# Patient Record
Sex: MALE | Race: WHITE | NOT HISPANIC OR LATINO | Employment: FULL TIME | URBAN - METROPOLITAN AREA
[De-identification: names, ages, dates, MRNs, and addresses within clinical notes are randomized per-mention and may not be internally consistent; named-entity substitution may affect disease eponyms.]

---

## 2020-02-25 ENCOUNTER — HOSPITAL ENCOUNTER (OUTPATIENT)
Dept: RADIOLOGY | Facility: HOSPITAL | Age: 50
Discharge: HOME | End: 2020-02-25
Payer: COMMERCIAL

## 2020-02-25 ENCOUNTER — TRANSCRIBE ORDERS (OUTPATIENT)
Dept: ADMINISTRATIVE | Facility: HOSPITAL | Age: 50
End: 2020-02-25

## 2020-02-25 DIAGNOSIS — M54.2 CERVICALGIA: Primary | ICD-10-CM

## 2020-02-25 DIAGNOSIS — M54.2 CERVICALGIA: ICD-10-CM

## 2020-02-25 PROCEDURE — 72040 X-RAY EXAM NECK SPINE 2-3 VW: CPT

## 2021-01-06 ENCOUNTER — TRANSCRIBE ORDERS (OUTPATIENT)
Dept: ADMINISTRATIVE | Facility: HOSPITAL | Age: 51
End: 2021-01-06

## 2021-01-06 ENCOUNTER — HOSPITAL ENCOUNTER (OUTPATIENT)
Dept: RADIOLOGY | Facility: HOSPITAL | Age: 51
Discharge: HOME | End: 2021-01-06
Payer: COMMERCIAL

## 2021-01-06 DIAGNOSIS — M79.644 PAIN OF RIGHT THUMB: ICD-10-CM

## 2021-01-06 DIAGNOSIS — M79.644 PAIN OF RIGHT THUMB: Primary | ICD-10-CM

## 2021-01-06 PROCEDURE — 73140 X-RAY EXAM OF FINGER(S): CPT

## 2022-01-26 ENCOUNTER — HOSPITAL ENCOUNTER (OUTPATIENT)
Dept: CT IMAGING | Facility: HOSPITAL | Age: 52
Discharge: HOME | End: 2022-01-26
Payer: COMMERCIAL

## 2022-01-26 DIAGNOSIS — R31.9 HEMATURIA: ICD-10-CM

## 2022-01-26 PROCEDURE — 74176 CT ABD & PELVIS W/O CONTRAST: CPT

## 2022-02-10 ENCOUNTER — TELEPHONE (OUTPATIENT)
Dept: UROLOGY | Facility: AMBULATORY SURGERY CENTER | Age: 52
End: 2022-02-10

## 2022-02-10 NOTE — TELEPHONE ENCOUNTER
Please Triage  New Patient- Oj Elias   What is the reason for the patients appointment? Pt calling after being referred from his pcp - he did take medication from this previous visit with pcp  He has now had a ct and pcp said he needs to be seen by urology  Pt describes does have lower flank pain  Can urinate and no fever  No blood currently in urine  Imaging/Lab Results:  Do we accept the patient's insurance or is the patient Self-Pay? Provider & Plan: [facility]   Member ID#:    Has the patient had any previous Urologist(s)?no  Have patient records been requested?no  Has the patient had any outside testing done?no - did have ct with pcp   Does the patient have a personal history of cancer?no  Patient can be reached at : [phone]      PT is scheduled for 3/31 - if this is appropriate no action needed

## 2022-02-11 NOTE — TELEPHONE ENCOUNTER
CT scan abdomen pelvis 1/26/22: IMPRESSION:     1   Bilateral nonobstructive nephrolithiasis  Note is made of mild asymmetric fullness of the left renal pelvis without maile hydronephrosis      2  Borderline mild circumferential mural thickening of the urinary bladder of uncertain clinical significance and possibly related to underdistention      3   5 7 mm nodule involving the lingula  While I favor this finding to be related to nodular atelectasis, small developing lung nodule cannot be entirely excluded  Based on current Fleischner Society 2017 Guidelines on incidental pulmonary nodule, no   routine follow-up is needed if the patient is considered low risk for lung cancer  If the patient is considered high risk for lung cancer, 12 month follow-up non-contrast chest CT is recommended      Please see above for additional incidental findings  Please advise if appointment is appropriate 3/31/22

## 2022-03-31 ENCOUNTER — OFFICE VISIT (OUTPATIENT)
Dept: UROLOGY | Facility: CLINIC | Age: 52
End: 2022-03-31
Payer: COMMERCIAL

## 2022-03-31 VITALS
SYSTOLIC BLOOD PRESSURE: 132 MMHG | HEART RATE: 97 BPM | DIASTOLIC BLOOD PRESSURE: 78 MMHG | WEIGHT: 218.2 LBS | HEIGHT: 67 IN | BODY MASS INDEX: 34.25 KG/M2

## 2022-03-31 DIAGNOSIS — N40.0 BENIGN PROSTATIC HYPERPLASIA WITHOUT LOWER URINARY TRACT SYMPTOMS: ICD-10-CM

## 2022-03-31 DIAGNOSIS — R31.0 GROSS HEMATURIA: Primary | ICD-10-CM

## 2022-03-31 PROCEDURE — 99203 OFFICE O/P NEW LOW 30 MIN: CPT | Performed by: PHYSICIAN ASSISTANT

## 2022-03-31 RX ORDER — AMITRIPTYLINE HYDROCHLORIDE 100 MG/1
200 TABLET, FILM COATED ORAL
COMMUNITY
Start: 2022-03-09

## 2022-03-31 RX ORDER — ATORVASTATIN CALCIUM 10 MG/1
10 TABLET, FILM COATED ORAL DAILY
COMMUNITY
Start: 2022-03-09

## 2022-03-31 RX ORDER — LOSARTAN POTASSIUM 25 MG/1
25 TABLET ORAL DAILY
COMMUNITY
Start: 2022-03-09

## 2022-03-31 NOTE — PROGRESS NOTES
UROLOGY CONSULTATION NOTE     Patient Identifiers: Mary Kay Calvo (MRN: [id number])  Service Requesting Consultation:Andrzej Fenton DO   Service Providing Consultation:  Urology, Emil Laurent PA-C  Consults  Date of Service: 3/31/2022    Reason for Consultation:  Micro hematuria  History of Present Illness:     Mary Kay Calvo is a 45 y o  Male with no prior urologic history is referred from primary care for evaluation of gross  Hematuria  He had one recent episode of gross hematuria  But it sounds like he experienced this before  No lower tract symptoms  Denies any family history of prostate bladder or kidney disease  CT shows small stones in both kidneys  Current  cigarette smoking   Past Medical, Past Surgical History:   History reviewed  No pertinent past medical history :    Past Surgical History:   Procedure Laterality Date    KNEE SURGERY      MANDIBLE FRACTURE SURGERY  1985    PATELLA FRACTURE SURGERY      RECTAL SURGERY     :    Medications, Allergies:     Current Outpatient Medications:     amitriptyline (ELAVIL) 100 mg tablet, 200 mg daily at bedtime, Disp: , Rfl:     atorvastatin (LIPITOR) 10 mg tablet, Take 10 mg by mouth daily, Disp: , Rfl:     losartan (COZAAR) 25 mg tablet, Take 25 mg by mouth daily, Disp: , Rfl:     metFORMIN (GLUCOPHAGE) 500 mg tablet, TAKE 1 TABLET EVERY EVENING MEAL, Disp: , Rfl:     Allergies:  No Known Allergies:    Social and Family History:   Social History:   Social History     Tobacco Use    Smoking status: Current Every Day Smoker     Packs/day: 1 50    Smokeless tobacco: Never Used   Vaping Use    Vaping Use: Never used   Substance Use Topics    Alcohol use: Not Currently    Drug use: Never         Social History     Tobacco Use   Smoking Status Current Every Day Smoker    Packs/day: 1 50   Smokeless Tobacco Never Used       Family History:  Family History   Problem Relation Age of Onset    No Known Problems Father     No Known Problems Mother    :     Review of Systems:     General: Fever, chills, or night sweats: negative  Cardiac: Negative for chest pain  Pulmonary: Negative for shortness of breath  Gastrointestinal: Abdominal pain negative  Nausea, vomiting, or diarrhea negative,  Genitourinary: See HPI above  Patient did  have gross hematuria  All other systems queried were negative  Physical Exam:   General: Patient is pleasant and in NAD  Awake and alert  /78 (BP Location: Left arm, Patient Position: Sitting, Cuff Size: Adult)   Pulse 97   Ht 5' 7" (1 702 m)   Wt 99 kg (218 lb 3 2 oz)   BMI 34 17 kg/m²   HEENT:conjunctiva clear  Constitutional:  pleasant and cooperative     no apparent distress  Cardiac: Peripheral edema: negative  Pulmonary: Non-labored breathing  Abdomen: Soft, non-tender, non-distended  No surgical scars  No masses, tenderness, hernias noted  Genitourinary: Negative CVA tenderness, negative suprapubic tenderness  Extremities:moves all extremities  Neurological:CNII-XII intact  No numbness or tingling  Essentially non focal neurologic exam  Psychiatric:mood affect and behavior normal    (Male): Penis circumcised, phallus normal, meatus patent  Testicles descended into scrotum bilaterally without masses nor tenderness  No inguinal hernias bilaterally  WES: Prostate is not enlarged at 28 grams  The prostate is not boggy  The prostate is not tender  No nodules noted  Labs:   No results found for: HGB, HCT, WBC, PLT]    No results found for: NA, K, CL, CO2, BUN, CREATININE, CALCIUM, GLUCOSE]    Imaging:   I personally reviewed the images and report of the following studies, and reviewed them with the patient:  CT ABDOMEN AND PELVIS WITHOUT IV CONTRAST   IMPRESSION:     1  Bilateral nonobstructive nephrolithiasis  Note is made of mild asymmetric fullness of the left renal pelvis without maile hydronephrosis       2   Borderline mild circumferential mural thickening of the urinary bladder of uncertain clinical significance and possibly related to underdistention  3   5 7 mm nodule involving the lingula  While I favor this finding to be related to nodular atelectasis, small developing lung nodule cannot be entirely excluded  Based on current Fleischner Society 2017 Guidelines on incidental pulmonary nodule, no   routine follow-up is needed if the patient is considered low risk for lung cancer  If the patient is considered high risk for lung cancer, 12 month follow-up non-contrast chest CT is recommended  ASSESSMENT:     1  Microhematuria      PLAN:   -urine for cytology  -office follow up for cystoscopy      Thank you for allowing me to participate in this patients care  Please do not hesitate to call with any additional questions    Ryan Boston PA-C

## 2022-07-03 PROBLEM — R31.0 GROSS HEMATURIA: Status: ACTIVE | Noted: 2022-07-03

## 2022-07-03 PROBLEM — Z71.2 ENCOUNTER TO DISCUSS TEST RESULTS: Status: ACTIVE | Noted: 2022-07-03

## 2022-07-04 ENCOUNTER — TELEPHONE (OUTPATIENT)
Dept: OTHER | Facility: OTHER | Age: 52
End: 2022-07-04

## 2022-07-04 NOTE — TELEPHONE ENCOUNTER
Pt called, requesting a call back from office at best convenience, to reschedule cancelled appointment

## 2024-04-19 ENCOUNTER — APPOINTMENT (EMERGENCY)
Dept: RADIOLOGY | Facility: HOSPITAL | Age: 54
DRG: 987 | End: 2024-04-19
Payer: COMMERCIAL

## 2024-04-19 ENCOUNTER — APPOINTMENT (EMERGENCY)
Dept: CT IMAGING | Facility: HOSPITAL | Age: 54
DRG: 987 | End: 2024-04-19
Payer: COMMERCIAL

## 2024-04-19 ENCOUNTER — HOSPITAL ENCOUNTER (INPATIENT)
Facility: HOSPITAL | Age: 54
LOS: 5 days | Discharge: HOME | DRG: 987 | End: 2024-04-24
Attending: EMERGENCY MEDICINE | Admitting: INTERNAL MEDICINE
Payer: COMMERCIAL

## 2024-04-19 DIAGNOSIS — I10 PRIMARY HYPERTENSION: ICD-10-CM

## 2024-04-19 DIAGNOSIS — F17.200 TOBACCO DEPENDENCE: ICD-10-CM

## 2024-04-19 DIAGNOSIS — I26.09 ACUTE PULMONARY EMBOLISM WITH ACUTE COR PULMONALE, UNSPECIFIED PULMONARY EMBOLISM TYPE (HCC): ICD-10-CM

## 2024-04-19 DIAGNOSIS — R09.02 HYPOXIA: ICD-10-CM

## 2024-04-19 DIAGNOSIS — R93.89 ABNORMAL CT OF THE CHEST: ICD-10-CM

## 2024-04-19 DIAGNOSIS — E11.9 TYPE 2 DIABETES MELLITUS (HCC): ICD-10-CM

## 2024-04-19 DIAGNOSIS — I26.99 ACUTE PULMONARY EMBOLISM (HCC): ICD-10-CM

## 2024-04-19 DIAGNOSIS — J18.9 MULTIFOCAL PNEUMONIA: ICD-10-CM

## 2024-04-19 DIAGNOSIS — R07.82 INTERCOSTAL PAIN: ICD-10-CM

## 2024-04-19 DIAGNOSIS — I26.99 PULMONARY EMBOLI (HCC): Primary | ICD-10-CM

## 2024-04-19 LAB
2HR DELTA HS TROPONIN: 2 NG/L
ALBUMIN SERPL BCP-MCNC: 4.1 G/DL (ref 3.5–5)
ALP SERPL-CCNC: 71 U/L (ref 34–104)
ALT SERPL W P-5'-P-CCNC: 20 U/L (ref 7–52)
ANION GAP SERPL CALCULATED.3IONS-SCNC: 8 MMOL/L (ref 4–13)
APTT PPP: 40 SECONDS (ref 23–37)
AST SERPL W P-5'-P-CCNC: 13 U/L (ref 13–39)
BASOPHILS # BLD AUTO: 0.06 THOUSANDS/ÂΜL (ref 0–0.1)
BASOPHILS NFR BLD AUTO: 1 % (ref 0–1)
BILIRUB SERPL-MCNC: 0.33 MG/DL (ref 0.2–1)
BNP SERPL-MCNC: 11 PG/ML (ref 0–100)
BUN SERPL-MCNC: 21 MG/DL (ref 5–25)
CALCIUM SERPL-MCNC: 9.7 MG/DL (ref 8.4–10.2)
CARDIAC TROPONIN I PNL SERPL HS: 24 NG/L
CARDIAC TROPONIN I PNL SERPL HS: 26 NG/L
CHLORIDE SERPL-SCNC: 104 MMOL/L (ref 96–108)
CO2 SERPL-SCNC: 25 MMOL/L (ref 21–32)
CREAT SERPL-MCNC: 0.74 MG/DL (ref 0.6–1.3)
D DIMER PPP FEU-MCNC: 13.7 UG/ML FEU
EOSINOPHIL # BLD AUTO: 0.17 THOUSAND/ÂΜL (ref 0–0.61)
EOSINOPHIL NFR BLD AUTO: 2 % (ref 0–6)
ERYTHROCYTE [DISTWIDTH] IN BLOOD BY AUTOMATED COUNT: 15.6 % (ref 11.6–15.1)
GFR SERPL CREATININE-BSD FRML MDRD: 103 ML/MIN/1.73SQ M
GLUCOSE SERPL-MCNC: 100 MG/DL (ref 65–140)
HCT VFR BLD AUTO: 38.7 % (ref 36.5–49.3)
HGB BLD-MCNC: 12.1 G/DL (ref 12–17)
IMM GRANULOCYTES # BLD AUTO: 0.06 THOUSAND/UL (ref 0–0.2)
IMM GRANULOCYTES NFR BLD AUTO: 1 % (ref 0–2)
INR PPP: 1.13 (ref 0.84–1.19)
LYMPHOCYTES # BLD AUTO: 3.09 THOUSANDS/ÂΜL (ref 0.6–4.47)
LYMPHOCYTES NFR BLD AUTO: 27 % (ref 14–44)
MCH RBC QN AUTO: 24.7 PG (ref 26.8–34.3)
MCHC RBC AUTO-ENTMCNC: 31.3 G/DL (ref 31.4–37.4)
MCV RBC AUTO: 79 FL (ref 82–98)
MONOCYTES # BLD AUTO: 0.96 THOUSAND/ÂΜL (ref 0.17–1.22)
MONOCYTES NFR BLD AUTO: 8 % (ref 4–12)
NEUTROPHILS # BLD AUTO: 7.11 THOUSANDS/ÂΜL (ref 1.85–7.62)
NEUTS SEG NFR BLD AUTO: 61 % (ref 43–75)
NRBC BLD AUTO-RTO: 0 /100 WBCS
PLATELET # BLD AUTO: 277 THOUSANDS/UL (ref 149–390)
PMV BLD AUTO: 8.8 FL (ref 8.9–12.7)
POTASSIUM SERPL-SCNC: 4.1 MMOL/L (ref 3.5–5.3)
PROT SERPL-MCNC: 7.5 G/DL (ref 6.4–8.4)
PROTHROMBIN TIME: 15.2 SECONDS (ref 11.6–14.5)
RBC # BLD AUTO: 4.89 MILLION/UL (ref 3.88–5.62)
SODIUM SERPL-SCNC: 137 MMOL/L (ref 135–147)
WBC # BLD AUTO: 11.45 THOUSAND/UL (ref 4.31–10.16)

## 2024-04-19 PROCEDURE — 96365 THER/PROPH/DIAG IV INF INIT: CPT

## 2024-04-19 PROCEDURE — 82728 ASSAY OF FERRITIN: CPT

## 2024-04-19 PROCEDURE — 85730 THROMBOPLASTIN TIME PARTIAL: CPT

## 2024-04-19 PROCEDURE — 71275 CT ANGIOGRAPHY CHEST: CPT

## 2024-04-19 PROCEDURE — 85379 FIBRIN DEGRADATION QUANT: CPT

## 2024-04-19 PROCEDURE — 36415 COLL VENOUS BLD VENIPUNCTURE: CPT | Performed by: EMERGENCY MEDICINE

## 2024-04-19 PROCEDURE — 99285 EMERGENCY DEPT VISIT HI MDM: CPT

## 2024-04-19 PROCEDURE — 83550 IRON BINDING TEST: CPT

## 2024-04-19 PROCEDURE — 85025 COMPLETE CBC W/AUTO DIFF WBC: CPT | Performed by: EMERGENCY MEDICINE

## 2024-04-19 PROCEDURE — 83540 ASSAY OF IRON: CPT

## 2024-04-19 PROCEDURE — 83880 ASSAY OF NATRIURETIC PEPTIDE: CPT

## 2024-04-19 PROCEDURE — 87040 BLOOD CULTURE FOR BACTERIA: CPT

## 2024-04-19 PROCEDURE — 96375 TX/PRO/DX INJ NEW DRUG ADDON: CPT

## 2024-04-19 PROCEDURE — 71045 X-RAY EXAM CHEST 1 VIEW: CPT

## 2024-04-19 PROCEDURE — 84145 PROCALCITONIN (PCT): CPT

## 2024-04-19 PROCEDURE — 80053 COMPREHEN METABOLIC PANEL: CPT | Performed by: EMERGENCY MEDICINE

## 2024-04-19 PROCEDURE — 84484 ASSAY OF TROPONIN QUANT: CPT | Performed by: EMERGENCY MEDICINE

## 2024-04-19 PROCEDURE — 93005 ELECTROCARDIOGRAM TRACING: CPT

## 2024-04-19 PROCEDURE — 85610 PROTHROMBIN TIME: CPT

## 2024-04-19 RX ORDER — HEPARIN SODIUM 1000 [USP'U]/ML
3600 INJECTION, SOLUTION INTRAVENOUS; SUBCUTANEOUS EVERY 6 HOURS PRN
Status: DISCONTINUED | OUTPATIENT
Start: 2024-04-19 | End: 2024-04-23

## 2024-04-19 RX ORDER — HEPARIN SODIUM 10000 [USP'U]/100ML
3-30 INJECTION, SOLUTION INTRAVENOUS
Status: DISCONTINUED | OUTPATIENT
Start: 2024-04-19 | End: 2024-04-23

## 2024-04-19 RX ORDER — HEPARIN SODIUM 1000 [USP'U]/ML
7200 INJECTION, SOLUTION INTRAVENOUS; SUBCUTANEOUS ONCE
Status: COMPLETED | OUTPATIENT
Start: 2024-04-19 | End: 2024-04-19

## 2024-04-19 RX ORDER — LIDOCAINE 50 MG/G
1 PATCH TOPICAL ONCE
Status: COMPLETED | OUTPATIENT
Start: 2024-04-19 | End: 2024-04-20

## 2024-04-19 RX ORDER — METHOCARBAMOL 500 MG/1
500 TABLET, FILM COATED ORAL 4 TIMES DAILY PRN
COMMUNITY
Start: 2024-02-27 | End: 2024-05-01

## 2024-04-19 RX ORDER — FAMOTIDINE 40 MG/1
40 TABLET, FILM COATED ORAL DAILY PRN
COMMUNITY
Start: 2024-04-02

## 2024-04-19 RX ORDER — HEPARIN SODIUM 1000 [USP'U]/ML
7200 INJECTION, SOLUTION INTRAVENOUS; SUBCUTANEOUS EVERY 6 HOURS PRN
Status: DISCONTINUED | OUTPATIENT
Start: 2024-04-19 | End: 2024-04-23

## 2024-04-19 RX ORDER — INSULIN LISPRO 100 [IU]/ML
1-6 INJECTION, SOLUTION INTRAVENOUS; SUBCUTANEOUS
Status: DISCONTINUED | OUTPATIENT
Start: 2024-04-20 | End: 2024-04-24 | Stop reason: HOSPADM

## 2024-04-19 RX ORDER — GLIMEPIRIDE 1 MG/1
1 TABLET ORAL 2 TIMES DAILY WITH MEALS
COMMUNITY
Start: 2024-03-19 | End: 2024-04-30 | Stop reason: SDUPTHER

## 2024-04-19 RX ORDER — CEPHALEXIN 500 MG/1
500 CAPSULE ORAL EVERY 8 HOURS SCHEDULED
Status: ON HOLD | COMMUNITY
Start: 2024-04-03 | End: 2024-04-20 | Stop reason: ALTCHOICE

## 2024-04-19 RX ADMIN — HEPARIN SODIUM 7200 UNITS: 1000 INJECTION INTRAVENOUS; SUBCUTANEOUS at 23:34

## 2024-04-19 RX ADMIN — HEPARIN SODIUM 18 UNITS/KG/HR: 10000 INJECTION, SOLUTION INTRAVENOUS at 23:34

## 2024-04-19 RX ADMIN — IOHEXOL 85 ML: 350 INJECTION, SOLUTION INTRAVENOUS at 21:27

## 2024-04-19 RX ADMIN — LIDOCAINE 1 PATCH: 50 PATCH CUTANEOUS at 23:44

## 2024-04-19 RX ADMIN — AZITHROMYCIN MONOHYDRATE 500 MG: 500 INJECTION, POWDER, LYOPHILIZED, FOR SOLUTION INTRAVENOUS at 23:37

## 2024-04-19 RX ADMIN — CEFTRIAXONE SODIUM 1000 MG: 10 INJECTION, POWDER, FOR SOLUTION INTRAVENOUS at 22:59

## 2024-04-20 ENCOUNTER — APPOINTMENT (INPATIENT)
Dept: NON INVASIVE DIAGNOSTICS | Facility: HOSPITAL | Age: 54
DRG: 987 | End: 2024-04-20
Payer: COMMERCIAL

## 2024-04-20 ENCOUNTER — APPOINTMENT (INPATIENT)
Dept: VASCULAR ULTRASOUND | Facility: HOSPITAL | Age: 54
DRG: 987 | End: 2024-04-20
Payer: COMMERCIAL

## 2024-04-20 ENCOUNTER — APPOINTMENT (INPATIENT)
Dept: CT IMAGING | Facility: HOSPITAL | Age: 54
DRG: 987 | End: 2024-04-20
Payer: COMMERCIAL

## 2024-04-20 PROBLEM — R07.9 CHEST PAIN: Status: ACTIVE | Noted: 2024-04-20

## 2024-04-20 PROBLEM — D72.829 LEUCOCYTOSIS: Status: ACTIVE | Noted: 2024-04-20

## 2024-04-20 PROBLEM — R06.02 SOB (SHORTNESS OF BREATH): Status: ACTIVE | Noted: 2024-04-20

## 2024-04-20 PROBLEM — F17.200 TOBACCO DEPENDENCE: Status: ACTIVE | Noted: 2024-04-20

## 2024-04-20 LAB
4HR DELTA HS TROPONIN: -7 NG/L
AORTIC ROOT: 4 CM
AORTIC VALVE MEAN VELOCITY: 13.8 M/S
APICAL FOUR CHAMBER EJECTION FRACTION: 56 %
APTT PPP: 57 SECONDS (ref 23–37)
APTT PPP: 64 SECONDS (ref 23–37)
APTT PPP: 64 SECONDS (ref 23–37)
ASCENDING AORTA: 3.5 CM
AV AREA BY CONTINUOUS VTI: 2.1 CM2
AV AREA PEAK VELOCITY: 1.8 CM2
AV LVOT MEAN GRADIENT: 2 MMHG
AV LVOT PEAK GRADIENT: 4 MMHG
AV MEAN GRADIENT: 9 MMHG
AV PEAK GRADIENT: 15 MMHG
AV VALVE AREA: 2.06 CM2
AV VELOCITY RATIO: 0.48
BSA FOR ECHO PROCEDURE: 2.06 M2
CARDIAC TROPONIN I PNL SERPL HS: 17 NG/L
DOP CALC AO PEAK VEL: 1.95 M/S
DOP CALC AO VTI: 34.22 CM
DOP CALC LVOT AREA: 3.8 CM2
DOP CALC LVOT CARDIAC INDEX: 3.09 L/MIN/M2
DOP CALC LVOT CARDIAC OUTPUT: 6.37 L/MIN
DOP CALC LVOT DIAMETER: 2.2 CM
DOP CALC LVOT PEAK VEL VTI: 18.52 CM
DOP CALC LVOT PEAK VEL: 0.94 M/S
DOP CALC LVOT STROKE INDEX: 33 ML/M2
DOP CALC LVOT STROKE VOLUME: 70.36
E WAVE DECELERATION TIME: 168 MS
E/A RATIO: 0.83
ERYTHROCYTE [DISTWIDTH] IN BLOOD BY AUTOMATED COUNT: 15.8 % (ref 11.6–15.1)
EST. AVERAGE GLUCOSE BLD GHB EST-MCNC: 157 MG/DL
FERRITIN SERPL-MCNC: 63 NG/ML (ref 24–336)
FLUAV RNA RESP QL NAA+PROBE: NEGATIVE
FLUBV RNA RESP QL NAA+PROBE: NEGATIVE
FRACTIONAL SHORTENING: 30 (ref 28–44)
GLUCOSE SERPL-MCNC: 118 MG/DL (ref 65–140)
GLUCOSE SERPL-MCNC: 133 MG/DL (ref 65–140)
GLUCOSE SERPL-MCNC: 135 MG/DL (ref 65–140)
GLUCOSE SERPL-MCNC: 168 MG/DL (ref 65–140)
GLUCOSE SERPL-MCNC: 191 MG/DL (ref 65–140)
HBA1C MFR BLD: 7.1 %
HCT VFR BLD AUTO: 37.4 % (ref 36.5–49.3)
HGB BLD-MCNC: 11.7 G/DL (ref 12–17)
INTERVENTRICULAR SEPTUM IN DIASTOLE (PARASTERNAL SHORT AXIS VIEW): 1.3 CM
INTERVENTRICULAR SEPTUM: 1.3 CM (ref 0.6–1.1)
IRON SATN MFR SERPL: 13 % (ref 15–50)
IRON SERPL-MCNC: 28 UG/DL (ref 50–212)
LAAS-AP2: 21.6 CM2
LAAS-AP4: 20.7 CM2
LEFT ATRIUM SIZE: 3.9 CM
LEFT ATRIUM VOLUME (MOD BIPLANE): 68 ML
LEFT ATRIUM VOLUME INDEX (MOD BIPLANE): 33 ML/M2
LEFT INTERNAL DIMENSION IN SYSTOLE: 3.3 CM (ref 2.1–4)
LEFT VENTRICULAR INTERNAL DIMENSION IN DIASTOLE: 4.7 CM (ref 3.5–6)
LEFT VENTRICULAR POSTERIOR WALL IN END DIASTOLE: 1.3 CM
LEFT VENTRICULAR STROKE VOLUME: 58 ML
LVSV (TEICH): 58 ML
MCH RBC QN AUTO: 24.8 PG (ref 26.8–34.3)
MCHC RBC AUTO-ENTMCNC: 31.3 G/DL (ref 31.4–37.4)
MCV RBC AUTO: 79 FL (ref 82–98)
MV E'TISSUE VEL-SEP: 10 CM/S
MV PEAK A VEL: 1.06 M/S
MV PEAK E VEL: 88 CM/S
MV STENOSIS PRESSURE HALF TIME: 49 MS
MV VALVE AREA P 1/2 METHOD: 4.49
PLATELET # BLD AUTO: 262 THOUSANDS/UL (ref 149–390)
PMV BLD AUTO: 9 FL (ref 8.9–12.7)
PROCALCITONIN SERPL-MCNC: 0.11 NG/ML
PROCALCITONIN SERPL-MCNC: 0.13 NG/ML
RBC # BLD AUTO: 4.72 MILLION/UL (ref 3.88–5.62)
RIGHT ATRIAL 2D VOLUME: 42 ML
RIGHT ATRIUM AREA SYSTOLE A4C: 15.1 CM2
RIGHT VENTRICLE ID DIMENSION: 4.2 CM
RSV RNA RESP QL NAA+PROBE: NEGATIVE
SARS-COV-2 RNA RESP QL NAA+PROBE: NEGATIVE
SL CV LEFT ATRIUM LENGTH A2C: 5.1 CM
SL CV LV EF: 60
SL CV PED ECHO LEFT VENTRICLE DIASTOLIC VOLUME (MOD BIPLANE) 2D: 104 ML
SL CV PED ECHO LEFT VENTRICLE SYSTOLIC VOLUME (MOD BIPLANE) 2D: 45 ML
TIBC SERPL-MCNC: 212 UG/DL (ref 250–450)
TRICUSPID ANNULAR PLANE SYSTOLIC EXCURSION: 2.3 CM
UIBC SERPL-MCNC: 184 UG/DL (ref 155–355)
WBC # BLD AUTO: 10.33 THOUSAND/UL (ref 4.31–10.16)

## 2024-04-20 PROCEDURE — 82948 REAGENT STRIP/BLOOD GLUCOSE: CPT

## 2024-04-20 PROCEDURE — 85730 THROMBOPLASTIN TIME PARTIAL: CPT

## 2024-04-20 PROCEDURE — 93970 EXTREMITY STUDY: CPT | Performed by: SURGERY

## 2024-04-20 PROCEDURE — 74178 CT ABD&PLV WO CNTR FLWD CNTR: CPT

## 2024-04-20 PROCEDURE — 83036 HEMOGLOBIN GLYCOSYLATED A1C: CPT | Performed by: INTERNAL MEDICINE

## 2024-04-20 PROCEDURE — 99223 1ST HOSP IP/OBS HIGH 75: CPT | Performed by: HOSPITALIST

## 2024-04-20 PROCEDURE — 3051F HG A1C>EQUAL 7.0%<8.0%: CPT | Performed by: FAMILY MEDICINE

## 2024-04-20 PROCEDURE — 93970 EXTREMITY STUDY: CPT

## 2024-04-20 PROCEDURE — 93306 TTE W/DOPPLER COMPLETE: CPT | Performed by: INTERNAL MEDICINE

## 2024-04-20 PROCEDURE — 85730 THROMBOPLASTIN TIME PARTIAL: CPT | Performed by: HOSPITALIST

## 2024-04-20 PROCEDURE — 84145 PROCALCITONIN (PCT): CPT

## 2024-04-20 PROCEDURE — 93306 TTE W/DOPPLER COMPLETE: CPT

## 2024-04-20 PROCEDURE — 84484 ASSAY OF TROPONIN QUANT: CPT | Performed by: INTERNAL MEDICINE

## 2024-04-20 PROCEDURE — 85027 COMPLETE CBC AUTOMATED: CPT

## 2024-04-20 PROCEDURE — 0241U HB NFCT DS VIR RESP RNA 4 TRGT: CPT

## 2024-04-20 RX ORDER — FAMOTIDINE 20 MG/1
40 TABLET, FILM COATED ORAL DAILY PRN
Status: DISCONTINUED | OUTPATIENT
Start: 2024-04-20 | End: 2024-04-24 | Stop reason: HOSPADM

## 2024-04-20 RX ORDER — OXYCODONE HYDROCHLORIDE 5 MG/1
5 TABLET ORAL EVERY 4 HOURS PRN
Status: DISCONTINUED | OUTPATIENT
Start: 2024-04-20 | End: 2024-04-24 | Stop reason: HOSPADM

## 2024-04-20 RX ORDER — ACETAMINOPHEN 325 MG/1
650 TABLET ORAL EVERY 6 HOURS PRN
Status: DISCONTINUED | OUTPATIENT
Start: 2024-04-20 | End: 2024-04-20

## 2024-04-20 RX ORDER — ACETAMINOPHEN 325 MG/1
975 TABLET ORAL EVERY 8 HOURS SCHEDULED
Status: DISCONTINUED | OUTPATIENT
Start: 2024-04-20 | End: 2024-04-24 | Stop reason: HOSPADM

## 2024-04-20 RX ORDER — FUROSEMIDE 10 MG/ML
20 INJECTION INTRAMUSCULAR; INTRAVENOUS ONCE
Status: COMPLETED | OUTPATIENT
Start: 2024-04-20 | End: 2024-04-20

## 2024-04-20 RX ORDER — BENZONATATE 100 MG/1
200 CAPSULE ORAL 3 TIMES DAILY
Status: DISCONTINUED | OUTPATIENT
Start: 2024-04-20 | End: 2024-04-24 | Stop reason: HOSPADM

## 2024-04-20 RX ORDER — LOSARTAN POTASSIUM 25 MG/1
25 TABLET ORAL DAILY
Status: DISCONTINUED | OUTPATIENT
Start: 2024-04-20 | End: 2024-04-24 | Stop reason: HOSPADM

## 2024-04-20 RX ORDER — METHOCARBAMOL 500 MG/1
500 TABLET, FILM COATED ORAL 4 TIMES DAILY PRN
Status: DISCONTINUED | OUTPATIENT
Start: 2024-04-20 | End: 2024-04-24 | Stop reason: HOSPADM

## 2024-04-20 RX ORDER — AMITRIPTYLINE HYDROCHLORIDE 25 MG/1
200 TABLET, FILM COATED ORAL
Status: DISCONTINUED | OUTPATIENT
Start: 2024-04-20 | End: 2024-04-24 | Stop reason: HOSPADM

## 2024-04-20 RX ORDER — IBUPROFEN 400 MG/1
400 TABLET ORAL ONCE
Status: COMPLETED | OUTPATIENT
Start: 2024-04-20 | End: 2024-04-20

## 2024-04-20 RX ORDER — LANOLIN ALCOHOL/MO/W.PET/CERES
3 CREAM (GRAM) TOPICAL
Status: DISCONTINUED | OUTPATIENT
Start: 2024-04-20 | End: 2024-04-24 | Stop reason: HOSPADM

## 2024-04-20 RX ADMIN — ACETAMINOPHEN 975 MG: 325 TABLET, FILM COATED ORAL at 21:24

## 2024-04-20 RX ADMIN — BENZONATATE 200 MG: 100 CAPSULE ORAL at 08:14

## 2024-04-20 RX ADMIN — ACETAMINOPHEN 975 MG: 325 TABLET, FILM COATED ORAL at 14:37

## 2024-04-20 RX ADMIN — IBUPROFEN 400 MG: 400 TABLET, FILM COATED ORAL at 01:53

## 2024-04-20 RX ADMIN — AMITRIPTYLINE HYDROCHLORIDE 200 MG: 25 TABLET, FILM COATED ORAL at 21:24

## 2024-04-20 RX ADMIN — HEPARIN SODIUM 20 UNITS/KG/HR: 10000 INJECTION, SOLUTION INTRAVENOUS at 21:25

## 2024-04-20 RX ADMIN — IOHEXOL 90 ML: 350 INJECTION, SOLUTION INTRAVENOUS at 16:53

## 2024-04-20 RX ADMIN — METHOCARBAMOL 500 MG: 500 TABLET ORAL at 21:24

## 2024-04-20 RX ADMIN — BENZONATATE 200 MG: 100 CAPSULE ORAL at 21:24

## 2024-04-20 RX ADMIN — INSULIN LISPRO 1 UNITS: 100 INJECTION, SOLUTION INTRAVENOUS; SUBCUTANEOUS at 08:15

## 2024-04-20 RX ADMIN — BENZONATATE 200 MG: 100 CAPSULE ORAL at 17:23

## 2024-04-20 RX ADMIN — Medication 3 MG: at 21:24

## 2024-04-20 RX ADMIN — FUROSEMIDE 20 MG: 10 INJECTION, SOLUTION INTRAMUSCULAR; INTRAVENOUS at 01:54

## 2024-04-20 RX ADMIN — OXYCODONE HYDROCHLORIDE 5 MG: 5 TABLET ORAL at 21:24

## 2024-04-20 RX ADMIN — AMITRIPTYLINE HYDROCHLORIDE 200 MG: 25 TABLET, FILM COATED ORAL at 02:35

## 2024-04-20 RX ADMIN — HEPARIN SODIUM 3600 UNITS: 1000 INJECTION INTRAVENOUS; SUBCUTANEOUS at 06:03

## 2024-04-20 RX ADMIN — LOSARTAN POTASSIUM 25 MG: 25 TABLET, FILM COATED ORAL at 08:14

## 2024-04-20 RX ADMIN — ACETAMINOPHEN 975 MG: 325 TABLET, FILM COATED ORAL at 05:14

## 2024-04-20 RX ADMIN — HEPARIN SODIUM 20 UNITS/KG/HR: 10000 INJECTION, SOLUTION INTRAVENOUS at 07:37

## 2024-04-20 NOTE — QUICK NOTE
This morning patient notes his chest pain is a bit better. He is not on oxygen at home. He understands we are looking for potential underlying malignancy.    Currently satting 94% on 4L oxygen.    Follow up vas duplex and echo reads  If no RV strain on echo can dc tele, only sinus tach 100-110 recorded  If patient spikes a fever, will add CTX to treat PNA, but imaging findings may just be inflammatory  IR consulted for potential biopsy  CT abdomen pelvis with contrast ordered for further evaluation of enlarged lymph nodes and to see if there is a potential neoplastic source

## 2024-04-20 NOTE — ASSESSMENT & PLAN NOTE
35-year history of smoking history, smoker, half pack per day 53pack.year  Stopped smoking on Monday  Tobacco cessation counseling provided  Patient refused nicotine patches at this time if needed will request back

## 2024-04-20 NOTE — ASSESSMENT & PLAN NOTE
"Assessment:  Recent URI/COVID infection January 2024, since then has had worsening cough and shortness of breath.  Afebrile, no recent sick contacts.  Current smoker. 53 pack/year  CT imaging with   Multiple findings including \"multifocal pneumonia in the right lung worse in the right mid lobe  Pathologically enlarged mediastinal, hilar, periaortic, retrocrural and upper abdominal lymph nodes concerning for diffuse inflammatory/infection or neoplastic process including lymphoma.   Trace bilateral pleural effusions with bibasilar atelectasis. Interstitial pulmonary edema bilaterally.   Nonspecific 1.9 cm left adrenal nodule as described above. Recommend adrenal washout CT in 12 months per current guidelines.  In the ED given findings of possible multifocal pneumonia patient was given ceftriaxone and azithromycin  Afebrile, WBC likely reactive trending down back to normal.  Procalcitonin negative x 2  Iron panel showed signs of inflammatory anemia.  Likely malignancy given lymphadenopathy.    Plan  Pending Blood cx   IR consult appreciated for lymph node biopsy.  Pending CT abdomen/pelvis   "

## 2024-04-20 NOTE — ASSESSMENT & PLAN NOTE
Assessment:   Presented shortness of breath over the past 1 to 2 months worsening for the past 1 week prior to admission.  No lower extremity edema.   CT scan showed bilateral small pleural effusions, atelectasis as well as acute PE  Occupational history.   Likely in the setting of acute PE    Plan  Continue IV heparin as above.   Incentive spirometry

## 2024-04-20 NOTE — ED PROVIDER NOTES
History  Chief Complaint   Patient presents with    Shortness of Breath     Patient reporting SOB for past few weeks. States breathing getting progressively worse. Also reporting L sided chest / rib pain. Rates pain 8/10. Describes as pulling. States worse pain w/ coughing. Reports cough is dry.      Papo Gibbs is a 54 y.o. male with a PMH of diabetes, hypertension presenting to the ED on 2024 with shortness of breath. Patient endorses that for the last month or 2 he has had increasing shortness of breath. Patient states that prior to the onset of these shortness of breath he had tested positive for COVID. All other COVID symptoms resolved however he is still endorsing this cough and shortness of breath.  Patient notes that he out of breath just from putting his shoes on or walking short distances around the house which is not typical for him.  Patient is also complaining of right-sided chest and rib pain he rates this pain an 8 out of 10 and states that it is worse when he is coughing or breathing deeply. Patient is coming in today because his wife is very concerned over his increased work of breathing.  He states that he has been smoking 1 pack of cigarettes a day ever since he was a teenager but has never required oxygen at home and does not have a diagnosis of COPD or asthma.  Patient denies congestion, fevers, chills, abdominal pain, n/v, diarrhea, dysuria, urgency, frequency or any other complaints at this time.         Shortness of Breath  Associated symptoms: chest pain and cough    Associated symptoms: no abdominal pain, no fever, no headaches, no neck pain, no rash, no sore throat, no vomiting and no wheezing        Prior to Admission Medications   Prescriptions Last Dose Informant Patient Reported? Taking?   amitriptyline (ELAVIL) 100 mg tablet 2024 Self Yes Yes   Si mg daily at bedtime   atorvastatin (LIPITOR) 10 mg tablet More than a month Self Yes No   Sig: Take 10 mg by mouth  daily   famotidine (PEPCID) 40 MG tablet More than a month  Yes No   Sig: Take 40 mg by mouth daily as needed for heartburn or indigestion   glimepiride (AMARYL) 1 mg tablet 4/19/2024  Yes Yes   Sig: Take 1 mg by mouth 2 (two) times a day with meals   losartan (COZAAR) 25 mg tablet 4/19/2024 Self Yes Yes   Sig: Take 25 mg by mouth daily   metFORMIN (GLUCOPHAGE) 500 mg tablet  Self Yes No   Sig: Take 1,000 mg by mouth 2 (two) times a day with meals   methocarbamol (ROBAXIN) 500 mg tablet Not Taking  Yes No   Sig: Take 500 mg by mouth 4 (four) times a day as needed for muscle spasms   Patient not taking: Reported on 4/20/2024      Facility-Administered Medications: None       History reviewed. No pertinent past medical history.    Past Surgical History:   Procedure Laterality Date    KNEE SURGERY      MANDIBLE FRACTURE SURGERY  1985    PATELLA FRACTURE SURGERY      RECTAL SURGERY         Family History   Problem Relation Age of Onset    No Known Problems Father     No Known Problems Mother      I have reviewed and agree with the history as documented.    E-Cigarette/Vaping    E-Cigarette Use Never User      E-Cigarette/Vaping Substances    Nicotine No     THC No     CBD No     Flavoring No     Other No     Unknown No      Social History     Tobacco Use    Smoking status: Every Day     Current packs/day: 0.50     Types: Cigarettes    Smokeless tobacco: Never   Vaping Use    Vaping status: Never Used   Substance Use Topics    Alcohol use: Not Currently    Drug use: Never       Review of Systems   Constitutional:  Negative for chills and fever.   HENT:  Negative for congestion and sore throat.    Eyes:  Negative for discharge and redness.   Respiratory:  Positive for cough and shortness of breath. Negative for wheezing.    Cardiovascular:  Positive for chest pain. Negative for palpitations.   Gastrointestinal:  Negative for abdominal distention, abdominal pain, nausea and vomiting.   Genitourinary:  Negative for  difficulty urinating, dysuria, frequency, hematuria and urgency.   Musculoskeletal:  Negative for neck pain and neck stiffness.   Skin:  Negative for color change and rash.   Neurological:  Negative for dizziness, seizures, syncope, light-headedness and headaches.   All other systems reviewed and are negative.      Physical Exam  Physical Exam  Vitals and nursing note reviewed.   Constitutional:       General: He is not in acute distress.     Appearance: Normal appearance. He is well-developed and normal weight. He is not ill-appearing, toxic-appearing or diaphoretic.   HENT:      Head: Normocephalic and atraumatic.      Right Ear: External ear normal.      Left Ear: External ear normal.      Nose: Nose normal. No congestion or rhinorrhea.      Mouth/Throat:      Mouth: Mucous membranes are moist.   Eyes:      General: No scleral icterus.        Right eye: No discharge.         Left eye: No discharge.      Extraocular Movements: Extraocular movements intact.      Conjunctiva/sclera: Conjunctivae normal.   Cardiovascular:      Rate and Rhythm: Normal rate and regular rhythm.      Heart sounds: Normal heart sounds. No murmur heard.     No friction rub. No gallop.   Pulmonary:      Effort: Pulmonary effort is normal. No respiratory distress.      Breath sounds: Normal breath sounds. No wheezing, rhonchi or rales.   Abdominal:      General: Abdomen is flat. Bowel sounds are normal. There is no distension.      Palpations: Abdomen is soft.      Tenderness: There is no abdominal tenderness. There is no guarding or rebound.   Musculoskeletal:         General: No signs of injury. Normal range of motion.      Cervical back: Normal range of motion and neck supple. No rigidity.   Skin:     General: Skin is warm.      Capillary Refill: Capillary refill takes less than 2 seconds.      Coloration: Skin is not pale.      Findings: No erythema.   Neurological:      General: No focal deficit present.      Mental Status: He is alert  and oriented to person, place, and time. Mental status is at baseline.      Motor: No weakness.      Gait: Gait normal.   Psychiatric:         Mood and Affect: Mood normal.         Behavior: Behavior normal.         Vital Signs  ED Triage Vitals   Temperature Pulse Respirations Blood Pressure SpO2   04/19/24 2029 04/19/24 2029 04/19/24 2029 04/19/24 2029 04/19/24 2029   98.7 °F (37.1 °C) (!) 111 20 168/84 92 %      Temp src Heart Rate Source Patient Position - Orthostatic VS BP Location FiO2 (%)   -- -- 04/19/24 2029 04/19/24 2029 --     Sitting Right arm       Pain Score       04/20/24 0131       No Pain           Vitals:    04/20/24 0710 04/20/24 0801 04/20/24 1433 04/20/24 1434   BP: 151/91 135/72 133/81 133/81   Pulse: 101 103 (!) 114 (!) 113   Patient Position - Orthostatic VS:             Visual Acuity      ED Medications  Medications   heparin (porcine) 25,000 units in 0.45% NaCl 250 mL infusion (premix) (20 Units/kg/hr × 90 kg (Order-Specific) Intravenous New Bag 4/20/24 2125)   heparin (porcine) injection 7,200 Units (has no administration in time range)   heparin (porcine) injection 3,600 Units (3,600 Units Intravenous Given 4/20/24 0603)   insulin lispro (HumALOG/ADMELOG) 100 units/mL subcutaneous injection 1-6 Units ( Subcutaneous Not Given 4/20/24 1722)   melatonin tablet 3 mg (3 mg Oral Given 4/20/24 2124)   acetaminophen (TYLENOL) tablet 975 mg ( Oral Canceled Entry 4/21/24 0600)   amitriptyline (ELAVIL) tablet 200 mg (200 mg Oral Given 4/20/24 2124)   losartan (COZAAR) tablet 25 mg (25 mg Oral Given 4/20/24 0814)   famotidine (PEPCID) tablet 40 mg (has no administration in time range)   methocarbamol (ROBAXIN) tablet 500 mg (500 mg Oral Given 4/20/24 2124)   benzonatate (TESSALON PERLES) capsule 200 mg (200 mg Oral Given 4/20/24 2124)   oxyCODONE (ROXICODONE) split tablet 2.5 mg ( Oral See Alternative 4/20/24 2124)     Or   oxyCODONE (ROXICODONE) IR tablet 5 mg (5 mg Oral Given 4/20/24 2124)    ceftriaxone (ROCEPHIN) 1 g/50 mL in dextrose IVPB (0 mg Intravenous Stopped 4/19/24 2320)   azithromycin (ZITHROMAX) 500 mg in sodium chloride 0.9% 250mL IVPB 500 mg (500 mg Intravenous New Bag 4/19/24 2337)   iohexol (OMNIPAQUE) 350 MG/ML injection (MULTI-DOSE) 100 mL (85 mL Intravenous Given 4/19/24 2127)   heparin (porcine) injection 7,200 Units (7,200 Units Intravenous Given 4/19/24 2334)   lidocaine (LIDODERM) 5 % patch 1 patch (1 patch Topical Patch Removed 4/20/24 1309)   furosemide (LASIX) injection 20 mg (20 mg Intravenous Given 4/20/24 0154)   ibuprofen (MOTRIN) tablet 400 mg (400 mg Oral Given 4/20/24 0153)   iohexol (OMNIPAQUE) 350 MG/ML injection (MULTI-DOSE) 90 mL (90 mL Intravenous Given 4/20/24 1653)       Diagnostic Studies  Results Reviewed       Procedure Component Value Units Date/Time    Blood culture #1 [[id number]] Collected: 04/19/24 2345    Lab Status: Preliminary result Specimen: Blood from Hand, Right Updated: 04/20/24 0901     Blood Culture Received in Microbiology Lab. Culture in Progress.    Blood culture #2 [[id number]] Collected: 04/19/24 2345    Lab Status: Preliminary result Specimen: Blood from Arm, Left Updated: 04/20/24 0901     Blood Culture Received in Microbiology Lab. Culture in Progress.    Procalcitonin [[id number]]  (Normal) Collected: 04/20/24 0505    Lab Status: Final result Specimen: Blood from Arm, Right Updated: 04/20/24 0550     Procalcitonin 0.13 ng/ml     APTT [[id number]]  (Abnormal) Collected: 04/20/24 0505    Lab Status: Final result Specimen: Blood from Arm, Right Updated: 04/20/24 0547     PTT 57 seconds     CBC [[id number]]  (Abnormal) Collected: 04/20/24 0505    Lab Status: Final result Specimen: Blood from Arm, Right Updated: 04/20/24 0520     WBC 10.33 Thousand/uL      RBC 4.72 Million/uL      Hemoglobin 11.7 g/dL      Hematocrit 37.4 %      MCV 79 fL      MCH 24.8 pg      MCHC 31.3 g/dL      RDW 15.8 %      Platelets 262 Thousands/uL      MPV 9.0 fL      Ferritin [[id number]]  (Normal) Collected: 04/19/24 2039    Lab Status: Final result Specimen: Blood from Arm, Left Updated: 04/20/24 0504     Ferritin 63 ng/mL     TIBC Panel (incl. Iron, TIBC, % Iron Saturation) [[id number]]  (Abnormal) Collected: 04/19/24 2039    Lab Status: Final result Specimen: Blood from Arm, Left Updated: 04/20/24 0500     Iron Saturation 13 %      TIBC 212 ug/dL      Iron 28 ug/dL      UIBC 184 ug/dL     COVID/FLU/RSV [[id number]]  (Normal) Collected: 04/20/24 0008    Lab Status: Final result Specimen: Nares from Nose Updated: 04/20/24 0113     SARS-CoV-2 Negative     INFLUENZA A PCR Negative     INFLUENZA B PCR Negative     RSV PCR Negative    Narrative:      FOR PEDIATRIC PATIENTS - copy/paste COVID Guidelines URL to browser: [email or web address]    SARS-CoV-2 assay is a Nucleic Acid Amplification assay intended for the  qualitative detection of nucleic acid from SARS-CoV-2 in nasopharyngeal  swabs. Results are for the presumptive identification of SARS-CoV-2 RNA.    Positive results are indicative of infection with SARS-CoV-2, the virus  causing COVID-19, but do not rule out bacterial infection or co-infection  with other viruses. Laboratories within the [place] and its  territories are required to report all positive results to the appropriate  public health authorities. Negative results do not preclude SARS-CoV-2  infection and should not be used as the sole basis for treatment or other  patient management decisions. Negative results must be combined with  clinical observations, patient history, and epidemiological information.  This test has not been FDA cleared or approved.    This test has been authorized by FDA under an Emergency Use Authorization  (EUA). This test is only authorized for the duration of time the  declaration that circumstances exist justifying the authorization of the  emergency use of an in vitro diagnostic  tests for detection of SARS-CoV-2  virus and/or diagnosis of COVID-19 infection under section 564(b)(1) of  the Act, 21 U.S.C. 360bbb-3(b)(1), unless the authorization is terminated  or revoked sooner. The test has been validated but independent review by FDA  and CLIA is pending.    Test performed using [employer] GeneXpert: This RT-PCR assay targets N2,  a region unique to SARS-CoV-2. A conserved region in the E-gene was chosen  for pan-Sarbecovirus detection which includes SARS-CoV-2.    According to CMS-2020-01-R, this platform meets the definition of high-throughput technology.    Procalcitonin [[id number]]  (Normal) Collected: 04/19/24 2039    Lab Status: Final result Specimen: Blood from Arm, Left Updated: 04/20/24 0018     Procalcitonin 0.11 ng/ml     HS Troponin I 2hr [[id number]]  (Normal) Collected: 04/19/24 2259    Lab Status: Final result Specimen: Blood from Arm, Right Updated: 04/19/24 2334     hs TnI 2hr 26 ng/L      Delta 2hr hsTnI 2 ng/L     B-Type Natriuretic Peptide(BNP) [[id number]]  (Normal) Collected: 04/19/24 2039    Lab Status: Final result Specimen: Blood from Arm, Left Updated: 04/19/24 2310     BNP 11 pg/mL     APTT [[id number]]  (Abnormal) Collected: 04/19/24 2039    Lab Status: Final result Specimen: Blood from Arm, Left Updated: 04/19/24 2309     PTT 40 seconds     Protime-INR [[id number]]  (Abnormal) Collected: 04/19/24 2039    Lab Status: Final result Specimen: Blood from Arm, Left Updated: 04/19/24 2309     Protime 15.2 seconds      INR 1.13    D-dimer, quantitative [[id number]]  (Abnormal) Collected: 04/19/24 2039    Lab Status: Final result Specimen: Blood from Arm, Left Updated: 04/19/24 2112     D-Dimer, Quant 13.70 ug/ml FEU     Narrative:      In the evaluation for possible pulmonary embolism, in the appropriate (Well's Score of 4 or less) patient, the age adjusted d-dimer cutoff for this patient can be calculated as:    Age x 0.01 (in ug/mL) for Age-adjusted D-dimer exclusion  threshold for a patient over 50 years.    HS Troponin 0hr (reflex protocol) [[id number]]  (Normal) Collected: 04/19/24 2039    Lab Status: Final result Specimen: Blood from Arm, Left Updated: 04/19/24 2110     hs TnI 0hr 24 ng/L     Comprehensive metabolic panel [[id number]] Collected: 04/19/24 2039    Lab Status: Final result Specimen: Blood from Arm, Left Updated: 04/19/24 2104     Sodium 137 mmol/L      Potassium 4.1 mmol/L      Chloride 104 mmol/L      CO2 25 mmol/L      ANION GAP 8 mmol/L      BUN 21 mg/dL      Creatinine 0.74 mg/dL      Glucose 100 mg/dL      Calcium 9.7 mg/dL      AST 13 U/L      ALT 20 U/L      Alkaline Phosphatase 71 U/L      Total Protein 7.5 g/dL      Albumin 4.1 g/dL      Total Bilirubin 0.33 mg/dL      eGFR 103 ml/min/1.73sq m     Narrative:      National Kidney Disease Foundation guidelines for Chronic Kidney Disease (CKD):     Stage 1 with normal or high GFR (GFR > 90 mL/min/1.73 square meters)    Stage 2 Mild CKD (GFR = 60-89 mL/min/1.73 square meters)    Stage 3A Moderate CKD (GFR = 45-59 mL/min/1.73 square meters)    Stage 3B Moderate CKD (GFR = 30-44 mL/min/1.73 square meters)    Stage 4 Severe CKD (GFR = 15-29 mL/min/1.73 square meters)    Stage 5 End Stage CKD (GFR <15 mL/min/1.73 square meters)  Note: GFR calculation is accurate only with a steady state creatinine    CBC and differential [[id number]]  (Abnormal) Collected: 04/19/24 2039    Lab Status: Final result Specimen: Blood from Arm, Left Updated: 04/19/24 2046     WBC 11.45 Thousand/uL      RBC 4.89 Million/uL      Hemoglobin 12.1 g/dL      Hematocrit 38.7 %      MCV 79 fL      MCH 24.7 pg      MCHC 31.3 g/dL      RDW 15.6 %      MPV 8.8 fL      Platelets 277 Thousands/uL      nRBC 0 /100 WBCs      Segmented % 61 %      Immature Grans % 1 %      Lymphocytes % 27 %      Monocytes % 8 %      Eosinophils Relative 2 %      Basophils Relative 1 %      Absolute Neutrophils 7.11 Thousands/µL      Absolute Immature Grans 0.06  Thousand/uL      Absolute Lymphocytes 3.09 Thousands/µL      Absolute Monocytes 0.96 Thousand/µL      Eosinophils Absolute 0.17 Thousand/µL      Basophils Absolute 0.06 Thousands/µL                     VAS VENOUS DUPLEX - LOWER LIMB BILATERAL   Final Result by Hubert Bedolla MD (04/20 1912)      CTA ED chest PE study   Final Result by Everardo Mata MD (04/19 2234)      1.  Acute moderate clot burden multifocal bilateral peripheral pulmonary embolism. RV:LV ratio is elevated at 1.1 suggestive of mild right heart strain.   2.  No thoracic aortic aneurysm or dissection. Mild calcific atherosclerosis of the aortic arch and coronary arteries noted.   3.  Trace bilateral pleural effusions with bibasilar atelectasis. Interstitial pulmonary edema bilaterally.   4.  Findings suggestive of multifocal pneumonia in the right lung worse in the right middle lobe. Recommend follow-up imaging after adequate therapy to ensure complete resolution and exclude possibility of underlying neoplasm.   5.  Pathologically enlarged mediastinal, hilar, para-aortic, retrocrural, and upper abdominal lymph nodes are seen which may be secondary to a diffuse infectious/inflammatory or neoplastic process including lymphoma. Recommend clinical correlation.   6.  Nonspecific 1.9 cm left adrenal nodule as described above. Recommend adrenal washout CT in 12 months per current guidelines.         Findings discussed with RAFFI Anguiano at 10:33 p.m.      Workstation performed: [id number]         XR chest 1 view portable   Final Result by Jeffrey Connelly MD (04/20 0729)      Right middle lobe pneumonia.            Workstation performed: [id number]         CT abdomen pelvis w wo contrast    (Results Pending)              Procedures  ECG 12 Lead Documentation Only    Date/Time: 4/19/2024 8:33 PM    Performed by: Alta Anguiano PA-C  Authorized by: Alta Anguiano PA-C    Indications / Diagnosis:  Sob + cp  ECG reviewed by me, the ED Provider: yes     Patient location:  ED  Previous ECG:     Previous ECG:  Unavailable    Comparison to cardiac monitor: Yes    Interpretation:     Interpretation: normal    Rate:     ECG rate:  110    ECG rate assessment: tachycardic    Rhythm:     Rhythm: sinus tachycardia    Ectopy:     Ectopy: none    QRS:     QRS axis:  Normal    QRS intervals:  Normal  Conduction:     Conduction: normal    ST segments:     ST segments:  Normal  T waves:     T waves: normal             ED Course  ED Course as of 04/21/24 0639   Fri Apr 19, 2024   2235 Called radiology, has PEs w right heart strain in addition to pneumonia             HEART Risk Score      Flowsheet Row Most Recent Value   Heart Score Risk Calculator    History 1 Filed at: 04/19/2024 2341   ECG 0 Filed at: 04/19/2024 2341   Age 1 Filed at: 04/19/2024 2341   Risk Factors 1 Filed at: 04/19/2024 2341   Troponin 1 Filed at: 04/19/2024 2341   HEART Score 4 Filed at: 04/19/2024 2341                          SBIRT 22yo+      Flowsheet Row Most Recent Value   Initial Alcohol Screen: US AUDIT-C     1. How often do you have a drink containing alcohol? 0 Filed at: 04/19/2024 2028   2. How many drinks containing alcohol do you have on a typical day you are drinking?  0 Filed at: 04/19/2024 2028   3a. Male UNDER 65: How often do you have five or more drinks on one occasion? 0 Filed at: 04/19/2024 2028   3b. FEMALE Any Age, or MALE 65+: How often do you have 4 or more drinks on one occassion? 0 Filed at: 04/19/2024 2028   Audit-C Score 0 Filed at: 04/19/2024 2028   CALE: How many times in the past year have you...    Used an illegal drug or used a prescription medication for non-medical reasons? Never Filed at: 04/19/2024 2028            Wells' Criteria for PE      Flowsheet Row Most Recent Value   Wells' Criteria for PE    Clinical signs and symptoms of DVT 0 Filed at: 04/19/2024 2340   PE is primary diagnosis or equally likely 3 Filed at: 04/19/2024 2340   HR >100 1.5 Filed at: 04/19/2024  "2340   Immobilization at least 3 days or Surgery in the previous 4 weeks 0 Filed at: 04/19/2024 2340   Previous, objectively diagnosed PE or DVT 0 Filed at: 04/19/2024 2340   Hemoptysis 0 Filed at: 04/19/2024 2340   Malignancy with treatment within 6 months or palliative 0 Filed at: 04/19/2024 2340   Wells' Criteria Total 4.5 Filed at: 04/19/2024 2340                  Medical Decision Making  Patient seen and examined noted to have pulmonary emboli, multifocal pneumonia, hypoxia.  Patient coming in with 1 to 2 months of symptoms.  Due to to patient's new oxygen requirement and tachycardia as well as complaints of shortness of breath labs and imaging were ordered.  Flu/COVID/RSV swab was negative, 0-hour troponin of 24, 2-hour troponin of 26, BNP of 11, PT of 15.2, INR 1.13, PTT of 40, D-dimer of 13.7, CMP unrevealing, CBC shows a white blood cell count of 11.45.  Chest x-ray showed right middle lobe pneumonia, CTA CT PE chest study showed acute moderate clot burden of multifocal bilateral peripheral pulmonary emboli and RV to LV ratio was elevated at 1.1 suggestive of right heart strain.  Heparin and antibiotics were initiated for this patient and he was admitted to [facility] for further management.    Differential diagnosis includes but is not limited to pneumonia, pleural effusion, CHF, SCAPE, PE, PTX, ACS, MI, asthma exacerbation, COPD exacerbation, COVID 19, EVALI, anemia, metabolic abnormality, renal failure.     Patient's labs notable for: mentioned above    Imaging revealed:   Mentioned above    EKG: was interpreted by myself as above.    Discussed patient's case with [facility] resident team regarding admission who accepted the patient for further evaluation and management.    All labs reviewed and utilized in the medical decision making process (if labs were ordered). Portions of the record may have been created with voice recognition software.  Occasional wrong word or \"sound a like\" substitutions may have occurred " due to the inherent limitations of voice recognition software.  Read the chart carefully and recognize, using context, where substitutions have occurred.      Amount and/or Complexity of Data Reviewed  Labs: ordered.  Radiology: ordered.    Risk  Prescription drug management.  Decision regarding hospitalization.                 Disposition  Final diagnoses:   Pulmonary emboli (HCC)   Multifocal pneumonia   Hypoxia     Time reflects when diagnosis was documented in both MDM as applicable and the Disposition within this note       Time User Action Codes Description Comment    4/19/2024 11:47 PM Alta Anguiano Add [I26.99] Pulmonary emboli (HCC)     4/19/2024 11:47 PM Silvio, Alta Add [J18.9] Multifocal pneumonia     4/19/2024 11:47 PM SilvioYanelis cohnissa Remove [J18.9] Multifocal pneumonia     4/19/2024 11:47 PM Glen DanielYanelis cohnissa Add [J18.9] Multifocal pneumonia     4/19/2024 11:48 PM Alta Anguiano Add [R09.02] Hypoxia     4/20/2024  1:15 AM Jihan Roche Add [R93.89] Abnormal CT of the chest     4/20/2024  2:11 AM Jihan Roche Add [I26.99] Acute pulmonary embolism (HCC)           ED Disposition       ED Disposition   Admit    Condition   Stable    Date/Time   Fri Apr 19, 2024 [id number]    Comment   Case was discussed with [facility] resident Jihan and the patient's admission status was agreed to be Admission Status: inpatient status to the service of Dr. Martinez .               Follow-up Information    None         Current Discharge Medication List        START taking these medications    Details   apixaban (ELIQUIS) 5 mg Take 2 tablets (10 mg total) by mouth 2 (two) times a day for 7 days, THEN 1 tablet (5 mg total) 2 (two) times a day for 23 days.  Qty: 74 tablet, Refills: 0    Comments: Eliquis Starter Pack  Associated Diagnoses: Pulmonary emboli (HCC); Acute pulmonary embolism (HCC)           CONTINUE these medications which have NOT CHANGED    Details   amitriptyline (ELAVIL) 100 mg tablet  200 mg daily at bedtime      glimepiride (AMARYL) 1 mg tablet Take 1 mg by mouth 2 (two) times a day with meals      losartan (COZAAR) 25 mg tablet Take 25 mg by mouth daily      atorvastatin (LIPITOR) 10 mg tablet Take 10 mg by mouth daily      famotidine (PEPCID) 40 MG tablet Take 40 mg by mouth daily as needed for heartburn or indigestion      metFORMIN (GLUCOPHAGE) 500 mg tablet Take 1,000 mg by mouth 2 (two) times a day with meals      methocarbamol (ROBAXIN) 500 mg tablet Take 500 mg by mouth 4 (four) times a day as needed for muscle spasms             No discharge procedures on file.    PDMP Review       None            ED Provider  Electronically Signed by             Alta Anguiano PA-C  04/21/24 4979

## 2024-04-20 NOTE — ASSESSMENT & PLAN NOTE
History of HTN  Blood Pressure: 139/79  Above goal <130/80  Continue home losartan 25 mg daily.  If patient above goal can increase losartan to 50 mg daily.

## 2024-04-20 NOTE — ASSESSMENT & PLAN NOTE
35-year history of smoking history, smoker, half pack per day 53 pack.year  Stopped smoking on 4/15/2024  Tobacco cessation counseling provided.   Patient refused nicotine patches at this time if needed will order.

## 2024-04-20 NOTE — UTILIZATION REVIEW
Initial Clinical Review    Admission: Date/Time/Statement:   Admission Orders (From admission, onward)       Ordered        04/19/24 2348  INPATIENT ADMISSION  Once                          Orders Placed This Encounter   Procedures    INPATIENT ADMISSION     Standing Status:   Standing     Number of Occurrences:   1     Order Specific Question:   Level of Care     Answer:   Med Surg [16]     Order Specific Question:   Estimated length of stay     Answer:   More than 2 Midnights     Order Specific Question:   Certification     Answer:   I certify that inpatient services are medically necessary for this patient for a duration of greater than two midnights. See H&P and MD Progress Notes for additional information about the patient's course of treatment.     ED Arrival Information       Expected   -    Arrival   4/19/2024 20:17    Acuity   Urgent              Means of arrival   Walk-In    Escorted by   Spouse    Service   Hospitalist    Admission type   Emergency              Arrival complaint   SOB             Chief Complaint   Patient presents with    Shortness of Breath     Patient reporting SOB for past few weeks. States breathing getting progressively worse. Also reporting L sided chest / rib pain. Rates pain 8/10. Describes as pulling. States worse pain w/ coughing. Reports cough is dry.        Initial Presentation: 54 y.o. male to ED w Spouse presents with worsening shortness of breath with cough. Symptoms present for approximately 1 month worsening over the last week. Shortness of breath mostly with exertion. PMH  active Smoker, type 2 diabetes, hypertension, tobacco use, recent COVID infection 1/2024 Denied fevers or chills.   EXAM  Tachycardia, CT c/a imaging acute PE with mild right heart strain w  lymphadenopathy concerning for possible neoplasm. CXR reveals interstitial pneumonia bilateral  Labs w leukocytosis 11.45, D Dimer elevation, MCV of 79 , PESI score II   Inpatient admission due to acute PE, SOB,  ABN CT chest, CP. PLAN Tele, IV Heparin drip next 24-48 HR, echo, follow resp status & bio markers, VAS LE,   Trial IV Lasix, incentive spirometry; follow BCX, consult IR for biopsy & consider CT a/p for staging; iron panel. Motrin 400mg for CP, Tylenol scheduled, Lido cream PRn, Oxycodone 2.5 mg PRN, hold off Opioid escalation & further NSAIDs given IV Heparin.  AM CBC, pro chhaya SSI, PTA losartan   Date: 4/20   Day 2:   Lungs clear w unlabored respiration, tachycardia;  POX 94% on NC 4 L, follow up VAS US   Hold PO anticoagulation until plan from IR is finalized. IR consulted for consideration of lymph node biopsy given patient's significant smoking history. CT abdomen pelvis ordered to assess for alternate site of biopsy or evidence of malignancy. Question of this being related to possible infection     ED Triage Vitals   Temperature Pulse Respirations Blood Pressure SpO2   04/19/24 2029 04/19/24 2029 04/19/24 2029 04/19/24 2029 04/19/24 2029   98.7 °F (37.1 °C) (!) 111 20 168/84 92 %      Temp src Heart Rate Source Patient Position - Orthostatic VS BP Location FiO2 (%)   -- -- 04/19/24 2029 04/19/24 2029 --     Sitting Right arm       Pain Score       04/20/24 0131       No Pain          Wt Readings from Last 1 Encounters:   04/20/24 95.2 kg (209 lb 14.1 oz)     Additional Vital Signs:   Date/Time Temp Pulse Resp BP MAP (mmHg) SpO2 Calculated FIO2 (%) - Nasal Cannula Nasal Cannula O2 Flow Rate (L/min) O2 Device Patient Position - Orthostatic VS   04/20/24 1548 -- -- -- -- -- 95 % 28 2 L/min Nasal cannula --   04/20/24 1434 98.5 °F (36.9 °C) 113 Abnormal  -- 133/81 98 93 % -- -- -- --   04/20/24 14:33:45 98.5 °F (36.9 °C) 114 Abnormal  -- 133/81 98 93 % -- -- -- --   04/20/24 08:01:51 98.6 °F (37 °C) 103 -- 135/72 93 92 % -- -- -- --   04/20/24 0710 -- 101 -- 151/91 -- 95 % -- -- -- --   04/20/24 00:28:28 98.7 °F (37.1 °C) 119 Abnormal  -- 151/91 111 91 % -- -- -- --   04/19/24 2300 -- 104 -- 145/74 103 93 % -- --  "-- --   04/19/24 2100 -- -- -- -- -- -- -- -- None (Room air) --   04/19/24 2029 98.7 °F (37.1 °C) 111 Abnormal  20 168/84 -- 92 % -- -- None (Room air) Sitting     Weights (last 14 days)    Date/Time Weight Weight Method Height   04/20/24 0710 95.2 kg (209 lb 14.1 oz) -- 5' 7\" (1.702 m)   04/19/24 2029 95.2 kg (209 lb 14.1 oz) Stretcher scale      Pertinent Labs/Diagnostic Test Results:   4/20 ECHO =   Left Ventricle: Left ventricular cavity size is normal. Wall thickness is mildly increased. There is mild concentric hypertrophy. The left ventricular ejection fraction is 60%. Systolic function is normal. Wall motion is normal. Diastolic function is normal.    Right Ventricle: Right ventricular cavity size is borderline dilated. Systolic function is normal.    Aortic Valve: There is mild regurgitation. There is aortic valve sclerosis.The aortic valve peak velocity is 1.95 m/s. The aortic valve area is 2.06 cm2.  No echocardiographic evidence of RV dysfunction / strain.  No indirect evidence of pulmonary hypertension.  4/19 EKG=  Component  Ref Range & Units 4/19/24 2033   Ventricular Rate     Atrial Rate     LA Interval  ms 164   QRSD Interval  ms 88   QT Interval  ms 326   QTC Interval  ms 441   P [place]  degrees 57   QRS Axis  degrees 65   T Wave Axis  degrees 45       CTA ED chest PE study   Final Result by Everardo Mata MD (04/19 2234)      1.  Acute moderate clot burden multifocal bilateral peripheral pulmonary embolism. RV:LV ratio is elevated at 1.1 suggestive of mild right heart strain.   2.  No thoracic aortic aneurysm or dissection. Mild calcific atherosclerosis of the aortic arch and coronary arteries noted.   3.  Trace bilateral pleural effusions with bibasilar atelectasis. Interstitial pulmonary edema bilaterally.   4.  Findings suggestive of multifocal pneumonia in the right lung worse in the right middle lobe. Recommend follow-up imaging after adequate therapy to ensure complete " "resolution and exclude possibility of underlying neoplasm.   5.  Pathologically enlarged mediastinal, hilar, para-aortic, retrocrural, and upper abdominal lymph nodes are seen which may be secondary to a diffuse infectious/inflammatory or neoplastic process including lymphoma. Recommend clinical correlation.   6.  Nonspecific 1.9 cm left adrenal nodule as described above. Recommend adrenal washout CT in 12 months per current guidelines.         Findings discussed with RAFFI Anguiano at 10:33 p.m.      Workstation performed: [id number]         XR chest 1 view portable   Final Result by Jeffrey Connelly MD (04/20 0729)      Right middle lobe pneumonia.            Workstation performed: [id number]          VAS VENOUS DUPLEX - LOWER LIMB BILATERAL    (Results Pending)   CT abdomen pelvis w wo contrast    (Results Pending)     Results from last 7 days   Lab Units 04/20/24  0008   SARS-COV-2  Negative     Results from last 7 days   Lab Units 04/20/24  0505 04/19/24 2039   WBC Thousand/uL 10.33* 11.45*   HEMOGLOBIN g/dL 11.7* 12.1   HEMATOCRIT % 37.4 38.7   PLATELETS Thousands/uL 262 277   TOTAL NEUT ABS Thousands/µL  --  7.11         Results from last 7 days   Lab Units 04/19/24  2039   SODIUM mmol/L 137   POTASSIUM mmol/L 4.1   CHLORIDE mmol/L 104   CO2 mmol/L 25   ANION GAP mmol/L 8   BUN mg/dL 21   CREATININE mg/dL 0.74   EGFR ml/min/1.73sq m 103   CALCIUM mg/dL 9.7     Results from last 7 days   Lab Units 04/19/24  2039   AST U/L 13   ALT U/L 20   ALK PHOS U/L 71   TOTAL PROTEIN g/dL 7.5   ALBUMIN g/dL 4.1   TOTAL BILIRUBIN mg/dL 0.33     Results from last 7 days   Lab Units 04/20/24  1623 04/20/24  1145 04/20/24  0801 04/20/24  0348   POC GLUCOSE mg/dl 133 135 168* 191*     Results from last 7 days   Lab Units 04/19/24  2039   GLUCOSE RANDOM mg/dL 100         Results from last 7 days   Lab Units 04/20/24  0505   HEMOGLOBIN A1C % 7.1*   EAG mg/dl 157     No results found for: \"BETA-HYDROXYBUTYRATE\"                 "   Results from last 7 days   Lab Units 04/20/24  0505 04/19/24  2259 04/19/24 2039   HS TNI 0HR ng/L  --   --  24   HS TNI 2HR ng/L  --  26  --    HSTNI D2 ng/L  --  2  --    HS TNI 4HR ng/L 17  --   --    HSTNI D4 ng/L -7  --   --      Results from last 7 days   Lab Units 04/19/24 2039   D-DIMER QUANTITATIVE ug/ml FEU 13.70*     Results from last 7 days   Lab Units 04/20/24  1230 04/20/24  0505 04/19/24 2039   PROTIME seconds  --   --  15.2*   INR   --   --  1.13   PTT seconds 64* 57* 40*         Results from last 7 days   Lab Units 04/20/24  0505 04/19/24 2039   PROCALCITONIN ng/ml 0.13 0.11                 Results from last 7 days   Lab Units 04/19/24 2039   BNP pg/mL 11     Results from last 7 days   Lab Units 04/19/24 2039   FERRITIN ng/mL 63   IRON SATURATION % 13*   IRON ug/dL 28*   TIBC ug/dL 212*                                     Results from last 7 days   Lab Units 04/20/24  0008   INFLUENZA A PCR  Negative   INFLUENZA B PCR  Negative   RSV PCR  Negative                             Results from last 7 days   Lab Units 04/19/24  2345   BLOOD CULTURE  Received in Microbiology Lab. Culture in Progress.  Received in Microbiology Lab. Culture in Progress.                   ED Treatment:   Medication Administration from 04/19/2024 2017 to 04/20/2024 0017         Date/Time Order Dose Route Action Action by Comments     04/19/2024 2320 EDT ceftriaxone (ROCEPHIN) 1 g/50 mL in dextrose IVPB 0 mg Intravenous Stopped Stephanie Ch RN --     04/19/2024 2259 EDT ceftriaxone (ROCEPHIN) 1 g/50 mL in dextrose IVPB 1,000 mg Intravenous New Lucas Curtis RN --     04/19/2024 2337 EDT azithromycin (ZITHROMAX) 500 mg in sodium chloride 0.9% 250mL IVPB 500 mg 500 mg Intravenous New Lucas Ch RN --     04/19/2024 2127 EDT iohexol (OMNIPAQUE) 350 MG/ML injection (MULTI-DOSE) 100 mL 85 mL Intravenous Given Lorraine Patrice --     04/19/2024 2304 EDT heparin (VTE/PE) high 0 mL Intravenous Hold Alayna Curtis,  RN --     04/19/2024 2315 EDT heparin (VTE/PE) high 0 mL Intravenous Hold Stephanie Ch RN --     04/19/2024 2334 EDT heparin (porcine) injection 7,200 Units 7,200 Units Intravenous Given Stephanie Ch RN --     04/19/2024 2334 EDT heparin (porcine) 25,000 units in 0.45% NaCl 250 mL infusion (premix) 18 Units/kg/hr Intravenous New Bag Stephanie Ch RN --     04/19/2024 2344 EDT lidocaine (LIDODERM) 5 % patch 1 patch 1 patch Topical Medication Applied Stephanie Ch RN --          History reviewed. No pertinent past medical history.  Present on Admission:  **None**      Admitting Diagnosis: SOB (shortness of breath) [R06.02]  Hypoxia [R09.02]  Pulmonary emboli (HCC) [I26.99]  Multifocal pneumonia [J18.9]  Age/Sex: 54 y.o. male  Admission Orders:  Tele  Echo  VAS quintin LE  CT a/p  oob      Scheduled Medications:  acetaminophen, 975 mg, Oral, Q8H LATONIA  amitriptyline, 200 mg, Oral, HS  benzonatate, 200 mg, Oral, TID  insulin lispro, 1-6 Units, Subcutaneous, TID AC  losartan, 25 mg, Oral, Daily    Continuous IV Infusions:  heparin (porcine), 3-30 Units/kg/hr (Order-Specific), Intravenous, Titrated      PRN Meds:  famotidine, 40 mg, Oral, Daily PRN  heparin (porcine), 3,600 Units, Intravenous, Q6H PRN  heparin (porcine), 7,200 Units, Intravenous, Q6H PRN  melatonin, 3 mg, Oral, HS PRN  methocarbamol, 500 mg, Oral, 4x Daily PRN  oxyCODONE, 2.5 mg, Oral, Q4H PRN   Or  oxyCODONE, 5 mg, Oral, Q4H PRN        INPATIENT CONSULT TO     Network Utilization Review Department  ATTENTION: Please call with any questions or concerns to [phone] and carefully listen to the prompts so that you are directed to the right person. All voicemails are confidential.   For Discharge needs, contact Care Management DC Support Team at [phone] opt. 2  Send all requests for admission clinical reviews, approved or denied determinations and any other requests to dedicated fax number below belonging to the campus where the patient is  receiving treatment. List of dedicated fax numbers for the Facilities:  FACILITY NAME UR FAX NUMBER   ADMISSION DENIALS (Administrative/Medical Necessity) [phone]   DISCHARGE SUPPORT TEAM (NETWORK) [phone]   PARENT CHILD HEALTH (Maternity/NICU/Pediatrics) [phone]   [facility] [phone]   [facility] [phone]   [facility] [phone]   [facility] [phone]   [facility] [phone]   [facility] [phone]   [facility] [phone]   [facility] [phone]   [facility] [phone]   [facility] [phone]   [facility] [phone]   [facility] [phone]

## 2024-04-20 NOTE — ASSESSMENT & PLAN NOTE
"No results found for: \"HGBA1C\"    No results for input(s): \"POCGLU\" in the last 72 hours.    Blood Sugar Average: Last 72 hrs:  Hba1c per patient and fiance at bedside 7.8 not on care everhwere  PTA on metformin 1000 mg twice a day, recently started on Amaryl 1 mg twice daily    Plan  Start carbohydrate controlled diet level 2  Start sliding scale, glucose check 4 times daily goal 140-1 80 while inpatient  Hold off PTA p.o. meds    "

## 2024-04-20 NOTE — INCIDENTAL FINDINGS
The following findings require follow up:  Radiographic finding   Finding: Nonspecific 1.9 cm left adrenal nodule as described above measuring 52 Hounsfield units by density measurement.    Follow up required: Adrenal washout CT    Follow up should be done within 12 month(s)    Please notify the following clinician to assist with the follow up:   Dr. Jacob

## 2024-04-20 NOTE — ASSESSMENT & PLAN NOTE
Assessment:  Likely MSK in the setting of recurrent cough. TTP left sided.   Imagine negative for Fx  Trialed of Lidoderm patch unresponsive    Plan  Received one dose of Motrin 400mg  Continue multimodal pain regimen  Tylenol 975 mg TID  Robaxin 500 mg 4 times daily as needed.  Oxycodone 2.5 mg every 4 hours as needed for moderate pain  Oxycodone 5 mg every 4 hours as needed for severe pain.

## 2024-04-20 NOTE — ASSESSMENT & PLAN NOTE
Lab Results   Component Value Date    HGBA1C 7.1 (H) 04/20/2024       Recent Labs     04/20/24  1145 04/20/24  1623 04/20/24  2107 04/21/24  0737   POCGLU 135 133 118 140       Blood Sugar Average: Last 72 hrs:  (P) 147.5  Home medication: metformin 1000 mg twice a day, recently started on Amaryl 1 mg twice daily    Plan  Diabetic diet level 2  Insulin sliding scale.  Glucose goal 140-180

## 2024-04-20 NOTE — ASSESSMENT & PLAN NOTE
POA WBC 11.45 however afebrile, was noted tachycardia however this is in the setting of pulmonary embolism  At the ED was given ceftriaxone and azithromycin given imaging findings concerning for possible multifocal pneumonia however patient does have other findings including lymphadenopathy which may be inflammatory response  Leukocytosis likely in the setting of acute phase reactant inflammatory response with negative Procalcitonin on POA    Plan  Check CBC a.m.  Monitor off antibiotics  Recheck procalcitonin in a.m.

## 2024-04-20 NOTE — ASSESSMENT & PLAN NOTE
Hx HTN  Blood Pressure: 145/74  Above goal <130/80  PTA on losartan 25 mg daily on the afternoon   Adequate: hears normal conversation without difficulty

## 2024-04-20 NOTE — ED NOTES
Transported patient to [place] [address], receiving PCA was at bedside and receiving RN (Mikayla) was notified of arrival face to face.     Lewis Livingston  04/20/24 0022

## 2024-04-20 NOTE — ASSESSMENT & PLAN NOTE
Assessment:   Presented with 1 to 2 months history of shortness of breath with associated left sided chest pain worsening for 1 week prior to admission.  In the ED, D-dimer elevated 13.70  CT chest PE study positive for multifocal bilateral peripheral pulmonary embolism. RV:LV ratio is elevated at 1.1 suggestive of mild right heart strain.   PESI score II  Acute pulmonary embolism likely provoked concerning underlying undiagnosed malignancy given pathologic lymphadenopathy, less likely unprovoked given no recent history of traveling, occupational no recent surgeries although family history unclear on father side.  Echocardiogram showed no RV dysfunction/strain.  Doppler US:   Right acute DVT in the peroneal & soleal veins at theproximal to mid calf.  Left acute DVT in the proximal femoral vein and (1) posterior tibial, and peroneal veins at the proximal to mid calf.    Plan:  Continue IV heparin PE protocol, will continue for the next 24 to 48 hours, will hold off transition to DOAC at this time given abnormal CT scan consider considerations for IR biopsy  Discontinue Telemetry given echocardiogram findings.  Continue monitor respiratory status and biomarkers  Eliquis sent for price check upon discharge

## 2024-04-20 NOTE — ASSESSMENT & PLAN NOTE
"Recent URI/COVID infection January 2024, since then has had worsening cough and shortness of breath.  Afebrile, no recent sick contacts.  Current smoker.  53 pack/year  04/19 CT imaging with multiple findings including \"multifocal pneumonia in the right lung worse in the right mid lobe, pathologically enlarged mediastinal, hilar, periaortic, retrocrural and upper abdominal lymph nodes concerning for diffuse inflammatory/infection or neoplastic process including lymphoma. Trace bilateral pleural effusions with bibasilar atelectasis. Interstitial pulmonary edema bilaterally. Nonspecific 1.9 cm left adrenal nodule as described above. Recommend adrenal washout CT in 12 months per current guidelines.  At the ED given findings of possible multifocal pneumonia patient was started on ceftriaxone and azithromycin  Afebrile, WBC mildly elevated likely reactive  Procalcitonin negative 1x  Abnormal CT scan of the chest with multiple findings concerning multifocal pneumonia however most importantly concerning for malignancy given lymphadenopathy    Plan  Follow-up blood cultures however low suspicious of infection at this time  Patient does have mild leukocytosis however likely reactive, afebrile, procalcitonin negative for which at this point we will hold off further antibiotics  Will consult IR for biopsy while inpatient, consider CT abdominal pelvis for further staging  Iron panel added given patient does have low normal hemoglobin for a 54-year-old male, with MCV of 79   Has had colonoscopy 2009 however this was unremarkable encourage patient to repeat colonoscopy    "

## 2024-04-20 NOTE — ASSESSMENT & PLAN NOTE
POA shortness of breath over the past 1 to 2 months worsening the past 1 to 2 weeks mostly upon exertion  No lower extremity edema  Chest x-ray pending final read however interstitial pneumonia bilateral noted.  CAT scan did showed bilateral small pleural effusions, atelectasis as well as acute PE  Occupational history  Likely in the setting of acute PE, doubt CHF at this time given unremarkable BNP versus inflammatory/malignancy related    Plan  Continue IV heparin due to the above  Consider trial of IV Lasix once  Incentive spirometry

## 2024-04-20 NOTE — H&P
[facility]  H&P  Name: Papo Gibbs 54 y.o. male I MRN: [id number]  Unit/Bed#: S -[facility] I Date of Admission: 4/19/2024   Date of Service: 4/20/2024 I Hospital Day: 1      Assessment/Plan   * Acute pulmonary embolism (HCC)  Assessment & Plan  POA 1 to 2 months history of shortness of breath with associated chest pain mostly on the left side worsening over the past week.  At the ED D-dimer elevated 13.70  CT chest PE study positive for multifocal bilateral peripheral pulmonary embolism. RV:LV ratio is elevated at 1.1 suggestive of mild right heart strain.   PESI score II  Acute pulmonary embolism likely provoked concerning underlying undiagnosed malignancy given pathologic lymphadenopathy, less likely unprovoked given no recent history of traveling, occupational no recent surgeries although family history unclear on father side..    Plan  Continue IV heparin PE protocol, will continue for the next 24 to 48 hours, will hold off transition to DOAC at this time given abnormal CT scan consider considerations for IR biopsy  Follow-up echocardiogram   Continue telemetry  Continue monitor respiratory status and biomarkers  Eliquis sent for price check upon discharge  VAS Duplex US ordered for completion.     SOB (shortness of breath)  Assessment & Plan  POA shortness of breath over the past 1 to 2 months worsening the past 1 to 2 weeks mostly upon exertion  No lower extremity edema  Chest x-ray pending final read however interstitial pneumonia bilateral noted.  CAT scan did showed bilateral small pleural effusions, atelectasis as well as acute PE  Occupational history  Likely in the setting of acute PE, doubt CHF at this time given unremarkable BNP versus inflammatory/malignancy related    Plan  Continue IV heparin due to the above  Trial of IV Lasix once  Incentive spirometry    Abnormal CT of the chest  Assessment & Plan  Recent URI/COVID infection January 2024, since then has had worsening  "cough and shortness of breath.  Afebrile, no recent sick contacts.  Current smoker.  53 pack/year  04/19 CT imaging with multiple findings including \"multifocal pneumonia in the right lung worse in the right mid lobe, pathologically enlarged mediastinal, hilar, periaortic, retrocrural and upper abdominal lymph nodes concerning for diffuse inflammatory/infection or neoplastic process including lymphoma. Trace bilateral pleural effusions with bibasilar atelectasis. Interstitial pulmonary edema bilaterally. Nonspecific 1.9 cm left adrenal nodule as described above. Recommend adrenal washout CT in 12 months per current guidelines.  At the ED given findings of possible multifocal pneumonia patient was started on ceftriaxone and azithromycin  Afebrile, WBC mildly elevated likely reactive  Procalcitonin negative 1x  Abnormal CT scan of the chest with multiple findings concerning multifocal pneumonia however most importantly concerning for malignancy given lymphadenopathy    Plan  Follow-up blood cultures however low suspicious of infection at this time  Patient does have mild leukocytosis however likely reactive, afebrile, procalcitonin negative for which at this point we will hold off further antibiotics  Will consult IR for biopsy while inpatient, consider CT abdominal pelvis for further staging  Iron panel added given patient does have low normal hemoglobin for a 54-year-old male, with MCV of 79   Per patient has had colonoscopy 2009 however this was unremarkable. Unable to find report encourage patient to repeat colonoscopy once discharge      Chest pain  Assessment & Plan  Likely in the setting of recurrent cough. TTP left sided.   Imagine negative for Fx  Trialed of Lidoderm patch unresponsive  Plan  One dose of Motrin 400mg  Start multimodal pain regimen  Tylenol 975 mg every 8 hours scheduled  Lidocaine cream as needed 4 times a day  Robaxin 500 mg 3 times daily as needed  Oxycodone 2.5 mg every 4 hours as needed " "for severe pain  Will hold off o further escalation of opioids  Will hold off further NSAIDs at this time given IV heparin    Tobacco dependence  Assessment & Plan  35-year history of smoking history, smoker, half pack per day 53pack.year  Stopped smoking on Monday  Tobacco cessation counseling provided  Patient refused nicotine patches at this time if needed will request back    Leucocytosis  Assessment & Plan  POA WBC 11.45 however afebrile, was noted tachycardia however this is in the setting of pulmonary embolism  At the ED was given ceftriaxone and azithromycin given imaging findings concerning for possible multifocal pneumonia however patient does have other findings including lymphadenopathy which may be inflammatory response  Leukocytosis likely in the setting of acute phase reactant inflammatory response with negative Procalcitonin on POA    Plan  Check CBC a.m.  Monitor off antibiotics  Recheck procalcitonin in a.m.    Type 2 diabetes mellitus (HCC)  Assessment & Plan  No results found for: \"HGBA1C\"    No results for input(s): \"POCGLU\" in the last 72 hours.    Blood Sugar Average: Last 72 hrs:  Hba1c per patient and fiance at bedside 7.8 not on care everhwere  PTA on metformin 1000 mg twice a day, recently started on Amaryl 1 mg twice daily    Plan  Start carbohydrate controlled diet level 2  Start sliding scale, glucose check 4 times daily goal 140-1 80 while inpatient  Hold off PTA p.o. meds      HTN (hypertension)  Assessment & Plan  Hx HTN  Blood Pressure: 151/91  Above goal <130/80  PTA on losartan 25 mg daily on the afternoon         Nutrition Assessment and Intervention:     Reviewed food recall journal      Emotional and Mental Well-being, Sleep, Connectedness Assessment and Intervention:    Sleep/stress assessment performed      Tobacco and Toxic Substance Assessment and Intervention:     Tobacco use screening performed    Tobacco cessation counseling provided    Alcohol cessation counseling " provided       VTE Pharmacologic Prophylaxis: VTE Score: 2  IV heparin  Code Status: Level 1 - Full Code patient and fiancé  Discussion with family: Updated  (fiancé) at bedside.    Anticipated Length of Stay: Patient will be admitted on an inpatient basis with an anticipated length of stay of greater than 2 midnights secondary to acute pulmonary embolism, shortness of breath, abnormal CT chest.    Chief Complaint: Shortness of breath    History of Present Illness:  Papo Gibbs is a 54 y.o. male with a PMH of type 2 diabetes not on insulin, hypertension, tobacco dependence, remote history of gross hematuria due to renal calculus who presents with 1 to 2-month history of cough, shortness of breath for which has been worsening over the past week.  Patient endorsed that had COVID infection back in January 2024 since then started with a cough which has been worsening since then.  His shortness of breath is mostly upon exertion, now with associated chest pain mostly on the left side due to recurrent cough that has been intractable since COVID infection at worst rated 8/10 relieved by NSAIDs at home.    Denies any orthopnea, paroxysmal dyspnea.  Denies any lower extremity edema, redness or pain since  episode back in February 2024 of bilateral cramps on his for which had a duplex arterial ultrasound for which was negative.  Denies any fevers, night sweats, chills, weight loss ,recent sick contacts, abdominal pain or urinary symptoms.    Denies any family history of DVT/PE although is not familiar with father side of family history.  Patient also denies any family history of lung cancer, pancreatic, colon cancer.    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  Denies any recent traveling. No pets, no exposure to to birds, coal, silica, construction material. Did had colonoscopy approximately in 2009 for which was negative however no other prevented health  maintenance since then regards to this as of low-dose CT chest.  Denied changes in bowel habits as well has not changes in stool color.Patient did had imaging back in 2022 for which was evaluated for gross hematuria and was found with a renal calculi since then no other episodes.  Denied any recent changes in weight, fatigue or appetite changes. Current smoker with 53 Pack/year, stopped since Monday 04/15 due to worsening symptoms.    Recently on 04/03 was evaluated in urgent care for acute episode of cellulitis in the right forearm for which completed 7 days course of Keflex.    Review of Systems:  Review of Systems   Constitutional:  Negative for chills and fever.   HENT:  Negative for ear pain and sore throat.    Eyes:  Negative for pain and visual disturbance.   Respiratory:  Positive for cough, chest tightness and shortness of breath.    Cardiovascular:  Positive for chest pain. Negative for palpitations and leg swelling.   Gastrointestinal:  Negative for abdominal distention, abdominal pain and vomiting.   Genitourinary:  Negative for dysuria and hematuria.   Musculoskeletal:  Negative for arthralgias and back pain.   Skin:  Negative for color change and rash.   Neurological:  Negative for seizures and syncope.   All other systems reviewed and are negative.      Past Medical and Surgical History:   History reviewed. No pertinent past medical history.    Past Surgical History:   Procedure Laterality Date    KNEE SURGERY      MANDIBLE FRACTURE SURGERY  1985    PATELLA FRACTURE SURGERY      RECTAL SURGERY         Meds/Allergies:  Prior to Admission medications    Medication Sig Start Date End Date Taking? Authorizing Provider   amitriptyline (ELAVIL) 100 mg tablet 200 mg daily at bedtime 3/9/22  Yes Historical Provider, MD   glimepiride (AMARYL) 1 mg tablet Take 1 mg by mouth 2 (two) times a day with meals 3/19/24  Yes Historical Provider, MD   losartan (COZAAR) 25 mg tablet Take 25 mg by mouth daily 3/9/22  Yes  Historical Provider, MD   atorvastatin (LIPITOR) 10 mg tablet Take 10 mg by mouth daily 3/9/22   Historical Provider, MD   famotidine (PEPCID) 40 MG tablet Take 40 mg by mouth daily as needed for heartburn or indigestion 4/2/24   Historical Provider, MD   metFORMIN (GLUCOPHAGE) 500 mg tablet Take 1,000 mg by mouth 2 (two) times a day with meals 3/10/22   Historical Provider, MD   methocarbamol (ROBAXIN) 500 mg tablet Take 500 mg by mouth 4 (four) times a day as needed for muscle spasms  Patient not taking: Reported on 4/20/2024 2/27/24   Historical Provider, MD   cephalexin (KEFLEX) 500 mg capsule Take 500 mg by mouth every 8 (eight) hours  Patient not taking: Reported on 4/20/2024 4/3/24 4/20/24  Historical Provider, MD     I have reviewed home medications with patient personally.    Allergies: No Known Allergies    Social History:  Marital Status: Single   Occupation: /dispatch  Patient Pre-hospital Living Situation: Home, With spouse  Patient Pre-hospital Level of Mobility: walks  Patient Pre-hospital Diet Restrictions: None  Substance Use History:   Social History     Substance and Sexual Activity   Alcohol Use Not Currently     Social History     Tobacco Use   Smoking Status Every Day    Current packs/day: 0.50    Types: Cigarettes   Smokeless Tobacco Never     Social History     Substance and Sexual Activity   Drug Use Never       Family History:  Family History   Problem Relation Age of Onset    No Known Problems Father     No Known Problems Mother        Physical Exam:     Vitals:   Blood Pressure: 151/91 (04/20/24 0028)  Pulse: (!) 119 (04/20/24 0028)  Temperature: 98.7 °F (37.1 °C) (04/20/24 0028)  Respirations: 20 (04/19/24 2029)  Weight - Scale: 95.2 kg (209 lb 14.1 oz) (04/19/24 2029)  SpO2: 91 % (04/20/24 0028)    Physical Exam  Vitals and nursing note reviewed.   Constitutional:       General: He is not in acute distress.     Appearance: He is well-developed. He is not ill-appearing.    HENT:      Head: Normocephalic and atraumatic.      Right Ear: External ear normal.      Left Ear: External ear normal.      Nose: Nose normal.      Mouth/Throat:      Mouth: Mucous membranes are moist.   Eyes:      General: No scleral icterus.     Extraocular Movements: Extraocular movements intact.      Conjunctiva/sclera: Conjunctivae normal.      Pupils: Pupils are equal, round, and reactive to light.   Cardiovascular:      Rate and Rhythm: Regular rhythm. Tachycardia present.      Pulses: Normal pulses.      Heart sounds: Normal heart sounds. No murmur heard.  Pulmonary:      Effort: Pulmonary effort is normal. No respiratory distress.      Breath sounds: No wheezing.      Comments: Bilateral decreased breath sounds mostly at the bases with somewhat poor airway entry right more than left.  Right lung mild rhonchi.  No wheezing.  Abdominal:      General: Bowel sounds are normal. There is no distension.      Palpations: Abdomen is soft.      Tenderness: There is no abdominal tenderness.      Comments: Obese abdomen however soft nontender to palpation   Musculoskeletal:         General: No swelling.      Cervical back: Normal range of motion and neck supple.      Right lower leg: No edema.      Left lower leg: No edema.   Skin:     General: Skin is warm and dry.      Capillary Refill: Capillary refill takes less than 2 seconds.      Findings: No rash.   Neurological:      Mental Status: He is alert.   Psychiatric:         Mood and Affect: Mood normal.          Additional Data:     Lab Results:  Results from last 7 days   Lab Units 04/20/24  0505 04/19/24 2039   WBC Thousand/uL 10.33* 11.45*   HEMOGLOBIN g/dL 11.7* 12.1   HEMATOCRIT % 37.4 38.7   PLATELETS Thousands/uL 262 277   SEGS PCT %  --  61   LYMPHO PCT %  --  27   MONO PCT %  --  8   EOS PCT %  --  2     Results from last 7 days   Lab Units 04/19/24 2039   SODIUM mmol/L 137   POTASSIUM mmol/L 4.1   CHLORIDE mmol/L 104   CO2 mmol/L 25   BUN mg/dL 21    CREATININE mg/dL 0.74   ANION GAP mmol/L 8   CALCIUM mg/dL 9.7   ALBUMIN g/dL 4.1   TOTAL BILIRUBIN mg/dL 0.33   ALK PHOS U/L 71   ALT U/L 20   AST U/L 13   GLUCOSE RANDOM mg/dL 100     Results from last 7 days   Lab Units 04/19/24 2039   INR  1.13     Results from last 7 days   Lab Units 04/20/24  0348   POC GLUCOSE mg/dl 191*         Results from last 7 days   Lab Units 04/20/24  0505 04/19/24 2039   PROCALCITONIN ng/ml 0.13 0.11       Lines/Drains:  Invasive Devices       Peripheral Intravenous Line  Duration             Peripheral IV 04/19/24 Dorsal (posterior);Right Wrist <1 day    Peripheral IV 04/19/24 Left Forearm <1 day                        Imaging: Reviewed radiology reports from this admission including: chest xray and chest CT scan  CTA ED chest PE study   Final Result by Everardo Mata MD (04/19 2234)      1.  Acute moderate clot burden multifocal bilateral peripheral pulmonary embolism. RV:LV ratio is elevated at 1.1 suggestive of mild right heart strain.   2.  No thoracic aortic aneurysm or dissection. Mild calcific atherosclerosis of the aortic arch and coronary arteries noted.   3.  Trace bilateral pleural effusions with bibasilar atelectasis. Interstitial pulmonary edema bilaterally.   4.  Findings suggestive of multifocal pneumonia in the right lung worse in the right middle lobe. Recommend follow-up imaging after adequate therapy to ensure complete resolution and exclude possibility of underlying neoplasm.   5.  Pathologically enlarged mediastinal, hilar, para-aortic, retrocrural, and upper abdominal lymph nodes are seen which may be secondary to a diffuse infectious/inflammatory or neoplastic process including lymphoma. Recommend clinical correlation.   6.  Nonspecific 1.9 cm left adrenal nodule as described above. Recommend adrenal washout CT in 12 months per current guidelines.         Findings discussed with RAFFI Anguiano at 10:33 p.m.      Workstation performed: [id number]         XR chest  1 view portable    (Results Pending)    VAS VENOUS DUPLEX - LOWER LIMB BILATERAL    (Results Pending)       EKG and Other Studies Reviewed on Admission:   EKG: Sinus Tachycardia. .    ** Please Note: This note has been constructed using a voice recognition system. **

## 2024-04-20 NOTE — ASSESSMENT & PLAN NOTE
POA 1 to 2 months history of shortness of breath with associated chest pain mostly on the left side worsening over the past week.  At the ED D-dimer elevated 13.70  CT chest PE study positive for multifocal bilateral peripheral pulmonary embolism. RV:LV ratio is elevated at 1.1 suggestive of mild right heart strain.   PESI score II  Acute pulmonary embolism likely unprovoked concerning underlying undiagnosed malignancy given pathologic lymphadenopathy, less likely provoked given no recent history of traveling, occupational no recent surgeries although family history unclear on father side..    Plan  Continue IV heparin PE protocol, will hold off transition to DOAC at this time given abnormal CT scan consider considerations for IR biopsy  Follow-up echocardiogram   Continue telemetry  Continue monitor respiratory status and biomarkers

## 2024-04-21 PROBLEM — D72.829 LEUCOCYTOSIS: Status: RESOLVED | Noted: 2024-04-20 | Resolved: 2024-04-21

## 2024-04-21 LAB
ANION GAP SERPL CALCULATED.3IONS-SCNC: 10 MMOL/L (ref 4–13)
APTT PPP: 66 SECONDS (ref 23–37)
ATRIAL RATE: 110 BPM
BUN SERPL-MCNC: 15 MG/DL (ref 5–25)
CALCIUM SERPL-MCNC: 9.3 MG/DL (ref 8.4–10.2)
CHLORIDE SERPL-SCNC: 104 MMOL/L (ref 96–108)
CO2 SERPL-SCNC: 23 MMOL/L (ref 21–32)
CREAT SERPL-MCNC: 0.61 MG/DL (ref 0.6–1.3)
ERYTHROCYTE [DISTWIDTH] IN BLOOD BY AUTOMATED COUNT: 15.6 % (ref 11.6–15.1)
GFR SERPL CREATININE-BSD FRML MDRD: 112 ML/MIN/1.73SQ M
GLUCOSE SERPL-MCNC: 113 MG/DL (ref 65–140)
GLUCOSE SERPL-MCNC: 139 MG/DL (ref 65–140)
GLUCOSE SERPL-MCNC: 140 MG/DL (ref 65–140)
GLUCOSE SERPL-MCNC: 147 MG/DL (ref 65–140)
GLUCOSE SERPL-MCNC: 183 MG/DL (ref 65–140)
HCT VFR BLD AUTO: 38.4 % (ref 36.5–49.3)
HGB BLD-MCNC: 11.6 G/DL (ref 12–17)
MCH RBC QN AUTO: 24.5 PG (ref 26.8–34.3)
MCHC RBC AUTO-ENTMCNC: 30.2 G/DL (ref 31.4–37.4)
MCV RBC AUTO: 81 FL (ref 82–98)
P AXIS: 57 DEGREES
PLATELET # BLD AUTO: 285 THOUSANDS/UL (ref 149–390)
PMV BLD AUTO: 9.1 FL (ref 8.9–12.7)
POTASSIUM SERPL-SCNC: 4 MMOL/L (ref 3.5–5.3)
PR INTERVAL: 164 MS
QRS AXIS: 65 DEGREES
QRSD INTERVAL: 88 MS
QT INTERVAL: 326 MS
QTC INTERVAL: 441 MS
RBC # BLD AUTO: 4.74 MILLION/UL (ref 3.88–5.62)
SODIUM SERPL-SCNC: 137 MMOL/L (ref 135–147)
T WAVE AXIS: 45 DEGREES
VENTRICULAR RATE: 110 BPM
WBC # BLD AUTO: 8.49 THOUSAND/UL (ref 4.31–10.16)

## 2024-04-21 PROCEDURE — 85730 THROMBOPLASTIN TIME PARTIAL: CPT | Performed by: HOSPITALIST

## 2024-04-21 PROCEDURE — 85027 COMPLETE CBC AUTOMATED: CPT | Performed by: HOSPITALIST

## 2024-04-21 PROCEDURE — 82948 REAGENT STRIP/BLOOD GLUCOSE: CPT

## 2024-04-21 PROCEDURE — 80048 BASIC METABOLIC PNL TOTAL CA: CPT | Performed by: HOSPITALIST

## 2024-04-21 PROCEDURE — 99232 SBSQ HOSP IP/OBS MODERATE 35: CPT | Performed by: HOSPITALIST

## 2024-04-21 PROCEDURE — NC001 PR NO CHARGE: Performed by: RADIOLOGY

## 2024-04-21 PROCEDURE — 93010 ELECTROCARDIOGRAM REPORT: CPT | Performed by: INTERNAL MEDICINE

## 2024-04-21 RX ADMIN — LOSARTAN POTASSIUM 25 MG: 25 TABLET, FILM COATED ORAL at 08:21

## 2024-04-21 RX ADMIN — ACETAMINOPHEN 975 MG: 325 TABLET, FILM COATED ORAL at 04:34

## 2024-04-21 RX ADMIN — INSULIN LISPRO 1 UNITS: 100 INJECTION, SOLUTION INTRAVENOUS; SUBCUTANEOUS at 17:11

## 2024-04-21 RX ADMIN — BENZONATATE 200 MG: 100 CAPSULE ORAL at 17:11

## 2024-04-21 RX ADMIN — HEPARIN SODIUM 20 UNITS/KG/HR: 10000 INJECTION, SOLUTION INTRAVENOUS at 21:38

## 2024-04-21 RX ADMIN — ACETAMINOPHEN 975 MG: 325 TABLET, FILM COATED ORAL at 14:09

## 2024-04-21 RX ADMIN — ACETAMINOPHEN 975 MG: 325 TABLET, FILM COATED ORAL at 21:37

## 2024-04-21 RX ADMIN — OXYCODONE HYDROCHLORIDE 5 MG: 5 TABLET ORAL at 21:37

## 2024-04-21 RX ADMIN — Medication 3 MG: at 21:37

## 2024-04-21 RX ADMIN — BENZONATATE 200 MG: 100 CAPSULE ORAL at 21:38

## 2024-04-21 RX ADMIN — METHOCARBAMOL 500 MG: 500 TABLET ORAL at 21:38

## 2024-04-21 RX ADMIN — BENZONATATE 200 MG: 100 CAPSULE ORAL at 08:21

## 2024-04-21 RX ADMIN — HEPARIN SODIUM 20 UNITS/KG/HR: 10000 INJECTION, SOLUTION INTRAVENOUS at 10:06

## 2024-04-21 NOTE — PROGRESS NOTES
[facility]  Progress Note  Name: Papo Gibbs I  MRN: [id number]  Unit/Bed#: S -01 I Date of Admission: 4/19/2024   Date of Service: 4/21/2024 I Hospital Day: 2    Assessment/Plan   * Acute pulmonary embolism (HCC)  Assessment & Plan  Assessment:   Presented with 1 to 2 months history of shortness of breath with associated left sided chest pain worsening for 1 week prior to admission.  In the ED, D-dimer elevated 13.70  CT chest PE study positive for multifocal bilateral peripheral pulmonary embolism. RV:LV ratio is elevated at 1.1 suggestive of mild right heart strain.   PESI score II  Acute pulmonary embolism likely provoked concerning underlying undiagnosed malignancy given pathologic lymphadenopathy, less likely unprovoked given no recent history of traveling, occupational no recent surgeries although family history unclear on father side.  Echocardiogram showed no RV dysfunction/strain.  Doppler US:   Right acute DVT in the peroneal & soleal veins at theproximal to mid calf.  Left acute DVT in the proximal femoral vein and (1) posterior tibial, and peroneal veins at the proximal to mid calf.    Plan:  Continue IV heparin PE protocol, will continue for the next 24 to 48 hours, will hold off transition to DOAC at this time given abnormal CT scan consider considerations for IR biopsy  Discontinue Telemetry given echocardiogram findings.  Continue monitor respiratory status and biomarkers  Eliquis sent for price check upon discharge    Chest pain  Assessment & Plan  Assessment:  Likely MSK in the setting of recurrent cough. TTP left sided.   Imagine negative for Fx  Trialed of Lidoderm patch unresponsive    Plan  Received one dose of Motrin 400mg  Continue multimodal pain regimen  Tylenol 975 mg TID  Robaxin 500 mg 4 times daily as needed.  Oxycodone 2.5 mg every 4 hours as needed for moderate pain  Oxycodone 5 mg every 4 hours as needed for severe pain.    SOB (shortness of  "breath)  Assessment & Plan  Assessment:   Presented shortness of breath over the past 1 to 2 months worsening for the past 1 week prior to admission.  No lower extremity edema.   CT scan showed bilateral small pleural effusions, atelectasis as well as acute PE  Occupational history.   Likely in the setting of acute PE    Plan  Continue IV heparin as above.   Incentive spirometry    Tobacco dependence  Assessment & Plan  35-year history of smoking history, smoker, half pack per day 53 pack.year  Stopped smoking on 4/15/2024  Tobacco cessation counseling provided.   Patient refused nicotine patches at this time if needed will order.     Type 2 diabetes mellitus (HCC)  Assessment & Plan  Lab Results   Component Value Date    HGBA1C 7.1 (H) 04/20/2024       Recent Labs     04/20/24  1145 04/20/24  1623 04/20/24  2107 04/21/24  0737   POCGLU 135 133 118 140         Blood Sugar Average: Last 72 hrs:  (P) 147.5  Home medication: metformin 1000 mg twice a day, recently started on Amaryl 1 mg twice daily    Plan  Diabetic diet level 2  Insulin sliding scale.  Glucose goal 140-180    HTN (hypertension)  Assessment & Plan  History of HTN  Blood Pressure: 139/79  Above goal <130/80  Continue home losartan 25 mg daily.  If patient above goal can increase losartan to 50 mg daily.    Abnormal CT of the chest  Assessment & Plan  Assessment:  Recent URI/COVID infection January 2024, since then has had worsening cough and shortness of breath.  Afebrile, no recent sick contacts.  Current smoker. 53 pack/year  CT imaging with   Multiple findings including \"multifocal pneumonia in the right lung worse in the right mid lobe  Pathologically enlarged mediastinal, hilar, periaortic, retrocrural and upper abdominal lymph nodes concerning for diffuse inflammatory/infection or neoplastic process including lymphoma.   Trace bilateral pleural effusions with bibasilar atelectasis. Interstitial pulmonary edema bilaterally.   Nonspecific 1.9 cm " left adrenal nodule as described above. Recommend adrenal washout CT in 12 months per current guidelines.  In the ED given findings of possible multifocal pneumonia patient was given ceftriaxone and azithromycin  Afebrile, WBC likely reactive trending down back to normal.  Procalcitonin negative x 2  Iron panel showed signs of inflammatory anemia.  Likely malignancy given lymphadenopathy.    Plan  Pending Blood cx   IR consult appreciated for lymph node biopsy.  Pending CT abdomen/pelvis          VTE Pharmacologic Prophylaxis: VTE Score: 2 Moderate Risk (Score 3-4) - Pharmacological DVT Prophylaxis Ordered: heparin drip.    Mobility:   Basic Mobility Inpatient Raw Score: 22  JH-HLM Goal: 7: Walk 25 feet or more  JH-HLM Achieved: 7: Walk 25 feet or more  JH-HLM Goal NOT achieved. Continue with multidisciplinary rounding and encourage appropriate mobility to improve upon JH-HLM goals.    Patient Centered Rounds: I performed bedside rounds with nursing staff today.  Discussions with Specialists or Other Care Team Provider: Nursing    Education and Discussions with Family / Patient: Patient declined call to .     Current Length of Stay: 2 day(s)  Current Patient Status: Inpatient   Discharge Plan: Anticipate discharge in 48-72 hrs to home.    Code Status: Level 1 - Full Code    Subjective:   Patient reported that his SOB has improved, however he is not sure if he would feels short of breath if he walks around. Chest pain is improving rated as 4/10 this morning. No legs pain or swelling. No palpitations.     Objective:   Patient was seen and examined this morning. He was sitting in bed not in pain or discomfort. AAO x 3. Breathing with 1 L LFNC satting 92-93%.     Vitals:   Temp (24hrs), Av.4 °F (36.9 °C), Min:98.3 °F (36.8 °C), Max:98.5 °F (36.9 °C)    Temp:  [98.3 °F (36.8 °C)-98.5 °F (36.9 °C)] 98.3 °F (36.8 °C)  HR:  [] 96  BP: (133-139)/(79-81) 139/79  SpO2:  [93 %-95 %] 93 %  Body mass index  is 31.23 kg/m².     Input and Output Summary (last 24 hours):     Intake/Output Summary (Last 24 hours) at 4/21/2024 0951  Last data filed at 4/21/2024 0441  Gross per 24 hour   Intake --   Output 2225 ml   Net -2225 ml       Physical Exam:   Physical Exam  Vitals and nursing note reviewed.   Constitutional:       General: He is not in acute distress.     Appearance: He is well-developed.   HENT:      Head: Normocephalic and atraumatic.   Eyes:      Conjunctiva/sclera: Conjunctivae normal.   Cardiovascular:      Rate and Rhythm: Normal rate and regular rhythm.      Heart sounds: No murmur heard.  Pulmonary:      Effort: Pulmonary effort is normal. No respiratory distress.      Breath sounds: Decreased breath sounds present.   Abdominal:      Palpations: Abdomen is soft.      Tenderness: There is no abdominal tenderness.   Musculoskeletal:         General: No swelling.      Cervical back: Neck supple.      Comments: Trace left leg edema.   No edema in right leg.    Skin:     General: Skin is warm and dry.      Capillary Refill: Capillary refill takes less than 2 seconds.   Neurological:      Mental Status: He is alert and oriented to person, place, and time.   Psychiatric:         Mood and Affect: Mood normal.          Additional Data:     Labs:  Results from last 7 days   Lab Units 04/21/24  0435 04/20/24  0505 04/19/24 2039   WBC Thousand/uL 8.49   < > 11.45*   HEMOGLOBIN g/dL 11.6*   < > 12.1   HEMATOCRIT % 38.4   < > 38.7   PLATELETS Thousands/uL 285   < > 277   SEGS PCT %  --   --  61   LYMPHO PCT %  --   --  27   MONO PCT %  --   --  8   EOS PCT %  --   --  2    < > = values in this interval not displayed.     Results from last 7 days   Lab Units 04/21/24  0435 04/19/24 2039   SODIUM mmol/L 137 137   POTASSIUM mmol/L 4.0 4.1   CHLORIDE mmol/L 104 104   CO2 mmol/L 23 25   BUN mg/dL 15 21   CREATININE mg/dL 0.61 0.74   ANION GAP mmol/L 10 8   CALCIUM mg/dL 9.3 9.7   ALBUMIN g/dL  --  4.1   TOTAL BILIRUBIN mg/dL   --  0.33   ALK PHOS U/L  --  71   ALT U/L  --  20   AST U/L  --  13   GLUCOSE RANDOM mg/dL 147* 100     Results from last 7 days   Lab Units 04/19/24 2039   INR  1.13     Results from last 7 days   Lab Units 04/21/24  0737 04/20/24  2107 04/20/24  1623 04/20/24  1145 04/20/24  0801 04/20/24  0348   POC GLUCOSE mg/dl 140 118 133 135 168* 191*     Results from last 7 days   Lab Units 04/20/24  0505   HEMOGLOBIN A1C % 7.1*     Results from last 7 days   Lab Units 04/20/24  0505 04/19/24 2039   PROCALCITONIN ng/ml 0.13 0.11       Lines/Drains:  Invasive Devices       Peripheral Intravenous Line  Duration             Peripheral IV 04/19/24 Dorsal (posterior);Right Wrist 1 day    Peripheral IV 04/19/24 Left Forearm 1 day                          Imaging: Reviewed radiology reports from this admission including: Doppler US and Echocardiogram    Recent Cultures (last 7 days):   Results from last 7 days   Lab Units 04/19/24  2345   BLOOD CULTURE  No Growth at 24 hrs.  No Growth at 24 hrs.       Last 24 Hours Medication List:   Current Facility-Administered Medications   Medication Dose Route Frequency Provider Last Rate    acetaminophen  975 mg Oral Q8H LATONIA Jihan Perez MD      amitriptyline  200 mg Oral HS Jihan Perez MD      benzonatate  200 mg Oral TID Jihan Perez MD      famotidine  40 mg Oral Daily PRN Jihan Perez MD      heparin (porcine)  3-30 Units/kg/hr (Order-Specific) Intravenous Titrated Alta Anguiano PA-C 20 Units/kg/hr (04/20/24 2125)    heparin (porcine)  3,600 Units Intravenous Q6H PRN Alta Anguiano PA-C      heparin (porcine)  7,200 Units Intravenous Q6H PRN Alta Anguiano PA-C      insulin lispro  1-6 Units Subcutaneous TID AC Jihan Perez MD      losartan  25 mg Oral Daily Jihan Perez MD      melatonin  3 mg Oral HS PRN Jihan Perez MD      methocarbamol  500 mg Oral 4x Daily PRN Jihan  Nikko Perez MD      oxyCODONE  2.5 mg Oral Q4H PRN Jihan Perez MD      Or    oxyCODONE  5 mg Oral Q4H PRN Jihan Perez MD       Nutrition Assessment and Intervention:     Online resources such as NutritionFacts.[email or web address], ForksOverKnives.com, plantstrong.com, pcrm.[email or web address], or similar provided to patient      Physical Activity Assessment and Intervention:    Activity journal reviewed      Emotional and Mental Well-being, Sleep, Connectedness Assessment and Intervention:    Sleep/stress assessment performed      Tobacco and Toxic Substance Assessment and Intervention:     Tobacco cessation counseling provided      Therapeutic Lifestyle Change Visit:     One-on-one comprehensive counseling, coaching, and health behavior change visit completed          Today, Patient Was Seen By: Geraldine Anne MD    **Please Note: This note may have been constructed using a voice recognition system.**

## 2024-04-21 NOTE — ASSESSMENT & PLAN NOTE
Assessment:   Presented with 1 to 2 months history of shortness of breath with associated left sided chest pain worsening for 1 week prior to admission.  In the ED, D-dimer elevated 13.70  CT chest PE study positive for multifocal bilateral peripheral pulmonary embolism. RV:LV ratio is elevated at 1.1 suggestive of mild right heart strain.   PESI score II  Acute pulmonary embolism likely provoked concerning underlying undiagnosed malignancy given pathologic lymphadenopathy, less likely unprovoked given no recent history of traveling, occupational no recent surgeries although family history unclear on father side.  Echocardiogram showed no RV dysfunction/strain.  Doppler US:   Right acute DVT in the peroneal & soleal veins at theproximal to mid calf.  Left acute DVT in the proximal femoral vein and (1) posterior tibial, and peroneal veins at the proximal to mid calf.  Right sided chest pain overnight, EKG and troponin unremarkable, patient was placed back on telemetry. Appears muscular in nature    Plan:  Continue IV heparin PE protocol, will continue for the next 24 to 48 hours, will hold off transition to DOAC until after biopsy  Discontinue Telemetry   Lidocaine patch for right chest  Eliquis sent for price check upon discharge

## 2024-04-21 NOTE — ASSESSMENT & PLAN NOTE
"Assessment:  Recent URI/COVID infection January 2024, since then has had worsening cough and shortness of breath.  Afebrile, no recent sick contacts.  Current smoker. 53 pack/year  CT imaging with   Multiple findings including \"multifocal pneumonia in the right lung worse in the right mid lobe  Pathologically enlarged mediastinal, hilar, periaortic, retrocrural and upper abdominal lymph nodes concerning for diffuse inflammatory/infection or neoplastic process including lymphoma.   Trace bilateral pleural effusions with bibasilar atelectasis. Interstitial pulmonary edema bilaterally.   Nonspecific 1.9 cm left adrenal nodule as described above. Recommend adrenal washout CT in 12 months per current guidelines.  In the ED given findings of possible multifocal pneumonia patient was given ceftriaxone and azithromycin  Afebrile, WBC likely reactive trending down back to normal.  Procalcitonin negative x 2  Iron panel showed signs of inflammatory anemia.  Likely malignancy given lymphadenopathy.    Plan  IR lymph node biopsy tomorrow, npo from midnight, hold heparin drip few hours before  "

## 2024-04-21 NOTE — ASSESSMENT & PLAN NOTE
35-year history of smoking history, smoker, half pack per day 53 pack.year  Stopped smoking on 4/15/2024  Tobacco cessation counseling provided.   Patient refused nicotine patches at this time if needed will order.   Incentive spirometry

## 2024-04-21 NOTE — TELEMEDICINE
e-Consult ([facility])  - Interventional Radiology  Papo Gibbs 54 y.o. male MRN: [id number]  Unit/Bed#: S -01 Encounter: [id number]          Interventional Radiology has been consulted to evaluate Papo Gibbs    We were consulted by Dr. Perez concerning this patient with lymph nodes.    Inpatient Consult to IR  Consult performed by: Glen Robledo DO  Consult ordered by: Jihan Perez MD        04/21/24    Assessment/Recommendation:   54 y M w/ DM2, HTN, tobacco use w/ worsening SOB w/ CT findings of PE and lymphadenopathy c/f inflammatory / infectious process vs neoplasm. Per chart review, no fever, chills or infectious symptoms prior to admission. Venous duplex w/ DVT. WBC wnl, afebrile, VSS. On heparin gtt.     CT PE - mild R heart strain, PE, findings suggestive of multifocal PNA and enlarged mediastinal, hilar, para-aortic, retrocrural and upper abdominal lymph nodes.     CT a/p pending.     Request for biopsy.     Await final read on CT a/p prior to deciding for biopsy. May want to consider formal heme/onc consult and/or short term follow up imaging once clinically improved prior to holding of heparin gtt to determine stability, worsening, resolution of lymph nodes. The para-aortic lymph nodes are amenable to CT biopsy.       11-20 minutes, >50% of the total time devoted to medical consultative verbal/EMR discussion between providers. Written report will be generated in the EMR.     Thank you for allowing Interventional Radiology to participate in the care of Papo Gibbs. Please don't hesitate to call or TigerText us with any questions.     Glen Robledo DO

## 2024-04-22 ENCOUNTER — APPOINTMENT (INPATIENT)
Dept: RADIOLOGY | Facility: HOSPITAL | Age: 54
DRG: 987 | End: 2024-04-22
Payer: COMMERCIAL

## 2024-04-22 LAB
2HR DELTA HS TROPONIN: -1 NG/L
ANION GAP SERPL CALCULATED.3IONS-SCNC: 9 MMOL/L (ref 4–13)
APTT PPP: 50 SECONDS (ref 23–37)
APTT PPP: 56 SECONDS (ref 23–37)
APTT PPP: 79 SECONDS (ref 23–37)
ATRIAL RATE: 97 BPM
BUN SERPL-MCNC: 15 MG/DL (ref 5–25)
CALCIUM SERPL-MCNC: 9.9 MG/DL (ref 8.4–10.2)
CARDIAC TROPONIN I PNL SERPL HS: 4 NG/L
CARDIAC TROPONIN I PNL SERPL HS: 5 NG/L
CHLORIDE SERPL-SCNC: 102 MMOL/L (ref 96–108)
CO2 SERPL-SCNC: 25 MMOL/L (ref 21–32)
CREAT SERPL-MCNC: 0.68 MG/DL (ref 0.6–1.3)
ERYTHROCYTE [DISTWIDTH] IN BLOOD BY AUTOMATED COUNT: 15.4 % (ref 11.6–15.1)
GFR SERPL CREATININE-BSD FRML MDRD: 107 ML/MIN/1.73SQ M
GLUCOSE SERPL-MCNC: 168 MG/DL (ref 65–140)
GLUCOSE SERPL-MCNC: 169 MG/DL (ref 65–140)
GLUCOSE SERPL-MCNC: 181 MG/DL (ref 65–140)
GLUCOSE SERPL-MCNC: 193 MG/DL (ref 65–140)
GLUCOSE SERPL-MCNC: 200 MG/DL (ref 65–140)
HCT VFR BLD AUTO: 41.5 % (ref 36.5–49.3)
HGB BLD-MCNC: 12.7 G/DL (ref 12–17)
MAGNESIUM SERPL-MCNC: 1.9 MG/DL (ref 1.9–2.7)
MCH RBC QN AUTO: 24.4 PG (ref 26.8–34.3)
MCHC RBC AUTO-ENTMCNC: 30.6 G/DL (ref 31.4–37.4)
MCV RBC AUTO: 80 FL (ref 82–98)
P AXIS: 60 DEGREES
PLATELET # BLD AUTO: 321 THOUSANDS/UL (ref 149–390)
PMV BLD AUTO: 9.1 FL (ref 8.9–12.7)
POTASSIUM SERPL-SCNC: 4.1 MMOL/L (ref 3.5–5.3)
PR INTERVAL: 162 MS
QRS AXIS: 61 DEGREES
QRSD INTERVAL: 84 MS
QT INTERVAL: 338 MS
QTC INTERVAL: 429 MS
RBC # BLD AUTO: 5.2 MILLION/UL (ref 3.88–5.62)
SODIUM SERPL-SCNC: 136 MMOL/L (ref 135–147)
T WAVE AXIS: 29 DEGREES
VENTRICULAR RATE: 97 BPM
WBC # BLD AUTO: 10.53 THOUSAND/UL (ref 4.31–10.16)

## 2024-04-22 PROCEDURE — 93005 ELECTROCARDIOGRAM TRACING: CPT

## 2024-04-22 PROCEDURE — 80048 BASIC METABOLIC PNL TOTAL CA: CPT

## 2024-04-22 PROCEDURE — 82948 REAGENT STRIP/BLOOD GLUCOSE: CPT

## 2024-04-22 PROCEDURE — 85027 COMPLETE CBC AUTOMATED: CPT

## 2024-04-22 PROCEDURE — 71045 X-RAY EXAM CHEST 1 VIEW: CPT

## 2024-04-22 PROCEDURE — 99232 SBSQ HOSP IP/OBS MODERATE 35: CPT | Performed by: HOSPITALIST

## 2024-04-22 PROCEDURE — 93010 ELECTROCARDIOGRAM REPORT: CPT | Performed by: INTERNAL MEDICINE

## 2024-04-22 PROCEDURE — 83735 ASSAY OF MAGNESIUM: CPT

## 2024-04-22 PROCEDURE — 84484 ASSAY OF TROPONIN QUANT: CPT

## 2024-04-22 PROCEDURE — 85730 THROMBOPLASTIN TIME PARTIAL: CPT | Performed by: HOSPITALIST

## 2024-04-22 RX ORDER — FUROSEMIDE 10 MG/ML
20 INJECTION INTRAMUSCULAR; INTRAVENOUS ONCE
Status: COMPLETED | OUTPATIENT
Start: 2024-04-22 | End: 2024-04-22

## 2024-04-22 RX ORDER — HYDROMORPHONE HCL/PF 1 MG/ML
0.5 SYRINGE (ML) INJECTION EVERY 4 HOURS PRN
Status: DISCONTINUED | OUTPATIENT
Start: 2024-04-22 | End: 2024-04-24 | Stop reason: HOSPADM

## 2024-04-22 RX ORDER — LIDOCAINE 50 MG/G
1 PATCH TOPICAL DAILY
Status: DISCONTINUED | OUTPATIENT
Start: 2024-04-22 | End: 2024-04-24 | Stop reason: HOSPADM

## 2024-04-22 RX ADMIN — ACETAMINOPHEN 975 MG: 325 TABLET, FILM COATED ORAL at 04:45

## 2024-04-22 RX ADMIN — BENZONATATE 200 MG: 100 CAPSULE ORAL at 08:45

## 2024-04-22 RX ADMIN — BENZONATATE 200 MG: 100 CAPSULE ORAL at 15:46

## 2024-04-22 RX ADMIN — ACETAMINOPHEN 975 MG: 325 TABLET, FILM COATED ORAL at 13:10

## 2024-04-22 RX ADMIN — HEPARIN SODIUM 3600 UNITS: 1000 INJECTION INTRAVENOUS; SUBCUTANEOUS at 07:30

## 2024-04-22 RX ADMIN — HYDROMORPHONE HYDROCHLORIDE 0.5 MG: 1 INJECTION, SOLUTION INTRAMUSCULAR; INTRAVENOUS; SUBCUTANEOUS at 17:08

## 2024-04-22 RX ADMIN — INSULIN LISPRO 1 UNITS: 100 INJECTION, SOLUTION INTRAVENOUS; SUBCUTANEOUS at 13:12

## 2024-04-22 RX ADMIN — OXYCODONE HYDROCHLORIDE 5 MG: 5 TABLET ORAL at 10:21

## 2024-04-22 RX ADMIN — INSULIN LISPRO 1 UNITS: 100 INJECTION, SOLUTION INTRAVENOUS; SUBCUTANEOUS at 17:06

## 2024-04-22 RX ADMIN — OXYCODONE HYDROCHLORIDE 5 MG: 5 TABLET ORAL at 21:27

## 2024-04-22 RX ADMIN — OXYCODONE HYDROCHLORIDE 5 MG: 5 TABLET ORAL at 15:46

## 2024-04-22 RX ADMIN — BENZONATATE 200 MG: 100 CAPSULE ORAL at 21:27

## 2024-04-22 RX ADMIN — FUROSEMIDE 20 MG: 10 INJECTION, SOLUTION INTRAMUSCULAR; INTRAVENOUS at 04:45

## 2024-04-22 RX ADMIN — LOSARTAN POTASSIUM 25 MG: 25 TABLET, FILM COATED ORAL at 08:45

## 2024-04-22 RX ADMIN — HEPARIN SODIUM 22 UNITS/KG/HR: 10000 INJECTION, SOLUTION INTRAVENOUS at 11:38

## 2024-04-22 RX ADMIN — AMITRIPTYLINE HYDROCHLORIDE 200 MG: 25 TABLET, FILM COATED ORAL at 21:27

## 2024-04-22 RX ADMIN — LIDOCAINE 5% 1 PATCH: 700 PATCH TOPICAL at 08:45

## 2024-04-22 RX ADMIN — Medication 3 MG: at 21:26

## 2024-04-22 RX ADMIN — OXYCODONE HYDROCHLORIDE 5 MG: 5 TABLET ORAL at 03:15

## 2024-04-22 RX ADMIN — HEPARIN SODIUM 3600 UNITS: 1000 INJECTION INTRAVENOUS; SUBCUTANEOUS at 14:55

## 2024-04-22 RX ADMIN — ACETAMINOPHEN 975 MG: 325 TABLET, FILM COATED ORAL at 21:26

## 2024-04-22 RX ADMIN — METHOCARBAMOL 500 MG: 500 TABLET ORAL at 11:53

## 2024-04-22 RX ADMIN — INSULIN LISPRO 2 UNITS: 100 INJECTION, SOLUTION INTRAVENOUS; SUBCUTANEOUS at 08:46

## 2024-04-22 RX ADMIN — METHOCARBAMOL 500 MG: 500 TABLET ORAL at 21:27

## 2024-04-22 NOTE — PLAN OF CARE

## 2024-04-22 NOTE — QUICK NOTE
"Notified by nursing that patient was experiencing localized, R-sided chest pain. Patient expressed that the pain feels like a \"stretching\" and is worse with inspiration. No associated nausea, diaphoresis, or palpitations. He was noted to have SpO2 desaturation to the 80s. Upon evaluation his SpO2 was 95% on 3.5 L NC. Vitals were otherwise stable. Breath sounds were decreased at lung bases on auscultation, but patient had poor inspiratory effort 2/2 pain. Heart RRR. CXR and EKG ordered.   "

## 2024-04-22 NOTE — PROGRESS NOTES
04/22/24 1700   Clinical Encounter Type   Visited With Patient and family together   Routine Visit Introduction   Continue Visiting No   Patient Spiritual Encounters   Spiritual Assessment 4   Suffering Severity 3   Fear Level 5   Feelings of Loneliness none   Feelings of Hopelessness none   Social Interaction 75% of the time   Family Spiritual Encounters   Family Participation in Care 5     Pt male 54 y.o.,  in routine visitation. At that time Pt. Was lying on his bed, with computer on his hand. 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 Part of his family members was present in the encounter. Pt lucid very aware, coherent, able to engage in conversation.  notice strong family support. None [employer] belonging, but has some spiritual believes.

## 2024-04-22 NOTE — PROGRESS NOTES
[facility]  Progress Note  Name: Papo Gibbs I  MRN: [id number]  Unit/Bed#: S -01 I Date of Admission: 4/19/2024   Date of Service: 4/22/2024 I Hospital Day: 3    Assessment/Plan   * Acute pulmonary embolism (HCC)  Assessment & Plan  Assessment:   Presented with 1 to 2 months history of shortness of breath with associated left sided chest pain worsening for 1 week prior to admission.  In the ED, D-dimer elevated 13.70  CT chest PE study positive for multifocal bilateral peripheral pulmonary embolism. RV:LV ratio is elevated at 1.1 suggestive of mild right heart strain.   PESI score II  Acute pulmonary embolism likely provoked concerning underlying undiagnosed malignancy given pathologic lymphadenopathy, less likely unprovoked given no recent history of traveling, occupational no recent surgeries although family history unclear on father side.  Echocardiogram showed no RV dysfunction/strain.  Doppler US:   Right acute DVT in the peroneal & soleal veins at theproximal to mid calf.  Left acute DVT in the proximal femoral vein and (1) posterior tibial, and peroneal veins at the proximal to mid calf.  Right sided chest pain overnight, EKG and troponin unremarkable, patient was placed back on telemetry. Appears muscular in nature    Plan:  Continue IV heparin PE protocol, will continue for the next 24 to 48 hours, will hold off transition to DOAC until after biopsy  Discontinue Telemetry   Lidocaine patch for right chest  Eliquis sent for price check upon discharge    Chest pain  Assessment & Plan  Assessment:  Likely MSK in the setting of recurrent cough. TTP left sided.   Imagine negative for Fx  Trialed of Lidoderm patch unresponsive    Plan  Received one dose of Motrin 400mg  Continue multimodal pain regimen  Tylenol 975 mg TID  Robaxin 500 mg 4 times daily as needed.  Oxycodone 2.5 mg every 4 hours as needed for moderate pain  Oxycodone 5 mg every 4 hours as needed for severe  "pain.    SOB (shortness of breath)  Assessment & Plan  Assessment:   Presented shortness of breath over the past 1 to 2 months worsening for the past 1 week prior to admission.  No lower extremity edema.   CT scan showed bilateral small pleural effusions, atelectasis as well as acute PE  Occupational history.   Likely in the setting of acute PE    Plan  Continue IV heparin as above.   Incentive spirometry    Tobacco dependence  Assessment & Plan  35-year history of smoking history, smoker, half pack per day 53 pack.year  Stopped smoking on 4/15/2024  Tobacco cessation counseling provided.   Patient refused nicotine patches at this time if needed will order.   Incentive spirometry    Type 2 diabetes mellitus (HCC)  Assessment & Plan  Lab Results   Component Value Date    HGBA1C 7.1 (H) 04/20/2024       Recent Labs     04/20/24  1145 04/20/24  1623 04/20/24  2107 04/21/24  0737   POCGLU 135 133 118 140         Blood Sugar Average: Last 72 hrs:  (P) 147.5  Home medication: metformin 1000 mg twice a day, recently started on Amaryl 1 mg twice daily    Plan  Diabetic diet level 2  Insulin sliding scale.  Glucose goal 140-180    HTN (hypertension)  Assessment & Plan  History of HTN  Blood Pressure: 139/79  Above goal <130/80  Continue home losartan 25 mg daily.  If patient above goal can increase losartan to 50 mg daily.    Abnormal CT of the chest  Assessment & Plan  Assessment:  Recent URI/COVID infection January 2024, since then has had worsening cough and shortness of breath.  Afebrile, no recent sick contacts.  Current smoker. 53 pack/year  CT imaging with   Multiple findings including \"multifocal pneumonia in the right lung worse in the right mid lobe  Pathologically enlarged mediastinal, hilar, periaortic, retrocrural and upper abdominal lymph nodes concerning for diffuse inflammatory/infection or neoplastic process including lymphoma.   Trace bilateral pleural effusions with bibasilar atelectasis. Interstitial " "pulmonary edema bilaterally.   Nonspecific 1.9 cm left adrenal nodule as described above. Recommend adrenal washout CT in 12 months per current guidelines.  In the ED given findings of possible multifocal pneumonia patient was given ceftriaxone and azithromycin  Afebrile, WBC likely reactive trending down back to normal.  Procalcitonin negative x 2  Iron panel showed signs of inflammatory anemia.  Likely malignancy given lymphadenopathy.    Plan  IR lymph node biopsy tomorrow, npo from midnight, hold heparin drip few hours before               VTE Pharmacologic Prophylaxis: VTE Score: 2  On heparin drip for acute dvt and pe    Mobility:   Basic Mobility Inpatient Raw Score: 22  JH-HLM Goal: 7: Walk 25 feet or more  JH-HLM Achieved: 7: Walk 25 feet or more  JH-HLM Goal achieved. Continue to encourage appropriate mobility.    Patient Centered Rounds: I performed bedside rounds with nursing staff today.  Discussions with Specialists or Other Care Team Provider: IR    Education and Discussions with Family / Patient: Updated  (significant other) via phone.    Current Length of Stay: 3 day(s)  Current Patient Status: Inpatient   Discharge Plan: Anticipate discharge in 24-48 hrs to home.    Code Status: Level 1 - Full Code    Subjective:   Overnight patient had some right sided chest pain. He said it was different from his left sided pain and feels muscular like something is \"pulling.\" He is on board with trying lidocaine patch. He knows he will have a biopsy tomorrow.     Objective:     Vitals:   Temp (24hrs), Av.8 °F (37.1 °C), Min:98.7 °F (37.1 °C), Max:98.9 °F (37.2 °C)    Temp:  [98.7 °F (37.1 °C)-98.9 °F (37.2 °C)] 98.7 °F (37.1 °C)  HR:  [104-108] 107  Resp:  [18] 18  BP: (125-141)/(70-84) 141/84  SpO2:  [88 %-95 %] 95 %  Body mass index is 31.23 kg/m².     Input and Output Summary (last 24 hours):     Intake/Output Summary (Last 24 hours) at 2024 1426  Last data filed at 2024 1314  Gross " per 24 hour   Intake 560 ml   Output 2050 ml   Net -1490 ml       Physical Exam:   Physical Exam  Vitals reviewed.   Constitutional:       General: He is not in acute distress.     Appearance: He is well-developed.   HENT:      Head: Normocephalic and atraumatic.   Eyes:      Extraocular Movements: Extraocular movements intact.      Pupils: Pupils are equal, round, and reactive to light.   Cardiovascular:      Rate and Rhythm: Normal rate and regular rhythm.      Heart sounds: Normal heart sounds. No murmur heard.  Pulmonary:      Effort: Pulmonary effort is normal.      Breath sounds: Normal breath sounds.      Comments: Satting well on 3.5 L  1250 mL on IS  Abdominal:      General: There is no distension.      Palpations: Abdomen is soft.      Tenderness: There is no abdominal tenderness.   Musculoskeletal:         General: No tenderness.      Right lower leg: No edema.      Left lower leg: No edema.   Skin:     Coloration: Skin is not jaundiced or pale.   Neurological:      Mental Status: He is alert and oriented to person, place, and time.   Psychiatric:         Mood and Affect: Mood normal.          Additional Data:     Labs:  Results from last 7 days   Lab Units 04/22/24  0455 04/20/24  0505 04/19/24 2039   WBC Thousand/uL 10.53*   < > 11.45*   HEMOGLOBIN g/dL 12.7   < > 12.1   HEMATOCRIT % 41.5   < > 38.7   PLATELETS Thousands/uL 321   < > 277   SEGS PCT %  --   --  61   LYMPHO PCT %  --   --  27   MONO PCT %  --   --  8   EOS PCT %  --   --  2    < > = values in this interval not displayed.     Results from last 7 days   Lab Units 04/22/24  0455 04/21/24  0435 04/19/24 2039   SODIUM mmol/L 136   < > 137   POTASSIUM mmol/L 4.1   < > 4.1   CHLORIDE mmol/L 102   < > 104   CO2 mmol/L 25   < > 25   BUN mg/dL 15   < > 21   CREATININE mg/dL 0.68   < > 0.74   ANION GAP mmol/L 9   < > 8   CALCIUM mg/dL 9.9   < > 9.7   ALBUMIN g/dL  --   --  4.1   TOTAL BILIRUBIN mg/dL  --   --  0.33   ALK PHOS U/L  --   --  71    ALT U/L  --   --  20   AST U/L  --   --  13   GLUCOSE RANDOM mg/dL 168*   < > 100    < > = values in this interval not displayed.     Results from last 7 days   Lab Units 04/19/24  2039   INR  1.13     Results from last 7 days   Lab Units 04/22/24  1142 04/22/24  0812 04/21/24  2058 04/21/24  1509 04/21/24  1118 04/21/24  0737 04/20/24  2107 04/20/24  1623 04/20/24  1145 04/20/24  0801 04/20/24  0348   POC GLUCOSE mg/dl 181* 200* 113 183* 139 140 118 133 135 168* 191*     Results from last 7 days   Lab Units 04/20/24  0505   HEMOGLOBIN A1C % 7.1*     Results from last 7 days   Lab Units 04/20/24  0505 04/19/24 2039   PROCALCITONIN ng/ml 0.13 0.11       Lines/Drains:  Invasive Devices       Peripheral Intravenous Line  Duration             Peripheral IV 04/19/24 Dorsal (posterior);Right Wrist 2 days    Peripheral IV 04/19/24 Left Forearm 2 days                          Imaging: Reviewed radiology reports from this admission including: chest xray and abdominal/pelvic CT    Recent Cultures (last 7 days):   Results from last 7 days   Lab Units 04/19/24  2345   BLOOD CULTURE  No Growth at 48 hrs.  No Growth at 48 hrs.       Last 24 Hours Medication List:   Current Facility-Administered Medications   Medication Dose Route Frequency Provider Last Rate    acetaminophen  975 mg Oral Q8H LATONIA Jihan Perez MD      amitriptyline  200 mg Oral HS Jihan Perez MD      benzonatate  200 mg Oral TID Jihan Perez MD      famotidine  40 mg Oral Daily PRN Jihan Perez MD      heparin (porcine)  3-30 Units/kg/hr (Order-Specific) Intravenous Titrated Alta Anguiano PA-C 22 Units/kg/hr (04/22/24 1138)    heparin (porcine)  3,600 Units Intravenous Q6H PRN Alta Anguiano PA-C      heparin (porcine)  7,200 Units Intravenous Q6H PRN Alta Anguiano PA-C      insulin lispro  1-6 Units Subcutaneous TID AC Jihan Perez MD      lidocaine  1 patch Topical Daily Bhavna  MD Kelsey      losartan  25 mg Oral Daily Jihan Perez MD      melatonin  3 mg Oral HS PRN Jihan Perez MD      methocarbamol  500 mg Oral 4x Daily PRN Jihan Perez MD      oxyCODONE  2.5 mg Oral Q4H PRN Jihan Perez MD      Or    oxyCODONE  5 mg Oral Q4H PRN Jihan Perez MD          Today, Patient Was Seen By: Bhavna Cole MD    **Please Note: This note may have been constructed using a voice recognition system.**

## 2024-04-23 ENCOUNTER — APPOINTMENT (INPATIENT)
Dept: CT IMAGING | Facility: HOSPITAL | Age: 54
DRG: 987 | End: 2024-04-23
Payer: COMMERCIAL

## 2024-04-23 PROBLEM — J96.01 ACUTE RESPIRATORY FAILURE WITH HYPOXIA (HCC): Status: ACTIVE | Noted: 2024-04-23

## 2024-04-23 LAB
ANION GAP SERPL CALCULATED.3IONS-SCNC: 8 MMOL/L (ref 4–13)
APTT PPP: 46 SECONDS (ref 23–37)
BUN SERPL-MCNC: 13 MG/DL (ref 5–25)
CALCIUM SERPL-MCNC: 9.7 MG/DL (ref 8.4–10.2)
CHLORIDE SERPL-SCNC: 102 MMOL/L (ref 96–108)
CO2 SERPL-SCNC: 26 MMOL/L (ref 21–32)
CREAT SERPL-MCNC: 0.63 MG/DL (ref 0.6–1.3)
ERYTHROCYTE [DISTWIDTH] IN BLOOD BY AUTOMATED COUNT: 15.3 % (ref 11.6–15.1)
GFR SERPL CREATININE-BSD FRML MDRD: 110 ML/MIN/1.73SQ M
GLUCOSE SERPL-MCNC: 156 MG/DL (ref 65–140)
GLUCOSE SERPL-MCNC: 165 MG/DL (ref 65–140)
GLUCOSE SERPL-MCNC: 167 MG/DL (ref 65–140)
GLUCOSE SERPL-MCNC: 167 MG/DL (ref 65–140)
GLUCOSE SERPL-MCNC: 193 MG/DL (ref 65–140)
HCT VFR BLD AUTO: 39.2 % (ref 36.5–49.3)
HGB BLD-MCNC: 12.1 G/DL (ref 12–17)
MCH RBC QN AUTO: 24.4 PG (ref 26.8–34.3)
MCHC RBC AUTO-ENTMCNC: 30.9 G/DL (ref 31.4–37.4)
MCV RBC AUTO: 79 FL (ref 82–98)
PLATELET # BLD AUTO: 296 THOUSANDS/UL (ref 149–390)
PMV BLD AUTO: 8.8 FL (ref 8.9–12.7)
POTASSIUM SERPL-SCNC: 4.1 MMOL/L (ref 3.5–5.3)
RBC # BLD AUTO: 4.95 MILLION/UL (ref 3.88–5.62)
SODIUM SERPL-SCNC: 136 MMOL/L (ref 135–147)
WBC # BLD AUTO: 9.44 THOUSAND/UL (ref 4.31–10.16)

## 2024-04-23 PROCEDURE — 82948 REAGENT STRIP/BLOOD GLUCOSE: CPT

## 2024-04-23 PROCEDURE — 80048 BASIC METABOLIC PNL TOTAL CA: CPT | Performed by: INTERNAL MEDICINE

## 2024-04-23 PROCEDURE — 07BD3ZX EXCISION OF AORTIC LYMPHATIC, PERCUTANEOUS APPROACH, DIAGNOSTIC: ICD-10-PCS | Performed by: RADIOLOGY

## 2024-04-23 PROCEDURE — 88342 IMHCHEM/IMCYTCHM 1ST ANTB: CPT | Performed by: PATHOLOGY

## 2024-04-23 PROCEDURE — NC001 PR NO CHARGE: Performed by: RADIOLOGY

## 2024-04-23 PROCEDURE — 88341 IMHCHEM/IMCYTCHM EA ADD ANTB: CPT | Performed by: PATHOLOGY

## 2024-04-23 PROCEDURE — 88184 FLOWCYTOMETRY/ TC 1 MARKER: CPT

## 2024-04-23 PROCEDURE — 49180 BIOPSY ABDOMINAL MASS: CPT | Performed by: RADIOLOGY

## 2024-04-23 PROCEDURE — 88185 FLOWCYTOMETRY/TC ADD-ON: CPT

## 2024-04-23 PROCEDURE — 99152 MOD SED SAME PHYS/QHP 5/>YRS: CPT | Performed by: RADIOLOGY

## 2024-04-23 PROCEDURE — 88307 TISSUE EXAM BY PATHOLOGIST: CPT | Performed by: PATHOLOGY

## 2024-04-23 PROCEDURE — 85027 COMPLETE CBC AUTOMATED: CPT | Performed by: INTERNAL MEDICINE

## 2024-04-23 PROCEDURE — 99232 SBSQ HOSP IP/OBS MODERATE 35: CPT | Performed by: INTERNAL MEDICINE

## 2024-04-23 PROCEDURE — 85730 THROMBOPLASTIN TIME PARTIAL: CPT | Performed by: INTERNAL MEDICINE

## 2024-04-23 PROCEDURE — 77012 CT SCAN FOR NEEDLE BIOPSY: CPT | Performed by: RADIOLOGY

## 2024-04-23 RX ORDER — MIDAZOLAM HYDROCHLORIDE 2 MG/2ML
INJECTION, SOLUTION INTRAMUSCULAR; INTRAVENOUS AS NEEDED
Status: COMPLETED | OUTPATIENT
Start: 2024-04-23 | End: 2024-04-23

## 2024-04-23 RX ORDER — FENTANYL CITRATE 50 UG/ML
INJECTION, SOLUTION INTRAMUSCULAR; INTRAVENOUS AS NEEDED
Status: COMPLETED | OUTPATIENT
Start: 2024-04-23 | End: 2024-04-23

## 2024-04-23 RX ORDER — LIDOCAINE WITH 8.4% SOD BICARB 0.9%(10ML)
SYRINGE (ML) INJECTION AS NEEDED
Status: COMPLETED | OUTPATIENT
Start: 2024-04-23 | End: 2024-04-23

## 2024-04-23 RX ADMIN — APIXABAN 10 MG: 5 TABLET, FILM COATED ORAL at 17:13

## 2024-04-23 RX ADMIN — MIDAZOLAM 0.5 MG: 1 INJECTION INTRAMUSCULAR; INTRAVENOUS at 15:28

## 2024-04-23 RX ADMIN — BENZONATATE 200 MG: 100 CAPSULE ORAL at 16:28

## 2024-04-23 RX ADMIN — ACETAMINOPHEN 975 MG: 325 TABLET, FILM COATED ORAL at 14:00

## 2024-04-23 RX ADMIN — HEPARIN SODIUM 3600 UNITS: 1000 INJECTION INTRAVENOUS; SUBCUTANEOUS at 05:14

## 2024-04-23 RX ADMIN — FENTANYL CITRATE 25 MCG: 50 INJECTION INTRAMUSCULAR; INTRAVENOUS at 15:36

## 2024-04-23 RX ADMIN — BENZONATATE 200 MG: 100 CAPSULE ORAL at 09:21

## 2024-04-23 RX ADMIN — METHOCARBAMOL 500 MG: 500 TABLET ORAL at 21:59

## 2024-04-23 RX ADMIN — MIDAZOLAM 1 MG: 1 INJECTION INTRAMUSCULAR; INTRAVENOUS at 15:23

## 2024-04-23 RX ADMIN — Medication 5 ML: at 15:29

## 2024-04-23 RX ADMIN — MIDAZOLAM 0.5 MG: 1 INJECTION INTRAMUSCULAR; INTRAVENOUS at 15:36

## 2024-04-23 RX ADMIN — ACETAMINOPHEN 975 MG: 325 TABLET, FILM COATED ORAL at 21:59

## 2024-04-23 RX ADMIN — OXYCODONE HYDROCHLORIDE 5 MG: 5 TABLET ORAL at 04:03

## 2024-04-23 RX ADMIN — LOSARTAN POTASSIUM 25 MG: 25 TABLET, FILM COATED ORAL at 09:21

## 2024-04-23 RX ADMIN — FENTANYL CITRATE 25 MCG: 50 INJECTION INTRAMUSCULAR; INTRAVENOUS at 15:28

## 2024-04-23 RX ADMIN — INSULIN LISPRO 1 UNITS: 100 INJECTION, SOLUTION INTRAVENOUS; SUBCUTANEOUS at 16:31

## 2024-04-23 RX ADMIN — BENZONATATE 200 MG: 100 CAPSULE ORAL at 22:00

## 2024-04-23 RX ADMIN — FENTANYL CITRATE 25 MCG: 50 INJECTION INTRAMUSCULAR; INTRAVENOUS at 15:23

## 2024-04-23 RX ADMIN — AMITRIPTYLINE HYDROCHLORIDE 200 MG: 25 TABLET, FILM COATED ORAL at 22:02

## 2024-04-23 RX ADMIN — HEPARIN SODIUM 24 UNITS/KG/HR: 10000 INJECTION, SOLUTION INTRAVENOUS at 03:23

## 2024-04-23 NOTE — ASSESSMENT & PLAN NOTE
Lab Results   Component Value Date    HGBA1C 7.1 (H) 04/20/2024       Recent Labs     04/22/24  1142 04/22/24  1605 04/22/24  2108 04/23/24  0704   POCGLU 181* 169* 193* 167*         Blood Sugar Average: Last 72 hrs:  (P) 159.7497761540674713  Home medication: metformin 1000 mg twice a day, recently started on Amaryl 1 mg twice daily    Plan  Diabetic diet level 2  Insulin sliding scale.  Glucose goal 140-180

## 2024-04-23 NOTE — DISCHARGE INSTRUCTIONS
POST BIOPSY    Care after your procedure:    1. Limit your activities for 24 hours after your biopsy.    2. No driving day of biopsy.    3. Return to your normal diet.Small sips of flat soda will help with mild nausea.    4. Remove band-aid or dressing 24 hours after procedure.     Contact Interventional Radiology at [phone] ([facility] PATIENTS: Contact Interventional Radiology at [phone]) ([facility] PATIENTS: Contact Interventional Radiology at [phone]) if:    1. Difficulty breathing, nausea or vomiting.    2. Chills or fever above 101 degrees F.     3. Pain at biopsy site not relieved by medication.     4. Develop any redness, swelling, heat, unusual drainage, heavy bruising or bleeding from biopsy site.     Procedural Sedation   WHAT YOU NEED TO KNOW:   Procedural sedation is medicine used during procedures to help you feel relaxed and calm. You will remember little to none of the procedure. After sedation you may feel tired, weak, or unsteady on your feet. You may also have trouble concentrating or short-term memory loss. These symptoms should go away in 24 hours or less.   DISCHARGE INSTRUCTIONS:     Call 911 or have someone else call for any of the following:   You have sudden trouble breathing.     You cannot be woken.      Contact Interventional Radiology at [phone]   ([facility] PATIENTS: Contact Interventional Radiology at [phone]) ([facility] PATIENTS: Contact Interventional Radiology at [phone]) if any of the following occur:     You have a severe headache or dizziness.     Your heart is beating faster than usual.    You have a fever or chills.     Your skin is itchy, swollen, or you have a rash.     You have nausea or are vomiting for more than 8 hours after the procedure.      You have questions or concerns about your condition or care.  Self-care:   Have someone stay with you for 24 hours. This person can drive you to errands and help you do things around the house. This person  can also watch for problems.      Rest and do quiet activities for 24 hours. Do not exercise, ride a bike, or play sports. Stand up slowly to prevent dizziness and falls. Take short walks around the house with another person. Slowly return to your usual activities the next day.      Do not drive or use dangerous machines or tools for 24 hours. You may injure yourself or others. Examples include a lawnmower, saw, or drill. Do not return to work for 24 hours if you use dangerous machines or tools for work.      Do not make important decisions for 24 hours. For example, do not sign important papers or invest money.      Drink liquids as directed. Liquids help flush the sedation medicine out of your body. Ask how much liquid to drink each day and which liquids are best for you.      Eat small, frequent meals to prevent nausea and vomiting. Start with clear liquids such as juice or broth. If you do not vomit after clear liquids, you can eat your usual foods.      Do not drink alcohol or take medicines that make you drowsy. This includes medicines that help you sleep and anxiety medicines. Ask your healthcare provider if it is safe for you to take pain medicine.  Follow up with your healthcare provider as directed: Write down your questions so you remember to ask them during your visits.

## 2024-04-23 NOTE — ASSESSMENT & PLAN NOTE
"Recent COVID infection January 2024, since then has had worsening cough and shortness of breath.  Afebrile, no recent sick contacts. 53 pack year hx, stopped smoking 1 week ago.   CT imaging with   Multiple findings including \"multifocal pneumonia in the right lung worse in the right mid lobe  Pathologically enlarged mediastinal, hilar, periaortic, retrocrural and upper abdominal lymph nodes concerning for diffuse inflammatory/infection or neoplastic process including lymphoma.   Trace bilateral pleural effusions with bibasilar atelectasis. Interstitial pulmonary edema bilaterally.   Nonspecific 1.9 cm left adrenal nodule as described above. Recommend adrenal washout CT in 12 months per current guidelines.  Suspect malignancy, infectious etiology less likely    Plan  IR lymph node biopsy today, npo, hold heparin drip few hours before  "

## 2024-04-23 NOTE — SEDATION DOCUMENTATION
Left retroperitoneal lymph node biopsy completed by Dr. Carter. D-stat and band-aid to site. Patient tolerated well.

## 2024-04-23 NOTE — PLAN OF CARE

## 2024-04-23 NOTE — PROGRESS NOTES
"[facility]  Progress Note  Name: Papo Gibbs I  MRN: [id number]  Unit/Bed#: S -Beckie I Date of Admission: 4/19/2024   Date of Service: 4/23/2024 I Hospital Day: 4    Assessment/Plan   * Acute pulmonary embolism (HCC)  Assessment & Plan  Multifocal bilateral peripheral pulmonary embolism. Bilateral acute DVTs L>R. No RV strain on echo. Concern for underlying malignancy suggestive of provoked. Pleuritic chest pain responded well to dilaudid  Price check confirmed patient can afford eliquis. Suspect 3 months on DOAC will suffice    Plan:  Continue IV heparin PE protocol, after lymph node biopsy today transition to eliquis  Dilaudid 0.5 for breakthrough    Abnormal CT of the chest  Assessment & Plan  Recent COVID infection January 2024, since then has had worsening cough and shortness of breath.  Afebrile, no recent sick contacts. 53 pack year hx, stopped smoking 1 week ago.   CT imaging with   Multiple findings including \"multifocal pneumonia in the right lung worse in the right mid lobe  Pathologically enlarged mediastinal, hilar, periaortic, retrocrural and upper abdominal lymph nodes concerning for diffuse inflammatory/infection or neoplastic process including lymphoma.   Trace bilateral pleural effusions with bibasilar atelectasis. Interstitial pulmonary edema bilaterally.   Nonspecific 1.9 cm left adrenal nodule as described above. Recommend adrenal washout CT in 12 months per current guidelines.  Suspect malignancy, infectious etiology less likely    Plan  IR lymph node biopsy today, npo, hold heparin drip few hours before    Chest pain  Assessment & Plan  Continue multimodal pain regimen  Tylenol 975 mg TID  Robaxin 500 mg 4 times daily as needed.  Oxycodone 2.5 mg every 4 hours as needed for moderate pain  Oxycodone 5 mg every 4 hours as needed for severe pain.  Dilaudid added for breakthrough    SOB (shortness of breath)  Assessment & Plan  2/2 to PE. Not on O2 at home. 5L this " morning    Plan  PE plan above  Incentive spirometry    Tobacco dependence  Assessment & Plan  35-year history of smoking history, smoker, half pack per day 53 pack.year  Stopped smoking on 4/15/2024  Tobacco cessation counseling provided.   Patient refused nicotine patches at this time if needed will order.   Incentive spirometry    Type 2 diabetes mellitus (HCC)  Assessment & Plan  Lab Results   Component Value Date    HGBA1C 7.1 (H) 04/20/2024       Recent Labs     04/22/24  1142 04/22/24  1605 04/22/24  2108 04/23/24  0704   POCGLU 181* 169* 193* 167*         Blood Sugar Average: Last 72 hrs:  (P) [id number]  Home medication: metformin 1000 mg twice a day, recently started on Amaryl 1 mg twice daily    Plan  Diabetic diet level 2  Insulin sliding scale.  Glucose goal 140-180    HTN (hypertension)  Assessment & Plan  History of HTN  Blood Pressure: 138/89  Above goal <130/80  Continue home losartan 25 mg daily.  If patient above goal can increase losartan to 50 mg daily.  Currently suspect above goal 2/2 to pleuritic chest pain and anxiety                VTE Pharmacologic Prophylaxis: VTE Score: 2  heparin drip currently held for IR procedure will transition to Eliquis after    Mobility:   Basic Mobility Inpatient Raw Score: 23  JH-HLM Goal: 7: Walk 25 feet or more  JH-HLM Achieved: 3: Sit at edge of bed  JH-HLM Goal NOT achieved. Continue with multidisciplinary rounding and encourage appropriate mobility to improve upon JH-HLM goals.    Patient Centered Rounds: I performed bedside rounds with nursing staff today.  Discussions with Specialists or Other Care Team Provider: IR    Education and Discussions with Family / Patient: Updated  (mother) at bedside.    Current Length of Stay: 4 day(s)  Current Patient Status: Inpatient   Discharge Plan: Anticipate discharge tomorrow to home.    Code Status: Level 1 - Full Code    Subjective:   This morning patient feels well and notes that the sharp  pain is gone.  He understands that he has procedure today and will likely get started on anticoagulation after.  Patient confirmed he is okay with the cost of Eliquis.    Objective:     Vitals:   Temp (24hrs), Av.2 °F (37.3 °C), Min:98.7 °F (37.1 °C), Max:99.9 °F (37.7 °C)    Temp:  [98.7 °F (37.1 °C)-99.9 °F (37.7 °C)] 98.7 °F (37.1 °C)  HR:  [111-114] 111  Resp:  [22] 22  BP: (135-141)/(82-89) 138/89  SpO2:  [88 %-91 %] 88 %  Body mass index is 30.9 kg/m².     Input and Output Summary (last 24 hours):     Intake/Output Summary (Last 24 hours) at 2024 0850  Last data filed at 2024 0401  Gross per 24 hour   Intake 2371.17 ml   Output 1975 ml   Net 396.17 ml       Physical Exam:   Physical Exam  Vitals reviewed.   Constitutional:       General: He is not in acute distress.     Appearance: He is well-developed.   HENT:      Head: Normocephalic and atraumatic.   Cardiovascular:      Rate and Rhythm: Regular rhythm. Tachycardia present.      Heart sounds: Normal heart sounds. No murmur heard.  Pulmonary:      Effort: Pulmonary effort is normal.      Breath sounds: Normal breath sounds.      Comments: Requiring 4 L of oxygen  Abdominal:      General: There is no distension.      Palpations: Abdomen is soft.      Tenderness: There is no abdominal tenderness.   Musculoskeletal:      Right lower leg: No edema.      Left lower leg: No edema.   Skin:     Coloration: Skin is not jaundiced or pale.   Neurological:      Mental Status: He is alert and oriented to person, place, and time.   Psychiatric:         Mood and Affect: Mood normal.          Additional Data:     Labs:  Results from last 7 days   Lab Units 24  0356 24  0505 24  2039   WBC Thousand/uL 9.44   < > 11.45*   HEMOGLOBIN g/dL 12.1   < > 12.1   HEMATOCRIT % 39.2   < > 38.7   PLATELETS Thousands/uL 296   < > 277   SEGS PCT %  --   --  61   LYMPHO PCT %  --   --  27   MONO PCT %  --   --  8   EOS PCT %  --   --  2    < > = values in  this interval not displayed.     Results from last 7 days   Lab Units 04/23/24  0356 04/21/24  0435 04/19/24 2039   SODIUM mmol/L 136   < > 137   POTASSIUM mmol/L 4.1   < > 4.1   CHLORIDE mmol/L 102   < > 104   CO2 mmol/L 26   < > 25   BUN mg/dL 13   < > 21   CREATININE mg/dL 0.63   < > 0.74   ANION GAP mmol/L 8   < > 8   CALCIUM mg/dL 9.7   < > 9.7   ALBUMIN g/dL  --   --  4.1   TOTAL BILIRUBIN mg/dL  --   --  0.33   ALK PHOS U/L  --   --  71   ALT U/L  --   --  20   AST U/L  --   --  13   GLUCOSE RANDOM mg/dL 165*   < > 100    < > = values in this interval not displayed.     Results from last 7 days   Lab Units 04/19/24 2039   INR  1.13     Results from last 7 days   Lab Units 04/23/24  0704 04/22/24  2108 04/22/24  1605 04/22/24  1142 04/22/24  0812 04/21/24  2058 04/21/24  1509 04/21/24  1118 04/21/24  0737 04/20/24  2107 04/20/24  1623 04/20/24  1145   POC GLUCOSE mg/dl 167* 193* 169* 181* 200* 113 183* 139 140 118 133 135     Results from last 7 days   Lab Units 04/20/24  0505   HEMOGLOBIN A1C % 7.1*     Results from last 7 days   Lab Units 04/20/24  0505 04/19/24 2039   PROCALCITONIN ng/ml 0.13 0.11       Lines/Drains:  Invasive Devices       Peripheral Intravenous Line  Duration             Peripheral IV 04/23/24 Dorsal (posterior);Left Hand <1 day                          Imaging: Reviewed radiology reports from this admission including: abdominal/pelvic CT    Recent Cultures (last 7 days):   Results from last 7 days   Lab Units 04/19/24  2345   BLOOD CULTURE  No Growth at 72 hrs.  No Growth at 72 hrs.       Last 24 Hours Medication List:   Current Facility-Administered Medications   Medication Dose Route Frequency Provider Last Rate    acetaminophen  975 mg Oral Q8H LATONIA Jihan Perez MD      amitriptyline  200 mg Oral HS Jihan Perez MD      benzonatate  200 mg Oral TID Jihan Perez MD      famotidine  40 mg Oral Daily PRN Jihan Perez MD       heparin (porcine)  3-30 Units/kg/hr (Order-Specific) Intravenous Titrated Alta Magdalena, PA-C 26 Units/kg/hr (04/23/24 0510)    heparin (porcine)  3,600 Units Intravenous Q6H PRN Altaalbert Anguiano, PA-C      heparin (porcine)  7,200 Units Intravenous Q6H PRN Altaalbert Anguiano, PA-C      HYDROmorphone  0.5 mg Intravenous Q4H PRN Bhavna Cole MD      insulin lispro  1-6 Units Subcutaneous TID AC Jihan Perez MD      lidocaine  1 patch Topical Daily Bhavna Cole MD      losartan  25 mg Oral Daily Jihan Perez MD      melatonin  3 mg Oral HS PRN Jihan Perez MD      methocarbamol  500 mg Oral 4x Daily PRN Jihan Perez MD      oxyCODONE  2.5 mg Oral Q4H PRN Jihan Perez MD      Or    oxyCODONE  5 mg Oral Q4H PRN Jihan Perez MD          Today, Patient Was Seen By: Bhavna Cole MD    **Please Note: This note may have been constructed using a voice recognition system.**

## 2024-04-23 NOTE — ASSESSMENT & PLAN NOTE
Continue multimodal pain regimen  Tylenol 975 mg TID  Robaxin 500 mg 4 times daily as needed.  Oxycodone 2.5 mg every 4 hours as needed for moderate pain  Oxycodone 5 mg every 4 hours as needed for severe pain.  Dilaudid added for breakthrough

## 2024-04-23 NOTE — ASSESSMENT & PLAN NOTE
Multifocal bilateral peripheral pulmonary embolism. Bilateral acute DVTs L>R. No RV strain on echo. Concern for underlying malignancy suggestive of provoked. Pleuritic chest pain responded well to dilaudid  Price check confirmed patient can afford eliquis. Suspect 3 months on DOAC will suffice    Plan:  Continue IV heparin PE protocol, after lymph node biopsy today transition to eliquis  Dilaudid 0.5 for breakthrough

## 2024-04-23 NOTE — ASSESSMENT & PLAN NOTE
History of HTN  Blood Pressure: 138/89  Above goal <130/80  Continue home losartan 25 mg daily.  If patient above goal can increase losartan to 50 mg daily.  Currently suspect above goal 2/2 to pleuritic chest pain and anxiety

## 2024-04-23 NOTE — PLAN OF CARE
Problem: Potential for Falls  Goal: Patient will remain free of falls  Description: INTERVENTIONS:  - Educate patient/family on patient safety including physical limitations  - Instruct patient to call for assistance with activity   - Consult OT/PT to assist with strengthening/mobility   - Keep Call bell within reach  - Keep bed low and locked with side rails adjusted as appropriate  - Keep care items and personal belongings within reach  - Initiate and maintain comfort rounds  - Make Fall Risk Sign visible to staff  - Offer Toileting every 2 Hours, in advance of need  - Initiate/Maintain alarm  - Obtain necessary fall risk management equipment:   - Apply yellow socks and bracelet for high fall risk patients  - Consider moving patient to room near nurses station  4/23/2024 1153 by Ana Swanson RN  Outcome: Progressing  4/23/2024 1152 by Ana Swanson RN  Outcome: Progressing     Problem: PAIN - ADULT  Goal: Verbalizes/displays adequate comfort level or baseline comfort level  Description: Interventions:  - Encourage patient to monitor pain and request assistance  - Assess pain using appropriate pain scale  - Administer analgesics based on type and severity of pain and evaluate response  - Implement non-pharmacological measures as appropriate and evaluate response  - Consider cultural and social influences on pain and pain management  - Notify physician/advanced practitioner if interventions unsuccessful or patient reports new pain  4/23/2024 1153 by Ana Swanson RN  Outcome: Progressing  4/23/2024 1152 by Ana Swanson RN  Outcome: Progressing     Problem: INFECTION - ADULT  Goal: Absence or prevention of progression during hospitalization  Description: INTERVENTIONS:  - Assess and monitor for signs and symptoms of infection  - Monitor lab/diagnostic results  - Monitor all insertion sites, i.e. indwelling lines, tubes, and drains  - Monitor endotracheal if appropriate and nasal secretions for  changes in amount and color  - [place] appropriate cooling/warming therapies per order  - Administer medications as ordered  - Instruct and encourage patient and family to use good hand hygiene technique  - Identify and instruct in appropriate isolation precautions for identified infection/condition  4/23/2024 1153 by Ana Swanson RN  Outcome: Progressing  4/23/2024 1152 by Ana Swanson RN  Outcome: Progressing  Goal: Absence of fever/infection during neutropenic period  Description: INTERVENTIONS:  - Monitor WBC    4/23/2024 1153 by Ana Swanson RN  Outcome: Progressing  4/23/2024 1152 by Ana Swanson RN  Outcome: Progressing     Problem: SAFETY ADULT  Goal: Patient will remain free of falls  Description: INTERVENTIONS:  - Educate patient/family on patient safety including physical limitations  - Instruct patient to call for assistance with activity   - Consult OT/PT to assist with strengthening/mobility   - Keep Call bell within reach  - Keep bed low and locked with side rails adjusted as appropriate  - Keep care items and personal belongings within reach  - Initiate and maintain comfort rounds  - Make Fall Risk Sign visible to staff  - Offer Toileting every 2 Hours, in advance of need  - Initiate/Maintain alarm  - Obtain necessary fall risk management equipment:   - Apply yellow socks and bracelet for high fall risk patients  - Consider moving patient to room near nurses station  4/23/2024 1153 by Ana Swanson RN  Outcome: Progressing  4/23/2024 1152 by Ana Swanson RN  Outcome: Progressing  Goal: Maintain or return to baseline ADL function  Description: INTERVENTIONS:  -  Assess patient's ability to carry out ADLs; assess patient's baseline for ADL function and identify physical deficits which impact ability to perform ADLs (bathing, care of mouth/teeth, toileting, grooming, dressing, etc.)  - Assess/evaluate cause of self-care deficits   - Assess range of motion  - Assess  patient's mobility; develop plan if impaired  - Assess patient's need for assistive devices and provide as appropriate  - Encourage maximum independence but intervene and supervise when necessary  - Involve family in performance of ADLs  - Assess for home care needs following discharge   - Consider OT consult to assist with ADL evaluation and planning for discharge  - Provide patient education as appropriate  4/23/2024 1153 by Ana Swanson RN  Outcome: Progressing  4/23/2024 1152 by Ana Swanson RN  Outcome: Progressing  Goal: Maintains/Returns to pre admission functional level  Description: INTERVENTIONS:  - Perform AM-PAC 6 Click Basic Mobility/ Daily Activity assessment daily.  - Set and communicate daily mobility goal to care team and patient/family/caregiver.   - Collaborate with rehabilitation services on mobility goals if consulted  - Perform Range of Motion 2 times a day.  - Reposition patient every 2 hours.  - Dangle patient 2 times a day  - Stand patient 2 times a day  - Ambulate patient 2 times a day  - Out of bed to chair 2 times a day   - Out of bed for meals 2 times a day  - Out of bed for toileting  - Record patient progress and toleration of activity level   4/23/2024 1153 by Ana Swanson RN  Outcome: Progressing  4/23/2024 1152 by Ana Swanson RN  Outcome: Progressing     Problem: DISCHARGE PLANNING  Goal: Discharge to home or other facility with appropriate resources  Description: INTERVENTIONS:  - Identify barriers to discharge w/patient and caregiver  - Arrange for needed discharge resources and transportation as appropriate  - Identify discharge learning needs (meds, wound care, etc.)  - Arrange for interpretive services to assist at discharge as needed  - Refer to Case Management Department for coordinating discharge planning if the patient needs post-hospital services based on physician/advanced practitioner order or complex needs related to functional status, cognitive  ability, or social support system  4/23/2024 1153 by Ana Swanson RN  Outcome: Progressing  4/23/2024 1152 by Ana Swanson RN  Outcome: Progressing     Problem: Knowledge Deficit  Goal: Patient/family/caregiver demonstrates understanding of disease process, treatment plan, medications, and discharge instructions  Description: Complete learning assessment and assess knowledge base.  Interventions:  - Provide teaching at level of understanding  - Provide teaching via preferred learning methods  4/23/2024 1153 by Ana Swanson RN  Outcome: Progressing  4/23/2024 1152 by Ana Swanson RN  Outcome: Progressing

## 2024-04-24 VITALS
DIASTOLIC BLOOD PRESSURE: 72 MMHG | TEMPERATURE: 97.9 F | RESPIRATION RATE: 16 BRPM | HEART RATE: 104 BPM | HEIGHT: 67 IN | OXYGEN SATURATION: 91 % | BODY MASS INDEX: 30.67 KG/M2 | WEIGHT: 195.4 LBS | SYSTOLIC BLOOD PRESSURE: 137 MMHG

## 2024-04-24 LAB
ANION GAP SERPL CALCULATED.3IONS-SCNC: 9 MMOL/L (ref 4–13)
BUN SERPL-MCNC: 18 MG/DL (ref 5–25)
CALCIUM SERPL-MCNC: 9.8 MG/DL (ref 8.4–10.2)
CHLORIDE SERPL-SCNC: 102 MMOL/L (ref 96–108)
CO2 SERPL-SCNC: 25 MMOL/L (ref 21–32)
CREAT SERPL-MCNC: 0.64 MG/DL (ref 0.6–1.3)
ERYTHROCYTE [DISTWIDTH] IN BLOOD BY AUTOMATED COUNT: 15.5 % (ref 11.6–15.1)
GFR SERPL CREATININE-BSD FRML MDRD: 110 ML/MIN/1.73SQ M
GLUCOSE SERPL-MCNC: 154 MG/DL (ref 65–140)
GLUCOSE SERPL-MCNC: 164 MG/DL (ref 65–140)
GLUCOSE SERPL-MCNC: 168 MG/DL (ref 65–140)
HCT VFR BLD AUTO: 39.4 % (ref 36.5–49.3)
HGB BLD-MCNC: 12.1 G/DL (ref 12–17)
MCH RBC QN AUTO: 24.5 PG (ref 26.8–34.3)
MCHC RBC AUTO-ENTMCNC: 30.7 G/DL (ref 31.4–37.4)
MCV RBC AUTO: 80 FL (ref 82–98)
PLATELET # BLD AUTO: 312 THOUSANDS/UL (ref 149–390)
PMV BLD AUTO: 9 FL (ref 8.9–12.7)
POTASSIUM SERPL-SCNC: 4.1 MMOL/L (ref 3.5–5.3)
RBC # BLD AUTO: 4.94 MILLION/UL (ref 3.88–5.62)
SODIUM SERPL-SCNC: 136 MMOL/L (ref 135–147)
WBC # BLD AUTO: 8.71 THOUSAND/UL (ref 4.31–10.16)

## 2024-04-24 PROCEDURE — 80048 BASIC METABOLIC PNL TOTAL CA: CPT

## 2024-04-24 PROCEDURE — 85027 COMPLETE CBC AUTOMATED: CPT

## 2024-04-24 PROCEDURE — 94761 N-INVAS EAR/PLS OXIMETRY MLT: CPT

## 2024-04-24 PROCEDURE — 82948 REAGENT STRIP/BLOOD GLUCOSE: CPT

## 2024-04-24 PROCEDURE — 99239 HOSP IP/OBS DSCHRG MGMT >30: CPT | Performed by: INTERNAL MEDICINE

## 2024-04-24 RX ORDER — ACETAMINOPHEN 325 MG/1
975 TABLET ORAL EVERY 8 HOURS SCHEDULED
Qty: 30 TABLET | Refills: 0 | Status: SHIPPED | OUTPATIENT
Start: 2024-04-24

## 2024-04-24 RX ADMIN — INSULIN LISPRO 1 UNITS: 100 INJECTION, SOLUTION INTRAVENOUS; SUBCUTANEOUS at 08:19

## 2024-04-24 RX ADMIN — LOSARTAN POTASSIUM 25 MG: 25 TABLET, FILM COATED ORAL at 08:17

## 2024-04-24 RX ADMIN — BENZONATATE 200 MG: 100 CAPSULE ORAL at 08:17

## 2024-04-24 RX ADMIN — INSULIN LISPRO 1 UNITS: 100 INJECTION, SOLUTION INTRAVENOUS; SUBCUTANEOUS at 12:23

## 2024-04-24 RX ADMIN — ACETAMINOPHEN 975 MG: 325 TABLET, FILM COATED ORAL at 05:19

## 2024-04-24 RX ADMIN — APIXABAN 10 MG: 5 TABLET, FILM COATED ORAL at 08:17

## 2024-04-24 NOTE — ASSESSMENT & PLAN NOTE
Lab Results   Component Value Date    HGBA1C 7.1 (H) 04/20/2024       Recent Labs     04/23/24  1630 04/23/24  2105 04/24/24  0713 04/24/24  1113   POCGLU 156* 193* 164* 168*         Blood Sugar Average: Last 72 hrs:  (P) 166.6522382252916735  Home medication: metformin 1000 mg twice a day, recently started on Amaryl 1 mg twice daily

## 2024-04-24 NOTE — ASSESSMENT & PLAN NOTE
2/2 to PE. Not on O2 at home. 3L this morning  Home O2 eval completed patient going home on 2 L    Plan  PE plan above  Incentive spirometry

## 2024-04-24 NOTE — DISCHARGE SUMMARY
"[facility]  Discharge- Paop Gibbs 1970, 54 y.o. male MRN: [id number]  Unit/Bed#: S -Beckie Encounter: [id number]  Primary Care Provider: Andrzej Olivares DO   Date and time admitted to hospital: 4/19/2024  8:25 PM    * Acute pulmonary embolism (HCC)  Assessment & Plan  Multifocal bilateral peripheral pulmonary embolism. Bilateral acute DVTs L>R. No RV strain on echo. Concern for underlying malignancy suggestive of provoked. Pleuritic chest pain responded well to dilaudid  Price check confirmed patient can afford eliquis.   While suspect that this was a provoked DVT due to underlying malignancy, lymph node biopsy results are pending, will recommend 6 months for now, hematology can recommend a different course of time based on results    Plan:  Continue Eliquis 10 mg twice daily to complete a total of 14 doses and then 5 mg twice daily going forward, recommending 6 months for now  Home O2 eval completed, patient going home with 2 L of oxygen, PCP can determine when and if patient should come off oxygen  Medically cleared for discharge    Abnormal CT of the chest  Assessment & Plan  Recent COVID infection January 2024, since then has had worsening cough and shortness of breath.  Afebrile, no recent sick contacts. 53 pack year hx, stopped smoking 1 week ago.   CT imaging with   Multiple findings including \"multifocal pneumonia in the right lung worse in the right mid lobe  Pathologically enlarged mediastinal, hilar, periaortic, retrocrural and upper abdominal lymph nodes concerning for diffuse inflammatory/infection or neoplastic process including lymphoma.   Trace bilateral pleural effusions with bibasilar atelectasis. Interstitial pulmonary edema bilaterally.   Nonspecific 1.9 cm left adrenal nodule as described above. Recommend adrenal washout CT in 12 months per current guidelines.  Suspect malignancy, infectious etiology less likely    Plan  IR lymph node biopsy completed " tissue and cytology results pending, hematology referral placed they will follow-up on results    Chest pain  Assessment & Plan  Resolving, continue scheduled Tylenol to help with pleuritic chest pain    SOB (shortness of breath)  Assessment & Plan  2/2 to PE. Not on O2 at home. 3L this morning  Home O2 eval completed patient going home on 2 L    Plan  PE plan above  Incentive spirometry    Tobacco dependence  Assessment & Plan  35-year history of smoking history, smoker, half pack per day 53 pack.year  Stopped smoking on 4/15/2024  Tobacco cessation counseling provided.   Patient refused nicotine patches at this time if needed will order.   Incentive spirometry  Patient educated that it would be dangerous if he starts smoking again while he is on oxygen, he expresses understanding and confirms he does not intend to start smoking again    Type 2 diabetes mellitus (HCC)  Assessment & Plan  Lab Results   Component Value Date    HGBA1C 7.1 (H) 04/20/2024       Recent Labs     04/23/24  1630 04/23/24  2105 04/24/24  0713 04/24/24  1113   POCGLU 156* 193* 164* 168*         Blood Sugar Average: Last 72 hrs:  (P) 166.7801016050065425  Home medication: metformin 1000 mg twice a day, recently started on Amaryl 1 mg twice daily    HTN (hypertension)  Assessment & Plan  History of HTN  Blood Pressure: 137/72  Above goal <130/80  Continue home losartan 25 mg daily.        Medical Problems       Resolved Problems  Date Reviewed: 4/24/2024            Resolved    Leucocytosis 4/21/2024     Resolved by  Geraldine Anne MD        Discharging Resident: Bhavna Cole MD  Discharging Attending: Nyla Romero*  PCP: Andrzej Olivares DO  Admission Date:   Admission Orders (From admission, onward)       Ordered        04/19/24 7748  INPATIENT ADMISSION  Once                          Discharge Date: 04/24/24    Consultations During Hospital Stay:  Interventional radiology    Procedures Performed:   Lymph node  biopsy    Significant Findings / Test Results:   D-dimer 13.7  BNP 11  Troponin 24, 26, 17  Procalcitonin 0.13  Ferritin 63  Iron saturation 13%, TIBC 212, iron 28, UIBC 184  CTA PE with acute moderate clot burden multifocal bilateral peripheral PE suggestive mild right heart strain, trace bilateral pleural effusions with bibasilar atelectasis, interstitial pulmonary edema bilaterally, multifocal pneumonia type findings in the right middle lobe exclude possibility of underlying neoplasm, pathologically enlarged mediastinal hilar para-aortic retrocrural upper abdominal lymph nodes may be secondary to diffuse infectious inflammatory or neoplastic process including lymphoma  Bilateral venous duplex lower extremity: Acute DVT identified bilaterally left greater than right clot burden  CT abdomen and pelvis abdominal and thoracic lymphadenopathy with representative lymph nodes  Lymph node biopsy percutaneous core biopsy of left para-aortic lymph node  Home O2 qualifying test qualifies for 2 L on exertion with humidification and portable concentrator    Incidental Findings:   Patient informed of adrenal nodule requiring adrenal washout CT in 12 months    Test Results Pending at Discharge (will require follow up):  Lymph node tissue and cytology results     Outpatient Tests Requested:  Colonoscopy  Adrenal washout CT in 12 months    Complications: None    Reason for Admission: Acute pulmonary embolism    Hospital Course:   Papo Gibbs is a 54 y.o. male patient who originally presented to the hospital on 4/19/2024 due to worsening shortness of breath with chest pain and recurrent cough.  Found to have PE with bilateral DVTs and was started on heparin drip.  Based on lymphadenopathy concerning for underlying malignancy, patient remained on drip until imaging was evaluated and IR decided to do lymph node biopsy.  Patient remained stable and pleuritic pain was better controlled so after biopsy transitioned to Eliquis which  "was price checked.  Home O2 eval indicated need for 2 L at home.  Patient is medically cleared for discharge with home oxygen and outpatient follow-up with hematology to follow-up on length of time for anticoagulation as well as biopsy results.    Please see above list of diagnoses and related plan for additional information.     Condition at Discharge: stable    Discharge Day Visit / Exam:   Subjective:    This morning patient feels well and feels ready to go home.  He affirms that he cannot afford the Eliquis.  He denies further chest pain.  Suppresses interest in obtaining a new PCP and a referral was placed to complete this request.  He understands that he needs anticoagulation for likely 6 months at least and acknowledges understanding of increased bleeding risk.  He confirmed he does not have any intention of starting to smoke again but has been educated on the significant risk that smoking with oxygen on would pose for him.  Vitals: Blood Pressure: 137/72 (04/24/24 0712)  Pulse: 104 (04/24/24 0712)  Temperature: 97.9 °F (36.6 °C) (04/24/24 0712)  Temp Source: Oral (04/24/24 0712)  Respirations: 16 (04/24/24 0712)  Height: 5' 7\" (170.2 cm) (04/20/24 0710)  Weight - Scale: 88.6 kg (195 lb 6.4 oz) (04/24/24 0600)  SpO2: 91 % (04/24/24 0712)  Exam:   Physical Exam  Vitals reviewed.   Constitutional:       General: He is not in acute distress.     Appearance: He is well-developed.   HENT:      Head: Normocephalic and atraumatic.   Eyes:      Extraocular Movements: Extraocular movements intact.      Pupils: Pupils are equal, round, and reactive to light.   Cardiovascular:      Rate and Rhythm: Normal rate and regular rhythm.      Heart sounds: Normal heart sounds. No murmur heard.  Pulmonary:      Effort: Pulmonary effort is normal. No respiratory distress.      Breath sounds: Normal breath sounds. No wheezing or rhonchi.   Chest:      Chest wall: No tenderness.   Abdominal:      General: There is no distension.    "   Palpations: Abdomen is soft.      Tenderness: There is no abdominal tenderness.   Musculoskeletal:      Right lower leg: No edema.      Left lower leg: No edema.   Skin:     Capillary Refill: Capillary refill takes less than 2 seconds.      Coloration: Skin is not jaundiced or pale.   Neurological:      General: No focal deficit present.      Mental Status: He is alert and oriented to person, place, and time.   Psychiatric:         Mood and Affect: Mood normal.          Discussion with Family: Updated  (mother) at bedside.    Discharge instructions/Information to patient and family:   See after visit summary for information provided to patient and family.      Provisions for Follow-Up Care:  See after visit summary for information related to follow-up care and any pertinent home health orders.      Mobility at time of Discharge:   Basic Mobility Inpatient Raw Score: 23  JH-HLM Goal: 7: Walk 25 feet or more  JH-HLM Achieved: 7: Walk 25 feet or more  HLM Goal achieved. Continue to encourage appropriate mobility.     Disposition:   Home    Planned Readmission: No    Discharge Medications:  See after visit summary for reconciled discharge medications provided to patient and/or family.      **Please Note: This note may have been constructed using a voice recognition system**

## 2024-04-24 NOTE — ASSESSMENT & PLAN NOTE
Multifocal bilateral peripheral pulmonary embolism. Bilateral acute DVTs L>R. No RV strain on echo. Concern for underlying malignancy suggestive of provoked. Pleuritic chest pain responded well to dilaudid  Price check confirmed patient can afford eliquis.   While suspect that this was a provoked DVT due to underlying malignancy, lymph node biopsy results are pending, will recommend 6 months for now, hematology can recommend a different course of time based on results    Plan:  Continue Eliquis 10 mg twice daily to complete a total of 14 doses and then 5 mg twice daily going forward, recommending 6 months for now  Home O2 eval completed, patient going home with 2 L of oxygen, PCP can determine when and if patient should come off oxygen  Medically cleared for discharge

## 2024-04-24 NOTE — PLAN OF CARE
Problem: Potential for Falls  Goal: Patient will remain free of falls  Description: INTERVENTIONS:  - Educate patient/family on patient safety including physical limitations  - Instruct patient to call for assistance with activity   - Consult OT/PT to assist with strengthening/mobility   - Keep Call bell within reach  - Keep bed low and locked with side rails adjusted as appropriate  - Keep care items and personal belongings within reach  - Initiate and maintain comfort rounds  - Make Fall Risk Sign visible to staff  - Offer Toileting every  Hours, in advance of need  - Initiate/Maintain alarm  - Obtain necessary fall risk management equipment:   - Apply yellow socks and bracelet for high fall risk patients  - Consider moving patient to room near nurses station  Outcome: Completed     Problem: PAIN - ADULT  Goal: Verbalizes/displays adequate comfort level or baseline comfort level  Description: Interventions:  - Encourage patient to monitor pain and request assistance  - Assess pain using appropriate pain scale  - Administer analgesics based on type and severity of pain and evaluate response  - Implement non-pharmacological measures as appropriate and evaluate response  - Consider cultural and social influences on pain and pain management  - Notify physician/advanced practitioner if interventions unsuccessful or patient reports new pain  Outcome: Completed     Problem: INFECTION - ADULT  Goal: Absence or prevention of progression during hospitalization  Description: INTERVENTIONS:  - Assess and monitor for signs and symptoms of infection  - Monitor lab/diagnostic results  - Monitor all insertion sites, i.e. indwelling lines, tubes, and drains  - Monitor endotracheal if appropriate and nasal secretions for changes in amount and color  - [place] appropriate cooling/warming therapies per order  - Administer medications as ordered  - Instruct and encourage patient and family to use good hand hygiene technique  -  Identify and instruct in appropriate isolation precautions for identified infection/condition  Outcome: Completed  Goal: Absence of fever/infection during neutropenic period  Description: INTERVENTIONS:  - Monitor WBC    Outcome: Completed     Problem: SAFETY ADULT  Goal: Patient will remain free of falls  Description: INTERVENTIONS:  - Educate patient/family on patient safety including physical limitations  - Instruct patient to call for assistance with activity   - Consult OT/PT to assist with strengthening/mobility   - Keep Call bell within reach  - Keep bed low and locked with side rails adjusted as appropriate  - Keep care items and personal belongings within reach  - Initiate and maintain comfort rounds  - Make Fall Risk Sign visible to staff  - Offer Toileting every  Hours, in advance of need  - Initiate/Maintain alarm  - Obtain necessary fall risk management equipment:   - Apply yellow socks and bracelet for high fall risk patients  - Consider moving patient to room near nurses station  Outcome: Completed     Problem: SAFETY ADULT  Goal: Maintain or return to baseline ADL function  Description: INTERVENTIONS:  -  Assess patient's ability to carry out ADLs; assess patient's baseline for ADL function and identify physical deficits which impact ability to perform ADLs (bathing, care of mouth/teeth, toileting, grooming, dressing, etc.)  - Assess/evaluate cause of self-care deficits   - Assess range of motion  - Assess patient's mobility; develop plan if impaired  - Assess patient's need for assistive devices and provide as appropriate  - Encourage maximum independence but intervene and supervise when necessary  - Involve family in performance of ADLs  - Assess for home care needs following discharge   - Consider OT consult to assist with ADL evaluation and planning for discharge  - Provide patient education as appropriate  Outcome: Completed     Problem: SAFETY ADULT  Goal: Maintains/Returns to pre admission  functional level  Description: INTERVENTIONS:  - Perform AM-PAC 6 Click Basic Mobility/ Daily Activity assessment daily.  - Set and communicate daily mobility goal to care team and patient/family/caregiver.   - Collaborate with rehabilitation services on mobility goals if consulted  - Perform Range of Motion  times a day.  - Reposition patient every  hours.  - Dangle patient  times a day  - Stand patient  times a day  - Ambulate patient  times a day  - Out of bed to chair  times a day   - Out of bed for meals  times a day  - Out of bed for toileting  - Record patient progress and toleration of activity level   Outcome: Completed     Problem: DISCHARGE PLANNING  Goal: Discharge to home or other facility with appropriate resources  Description: INTERVENTIONS:  - Identify barriers to discharge w/patient and caregiver  - Arrange for needed discharge resources and transportation as appropriate  - Identify discharge learning needs (meds, wound care, etc.)  - Arrange for interpretive services to assist at discharge as needed  - Refer to Case Management Department for coordinating discharge planning if the patient needs post-hospital services based on physician/advanced practitioner order or complex needs related to functional status, cognitive ability, or social support system  Outcome: Completed     Problem: Knowledge Deficit  Goal: Patient/family/caregiver demonstrates understanding of disease process, treatment plan, medications, and discharge instructions  Description: Complete learning assessment and assess knowledge base.  Interventions:  - Provide teaching at level of understanding  - Provide teaching via preferred learning methods  Outcome: Completed

## 2024-04-24 NOTE — CASE MANAGEMENT
Case Management Progress Note    Patient name Papo Gibbs  Location S /S -01 MRN [id number]  : 1970 Date 2024       LOS (days): 5  Geometric Mean LOS (GMLOS) (days):   Days to GMLOS:        OBJECTIVE:        Current admission status: Inpatient  Preferred Pharmacy:   [facility]/pharmacy #[id number] - [place] - [address]  [address] [facility] [facility] [place] [address]  Phone: [phone] Fax: [phone]    Primary Care Provider: Andrzej Olivares DO    Primary Insurance: [employer]  Secondary Insurance:     PROGRESS NOTE:    Jazmine confirmed home oxygen order for delivery. [facility] delivered Saul POC to pt bedside from consignment closet. [facility] to reach out directly to pt for scheduling of delivery of home concentrator.

## 2024-04-24 NOTE — RESPIRATORY THERAPY NOTE
Home Oxygen Qualifying Test     Patient name: Papo Gibbs        : 1970   Date of Test:  2024  Diagnosis:    Home Oxygen Test:    **Medicare Guidelines require item(s) 1-5 on all ambulatory patients or 1 and 2 on non-ambulatory patients.    1. Baseline SPO2 on Room Air at rest 90 %   If <= 88% on Room Air add O2 via NC to obtain SpO2 >=88%. If LPM needed, document LPM 2 needed to reach =>88%    SPO2 during exertion on Room Air 86  %  During exertion monitor SPO2. If SPO2 increases >=89%, do not add supplemental oxygen    SPO2 on Oxygen at Rest 94 % at 2 LPM    SPO2 during exertion on Oxygen 91 % at 2 LPM    Test performed during exertion activity.      [x]  Supplemental Home Oxygen is indicated.    []  Client does not qualify for home oxygen.    Respiratory Additional Notes- pt qualifies for home 02 with exertion via nasal  Cannula. Liter flow of  2  lpm. Add humidification. Pt would benefit from Portable   Concentrator since he works and does exert himself    Geremias Wood

## 2024-04-24 NOTE — CASE MANAGEMENT
Case Management Discharge Planning Note    Patient name Papo Gibbs  Location S /S -01 MRN [id number]  : 1970 Date 2024       Current Admission Date: 2024  Current Admission Diagnosis:Acute pulmonary embolism (HCC)   Patient Active Problem List    Diagnosis Date Noted    Acute respiratory failure with hypoxia (HCC) 2024    Tobacco dependence 2024    SOB (shortness of breath) 2024    Chest pain 2024    Acute pulmonary embolism (HCC) 2024    Abnormal CT of the chest 2024    HTN (hypertension) 2024    Type 2 diabetes mellitus (HCC) 2024    Encounter to discuss test results 2022    Gross hematuria 2022      LOS (days): 5  Geometric Mean LOS (GMLOS) (days):   Days to GMLOS:     OBJECTIVE:  Risk of Unplanned Readmission Score: 9.26         Current admission status: Inpatient   Preferred Pharmacy:   [facility]/pharmacy #[id number]  [place] - [address]  [facility] [facility] [place] [address]  Phone: [phone] Fax: [phone]    Primary Care Provider: Andrzej Olivares DO    Primary Insurance: [employer]  Secondary Insurance:     DISCHARGE DETAILS:    Discharge planning discussed with:: patient, pt's mother and pt's friend at bedside  Freedom of Choice: Yes  Comments - Freedom of Choice: CM met w/ pt re: Home O2 needed on exertion per respiratory. Patient relayed choice for [facility] - home O2 order placed via parachute, currently in processing.                          DME Referral Provided  Referral made for DME?: Yes  DME referral completed for the following items:: Home Oxygen concentrator  DME Supplier Name:: [employer]

## 2024-04-24 NOTE — ASSESSMENT & PLAN NOTE
"Recent COVID infection January 2024, since then has had worsening cough and shortness of breath.  Afebrile, no recent sick contacts. 53 pack year hx, stopped smoking 1 week ago.   CT imaging with   Multiple findings including \"multifocal pneumonia in the right lung worse in the right mid lobe  Pathologically enlarged mediastinal, hilar, periaortic, retrocrural and upper abdominal lymph nodes concerning for diffuse inflammatory/infection or neoplastic process including lymphoma.   Trace bilateral pleural effusions with bibasilar atelectasis. Interstitial pulmonary edema bilaterally.   Nonspecific 1.9 cm left adrenal nodule as described above. Recommend adrenal washout CT in 12 months per current guidelines.  Suspect malignancy, infectious etiology less likely    Plan  IR lymph node biopsy completed tissue and cytology results pending, hematology referral placed they will follow-up on results  "

## 2024-04-24 NOTE — ASSESSMENT & PLAN NOTE
35-year history of smoking history, smoker, half pack per day 53 pack.year  Stopped smoking on 4/15/2024  Tobacco cessation counseling provided.   Patient refused nicotine patches at this time if needed will order.   Incentive spirometry  Patient educated that it would be dangerous if he starts smoking again while he is on oxygen, he expresses understanding and confirms he does not intend to start smoking again

## 2024-04-24 NOTE — DISCHARGE INSTR - AVS FIRST PAGE
Dear Papo Gibbs,     It was our pleasure to care for you here at [facility].  It is our hope that we were always able to exceed the expected standards for your care during your stay.  You were hospitalized due to acute pulmonary embolism.  You were cared for on the S 3 medsurg floor by Bhavna Cole MD under the service of Nyla Romero* with the [facility] who covers for your primary care physician (PCP), Andrzej Olivares DO, while you were hospitalized.  If you have any questions or concerns related to this hospitalization, you may contact us at .  For follow up as well as any medication refills, we recommend that you follow up with your primary care physician.  A registered nurse will reach out to you by phone within a few days after your discharge to answer any additional questions that you may have after going home.  However, at this time we provide for you here, the most important instructions / recommendations at discharge:     Notable Medication Adjustments -   Please start taking Eliquis twice daily 10 mg through 4/30 then 5 mg twice daily going forward for 6 months unless instructed otherwise by hematology  Continue rest of home medications as prescribed  Testing Required after Discharge -   Colonoscopy  ** Please contact your PCP to request testing orders for any of the testing recommended here **  Important follow up information -   Please follow up with your primary care within 1 week, a referral has been placed for you to establish with a new physician  Please follow up with hematology within 2 weeks, a referral has been placed, they will assist you with Eliquis duration and go over lymph node biopsy results  Other Instructions -   You are on a blood thinner now, you will need to be cautious and mindful of bleeding as it will take longer to stop.   Please review this entire after visit summary as additional  general instructions including medication list, appointments, activity, diet, any pertinent wound care, and other additional recommendations from your care team that may be provided for you.      Sincerely,     Bhavna Cole MD

## 2024-04-25 ENCOUNTER — TELEPHONE (OUTPATIENT)
Dept: OTHER | Facility: HOSPITAL | Age: 54
End: 2024-04-25

## 2024-04-25 LAB
BACTERIA BLD CULT: NORMAL
BACTERIA BLD CULT: NORMAL
DME PARACHUTE DELIVERY DATE ACTUAL: NORMAL
DME PARACHUTE DELIVERY DATE EXPECTED: NORMAL
DME PARACHUTE DELIVERY DATE REQUESTED: NORMAL
DME PARACHUTE ITEM DESCRIPTION: NORMAL
DME PARACHUTE ORDER STATUS: NORMAL
DME PARACHUTE SUPPLIER NAME: NORMAL
DME PARACHUTE SUPPLIER PHONE: NORMAL
SCAN RESULT: NORMAL

## 2024-04-25 PROCEDURE — 88341 IMHCHEM/IMCYTCHM EA ADD ANTB: CPT | Performed by: PATHOLOGY

## 2024-04-25 PROCEDURE — 88342 IMHCHEM/IMCYTCHM 1ST ANTB: CPT | Performed by: PATHOLOGY

## 2024-04-25 PROCEDURE — 88307 TISSUE EXAM BY PATHOLOGIST: CPT | Performed by: PATHOLOGY

## 2024-04-25 NOTE — UTILIZATION REVIEW
NOTIFICATION OF ADMISSION DISCHARGE   This is a Notification of Discharge from [facility]. Please be advised that this patient has been discharge from our facility. Below you will find the admission and discharge date and time including the patient’s disposition.   UTILIZATION REVIEW CONTACT:  Chiquis Mead  Utilization   Network Utilization Review Department  Phone: [phone] carefully listen to the prompts. All voicemails are confidential.  Email: [email or web address]     ADMISSION INFORMATION  PRESENTATION DATE: 4/19/2024  8:25 PM  OBERVATION ADMISSION DATE:   INPATIENT ADMISSION DATE: 4/19/24 11:48 PM   DISCHARGE DATE: 4/24/2024  2:09 PM   DISPOSITION:Home/Self Care    Network Utilization Review Department  ATTENTION: Please call with any questions or concerns to [phone] and carefully listen to the prompts so that you are directed to the right person. All voicemails are confidential.   For Discharge needs, contact Care Management DC Support Team at [phone] opt. 2  Send all requests for admission clinical reviews, approved or denied determinations and any other requests to dedicated fax number below belonging to the campus where the patient is receiving treatment. List of dedicated fax numbers for the Facilities:  FACILITY NAME UR FAX NUMBER   ADMISSION DENIALS (Administrative/Medical Necessity) [phone]   DISCHARGE SUPPORT TEAM ([facility]) [phone]   PARENT CHILD HEALTH (Maternity/NICU/Pediatrics) [phone]   [facility] [phone]   [facility] [phone]   [facility] [phone]   [facility] [phone]   [facility] [phone]   [facility] [phone]   [facility] [phone]   [facility] [phone]    [facility] [phone]   [facility] [phone]   [facility] [phone]   [facility] [phone]

## 2024-04-25 NOTE — TELEPHONE ENCOUNTER
Called patient today at 4:20 PM and notified him of lymph node biopsy results with metastatic adenocarcinoma of lung primary.  Emphasized importance of seeing hematology and establishing with new primary care in the next week.  He will call hematology as he has not heard from them yet.  No questions at this time, but patient endorses understanding that he has cancer.

## 2024-04-26 DIAGNOSIS — R59.1 LYMPHADENOPATHY: Primary | ICD-10-CM

## 2024-04-29 ENCOUNTER — TELEPHONE (OUTPATIENT)
Dept: HEMATOLOGY ONCOLOGY | Facility: CLINIC | Age: 54
End: 2024-04-29

## 2024-04-29 NOTE — TELEPHONE ENCOUNTER
I called Papo in response to a referral that was received for patient to establish care with Hematology.     Outreach was made to schedule a consultation.    I left a voicemail explaining the reason for my call and advised patient to call [facility] at [phone].  Another attempt will be made to contact patient.

## 2024-04-30 ENCOUNTER — OFFICE VISIT (OUTPATIENT)
Dept: FAMILY MEDICINE CLINIC | Facility: CLINIC | Age: 54
End: 2024-04-30
Payer: COMMERCIAL

## 2024-04-30 VITALS
HEIGHT: 67 IN | DIASTOLIC BLOOD PRESSURE: 52 MMHG | WEIGHT: 206 LBS | BODY MASS INDEX: 32.33 KG/M2 | TEMPERATURE: 98.3 F | HEART RATE: 120 BPM | OXYGEN SATURATION: 93 % | SYSTOLIC BLOOD PRESSURE: 140 MMHG

## 2024-04-30 DIAGNOSIS — E11.00 TYPE 2 DIABETES MELLITUS WITH HYPEROSMOLARITY WITHOUT COMA, WITHOUT LONG-TERM CURRENT USE OF INSULIN (HCC): ICD-10-CM

## 2024-04-30 DIAGNOSIS — Z12.11 COLON CANCER SCREENING: ICD-10-CM

## 2024-04-30 DIAGNOSIS — C34.90 ADENOCARCINOMA OF LUNG, UNSPECIFIED LATERALITY (HCC): Primary | ICD-10-CM

## 2024-04-30 DIAGNOSIS — E78.2 MIXED HYPERLIPIDEMIA: ICD-10-CM

## 2024-04-30 DIAGNOSIS — I26.99 PULMONARY EMBOLI (HCC): ICD-10-CM

## 2024-04-30 PROCEDURE — 99205 OFFICE O/P NEW HI 60 MIN: CPT | Performed by: FAMILY MEDICINE

## 2024-04-30 PROCEDURE — 3725F SCREEN DEPRESSION PERFORMED: CPT | Performed by: FAMILY MEDICINE

## 2024-04-30 PROCEDURE — 1111F DSCHRG MED/CURRENT MED MERGE: CPT | Performed by: FAMILY MEDICINE

## 2024-04-30 RX ORDER — GLIMEPIRIDE 4 MG/1
4 TABLET ORAL
Qty: 90 TABLET | Refills: 1 | Status: SHIPPED | OUTPATIENT
Start: 2024-04-30

## 2024-04-30 NOTE — PROGRESS NOTES
Subjective:      Patient ID: Papo Gibbs is a 54 y.o. male.    Here to establish care,   Was diagnosed with lung cancer recently  Got an appointment with hemato-oncology for 6/3/2024  Wife present  Type 2 diabetes-on metformin and glimepiride  Hypertension-on losartan  Hyperlipidemia- on Atorvastatin  Pulmonary embolism in setting of lung cancer- on eliquis        Past Medical History:   Diagnosis Date    Cancer (HCC) 04,25,2024    Diabetes mellitus (HCC)     High cholesterol     History of blood clots     Hypertension     Leucocytosis 04/20/2024       Family History   Problem Relation Age of Onset    No Known Problems Father     No Known Problems Mother        Past Surgical History:   Procedure Laterality Date    IR BIOPSY LYMPH NODE  4/23/2024    KNEE SURGERY      MANDIBLE FRACTURE SURGERY  1985    PATELLA FRACTURE SURGERY      RECTAL SURGERY          reports that he quit smoking about 2 weeks ago. His smoking use included cigarettes. He has a 52.5 pack-year smoking history. He has never used smokeless tobacco. He reports that he does not currently use alcohol. He reports that he does not use drugs.      Current Outpatient Medications:     acetaminophen (TYLENOL) 325 mg tablet, Take 3 tablets (975 mg total) by mouth every 8 (eight) hours, Disp: 30 tablet, Rfl: 0    amitriptyline (ELAVIL) 100 mg tablet, 200 mg daily at bedtime, Disp: , Rfl:     apixaban (ELIQUIS) 5 mg, Take 2 tablets (10 mg total) by mouth 2 (two) times a day for 12 doses, Disp: 24 tablet, Rfl: 0    apixaban (Eliquis) 5 mg, Take 1 tablet (5 mg total) by mouth 2 (two) times a day Do not start before May 1, 2024., Disp: 60 tablet, Rfl: 5    atorvastatin (LIPITOR) 10 mg tablet, Take 1 tablet (10 mg total) by mouth daily, Disp: 90 tablet, Rfl: 3    famotidine (PEPCID) 40 MG tablet, Take 40 mg by mouth daily as needed for heartburn or indigestion, Disp: , Rfl:     glimepiride (AMARYL) 4 mg tablet, Take 1 tablet (4 mg total) by mouth daily with  One week for stitch removal. "breakfast, Disp: 90 tablet, Rfl: 1    losartan (COZAAR) 25 mg tablet, Take 25 mg by mouth daily, Disp: , Rfl:     metFORMIN (GLUCOPHAGE) 500 mg tablet, Take 1,000 mg by mouth 2 (two) times a day with meals, Disp: , Rfl:     The following portions of the patient's history were reviewed and updated as appropriate: allergies, current medications, past family history, past medical history, past social history, past surgical history and problem list.    Review of Systems   Constitutional:  Negative for chills and fever.   HENT:  Negative for congestion, rhinorrhea and sore throat.    Eyes:  Negative for discharge, redness and itching.   Respiratory:  Negative for chest tightness, shortness of breath and wheezing.    Cardiovascular:  Negative for chest pain and palpitations.   Gastrointestinal:  Negative for abdominal pain, constipation and diarrhea.   Genitourinary:  Negative for dysuria.   Skin:  Negative for pallor and rash.   Neurological:  Negative for dizziness, weakness, numbness and headaches.         PHQ-2/9 Depression Screening    Little interest or pleasure in doing things: 0 - not at all  Feeling down, depressed, or hopeless: 0 - not at all  PHQ-2 Score: 0  PHQ-2 Interpretation: Negative depression screen             Objective:    /52 (BP Location: Left arm, Patient Position: Sitting, Cuff Size: Standard)   Pulse (!) 120   Temp 98.3 °F (36.8 °C) (Tympanic)   Ht 5' 7\" (1.702 m)   Wt 93.4 kg (206 lb)   SpO2 93%   BMI 32.26 kg/m²      Physical Exam  Vitals and nursing note reviewed.   Constitutional:       Appearance: Normal appearance.   HENT:      Right Ear: External ear normal.      Left Ear: External ear normal.   Eyes:      General:         Right eye: No discharge.         Left eye: No discharge.      Conjunctiva/sclera: Conjunctivae normal.   Cardiovascular:      Rate and Rhythm: Normal rate and regular rhythm.      Pulses: no weak pulses.           Dorsalis pedis pulses are 2+ on the right side " and 2+ on the left side.      Heart sounds: No murmur heard.  Pulmonary:      Effort: Pulmonary effort is normal.      Breath sounds: Rales (right middle lobe) present. No wheezing.   Abdominal:      General: There is no distension.      Palpations: Abdomen is soft.      Tenderness: There is no abdominal tenderness.   Musculoskeletal:      Right lower leg: No edema.      Left lower leg: No edema.   Feet:      Right foot:      Skin integrity: No ulcer, skin breakdown, erythema, warmth, callus or dry skin.      Left foot:      Skin integrity: No ulcer, skin breakdown, erythema, warmth, callus or dry skin.   Skin:     Findings: No lesion or rash.   Neurological:      Mental Status: He is alert. Mental status is at baseline.   Psychiatric:         Mood and Affect: Mood normal.         Thought Content: Thought content normal.         Patient's shoes and socks removed.    Right Foot/Ankle   Right Foot Inspection  Skin Exam: skin normal. Skin not intact, no dry skin, no warmth, no callus, no erythema, no maceration, no abnormal color, no pre-ulcer, no ulcer and no callus.     Toe Exam: ROM and strength within normal limits.     Sensory   Monofilament testing: intact    Vascular  Capillary refills: < 3 seconds  The right DP pulse is 2+.     Right Toe  - Comprehensive Exam  Ecchymosis: none  Swelling: none   Tenderness: none       Left Foot/Ankle  Left Foot Inspection  Skin Exam: skin normal. Skin not intact, no dry skin, no warmth, no erythema, no maceration, normal color, no pre-ulcer, no ulcer and no callus.     Toe Exam: ROM and strength within normal limits.     Sensory   Monofilament testing: intact    Vascular  Capillary refills: < 3 seconds  The left DP pulse is 2+.     Left Toe  - Comprehensive Exam  Ecchymosis: none  Swelling: none   Tenderness: none       Assign Risk Category  No deformity present  No loss of protective sensation  No weak pulses  Risk: 0      Recent Results (from the past 8736 hour(s))   ECG 12  "lead    Collection Time: 04/19/24  8:33 PM   Result Value Ref Range    Ventricular Rate 110 BPM    Atrial Rate 110 BPM    MT Interval 164 ms    QRSD Interval 88 ms    QT Interval 326 ms    QTC Interval 441 ms    P [place] 57 degrees    QRS Axis 65 degrees    T Wave Axis 45 degrees   CBC and differential    Collection Time: 04/19/24  8:39 PM   Result Value Ref Range    WBC 11.45 (H) 4.31 - 10.16 Thousand/uL    RBC 4.89 3.88 - 5.62 Million/uL    Hemoglobin 12.1 12.0 - 17.0 g/dL    Hematocrit 38.7 36.5 - 49.3 %    MCV 79 (L) 82 - 98 fL    MCH 24.7 (L) 26.8 - 34.3 pg    MCHC 31.3 (L) 31.4 - 37.4 g/dL    RDW 15.6 (H) 11.6 - 15.1 %    MPV 8.8 (L) 8.9 - 12.7 fL    Platelets 277 149 - 390 Thousands/uL    nRBC 0 /100 WBCs    Segmented % 61 43 - 75 %    Immature Grans % 1 0 - 2 %    Lymphocytes % 27 14 - 44 %    Monocytes % 8 4 - 12 %    Eosinophils Relative 2 0 - 6 %    Basophils Relative 1 0 - 1 %    Absolute Neutrophils 7.11 1.85 - 7.62 Thousands/µL    Absolute Immature Grans 0.06 0.00 - 0.20 Thousand/uL    Absolute Lymphocytes 3.09 0.60 - 4.47 Thousands/µL    Absolute Monocytes 0.96 0.17 - 1.22 Thousand/µL    Eosinophils Absolute 0.17 0.00 - 0.61 Thousand/µL    Basophils Absolute 0.06 0.00 - 0.10 Thousands/µL   Comprehensive metabolic panel    Collection Time: 04/19/24  8:39 PM   Result Value Ref Range    Sodium 137 135 - 147 mmol/L    Potassium 4.1 3.5 - 5.3 mmol/L    Chloride 104 96 - 108 mmol/L    CO2 25 21 - 32 mmol/L    ANION GAP 8 4 - 13 mmol/L    BUN 21 5 - 25 mg/dL    Creatinine 0.74 0.60 - 1.30 mg/dL    Glucose 100 65 - 140 mg/dL    Calcium 9.7 8.4 - 10.2 mg/dL    AST 13 13 - 39 U/L    ALT 20 7 - 52 U/L    Alkaline Phosphatase 71 34 - 104 U/L    Total Protein 7.5 6.4 - 8.4 g/dL    Albumin 4.1 3.5 - 5.0 g/dL    Total Bilirubin 0.33 0.20 - 1.00 mg/dL    eGFR 103 ml/min/1.73sq m   HS Troponin 0hr (reflex protocol)    Collection Time: 04/19/24  8:39 PM   Result Value Ref Range    hs TnI 0hr 24 \"Refer to ACS Flowchart\"- " "see link ng/L   D-dimer, quantitative    Collection Time: 04/19/24  8:39 PM   Result Value Ref Range    D-Dimer, Quant 13.70 (H) <0.50 ug/ml FEU   APTT    Collection Time: 04/19/24  8:39 PM   Result Value Ref Range    PTT 40 (H) 23 - 37 seconds   Protime-INR    Collection Time: 04/19/24  8:39 PM   Result Value Ref Range    Protime 15.2 (H) 11.6 - 14.5 seconds    INR 1.13 0.84 - 1.19   B-Type Natriuretic Peptide(BNP)    Collection Time: 04/19/24  8:39 PM   Result Value Ref Range    BNP 11 0 - 100 pg/mL   Procalcitonin    Collection Time: 04/19/24  8:39 PM   Result Value Ref Range    Procalcitonin 0.11 <=0.25 ng/ml   TIBC Panel (incl. Iron, TIBC, % Iron Saturation)    Collection Time: 04/19/24  8:39 PM   Result Value Ref Range    Iron Saturation 13 (L) 15 - 50 %    TIBC 212 (L) 250 - 450 ug/dL    Iron 28 (L) 50 - 212 ug/dL    UIBC 184 155 - 355 ug/dL   Ferritin    Collection Time: 04/19/24  8:39 PM   Result Value Ref Range    Ferritin 63 24 - 336 ng/mL   HS Troponin I 2hr    Collection Time: 04/19/24 10:59 PM   Result Value Ref Range    hs TnI 2hr 26 \"Refer to ACS Flowchart\"- see link ng/L    Delta 2hr hsTnI 2 <20 ng/L   Blood culture #1    Collection Time: 04/19/24 11:45 PM    Specimen: Hand, Right; Blood   Result Value Ref Range    Blood Culture No Growth After 5 Days.    Blood culture #2    Collection Time: 04/19/24 11:45 PM    Specimen: Arm, Left; Blood   Result Value Ref Range    Blood Culture No Growth After 5 Days.    COVID/FLU/RSV    Collection Time: 04/20/24 12:08 AM    Specimen: Nose; Nares   Result Value Ref Range    SARS-CoV-2 Negative Negative    INFLUENZA A PCR Negative Negative    INFLUENZA B PCR Negative Negative    RSV PCR Negative Negative   Fingerstick Glucose (POCT)    Collection Time: 04/20/24  3:48 AM   Result Value Ref Range    POC Glucose 191 (H) 65 - 140 mg/dl   APTT    Collection Time: 04/20/24  5:05 AM   Result Value Ref Range    PTT 57 (H) 23 - 37 seconds   CBC    Collection Time: 04/20/24  " "5:05 AM   Result Value Ref Range    WBC 10.33 (H) 4.31 - 10.16 Thousand/uL    RBC 4.72 3.88 - 5.62 Million/uL    Hemoglobin 11.7 (L) 12.0 - 17.0 g/dL    Hematocrit 37.4 36.5 - 49.3 %    MCV 79 (L) 82 - 98 fL    MCH 24.8 (L) 26.8 - 34.3 pg    MCHC 31.3 (L) 31.4 - 37.4 g/dL    RDW 15.8 (H) 11.6 - 15.1 %    Platelets 262 149 - 390 Thousands/uL    MPV 9.0 8.9 - 12.7 fL   Procalcitonin    Collection Time: 04/20/24  5:05 AM   Result Value Ref Range    Procalcitonin 0.13 <=0.25 ng/ml   HS Troponin I 4hr    Collection Time: 04/20/24  5:05 AM   Result Value Ref Range    hs TnI 4hr 17 \"Refer to ACS Flowchart\"- see link ng/L    Delta 4hr hsTnI -7 <20 ng/L   Hemoglobin A1C    Collection Time: 04/20/24  5:05 AM   Result Value Ref Range    Hemoglobin A1C 7.1 (H) Normal 4.0-5.6%; PreDiabetic 5.7-6.4%; Diabetic >=6.5%; Glycemic control for adults with diabetes <7.0% %     mg/dl   Echo complete w/ contrast if indicated    Collection Time: 04/20/24  7:52 AM   Result Value Ref Range    BSA 2.06 m2    A4C EF 56 %    LVOT stroke volume 70.36     LVOT stroke volume index 33.00 ml/m2    LVOT Cardiac Output 6.37 l/min    LVOT Cardiac Index 3.09 l/min/m2    LVIDd 4.70 cm    LVIDS 3.30 cm    IVSd 1.30 cm    LVPWd 1.30 cm    LVOT diameter 2.2 cm    LVOT peak VTI 18.52 cm    FS 30 28 - 44    MV E' Tissue Velocity Septal 10 cm/s    LA Volume Index (BP) 33.0 mL/m2    E/A ratio 0.83     E wave deceleration time 168 ms    MV Peak E Aldo 88 cm/s    MV Peak A Aldo 1.06 m/s    AV LVOT peak gradient 4 mmHg    LVOT peak aldo 0.94 m/s    RVID d 4.2 cm    Tricuspid annular plane systolic excursion 2.30 cm    LA size 3.9 cm    LA length (A2C) 5.10 cm    LA volume (BP) 68 mL    RA 2D Volume 42.0 mL    RAA A4C 15.1 cm2    Aortic valve peak velocity 1.95 m/s    Ao VTI 34.22 cm    AV mean gradient 9 mmHg    LVOT mn grad 2.0 mmHg    AV peak gradient 15 mmHg    AV area by cont VTI 2.1 cm2    AV area peak aldo 1.8 cm2    MV stenosis pressure 1/2 time 49 ms    " MV valve area p 1/2 method 4.49     Ao root 4.00 cm    Asc Ao 3.5 cm    Aortic valve mean velocity 13.80 m/s    Left ventricular stroke volume (2D) 58.00 mL    IVS 1.3 cm    LEFT VENTRICLE SYSTOLIC VOLUME (MOD BIPLANE) 2D 45 mL    LV DIASTOLIC VOLUME (MOD BIPLANE) 2D 104 mL    Left [facility] Area-systolic Four Chamber 20.7 cm2    Left [facility] Area-systolic Apical Two Chamber 21.6 cm2    LVSV, 2D 58 mL    LVOT area 3.80 cm2    DVI 0.48     AV valve area 2.06 cm2    LV EF 60    Fingerstick Glucose (POCT)    Collection Time: 04/20/24  8:01 AM   Result Value Ref Range    POC Glucose 168 (H) 65 - 140 mg/dl   Fingerstick Glucose (POCT)    Collection Time: 04/20/24 11:45 AM   Result Value Ref Range    POC Glucose 135 65 - 140 mg/dl   APTT    Collection Time: 04/20/24 12:30 PM   Result Value Ref Range    PTT 64 (H) 23 - 37 seconds   Fingerstick Glucose (POCT)    Collection Time: 04/20/24  4:23 PM   Result Value Ref Range    POC Glucose 133 65 - 140 mg/dl   APTT    Collection Time: 04/20/24  7:04 PM   Result Value Ref Range    PTT 64 (H) 23 - 37 seconds   Fingerstick Glucose (POCT)    Collection Time: 04/20/24  9:07 PM   Result Value Ref Range    POC Glucose 118 65 - 140 mg/dl   APTT    Collection Time: 04/21/24  4:35 AM   Result Value Ref Range    PTT 66 (H) 23 - 37 seconds   Basic metabolic panel    Collection Time: 04/21/24  4:35 AM   Result Value Ref Range    Sodium 137 135 - 147 mmol/L    Potassium 4.0 3.5 - 5.3 mmol/L    Chloride 104 96 - 108 mmol/L    CO2 23 21 - 32 mmol/L    ANION GAP 10 4 - 13 mmol/L    BUN 15 5 - 25 mg/dL    Creatinine 0.61 0.60 - 1.30 mg/dL    Glucose 147 (H) 65 - 140 mg/dL    Calcium 9.3 8.4 - 10.2 mg/dL    eGFR 112 ml/min/1.73sq m   CBC    Collection Time: 04/21/24  4:35 AM   Result Value Ref Range    WBC 8.49 4.31 - 10.16 Thousand/uL    RBC 4.74 3.88 - 5.62 Million/uL    Hemoglobin 11.6 (L) 12.0 - 17.0 g/dL    Hematocrit 38.4 36.5 - 49.3 %    MCV 81 (L) 82 - 98 fL    MCH 24.5 (L) 26.8 - 34.3 pg     "MCHC 30.2 (L) 31.4 - 37.4 g/dL    RDW 15.6 (H) 11.6 - 15.1 %    Platelets 285 149 - 390 Thousands/uL    MPV 9.1 8.9 - 12.7 fL   Fingerstick Glucose (POCT)    Collection Time: 04/21/24  7:37 AM   Result Value Ref Range    POC Glucose 140 65 - 140 mg/dl   Fingerstick Glucose (POCT)    Collection Time: 04/21/24 11:18 AM   Result Value Ref Range    POC Glucose 139 65 - 140 mg/dl   Fingerstick Glucose (POCT)    Collection Time: 04/21/24  3:09 PM   Result Value Ref Range    POC Glucose 183 (H) 65 - 140 mg/dl   Fingerstick Glucose (POCT)    Collection Time: 04/21/24  8:58 PM   Result Value Ref Range    POC Glucose 113 65 - 140 mg/dl   ECG 12 lead    Collection Time: 04/22/24  3:55 AM   Result Value Ref Range    Ventricular Rate 97 BPM    Atrial Rate 97 BPM    OH Interval 162 ms    QRSD Interval 84 ms    QT Interval 338 ms    QTC Interval 429 ms    P Axis 60 degrees    QRS Axis 61 degrees    T Wave Axis 29 degrees   APTT    Collection Time: 04/22/24  4:55 AM   Result Value Ref Range    PTT 56 (H) 23 - 37 seconds   CBC and Platelet    Collection Time: 04/22/24  4:55 AM   Result Value Ref Range    WBC 10.53 (H) 4.31 - 10.16 Thousand/uL    RBC 5.20 3.88 - 5.62 Million/uL    Hemoglobin 12.7 12.0 - 17.0 g/dL    Hematocrit 41.5 36.5 - 49.3 %    MCV 80 (L) 82 - 98 fL    MCH 24.4 (L) 26.8 - 34.3 pg    MCHC 30.6 (L) 31.4 - 37.4 g/dL    RDW 15.4 (H) 11.6 - 15.1 %    Platelets 321 149 - 390 Thousands/uL    MPV 9.1 8.9 - 12.7 fL   HS Troponin 0hr (reflex protocol)    Collection Time: 04/22/24  4:55 AM   Result Value Ref Range    hs TnI 0hr 5 \"Refer to ACS Flowchart\"- see link ng/L   Magnesium    Collection Time: 04/22/24  4:55 AM   Result Value Ref Range    Magnesium 1.9 1.9 - 2.7 mg/dL   Basic metabolic panel    Collection Time: 04/22/24  4:55 AM   Result Value Ref Range    Sodium 136 135 - 147 mmol/L    Potassium 4.1 3.5 - 5.3 mmol/L    Chloride 102 96 - 108 mmol/L    CO2 25 21 - 32 mmol/L    ANION GAP 9 4 - 13 mmol/L    BUN 15 5 - " "25 mg/dL    Creatinine 0.68 0.60 - 1.30 mg/dL    Glucose 168 (H) 65 - 140 mg/dL    Calcium 9.9 8.4 - 10.2 mg/dL    eGFR 107 ml/min/1.73sq m   Fingerstick Glucose (POCT)    Collection Time: 04/22/24  8:12 AM   Result Value Ref Range    POC Glucose 200 (H) 65 - 140 mg/dl   HS Troponin I 2hr    Collection Time: 04/22/24  8:16 AM   Result Value Ref Range    hs TnI 2hr 4 \"Refer to ACS Flowchart\"- see link ng/L    Delta 2hr hsTnI -1 <20 ng/L   Fingerstick Glucose (POCT)    Collection Time: 04/22/24 11:42 AM   Result Value Ref Range    POC Glucose 181 (H) 65 - 140 mg/dl   APTT    Collection Time: 04/22/24  1:48 PM   Result Value Ref Range    PTT 50 (H) 23 - 37 seconds   Fingerstick Glucose (POCT)    Collection Time: 04/22/24  4:05 PM   Result Value Ref Range    POC Glucose 169 (H) 65 - 140 mg/dl   Fingerstick Glucose (POCT)    Collection Time: 04/22/24  9:08 PM   Result Value Ref Range    POC Glucose 193 (H) 65 - 140 mg/dl   APTT    Collection Time: 04/22/24  9:19 PM   Result Value Ref Range    PTT 79 (H) 23 - 37 seconds   APTT    Collection Time: 04/23/24  3:56 AM   Result Value Ref Range    PTT 46 (H) 23 - 37 seconds   Basic metabolic panel    Collection Time: 04/23/24  3:56 AM   Result Value Ref Range    Sodium 136 135 - 147 mmol/L    Potassium 4.1 3.5 - 5.3 mmol/L    Chloride 102 96 - 108 mmol/L    CO2 26 21 - 32 mmol/L    ANION GAP 8 4 - 13 mmol/L    BUN 13 5 - 25 mg/dL    Creatinine 0.63 0.60 - 1.30 mg/dL    Glucose 165 (H) 65 - 140 mg/dL    Calcium 9.7 8.4 - 10.2 mg/dL    eGFR 110 ml/min/1.73sq m   CBC    Collection Time: 04/23/24  3:56 AM   Result Value Ref Range    WBC 9.44 4.31 - 10.16 Thousand/uL    RBC 4.95 3.88 - 5.62 Million/uL    Hemoglobin 12.1 12.0 - 17.0 g/dL    Hematocrit 39.2 36.5 - 49.3 %    MCV 79 (L) 82 - 98 fL    MCH 24.4 (L) 26.8 - 34.3 pg    MCHC 30.9 (L) 31.4 - 37.4 g/dL    RDW 15.3 (H) 11.6 - 15.1 %    Platelets 296 149 - 390 Thousands/uL    MPV 8.8 (L) 8.9 - 12.7 fL   Fingerstick Glucose (POCT) "    Collection Time: 04/23/24  7:04 AM   Result Value Ref Range    POC Glucose 167 (H) 65 - 140 mg/dl   Fingerstick Glucose (POCT)    Collection Time: 04/23/24 11:40 AM   Result Value Ref Range    POC Glucose 167 (H) 65 - 140 mg/dl   Tissue Exam    Collection Time: 04/23/24  3:39 PM   Result Value Ref Range    Case Report       Surgical Pathology Report                         Case: [id number]                                  Authorizing Provider:  Geraldine Anne MD             Collected:           04/23/2024 1539              Ordering Location:     [facility]        Received:            04/23/2024 [address] [place] [address] 3rd  Floor Med                                                                             Surg Unit                                                                    Pathologist:           Rufina Lopez MD                                                                  Specimen:    Lymph Node, left, retroperitoneal                                                          Final Diagnosis       A. Lymph Node, Left retroperitoneal, Biopsy:  - Metastatic adenocarcinoma of lung primary.     Comment: Specimen (block A2) is being sent to [facility] for MI Profile Testing. Upon completion of testing, "[employer] report will be sent directly to the requesting provider, as well as posted in the Media Tab of EPIC for this patient by the Pathology Department.      Microscopic Description       Immunochemical stains performed on block A1 with appropriate controls show the tumor cells are positive for CK7, TTF-1, napsin A and SATB2 (focal, weak), negative for CK20, CDX-2, GATA3, NKX3.1 and [place]-8.    Best representative tumor block: A1-3.      Note       Dr. Houston notified electronically (Inst[employer]GIS[employer]) by Dr. Lopze on 4/25/2024 at 10:27.    Intradepartmental consultation in agreement (CV).    Flow cytometry ([facility]#[id number] / [id number], evaluated by Nathan Piedra  "[email or web address].):    No flow immunophenotypic evidence of a lymphoproliferative disorder.      Additional Information       All reported additional testing was performed with appropriately reactive controls.  These tests were developed and their performance characteristics determined by [facility] or appropriate performing facility, though some tests may be performed on tissues which have not been validated for performance characteristics (such as staining performed on alcohol exposed cell blocks and decalcified tissues).  Results should be interpreted with caution and in the context of the patients’ clinical condition. These tests may not be cleared or approved by the U.S. Food and Drug Administration, though the FDA has determined that such clearance or approval is not necessary. These tests are used for clinical purposes and they should not be regarded as investigational or for research. This laboratory has been approved by CLIA 88, designated as a high-complexity laboratory and is qualified to perform these tests.    Interpretation performed at [facility], [address] [place] [address]      Gross Description       A. The specimen is received in formalin, labeled with the patient's name and hospital number, and is designated \"Lymph node, left retroperitoneal\".  The specimen consists of 3 tan-brown cores of filiform and friable soft tissue measuring less than 0.1 cm-0.1 cm in diameter and ranging from 0.5-1.1 cm in length.  Entirely submitted. Three cassettes.  Between sponges.    Note: The estimated total formalin fixation time based upon information provided by the submitting clinician and the standard processing schedule is under 72 hours. -[facility]      Clinical Information       CT (4/20/2024)  1. There is a 2.1 x 1.8 x 2.6 cm left adrenal nodule. This is new compared to 2022. This nodule remains indeterminate and does not have enhancement features to suggest an adenoma. Suggest continued " surveillance given above adenopathy.     Adrenal recommendation based on institutional consensus and Journal of American College of Radiology 2017;14:6115-0520.     2. Abdominal and thoracic lymphadenopathy with representative lymph nodes as described above.     3. Pulmonary emboli remain visualized.     4. Right middle lobe consolidation, suspicious for pneumonia, grossly similar in appearance when compared to prior study from 4/19/2024   Leukemia/Lymphoma flow cytometry    Collection Time: 04/23/24  3:39 PM   Result Value Ref Range    Scan Result SEE WRITTEN REPORT    Fingerstick Glucose (POCT)    Collection Time: 04/23/24  4:30 PM   Result Value Ref Range    POC Glucose 156 (H) 65 - 140 mg/dl   Fingerstick Glucose (POCT)    Collection Time: 04/23/24  9:05 PM   Result Value Ref Range    POC Glucose 193 (H) 65 - 140 mg/dl   CBC    Collection Time: 04/24/24  4:52 AM   Result Value Ref Range    WBC 8.71 4.31 - 10.16 Thousand/uL    RBC 4.94 3.88 - 5.62 Million/uL    Hemoglobin 12.1 12.0 - 17.0 g/dL    Hematocrit 39.4 36.5 - 49.3 %    MCV 80 (L) 82 - 98 fL    MCH 24.5 (L) 26.8 - 34.3 pg    MCHC 30.7 (L) 31.4 - 37.4 g/dL    RDW 15.5 (H) 11.6 - 15.1 %    Platelets 312 149 - 390 Thousands/uL    MPV 9.0 8.9 - 12.7 fL   Basic metabolic panel    Collection Time: 04/24/24  4:52 AM   Result Value Ref Range    Sodium 136 135 - 147 mmol/L    Potassium 4.1 3.5 - 5.3 mmol/L    Chloride 102 96 - 108 mmol/L    CO2 25 21 - 32 mmol/L    ANION GAP 9 4 - 13 mmol/L    BUN 18 5 - 25 mg/dL    Creatinine 0.64 0.60 - 1.30 mg/dL    Glucose 154 (H) 65 - 140 mg/dL    Calcium 9.8 8.4 - 10.2 mg/dL    eGFR 110 ml/min/1.73sq m   Fingerstick Glucose (POCT)    Collection Time: 04/24/24  7:13 AM   Result Value Ref Range    POC Glucose 164 (H) 65 - 140 mg/dl   Home O2 Setup    Collection Time: 04/24/24  9:51 AM   Result Value Ref Range    Supplier Name [employer]     Supplier Phone Number [phone]     Order Status Delivery  Successful     Delivery Note      Delivery Request Date 04/24/2024     Date Delivered  04/25/2024     Supplier Name 04/25/2024     Item Description       Home Oxygen Concentrator with Portability, Adult, Standard Liter Flow    Item Description Portable Gaseous Oxygen System     Item Description Portable O2 Contents, Gas     Item Description No Conserving Device     Item Description O2 Humidifier Bottle, Standard Liter Flow    Fingerstick Glucose (POCT)    Collection Time: 04/24/24 11:13 AM   Result Value Ref Range    POC Glucose 168 (H) 65 - 140 mg/dl       Laboratory Results: I have personally reviewed the pertinent laboratory results/reports     Radiology/Other Diagnostic Testing Results: I have personally reviewed pertinent reports.      CT abdomen pelvis w wo contrast    Result Date: 4/22/2024  CT ABDOMEN AND PELVIS WITH AND WITHOUT IV CONTRAST INDICATION: Abnormal CT chest concerning for lymphadenopathy. R/o malignancy/staging. COMPARISON: CTA chest 4/19/2024; CT abdomen pelvis without contrast 1/26/2022 TECHNIQUE: CT examination of the abdomen and pelvis was performed. Multiplanar 2D reformatted images were created from the source data. 15-minute delayed images of the adrenal glands were obtained to assess adrenal nodule. This examination, like all CT scans performed in the [facility], was performed utilizing techniques to minimize radiation dose exposure, including the use of iterative reconstruction and automated exposure control. Radiation dose length product (DLP) for this visit: 1681 mGy-cm IV Contrast: 90 mL of iohexol (OMNIPAQUE) Enteric Contrast: Not administered. FINDINGS: ABDOMEN LOWER CHEST: Right middle lobe consolidation, grossly similar in appearance when compared to prior study from 4/19/2024. Trace left pleural effusion. Coronary artery calcifications. Pulmonary emboli remain visible, allowing for differences in technique and contrast timing. LIVER/BILIARY TREE: Unremarkable.  GALLBLADDER: No calcified gallstones. No pericholecystic inflammatory change. SPLEEN: Unremarkable. PANCREAS: Unremarkable. ADRENAL GLANDS: Unremarkable. ADRENAL GLANDS: Adrenal nodule #1 - Location: Left adrenal gland. - Size: 2.1 x 1.8 x 2.6 cm. Previously measured similarly on study dated 4/19/2024, and new since 1/26/2022. - Attenuation: Homogenous. - Macroscopic fat: No. - Precontrast HU: 28 - Parenchymal phase HU: 51 - Delayed 15 min HU: 50 - Relative washout: 4% - Absolute washout: 2% CONCLUSION: Indeterminate. KIDNEYS/URETERS: Oval hypodensity in the upper lateral cortex, probably representing small cyst. No suspicious interval change. Left intrarenal calculus measuring 0.8 x 0.7 cm. No hydronephrosis. STOMACH AND BOWEL: Colonic diverticulosis without findings of acute diverticulitis. APPENDIX: Normal. ABDOMINOPELVIC CAVITY: Abdominal lymphadenopathy, representative lymph nodes as described: - Para-aortic/right retrocrural lymph node measuring 2.2 x 1.8 cm (series 2 image 34). - Left para-aortic lymph node measuring 1.5 cm in short axis (series 2 image 36). - Right para-aortic lymph node measuring 1.6 cm in short axis (series 2 image 43). - Aortocaval lymph node at the level of the renal arteries measuring 1.2 cm in short axis (series 2 image 64). No ascites. No pneumoperitoneum. VESSELS: Atherosclerosis without abdominal aortic aneurysm. PELVIS REPRODUCTIVE ORGANS: Mildly enlarged prostate. URINARY BLADDER: Mildly distended urinary bladder. ABDOMINAL WALL/INGUINAL REGIONS: Unremarkable. BONES: No acute fracture or suspicious osseous lesion. Spinal degenerative changes.     1. There is a 2.1 x 1.8 x 2.6 cm left adrenal nodule. This is new compared to 2022. This nodule remains indeterminate and does not have enhancement features to suggest an adenoma. Suggest continued surveillance given above adenopathy. Adrenal recommendation based on institutional consensus and Journal of American College of Radiology  2017;14:9919-2995. 2. Abdominal and thoracic lymphadenopathy with representative lymph nodes as described above. 3. Pulmonary emboli remain visualized. 4. Right middle lobe consolidation, suspicious for pneumonia, grossly similar in appearance when compared to prior study from 4/19/2024 Resident: CASSANDRA Hall I, the attending radiologist, have reviewed the images and agree with the final report above. Workstation performed: [id number]      VAS VENOUS DUPLEX - LOWER LIMB BILATERAL    Result Date: 4/20/2024   [facility] REPORT CLINICAL: Indications: Patient presents with recent discovery of pulmonary embolism and physician wants to determine potential source.  Patient reports worsening shortness of breath this past week upon arrival to ED . Operative History: Patient denies any cardiovascular surgery Risk Factors The patient has history of HTN, Diabetes (NIDDM (oral meds)) and smoking (current) 0.5 ppd.  FINDINGS:  Right       Impression              Peroneal    Occlusive Subsegmental  Calf Veins  Thrombosed (acute)       Left        Impression              FV Prox     Non Occlusive Thrombus  PostTibial  Occlusive Subsegmental  Peroneal    Occlusive Subsegmental     CONCLUSION: Impression: RIGHT LOWER LIMB: Acute deep vein thrombosis identified in the peroneal & soleal veins at the proximal to mid calf. No evidence of superficial thrombophlebitis noted. Doppler evaluation shows a normal response to augmentation maneuvers.. Popliteal, posterior tibial and anterior tibial arterial Doppler waveform's are triphasic.  LEFT LOWER LIMB: Acute deep vein thrombosis identified in the proximal femoral vein (extending into the confluence of the common femoral vein), and (1) posterior tibial, and peroneal veins at the proximal to mid calf. No evidence of superficial thrombophlebitis noted. Doppler evaluation shows a normal response to augmentation maneuvers. Popliteal, posterior tibial and anterior tibial arterial Doppler  waveform's are triphasic.  Technical findings were given to Jason Turcios MD via [employer] following exam.  SIGNATURE: Electronically Signed by: ALMA LEBLANC MD, RPVI on 2024-04-20 07:12:03 PM    Echo complete w/ contrast if indicated    Result Date: 4/20/2024    Left Ventricle: Left ventricular cavity size is normal. Wall thickness is mildly increased. There is mild concentric hypertrophy. The left ventricular ejection fraction is 60%. Systolic function is normal. Wall motion is normal. Diastolic function is normal.   Right Ventricle: Right ventricular cavity size is borderline dilated. Systolic function is normal.   Aortic Valve: There is mild regurgitation. There is aortic valve sclerosis.The aortic valve peak velocity is 1.95 m/s. The aortic valve area is 2.06 cm2. No echocardiographic evidence of RV dysfunction / strain.  No indirect evidence of pulmonary hypertension.     XR chest 1 view portable    Result Date: 4/20/2024  XR CHEST PORTABLE INDICATION: SOB. COMPARISON: None FINDINGS: Right middle lobe pneumonia. Chronic changes. Normal cardiomediastinal silhouette. Bones are unremarkable for age. Normal upper abdomen.     Right middle lobe pneumonia. Workstation performed: [id number]     CTA ED chest PE study    Result Date: 4/19/2024  CTA - CHEST WITH IV CONTRAST - PULMONARY ANGIOGRAM INDICATION: sob. COMPARISON: None. TECHNIQUE: CTA examination of the chest was performed using angiographic technique according to a protocol specifically tailored to evaluate for pulmonary embolism. Multiplanar 2D reformatted images were created from the source data. In addition, coronal  3D MIP postprocessing was performed on the acquisition scanner. Radiation dose length product (DLP) for this visit: 358 mGy-cm . This examination, like all CT scans performed in the [facility], was performed utilizing techniques to minimize radiation dose exposure, including the use of iterative reconstruction and automated  exposure control. IV Contrast: 85 mL of iohexol (OMNIPAQUE) FINDINGS: PULMONARY ARTERIAL TREE: Intraluminal filling defects in multiple lobar, segmental, and subsegmental branches in the right lower lobe, left upper lobe, lingula, and left lower lobe noted with overall moderate clot burden. No central/saddle pulmonary embolism. RV:LV ratio is elevated at 1.1 suggestive of mild right heart strain. LUNGS: Central airways are patent. There is no tracheal or endobronchial lesion. Interlobular septal thickening noted bilaterally suggestive of interstitial pulmonary edema. Extensive right upper lobe and right middle lobe perihilar airspace opacities are seen. Additional subtle perihilar opacities in the right lower lobe also noted. Overall findings suggest multifocal infection of the right lung. PLEURA: No pneumothorax. Trace bilateral pleural effusions with basal atelectasis. HEART/GREAT VESSELS: Heart is unremarkable for patient's age. No pericardial effusion. No thoracic aortic aneurysm or dissection. Mild calcific atherosclerosis of the aortic arch and coronary arteries noted. The visualized proximal thoracic great vessels  are patent with normal course and caliber.. MEDIASTINUM AND JEREMIAH: Multiple mediastinal and hilar pathologically enlarged lymph nodes are seen measuring up to 2.3 x 1.5 cm. The visualized thyroid glands are unremarkable. Esophagus is normal in course and caliber. No significant prevertebral soft tissue swelling. Multiple prominent para-aortic, retrocrural and upper abdominal lymph nodes are seen. CHEST WALL AND LOWER NECK: Unremarkable. VISUALIZED STRUCTURES IN THE UPPER ABDOMEN: No free air or free fluid in the imaged upper abdomen. Mild diffuse hepatic steatosis. 1.9 cm nonspecific left adrenal nodule noted measuring 52 Hounsfield units by density measurement. Recommend 1 year follow-up adrenal washout CT per current guidelines. Multiple nonspecific pathologically enlarged para-aortic,  retrocrural, and upper abdominal lymph nodes are seen measuring up to 2.2 x 1.7 cm. OSSEOUS STRUCTURES: No acute fracture or destructive osseous lesion. Mild multilevel degenerative changes of the thoracic spine.     1.  Acute moderate clot burden multifocal bilateral peripheral pulmonary embolism. RV:LV ratio is elevated at 1.1 suggestive of mild right heart strain. 2.  No thoracic aortic aneurysm or dissection. Mild calcific atherosclerosis of the aortic arch and coronary arteries noted. 3.  Trace bilateral pleural effusions with bibasilar atelectasis. Interstitial pulmonary edema bilaterally. 4.  Findings suggestive of multifocal pneumonia in the right lung worse in the right middle lobe. Recommend follow-up imaging after adequate therapy to ensure complete resolution and exclude possibility of underlying neoplasm. 5.  Pathologically enlarged mediastinal, hilar, para-aortic, retrocrural, and upper abdominal lymph nodes are seen which may be secondary to a diffuse infectious/inflammatory or neoplastic process including lymphoma. Recommend clinical correlation. 6.  Nonspecific 1.9 cm left adrenal nodule as described above. Recommend adrenal washout CT in 12 months per current guidelines. Findings discussed with RAFFI Anguiano at 10:33 p.m. Workstation performed: [id number]        Assessment/Plan:  Problem List Items Addressed This Visit          Endocrine    Type 2 diabetes mellitus (HCC)    Relevant Medications    glimepiride (AMARYL) 4 mg tablet    atorvastatin (LIPITOR) 10 mg tablet    Other Relevant Orders    Albumin / creatinine urine ratio     Other Visit Diagnoses       Adenocarcinoma of lung, unspecified laterality (HCC)    -  Primary    Relevant Orders    Ambulatory Referral to Pulmonology    Ambulatory Referral to Hematology / Oncology    Pulmonary emboli (HCC)        Colon cancer screening        Relevant Orders    Cologuard    Mixed hyperlipidemia        Relevant Medications    atorvastatin (LIPITOR) 10 mg  "tablet          Reviewed hospital records, imaging, biopsy and pathology results,labs, previous notes.  Refer to pulmonology and hemato-oncology. Recommend sooner appointments.  I have reached out to pulmonary office and oncology office to speed up the process to get his care for lung cancer started.  Continue metformin, glimepiride increased to 4 mg with breakfast.  Continue atorvastatin 10 mg daily.  Discussed risks and benefits of cologuard and colonoscopy.Given he is on eliquis, will do cologuard for now.  He will need zostavax , pcv-20 which we will discuss at next appointment as he is overwhelmed at present.                   Read package inserts for all medications before starting a new medications, call me if you have any questions.    Patient was given opportunity to ask questions and all questions were answered.    Disclaimer: Portions of the record may have been created with voice recognition software. Occasional wrong word or \"sound a like\" substitutions may have occurred due to the inherent limitations of voice recognition software. Read the chart carefully and recognize, using context, where substitutions have occurred. I have used the Epic copy/forward function to compose this note. I have reviewed my current note to ensure it reflects the current patient status, exam, assessment and plan.    "

## 2024-05-01 ENCOUNTER — DOCUMENTATION (OUTPATIENT)
Dept: HEMATOLOGY ONCOLOGY | Facility: CLINIC | Age: 54
End: 2024-05-01

## 2024-05-01 ENCOUNTER — TELEPHONE (OUTPATIENT)
Age: 54
End: 2024-05-01

## 2024-05-01 ENCOUNTER — PATIENT OUTREACH (OUTPATIENT)
Dept: HEMATOLOGY ONCOLOGY | Facility: CLINIC | Age: 54
End: 2024-05-01

## 2024-05-01 RX ORDER — ATORVASTATIN CALCIUM 10 MG/1
10 TABLET, FILM COATED ORAL DAILY
Qty: 90 TABLET | Refills: 3 | Status: SHIPPED | OUTPATIENT
Start: 2024-05-01

## 2024-05-01 NOTE — TELEPHONE ENCOUNTER
I placed a stat referral for Dr Henry or one of the physicians. I did copy my  on this and we will try to get him in ASAP.

## 2024-05-01 NOTE — PROGRESS NOTES
Intake received/ Chart reviewed for work up completed     Imaging completed: 04/20/24 CT abdomen pelvis w wo contrast  04/19/24 CTA ed chest pe study     Pathology completed:  04/23/24 at [facility]    All records needed are in patients chart. No records retrieval needed at this time.    Routing to care coordinator for scheduling of:  STAT Hem/Onc appointment - patient is willing to go to any campus but he lives in [place].     OCC to notify Nurse Navigator once scheduling complete. Nurse Navigator will provide appointment info upon outreach.

## 2024-05-01 NOTE — TELEPHONE ENCOUNTER
Spoke with patient's fiance and some time in the later afternoon is better for an appointment I'm going to try to call to get him in sooner.

## 2024-05-01 NOTE — TELEPHONE ENCOUNTER
Yair called and stated patient received a referral to hematology from the hospital upon discharge. Yair stated that the patient has a appt in June with a pa.The hemo office stated that the patient needs a new referral with the diagnosis listed on it and if patient needs a sooner appt it has to state stat.Yair also wanted to know does the provider want the patient to she a doctor instead of a pa.Please advise.

## 2024-05-01 NOTE — PROGRESS NOTES
Outreach to patient advised I received his referral for hem/onc. I am working on getting him an appointment. I will reach out tomorrow with more information. Verbalized understanding. Expressed appreciation of phone call.

## 2024-05-02 ENCOUNTER — DOCUMENTATION (OUTPATIENT)
Dept: HEMATOLOGY ONCOLOGY | Facility: CLINIC | Age: 54
End: 2024-05-02

## 2024-05-02 ENCOUNTER — PATIENT OUTREACH (OUTPATIENT)
Dept: HEMATOLOGY ONCOLOGY | Facility: CLINIC | Age: 54
End: 2024-05-02

## 2024-05-02 ENCOUNTER — PATIENT MESSAGE (OUTPATIENT)
Dept: FAMILY MEDICINE CLINIC | Facility: CLINIC | Age: 54
End: 2024-05-02

## 2024-05-02 DIAGNOSIS — C34.90 ADENOCARCINOMA OF LUNG, UNSPECIFIED LATERALITY (HCC): Primary | ICD-10-CM

## 2024-05-02 DIAGNOSIS — C34.90 ADENOCARCINOMA, LUNG, UNSPECIFIED LATERALITY (HCC): Primary | ICD-10-CM

## 2024-05-02 PROBLEM — C34.81: Status: ACTIVE | Noted: 2024-05-02

## 2024-05-02 NOTE — PROGRESS NOTES
Contacted patient as directed by  JENNIFER Simental to schedule medical oncology consult  .      Introduced myself and my role.      Type of appointment-Medical Oncology Consult   Provider-Dr. Sandoval   Qenh-2-6-2024  Time-10:00am  Location-[place]    Patient had no other questions or concerns at this time. I provided my contact information in case any should arise.

## 2024-05-02 NOTE — PROGRESS NOTES
MRI brain ordered per Dr. Sandoval - to be scheduled after his appointment on Monday 5/6/24 at 10 am.

## 2024-05-02 NOTE — PATIENT COMMUNICATION
[facility] requesting to order MRI brain for staging, he has an appointment with oncology on 5/7/2024.  Advised that this would be for oncology to order the appropriate testing to stage and is out of my scope of practise.

## 2024-05-02 NOTE — PROGRESS NOTES
Hematology/Oncology Outpatient Office Note    Date of Service: 2024    [facility]ATOLOG[facility] [address]  [place] [address]  [phone]    Reason for Consultation:   Chief Complaint   Patient presents with    Consult       Cancer Stage at diagnosis: IV    Referral Physician: Amelie Bacon MD    Primary Care Physician:  Amelie Bacon MD     Nickname: Jessica    Spouse: Mabel  (RN at [employer] in Cardiology)    Original ECO    Today's ECO    Goals and Barriers:  Current Goal: Minimize effects of disease burden, extend life.   Barriers to accomplishing this: SOB    Patient's Capacity to Self Care:  Patient is able to self care    ASSESSMENT & PLAN      Diagnosis ICD-10-CM Associated Orders   1. Adenocarcinoma of overlapping sites of right lung (HCC)  C34.81       2. Chemotherapy induced nausea and vomiting  R11.2     T45.1X5A       3. Adenocarcinoma of lung, unspecified laterality (HCC)  C34.90 Ambulatory Referral to Hematology / Oncology            This is a 54 y.o. c PMHx notable for DM, HLP, blood clots, HTN, remote nicotine abuse, being seen in consultation for metastatic lung adeno       Discussion of decision making  Oncology history updated, accordingly, during this visit  Goals of care/patient communication  I discussed with the patient the clinical course leading up to their cancer diagnosis. I reviewed relevant office notes, imaging reports and pathology result as well.  I told the patient that this is a case of incurable disease and what this means. We discussed that the goal of anti-cancer therapy is to provide best quality of life, extend overall survival, and progression free survival as shown in clinical trials. We also discussed that there might be a point when the cancer will no longer respond to this anti-neoplastic therapy. As a result, we also discussed the role of the palliative care team being introduced early in the treatment  course. We will be making this referral  I explained the risks/benefits of the proposed cancer therapy: Carbo-Alimta-Keytruda and after discussion including understanding risks of possible life-threatening complications and therapy-related malignancy development, informed consent for blood products and treatment has been signed and obtained.  TNM/Staging At Diagnosis  Cancer Staging   Adenocarcinoma of overlapping sites of right lung (HCC)  Staging form: Lung, AJCC 8th Edition  - Clinical: Stage IV (cN3, cM1) - Signed by Maryana Sandoval MD on 5/2/2024  Disease Features/Tumor Markers/Genetics  Tumor Marker: n/a  Notable Path Features: 4/23/2024 Lymph Node, Left retroperitoneal, Biopsy: Metastatic adenocarcinoma of lung primary  Treatment: Carbo-Alimta-Keytruda  Other Supportive care: Zofran EMLA  Treatment Team Members  Surgeon  Rad Onc  Palliative: Dr. Estrella  Labs  Diagnostics  4/19/2024 CT Chest PE: Acute moderate clot burden multifocal bilateral peripheral pulmonary embolism, mild R heart strain. Findings suggestive of multifocal pneumonia. Pathologically enlarged mediastinal, hilar, para-aortic, retrocrural, and upper abdominal lymph nodes. Nonspecific 1.9 cm left adrenal nodule  4/20/2024 CT Abd/pelv w/wo c: There is a 2.1 x 1.8 x 2.6 cm left adrenal nodule. Abdominal and thoracic lymphadenopathy  5/4/2024 MRI Brain w/wo c: 4 mm metastasis in the posterior right frontal lobe without significant edema. No other definite enhancing lesions but evaluation is limited due to motion artifact.  Tiny focus of restricted diffusion in the left frontal lobe without definite enhancement could represent acute/early subacute infarct      Discussion of decision making    I personally reviewed the following lab results, the image studies, pathology, other specialty/physicians consult notes and recommendations, and outside medical records. I had a lengthy discussion with the patient and shared the work-up findings. We  discussed the diagnosis and management plan as below. I spent 62 minutes reviewing the records (labs, clinician notes, outside records, medical history, ordering medicine/tests/procedures, monitoring of anti-neoplastic toxicities, interpreting the imaging/labs previously done) and coordination of care as well as direct time with the patient today, of which greater than 50% of the time was spent in counseling and coordination of care with the patient/family.      Plan/Labs  Zofran 8mg q8 prn nausea/vomiting to be sent home  CARIS NGS being sent off the 4/23 biopsy  Guardant NGS being sent off  Restaging CT CAP w/c ordered in 3 months  MRI Brain restaging as per Rad Onc  Rad Onc referral for determination of SRS vs NSY, tumor board scheduled Wednesday  F/u palliative care   Infusion chairs ordered for Carbo-Alimta-Keytruda q3 weeks while on preceding labs, C1 coming up         Follow Up: q3 weeks scheduled thru     All questions were answered to the patient's satisfaction during this encounter. The patient knows the contact information for our office and knows to reach out for any relevant concerns related to this encounter. They are to call for any temperature 100.4 or higher, new symptoms including but not restricted to shaking chills, decreased appetite, nausea, vomiting, diarrhea, increased fatigue, shortness of breath or chest pain, confusion, and not feeling the strength to come to the clinic. For all other listed problems and medical diagnosis in their chart - they are managed by PCP and/or other specialists, which the patient acknowledges. Thank you very much for your consultation and making us a part of this patient's care. We are continuing to follow closely with you. Please do not hesitate to reach out to me with any additional questions or concerns.    Maryana Sandoval MD  Hematology & Medical Oncology Staff Physician             Disclaimer: This document was prepared using [employer] Direct  technology. If a word or phrase is confusing, or does not make sense, this is likely due to recognition error which was not discovered during this clinician's review. If you believe an error has occurred, please contact me through [facility] service line for [email or web address]?cation.      ONCOLOGY HISTORY OF PRESENT ILLNESS        Oncology History   Adenocarcinoma of overlapping sites of right lung (HCC)   5/2/2024 Initial Diagnosis    Adenocarcinoma of overlapping sites of right lung (HCC)     5/2/2024 -  Cancer Staged    Staging form: Lung, AJCC 8th Edition  - Clinical: Stage IV (cN3, cM1) - Signed by Maryana Sandoval MD on 5/2/2024             SUBJECTIVE  (INTERVAL HISTORY)      Clotting History None   Bleeding History None   Cancer History Lung Adeno   Family Cancer History None   H/O Blood/Plt Transfusion None   Tobacco/etoh/drug abuse 1.5 PPD x 40 years, quit 4/2024, no etoh abuse or rec drug use       Cancer Screening history C-scope 2014, due for cologuard (ordered)   Occupation  in the past, now      Pain: mid chest (deep) since early May 2024, related to coughing which is acute    Dyspnea with exertion over the past 4 weeks since 4/2024. Has chronic kaleidoscope ocular sensation over the past few years.    I have reviewed the relevant past medical, surgical, social and family history. I have also reviewed allergies and medications for this patient.    Review of Systems  Baseline weight: 200-205 lbs    Denies F/C, N/V, CP, LH, HA, rash, itching, gen weakness, unilateral weakness, diplopia, melena, hematuria, hematochezia, falls, diarrhea, or constipation       A 10-point review of system was performed, pertinent positive and negative were detailed as above. Otherwise, the 10-point review of system was negative.      Past Medical History:   Diagnosis Date    Cancer (HCC) 04,25,2024    Diabetes mellitus (HCC)     High cholesterol     History of blood clots     Hypertension     Leucocytosis  2024       Past Surgical History:   Procedure Laterality Date    IR BIOPSY LYMPH NODE  2024    KNEE SURGERY      MANDIBLE FRACTURE SURGERY  1985    PATELLA FRACTURE SURGERY      RECTAL SURGERY         Family History   Problem Relation Age of Onset    No Known Problems Father     No Known Problems Mother        Social History     Socioeconomic History    Marital status: Single     Spouse name: Not on file    Number of children: Not on file    Years of education: Not on file    Highest education level: Not on file   Occupational History    Not on file   Tobacco Use    Smoking status: Former     Current packs/day: 0.00     Average packs/day: 1.5 packs/day for 35.0 years (52.5 ttl pk-yrs)     Types: Cigarettes     Quit date: 4/15/2024     Years since quittin.0     Passive exposure: Past    Smokeless tobacco: Never   Vaping Use    Vaping status: Never Used   Substance and Sexual Activity    Alcohol use: Not Currently    Drug use: Never    Sexual activity: Yes     Partners: Female     Birth control/protection: None   Other Topics Concern    Not on file   Social History Narrative    Not on file     Social Determinants of Health     Financial Resource Strain: Not on file   Food Insecurity: Not on file   Transportation Needs: Not on file   Physical Activity: Not on file   Stress: Not on file   Social Connections: Not on file   Intimate Partner Violence: Not on file   Housing Stability: Not on file       No Known Allergies    Current Outpatient Medications   Medication Sig Dispense Refill    acetaminophen (TYLENOL) 325 mg tablet Take 3 tablets (975 mg total) by mouth every 8 (eight) hours 30 tablet 0    amitriptyline (ELAVIL) 100 mg tablet 200 mg daily at bedtime      apixaban (Eliquis) 5 mg Take 1 tablet (5 mg total) by mouth 2 (two) times a day Do not start before May 1, 2024. 60 tablet 5    atorvastatin (LIPITOR) 10 mg tablet Take 1 tablet (10 mg total) by mouth daily 90 tablet 3    famotidine (PEPCID) 40 MG  tablet Take 40 mg by mouth daily as needed for heartburn or indigestion      glimepiride (AMARYL) 4 mg tablet Take 1 tablet (4 mg total) by mouth daily with breakfast 90 tablet 1    Lancets (OneTouch Delica Plus [id number]) MISC TEST ONE TIME DAILY      losartan (COZAAR) 25 mg tablet Take 25 mg by mouth daily      metFORMIN (GLUCOPHAGE) 500 mg tablet Take 1,000 mg by mouth 2 (two) times a day with meals      OneTouch Verio test strip in the morning       No current facility-administered medications for this visit.       (Not in a hospital admission)      Objective:     24 Hour Vitals Assessment:     Vitals:    05/06/24 0935   BP: 124/60   Pulse: (!) 107   Resp: 16   Temp: 98.2 °F (36.8 °C)   SpO2: 95%       PHYSICIAN EXAM:    General: Appearance: alert, cooperative, no distress.  HEENT: Normocephalic, atraumatic. No scleral icterus. conjunctivae clear. EOMI.  Chest: No tenderness to palpation. No open wound noted.  Lungs: Clear to auscultation bilaterally, Respirations unlabored.  Cardiac: Tachycardia, +S1and S2  Abdomen: Soft, non-tender, non-distended. Bowel sounds are normal.  Extremities:  No edema, cyanosis, clubbing.  Skin: Skin color, turgor are normal. No rashes.  Lymphatics: no palpable supra-cervical, axillary, or inguinal adenopathy  Neurologic: Awake, Alert, and oriented, no gross focal deficits noted b/l.       DATA REVIEW:    Pathology Result:    Final Diagnosis   Date Value Ref Range Status   04/23/2024   Final    A. Lymph Node, Left retroperitoneal, Biopsy:  - Metastatic adenocarcinoma of lung primary.     Comment: Specimen (block A2) is being sent to [facility] for MI Profile Testing. Upon completion of testing, [employer] report will be sent directly to the requesting provider, as well as posted in the Media Tab of EPIC for this patient by the Pathology Department.          Image Results:   Image result are reviewed and documented in Hematology/Oncology history    MRI brain w wo  contrast  Narrative: MRI BRAIN WITH AND WITHOUT CONTRAST    INDICATION: C34.90: Malignant neoplasm of unspecified part of unspecified bronchus or lung.    COMPARISON:  None.    TECHNIQUE:  Multiplanar, multisequence imaging of the brain was performed before and after gadolinium administration.    IV Contrast:  9.2 mL of Gadobutrol injection (SINGLE-DOSE)    IMAGE QUALITY:   Diagnostic.    FINDINGS:    BRAIN PARENCHYMA: There is the 4 mm enhancing lesion in the posterior right frontal lobe on image 23 series 10 suspicious for metastasis. No significant edema. No other definite enhancing lesions but evaluation is limited due to motion artifact on   postcontrast images. There is tiny focus of restricted diffusion in the left frontal lobe that could represent recent infarct but additional metastasis is not excluded and close interval follow-up is recommended. Tiny linear focus of mild DWI signal in   the right caudate head adjacent to chronic cystic lacunar infarct. No acute hemorrhage, mass effect, shift or herniation. Small scattered foci of T2/FLAIR hyperintensity in the periventricular and subcortical matter due to mild chronic microangiopathy.    VENTRICLES:  Normal for the patient's age.    SELLA AND PITUITARY GLAND:  Normal.    ORBITS:  Normal.    PARANASAL SINUSES:  Normal.    VASCULATURE:  Evaluation of the major intracranial vasculature demonstrates appropriate flow voids.    CALVARIUM AND SKULL BASE:  Normal.    EXTRACRANIAL SOFT TISSUES:  Normal.  Impression: 4 mm metastasis in the posterior right frontal lobe without significant edema. No other definite enhancing lesions but evaluation is limited due to motion artifact.  Tiny focus of restricted diffusion in the left frontal lobe without definite enhancement could represent acute/early subacute infarct but recommend 2-month follow-up as additional metastasis is not excluded.    The study was marked in EPIC for immediate notification.    Workstation  "performed: [id number]      LABS:  Lab data are reviewed and documented in [facility] history.       Lab Results   Component Value Date    HGB 12.1 04/24/2024    HCT 39.4 04/24/2024    MCV 80 (L) 04/24/2024     04/24/2024    WBC 8.71 04/24/2024    NRBC 0 04/19/2024     Lab Results   Component Value Date    K 4.1 04/24/2024     04/24/2024    CO2 25 04/24/2024    BUN 18 04/24/2024    CREATININE 0.64 04/24/2024    CALCIUM 9.8 04/24/2024    AST 13 04/19/2024    ALT 20 04/19/2024    ALKPHOS 71 04/19/2024    EGFR 110 04/24/2024       Lab Results   Component Value Date    IRON 28 (L) 04/19/2024    TIBC 212 (L) 04/19/2024    FERRITIN 63 04/19/2024       No results found for: \"[id number]\"    No results for input(s): \"WBC\", \"CREAT\", \"PLT\" in the last 72 hours.    By:  Maryana Sandoval MD, 5/6/2024, 10:01 AM                                  "

## 2024-05-02 NOTE — PROGRESS NOTES
Oncology Nurse Navigator:    Called patient for initial outreach from nurse navigator. Introduced myself and my role. Reviewed referral to medical oncologist.  Assessed barriers of care.     Patient lives spouse . Denies any transportation issues at this time.  OSW referral placed.     RN Initial Assessment     What is your understanding of your diagnosis? Lung cancer. Reviewed role of medical oncologist. Advised patient he would need MRI of brain to complete staging. We will try to get order and have it schedule prior to appointment with medical oncologist. Verbalized understanding.     Do yo have transportation for initial appts? Yes     Will someone be coming with you? Spouse    Review of Communication consent and update as needed.     How do you prefer to receive information? Verbal both written    Family history of cancer? No   Interested in speaking with oncology Genetics?    Systems Assessment     History of smoking at least 1.5 ppd for 35 years quit 3 weeks ago.     Patient states he had some leg cramps the end of April ended up in the hospital with blood clot and pneumonia. He had scans done and biopsy which he was told yesterday he had lung cancer.     He reports he works full time plus.  He has an occasional dry cough and SOB on exertion. He uses oxygen as needed.  He denies any phlegm, hemoptysis, chronic infections, wheezing or pain. He does report fatigue or weakness on exertion.   Patient denies any weight loss except during hospitalization.     Can you get around independently? Yes    Assistance with ADLs?  No    How much time during the day do you spend sitting/lying down? Not much    Do you have any vision changes/headaches/dizziness/numbness/altered mental status/increase in forgetfulness? No    Any precipitating factors?     Any swelling new or worsening?  No    Do you have a PCP that you are established with? Yes - Dr. Bacon    Information provided on Cancer Support Community along with my  contact information and OCC contact information via [employer] message.  Patient  is aware he can reach out with any questions.  General assessment complete. Patient expresses appreciation of phone call.       Patient Height: 5 7  Weight: 206  Pacemaker:Defibrillator: No  Surgical metal in body? Yes  (Shunts, filters, coils, clips, etc?)  Wire in right side of jaw, right knee fx patella  Injury to eyes involving metal?  no  Struck by bullets or shrapnel? no  Previous study/MRI? No  (This body part) Date :   Diabetes: yes - Glimepiride   Kidney or liver disease?  no  Kidney or liver transplant?  no  On Dialysis? No  Recent BUN/Creatinine? yes 04/24/24  (Within 12 weeks)  High blood pressure? yes - on medication    Claustrophobia? No  Shunt? No  Port? No  Is patient able to stand without assistance? Yes    History of cancer diagnosis and treatment? No  Did patient receive previous radiation therapy? No  Did patient receive previous chemotherapy? No  Did patient complete prescribed treatment? No  Has patient previously undergone genetic or genomic testing? No    Patient scheduled with Dr. Sandoval Monday 5/6/24  10:00 and MRI brain on Monday 05/06/24 at 2:15 [facility].

## 2024-05-03 ENCOUNTER — PATIENT OUTREACH (OUTPATIENT)
Dept: HEMATOLOGY ONCOLOGY | Facility: CLINIC | Age: 54
End: 2024-05-03

## 2024-05-03 RX ORDER — LANCETS 33 GAUGE
EACH MISCELLANEOUS
COMMUNITY
Start: 2024-03-18

## 2024-05-03 RX ORDER — BLOOD SUGAR DIAGNOSTIC
STRIP MISCELLANEOUS DAILY
COMMUNITY
Start: 2024-03-15

## 2024-05-03 NOTE — PROGRESS NOTES
Appointment Change  Cancel, Reschedule, Change to Virtual      Who are you speaking with? Patient   If it is not the patient, is the caller listed on the communication consent form? N/A   Old appointment details May 6, 2024  Brain MRI  Vlad   What is the reason for the appointment change? Sooner appointment    New appointment details May 4, 2024  Brain MRI   Vlad

## 2024-05-04 ENCOUNTER — HOSPITAL ENCOUNTER (OUTPATIENT)
Dept: MRI IMAGING | Facility: HOSPITAL | Age: 54
Discharge: HOME | End: 2024-05-04
Attending: INTERNAL MEDICINE
Payer: COMMERCIAL

## 2024-05-04 DIAGNOSIS — C34.90 ADENOCARCINOMA, LUNG, UNSPECIFIED LATERALITY (HCC): ICD-10-CM

## 2024-05-04 PROCEDURE — A9585 GADOBUTROL INJECTION: HCPCS | Performed by: INTERNAL MEDICINE

## 2024-05-04 PROCEDURE — 70553 MRI BRAIN STEM W/O & W/DYE: CPT

## 2024-05-04 RX ORDER — GADOBUTROL 604.72 MG/ML
9.2 INJECTION INTRAVENOUS
Status: COMPLETED | OUTPATIENT
Start: 2024-05-04 | End: 2024-05-04

## 2024-05-04 RX ADMIN — GADOBUTROL 9.2 ML: 604.72 INJECTION INTRAVENOUS at 07:48

## 2024-05-06 ENCOUNTER — OFFICE VISIT (OUTPATIENT)
Dept: HEMATOLOGY ONCOLOGY | Facility: CLINIC | Age: 54
End: 2024-05-06
Payer: COMMERCIAL

## 2024-05-06 ENCOUNTER — PATIENT OUTREACH (OUTPATIENT)
Dept: CASE MANAGEMENT | Facility: HOSPITAL | Age: 54
End: 2024-05-06

## 2024-05-06 ENCOUNTER — DOCUMENTATION (OUTPATIENT)
Dept: HEMATOLOGY ONCOLOGY | Facility: CLINIC | Age: 54
End: 2024-05-06

## 2024-05-06 ENCOUNTER — TELEPHONE (OUTPATIENT)
Dept: HEMATOLOGY ONCOLOGY | Facility: CLINIC | Age: 54
End: 2024-05-06

## 2024-05-06 ENCOUNTER — PATIENT OUTREACH (OUTPATIENT)
Dept: HEMATOLOGY ONCOLOGY | Facility: CLINIC | Age: 54
End: 2024-05-06

## 2024-05-06 VITALS
DIASTOLIC BLOOD PRESSURE: 60 MMHG | HEIGHT: 67 IN | HEART RATE: 107 BPM | RESPIRATION RATE: 16 BRPM | SYSTOLIC BLOOD PRESSURE: 124 MMHG | BODY MASS INDEX: 32.18 KG/M2 | OXYGEN SATURATION: 95 % | TEMPERATURE: 98.2 F | WEIGHT: 205 LBS

## 2024-05-06 DIAGNOSIS — C34.90 ADENOCARCINOMA OF LUNG, UNSPECIFIED LATERALITY (HCC): ICD-10-CM

## 2024-05-06 DIAGNOSIS — C34.90 ADENOCARCINOMA, LUNG, UNSPECIFIED LATERALITY (HCC): Primary | ICD-10-CM

## 2024-05-06 DIAGNOSIS — C34.81 ADENOCARCINOMA OF OVERLAPPING SITES OF RIGHT LUNG (HCC): ICD-10-CM

## 2024-05-06 DIAGNOSIS — T45.1X5A CHEMOTHERAPY INDUCED NAUSEA AND VOMITING: ICD-10-CM

## 2024-05-06 DIAGNOSIS — R11.2 CHEMOTHERAPY INDUCED NAUSEA AND VOMITING: ICD-10-CM

## 2024-05-06 DIAGNOSIS — C34.81 ADENOCARCINOMA OF OVERLAPPING SITES OF RIGHT LUNG (HCC): Primary | ICD-10-CM

## 2024-05-06 PROCEDURE — 99205 OFFICE O/P NEW HI 60 MIN: CPT | Performed by: INTERNAL MEDICINE

## 2024-05-06 RX ORDER — ONDANSETRON 8 MG/1
8 TABLET, ORALLY DISINTEGRATING ORAL EVERY 8 HOURS PRN
Qty: 20 TABLET | Refills: 1 | Status: SHIPPED | OUTPATIENT
Start: 2024-05-06

## 2024-05-06 RX ORDER — FOLIC ACID 1 MG/1
1 TABLET ORAL DAILY
Qty: 90 TABLET | Refills: 2 | Status: SHIPPED | OUTPATIENT
Start: 2024-05-06

## 2024-05-06 RX ORDER — SODIUM CHLORIDE 9 MG/ML
20 INJECTION, SOLUTION INTRAVENOUS ONCE
OUTPATIENT
Start: 2024-07-02

## 2024-05-06 RX ORDER — CYANOCOBALAMIN 1000 UG/ML
1000 INJECTION, SOLUTION INTRAMUSCULAR; SUBCUTANEOUS ONCE
OUTPATIENT
Start: 2024-07-02 | End: 2024-06-25

## 2024-05-06 RX ORDER — LIDOCAINE AND PRILOCAINE 25; 25 MG/G; MG/G
CREAM TOPICAL AS NEEDED
Qty: 50 G | Refills: 1 | Status: SHIPPED | OUTPATIENT
Start: 2024-05-06

## 2024-05-06 RX ORDER — CYANOCOBALAMIN 1000 UG/ML
1000 INJECTION, SOLUTION INTRAMUSCULAR; SUBCUTANEOUS ONCE
Status: CANCELLED | OUTPATIENT
Start: 2024-05-14 | End: 2024-05-07

## 2024-05-06 NOTE — PROGRESS NOTES
OSW received referral for patient. Patient had consult today, 5/6/24 with Dr. Sandoval. Patient will be presented to tumor board. OSW will follow.

## 2024-05-06 NOTE — PROGRESS NOTES
Reviewed [employer]colink printout for Carboplatin, Alimta, and keytruda. Reviewed vitamin b12 injection and lab recommendations. Reviewed port information as well.    Reviewed office handout with HOPELINE number and Chemotherapy and you booklet.    Consent obtained, copy given to patient and uploaded into patient chart for further review.

## 2024-05-06 NOTE — PROGRESS NOTES
In-basket message received from Dr. Sandoval to add patient to the neuro [facility]CC on 5/8/2024. Chart reviewed and prep completed.

## 2024-05-06 NOTE — PROGRESS NOTES
Call to patient, introduced self and my direct contact number provided. Spoke with patient regarding scheduling radiation oncology consult, referral placed by Dr Sandoval today. Patient reports no prior history of radiation therapy. Radiation consult scheduled for 5/8/24 at [facility]. Office location, address and phone number provided. Information regarding consult visit reviewed with patient. No transportation needs at this time. Patient states that he will probably be going to appointment by himself. Patient also scheduled to see Dr Estrella, palliative care 5/8/24. All questions answered to patient's satisfaction at this time, encouraged him to call if he has any questions/needs.     Documentation noted from today that patient to be presented at 5/8/24 neuro [facility].

## 2024-05-06 NOTE — Clinical Note
Not sure who's covering Nuzhat (Harleen?), but need JOSE ENRIQUE SANTACRUZ off of the April lymph node please

## 2024-05-07 ENCOUNTER — PATIENT OUTREACH (OUTPATIENT)
Dept: HEMATOLOGY ONCOLOGY | Facility: CLINIC | Age: 54
End: 2024-05-07

## 2024-05-07 ENCOUNTER — CONSULT (OUTPATIENT)
Dept: PULMONOLOGY | Facility: CLINIC | Age: 54
End: 2024-05-07
Payer: COMMERCIAL

## 2024-05-07 VITALS
HEART RATE: 121 BPM | TEMPERATURE: 98 F | WEIGHT: 205 LBS | HEIGHT: 67 IN | OXYGEN SATURATION: 92 % | SYSTOLIC BLOOD PRESSURE: 116 MMHG | BODY MASS INDEX: 32.18 KG/M2 | DIASTOLIC BLOOD PRESSURE: 66 MMHG

## 2024-05-07 DIAGNOSIS — C34.90 ADENOCARCINOMA OF LUNG, UNSPECIFIED LATERALITY (HCC): ICD-10-CM

## 2024-05-07 DIAGNOSIS — C34.81 ADENOCARCINOMA OF OVERLAPPING SITES OF RIGHT LUNG (HCC): ICD-10-CM

## 2024-05-07 DIAGNOSIS — J96.11 CHRONIC HYPOXEMIC RESPIRATORY FAILURE (HCC): Primary | ICD-10-CM

## 2024-05-07 DIAGNOSIS — I26.99 ACUTE PULMONARY EMBOLISM, UNSPECIFIED PULMONARY EMBOLISM TYPE, UNSPECIFIED WHETHER ACUTE COR PULMONALE PRESENT (HCC): ICD-10-CM

## 2024-05-07 DIAGNOSIS — J98.4 RESTRICTIVE LUNG DISEASE: ICD-10-CM

## 2024-05-07 PROCEDURE — 94618 PULMONARY STRESS TESTING: CPT | Performed by: INTERNAL MEDICINE

## 2024-05-07 PROCEDURE — 99205 OFFICE O/P NEW HI 60 MIN: CPT | Performed by: INTERNAL MEDICINE

## 2024-05-07 PROCEDURE — 94010 BREATHING CAPACITY TEST: CPT | Performed by: INTERNAL MEDICINE

## 2024-05-07 NOTE — PROGRESS NOTES
I reached out and spoke with Papo now that consults have been completed with the oncology teams to review for any barriers to care and offer supportive services as needed. I reviewed and updated the members assigned to the care team in [facility].   He knows the members of the care team as well as how and when to contact them with any needs.   He verbalizes managing the schedules well.   They are currently driving themselves and deny any transportation needs.    He is already established with Palliative Care.  Scheduled on 5/8/24  Patients states that they are eating and drinking as per usual with no unintentional weight loss.     Patient does not smoke.   Patient states he is well supported by family and friends.  Community support groups discussed including the Cancer Support Community of the [facility]. Patient declined information at this time.   Patient feels they have adequate insurance coverage and deny any financial concerns at this time.     Discussed the plan for follow-up with the patient. Based on their individual needs I will follow up with them in about 4-6 weeks.   I have provided my direct contact information to the patient and welcome them to contact me if their needs as discussed above change. They were appreciative for the call.

## 2024-05-07 NOTE — ASSESSMENT & PLAN NOTE
Patient maintaining on Eliquis.  I reviewed his CTA as well as his 2D echocardiogram which showed no evidence of right heart strain.  We talked about PE and how his cancer put him at risk he will maintain on Eliquis hereafter.

## 2024-05-07 NOTE — ASSESSMENT & PLAN NOTE
6-minute walk reveals persistent oxygen desaturations on room air down to 88% needs 2 L of supplemental oxygen to keep pulse ox greater than 90%.

## 2024-05-08 ENCOUNTER — DOCUMENTATION (OUTPATIENT)
Dept: HEMATOLOGY ONCOLOGY | Facility: CLINIC | Age: 54
End: 2024-05-08

## 2024-05-08 ENCOUNTER — RADIATION ONCOLOGY CONSULT (OUTPATIENT)
Dept: RADIATION ONCOLOGY | Facility: HOSPITAL | Age: 54
End: 2024-05-08
Attending: STUDENT IN AN ORGANIZED HEALTH CARE EDUCATION/TRAINING PROGRAM
Payer: COMMERCIAL

## 2024-05-08 ENCOUNTER — CONSULT (OUTPATIENT)
Dept: PALLIATIVE MEDICINE | Facility: CLINIC | Age: 54
End: 2024-05-08
Payer: COMMERCIAL

## 2024-05-08 ENCOUNTER — CLINICAL SUPPORT (OUTPATIENT)
Dept: PALLIATIVE MEDICINE | Facility: CLINIC | Age: 54
End: 2024-05-08
Payer: COMMERCIAL

## 2024-05-08 VITALS
HEART RATE: 95 BPM | OXYGEN SATURATION: 92 % | SYSTOLIC BLOOD PRESSURE: 116 MMHG | DIASTOLIC BLOOD PRESSURE: 68 MMHG | RESPIRATION RATE: 18 BRPM | TEMPERATURE: 97.7 F

## 2024-05-08 VITALS
HEART RATE: 101 BPM | WEIGHT: 206.5 LBS | OXYGEN SATURATION: 90 % | TEMPERATURE: 97 F | BODY MASS INDEX: 32.41 KG/M2 | DIASTOLIC BLOOD PRESSURE: 60 MMHG | SYSTOLIC BLOOD PRESSURE: 102 MMHG | HEIGHT: 67 IN

## 2024-05-08 DIAGNOSIS — Z51.5 PALLIATIVE CARE ENCOUNTER: ICD-10-CM

## 2024-05-08 DIAGNOSIS — C34.81 ADENOCARCINOMA OF OVERLAPPING SITES OF RIGHT LUNG (HCC): ICD-10-CM

## 2024-05-08 DIAGNOSIS — E11.9 TYPE 2 DIABETES MELLITUS (HCC): ICD-10-CM

## 2024-05-08 DIAGNOSIS — C34.81 ADENOCARCINOMA OF OVERLAPPING SITES OF RIGHT LUNG (HCC): Primary | ICD-10-CM

## 2024-05-08 DIAGNOSIS — J98.4 RESTRICTIVE LUNG DISEASE: ICD-10-CM

## 2024-05-08 DIAGNOSIS — J96.11 CHRONIC HYPOXEMIC RESPIRATORY FAILURE (HCC): ICD-10-CM

## 2024-05-08 DIAGNOSIS — Z86.711 HISTORY OF PULMONARY EMBOLISM: ICD-10-CM

## 2024-05-08 DIAGNOSIS — Z71.89 COUNSELING AND COORDINATION OF CARE: Primary | ICD-10-CM

## 2024-05-08 DIAGNOSIS — C79.31 METASTASIS TO BRAIN (HCC): Primary | ICD-10-CM

## 2024-05-08 PROCEDURE — 99211 OFF/OP EST MAY X REQ PHY/QHP: CPT | Performed by: STUDENT IN AN ORGANIZED HEALTH CARE EDUCATION/TRAINING PROGRAM

## 2024-05-08 PROCEDURE — G0463 HOSPITAL OUTPT CLINIC VISIT: HCPCS | Performed by: STUDENT IN AN ORGANIZED HEALTH CARE EDUCATION/TRAINING PROGRAM

## 2024-05-08 PROCEDURE — 99204 OFFICE O/P NEW MOD 45 MIN: CPT | Performed by: STUDENT IN AN ORGANIZED HEALTH CARE EDUCATION/TRAINING PROGRAM

## 2024-05-08 PROCEDURE — NC001 PR NO CHARGE

## 2024-05-08 NOTE — ASSESSMENT & PLAN NOTE
Goals of Care / Advanced Care Planning  -Advanced Directive Counseling Given   -Patient declined ACP directive   -Health Care Proxy/Agent/ Power of  :       Psychosocial   Supportive listening provided  Normalized experience of patient/family  Provided anxiety containment     Referrals Placed / Medical Equipment Ordered  -    Follow-Up Recommendations  -Follow-up with PCP and current medical specialists  -Follow-up with palliative care:     Medication safety issues - no driving under the influence of narcotics (including opioids), watch for adverse effects including AMS or respiratory depression (slowed breathing), keep medications stored in a safe/locked environment, do not use alcohol while opioids or other narcotics are in one's system.

## 2024-05-08 NOTE — PATIENT INSTRUCTIONS
It was a pleasure speaking with you today.    As discussed:  -Continue your medications as prescribed.  -Keep your appointments with your PCP and specialists.    Follow-up in: 4 weeks or sooner as needed    Please do not hesitate to call me sooner should you have any questions/concerns or needs.

## 2024-05-08 NOTE — ASSESSMENT & PLAN NOTE
Currently on Eliquis 5mg BID for management.  Following Pulmonology and Oncology.  Patient has oxygen tank available. States he has needed anywhere from 2-3 L at times especially after periods of physical exertion.

## 2024-05-08 NOTE — ASSESSMENT & PLAN NOTE
Following Pulmonology with Dr. Little.  Patient previously a tobacco smoker.  Smoked for 35-40 years at 1.5 ppd. He has quit since April 2024.    Does not use alcohol products.

## 2024-05-08 NOTE — PROGRESS NOTES
Outpatient Consultation - Palliative and Supportive Care   Papo Gibbs 54 y.o. male [id number]    Assessment & Plan  Problem List Items Addressed This Visit       Type 2 diabetes mellitus (HCC)       Lab Results   Component Value Date    HGBA1C 7.1 (H) 04/20/2024     Managed by his PCP and currently on Metformin and Glimepiride.  Glucerna protein shakes provided in office today.  Counseling on weight maintenance provided today and importance of maintaining adequate PO intake.         Adenocarcinoma of overlapping sites of right lung (HCC) - Primary     Medical team:  PCP: Dr Bacon  Oncology: Dr Sandoval  Radiation oncology  Pulmonology: Dr Little    Cancer treatment planned: Pembrolizumab, pemetrexed, carboplatin q. 21 days    Symptom management:  Patient has had occasional discomfort in his mid-chest that has been ongoing since his hospitalization for PE. He was started on Eliquis for management. He does have dyspnea with physical exertion but does use his portable oxygen that helps. He takes Tylenol only as needed and does not feel he requires escalation of medications as he does not feel the discomfort is of much concern at the moment.    ER precautions provided for red flags symptoms not limited to fevers, chills, acute chest pain, shortness of breath, intractable pain, acute swelling/pain/warmth of extremities (for counseling regarding DVTs).    Nutrition:  Patient open to establish with Oncology Nutrition for recommendations and guidance as he prepares for systemic cancer therapy and potentially radiation treatments.  -Glucerna protein shakes provided today as patient is diabetic.         Relevant Orders    Ambulatory Referral to Nutrition Services for Oncology    Chronic hypoxemic respiratory failure (HCC)     Following Pulmonology with Dr. Little.  Patient previously a tobacco smoker.  Smoked for 35-40 years at 1.5 ppd. He has quit since April 2024.    Does not use alcohol products.         Restrictive  lung disease     Following Pulmonology with Dr. Little.         Palliative care encounter     Goals of Care / Advanced Care Planning  -Advanced Directive Counseling Given   -Patient declined ACP directive   -Health Care Proxy/Agent/ Power of  :       Psychosocial   Supportive listening provided  Normalized experience of patient/family  Provided anxiety containment     Referrals Placed / Medical Equipment Ordered  -    Follow-Up Recommendations  -Follow-up with PCP and current medical specialists  -Follow-up with palliative care:     Medication safety issues - no driving under the influence of narcotics (including opioids), watch for adverse effects including AMS or respiratory depression (slowed breathing), keep medications stored in a safe/locked environment, do not use alcohol while opioids or other narcotics are in one's system.          Relevant Orders    Ambulatory Referral to Nutrition Services for Oncology    History of pulmonary embolism     Currently on Eliquis 5mg BID for management.  Following Pulmonology and Oncology.  Patient has oxygen tank available. States he has needed anywhere from 2-3 L at times especially after periods of physical exertion.                 Medications adjusted this encounter:  Requested Prescriptions      No prescriptions requested or ordered in this encounter     Orders Placed This Encounter   Procedures    Ambulatory Referral to Nutrition Services for Oncology     There are no discontinued medications.      Papo Gibbs was seen today for symptoms and planning cares related to above illnesses.  Above orders were sent electronically, or provided in hardcopy in clinic.  I have reviewed the patient's controlled substance dispensing history in the Prescription Drug Monitoring Program in compliance with the [employer] regulations before prescribing any controlled substances.   We appreciate the referral, and wish for him to continue to follow with us.  If there are questions or  concerns, please contact us through our clinic/answering service 24 hours a day, seven days a week.      Visit Information    Accompanied By: No one    Source of History: Self    History Limitations: None    Contacts:Caregiver Information: Name: S/O Tamera Randolph    Chief Complaint  Chief Complaint   Patient presents with    Consult       History of Present Illness    Papo Gibbs is a 54 y.o. male who presents as a referral from Dr. Sandoval of Medical Oncology for primary palliative diagnosis of adenocarcinoma of right lung.  Consultation is requested for: symptom management, goal of care assessment and decisional support, advance care planning.    Past medical history of hypertension, type 2 diabetes, restrictive lung disease, chronic respiratory failure, tobacco dependence, recent history of PE on anticoagulation with Eliquis 5 mg twice daily.     Patient's current diagnosis of adenocarcinoma of the right lung initially diagnosed on 5/2/2024.  Stage IV (cN3, cM1). Current cancer treatment planned: Pembrolizumab, pemetrexed, carboplatin q. 21 days.     Patient is here alone for his appointment today. He is alert, oriented, and pleasantly conversant. We discussed Palliative Care services in terms of symptom management and goals of care/ACP determination. Patient does not have any concerning symptoms at this time including no pain, nausea, vomiting, or other issues requiring attention today. He does have dyspnea with exertion but this has since been due to his PE but is now on Eliquis. He does use supplemental oxygen when he needs it, but did not have it on while he was in the office. He is following with Oncology and Pulmonology with plans to establish with Radiation Oncology for potential radiation to his brain.  Patient is tolerating his normal level of PO intake without concerns but is open to establishing with Oncology Nutrition in anticipation of his systemic cancer therapy and being a diabetic.   We reviewed ACP  options and patient would like to re-visit this at another time. He has his significant other (not legally ) Tomasa who is his main source of support. He does not have children. He has his parents along with his siblings, however, he has not told them yet of his diagnosis. He plans to speak with them soon regarding his medical diagnosis. We did review PA  and we will revisit HCA designation as he is not ready yet for this. He does not have a living will or POA paperwork.    I will plan to follow-up with him in the coming weeks after he starts systemic cancer treatment to follow-up for any symptoms he may be experiencing.     LN Biopsy 4/23/2024  A. Lymph Node, Left retroperitoneal, Biopsy:  - Metastatic adenocarcinoma of lung primary.     MRI Brain 5/4/2024  4 mm metastasis in the posterior right frontal lobe without significant edema. No other definite enhancing lesions but evaluation is limited due to motion artifact.  Tiny focus of restricted diffusion in the left frontal lobe without definite enhancement could represent acute/early subacute infarct but recommend 2-month follow-up as additional metastasis is not excluded.    CT AP 4/20/2024  1. There is a 2.1 x 1.8 x 2.6 cm left adrenal nodule. This is new compared to 2022. This nodule remains indeterminate and does not have enhancement features to suggest an adenoma. Suggest continued surveillance given above adenopathy.  Adrenal recommendation based on institutional consensus and Journal of American College of Radiology 2017;14:6741-9637.  2. Abdominal and thoracic lymphadenopathy with representative lymph nodes as described above.  3. Pulmonary emboli remain visualized.  4. Right middle lobe consolidation, suspicious for pneumonia, grossly similar in appearance when compared to prior study from 4/19/2024      Pertinent Palliative Care Domains    Physical Symptoms:ambulates    Psychological Symptoms:good insight, coping well    Social Aspects:  support system includes significant other (Tomasa) and parents/siblings (who are not aware of diagnosis yet but patient will plan on discussing with them)    Spiritual Aspects: to re-visit    Advance Directive and Living Will:    No  Power of :  No  POLST:  No    Historical Data  Past Medical History:   Diagnosis Date    Cancer (HCC) ,    Diabetes mellitus (HCC)     High cholesterol     History of blood clots     Hypertension     Leucocytosis 2024    Lung cancer (HCC)     Pneumonia     Pulmonary embolism (HCC)      Past Surgical History:   Procedure Laterality Date    IR BIOPSY LYMPH NODE  2024    KNEE SURGERY      MANDIBLE FRACTURE SURGERY  1985    PATELLA FRACTURE SURGERY      RECTAL SURGERY       Social History     Socioeconomic History    Marital status: Single     Spouse name: Not on file    Number of children: Not on file    Years of education: Not on file    Highest education level: Not on file   Occupational History    Not on file   Tobacco Use    Smoking status: Former     Current packs/day: 0.00     Average packs/day: 1.5 packs/day for 35.0 years (52.5 ttl pk-yrs)     Types: Cigarettes     Quit date: 4/15/2024     Years since quittin.0     Passive exposure: Past    Smokeless tobacco: Never   Vaping Use    Vaping status: Never Used   Substance and Sexual Activity    Alcohol use: Not Currently    Drug use: Never    Sexual activity: Yes     Partners: Female     Birth control/protection: Post-menopausal, None   Other Topics Concern    Not on file   Social History Narrative    Not on file     Social Determinants of Health     Financial Resource Strain: Not on file   Food Insecurity: Not on file   Transportation Needs: Not on file   Physical Activity: Not on file   Stress: Not on file   Social Connections: Not on file   Intimate Partner Violence: Not on file   Housing Stability: Not on file     Family History   Problem Relation Age of Onset    No Known Problems Father     No Known  Problems Mother      No Known Allergies  Current Outpatient Medications   Medication Sig Dispense Refill    acetaminophen (TYLENOL) 325 mg tablet Take 3 tablets (975 mg total) by mouth every 8 (eight) hours 30 tablet 0    amitriptyline (ELAVIL) 100 mg tablet 200 mg daily at bedtime      apixaban (Eliquis) 5 mg Take 1 tablet (5 mg total) by mouth 2 (two) times a day Do not start before May 1, 2024. 60 tablet 5    atorvastatin (LIPITOR) 10 mg tablet Take 1 tablet (10 mg total) by mouth daily 90 tablet 3    famotidine (PEPCID) 40 MG tablet Take 40 mg by mouth daily as needed for heartburn or indigestion      folic acid ( Folic Acid) 1 mg tablet Take 1 tablet (1 mg total) by mouth daily 90 tablet 2    glimepiride (AMARYL) 4 mg tablet Take 1 tablet (4 mg total) by mouth daily with breakfast 90 tablet 1    Lancets (OneTouch Delica Plus [id number]) MISC TEST ONE TIME DAILY      lidocaine-prilocaine (EMLA) cream Apply topically as needed for mild pain 50 g 1    losartan (COZAAR) 25 mg tablet Take 25 mg by mouth daily      metFORMIN (GLUCOPHAGE) 500 mg tablet Take 1,000 mg by mouth 2 (two) times a day with meals      ondansetron (ZOFRAN-ODT) 8 mg disintegrating tablet Take 1 tablet (8 mg total) by mouth every 8 (eight) hours as needed for vomiting or nausea 20 tablet 1    OneTouch Verio test strip in the morning       No current facility-administered medications for this visit.       Review of Systems   Constitutional: Negative for chills, decreased appetite, fever and malaise/fatigue.   Cardiovascular:  Negative for chest pain and leg swelling.   Respiratory:  Negative for cough and shortness of breath (no dyspnea at time of visit, but does endorse dyspnea with physical exertion. Does use portable oxygen when he needs it which helps.).    Musculoskeletal:  Negative for myalgias.   Gastrointestinal:  Negative for abdominal pain, constipation, diarrhea, dysphagia, nausea and vomiting.   Genitourinary:  Negative for dysuria.  "  Neurological:  Negative for headaches, light-headedness and numbness.   Psychiatric/Behavioral:  The patient does not have insomnia.    All other systems reviewed and are negative.        Vital Signs    /60 (BP Location: Left arm, Patient Position: Sitting, Cuff Size: Standard)   Pulse 101   Temp (!) 97 °F (36.1 °C) (Temporal)   Ht 5' 7\" (1.702 m)   Wt 93.7 kg (206 lb 8 oz)   SpO2 90%   BMI 32.34 kg/m²     Physical Exam and Objective Data  Physical Exam  Vitals reviewed.   Constitutional:       General: He is not in acute distress.     Appearance: He is well-developed. He is not ill-appearing, toxic-appearing or diaphoretic.   HENT:      Head: Normocephalic and atraumatic.      Nose: Nose normal.      Mouth/Throat:      Mouth: Mucous membranes are moist.   Eyes:      General:         Right eye: No discharge.         Left eye: No discharge.   Cardiovascular:      Rate and Rhythm: Normal rate.   Pulmonary:      Effort: Pulmonary effort is normal. No respiratory distress.   Abdominal:      General: Abdomen is flat. There is no distension.   Musculoskeletal:         General: No swelling, tenderness or signs of injury.      Right lower leg: No edema.      Left lower leg: No edema.   Skin:     General: Skin is warm and dry.      Capillary Refill: Capillary refill takes less than 2 seconds.      Coloration: Skin is not jaundiced or pale.   Neurological:      General: No focal deficit present.      Mental Status: He is alert and oriented to person, place, and time.      Gait: Gait normal.   Psychiatric:         Mood and Affect: Mood normal.         Behavior: Behavior normal.         Thought Content: Thought content normal.         Judgment: Judgment normal.           Radiology and Laboratory:  I personally reviewed and interpreted the following results: labs (4/24/2024 - Cr 0.64, ), images, specialist notes (Dr. Sandoval 5/6/2024 and Dr. Little 5/7/2024)    45 minutes were spent in this ambulatory visit " "with greater than 50% of the time spent face to face with patient in counseling or coordination of care. A description of the counseling / coordination of care: symptom assessment and management, medication review, psychosocial support, chart review, imaging review, lab review, advanced directives, supportive listening, and anticipatory guidance.. All of the patient's questions were answered during this discussion.    Note Shared.    Rikki Estrella,   Palliative & Supportive Care  Clinic/Answering Service: [phone]  You can find me on [email or web address].    Portions of this document may have been created using dictation software and as such some \"sound alike\" terms may have been generated by the system. Do not hesitate to contact me with any questions or clarifications.    "

## 2024-05-08 NOTE — ASSESSMENT & PLAN NOTE
Medical team:  PCP: Dr Bacon  Oncology: Dr Sandoval  Radiation oncology  Pulmonology: Dr Little    Cancer treatment planned: Pembrolizumab, pemetrexed, carboplatin q. 21 days    Symptom management:  Patient has had occasional discomfort in his mid-chest that has been ongoing since his hospitalization for PE. He was started on Eliquis for management. He does have dyspnea with physical exertion but does use his portable oxygen that helps. He takes Tylenol only as needed and does not feel he requires escalation of medications as he does not feel the discomfort is of much concern at the moment.    ER precautions provided for red flags symptoms not limited to fevers, chills, acute chest pain, shortness of breath, intractable pain, acute swelling/pain/warmth of extremities (for counseling regarding DVTs).    Nutrition:  Patient open to establish with Oncology Nutrition for recommendations and guidance as he prepares for systemic cancer therapy and potentially radiation treatments.  -Glucerna protein shakes provided today as patient is diabetic.

## 2024-05-08 NOTE — PROGRESS NOTES
NEURO ONCOLOGY MULTIDISCIPLINARY CASE CONFERENCE    DATE: 5/8/2024  PRESENTING DOCTOR: Dr Maryana Sandoval    DIAGNOSIS: Stage IV adenocarcinoma of overlapping sites of right lung        Papo Gibbs is a 54 y.o. male who was presented at Neuro Oncology Case Review today. Newly diagnosed metastatic adenocarcinoma of the lung. He presented to ED 4/19/24 with progressive SOB, dry cough and left sided chest/rib pain. Workup revealed multifocal bilateral peripheral pulmonary embolism, abdominal and thoracic lymphadenopathy, and incidental finding of nonspecific 2.6 cm left adrenal nodule. Biopsy 4/23/24 left retroperitoneal lymph node revealed metastatic adenocarcinoma of lung primary. Patient to initiate systemic therapy with Carbo-Alimta-Keytruda. Patient was presented today to review recent MRI brain findings.     PHYSICIAN RECOMMENDED PLAN:   - Short interval MRI brain follow up in 6-8 weeks    Upcoming Oncology Appointments:   5/8/2024 Palliative care consult, Dr Estrella  5/8/2024 Radiation oncology consult, Dr Paulo Alcala  6/3/2024 Hematology oncology, Dr Sandoval      Imaging Reviewed:   5/7/2024 MRI brain      Team agreed to plan.    NCCN guidelines were readily available for review at this discussion.    The final treatment plan will be left at the discretion of the patient and the treating physician.     DISCLAIMERS:  TO THE TREATING PHYSICIAN:  This conference is a meeting of clinicians from various specialty areas who evaluate and discuss patients for whom a multidisciplinary treatment approach is being considered. Please note that the above opinion was a consensus of the conference attendees and is intended only to assist in quality care of the discussed patient.  The responsibility for follow up on the input given during the conference, along with any final decisions regarding plan of care, is that of the patient and the patient's provider. Accordingly, appointments  have only been recommended based on this information; and have NOT been scheduled unless otherwise noted.      TO THE PATIENT:  This summary is a brief record of major aspects of your cancer treatment. You may choose to can share a your copy with any of your doctors or nurses. However, this is not a detailed or comprehensive record of your care.

## 2024-05-08 NOTE — PROGRESS NOTES
Consultation - Radiation Oncology      MRN:[id number] : 1970  Encounter: [id number]  Patient Information: Papo Gibbs      CHIEF COMPLAINT  Chief Complaint   Patient presents with    Consult     Radiation Oncology     Cancer Staging   Adenocarcinoma of overlapping sites of right lung (HCC)  Staging form: Lung, AJCC 8th Edition  - Clinical: Stage IV (cN3, cM1) - Signed by Maryana Sandoval MD on 2024    Assessment and Plan:   Mr. Papo Gibbs is a 54 year old man with newly diagnosed Stage IV adenocarcinoma of the lung pending initiation of systemic therapy has been found to have a 4 mm right frontal brain metastasis on screening MRI brain.  He is seen today for consideration of RT options.    We reviewed the patient's recent clinical history, imaging studies, and pathology report in detail.  These were additionally reviewed at neuro-oncology tumor board with neurosurgery, medical oncology, and neuroradiology present.  On my review, he has a small right frontal enhancing lesion consistent with metastasis.  There is also an area of diffusion signal in the left frontal lobe without corresponding T1 hyperenhancement.  Options reviewed include upfront SRS versus initiation of systemic therapy with short interval follow-up thereafter.  As the patient will be receiving systemic therapy with good intracranial penetration (pembro) and his metastasis is quite small and asymptomatic, I would favor upfront observation with short interval repeat MRI brain.  We reviewed the advantages and disadvantages of this approach as compared to upfront stereotactic treatment.  The patient was in agreement with this plan.    I will plan for repeat MRI brain in approximately 6 weeks with return to clinic thereafter.  I remain available in the interim should any questions or concerns arise.    Summary:   - Plan for repeat MRI Brain in 6 weeks with RTC thereafter.        History of Present Illness   Mr. Papo Gibbs is a 54  year old man with newly diagnosed Stage IV adenocarcinoma of the right lung (TxN3M1) found after CTA Chest (4/19/2024) performed in the context of shortness of breath demonstrate pathologically enlarged mediastinal, hilar, para-aortic, retrocrural, and upper abdominal lymph nodes.  CT-guided biopsy of a left para-aortic lymph node (4/23/2024) demonstrated metastatic adenocarcinoma of lung primary.  MRI brain (5/4/2024) performed for lung cancer staging demonstrated a 4 mm enhancing lesion in the posterior right frontal lobe suspicious for metastasis as well as a tiny focus of restricted diffusion in the left frontal lobe (recent infarct versus additional metastasis).  He was referred to our office for consideration of treatment options.    Currently the patient is doing well overall.  He has no headaches, nausea, weakness, numbness, or other neurologic symptoms.  He does have some cough and shortness of breath with exertion as well as moderate fatigue.  He is pending start of the chemoimmunotherapy with plan for carboplatin/pemetrexed/pembrolizumab under the care of Dr. Sandoval.    Historical Information   Oncology History   Adenocarcinoma of overlapping sites of right lung (HCC)   4/23/2024 Biopsy    Final Diagnosis   A. Lymph Node, Left retroperitoneal, Biopsy:  - Metastatic adenocarcinoma of lung primary.         5/2/2024 Initial Diagnosis    Adenocarcinoma of overlapping sites of right lung (HCC)     5/2/2024 -  Cancer Staged    Staging form: Lung, AJCC 8th Edition  - Clinical: Stage IV (cN3, cM1) - Signed by Maryana Sandoval MD on 5/2/2024 5/14/2024 -  Chemotherapy    cyanocobalamin, 1,000 mcg, Intramuscular, Once, 0 of 3 cycles  alteplase (CATHFLO), 2 mg, Intracatheter, Every 1 Minute as needed, 0 of 6 cycles  fosaprepitant (EMEND) IVPB, 150 mg, Intravenous, Once, 0 of 6 cycles  CARBOplatin (PARAPLATIN) IVPB (GOG AUC DOSING), , Intravenous, Once, 0 of 6 cycles  pemetrexed (ALIMTA) chemo infusion, 500  mg/m2, Intravenous, Once, 0 of 6 cycles  pembrolizumab (KEYTRUDA) IVPB, 200 mg, Intravenous, Once, 0 of 6 cycles           Past Medical History:   Diagnosis Date    Cancer (HCC)     Diabetes mellitus (HCC)     High cholesterol     History of blood clots     Hypertension     Leucocytosis 2024    Lung cancer (HCC)     Pneumonia     Pulmonary embolism (HCC)      Past Surgical History:   Procedure Laterality Date    IR BIOPSY LYMPH NODE  2024    KNEE SURGERY      MANDIBLE FRACTURE SURGERY  1985    PATELLA FRACTURE SURGERY      RECTAL SURGERY         Family History   Problem Relation Age of Onset    No Known Problems Father     No Known Problems Mother        Social History   Social History     Substance and Sexual Activity   Alcohol Use Not Currently     Social History     Substance and Sexual Activity   Drug Use Never     Social History     Tobacco Use   Smoking Status Former    Current packs/day: 0.00    Average packs/day: 1.5 packs/day for 35.0 years (52.5 ttl pk-yrs)    Types: Cigarettes    Quit date: 4/15/2024    Years since quittin.0    Passive exposure: Past   Smokeless Tobacco Never         Meds/Allergies     Current Outpatient Medications:     acetaminophen (TYLENOL) 325 mg tablet, Take 3 tablets (975 mg total) by mouth every 8 (eight) hours, Disp: 30 tablet, Rfl: 0    amitriptyline (ELAVIL) 100 mg tablet, 200 mg daily at bedtime, Disp: , Rfl:     apixaban (Eliquis) 5 mg, Take 1 tablet (5 mg total) by mouth 2 (two) times a day Do not start before May 1, 2024., Disp: 60 tablet, Rfl: 5    atorvastatin (LIPITOR) 10 mg tablet, Take 1 tablet (10 mg total) by mouth daily, Disp: 90 tablet, Rfl: 3    famotidine (PEPCID) 40 MG tablet, Take 40 mg by mouth daily as needed for heartburn or indigestion, Disp: , Rfl:     folic acid (KP Folic Acid) 1 mg tablet, Take 1 tablet (1 mg total) by mouth daily, Disp: 90 tablet, Rfl: 2    glimepiride (AMARYL) 4 mg tablet, Take 1 tablet (4 mg total) by mouth  daily with breakfast, Disp: 90 tablet, Rfl: 1    Lancets (OneTouch Delica Plus Ooglyj79M) MISC, TEST ONE TIME DAILY, Disp: , Rfl:     lidocaine-prilocaine (EMLA) cream, Apply topically as needed for mild pain, Disp: 50 g, Rfl: 1    losartan (COZAAR) 25 mg tablet, Take 25 mg by mouth daily, Disp: , Rfl:     metFORMIN (GLUCOPHAGE) 500 mg tablet, Take 1,000 mg by mouth 2 (two) times a day with meals, Disp: , Rfl:     ondansetron (ZOFRAN-ODT) 8 mg disintegrating tablet, Take 1 tablet (8 mg total) by mouth every 8 (eight) hours as needed for vomiting or nausea, Disp: 20 tablet, Rfl: 1    OneTouch Verio test strip, in the morning, Disp: , Rfl:   No Known Allergies    Review of Systems   Constitutional:  Positive for fatigue (Moderate).   HENT: Negative.     Eyes: Negative.    Respiratory:  Positive for cough (Daily, dry) and shortness of breath (with exertion).    Cardiovascular: Negative.    Gastrointestinal: Negative.    Endocrine: Negative.    Genitourinary: Negative.    Musculoskeletal: Negative.    Skin: Negative.    Allergic/Immunologic: Negative.    Neurological: Negative.    Hematological: Negative.    Psychiatric/Behavioral: Negative.          OBJECTIVE:   /68 (BP Location: Left arm, Patient Position: Sitting, Cuff Size: Standard)   Pulse 95   Temp 97.7 °F (36.5 °C) (Temporal)   Resp 18   SpO2 92%   Pain Assessment:  0  Performance Status: ECOG/Zubrod/WHO: 1 - Symptomatic but completely ambulatory    Physical Exam   Well-appearing.  NAD.  No increased work of breathing.  Extremities warm well-perfused.  No overt neurologic deficits noted.  Alert and well-oriented.  Normal ambulation.      RESULTS  Lab Results    Chemistry        Component Value Date/Time    K 4.1 04/24/2024 0452     04/24/2024 0452    CO2 25 04/24/2024 0452    BUN 18 04/24/2024 0452    CREATININE 0.64 04/24/2024 0452        Component Value Date/Time    CALCIUM 9.8 04/24/2024 0452    ALKPHOS 71 04/19/2024 2039    AST 13 04/19/2024  2039    ALT 20 04/19/2024 2039            Lab Results   Component Value Date    WBC 8.71 04/24/2024    HGB 12.1 04/24/2024    HCT 39.4 04/24/2024    MCV 80 (L) 04/24/2024     04/24/2024         Imaging Studies  POCT spirometry    Result Date: 5/7/2024  Impression: Restrictive lung disease with an FEV1 of 41% predicted    MRI brain w wo contrast    Result Date: 5/6/2024  Narrative: MRI BRAIN WITH AND WITHOUT CONTRAST INDICATION: C34.90: Malignant neoplasm of unspecified part of unspecified bronchus or lung. COMPARISON:  None. TECHNIQUE: Multiplanar, multisequence imaging of the brain was performed before and after gadolinium administration. IV Contrast:  9.2 mL of Gadobutrol injection (SINGLE-DOSE) IMAGE QUALITY:   Diagnostic. FINDINGS: BRAIN PARENCHYMA: There is the 4 mm enhancing lesion in the posterior right frontal lobe on image 23 series 10 suspicious for metastasis. No significant edema. No other definite enhancing lesions but evaluation is limited due to motion artifact on postcontrast images. There is tiny focus of restricted diffusion in the left frontal lobe that could represent recent infarct but additional metastasis is not excluded and close interval follow-up is recommended. Tiny linear focus of mild DWI signal in the right caudate head adjacent to chronic cystic lacunar infarct. No acute hemorrhage, mass effect, shift or herniation. Small scattered foci of T2/FLAIR hyperintensity in the periventricular and subcortical matter due to mild chronic microangiopathy. VENTRICLES:  Normal for the patient's age. SELLA AND PITUITARY GLAND:  Normal. ORBITS:  Normal. PARANASAL SINUSES:  Normal. VASCULATURE:  Evaluation of the major intracranial vasculature demonstrates appropriate flow voids. CALVARIUM AND SKULL BASE:  Normal. EXTRACRANIAL SOFT TISSUES:  Normal.     Impression: 4 mm metastasis in the posterior right frontal lobe without significant edema. No other definite enhancing lesions but evaluation  is limited due to motion artifact. Tiny focus of restricted diffusion in the left frontal lobe without definite enhancement could represent acute/early subacute infarct but recommend 2-month follow-up as additional metastasis is not excluded. The study was marked in EPIC for immediate notification. Workstation performed: [id number]     IR biopsy lymph node    Result Date: 4/24/2024  Narrative: CT-scan guided needle biopsy of left para-aortic lymph node PROCEDURAL PERSONNEL: Attending physician(s): Vamsi Carter MD Resident physician(s): None Advanced practice provider(s): None INDICATION:  lymphadenopathy. COMPLICATIONS: No immediate complications. ESTIMATED BLOOD LOSS (mL): Less than 10 CONTRAST: Contrast agent: None Contrast volume (mL): 0 RADIATION DOSE: Reference air kerma: 968.16 mGy-cm ANESTHESIA/SEDATION: Level of anesthesia/sedation: Moderate sedation (conscious sedation) Anesthesia/sedation administered by: Independent trained observer under attending supervision with continuous monitoring of the patient's level of consciousness and physiologic status Total intra-service sedation time (minutes): 30 DESCRIPTION OF PROCEDURE: Informed consent for the procedure including risks, benefits and alternatives was obtained and time-out was performed prior to the procedure. The patient was brought to the CT scanner and placed prone on the table. After axial images were obtained through the region of interest, the site was marked, prepared and draped using all elements of maximal sterile barrier technique including sterile gloves, sterile gown, cap, mask, large sterile sheet, sterile ultrasound probe cover, hand hygiene and cutaneous antisepsis with 2% chlorhexidine. This examination, like all CT scans performed in the [facility], was performed utilizing techniques to minimize radiation dose exposure, including the use of iterative reconstruction and automated exposure control. Lidocaine was  administered to the skin and a small skin incision was made. A 17-gauge cannula was advanced up to the lesion, and through this, an 18-gauge core biopsy specimen was obtained. At the end of the procedure, D-Stat was used for hemostasis through the cannula as it was removed. The patient tolerated the procedure well and suffered no complication. FINDINGS: 1.  Planning CT abdomen redemonstrated confluence of multiple left-sided para-aortic lymph nodes. 2.  Interval CT demonstrated needle tip at the lymph nodes. 3.  Completion CT showed no immediate postprocedure hematoma in this region.     Impression: Successful percutaneous core biopsy of left para-aortic lymph node. Workstation performed: [id number]     CT abdomen pelvis w wo contrast    Result Date: 4/22/2024  Narrative: CT ABDOMEN AND PELVIS WITH AND WITHOUT IV CONTRAST INDICATION: Abnormal CT chest concerning for lymphadenopathy. R/o malignancy/staging. COMPARISON: CTA chest 4/19/2024; CT abdomen pelvis without contrast 1/26/2022 TECHNIQUE: CT examination of the abdomen and pelvis was performed. Multiplanar 2D reformatted images were created from the source data. 15-minute delayed images of the adrenal glands were obtained to assess adrenal nodule. This examination, like all CT scans performed in the [facility], was performed utilizing techniques to minimize radiation dose exposure, including the use of iterative reconstruction and automated exposure control. Radiation dose length product (DLP) for this visit: 1681 mGy-cm IV Contrast: 90 mL of iohexol (OMNIPAQUE) Enteric Contrast: Not administered. FINDINGS: ABDOMEN LOWER CHEST: Right middle lobe consolidation, grossly similar in appearance when compared to prior study from 4/19/2024. Trace left pleural effusion. Coronary artery calcifications. Pulmonary emboli remain visible, allowing for differences in technique and contrast timing. LIVER/BILIARY TREE: Unremarkable. GALLBLADDER: No calcified  gallstones. No pericholecystic inflammatory change. SPLEEN: Unremarkable. PANCREAS: Unremarkable. ADRENAL GLANDS: Unremarkable. ADRENAL GLANDS: Adrenal nodule #1 - Location: Left adrenal gland. - Size: 2.1 x 1.8 x 2.6 cm. Previously measured similarly on study dated 4/19/2024, and new since 1/26/2022. - Attenuation: Homogenous. - Macroscopic fat: No. - Precontrast HU: 28 - Parenchymal phase HU: 51 - Delayed 15 min HU: 50 - Relative washout: 4% - Absolute washout: 2% CONCLUSION: Indeterminate. KIDNEYS/URETERS: Oval hypodensity in the upper lateral cortex, probably representing small cyst. No suspicious interval change. Left intrarenal calculus measuring 0.8 x 0.7 cm. No hydronephrosis. STOMACH AND BOWEL: Colonic diverticulosis without findings of acute diverticulitis. APPENDIX: Normal. ABDOMINOPELVIC CAVITY: Abdominal lymphadenopathy, representative lymph nodes as described: - Para-aortic/right retrocrural lymph node measuring 2.2 x 1.8 cm (series 2 image 34). - Left para-aortic lymph node measuring 1.5 cm in short axis (series 2 image 36). - Right para-aortic lymph node measuring 1.6 cm in short axis (series 2 image 43). - Aortocaval lymph node at the level of the renal arteries measuring 1.2 cm in short axis (series 2 image 64). No ascites. No pneumoperitoneum. VESSELS: Atherosclerosis without abdominal aortic aneurysm. PELVIS REPRODUCTIVE ORGANS: Mildly enlarged prostate. URINARY BLADDER: Mildly distended urinary bladder. ABDOMINAL WALL/INGUINAL REGIONS: Unremarkable. BONES: No acute fracture or suspicious osseous lesion. Spinal degenerative changes.     Impression: 1. There is a 2.1 x 1.8 x 2.6 cm left adrenal nodule. This is new compared to 2022. This nodule remains indeterminate and does not have enhancement features to suggest an adenoma. Suggest continued surveillance given above adenopathy. Adrenal recommendation based on institutional consensus and Journal of American College of Radiology 2017;14:3750-1607.  2. Abdominal and thoracic lymphadenopathy with representative lymph nodes as described above. 3. Pulmonary emboli remain visualized. 4. Right middle lobe consolidation, suspicious for pneumonia, grossly similar in appearance when compared to prior study from 4/19/2024 Resident: CASSANDRA Hall I, the attending radiologist, have reviewed the images and agree with the final report above. Workstation performed: [id number]     XR chest portable    Result Date: 4/22/2024  Narrative: XR CHEST PORTABLE INDICATION: chest pain. COMPARISON: 4/19/2024 FINDINGS: Persistent fairly dense right basal infiltrate persistent vascular congestion No pneumothorax or pleural effusion. Normal cardiomediastinal silhouette. Bones are unremarkable for age. Normal upper abdomen.     Impression: Persistent right basal infiltrate and mild vascular congestion Workstation performed: [id number]      VAS VENOUS DUPLEX - LOWER LIMB BILATERAL    Result Date: 4/20/2024  Narrative:  [facility] REPORT CLINICAL: Indications: Patient presents with recent discovery of pulmonary embolism and physician wants to determine potential source.  Patient reports worsening shortness of breath this past week upon arrival to ED . Operative History: Patient denies any cardiovascular surgery Risk Factors The patient has history of HTN, Diabetes (NIDDM (oral meds)) and smoking (current) 0.5 ppd.  FINDINGS:  Right       Impression              Peroneal    Occlusive Subsegmental  Calf Veins  Thrombosed (acute)       Left        Impression              FV Prox     Non Occlusive Thrombus  PostTibial  Occlusive Subsegmental  Peroneal    Occlusive Subsegmental     CONCLUSION: Impression: RIGHT LOWER LIMB: Acute deep vein thrombosis identified in the peroneal & soleal veins at the proximal to mid calf. No evidence of superficial thrombophlebitis noted. Doppler evaluation shows a normal response to augmentation maneuvers.. Popliteal, posterior tibial and anterior tibial  arterial Doppler waveform's are triphasic.  LEFT LOWER LIMB: Acute deep vein thrombosis identified in the proximal femoral vein (extending into the confluence of the common femoral vein), and (1) posterior tibial, and peroneal veins at the proximal to mid calf. No evidence of superficial thrombophlebitis noted. Doppler evaluation shows a normal response to augmentation maneuvers. Popliteal, posterior tibial and anterior tibial arterial Doppler waveform's are triphasic.  Technical findings were given to Jason Turcios MD via [employer] following exam.  SIGNATURE: Electronically Signed by: ALMA LEBLANC MD, RPVI on 2024-04-20 07:12:03 PM    Echo complete w/ contrast if indicated    Result Date: 4/20/2024  Narrative:   Left Ventricle: Left ventricular cavity size is normal. Wall thickness is mildly increased. There is mild concentric hypertrophy. The left ventricular ejection fraction is 60%. Systolic function is normal. Wall motion is normal. Diastolic function is normal.   Right Ventricle: Right ventricular cavity size is borderline dilated. Systolic function is normal.   Aortic Valve: There is mild regurgitation. There is aortic valve sclerosis.The aortic valve peak velocity is 1.95 m/s. The aortic valve area is 2.06 cm2. No echocardiographic evidence of RV dysfunction / strain.  No indirect evidence of pulmonary hypertension.     XR chest 1 view portable    Result Date: 4/20/2024  Narrative: XR CHEST PORTABLE INDICATION: SOB. COMPARISON: None FINDINGS: Right middle lobe pneumonia. Chronic changes. Normal cardiomediastinal silhouette. Bones are unremarkable for age. Normal upper abdomen.     Impression: Right middle lobe pneumonia. Workstation performed: [id number]     CTA ED chest PE study    Result Date: 4/19/2024  Narrative: CTA - CHEST WITH IV CONTRAST - PULMONARY ANGIOGRAM INDICATION: sob. COMPARISON: None. TECHNIQUE: CTA examination of the chest was performed using angiographic technique according to a protocol  specifically tailored to evaluate for pulmonary embolism. Multiplanar 2D reformatted images were created from the source data. In addition, coronal  3D MIP postprocessing was performed on the acquisition scanner. Radiation dose length product (DLP) for this visit: 358 mGy-cm . This examination, like all CT scans performed in the [facility], was performed utilizing techniques to minimize radiation dose exposure, including the use of iterative reconstruction and automated exposure control. IV Contrast: 85 mL of iohexol (OMNIPAQUE) FINDINGS: PULMONARY ARTERIAL TREE: Intraluminal filling defects in multiple lobar, segmental, and subsegmental branches in the right lower lobe, left upper lobe, lingula, and left lower lobe noted with overall moderate clot burden. No central/saddle pulmonary embolism. RV:LV ratio is elevated at 1.1 suggestive of mild right heart strain. LUNGS: Central airways are patent. There is no tracheal or endobronchial lesion. Interlobular septal thickening noted bilaterally suggestive of interstitial pulmonary edema. Extensive right upper lobe and right middle lobe perihilar airspace opacities are seen. Additional subtle perihilar opacities in the right lower lobe also noted. Overall findings suggest multifocal infection of the right lung. PLEURA: No pneumothorax. Trace bilateral pleural effusions with basal atelectasis. HEART/GREAT VESSELS: Heart is unremarkable for patient's age. No pericardial effusion. No thoracic aortic aneurysm or dissection. Mild calcific atherosclerosis of the aortic arch and coronary arteries noted. The visualized proximal thoracic great vessels  are patent with normal course and caliber.. MEDIASTINUM AND JEREMIAH: Multiple mediastinal and hilar pathologically enlarged lymph nodes are seen measuring up to 2.3 x 1.5 cm. The visualized thyroid glands are unremarkable. Esophagus is normal in course and caliber. No significant prevertebral soft tissue swelling.  Multiple prominent para-aortic, retrocrural and upper abdominal lymph nodes are seen. CHEST WALL AND LOWER NECK: Unremarkable. VISUALIZED STRUCTURES IN THE UPPER ABDOMEN: No free air or free fluid in the imaged upper abdomen. Mild diffuse hepatic steatosis. 1.9 cm nonspecific left adrenal nodule noted measuring 52 Hounsfield units by density measurement. Recommend 1 year follow-up adrenal washout CT per current guidelines. Multiple nonspecific pathologically enlarged para-aortic, retrocrural, and upper abdominal lymph nodes are seen measuring up to 2.2 x 1.7 cm. OSSEOUS STRUCTURES: No acute fracture or destructive osseous lesion. Mild multilevel degenerative changes of the thoracic spine.     Impression: 1.  Acute moderate clot burden multifocal bilateral peripheral pulmonary embolism. RV:LV ratio is elevated at 1.1 suggestive of mild right heart strain. 2.  No thoracic aortic aneurysm or dissection. Mild calcific atherosclerosis of the aortic arch and coronary arteries noted. 3.  Trace bilateral pleural effusions with bibasilar atelectasis. Interstitial pulmonary edema bilaterally. 4.  Findings suggestive of multifocal pneumonia in the right lung worse in the right middle lobe. Recommend follow-up imaging after adequate therapy to ensure complete resolution and exclude possibility of underlying neoplasm. 5.  Pathologically enlarged mediastinal, hilar, para-aortic, retrocrural, and upper abdominal lymph nodes are seen which may be secondary to a diffuse infectious/inflammatory or neoplastic process including lymphoma. Recommend clinical correlation. 6.  Nonspecific 1.9 cm left adrenal nodule as described above. Recommend adrenal washout CT in 12 months per current guidelines. Findings discussed with RAFFI Anguiano at 10:33 p.m. Workstation performed: [id number]     Pathology: As above.     ASSESSMENT  1. Metastasis to brain (HCC)  MRI Brain BT w wo Contrast      2. Adenocarcinoma of overlapping sites of right lung (HCC)   "Ambulatory referral to Radiation Oncology        Cancer Staging   Adenocarcinoma of overlapping sites of right lung (HCC)  Staging form: Lung, AJCC 8th Edition  - Clinical: Stage IV (cN3, cM1) - Signed by Maryana Sandoval MD on 5/2/2024        PLAN/DISCUSSION  Orders Placed This Encounter   Procedures    MRI Brain BT w wo Contrast          Sergey Alcala MD  5/8/2024,11:05 AM    Total time spent reviewing EMR, seeing patient in clinic visit, documenting visit, placing treatment orders, and communicating with other medical providers on date of visit: 41 minutes.     Portions of the record may have been created with voice recognition software.  Occasional wrong word or \"sound a like\" substitutions may have occurred due to the inherent limitations of voice recognition software.  Read the chart carefully and recognize, using context, where substitutions have occurred.          "

## 2024-05-08 NOTE — H&P (VIEW-ONLY)
Consultation - Radiation Oncology      MRN:[id number] : 1970  Encounter: [id number]  Patient Information: Papo Gibbs      CHIEF COMPLAINT  Chief Complaint   Patient presents with    Consult     Radiation Oncology     Cancer Staging   Adenocarcinoma of overlapping sites of right lung (HCC)  Staging form: Lung, AJCC 8th Edition  - Clinical: Stage IV (cN3, cM1) - Signed by Maryana Sandoval MD on 2024    Assessment and Plan:   Mr. Papo Gibbs is a 54 year old man with newly diagnosed Stage IV adenocarcinoma of the lung pending initiation of systemic therapy has been found to have a 4 mm right frontal brain metastasis on screening MRI brain.  He is seen today for consideration of RT options.    We reviewed the patient's recent clinical history, imaging studies, and pathology report in detail.  These were additionally reviewed at neuro-oncology tumor board with neurosurgery, medical oncology, and neuroradiology present.  On my review, he has a small right frontal enhancing lesion consistent with metastasis.  There is also an area of diffusion signal in the left frontal lobe without corresponding T1 hyperenhancement.  Options reviewed include upfront SRS versus initiation of systemic therapy with short interval follow-up thereafter.  As the patient will be receiving systemic therapy with good intracranial penetration (pembro) and his metastasis is quite small and asymptomatic, I would favor upfront observation with short interval repeat MRI brain.  We reviewed the advantages and disadvantages of this approach as compared to upfront stereotactic treatment.  The patient was in agreement with this plan.    I will plan for repeat MRI brain in approximately 6 weeks with return to clinic thereafter.  I remain available in the interim should any questions or concerns arise.    Summary:   - Plan for repeat MRI Brain in 6 weeks with RTC thereafter.        History of Present Illness   Mr. Papo Gibbs is a 54  year old man with newly diagnosed Stage IV adenocarcinoma of the right lung (TxN3M1) found after CTA Chest (4/19/2024) performed in the context of shortness of breath demonstrate pathologically enlarged mediastinal, hilar, para-aortic, retrocrural, and upper abdominal lymph nodes.  CT-guided biopsy of a left para-aortic lymph node (4/23/2024) demonstrated metastatic adenocarcinoma of lung primary.  MRI brain (5/4/2024) performed for lung cancer staging demonstrated a 4 mm enhancing lesion in the posterior right frontal lobe suspicious for metastasis as well as a tiny focus of restricted diffusion in the left frontal lobe (recent infarct versus additional metastasis).  He was referred to our office for consideration of treatment options.    Currently the patient is doing well overall.  He has no headaches, nausea, weakness, numbness, or other neurologic symptoms.  He does have some cough and shortness of breath with exertion as well as moderate fatigue.  He is pending start of the chemoimmunotherapy with plan for carboplatin/pemetrexed/pembrolizumab under the care of Dr. Sandoval.    Historical Information   Oncology History   Adenocarcinoma of overlapping sites of right lung (HCC)   4/23/2024 Biopsy    Final Diagnosis   A. Lymph Node, Left retroperitoneal, Biopsy:  - Metastatic adenocarcinoma of lung primary.         5/2/2024 Initial Diagnosis    Adenocarcinoma of overlapping sites of right lung (HCC)     5/2/2024 -  Cancer Staged    Staging form: Lung, AJCC 8th Edition  - Clinical: Stage IV (cN3, cM1) - Signed by Maryana Sandoval MD on 5/2/2024 5/14/2024 -  Chemotherapy    cyanocobalamin, 1,000 mcg, Intramuscular, Once, 0 of 3 cycles  alteplase (CATHFLO), 2 mg, Intracatheter, Every 1 Minute as needed, 0 of 6 cycles  fosaprepitant (EMEND) IVPB, 150 mg, Intravenous, Once, 0 of 6 cycles  CARBOplatin (PARAPLATIN) IVPB (GOG AUC DOSING), , Intravenous, Once, 0 of 6 cycles  pemetrexed (ALIMTA) chemo infusion, 500  mg/m2, Intravenous, Once, 0 of 6 cycles  pembrolizumab (KEYTRUDA) IVPB, 200 mg, Intravenous, Once, 0 of 6 cycles           Past Medical History:   Diagnosis Date    Cancer (HCC)     Diabetes mellitus (HCC)     High cholesterol     History of blood clots     Hypertension     Leucocytosis 2024    Lung cancer (HCC)     Pneumonia     Pulmonary embolism (HCC)      Past Surgical History:   Procedure Laterality Date    IR BIOPSY LYMPH NODE  2024    KNEE SURGERY      MANDIBLE FRACTURE SURGERY  1985    PATELLA FRACTURE SURGERY      RECTAL SURGERY         Family History   Problem Relation Age of Onset    No Known Problems Father     No Known Problems Mother        Social History   Social History     Substance and Sexual Activity   Alcohol Use Not Currently     Social History     Substance and Sexual Activity   Drug Use Never     Social History     Tobacco Use   Smoking Status Former    Current packs/day: 0.00    Average packs/day: 1.5 packs/day for 35.0 years (52.5 ttl pk-yrs)    Types: Cigarettes    Quit date: 4/15/2024    Years since quittin.0    Passive exposure: Past   Smokeless Tobacco Never         Meds/Allergies     Current Outpatient Medications:     acetaminophen (TYLENOL) 325 mg tablet, Take 3 tablets (975 mg total) by mouth every 8 (eight) hours, Disp: 30 tablet, Rfl: 0    amitriptyline (ELAVIL) 100 mg tablet, 200 mg daily at bedtime, Disp: , Rfl:     apixaban (Eliquis) 5 mg, Take 1 tablet (5 mg total) by mouth 2 (two) times a day Do not start before May 1, 2024., Disp: 60 tablet, Rfl: 5    atorvastatin (LIPITOR) 10 mg tablet, Take 1 tablet (10 mg total) by mouth daily, Disp: 90 tablet, Rfl: 3    famotidine (PEPCID) 40 MG tablet, Take 40 mg by mouth daily as needed for heartburn or indigestion, Disp: , Rfl:     folic acid (KP Folic Acid) 1 mg tablet, Take 1 tablet (1 mg total) by mouth daily, Disp: 90 tablet, Rfl: 2    glimepiride (AMARYL) 4 mg tablet, Take 1 tablet (4 mg total) by mouth  daily with breakfast, Disp: 90 tablet, Rfl: 1    Lancets (OneTouch Delica Plus Ipizsi18O) MISC, TEST ONE TIME DAILY, Disp: , Rfl:     lidocaine-prilocaine (EMLA) cream, Apply topically as needed for mild pain, Disp: 50 g, Rfl: 1    losartan (COZAAR) 25 mg tablet, Take 25 mg by mouth daily, Disp: , Rfl:     metFORMIN (GLUCOPHAGE) 500 mg tablet, Take 1,000 mg by mouth 2 (two) times a day with meals, Disp: , Rfl:     ondansetron (ZOFRAN-ODT) 8 mg disintegrating tablet, Take 1 tablet (8 mg total) by mouth every 8 (eight) hours as needed for vomiting or nausea, Disp: 20 tablet, Rfl: 1    OneTouch Verio test strip, in the morning, Disp: , Rfl:   No Known Allergies    Review of Systems   Constitutional:  Positive for fatigue (Moderate).   HENT: Negative.     Eyes: Negative.    Respiratory:  Positive for cough (Daily, dry) and shortness of breath (with exertion).    Cardiovascular: Negative.    Gastrointestinal: Negative.    Endocrine: Negative.    Genitourinary: Negative.    Musculoskeletal: Negative.    Skin: Negative.    Allergic/Immunologic: Negative.    Neurological: Negative.    Hematological: Negative.    Psychiatric/Behavioral: Negative.          OBJECTIVE:   /68 (BP Location: Left arm, Patient Position: Sitting, Cuff Size: Standard)   Pulse 95   Temp 97.7 °F (36.5 °C) (Temporal)   Resp 18   SpO2 92%   Pain Assessment:  0  Performance Status: ECOG/Zubrod/WHO: 1 - Symptomatic but completely ambulatory    Physical Exam   Well-appearing.  NAD.  No increased work of breathing.  Extremities warm well-perfused.  No overt neurologic deficits noted.  Alert and well-oriented.  Normal ambulation.      RESULTS  Lab Results    Chemistry        Component Value Date/Time    K 4.1 04/24/2024 0452     04/24/2024 0452    CO2 25 04/24/2024 0452    BUN 18 04/24/2024 0452    CREATININE 0.64 04/24/2024 0452        Component Value Date/Time    CALCIUM 9.8 04/24/2024 0452    ALKPHOS 71 04/19/2024 2039    AST 13 04/19/2024  2039    ALT 20 04/19/2024 2039            Lab Results   Component Value Date    WBC 8.71 04/24/2024    HGB 12.1 04/24/2024    HCT 39.4 04/24/2024    MCV 80 (L) 04/24/2024     04/24/2024         Imaging Studies  POCT spirometry    Result Date: 5/7/2024  Impression: Restrictive lung disease with an FEV1 of 41% predicted    MRI brain w wo contrast    Result Date: 5/6/2024  Narrative: MRI BRAIN WITH AND WITHOUT CONTRAST INDICATION: C34.90: Malignant neoplasm of unspecified part of unspecified bronchus or lung. COMPARISON:  None. TECHNIQUE: Multiplanar, multisequence imaging of the brain was performed before and after gadolinium administration. IV Contrast:  9.2 mL of Gadobutrol injection (SINGLE-DOSE) IMAGE QUALITY:   Diagnostic. FINDINGS: BRAIN PARENCHYMA: There is the 4 mm enhancing lesion in the posterior right frontal lobe on image 23 series 10 suspicious for metastasis. No significant edema. No other definite enhancing lesions but evaluation is limited due to motion artifact on postcontrast images. There is tiny focus of restricted diffusion in the left frontal lobe that could represent recent infarct but additional metastasis is not excluded and close interval follow-up is recommended. Tiny linear focus of mild DWI signal in the right caudate head adjacent to chronic cystic lacunar infarct. No acute hemorrhage, mass effect, shift or herniation. Small scattered foci of T2/FLAIR hyperintensity in the periventricular and subcortical matter due to mild chronic microangiopathy. VENTRICLES:  Normal for the patient's age. SELLA AND PITUITARY GLAND:  Normal. ORBITS:  Normal. PARANASAL SINUSES:  Normal. VASCULATURE:  Evaluation of the major intracranial vasculature demonstrates appropriate flow voids. CALVARIUM AND SKULL BASE:  Normal. EXTRACRANIAL SOFT TISSUES:  Normal.     Impression: 4 mm metastasis in the posterior right frontal lobe without significant edema. No other definite enhancing lesions but evaluation  is limited due to motion artifact. Tiny focus of restricted diffusion in the left frontal lobe without definite enhancement could represent acute/early subacute infarct but recommend 2-month follow-up as additional metastasis is not excluded. The study was marked in EPIC for immediate notification. Workstation performed: [id number]     IR biopsy lymph node    Result Date: 4/24/2024  Narrative: CT-scan guided needle biopsy of left para-aortic lymph node PROCEDURAL PERSONNEL: Attending physician(s): Vamsi Carter MD Resident physician(s): None Advanced practice provider(s): None INDICATION:  lymphadenopathy. COMPLICATIONS: No immediate complications. ESTIMATED BLOOD LOSS (mL): Less than 10 CONTRAST: Contrast agent: None Contrast volume (mL): 0 RADIATION DOSE: Reference air kerma: 968.16 mGy-cm ANESTHESIA/SEDATION: Level of anesthesia/sedation: Moderate sedation (conscious sedation) Anesthesia/sedation administered by: Independent trained observer under attending supervision with continuous monitoring of the patient's level of consciousness and physiologic status Total intra-service sedation time (minutes): 30 DESCRIPTION OF PROCEDURE: Informed consent for the procedure including risks, benefits and alternatives was obtained and time-out was performed prior to the procedure. The patient was brought to the CT scanner and placed prone on the table. After axial images were obtained through the region of interest, the site was marked, prepared and draped using all elements of maximal sterile barrier technique including sterile gloves, sterile gown, cap, mask, large sterile sheet, sterile ultrasound probe cover, hand hygiene and cutaneous antisepsis with 2% chlorhexidine. This examination, like all CT scans performed in the [facility], was performed utilizing techniques to minimize radiation dose exposure, including the use of iterative reconstruction and automated exposure control. Lidocaine was  administered to the skin and a small skin incision was made. A 17-gauge cannula was advanced up to the lesion, and through this, an 18-gauge core biopsy specimen was obtained. At the end of the procedure, D-Stat was used for hemostasis through the cannula as it was removed. The patient tolerated the procedure well and suffered no complication. FINDINGS: 1.  Planning CT abdomen redemonstrated confluence of multiple left-sided para-aortic lymph nodes. 2.  Interval CT demonstrated needle tip at the lymph nodes. 3.  Completion CT showed no immediate postprocedure hematoma in this region.     Impression: Successful percutaneous core biopsy of left para-aortic lymph node. Workstation performed: [id number]     CT abdomen pelvis w wo contrast    Result Date: 4/22/2024  Narrative: CT ABDOMEN AND PELVIS WITH AND WITHOUT IV CONTRAST INDICATION: Abnormal CT chest concerning for lymphadenopathy. R/o malignancy/staging. COMPARISON: CTA chest 4/19/2024; CT abdomen pelvis without contrast 1/26/2022 TECHNIQUE: CT examination of the abdomen and pelvis was performed. Multiplanar 2D reformatted images were created from the source data. 15-minute delayed images of the adrenal glands were obtained to assess adrenal nodule. This examination, like all CT scans performed in the Counts include 234 beds at the [facility], was performed utilizing techniques to minimize radiation dose exposure, including the use of iterative reconstruction and automated exposure control. Radiation dose length product (DLP) for this visit: 1681 mGy-cm IV Contrast: 90 mL of iohexol (OMNIPAQUE) Enteric Contrast: Not administered. FINDINGS: ABDOMEN LOWER CHEST: Right middle lobe consolidation, grossly similar in appearance when compared to prior study from 4/19/2024. Trace left pleural effusion. Coronary artery calcifications. Pulmonary emboli remain visible, allowing for differences in technique and contrast timing. LIVER/BILIARY TREE: Unremarkable. GALLBLADDER: No calcified  gallstones. No pericholecystic inflammatory change. SPLEEN: Unremarkable. PANCREAS: Unremarkable. ADRENAL GLANDS: Unremarkable. ADRENAL GLANDS: Adrenal nodule #1 - Location: Left adrenal gland. - Size: 2.1 x 1.8 x 2.6 cm. Previously measured similarly on study dated 4/19/2024, and new since 1/26/2022. - Attenuation: Homogenous. - Macroscopic fat: No. - Precontrast HU: 28 - Parenchymal phase HU: 51 - Delayed 15 min HU: 50 - Relative washout: 4% - Absolute washout: 2% CONCLUSION: Indeterminate. KIDNEYS/URETERS: Oval hypodensity in the upper lateral cortex, probably representing small cyst. No suspicious interval change. Left intrarenal calculus measuring 0.8 x 0.7 cm. No hydronephrosis. STOMACH AND BOWEL: Colonic diverticulosis without findings of acute diverticulitis. APPENDIX: Normal. ABDOMINOPELVIC CAVITY: Abdominal lymphadenopathy, representative lymph nodes as described: - Para-aortic/right retrocrural lymph node measuring 2.2 x 1.8 cm (series 2 image 34). - Left para-aortic lymph node measuring 1.5 cm in short axis (series 2 image 36). - Right para-aortic lymph node measuring 1.6 cm in short axis (series 2 image 43). - Aortocaval lymph node at the level of the renal arteries measuring 1.2 cm in short axis (series 2 image 64). No ascites. No pneumoperitoneum. VESSELS: Atherosclerosis without abdominal aortic aneurysm. PELVIS REPRODUCTIVE ORGANS: Mildly enlarged prostate. URINARY BLADDER: Mildly distended urinary bladder. ABDOMINAL WALL/INGUINAL REGIONS: Unremarkable. BONES: No acute fracture or suspicious osseous lesion. Spinal degenerative changes.     Impression: 1. There is a 2.1 x 1.8 x 2.6 cm left adrenal nodule. This is new compared to 2022. This nodule remains indeterminate and does not have enhancement features to suggest an adenoma. Suggest continued surveillance given above adenopathy. Adrenal recommendation based on institutional consensus and Journal of American College of Radiology 2017;14:1848-4217.  2. Abdominal and thoracic lymphadenopathy with representative lymph nodes as described above. 3. Pulmonary emboli remain visualized. 4. Right middle lobe consolidation, suspicious for pneumonia, grossly similar in appearance when compared to prior study from 4/19/2024 Resident: CASSANDRA Hall I, the attending radiologist, have reviewed the images and agree with the final report above. Workstation performed: [id number]     XR chest portable    Result Date: 4/22/2024  Narrative: XR CHEST PORTABLE INDICATION: chest pain. COMPARISON: 4/19/2024 FINDINGS: Persistent fairly dense right basal infiltrate persistent vascular congestion No pneumothorax or pleural effusion. Normal cardiomediastinal silhouette. Bones are unremarkable for age. Normal upper abdomen.     Impression: Persistent right basal infiltrate and mild vascular congestion Workstation performed: [id number]      VAS VENOUS DUPLEX - LOWER LIMB BILATERAL    Result Date: 4/20/2024  Narrative:  [facility] REPORT CLINICAL: Indications: Patient presents with recent discovery of pulmonary embolism and physician wants to determine potential source.  Patient reports worsening shortness of breath this past week upon arrival to ED . Operative History: Patient denies any cardiovascular surgery Risk Factors The patient has history of HTN, Diabetes (NIDDM (oral meds)) and smoking (current) 0.5 ppd.  FINDINGS:  Right       Impression              Peroneal    Occlusive Subsegmental  Calf Veins  Thrombosed (acute)       Left        Impression              FV Prox     Non Occlusive Thrombus  PostTibial  Occlusive Subsegmental  Peroneal    Occlusive Subsegmental     CONCLUSION: Impression: RIGHT LOWER LIMB: Acute deep vein thrombosis identified in the peroneal & soleal veins at the proximal to mid calf. No evidence of superficial thrombophlebitis noted. Doppler evaluation shows a normal response to augmentation maneuvers.. Popliteal, posterior tibial and anterior tibial  arterial Doppler waveform's are triphasic.  LEFT LOWER LIMB: Acute deep vein thrombosis identified in the proximal femoral vein (extending into the confluence of the common femoral vein), and (1) posterior tibial, and peroneal veins at the proximal to mid calf. No evidence of superficial thrombophlebitis noted. Doppler evaluation shows a normal response to augmentation maneuvers. Popliteal, posterior tibial and anterior tibial arterial Doppler waveform's are triphasic.  Technical findings were given to Jason Turcios MD via [employer] following exam.  SIGNATURE: Electronically Signed by: ALMA LEBLANC MD, RPVI on 2024-04-20 07:12:03 PM    Echo complete w/ contrast if indicated    Result Date: 4/20/2024  Narrative:   Left Ventricle: Left ventricular cavity size is normal. Wall thickness is mildly increased. There is mild concentric hypertrophy. The left ventricular ejection fraction is 60%. Systolic function is normal. Wall motion is normal. Diastolic function is normal.   Right Ventricle: Right ventricular cavity size is borderline dilated. Systolic function is normal.   Aortic Valve: There is mild regurgitation. There is aortic valve sclerosis.The aortic valve peak velocity is 1.95 m/s. The aortic valve area is 2.06 cm2. No echocardiographic evidence of RV dysfunction / strain.  No indirect evidence of pulmonary hypertension.     XR chest 1 view portable    Result Date: 4/20/2024  Narrative: XR CHEST PORTABLE INDICATION: SOB. COMPARISON: None FINDINGS: Right middle lobe pneumonia. Chronic changes. Normal cardiomediastinal silhouette. Bones are unremarkable for age. Normal upper abdomen.     Impression: Right middle lobe pneumonia. Workstation performed: [id number]     CTA ED chest PE study    Result Date: 4/19/2024  Narrative: CTA - CHEST WITH IV CONTRAST - PULMONARY ANGIOGRAM INDICATION: sob. COMPARISON: None. TECHNIQUE: CTA examination of the chest was performed using angiographic technique according to a protocol  specifically tailored to evaluate for pulmonary embolism. Multiplanar 2D reformatted images were created from the source data. In addition, coronal  3D MIP postprocessing was performed on the acquisition scanner. Radiation dose length product (DLP) for this visit: 358 mGy-cm . This examination, like all CT scans performed in the [facility], was performed utilizing techniques to minimize radiation dose exposure, including the use of iterative reconstruction and automated exposure control. IV Contrast: 85 mL of iohexol (OMNIPAQUE) FINDINGS: PULMONARY ARTERIAL TREE: Intraluminal filling defects in multiple lobar, segmental, and subsegmental branches in the right lower lobe, left upper lobe, lingula, and left lower lobe noted with overall moderate clot burden. No central/saddle pulmonary embolism. RV:LV ratio is elevated at 1.1 suggestive of mild right heart strain. LUNGS: Central airways are patent. There is no tracheal or endobronchial lesion. Interlobular septal thickening noted bilaterally suggestive of interstitial pulmonary edema. Extensive right upper lobe and right middle lobe perihilar airspace opacities are seen. Additional subtle perihilar opacities in the right lower lobe also noted. Overall findings suggest multifocal infection of the right lung. PLEURA: No pneumothorax. Trace bilateral pleural effusions with basal atelectasis. HEART/GREAT VESSELS: Heart is unremarkable for patient's age. No pericardial effusion. No thoracic aortic aneurysm or dissection. Mild calcific atherosclerosis of the aortic arch and coronary arteries noted. The visualized proximal thoracic great vessels  are patent with normal course and caliber.. MEDIASTINUM AND JEREMIAH: Multiple mediastinal and hilar pathologically enlarged lymph nodes are seen measuring up to 2.3 x 1.5 cm. The visualized thyroid glands are unremarkable. Esophagus is normal in course and caliber. No significant prevertebral soft tissue swelling.  Multiple prominent para-aortic, retrocrural and upper abdominal lymph nodes are seen. CHEST WALL AND LOWER NECK: Unremarkable. VISUALIZED STRUCTURES IN THE UPPER ABDOMEN: No free air or free fluid in the imaged upper abdomen. Mild diffuse hepatic steatosis. 1.9 cm nonspecific left adrenal nodule noted measuring 52 Hounsfield units by density measurement. Recommend 1 year follow-up adrenal washout CT per current guidelines. Multiple nonspecific pathologically enlarged para-aortic, retrocrural, and upper abdominal lymph nodes are seen measuring up to 2.2 x 1.7 cm. OSSEOUS STRUCTURES: No acute fracture or destructive osseous lesion. Mild multilevel degenerative changes of the thoracic spine.     Impression: 1.  Acute moderate clot burden multifocal bilateral peripheral pulmonary embolism. RV:LV ratio is elevated at 1.1 suggestive of mild right heart strain. 2.  No thoracic aortic aneurysm or dissection. Mild calcific atherosclerosis of the aortic arch and coronary arteries noted. 3.  Trace bilateral pleural effusions with bibasilar atelectasis. Interstitial pulmonary edema bilaterally. 4.  Findings suggestive of multifocal pneumonia in the right lung worse in the right middle lobe. Recommend follow-up imaging after adequate therapy to ensure complete resolution and exclude possibility of underlying neoplasm. 5.  Pathologically enlarged mediastinal, hilar, para-aortic, retrocrural, and upper abdominal lymph nodes are seen which may be secondary to a diffuse infectious/inflammatory or neoplastic process including lymphoma. Recommend clinical correlation. 6.  Nonspecific 1.9 cm left adrenal nodule as described above. Recommend adrenal washout CT in 12 months per current guidelines. Findings discussed with RAFFI Anguiano at 10:33 p.m. Workstation performed: [id number]     Pathology: As above.     ASSESSMENT  1. Metastasis to brain (HCC)  MRI Brain BT w wo Contrast      2. Adenocarcinoma of overlapping sites of right lung (HCC)   "Ambulatory referral to Radiation Oncology        Cancer Staging   Adenocarcinoma of overlapping sites of right lung (HCC)  Staging form: Lung, AJCC 8th Edition  - Clinical: Stage IV (cN3, cM1) - Signed by Maryana Sandoval MD on 5/2/2024        PLAN/DISCUSSION  Orders Placed This Encounter   Procedures    MRI Brain BT w wo Contrast          Sergey Alcala MD  5/8/2024,11:05 AM    Total time spent reviewing EMR, seeing patient in clinic visit, documenting visit, placing treatment orders, and communicating with other medical providers on date of visit: 41 minutes.     Portions of the record may have been created with voice recognition software.  Occasional wrong word or \"sound a like\" substitutions may have occurred due to the inherent limitations of voice recognition software.  Read the chart carefully and recognize, using context, where substitutions have occurred.          "

## 2024-05-08 NOTE — PROGRESS NOTES
Papo Gibbs 1970 is a 54 y.o. male presented to ED 4/19/24 with progressive SOB, dry cough and left sided chest/rib pain. CT chest PE study positive for multifocal bilateral peripheral pulmonary embolism. Incidental finding of nonspecific 1.9 cm left adrenal nodule. Biopsy 4/23/24 left retroperitoneal lymph node revealed metastatic adenocarcinoma of lung primary. MRI brain 5/4/24 revealed 4 mm metastasis in the posterior right frontal lobe. He was seen by Dr. Sandoval and will be initiated on carbo/alimta/keytruda every 3 weeks. He is referred by Dr. Sandoval and presents today for initial consult.     4/19/24 CTA ED chest PE study  IMPRESSION:  1.  Acute moderate clot burden multifocal bilateral peripheral pulmonary embolism. RV:LV ratio is elevated at 1.1 suggestive of mild right heart strain.  2.  No thoracic aortic aneurysm or dissection. Mild calcific atherosclerosis of the aortic arch and coronary arteries noted.  3.  Trace bilateral pleural effusions with bibasilar atelectasis. Interstitial pulmonary edema bilaterally.  4.  Findings suggestive of multifocal pneumonia in the right lung worse in the right middle lobe. Recommend follow-up imaging after adequate therapy to ensure complete resolution and exclude possibility of underlying neoplasm.  5.  Pathologically enlarged mediastinal, hilar, para-aortic, retrocrural, and upper abdominal lymph nodes are seen which may be secondary to a diffuse infectious/inflammatory or neoplastic process including lymphoma. Recommend clinical correlation.  6.  Nonspecific 1.9 cm left adrenal nodule as described above. Recommend adrenal washout CT in 12 months per current guidelines.     4/20/24 CT abdomen pelvis w wo contrast  IMPRESSION:  1. There is a 2.1 x 1.8 x 2.6 cm left adrenal nodule. This is new compared to 2022. This nodule remains indeterminate and does not have enhancement features to suggest an adenoma. Suggest continued surveillance given above  adenopathy.  Adrenal recommendation based on institutional consensus and Journal of American College of Radiology 2017;14:6110-3555.  2. Abdominal and thoracic lymphadenopathy with representative lymph nodes as described above.  3. Pulmonary emboli remain visualized.  4. Right middle lobe consolidation, suspicious for pneumonia, grossly similar in appearance when compared to prior study from 24 Biopsy  (Results below)    24 MRI brain w wo contrast  IMPRESSION:  4 mm metastasis in the posterior right frontal lobe without significant edema. No other definite enhancing lesions but evaluation is limited due to motion artifact.  Tiny focus of restricted diffusion in the left frontal lobe without definite enhancement could represent acute/early subacute infarct but recommend 2-month follow-up as additional metastasis is not excluded.    24 Hematology Oncology - Dr. Sandoval  Infusion chairs ordered for Carbo-Alimta-Keytruda q3 weeks while on preceding labs, C1 coming up   CARIS NGS being sent off the  biopsy  Guardant NGS being sent off  Restaging CT CAP w/c ordered in 3 months  MRI Brain restaging as per Rad Onc  Rad Onc referral for determination of SRS vs NSY, tumor board scheduled 24 Palliative Care    Upcomin24 IR - port placement 24 Infusion c1  6/3/24 Dr. Sandoval    Oncology History   Adenocarcinoma of overlapping sites of right lung (HCC)   2024 Biopsy    Final Diagnosis   A. Lymph Node, Left retroperitoneal, Biopsy:  - Metastatic adenocarcinoma of lung primary.         2024 Initial Diagnosis    Adenocarcinoma of overlapping sites of right lung (HCC)     2024 -  Cancer Staged    Staging form: Lung, AJCC 8th Edition  - Clinical: Stage IV (cN3, cM1) - Signed by Maryana Sandoval MD on 2024 -  Chemotherapy    cyanocobalamin, 1,000 mcg, Intramuscular, Once, 0 of 3 cycles  alteplase (CATHFLO), 2 mg, Intracatheter, Every 1  Minute as needed, 0 of 6 cycles  fosaprepitant (EMEND) IVPB, 150 mg, Intravenous, Once, 0 of 6 cycles  CARBOplatin (PARAPLATIN) IVPB (GOG AUC DOSING), , Intravenous, Once, 0 of 6 cycles  pemetrexed (ALIMTA) chemo infusion, 500 mg/m2, Intravenous, Once, 0 of 6 cycles  pembrolizumab (KEYTRUDA) IVPB, 200 mg, Intravenous, Once, 0 of 6 cycles         Review of Systems:  Review of Systems   Constitutional:  Positive for fatigue (Moderate).   HENT: Negative.     Eyes: Negative.    Respiratory:  Positive for cough (Daily, dry) and shortness of breath (with exertion).    Cardiovascular: Negative.    Gastrointestinal: Negative.    Endocrine: Negative.    Genitourinary: Negative.    Musculoskeletal: Negative.    Skin: Negative.    Allergic/Immunologic: Negative.    Neurological: Negative.    Hematological: Negative.    Psychiatric/Behavioral: Negative.         Clinical Trial: no    Pain assessment: 0    Prior Radiation: No    Teaching: nci radiation packet    MST: completed     Implantable Devices (Port, pacemaker, pain stimulator): No    Hip Replacement: No    Health Maintenance   Topic Date Due    Hepatitis C Screening  Never done    Pneumococcal Vaccine: Pediatrics (0 to 5 Years) and At-Risk Patients (6 to 64 Years) (1 of 2 - PCV) Never done    DM Eye Exam  Never done    HIV Screening  Never done    BMI: Followup Plan  Never done    Annual Physical  Never done    Zoster Vaccine (1 of 2) Never done    Hepatitis A Vaccine (1 of 2 - Risk 2-dose series) Never done    Colorectal Cancer Screening  Never done    COVID-19 Vaccine (3 - Pfizer risk series) 05/20/2021    Influenza Vaccine (Season Ended) 09/01/2024    HEMOGLOBIN A1C  10/20/2024    Diabetic Foot Exam  04/30/2025    BMI: Adult  05/07/2025    Depression Screening  05/08/2025    DTaP,Tdap,and Td Vaccines (2 - Td or Tdap) 04/03/2034    HIB Vaccine  Aged Out    IPV Vaccine  Aged Out    Meningococcal ACWY Vaccine  Aged Out    HPV Vaccine  Aged Out       Past Medical History:    Diagnosis Date    Cancer (HCC)     Diabetes mellitus (HCC)     High cholesterol     History of blood clots     Hypertension     Leucocytosis 2024    Lung cancer (HCC)     Pneumonia     Pulmonary embolism (HCC)        Past Surgical History:   Procedure Laterality Date    IR BIOPSY LYMPH NODE  2024    KNEE SURGERY      MANDIBLE FRACTURE SURGERY  1985    PATELLA FRACTURE SURGERY      RECTAL SURGERY         Family History   Problem Relation Age of Onset    No Known Problems Father     No Known Problems Mother        Social History     Tobacco Use    Smoking status: Former     Current packs/day: 0.00     Average packs/day: 1.5 packs/day for 35.0 years (52.5 ttl pk-yrs)     Types: Cigarettes     Quit date: 4/15/2024     Years since quittin.0     Passive exposure: Past    Smokeless tobacco: Never   Vaping Use    Vaping status: Never Used   Substance Use Topics    Alcohol use: Not Currently    Drug use: Never          Current Outpatient Medications:     acetaminophen (TYLENOL) 325 mg tablet, Take 3 tablets (975 mg total) by mouth every 8 (eight) hours, Disp: 30 tablet, Rfl: 0    amitriptyline (ELAVIL) 100 mg tablet, 200 mg daily at bedtime, Disp: , Rfl:     apixaban (Eliquis) 5 mg, Take 1 tablet (5 mg total) by mouth 2 (two) times a day Do not start before May 1, 2024., Disp: 60 tablet, Rfl: 5    atorvastatin (LIPITOR) 10 mg tablet, Take 1 tablet (10 mg total) by mouth daily, Disp: 90 tablet, Rfl: 3    famotidine (PEPCID) 40 MG tablet, Take 40 mg by mouth daily as needed for heartburn or indigestion, Disp: , Rfl:     folic acid (KP Folic Acid) 1 mg tablet, Take 1 tablet (1 mg total) by mouth daily, Disp: 90 tablet, Rfl: 2    glimepiride (AMARYL) 4 mg tablet, Take 1 tablet (4 mg total) by mouth daily with breakfast, Disp: 90 tablet, Rfl: 1    Lancets (OneTouch Delica Plus [id number]) MISC, TEST ONE TIME DAILY, Disp: , Rfl:     lidocaine-prilocaine (EMLA) cream, Apply topically as needed for mild  pain, Disp: 50 g, Rfl: 1    losartan (COZAAR) 25 mg tablet, Take 25 mg by mouth daily, Disp: , Rfl:     metFORMIN (GLUCOPHAGE) 500 mg tablet, Take 1,000 mg by mouth 2 (two) times a day with meals, Disp: , Rfl:     ondansetron (ZOFRAN-ODT) 8 mg disintegrating tablet, Take 1 tablet (8 mg total) by mouth every 8 (eight) hours as needed for vomiting or nausea, Disp: 20 tablet, Rfl: 1    OneTouch Verio test strip, in the morning, Disp: , Rfl:     No Known Allergies     Vitals:    05/08/24 1016   BP: 116/68   BP Location: Left arm   Patient Position: Sitting   Cuff Size: Standard   Pulse: 95   Resp: 18   Temp: 97.7 °F (36.5 °C)   TempSrc: Temporal   SpO2: 92%       Pain Score: 0-No pain

## 2024-05-08 NOTE — PROGRESS NOTES
Palliative Outpatient Assessment of Need    LSW completed an assessment of need which was completed with (patient, family, or both) in the office or via phone/video conference    Relationship status: Engaged   Duration of relationship: 17 years   Name of significant other: Tomasa  Children and Ages: None  Pets: 1 dog  Other important family information: Parents and 2 siblings are local. Pt's SO is an RN  Living situation (where and whom): Resides with SO    Patient's primary caregiver: Self    Any limitations of caregiver:  Environmental concerns or barriers:    history: Served in the [employer]  Employment history/source of income: Currently working full-time as an  for a [employer] company  Disability: None    Concerns regarding literacy: None  Spirituality/ Jehovah's witness: None   Patient's strengths, social supports, and resources: Supportive SO  Cultural information:   Mental Health current or previous: None  Substance use or history: Pt reports he quit smoking in April   Sleep: Pt reports sleep being a long-standing issue for him  Exercise: Pt reports some fatigue related to his breathing  Diet/nutrition: No concerns  Durable Medical Equipment needs: O2 (as needed)  Transportation: Drives Self  Financial concerns: None  Advanced Directive: None. [facility] HCR/AD document provided at visit today  Other medical or social work providers involved: Oncology; Pulmonology  Patient/caregiver current level of coping: Pt appears to be coping well emotionally at this time   Understanding: Pt appears understanding of current medical status  Patient/family concerns and areas of need: Fatigue related to his breathing; Pt's goal is to continue to be able to work full-time  Patient's interests: Motorcycles     I have spent 25 minutes with Patient  today in which greater than 50% of this time was spent in counseling/coordination of care.    *All questions may not be answered due to constraints.  Follow-up discussions  may need to occur

## 2024-05-08 NOTE — ASSESSMENT & PLAN NOTE
Lab Results   Component Value Date    HGBA1C 7.1 (H) 04/20/2024     Managed by his PCP and currently on Metformin and Glimepiride.  Glucerna protein shakes provided in office today.  Counseling on weight maintenance provided today and importance of maintaining adequate PO intake.

## 2024-05-09 ENCOUNTER — TELEPHONE (OUTPATIENT)
Dept: NUTRITION | Facility: CLINIC | Age: 54
End: 2024-05-09

## 2024-05-09 ENCOUNTER — PATIENT OUTREACH (OUTPATIENT)
Dept: CASE MANAGEMENT | Facility: HOSPITAL | Age: 54
End: 2024-05-09

## 2024-05-09 NOTE — PROGRESS NOTES
OSW completed chart review. Patient had consults completed. OSW will outreach for assessment and dt.

## 2024-05-09 NOTE — TELEPHONE ENCOUNTER
"Received notification by Dr. Estrella on 5/8/24 that pt has triggered for oncology nutrition care (reason for referral: Ambulatory referral for nutrition services for oncology: \"Planned for systemic therapy along with potential radiation. Patient is diabetic. Recommendations/guidance with Nutrition in anticipation of planned therapies. Thank you.\" ).    Contacted Papo and introduced self and explained the reason for today's call.      Discussed oncology nutrition services available (options for in-person and phone consultation) and the benefits of meeting for a consultation. He preferred to wait until he starts tx and will reach out to RD at that time, when he is ready to schedule an appointment.     Provided this RD’s contact information asking that Papo reach out prn.  All questions/concerns addressed at this time.  "

## 2024-05-10 DIAGNOSIS — C34.81 ADENOCARCINOMA OF OVERLAPPING SITES OF RIGHT LUNG (HCC): Primary | ICD-10-CM

## 2024-05-12 LAB
CARIS ALK: NORMAL
CARIS GENOMIC LOH - EXOME: NORMAL
CARIS MSI - EXOME: NORMAL
CARIS PD-L1 (22C3): NEGATIVE
CARIS PD-L1 (SP263): NEGATIVE
CARIS PD-L1 FDA (28-8): NEGATIVE
CARIS PD-L1 FDA(SP142): NEGATIVE
CARIS PTEN: POSITIVE
CARIS TMB - EXOME: NORMAL

## 2024-05-14 ENCOUNTER — HOSPITAL ENCOUNTER (OUTPATIENT)
Dept: INFUSION CENTER | Facility: CLINIC | Age: 54
Discharge: HOME | End: 2024-05-14
Payer: COMMERCIAL

## 2024-05-14 ENCOUNTER — TELEPHONE (OUTPATIENT)
Dept: RADIOLOGY | Facility: HOSPITAL | Age: 54
End: 2024-05-14

## 2024-05-14 DIAGNOSIS — C34.81 ADENOCARCINOMA OF OVERLAPPING SITES OF RIGHT LUNG (HCC): Primary | ICD-10-CM

## 2024-05-14 RX ORDER — CEFAZOLIN SODIUM 2 G/50ML
2000 SOLUTION INTRAVENOUS ONCE
Status: CANCELLED | OUTPATIENT
Start: 2024-05-14 | End: 2024-05-14

## 2024-05-14 RX ORDER — CYANOCOBALAMIN 1000 UG/ML
1000 INJECTION, SOLUTION INTRAMUSCULAR; SUBCUTANEOUS ONCE
Status: COMPLETED | OUTPATIENT
Start: 2024-05-14 | End: 2024-05-14

## 2024-05-14 RX ORDER — SODIUM CHLORIDE 9 MG/ML
75 INJECTION, SOLUTION INTRAVENOUS CONTINUOUS
Status: CANCELLED | OUTPATIENT
Start: 2024-05-14

## 2024-05-14 RX ADMIN — CYANOCOBALAMIN 1000 MCG: 1000 INJECTION INTRAMUSCULAR; SUBCUTANEOUS at 08:18

## 2024-05-14 NOTE — PROGRESS NOTES
Pt tolerated B12 injection prior to start of Alimta next week. Pt aware of next appt 5/21 1000 and aware of need to have labs drawn 2-3 days prior.

## 2024-05-14 NOTE — PRE-PROCEDURE INSTRUCTIONS
Pre-procedure Instructions for Interventional Radiology  [place] [address] [place] [address]   [id number]  INTERVENTIONAL RADIOLOGY [phone]    You are scheduled for a/an Port Placement.    On Monday 5/20/24.    Your arrival time is 1245.  Our Interventional Radiology nurse will notify you a few days before your procedure with the exact arrival time and location to arrive.    To prepare for your procedure:  Please arrange for someone to drive you home after the procedure and stay with you until the next morning if you are instructed to do so.  This is typically for patients receiving some type of sedative or anesthetic for the procedure.  DO NOT EAT OR DRINK ANYTHING after midnight on the evening before your procedure including candy & gum.  ONLY SIPS OF WATER with your medications are allowed on the morning of your procedure.  TAKE ALL OF YOUR REGULAR MEDICATIONS THE MORNING OF YOUR PROCEDURE with sips of water!  We may call you to stop some of your blood sugar, blood pressure and blood thinning medications depending on the procedure.  Please take all of these medications unless we instruct you to stop them.  If you have an allergy to x-ray dye, please contact Interventional Radiology for an x-ray dye preparation which usually consists of an oral steroid and Benadryl.    The day of your procedure:  Bring a list of the medications you take at home.  Bring medications you take for breathing problems (such as inhalers), medications for chest pain, or both.  Bring your insurance card and a form of photo ID.  Bring a case for your glasses or contacts.  Please leave all valuables such as credit cards and jewelry at home.  Report to the registration desk in the main lobby at the [facility].  Ask to be directed to the After Procedure Unit on the 2nd floor.  While your procedure is being performed your family may wait in the Waiting Room on the 2nd floor.  Be prepared to stay overnight just in case.  Sometimes procedures will indicate the need for further observation or treatment.   If you are scheduled for a follow-up visit with the Interventional Radiologist after your procedure, you will be called with a date and time.    Special Instructions (Medications to alter or stop taking before your procedure etc.):  Glimepiride  and Metformin hold AM 5/20/24.

## 2024-05-15 ENCOUNTER — PATIENT OUTREACH (OUTPATIENT)
Dept: CASE MANAGEMENT | Facility: HOSPITAL | Age: 54
End: 2024-05-15

## 2024-05-15 NOTE — PROGRESS NOTES
Biopsychosocial and Barriers Assessment    Cancer Diagnosis: lung cancer  Home/Cell Phone: [phone]  Emergency Contact: Tomasa- spouse  Marital Status:   Interpretation concerns, speaks another language, preferred language: English  Cultural concerns: none  Ability to read or write: yes    Caregiver/Support: family   Children: grown children   Child/Elder care: no    Housing: house  Home Setup: more than 1 level  Lives With: Tomasa  Daily Living Activities:independent  Durable Medical Equipment: no  Ambulation: independent     Preferred Pharmacy: [facility]  High co-pays with insurance: no  High co-pays with medication coverage: no  No medication coverage: has coverage    Primary Care Provider: Dr. Bacon  Hx of Home Health Care: none  Hx of Short term rehab: none  Mental Health Hx: no  Substance Abuse Hx: no   Employment: full time, employer supportive  [place] Status/Location: not linked to va but is a    Ability to pay bills: yes  POA/LW/AD:will send info  Transportation Plan/Concerns: no concerns      What do you know about your Cancer Diagnosis    What has your doctor told you about your cancer diagnosis: Lung Cancer    What has your doctor told you about your cancer treatment: chemo    What specific concerns do you have about your diagnosis and treatment:  None at this time  Have you been made aware of any hair loss associated with treatment:did not discuss    Additional Comments:  OSW outreached to complete assessment and dt. Patient self rated DT as 0/10. Patient denies social, emotional, practical, spiritual concerns. Patient does report that there are some changes to physical abilities.     Patient denies current needs from OSW but was encouraged to reach out should a need arise.

## 2024-05-17 ENCOUNTER — APPOINTMENT (OUTPATIENT)
Dept: LAB | Facility: CLINIC | Age: 54
End: 2024-05-17
Payer: COMMERCIAL

## 2024-05-17 DIAGNOSIS — C34.81 ADENOCARCINOMA OF OVERLAPPING SITES OF RIGHT LUNG (HCC): Primary | ICD-10-CM

## 2024-05-17 DIAGNOSIS — E03.9 HYPOTHYROIDISM, UNSPECIFIED TYPE: Primary | ICD-10-CM

## 2024-05-17 LAB
ALBUMIN SERPL BCP-MCNC: 3.8 G/DL (ref 3.5–5)
ALP SERPL-CCNC: 70 U/L (ref 34–104)
ALT SERPL W P-5'-P-CCNC: 18 U/L (ref 7–52)
ANION GAP SERPL CALCULATED.3IONS-SCNC: 9 MMOL/L (ref 4–13)
AST SERPL W P-5'-P-CCNC: 17 U/L (ref 13–39)
BASOPHILS # BLD AUTO: 0.07 THOUSANDS/ÂΜL (ref 0–0.1)
BASOPHILS NFR BLD AUTO: 1 % (ref 0–1)
BILIRUB SERPL-MCNC: 0.34 MG/DL (ref 0.2–1)
BUN SERPL-MCNC: 13 MG/DL (ref 5–25)
CALCIUM SERPL-MCNC: 9.2 MG/DL (ref 8.4–10.2)
CHLORIDE SERPL-SCNC: 101 MMOL/L (ref 96–108)
CO2 SERPL-SCNC: 27 MMOL/L (ref 21–32)
CREAT SERPL-MCNC: 0.83 MG/DL (ref 0.6–1.3)
EOSINOPHIL # BLD AUTO: 0.14 THOUSAND/ÂΜL (ref 0–0.61)
EOSINOPHIL NFR BLD AUTO: 1 % (ref 0–6)
ERYTHROCYTE [DISTWIDTH] IN BLOOD BY AUTOMATED COUNT: 16.1 % (ref 11.6–15.1)
GFR SERPL CREATININE-BSD FRML MDRD: 99 ML/MIN/1.73SQ M
GLUCOSE P FAST SERPL-MCNC: 133 MG/DL (ref 65–99)
HCT VFR BLD AUTO: 36.6 % (ref 36.5–49.3)
HGB BLD-MCNC: 11.1 G/DL (ref 12–17)
IMM GRANULOCYTES # BLD AUTO: 0.03 THOUSAND/UL (ref 0–0.2)
IMM GRANULOCYTES NFR BLD AUTO: 0 % (ref 0–2)
LYMPHOCYTES # BLD AUTO: 1.78 THOUSANDS/ÂΜL (ref 0.6–4.47)
LYMPHOCYTES NFR BLD AUTO: 18 % (ref 14–44)
MCH RBC QN AUTO: 24 PG (ref 26.8–34.3)
MCHC RBC AUTO-ENTMCNC: 30.3 G/DL (ref 31.4–37.4)
MCV RBC AUTO: 79 FL (ref 82–98)
MONOCYTES # BLD AUTO: 0.63 THOUSAND/ÂΜL (ref 0.17–1.22)
MONOCYTES NFR BLD AUTO: 7 % (ref 4–12)
NEUTROPHILS # BLD AUTO: 7.06 THOUSANDS/ÂΜL (ref 1.85–7.62)
NEUTS SEG NFR BLD AUTO: 73 % (ref 43–75)
NRBC BLD AUTO-RTO: 0 /100 WBCS
PLATELET # BLD AUTO: 267 THOUSANDS/UL (ref 149–390)
PMV BLD AUTO: 8.9 FL (ref 8.9–12.7)
POTASSIUM SERPL-SCNC: 4.4 MMOL/L (ref 3.5–5.3)
PROT SERPL-MCNC: 7.1 G/DL (ref 6.4–8.4)
RBC # BLD AUTO: 4.63 MILLION/UL (ref 3.88–5.62)
SODIUM SERPL-SCNC: 137 MMOL/L (ref 135–147)
T4 FREE SERPL-MCNC: 0.51 NG/DL (ref 0.61–1.12)
TSH SERPL DL<=0.05 MIU/L-ACNC: 0.23 UIU/ML (ref 0.45–4.5)
WBC # BLD AUTO: 9.71 THOUSAND/UL (ref 4.31–10.16)

## 2024-05-17 PROCEDURE — 85025 COMPLETE CBC W/AUTO DIFF WBC: CPT

## 2024-05-17 PROCEDURE — 84443 ASSAY THYROID STIM HORMONE: CPT

## 2024-05-17 PROCEDURE — 36415 COLL VENOUS BLD VENIPUNCTURE: CPT

## 2024-05-17 PROCEDURE — 80053 COMPREHEN METABOLIC PANEL: CPT

## 2024-05-17 PROCEDURE — 84439 ASSAY OF FREE THYROXINE: CPT

## 2024-05-17 RX ORDER — SODIUM CHLORIDE 9 MG/ML
20 INJECTION, SOLUTION INTRAVENOUS ONCE
Status: CANCELLED | OUTPATIENT
Start: 2024-05-21

## 2024-05-17 RX ORDER — SODIUM CHLORIDE 9 MG/ML
20 INJECTION, SOLUTION INTRAVENOUS ONCE
OUTPATIENT
Start: 2024-06-11

## 2024-05-18 RX ORDER — LEVOTHYROXINE SODIUM 0.03 MG/1
25 TABLET ORAL DAILY
Qty: 90 TABLET | Refills: 2 | Status: SHIPPED | OUTPATIENT
Start: 2024-05-18 | End: 2025-02-12

## 2024-05-20 ENCOUNTER — HOSPITAL ENCOUNTER (OUTPATIENT)
Dept: INTERVENTIONAL RADIOLOGY/VASCULAR | Facility: HOSPITAL | Age: 54
Discharge: HOME | End: 2024-05-20
Attending: INTERNAL MEDICINE | Admitting: RADIOLOGY
Payer: COMMERCIAL

## 2024-05-20 VITALS
HEART RATE: 97 BPM | TEMPERATURE: 97.6 F | DIASTOLIC BLOOD PRESSURE: 65 MMHG | OXYGEN SATURATION: 93 % | HEIGHT: 67 IN | RESPIRATION RATE: 20 BRPM | WEIGHT: 203 LBS | BODY MASS INDEX: 31.86 KG/M2 | SYSTOLIC BLOOD PRESSURE: 130 MMHG

## 2024-05-20 DIAGNOSIS — C34.81 ADENOCARCINOMA OF OVERLAPPING SITES OF RIGHT LUNG (HCC): ICD-10-CM

## 2024-05-20 LAB — GLUCOSE SERPL-MCNC: 122 MG/DL (ref 65–140)

## 2024-05-20 PROCEDURE — C1894 INTRO/SHEATH, NON-LASER: HCPCS

## 2024-05-20 PROCEDURE — 77001 FLUOROGUIDE FOR VEIN DEVICE: CPT

## 2024-05-20 PROCEDURE — 36561 INSERT TUNNELED CV CATH: CPT | Performed by: RADIOLOGY

## 2024-05-20 PROCEDURE — C1788 PORT, INDWELLING, IMP: HCPCS

## 2024-05-20 PROCEDURE — 36561 INSERT TUNNELED CV CATH: CPT

## 2024-05-20 PROCEDURE — 76937 US GUIDE VASCULAR ACCESS: CPT

## 2024-05-20 PROCEDURE — 77001 FLUOROGUIDE FOR VEIN DEVICE: CPT | Performed by: RADIOLOGY

## 2024-05-20 PROCEDURE — 82948 REAGENT STRIP/BLOOD GLUCOSE: CPT

## 2024-05-20 PROCEDURE — 99152 MOD SED SAME PHYS/QHP 5/>YRS: CPT | Performed by: RADIOLOGY

## 2024-05-20 PROCEDURE — 76937 US GUIDE VASCULAR ACCESS: CPT | Performed by: RADIOLOGY

## 2024-05-20 PROCEDURE — C1769 GUIDE WIRE: HCPCS

## 2024-05-20 RX ORDER — MIDAZOLAM HYDROCHLORIDE 2 MG/2ML
INJECTION, SOLUTION INTRAMUSCULAR; INTRAVENOUS AS NEEDED
Status: COMPLETED | OUTPATIENT
Start: 2024-05-20 | End: 2024-05-20

## 2024-05-20 RX ORDER — SODIUM CHLORIDE 9 MG/ML
75 INJECTION, SOLUTION INTRAVENOUS CONTINUOUS
Status: DISCONTINUED | OUTPATIENT
Start: 2024-05-20 | End: 2024-05-24 | Stop reason: HOSPADM

## 2024-05-20 RX ORDER — LIDOCAINE HYDROCHLORIDE AND EPINEPHRINE 10; 10 MG/ML; UG/ML
INJECTION, SOLUTION INFILTRATION; PERINEURAL AS NEEDED
Status: COMPLETED | OUTPATIENT
Start: 2024-05-20 | End: 2024-05-20

## 2024-05-20 RX ORDER — CEFAZOLIN SODIUM 2 G/50ML
2000 SOLUTION INTRAVENOUS ONCE
Status: DISCONTINUED | OUTPATIENT
Start: 2024-05-20 | End: 2024-05-24 | Stop reason: HOSPADM

## 2024-05-20 RX ORDER — FENTANYL CITRATE 50 UG/ML
INJECTION, SOLUTION INTRAMUSCULAR; INTRAVENOUS AS NEEDED
Status: COMPLETED | OUTPATIENT
Start: 2024-05-20 | End: 2024-05-20

## 2024-05-20 RX ADMIN — LIDOCAINE HYDROCHLORIDE,EPINEPHRINE BITARTRATE 20 ML: 10; .01 INJECTION, SOLUTION INFILTRATION; PERINEURAL at 14:25

## 2024-05-20 RX ADMIN — FENTANYL CITRATE 50 MCG: 50 INJECTION, SOLUTION INTRAMUSCULAR; INTRAVENOUS at 14:24

## 2024-05-20 RX ADMIN — MIDAZOLAM HYDROCHLORIDE 1 MG: 1 INJECTION, SOLUTION INTRAMUSCULAR; INTRAVENOUS at 14:24

## 2024-05-20 NOTE — BRIEF OP NOTE (RAD/CATH)
INTERVENTIONAL RADIOLOGY PROCEDURE NOTE    Date: 5/20/2024    Procedure:   Procedure Summary       Date: 05/20/24 Room / Location: [facility] Interventional Radiology    Anesthesia Start:  Anesthesia Stop:     Procedure: IR PORT PLACEMENT Diagnosis:       Adenocarcinoma of overlapping sites of right lung (HCC)      (54M with metastatic lung cancer)    Scheduled Providers:  Responsible Provider:     Anesthesia Type: Not recorded ASA Status: Not recorded            Preoperative diagnosis:   1. Adenocarcinoma of overlapping sites of right lung (HCC)         Postoperative diagnosis: Same.    Surgeon: Homero Perrin MD     Assistant: None. No qualified resident was available.    Blood loss: Minimal    Specimens: None     Findings: Right chest port placement.    Complications: None immediate.    Anesthesia: conscious sedation

## 2024-05-20 NOTE — INTERVAL H&P NOTE
Update: (This section must be completed if the H&P was completed greater than 24 hrs to procedure or admission)    H&P reviewed. After examining the patient, I find no changed to the H&P since it had been written.    Patient re-evaluated. Accept as history and physical.    Homero Perrin MD/May 20, 2024/2:08 PM

## 2024-05-21 ENCOUNTER — PATIENT MESSAGE (OUTPATIENT)
Dept: FAMILY MEDICINE CLINIC | Facility: CLINIC | Age: 54
End: 2024-05-21

## 2024-05-21 ENCOUNTER — HOSPITAL ENCOUNTER (OUTPATIENT)
Dept: INFUSION CENTER | Facility: CLINIC | Age: 54
Discharge: HOME | End: 2024-05-21
Payer: COMMERCIAL

## 2024-05-21 VITALS
SYSTOLIC BLOOD PRESSURE: 138 MMHG | TEMPERATURE: 97.7 F | HEIGHT: 67 IN | BODY MASS INDEX: 31.8 KG/M2 | RESPIRATION RATE: 18 BRPM | HEART RATE: 109 BPM | DIASTOLIC BLOOD PRESSURE: 71 MMHG | WEIGHT: 202.6 LBS

## 2024-05-21 DIAGNOSIS — E11.00 TYPE 2 DIABETES MELLITUS WITH HYPEROSMOLARITY WITHOUT COMA, WITHOUT LONG-TERM CURRENT USE OF INSULIN (HCC): Primary | ICD-10-CM

## 2024-05-21 DIAGNOSIS — C34.81 ADENOCARCINOMA OF OVERLAPPING SITES OF RIGHT LUNG (HCC): Primary | ICD-10-CM

## 2024-05-21 DIAGNOSIS — R05.9 COUGH, UNSPECIFIED TYPE: ICD-10-CM

## 2024-05-21 PROCEDURE — 96417 CHEMO IV INFUS EACH ADDL SEQ: CPT

## 2024-05-21 PROCEDURE — 96367 TX/PROPH/DG ADDL SEQ IV INF: CPT

## 2024-05-21 PROCEDURE — 96413 CHEMO IV INFUSION 1 HR: CPT

## 2024-05-21 RX ORDER — BENZONATATE 200 MG/1
200 CAPSULE ORAL 3 TIMES DAILY PRN
Qty: 20 CAPSULE | Refills: 0 | Status: SHIPPED | OUTPATIENT
Start: 2024-05-21

## 2024-05-21 RX ORDER — SODIUM CHLORIDE 9 MG/ML
20 INJECTION, SOLUTION INTRAVENOUS ONCE
Status: COMPLETED | OUTPATIENT
Start: 2024-05-21 | End: 2024-05-21

## 2024-05-21 RX ADMIN — SODIUM CHLORIDE 200 MG: 9 INJECTION, SOLUTION INTRAVENOUS at 11:16

## 2024-05-21 RX ADMIN — PEMETREXED DISODIUM 1000 MG: 500 INJECTION, POWDER, LYOPHILIZED, FOR SOLUTION INTRAVENOUS at 12:05

## 2024-05-21 RX ADMIN — DEXAMETHASONE SODIUM PHOSPHATE: 100 INJECTION INTRAMUSCULAR; INTRAVENOUS at 10:17

## 2024-05-21 RX ADMIN — CARBOPLATIN 672 MG: 10 INJECTION, SOLUTION INTRAVENOUS at 12:23

## 2024-05-21 RX ADMIN — FOSAPREPITANT 150 MG: 150 INJECTION, POWDER, LYOPHILIZED, FOR SOLUTION INTRAVENOUS at 10:39

## 2024-05-21 RX ADMIN — SODIUM CHLORIDE 20 ML/HR: 0.9 INJECTION, SOLUTION INTRAVENOUS at 10:16

## 2024-05-21 NOTE — PROGRESS NOTES
Pt arrived to unit without complaint, tolerated day 1 cycle 1 treatment well without incident. Education provided to pt and his s.o., re: potential s/e, management of same, s/s to report to MD. Both very appreciative and without question or concern. Pt left unit in stable condition, declines AVS, aware of next appt for central labs at [place] infusion 6/7 [id number]

## 2024-05-22 RX ORDER — LANCETS 33 GAUGE
1 EACH MISCELLANEOUS 4 TIMES DAILY
Qty: 100 EACH | Refills: 5 | Status: SHIPPED | OUTPATIENT
Start: 2024-05-22

## 2024-05-22 RX ORDER — BLOOD SUGAR DIAGNOSTIC
1 STRIP MISCELLANEOUS 4 TIMES DAILY
Qty: 100 EACH | Refills: 5 | Status: SHIPPED | OUTPATIENT
Start: 2024-05-22

## 2024-05-22 RX ORDER — GLIMEPIRIDE 4 MG/1
4 TABLET ORAL
Qty: 90 TABLET | Refills: 1 | Status: SHIPPED | OUTPATIENT
Start: 2024-05-22

## 2024-05-26 NOTE — PROGRESS NOTES
Hematology/Oncology Outpatient Office Note    Date of Service: 6/3/2024    [facility] [place]  [address]  [place] [id number]  [phone]    Reason for Consultation:   Chief Complaint   Patient presents with    Follow-up       Cancer Stage at diagnosis: IV    Referral Physician: No ref. provider found    Primary Care Physician:  Amelie Bacon MD     Nickname: Jessica    Spouse: Mabel  (RN at [employer] in Cardiology)    Original ECO    Today's ECO    Goals and Barriers:  Current Goal: Minimize effects of disease burden, extend life.   Barriers to accomplishing this: SOB    Patient's Capacity to Self Care:  Patient is able to self care    ASSESSMENT & PLAN      Diagnosis ICD-10-CM Associated Orders   1. Adenocarcinoma of overlapping sites of right lung (HCC)  C34.81       2. Chemotherapy induced nausea and vomiting  R11.2     T45.1X5A             This is a 54 y.o. c PMHx notable for DM, HLP, blood clots, HTN, remote nicotine abuse, being seen in consultation for metastatic lung adeno       Discussion of decision making  Oncology history updated, accordingly, during this visit  Goals of care/patient communication  I discussed with the patient the clinical course leading up to their cancer diagnosis. I reviewed relevant office notes, imaging reports and pathology result as well.  I told the patient that this is a case of incurable disease and what this means. We discussed that the goal of anti-cancer therapy is to provide best quality of life, extend overall survival, and progression free survival as shown in clinical trials. We also discussed that there might be a point when the cancer will no longer respond to this anti-neoplastic therapy. As a result, we also discussed the role of the palliative care team being introduced early in the treatment course. We will be making this referral  I explained the risks/benefits of the proposed cancer therapy:  Carbo-Alimta-Keytruda and after discussion including understanding risks of possible life-threatening complications and therapy-related malignancy development, informed consent for blood products and treatment has been signed and obtained.  TNM/Staging At Diagnosis  Cancer Staging   Adenocarcinoma of overlapping sites of right lung (HCC)  Staging form: Lung, AJCC 8th Edition  - Clinical: Stage IV (cN3, cM1) - Signed by Maryana Sandoval MD on 5/2/2024  Disease Features/Tumor Markers/Genetics  Tumor Marker: n/a  Notable Path Features: 4/23/2024 Lymph Node, Left retroperitoneal, Biopsy: Metastatic adenocarcinoma of lung primary  Treatment: Carbo-Alimta-Keytruda  Other Supportive care: Zofran, EMLA  Treatment Team Members  Surgeon  Rad Onc  Palliative: Dr. Estrella  Labs  Diagnostics  4/19/2024 CT Chest PE: Acute moderate clot burden multifocal bilateral peripheral pulmonary embolism, mild R heart strain. Findings suggestive of multifocal pneumonia. Pathologically enlarged mediastinal, hilar, para-aortic, retrocrural, and upper abdominal lymph nodes. Nonspecific 1.9 cm left adrenal nodule  4/20/2024 CT Abd/pelv w/wo c: There is a 2.1 x 1.8 x 2.6 cm left adrenal nodule. Abdominal and thoracic lymphadenopathy  5/4/2024 MRI Brain w/wo c: 4 mm metastasis in the posterior right frontal lobe without significant edema. No other definite enhancing lesions but evaluation is limited due to motion artifact.  Tiny focus of restricted diffusion in the left frontal lobe without definite enhancement could represent acute/early subacute infarct  5/8/2024: Neuro working group recommended close MRI Brain surveillance for the 2 small brain mets to see how it responds to treatment, Dr. Paulo Alcala will trend it        Discussion of decision making    I personally reviewed the following lab results, the image studies, pathology, other specialty/physicians consult notes and recommendations, and outside medical records. I had a lengthy  discussion with the patient and shared the work-up findings. We discussed the diagnosis and management plan as below. I spent 41 minutes reviewing the records (labs, clinician notes, outside records, medical history, ordering medicine/tests/procedures, monitoring of anti-neoplastic toxicities, interpreting the imaging/labs previously done) and coordination of care as well as direct time with the patient today, of which greater than 50% of the time was spent in counseling and coordination of care with the patient/family.      Plan/Labs  F/u [facility] sent off the 4/23 biopsy  F/u [facility] sent off  Restaging CT CAP w/c scheduled 8/2/2024  MRI Brain restaging as per Rad Onc  Rad Onc f/u for surveillance of 2 small brain mets  F/u palliative care   Infusion chairs ordered for Carbo-Alimta-Keytruda q3 weeks while on preceding labs, C2 coming up         Follow Up: q3 weeks scheduled thru 9/24/2024    All questions were answered to the patient's satisfaction during this encounter. The patient knows the contact information for our office and knows to reach out for any relevant concerns related to this encounter. They are to call for any temperature 100.4 or higher, new symptoms including but not restricted to shaking chills, decreased appetite, nausea, vomiting, diarrhea, increased fatigue, shortness of breath or chest pain, confusion, and not feeling the strength to come to the clinic. For all other listed problems and medical diagnosis in their chart - they are managed by PCP and/or other specialists, which the patient acknowledges. Thank you very much for your consultation and making us a part of this patient's care. We are continuing to follow closely with you. Please do not hesitate to reach out to me with any additional questions or concerns.    Maryana Sandoval MD  Hematology & Medical Oncology Staff Physician             Disclaimer: This document was prepared using [employer] Direct technology. If a  word or phrase is confusing, or does not make sense, this is likely due to recognition error which was not discovered during this clinician's review. If you believe an error has occurred, please contact me through [facility] service line for juliana?cation.      ONCOLOGY HISTORY OF PRESENT ILLNESS        Oncology History   Adenocarcinoma of overlapping sites of right lung (HCC)   4/23/2024 Biopsy    Final Diagnosis   A. Lymph Node, Left retroperitoneal, Biopsy:  - Metastatic adenocarcinoma of lung primary.         5/2/2024 Initial Diagnosis    Adenocarcinoma of overlapping sites of right lung (HCC)     5/2/2024 -  Cancer Staged    Staging form: Lung, AJCC 8th Edition  - Clinical: Stage IV (cN3, cM1) - Signed by Maryana Sandoval MD on 5/2/2024 5/21/2024 -  Chemotherapy    cyanocobalamin, 1,000 mcg, Intramuscular, Once, 1 of 3 cycles  Administration: 1,000 mcg (5/14/2024)  alteplase (CATHFLO), 2 mg, Intracatheter, Every 1 Minute as needed, 1 of 6 cycles  fosaprepitant (EMEND) IVPB, 150 mg, Intravenous, Once, 1 of 6 cycles  Administration: 150 mg (5/21/2024)  CARBOplatin (PARAPLATIN) IVPB (GOG AUC DOSING), 672 mg, Intravenous, Once, 1 of 6 cycles  Administration: 672 mg (5/21/2024)  pemetrexed (ALIMTA) chemo infusion, 1,020 mg, Intravenous, Once, 1 of 6 cycles  Administration: 1,000 mg (5/21/2024)  pembrolizumab (KEYTRUDA) IVPB, 200 mg, Intravenous, Once, 1 of 6 cycles  Administration: 200 mg (5/21/2024)           SUBJECTIVE  (INTERVAL HISTORY)      Clotting History None   Bleeding History None   Cancer History Lung Adeno   Family Cancer History None   H/O Blood/Plt Transfusion None   Tobacco/etoh/drug abuse 1.5 PPD x 40 years, quit 4/2024, no etoh abuse or rec drug use       Cancer Screening history C-scope 2014, due for cologuard (ordered)   Occupation  in the past, now        Pain: mid chest (deep) since early May 2024, related to coughing which is now starting to improve    Dyspnea  with exertion since 2024 is improving and he's able to take deeper breaths. Feels stronger. Has chronic kaleidoscope ocular sensation over the past few years.    I have reviewed the relevant past medical, surgical, social and family history. I have also reviewed allergies and medications for this patient.    Review of Systems  Baseline weight: 200-205 lbs    Denies F/C, N/V, CP, LH, HA, rash, itching, gen weakness, unilateral weakness, diplopia, melena, hematuria, hematochezia, falls, diarrhea, or constipation       A 10-point review of system was performed, pertinent positive and negative were detailed as above. Otherwise, the 10-point review of system was negative.      Past Medical History:   Diagnosis Date    Cancer (HCC) ,    Diabetes mellitus (HCC)     High cholesterol     History of blood clots     Hypertension     Leucocytosis 2024    Lung cancer (HCC)     Pneumonia     Pulmonary embolism (HCC)        Past Surgical History:   Procedure Laterality Date    IR BIOPSY LYMPH NODE  2024    IR PORT PLACEMENT  2024    KNEE SURGERY      MANDIBLE FRACTURE SURGERY  1985    PATELLA FRACTURE SURGERY      RECTAL SURGERY         Family History   Problem Relation Age of Onset    No Known Problems Father     No Known Problems Mother        Social History     Socioeconomic History    Marital status: Single     Spouse name: Not on file    Number of children: Not on file    Years of education: Not on file    Highest education level: Not on file   Occupational History    Not on file   Tobacco Use    Smoking status: Former     Current packs/day: 0.00     Average packs/day: 1.5 packs/day for 35.0 years (52.5 ttl pk-yrs)     Types: Cigarettes     Quit date: 4/15/2024     Years since quittin.1     Passive exposure: Past    Smokeless tobacco: Never   Vaping Use    Vaping status: Never Used   Substance and Sexual Activity    Alcohol use: Not Currently    Drug use: Never    Sexual activity: Yes      Partners: Female     Birth control/protection: Post-menopausal, None   Other Topics Concern    Not on file   Social History Narrative    Not on file     Social Determinants of Health     Financial Resource Strain: Not on file   Food Insecurity: Not on file   Transportation Needs: Not on file   Physical Activity: Not on file   Stress: Not on file   Social Connections: Not on file   Intimate Partner Violence: Not on file   Housing Stability: Not on file       No Known Allergies    Current Outpatient Medications   Medication Sig Dispense Refill    acetaminophen (TYLENOL) 325 mg tablet Take 3 tablets (975 mg total) by mouth every 8 (eight) hours 30 tablet 0    amitriptyline (ELAVIL) 100 mg tablet 200 mg daily at bedtime      apixaban (Eliquis) 5 mg Take 1 tablet (5 mg total) by mouth 2 (two) times a day Do not start before May 1, 2024. 60 tablet 5    atorvastatin (LIPITOR) 10 mg tablet Take 1 tablet (10 mg total) by mouth daily 90 tablet 3    benzonatate (TESSALON) 200 MG capsule Take 1 capsule (200 mg total) by mouth 3 (three) times a day as needed for cough 20 capsule 0    famotidine (PEPCID) 40 MG tablet Take 40 mg by mouth daily as needed for heartburn or indigestion Taking as needed      folic acid (KP Folic Acid) 1 mg tablet Take 1 tablet (1 mg total) by mouth daily 90 tablet 2    glimepiride (AMARYL) 4 mg tablet Take 1 tablet (4 mg total) by mouth daily with breakfast 90 tablet 1    Lancets (OneTouch Delica Plus [id number]) MISC Use 1 Units 4 (four) times a day 100 each 5    levothyroxine (Synthroid) 25 mcg tablet Take 1 tablet (25 mcg total) by mouth daily 90 tablet 2    lidocaine-prilocaine (EMLA) cream Apply topically as needed for mild pain 50 g 1    losartan (COZAAR) 25 mg tablet Take 25 mg by mouth daily      metFORMIN (GLUCOPHAGE) 500 mg tablet Take 1,000 mg by mouth 2 (two) times a day with meals      ondansetron (ZOFRAN-ODT) 8 mg disintegrating tablet Take 1 tablet (8 mg total) by mouth every 8 (eight)  hours as needed for vomiting or nausea 20 tablet 1    OneTouch Verio test strip Use 1 each 4 (four) times a day 100 each 5     No current facility-administered medications for this visit.       (Not in a hospital admission)      Objective:     24 Hour Vitals Assessment:     Vitals:    06/03/24 1102   BP: 124/70   Pulse: (!) 112   Resp: 16   Temp: 97.5 °F (36.4 °C)   SpO2: 94%         PHYSICIAN EXAM:    General: Appearance: alert, cooperative, no distress.  HEENT: Normocephalic, atraumatic. No scleral icterus. conjunctivae clear. EOMI.  Chest: No tenderness to palpation. No open wound noted.  Lungs: Clear to auscultation bilaterally, Respirations unlabored.  Cardiac: Tachycardia, +S1and S2  Abdomen: Soft, non-tender, non-distended. Bowel sounds are normal.  Extremities:  No edema, cyanosis, clubbing.  Skin: Skin color, turgor are normal. No rashes.  Lymphatics: no palpable supra-cervical, axillary, or inguinal adenopathy  Neurologic: Awake, Alert, and oriented, no gross focal deficits noted b/l.       DATA REVIEW:    Pathology Result:    Final Diagnosis   Date Value Ref Range Status   04/23/2024   Final    A. Lymph Node, Left retroperitoneal, Biopsy:  - Metastatic adenocarcinoma of lung primary.     Comment: Specimen (block A2) is being sent to [facility] for MI Profile Testing. Upon completion of testing, [facility] report will be sent directly to the requesting provider, as well as posted in the Media Tab of EPIC for this patient by the Pathology Department.          Image Results:   Image result are reviewed and documented in Hematology/Oncology history    IR port placement  Narrative: Procedure:  1. Ultrasound guided right sided internal jugular venous access.  2. Right sided chest port placement.    Indication: 54year-old male with history of metastatic lung cancer requiring port placement for chemotherapy.    Fluoroscopy time: 1.6 minutes    Images: Multiple    Guidance: Ultrasound was utilized for initial  vessel access.  Fluoroscopy was used for the rest of the procedure.    Sedation: Moderate conscious sedation was utilized under my direct supervision administered by trained independent provider. A total of 30 minutes sedation time was utilized. I was present at the initial dose of sedation medication.    Technique/Findings:  After discussion of the benefits and risks of the procedure which included but are not limited to bleeding, infection, air embolism, and port dysfunction were discussed with the patient, questions were answered and written informed consent was obtained.    The patient was placed supine on the fluoroscopy suite table.  A timeout was performed.  A limited ultrasound examination of the right  neck demonstrated a patent right  internal jugular vein without evidence of thrombus.  The right neck and upper chest   were prepped and draped in the usual sterile fashion. All elements of maximal sterile barrier technique were followed (cap, mask, sterile gown, sterile gloves, large sterile sheet, hand hygiene, and 2% chlorhexidine for cutaneous antisepsis).  An   appropriate entry site was chosen under ultrasound guidance.  The overlying skin and the right upper chest wall were anesthetized with 1% lidocaine.    Under direct ultrasound visualization a micropuncture needle was advanced into the right  internal jugular vein with permanent recording and reporting.  Using standard Seldinger technique the introducer wire was advanced through the needle which was then   removed.  A small dermatotomy was made and the micropuncture sheath was placed over the wire.  Wire was used to estimate the length of the catheter using fluoroscopy.    Next attention was turned to placing the port.  A small linear 4 cm incision was made approximately two fingerbreadths beneath the right clavicle and a pocket for the port was created using blunt dissection.  The Smart port was assembled and placed into   the pocket.   The  "catheter was tunneled under the skin to the right internal jugular venous access site in the neck.  The catheter was measured and cut using the 018 wire. The micropuncture sheath was exchanged for the peel away sheath and the catheter   was advanced into its final position at the cavoatrial junction utilizing fluoroscopic guidance.    The incision was closed in layers, first with an interrupted layer of 3-0 Vicryl and then with Histoacryl glue.  Histoacryl glue was also applied to the internal jugular venous access site.    The port was accessed and noted to flush and aspirate without difficulty and then hep-locked. The patient tolerated the procedure well without any immediate complications.  Impression: Impression: Technically successful placement of right sided chest port which is ready to use.    Workstation performed: [id number]      LABS:  Lab data are reviewed and documented in HemOnc history.       Lab Results   Component Value Date    HGB 11.1 (L) 05/17/2024    HCT 36.6 05/17/2024    MCV 79 (L) 05/17/2024     05/17/2024    WBC 9.71 05/17/2024    NRBC 0 05/17/2024     Lab Results   Component Value Date    K 4.4 05/17/2024     05/17/2024    CO2 27 05/17/2024    BUN 13 05/17/2024    CREATININE 0.83 05/17/2024    GLUF 133 (H) 05/17/2024    CALCIUM 9.2 05/17/2024    AST 17 05/17/2024    ALT 18 05/17/2024    ALKPHOS 70 05/17/2024    EGFR 99 05/17/2024       Lab Results   Component Value Date    IRON 28 (L) 04/19/2024    TIBC 212 (L) 04/19/2024    FERRITIN 63 04/19/2024       No results found for: \"[id number]\"    No results for input(s): \"WBC\", \"CREAT\", \"PLT\" in the last 72 hours.    By:  Maryana Sandoval MD, 6/3/2024, 11:19 AM                                  "

## 2024-05-28 ENCOUNTER — RA CDI HCC (OUTPATIENT)
Dept: OTHER | Facility: HOSPITAL | Age: 54
End: 2024-05-28

## 2024-06-03 ENCOUNTER — APPOINTMENT (OUTPATIENT)
Dept: LAB | Facility: CLINIC | Age: 54
End: 2024-06-03
Payer: COMMERCIAL

## 2024-06-03 ENCOUNTER — OFFICE VISIT (OUTPATIENT)
Dept: HEMATOLOGY ONCOLOGY | Facility: CLINIC | Age: 54
End: 2024-06-03
Payer: COMMERCIAL

## 2024-06-03 VITALS
OXYGEN SATURATION: 94 % | HEART RATE: 112 BPM | DIASTOLIC BLOOD PRESSURE: 70 MMHG | RESPIRATION RATE: 16 BRPM | HEIGHT: 67 IN | SYSTOLIC BLOOD PRESSURE: 124 MMHG | BODY MASS INDEX: 31.86 KG/M2 | WEIGHT: 203 LBS | TEMPERATURE: 97.5 F

## 2024-06-03 DIAGNOSIS — T45.1X5A CHEMOTHERAPY INDUCED NAUSEA AND VOMITING: ICD-10-CM

## 2024-06-03 DIAGNOSIS — C34.81 ADENOCARCINOMA OF OVERLAPPING SITES OF RIGHT LUNG (HCC): Primary | ICD-10-CM

## 2024-06-03 DIAGNOSIS — R11.2 CHEMOTHERAPY INDUCED NAUSEA AND VOMITING: ICD-10-CM

## 2024-06-03 DIAGNOSIS — E11.00 TYPE 2 DIABETES MELLITUS WITH HYPEROSMOLARITY WITHOUT COMA, WITHOUT LONG-TERM CURRENT USE OF INSULIN (HCC): ICD-10-CM

## 2024-06-03 LAB
CREAT UR-MCNC: 58.8 MG/DL
MICROALBUMIN UR-MCNC: <7 MG/L
MICROALBUMIN/CREAT 24H UR: <12 MG/G CREATININE (ref 0–30)

## 2024-06-03 PROCEDURE — 82570 ASSAY OF URINE CREATININE: CPT

## 2024-06-03 PROCEDURE — 82043 UR ALBUMIN QUANTITATIVE: CPT

## 2024-06-03 PROCEDURE — 99215 OFFICE O/P EST HI 40 MIN: CPT | Performed by: INTERNAL MEDICINE

## 2024-06-04 ENCOUNTER — OFFICE VISIT (OUTPATIENT)
Dept: FAMILY MEDICINE CLINIC | Facility: CLINIC | Age: 54
End: 2024-06-04
Payer: COMMERCIAL

## 2024-06-04 VITALS
RESPIRATION RATE: 16 BRPM | HEART RATE: 118 BPM | TEMPERATURE: 97.7 F | WEIGHT: 208.8 LBS | SYSTOLIC BLOOD PRESSURE: 120 MMHG | HEIGHT: 67 IN | DIASTOLIC BLOOD PRESSURE: 60 MMHG | OXYGEN SATURATION: 93 % | BODY MASS INDEX: 32.77 KG/M2

## 2024-06-04 DIAGNOSIS — E27.8 ADRENAL NODULE (HCC): ICD-10-CM

## 2024-06-04 DIAGNOSIS — E16.2 HYPOGLYCEMIA: ICD-10-CM

## 2024-06-04 DIAGNOSIS — I26.09 ACUTE PULMONARY EMBOLISM WITH ACUTE COR PULMONALE, UNSPECIFIED PULMONARY EMBOLISM TYPE (HCC): ICD-10-CM

## 2024-06-04 DIAGNOSIS — C34.81 MALIGNANT NEOPLASM OF OVERLAPPING SITES OF RIGHT LUNG (HCC): ICD-10-CM

## 2024-06-04 DIAGNOSIS — I82.432 ACUTE DEEP VEIN THROMBOSIS (DVT) OF POPLITEAL VEIN OF LEFT LOWER EXTREMITY (HCC): ICD-10-CM

## 2024-06-04 DIAGNOSIS — I51.7: ICD-10-CM

## 2024-06-04 DIAGNOSIS — I82.412 ACUTE DEEP VEIN THROMBOSIS (DVT) OF LEFT FEMORAL VEIN (HCC): Primary | ICD-10-CM

## 2024-06-04 DIAGNOSIS — Z00.01 ENCOUNTER FOR GENERAL ADULT MEDICAL EXAMINATION WITH ABNORMAL FINDINGS: ICD-10-CM

## 2024-06-04 DIAGNOSIS — E11.00 TYPE 2 DIABETES MELLITUS WITH HYPEROSMOLARITY WITHOUT COMA, WITHOUT LONG-TERM CURRENT USE OF INSULIN (HCC): ICD-10-CM

## 2024-06-04 DIAGNOSIS — C34.91 PRIMARY MALIGNANT NEOPLASM OF RIGHT LUNG METASTATIC TO OTHER SITE (HCC): ICD-10-CM

## 2024-06-04 PROBLEM — E27.9 ADRENAL NODULE (HCC): Status: ACTIVE | Noted: 2024-06-04

## 2024-06-04 PROCEDURE — 99396 PREV VISIT EST AGE 40-64: CPT | Performed by: FAMILY MEDICINE

## 2024-06-04 PROCEDURE — 99214 OFFICE O/P EST MOD 30 MIN: CPT | Performed by: FAMILY MEDICINE

## 2024-06-04 RX ORDER — GLIMEPIRIDE 2 MG/1
2 TABLET ORAL
Qty: 90 TABLET | Refills: 1 | Status: SHIPPED | OUTPATIENT
Start: 2024-06-04

## 2024-06-04 RX ORDER — IBUPROFEN 600 MG/1
1 TABLET ORAL ONCE AS NEEDED
Qty: 2 KIT | Refills: 0 | Status: SHIPPED | OUTPATIENT
Start: 2024-06-04

## 2024-06-04 NOTE — PROGRESS NOTES
"Adult Annual Physical  Name: Papo Gibbs      : 1970      MRN: [id number]  Encounter Provider: Amelie Bacon MD  Encounter Date: 2024   Encounter department: [facility] MEDICINE    Assessment & Plan   1. Acute deep vein thrombosis (DVT) of left femoral vein (HCC)  2. Encounter for general adult medical examination with abnormal findings  3. Acute pulmonary embolism with acute cor pulmonale, unspecified pulmonary embolism type (HCC)  4. Acute deep vein thrombosis (DVT) of popliteal vein of left lower extremity (HCC)  5. Adrenal nodule (HCC)  6. Primary malignant neoplasm of right lung metastatic to other site (HCC)  7. Malignant neoplasm of overlapping sites of right lung (HCC)  8. Acquired left ventricular hypertrophy  9. Type 2 diabetes mellitus with hyperosmolarity without coma, without long-term current use of insulin (HCC)  -     glimepiride (AMARYL) 2 mg tablet; Take 1 tablet (2 mg total) by mouth daily with breakfast  -     Hemoglobin A1C; Future  -     Comprehensive metabolic panel; Future  -     Comprehensive metabolic panel  10. 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    I stressed importance of keeping safe, avoiding hypoglycemia, and always checking blood glucose prior to driving and operating equiptment. Cautioned never to skip or delay meal after taking insulin. Always carry a snack and/or glucose tabs.     I reviewed use of glucose tabs/ fast acting carb \"rule of 15\":  -If BG between 50-70 mg/dl, then give 15gms of fast-acting carb(discussed glucose products/other options) and recheck BG in 15 " minutes  -if BG still less than 70 then treat again until BG >70.   -If BG<50 take 30gms of fast-acting carb(discussed options) and recheck BG in 15 minutes  -if BG still less than 70 then treat again until BG >70.     Has/needs glucagon pen. Instructions provided for pen use.    Pt verbalized understanding.    Immunizations and preventive care screenings were discussed with patient today. Appropriate education was printed on patient's after visit summary.    Discussed risks and benefits of prostate cancer screening. We discussed the controversial history of PSA screening for prostate cancer in the [place] as well as the risk of over detection and over treatment of prostate cancer by way of PSA screening.  The patient understands that PSA blood testing is an imperfect way to screen for prostate cancer and that elevated PSA levels in the blood may also be caused by infection, inflammation, prostatic trauma or manipulation, urological procedures, or by benign prostatic enlargement.    The role of the digital rectal examination in prostate cancer screening was also discussed and I discussed with him that there is large interobserver variability in the findings of digital rectal examination.    Counseling:  Alcohol/drug use: discussed moderation in alcohol intake, the recommendations for healthy alcohol use, and avoidance of illicit drug use.  Dental Health: discussed importance of regular tooth brushing, flossing, and dental visits.  Injury prevention: discussed safety/seat belts, safety helmets, smoke detectors, carbon dioxide detectors, and smoking near bedding or upholstery.  Sexual health: discussed sexually transmitted diseases, partner selection, use of condoms, avoidance of unintended pregnancy, and contraceptive alternatives.  Exercise: the importance of regular exercise/physical activity was discussed. Recommend exercise 3-5 times per week for at least 30 minutes.          History of Present Illness      Adult Annual Physical:  Patient presents for annual physical.     Depression Screening:  - PHQ-2 Score: 0    Review of Systems   Constitutional:  Negative for chills and fever.   HENT:  Negative for congestion, rhinorrhea and sore throat.    Eyes:  Negative for discharge, redness and itching.   Respiratory:  Negative for chest tightness, shortness of breath and wheezing.    Cardiovascular:  Negative for chest pain and palpitations.   Gastrointestinal:  Negative for abdominal pain, constipation and diarrhea.   Genitourinary:  Negative for dysuria.   Skin:  Negative for pallor and rash.   Neurological:  Negative for dizziness, weakness, numbness and headaches.     Medical History Reviewed by provider this encounter:  Tobacco  Allergies  Meds  Problems  Med Hx  Surg Hx  Fam Hx       Current Outpatient Medications on File Prior to Visit   Medication Sig Dispense Refill    acetaminophen (TYLENOL) 325 mg tablet Take 3 tablets (975 mg total) by mouth every 8 (eight) hours 30 tablet 0    amitriptyline (ELAVIL) 100 mg tablet 200 mg daily at bedtime      apixaban (Eliquis) 5 mg Take 1 tablet (5 mg total) by mouth 2 (two) times a day Do not start before May 1, 2024. 60 tablet 5    atorvastatin (LIPITOR) 10 mg tablet Take 1 tablet (10 mg total) by mouth daily 90 tablet 3    benzonatate (TESSALON) 200 MG capsule Take 1 capsule (200 mg total) by mouth 3 (three) times a day as needed for cough 20 capsule 0    famotidine (PEPCID) 40 MG tablet Take 40 mg by mouth daily as needed for heartburn or indigestion Taking as needed      folic acid (KP Folic Acid) 1 mg tablet Take 1 tablet (1 mg total) by mouth daily 90 tablet 2    levothyroxine (Synthroid) 25 mcg tablet Take 1 tablet (25 mcg total) by mouth daily 90 tablet 2    losartan (COZAAR) 25 mg tablet Take 25 mg by mouth daily      metFORMIN (GLUCOPHAGE) 500 mg tablet Take 1,000 mg by mouth 2 (two) times a day with meals      ondansetron (ZOFRAN-ODT) 8 mg disintegrating  "tablet Take 1 tablet (8 mg total) by mouth every 8 (eight) hours as needed for vomiting or nausea 20 tablet 1    [DISCONTINUED] glimepiride (AMARYL) 4 mg tablet Take 1 tablet (4 mg total) by mouth daily with breakfast 90 tablet 1    Lancets (OneTouch Delica Plus Flkako11D) MISC Use 1 Units 4 (four) times a day 100 each 5    OneTouch Verio test strip Use 1 each 4 (four) times a day 100 each 5    [DISCONTINUED] lidocaine-prilocaine (EMLA) cream Apply topically as needed for mild pain (Patient not taking: Reported on 2024) 50 g 1     No current facility-administered medications on file prior to visit.      Social History     Tobacco Use    Smoking status: Former     Current packs/day: 0.00     Average packs/day: 1.5 packs/day for 35.0 years (52.5 ttl pk-yrs)     Types: Cigarettes     Quit date: 4/15/2024     Years since quittin.1     Passive exposure: Past    Smokeless tobacco: Never   Vaping Use    Vaping status: Never Used   Substance and Sexual Activity    Alcohol use: Not Currently    Drug use: Never    Sexual activity: Yes     Partners: Female     Birth control/protection: Post-menopausal, None       Objective     /60 (BP Location: Left arm, Patient Position: Sitting, Cuff Size: Adult)   Pulse (!) 118   Temp 97.7 °F (36.5 °C) (Tympanic)   Resp 16   Ht 5' 7\" (1.702 m)   Wt 94.7 kg (208 lb 12.8 oz)   SpO2 93%   BMI 32.70 kg/m²     Physical Exam  Vitals and nursing note reviewed.   Constitutional:       General: He is not in acute distress.     Appearance: Normal appearance. He is well-developed and normal weight. He is not ill-appearing or toxic-appearing.   HENT:      Head: Normocephalic and atraumatic.      Right Ear: Tympanic membrane, ear canal and external ear normal.      Left Ear: Tympanic membrane, ear canal and external ear normal.      Nose: No mucosal edema or rhinorrhea.      Mouth/Throat:      Mouth: Mucous membranes are moist. Mucous membranes are not pale and not cyanotic.      " Pharynx: Oropharynx is clear. No oropharyngeal exudate or posterior oropharyngeal erythema.   Eyes:      General: No scleral icterus.        Right eye: No discharge.         Left eye: No discharge.      Extraocular Movements: Extraocular movements intact.      Conjunctiva/sclera: Conjunctivae normal.      Pupils: Pupils are equal, round, and reactive to light.   Cardiovascular:      Rate and Rhythm: Normal rate and regular rhythm.      Heart sounds: Normal heart sounds. No murmur heard.     No gallop.   Pulmonary:      Effort: Pulmonary effort is normal. No respiratory distress.      Breath sounds: Normal breath sounds. No wheezing or rales.   Abdominal:      General: Abdomen is flat.      Palpations: Abdomen is soft.      Tenderness: There is no abdominal tenderness.   Musculoskeletal:         General: No swelling, tenderness, deformity or signs of injury.      Cervical back: Normal range of motion.      Right lower leg: No edema.      Left lower leg: No edema.   Skin:     General: Skin is warm.      Coloration: Skin is not jaundiced or pale.      Findings: No rash.   Neurological:      General: No focal deficit present.      Mental Status: He is alert and oriented to person, place, and time.   Psychiatric:         Mood and Affect: Mood normal.         Behavior: Behavior normal.         Thought Content: Thought content normal.         Judgment: Judgment normal.       Administrative Statements

## 2024-06-05 ENCOUNTER — PATIENT OUTREACH (OUTPATIENT)
Dept: HEMATOLOGY ONCOLOGY | Facility: CLINIC | Age: 54
End: 2024-06-05

## 2024-06-05 PROBLEM — Z95.828 PORT-A-CATH IN PLACE: Status: ACTIVE | Noted: 2024-06-05

## 2024-06-06 ENCOUNTER — OFFICE VISIT (OUTPATIENT)
Dept: PALLIATIVE MEDICINE | Facility: CLINIC | Age: 54
End: 2024-06-06
Payer: COMMERCIAL

## 2024-06-06 VITALS
DIASTOLIC BLOOD PRESSURE: 60 MMHG | HEART RATE: 110 BPM | WEIGHT: 210.5 LBS | OXYGEN SATURATION: 94 % | SYSTOLIC BLOOD PRESSURE: 110 MMHG | BODY MASS INDEX: 32.97 KG/M2 | TEMPERATURE: 97.6 F

## 2024-06-06 DIAGNOSIS — C34.81 ADENOCARCINOMA OF OVERLAPPING SITES OF RIGHT LUNG (HCC): Primary | ICD-10-CM

## 2024-06-06 DIAGNOSIS — E11.9 TYPE 2 DIABETES MELLITUS (HCC): ICD-10-CM

## 2024-06-06 DIAGNOSIS — R11.0 CHEMOTHERAPY-INDUCED NAUSEA: ICD-10-CM

## 2024-06-06 DIAGNOSIS — T45.1X5A CHEMOTHERAPY-INDUCED NAUSEA: ICD-10-CM

## 2024-06-06 DIAGNOSIS — Z51.5 PALLIATIVE CARE ENCOUNTER: ICD-10-CM

## 2024-06-06 DIAGNOSIS — J96.11 CHRONIC HYPOXEMIC RESPIRATORY FAILURE (HCC): ICD-10-CM

## 2024-06-06 PROCEDURE — 99214 OFFICE O/P EST MOD 30 MIN: CPT | Performed by: STUDENT IN AN ORGANIZED HEALTH CARE EDUCATION/TRAINING PROGRAM

## 2024-06-06 NOTE — PATIENT INSTRUCTIONS
It was a pleasure speaking with you today.    As discussed:  -Continue your medications as prescribed.  -Continue with a bowel regimen to avoid constipation.  -Continue to keep your appointments with your PCP and Specialists    Follow-up in: In 4 weeks or sooner as needed.    Please do not hesitate to call me sooner should you have any questions/concerns or needs.

## 2024-06-06 NOTE — ASSESSMENT & PLAN NOTE
Lab Results   Component Value Date    HGBA1C 7.1 (H) 04/20/2024   Ongoing management with his PCP, Dr. Bacon.  Recently had a visit with Dr. Bacon with adjustments to his medication regimen due to hypoglycemia episodes.

## 2024-06-06 NOTE — ASSESSMENT & PLAN NOTE
Patient had one episode of nausea after his first systemic cancer treatment.  States he required one dose of Zofran since then due to nausea from the treatment. This episode was resolved and has not had issues since with nausea or vomiting.    Patient is tolerating PO intake well without issues. Eating on average 3 meals a day without issues.  Patient without issues with bowel or bladder function at this time.

## 2024-06-06 NOTE — ASSESSMENT & PLAN NOTE
Goals of Care / Advanced Care Planning  -Advanced Directive Counseling Given.  -Patient did provide completed [facility] ACP documentation today. Documentation reviewed and form witnessed by our staff today.  -Health Care Proxy/Agent/ Power of  : Spouse - Tomasa    Psychosocial   Supportive listening provided  Normalized experience of patient/family  Provided anxiety containment     Referrals Placed / Medical Equipment Ordered  -None today    Follow-Up Recommendations  -Follow-up with PCP and current medical specialists  -Follow-up with palliative care: 4 weeks or sooner as needed for follow-up of MRI results along with continued monitoring as patient continues his systemic cancer treatments. Patient is scheduled for follow-up with Radiation Oncology.

## 2024-06-06 NOTE — ASSESSMENT & PLAN NOTE
Patient tolerated his first treatment with Keytruda, Pemetrexed and Carboplatin. Did have one episode of nausea after the treatment which was resolved by one dose of Zofran.   Patient has good supply of Zofran at this time and knows to call my office should he need refills or symptoms arise.    Patient is scheduled for MRI of the brain for surveillance of the metastatic lesions in the brain and will be following-up with Dr. Alcala of Radiation Oncology in the coming weeks this morning of June 2024.    Patient is otherwise doing well from a functional and nutritional standpoint with no complaints at this time.

## 2024-06-06 NOTE — PROGRESS NOTES
Outpatient Follow-Up - Palliative and Supportive Care   Papo Gibbs 54 y.o. male [id number]    Assessment & Plan  Problem List Items Addressed This Visit       Type 2 diabetes mellitus (HCC)     Lab Results   Component Value Date    HGBA1C 7.1 (H) 04/20/2024   Ongoing management with his PCP, Dr. Bacon.  Recently had a visit with Dr. Bacon with adjustments to his medication regimen due to hypoglycemia episodes.         Adenocarcinoma of overlapping sites of right lung (HCC) - Primary     Patient tolerated his first treatment with Keytruda, Pemetrexed and Carboplatin. Did have one episode of nausea after the treatment which was resolved by one dose of Zofran.   Patient has good supply of Zofran at this time and knows to call my office should he need refills or symptoms arise.    Patient is scheduled for MRI of the brain for surveillance of the metastatic lesions in the brain and will be following-up with Dr. Alcala of Radiation Oncology in the coming weeks this morning of June 2024.    Patient is otherwise doing well from a functional and nutritional standpoint with no complaints at this time.         Chronic hypoxemic respiratory failure (HCC)     Requires supplemental Oxygen at 3L continuously at home.  Follows Dr. Little of Pulmonology.         Palliative care encounter     Goals of Care / Advanced Care Planning  -Advanced Directive Counseling Given.  -Patient did provide completed [facility] ACP documentation today. Documentation reviewed and form witnessed by our staff today.  -Health Care Proxy/Agent/ Power of  : Spouse - Tomasa    Psychosocial   Supportive listening provided  Normalized experience of patient/family  Provided anxiety containment     Referrals Placed / Medical Equipment Ordered  -None today    Follow-Up Recommendations  -Follow-up with PCP and current medical specialists  -Follow-up with palliative care: 4 weeks or sooner as needed for follow-up of MRI results along with continued  monitoring as patient continues his systemic cancer treatments. Patient is scheduled for follow-up with Radiation Oncology.          Chemotherapy-induced nausea     Patient had one episode of nausea after his first systemic cancer treatment.  States he required one dose of Zofran since then due to nausea from the treatment. This episode was resolved and has not had issues since with nausea or vomiting.    Patient is tolerating PO intake well without issues. Eating on average 3 meals a day without issues.  Patient without issues with bowel or bladder function at this time.             Medications adjusted this encounter:  Requested Prescriptions      No prescriptions requested or ordered in this encounter     No orders of the defined types were placed in this encounter.    There are no discontinued medications.      Papo Gibbs was seen today for symptoms and planning cares related to above illnesses.  Above orders were sent electronically, or provided in hardcopy in clinic.  I have reviewed the patient's controlled substance dispensing history in the Prescription Drug Monitoring Program in compliance with the [facility] regulations before prescribing any controlled substances.  We appreciate the referral, and wish for him to continue to follow with us.  If there are questions or concerns, please contact us through our clinic/answering service 24 hours a day, seven days a week.      Visit Information    Accompanied By: No one    Source of History: Self    History Limitations: None    Contacts:Caregiver Information: Name: Spouse - Tomasa    Chief Complaint  Chief Complaint   Patient presents with    Follow-up       History of Present Illness  Papo Gibbs is a 54 y.o. 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    Pertinent issues include: symptom management, nausea, advance care planning    Patient following Dr. Sandoval medical oncology, Dr. Alcala of  radiation oncology.  Upcoming plans with MRI of the brain in the coming weeks along with follow-up with radiation oncology for further planning.  Patient is currently on Keytruda, pemetrexed, carboplatin with the next treatment due for June 11.  Patient has 1 treatment so far and has tolerated without issues.  He did have a day where some nausea in which he took a dose of Zofran that helped resolve his nausea.  Patient has not had a return of his nausea since that time after treatment. Patient denies pain at this time as he has remained relatively asymptomatic.   Patient does have a history of DM2 which is actively being managed by his PCP in which recent adjustments were made to his diabetic regimen due to episodes of hypoglycemia in which patient states he had times of glucose levels in the 50-60s. He is continuing to monitor his sugars and has started his adjusted regimen for his DM2. Denies fevers, chills, headaches, dizziness, vision changes, chest pain, new/worsening shortness of breath, or new/different/acute pain at this time. He is aware to call my office should he have questions or if symptoms arise requiring evaluation.  ACP documentation reviewed and patient provided a signed copy today. Did have our office to witness and upload to our records. Patient remains focused on all disease directed cares at this time with continued systemic cancer treatments and further evaluation by radiation.      Past medical, surgical, social, and family histories are reviewed and pertinent updates are made.    Review of Systems   Constitutional: Negative for chills, decreased appetite, fever and malaise/fatigue.   HENT:  Negative for odynophagia.         Denies change in taste.   Eyes:  Negative for visual disturbance.   Cardiovascular:  Negative for chest pain, irregular heartbeat, leg swelling and palpitations.   Respiratory:  Positive for shortness of breath (with physical exertion, does use supplemental O2 at home  contiuously at 3L. Does not need to use oxygen when he is out of the home as long as he is able to take it slow / at his pace.). Negative for cough.    Musculoskeletal:  Negative for back pain and myalgias.   Gastrointestinal:  Negative for abdominal pain, constipation, diarrhea, dysphagia, nausea and vomiting.   Genitourinary:  Negative for dysuria.   Neurological:  Negative for dizziness, headaches, light-headedness, numbness and paresthesias.   Psychiatric/Behavioral:  The patient does not have insomnia.    All other systems reviewed and are negative.        Vital Signs    /60 (BP Location: Left arm, Patient Position: Sitting, Cuff Size: Standard)   Pulse (!) 110   Temp 97.6 °F (36.4 °C) (Temporal)   Wt 95.5 kg (210 lb 8 oz)   SpO2 94%   BMI 32.97 kg/m²     Physical Exam and Objective Data  Physical Exam  Vitals and nursing note reviewed.   Constitutional:       General: He is not in acute distress.     Appearance: Normal appearance. He is well-developed. He is not ill-appearing, toxic-appearing or diaphoretic.   HENT:      Head: Normocephalic and atraumatic.      Nose: Nose normal.      Mouth/Throat:      Mouth: Mucous membranes are moist.   Eyes:      General:         Right eye: No discharge.         Left eye: No discharge.   Cardiovascular:      Rate and Rhythm: Normal rate.      Comments: Recheck later in office by radial pulse palpation with regular rhythm and rate <100 bpm. Patient does need a few minutes to rest/calm after walking/exerting himself physically (walking to patient room).  Pulmonary:      Effort: Pulmonary effort is normal. No respiratory distress.   Abdominal:      General: Abdomen is flat. There is no distension.   Musculoskeletal:         General: No swelling, tenderness or signs of injury.      Cervical back: Neck supple.      Right lower leg: No edema.      Left lower leg: No edema.   Skin:     General: Skin is warm and dry.      Capillary Refill: Capillary refill takes less  "than 2 seconds.      Coloration: Skin is not jaundiced or pale.   Neurological:      General: No focal deficit present.      Mental Status: He is alert. Mental status is at baseline.   Psychiatric:         Mood and Affect: Mood normal.         Behavior: Behavior normal.         Thought Content: Thought content normal.         Judgment: Judgment normal.       Radiology and Laboratory:  I personally reviewed and interpreted the following results:     32 minutes were spent in this ambulatory visit with greater than 50% of the time spent face to face with patient in counseling or coordination of care. A description of the counseling / coordination of care: symptom assessment and management, medication review, psychosocial support, chart review, imaging review, lab review, advanced directives, goals of care, supportive listening, and anticipatory guidance.. All of the patient's questions were answered during this discussion.    Note Shared.    Rikki Estrella DO  Palliative & Supportive Care  Clinic/Answering Service: [phone]  You can find me on [employer].    Portions of this document may have been created using dictation software and as such some \"sound alike\" terms may have been generated by the system. Do not hesitate to contact me with any questions or clarifications.    "

## 2024-06-07 ENCOUNTER — HOSPITAL ENCOUNTER (OUTPATIENT)
Dept: INFUSION CENTER | Facility: CLINIC | Age: 54
End: 2024-06-07
Payer: COMMERCIAL

## 2024-06-07 DIAGNOSIS — Z95.828 PORT-A-CATH IN PLACE: Primary | ICD-10-CM

## 2024-06-07 DIAGNOSIS — C34.81 ADENOCARCINOMA OF OVERLAPPING SITES OF RIGHT LUNG (HCC): ICD-10-CM

## 2024-06-07 LAB
ALBUMIN SERPL BCP-MCNC: 3.9 G/DL (ref 3.5–5)
ALP SERPL-CCNC: 80 U/L (ref 34–104)
ALT SERPL W P-5'-P-CCNC: 24 U/L (ref 7–52)
ANION GAP SERPL CALCULATED.3IONS-SCNC: 8 MMOL/L (ref 4–13)
AST SERPL W P-5'-P-CCNC: 16 U/L (ref 13–39)
BASOPHILS # BLD AUTO: 0.01 THOUSANDS/ÂΜL (ref 0–0.1)
BASOPHILS NFR BLD AUTO: 0 % (ref 0–1)
BILIRUB SERPL-MCNC: 0.23 MG/DL (ref 0.2–1)
BUN SERPL-MCNC: 15 MG/DL (ref 5–25)
CALCIUM SERPL-MCNC: 9.4 MG/DL (ref 8.4–10.2)
CHLORIDE SERPL-SCNC: 106 MMOL/L (ref 96–108)
CO2 SERPL-SCNC: 28 MMOL/L (ref 21–32)
CREAT SERPL-MCNC: 0.87 MG/DL (ref 0.6–1.3)
EOSINOPHIL # BLD AUTO: 0.05 THOUSAND/ÂΜL (ref 0–0.61)
EOSINOPHIL NFR BLD AUTO: 1 % (ref 0–6)
ERYTHROCYTE [DISTWIDTH] IN BLOOD BY AUTOMATED COUNT: 17.5 % (ref 11.6–15.1)
GFR SERPL CREATININE-BSD FRML MDRD: 97 ML/MIN/1.73SQ M
GLUCOSE SERPL-MCNC: 123 MG/DL (ref 65–140)
HCT VFR BLD AUTO: 32 % (ref 36.5–49.3)
HGB BLD-MCNC: 9.8 G/DL (ref 12–17)
IMM GRANULOCYTES # BLD AUTO: 0.02 THOUSAND/UL (ref 0–0.2)
IMM GRANULOCYTES NFR BLD AUTO: 1 % (ref 0–2)
LYMPHOCYTES # BLD AUTO: 1.19 THOUSANDS/ÂΜL (ref 0.6–4.47)
LYMPHOCYTES NFR BLD AUTO: 30 % (ref 14–44)
MCH RBC QN AUTO: 24.3 PG (ref 26.8–34.3)
MCHC RBC AUTO-ENTMCNC: 30.6 G/DL (ref 31.4–37.4)
MCV RBC AUTO: 79 FL (ref 82–98)
MONOCYTES # BLD AUTO: 0.39 THOUSAND/ÂΜL (ref 0.17–1.22)
MONOCYTES NFR BLD AUTO: 10 % (ref 4–12)
NEUTROPHILS # BLD AUTO: 2.33 THOUSANDS/ÂΜL (ref 1.85–7.62)
NEUTS SEG NFR BLD AUTO: 58 % (ref 43–75)
NRBC BLD AUTO-RTO: 0 /100 WBCS
PLATELET # BLD AUTO: 267 THOUSANDS/UL (ref 149–390)
PMV BLD AUTO: 8.8 FL (ref 8.9–12.7)
POTASSIUM SERPL-SCNC: 4.3 MMOL/L (ref 3.5–5.3)
PROT SERPL-MCNC: 6.9 G/DL (ref 6.4–8.4)
RBC # BLD AUTO: 4.03 MILLION/UL (ref 3.88–5.62)
SODIUM SERPL-SCNC: 142 MMOL/L (ref 135–147)
TSH SERPL DL<=0.05 MIU/L-ACNC: 0.65 UIU/ML (ref 0.45–4.5)
WBC # BLD AUTO: 3.99 THOUSAND/UL (ref 4.31–10.16)

## 2024-06-07 PROCEDURE — 80053 COMPREHEN METABOLIC PANEL: CPT | Performed by: INTERNAL MEDICINE

## 2024-06-07 PROCEDURE — 85025 COMPLETE CBC W/AUTO DIFF WBC: CPT | Performed by: INTERNAL MEDICINE

## 2024-06-07 PROCEDURE — 84443 ASSAY THYROID STIM HORMONE: CPT | Performed by: INTERNAL MEDICINE

## 2024-06-07 NOTE — PROGRESS NOTES
Pt here for central labs, offers no complaints, port accessed, labs drawn and port de accessed. Pt has treatment at [facility] on Tuesday at 0800.  Declined AVS

## 2024-06-10 DIAGNOSIS — C34.81 ADENOCARCINOMA OF OVERLAPPING SITES OF RIGHT LUNG (HCC): Primary | ICD-10-CM

## 2024-06-11 ENCOUNTER — HOSPITAL ENCOUNTER (OUTPATIENT)
Dept: INFUSION CENTER | Facility: CLINIC | Age: 54
Discharge: HOME | End: 2024-06-11
Payer: COMMERCIAL

## 2024-06-11 VITALS
SYSTOLIC BLOOD PRESSURE: 143 MMHG | HEIGHT: 67 IN | TEMPERATURE: 97.8 F | DIASTOLIC BLOOD PRESSURE: 84 MMHG | HEART RATE: 109 BPM | RESPIRATION RATE: 16 BRPM | WEIGHT: 203.71 LBS | BODY MASS INDEX: 31.97 KG/M2

## 2024-06-11 DIAGNOSIS — C34.81 ADENOCARCINOMA OF OVERLAPPING SITES OF RIGHT LUNG (HCC): Primary | ICD-10-CM

## 2024-06-11 PROCEDURE — 96417 CHEMO IV INFUS EACH ADDL SEQ: CPT

## 2024-06-11 PROCEDURE — 96413 CHEMO IV INFUSION 1 HR: CPT

## 2024-06-11 PROCEDURE — 96367 TX/PROPH/DG ADDL SEQ IV INF: CPT

## 2024-06-11 PROCEDURE — 96411 CHEMO IV PUSH ADDL DRUG: CPT

## 2024-06-11 RX ORDER — SODIUM CHLORIDE 9 MG/ML
20 INJECTION, SOLUTION INTRAVENOUS ONCE
Status: COMPLETED | OUTPATIENT
Start: 2024-06-11 | End: 2024-06-11

## 2024-06-11 RX ADMIN — SODIUM CHLORIDE 200 MG: 9 INJECTION, SOLUTION INTRAVENOUS at 09:38

## 2024-06-11 RX ADMIN — CARBOPLATIN 692.5 MG: 600 INJECTION, SOLUTION INTRAVENOUS at 10:47

## 2024-06-11 RX ADMIN — SODIUM CHLORIDE 20 ML/HR: 9 INJECTION, SOLUTION INTRAVENOUS at 08:25

## 2024-06-11 RX ADMIN — DEXAMETHASONE SODIUM PHOSPHATE: 100 INJECTION INTRAMUSCULAR; INTRAVENOUS at 08:26

## 2024-06-11 RX ADMIN — PEMETREXED DISODIUM 1000 MG: 500 INJECTION, POWDER, LYOPHILIZED, FOR SOLUTION INTRAVENOUS at 10:26

## 2024-06-11 RX ADMIN — FOSAPREPITANT 150 MG: 150 INJECTION, POWDER, LYOPHILIZED, FOR SOLUTION INTRAVENOUS at 08:50

## 2024-06-13 LAB — COLOGUARD RESULT REPORTABLE: NEGATIVE

## 2024-06-19 DIAGNOSIS — R05.9 COUGH, UNSPECIFIED TYPE: ICD-10-CM

## 2024-06-19 DIAGNOSIS — C34.81 ADENOCARCINOMA OF OVERLAPPING SITES OF RIGHT LUNG (HCC): ICD-10-CM

## 2024-06-20 ENCOUNTER — OFFICE VISIT (OUTPATIENT)
Dept: PULMONOLOGY | Facility: CLINIC | Age: 54
End: 2024-06-20
Payer: COMMERCIAL

## 2024-06-20 ENCOUNTER — HOSPITAL ENCOUNTER (OUTPATIENT)
Dept: MRI IMAGING | Facility: HOSPITAL | Age: 54
Discharge: HOME | End: 2024-06-20
Attending: STUDENT IN AN ORGANIZED HEALTH CARE EDUCATION/TRAINING PROGRAM
Payer: COMMERCIAL

## 2024-06-20 VITALS
SYSTOLIC BLOOD PRESSURE: 136 MMHG | BODY MASS INDEX: 32.44 KG/M2 | OXYGEN SATURATION: 97 % | WEIGHT: 207.2 LBS | HEART RATE: 91 BPM | TEMPERATURE: 95.4 F | DIASTOLIC BLOOD PRESSURE: 70 MMHG

## 2024-06-20 DIAGNOSIS — J98.4 RESTRICTIVE LUNG DISEASE: Primary | ICD-10-CM

## 2024-06-20 DIAGNOSIS — F17.200 TOBACCO DEPENDENCE: ICD-10-CM

## 2024-06-20 DIAGNOSIS — C34.81 ADENOCARCINOMA OF OVERLAPPING SITES OF RIGHT LUNG (HCC): ICD-10-CM

## 2024-06-20 DIAGNOSIS — C79.31 METASTASIS TO BRAIN (HCC): ICD-10-CM

## 2024-06-20 DIAGNOSIS — J96.11 CHRONIC HYPOXEMIC RESPIRATORY FAILURE (HCC): ICD-10-CM

## 2024-06-20 DIAGNOSIS — I26.09 ACUTE PULMONARY EMBOLISM WITH ACUTE COR PULMONALE, UNSPECIFIED PULMONARY EMBOLISM TYPE (HCC): ICD-10-CM

## 2024-06-20 PROCEDURE — A9585 GADOBUTROL INJECTION: HCPCS | Performed by: STUDENT IN AN ORGANIZED HEALTH CARE EDUCATION/TRAINING PROGRAM

## 2024-06-20 PROCEDURE — 99213 OFFICE O/P EST LOW 20 MIN: CPT | Performed by: NURSE PRACTITIONER

## 2024-06-20 PROCEDURE — 70553 MRI BRAIN STEM W/O & W/DYE: CPT

## 2024-06-20 RX ORDER — GADOBUTROL 604.72 MG/ML
10 INJECTION INTRAVENOUS
Status: COMPLETED | OUTPATIENT
Start: 2024-06-20 | End: 2024-06-20

## 2024-06-20 RX ORDER — TIOTROPIUM BROMIDE AND OLODATEROL 3.124; 2.736 UG/1; UG/1
2 SPRAY, METERED RESPIRATORY (INHALATION) DAILY
Qty: 4 G | Refills: 3 | Status: SHIPPED | OUTPATIENT
Start: 2024-06-20

## 2024-06-20 RX ORDER — BENZONATATE 200 MG/1
200 CAPSULE ORAL 3 TIMES DAILY PRN
Qty: 20 CAPSULE | Refills: 0 | Status: SHIPPED | OUTPATIENT
Start: 2024-06-20

## 2024-06-20 RX ADMIN — GADOBUTROL 10 ML: 604.72 INJECTION INTRAVENOUS at 07:35

## 2024-06-20 NOTE — ASSESSMENT & PLAN NOTE
Is following with oncology  Treatment therapy carbo-Alimta-Keytruda.  Patient endorses tolerating well at this time

## 2024-06-20 NOTE — ASSESSMENT & PLAN NOTE
Maintained on Eliquis  Due to recent cancer diagnosis patient may be at higher risk for additional blood clots and may need lifelong anticoagulation

## 2024-06-20 NOTE — ASSESSMENT & PLAN NOTE
35 pk yr history of smoking  Quit in April  Patient congratulated on continued cessation.    Discussed NRT options if needed.  Patient declined at this time

## 2024-06-20 NOTE — PROGRESS NOTES
Pulmonary Follow-Up Note   Papo Gibbs 54 y.o. male MRN: [id number]  6/20/2024      Assessment/Plan:    Problem List Items Addressed This Visit       Acute pulmonary embolism with acute cor pulmonale, unspecified pulmonary embolism type (HCC)     Maintained on Eliquis  Due to recent cancer diagnosis patient may be at higher risk for additional blood clots and may need lifelong anticoagulation         Tobacco dependence     35 pk yr history of smoking  Quit in April  Patient congratulated on continued cessation.    Discussed NRT options if needed.  Patient declined at this time         Adenocarcinoma of overlapping sites of right lung (HCC)     Is following with oncology  Treatment therapy carbo-Alimta-Keytruda.  Patient endorses tolerating well at this time         Relevant Medications    tiotropium-olodaterol (Stiolto Respimat) 2.5-2.5 MCG/ACT inhaler    Chronic hypoxemic respiratory failure (HCC)     Presented to office on room air saturation 97%  Continues to use 2 L with exertion as needed and at night         Restrictive lung disease - Primary     In office spirometry shows restrictive lung disease with FEV1 41%  Patient endorses significant improvement with Stiolto  Will continue Stiolto 2.5-2.5 mcg/ACT 2 puffs daily         Relevant Medications    tiotropium-olodaterol (Stiolto Respimat) 2.5-2.5 MCG/ACT inhaler       Return in about 4 months (around 10/20/2024).    All of Papo's questions were answered prior to leaving the office today. He will follow-up in 4 months or sooner should the need arise. He is aware to call our office with any further questions or concerns.    History of Present Illness   Reason for Visit: Follow  up  Chief Complaint: Feeling well  HPI: Ppao Gibbs is a 54 y.o. male who presents to the office today for follow up for chronic hypoxic respiratory failure, acute PE, restrictive lung disease and recent diagnosis of adenocarcinoma.  Patient was seen in the office by Dr. Little 6  weeks ago.  He presents to the office overall feeling well.  He was started on Stiolto at his last visit and endorses improvement in shortness of breath and cough.  Will continue Stiolto at this time.  He is following with oncology for recent diagnosis of adenocarcinoma of the lung.  Treatment therapy carbo-Alimta-Keytruda.  Patient endorses tolerating well at this time  He continues on Eliquis for PE.  Given cancer diagnosis patient will be at continued risk for additional blood clots and will likely need to remain on Eliquis lifelong.  Patient will follow-up in 4 months or sooner if needed    Review of Systems   Constitutional:  Negative for appetite change and fever.   HENT:  Negative for ear pain, postnasal drip, rhinorrhea, sneezing, sore throat and trouble swallowing.    Respiratory:  Positive for cough and shortness of breath.    Cardiovascular:  Negative for chest pain.   Musculoskeletal:  Negative for myalgias.   Neurological:  Negative for headaches.       Historical Information   Past Medical History:   Diagnosis Date    Cancer (HCC) 04,25,2024    Diabetes mellitus (HCC)     High cholesterol     History of blood clots     Hypertension     Leucocytosis 04/20/2024    Lung cancer (HCC)     Pneumonia     Pulmonary embolism (HCC)      Past Surgical History:   Procedure Laterality Date    IR BIOPSY LYMPH NODE  4/23/2024    IR PORT PLACEMENT  5/20/2024    KNEE SURGERY      MANDIBLE FRACTURE SURGERY  1985    PATELLA FRACTURE SURGERY      RECTAL SURGERY       Family History   Problem Relation Age of Onset    No Known Problems Father     No Known Problems Mother      Social History   Social History     Substance and Sexual Activity   Alcohol Use Not Currently     Social History     Substance and Sexual Activity   Drug Use Never     Social History     Tobacco Use   Smoking Status Former    Current packs/day: 0.00    Average packs/day: 1.5 packs/day for 35.0 years (52.5 ttl pk-yrs)    Types: Cigarettes    Quit date:  4/15/2024    Years since quittin.1    Passive exposure: Past   Smokeless Tobacco Never     E-Cigarette/Vaping    E-Cigarette Use Never User      E-Cigarette/Vaping Substances    Nicotine No     THC No     CBD No     Flavoring No     Other No     Unknown No        Meds/Allergies     Current Outpatient Medications:     amitriptyline (ELAVIL) 100 mg tablet, 200 mg daily at bedtime, Disp: , Rfl:     apixaban (Eliquis) 5 mg, Take 1 tablet (5 mg total) by mouth 2 (two) times a day Do not start before May 1, 2024., Disp: 60 tablet, Rfl: 5    atorvastatin (LIPITOR) 10 mg tablet, Take 1 tablet (10 mg total) by mouth daily, Disp: 90 tablet, Rfl: 3    benzonatate (TESSALON) 200 MG capsule, Take 1 capsule (200 mg total) by mouth 3 (three) times a day as needed for cough, Disp: 20 capsule, Rfl: 0    famotidine (PEPCID) 40 MG tablet, Take 40 mg by mouth daily as needed for heartburn or indigestion Taking as needed, Disp: , Rfl:     folic acid (KP Folic Acid) 1 mg tablet, Take 1 tablet (1 mg total) by mouth daily, Disp: 90 tablet, Rfl: 2    glimepiride (AMARYL) 2 mg tablet, Take 1 tablet (2 mg total) by mouth daily with breakfast, Disp: 90 tablet, Rfl: 1    Lancets (OneTouch Delica Plus [id number]) MISC, Use 1 Units 4 (four) times a day, Disp: 100 each, Rfl: 5    levothyroxine (Synthroid) 25 mcg tablet, Take 1 tablet (25 mcg total) by mouth daily, Disp: 90 tablet, Rfl: 2    losartan (COZAAR) 25 mg tablet, Take 25 mg by mouth daily, Disp: , Rfl:     metFORMIN (GLUCOPHAGE) 500 mg tablet, Take 1,000 mg by mouth 2 (two) times a day with meals, Disp: , Rfl:     ondansetron (ZOFRAN-ODT) 8 mg disintegrating tablet, Take 1 tablet (8 mg total) by mouth every 8 (eight) hours as needed for vomiting or nausea, Disp: 20 tablet, Rfl: 1    OneTouch Verio test strip, Use 1 each 4 (four) times a day, Disp: 100 each, Rfl: 5    tiotropium-olodaterol (Stiolto Respimat) 2.5-2.5 MCG/ACT inhaler, Inhale 2 puffs daily, Disp: 4 g, Rfl: 3    Glucagon,  rDNA, (Glucagon Emergency) 1 MG KIT, Inject 1 mg as directed once as needed (hypoglycemia) for up to 1 dose (Patient not taking: Reported on 6/20/2024), Disp: 2 kit, Rfl: 0  No Known Allergies    Vitals: Blood pressure 136/70, pulse 91, temperature (!) 95.4 °F (35.2 °C), weight 94 kg (207 lb 3.2 oz), SpO2 97%. Body mass index is 32.44 kg/m². Oxygen Therapy  SpO2: 97 %Ra    Physical Exam:  Physical Exam  Constitutional:       General: He is not in acute distress.     Appearance: He is not ill-appearing.   HENT:      Head: Normocephalic and atraumatic.      Right Ear: External ear normal.      Left Ear: External ear normal.      Nose: Nose normal.      Mouth/Throat:      Mouth: Mucous membranes are moist.      Pharynx: Oropharynx is clear.   Eyes:      Extraocular Movements: Extraocular movements intact.      Pupils: Pupils are equal, round, and reactive to light.   Cardiovascular:      Rate and Rhythm: Normal rate and regular rhythm.      Pulses: Normal pulses.      Heart sounds: Normal heart sounds.   Pulmonary:      Effort: Pulmonary effort is normal.      Breath sounds: Normal breath sounds.      Comments: Decreased breath sounds bilaterally  Abdominal:      General: Bowel sounds are normal.      Tenderness: There is no abdominal tenderness.   Musculoskeletal:         General: Normal range of motion.      Cervical back: Normal range of motion and neck supple.      Right lower leg: No edema.      Left lower leg: No edema.   Skin:     General: Skin is warm and dry.   Neurological:      Mental Status: He is alert and oriented to person, place, and time.   Psychiatric:         Mood and Affect: Mood normal.         Behavior: Behavior normal.         Thought Content: Thought content normal.         Judgment: Judgment normal.       Imaging and other studies: I have personally reviewed pertinent reports.     Chest x-ray 04/22/2024 shows persistent right basilar infiltrate with mild vascular congestion.  No pneumothorax or  "pleural effusion    Pulmonary Results (PFTs, PSG): I have personally reviewed pertinent reports.     In office spirometry shows restrictive lung disease with FEV1 of 41%    DIANNE Marroquin  [facility]      Portions of the record may have been created with voice recognition software.  Occasional wrong word or \"sound a like\" substitutions may have occurred due to the inherent limitations of voice recognition software.  Read the chart carefully and recognize, using context, where substitutions have occurred or contact the dictating provider.  Answers submitted by the patient for this visit:  Pulmonology Questionnaire (Submitted on 6/19/2024)  Chief Complaint: Primary symptoms  Chronicity: recurrent  When did you first notice your symptoms?: more than 1 month ago  How often do your symptoms occur?: daily  Since you first noticed this problem, how has it changed?: gradually improving  Do you have shortness of breath that occurs with effort or exertion?: Yes  Do you have ear congestion?: No  Do you have heartburn?: No  Do you have fatigue?: No  Do you have nasal congestion?: No  Do you have shortness of breath when lying flat?: No  Do you have shortness of breath when you wake up?: No  Do you have sweats?: No  Have you experienced weight loss?: No  Which of the following makes your symptoms worse?: climbing stairs, exercise, strenuous activity  Which of the following makes your symptoms better?: prescription cough suppressant, rest  Risk factors for lung disease: smoking/tobacco exposure    "

## 2024-06-20 NOTE — ASSESSMENT & PLAN NOTE
Presented to office on room air saturation 97%  Continues to use 2 L with exertion as needed and at night

## 2024-06-20 NOTE — ASSESSMENT & PLAN NOTE
In office spirometry shows restrictive lung disease with FEV1 41%  Patient endorses significant improvement with Stiolto  Will continue Stiolto 2.5-2.5 mcg/ACT 2 puffs daily

## 2024-06-22 NOTE — PROGRESS NOTES
Telemedicine consent    Patient: Papo Gibbs  Provider: Maryana Sandoval MD  Provider located at [facility] [place]  [address] [facility] [id number] [phone]    The patient was identified by name and date of birth. Papo Gibbs was informed that this is a telemedicine visit and that the visit is being conducted through the [employer] platform. He agrees to proceed..  My office door was closed. No one else was in the room.  He acknowledged consent and understanding of privacy and security of the video platform. The patient has agreed to participate and understands they can discontinue the visit at any time.    Patient is aware this is a billable service.                                      Hematology/Oncology Outpatient Office Note    Date of Service: 2024    [facility]ATOLOG[facility] [place]  [address]  [facility] [address]  [phone]    Reason for Consultation:   No chief complaint on file.      Cancer Stage at diagnosis: IV    Referral Physician: No ref. provider found    Primary Care Physician:  Amelie Bacon MD     Nickname: Jessica    Spouse: Mabel  (RN at [employer] in Cardiology)    Original ECO    Today's ECO    Goals and Barriers:  Current Goal: Minimize effects of disease burden, extend life.   Barriers to accomplishing this: SOB    Patient's Capacity to Self Care:  Patient is able to self care    ASSESSMENT & PLAN      Diagnosis ICD-10-CM Associated Orders   1. Adenocarcinoma of overlapping sites of right lung (HCC)  C34.81             This is a 54 y.o. c PMHx notable for DM, HLP, blood clots, HTN, remote nicotine abuse, being seen in consultation for metastatic lung adeno       Discussion of decision making  Oncology history updated, accordingly, during this visit  Goals of care/patient communication  I discussed with the patient the clinical course leading up to their cancer diagnosis. I reviewed relevant  office notes, imaging reports and pathology result as well.  I told the patient that this is a case of incurable disease and what this means. We discussed that the goal of anti-cancer therapy is to provide best quality of life, extend overall survival, and progression free survival as shown in clinical trials. We also discussed that there might be a point when the cancer will no longer respond to this anti-neoplastic therapy. As a result, we also discussed the role of the palliative care team being introduced early in the treatment course. We will be making this referral  I explained the risks/benefits of the proposed cancer therapy: Carbo-Alimta-Keytruda and after discussion including understanding risks of possible life-threatening complications and therapy-related malignancy development, informed consent for blood products and treatment has been signed and obtained.  4/24/2024 [facility] NGS: TMB high 10 muts/Mb,TP53, KEAP1 mutated, STK11 mutated  TNM/Staging At Diagnosis  Cancer Staging   Adenocarcinoma of overlapping sites of right lung (HCC)  Staging form: Lung, AJCC 8th Edition  - Clinical: Stage IV (cN3, cM1) - Signed by Maryana Sandoval MD on 5/2/2024  Disease Features/Tumor Markers/Genetics  Tumor Marker: n/a  Notable Path Features: 4/23/2024 Lymph Node, Left retroperitoneal, Biopsy: Metastatic adenocarcinoma of lung primary  Treatment: Carbo-Alimta-Keytruda  Other Supportive care: Zofran, EMLA  Treatment Team Members  Surgeon  Rad Onc  Palliative: Dr. Estrella  Labs  Diagnostics  4/19/2024 CT Chest PE: Acute moderate clot burden multifocal bilateral peripheral pulmonary embolism, mild R heart strain. Findings suggestive of multifocal pneumonia. Pathologically enlarged mediastinal, hilar, para-aortic, retrocrural, and upper abdominal lymph nodes. Nonspecific 1.9 cm left adrenal nodule  4/20/2024 CT Abd/pelv w/wo c: There is a 2.1 x 1.8 x 2.6 cm left adrenal nodule. Abdominal and thoracic lymphadenopathy  5/4/2024  MRI Brain w/wo c: 4 mm metastasis in the posterior right frontal lobe without significant edema. No other definite enhancing lesions but evaluation is limited due to motion artifact.  Tiny focus of restricted diffusion in the left frontal lobe without definite enhancement could represent acute/early subacute infarct  5/8/2024: Neuro working group recommended close MRI Brain surveillance for the 2 small brain mets to see how it responds to treatment, Dr. Paulo Alcala will trend it        Discussion of decision making    I personally reviewed the following lab results, the image studies, pathology, other specialty/physicians consult notes and recommendations, and outside medical records. I had a lengthy discussion with the patient and shared the work-up findings. We discussed the diagnosis and management plan as below. I spent 41 minutes reviewing the records (labs, clinician notes, outside records, medical history, ordering medicine/tests/procedures, monitoring of anti-neoplastic toxicities, interpreting the imaging/labs previously done) and coordination of care as well as direct time with the patient today, of which greater than 50% of the time was spent in counseling and coordination of care with the patient/family.      Plan/Labs  Restaging CT CAP w/c scheduled 8/2/2024  MRI Brain restaging as per Rad Onc  Rad Onc f/u for surveillance of 2 small brain mets  F/u palliative care   Infusion chairs ordered for Carbo-Alimta-Keytruda q3 weeks while on preceding labs, C3 coming up         Follow Up: q3 weeks scheduled thru 9/24/2024    All questions were answered to the patient's satisfaction during this encounter. The patient knows the contact information for our office and knows to reach out for any relevant concerns related to this encounter. They are to call for any temperature 100.4 or higher, new symptoms including but not restricted to shaking chills, decreased appetite, nausea, vomiting, diarrhea, increased  fatigue, shortness of breath or chest pain, confusion, and not feeling the strength to come to the clinic. For all other listed problems and medical diagnosis in their chart - they are managed by PCP and/or other specialists, which the patient acknowledges. Thank you very much for your consultation and making us a part of this patient's care. We are continuing to follow closely with you. Please do not hesitate to reach out to me with any additional questions or concerns.    Maryana Sandoval MD  Hematology & Medical Oncology Staff Physician             Disclaimer: This document was prepared using [employer] Direct technology. If a word or phrase is confusing, or does not make sense, this is likely due to recognition error which was not discovered during this clinician's review. If you believe an error has occurred, please contact me through [employer] service line for juliana?cation.      ONCOLOGY HISTORY OF PRESENT ILLNESS        Oncology History Overview Note   with multifocal bilateral peripheral pulmonary embolism. Biopsy 4/23/24 left retroperitoneal lymph node revealed metastatic adenocarcinoma of lung primary. MRI brain 5/4/24 revealed 4 mm metastasis in the posterior right frontal lobe.  Patient last seen in Radiation Oncology on 5/08/24. Plan for repeat MRI Brain in 6 weeks with RTC thereafter.          5/8/24 Palliative Care  Pembrolizumab, pemetrexed, carboplatin q. 21 days. Patient taking Eliquis for discomfort in mid-chest.  Dyspnea with exertion. Uses oxygen from 2-3L.        5/14/24 Infusion  OP Pembrolizumab + PEMEtrexed + CARBOplatin Q 21 Days     5/20/24 IR - port placement      6/3/24 Dr. Sandoval  F/u [facility] NGS sent off the 4/23 biopsy  F/u [employer] NGS sent off  Infusion chairs ordered for Carbo-Alimta-Keytruda q3 weeks while on preceding labs, C2 coming up   Follow Up: q3 weeks scheduled thru 9/24/2024 6/6/24-Palliative-Dr. Estrella  One episode of nausea after radiation. Taking Zofran for  nausea as needed.  Patient able to eat without issues and denies bowel or bladder problems.       24-MRI Brain-Surveillance of metastatic lesions.  2475-Onrdgnbst-Kscwi[facility] [place], CRNP      Upcomin24-Hem Onc-Dr. Sandoval  7/3/10-Xreeeqybfq-Hz. Shiu  24-CT C/A/P W/ CON  24-Hem Onc-Dr. Sandoval  24-Hem Onc-Dr. Sandoval  24-Hem Onc-Dr. Sandoval           Adenocarcinoma of overlapping sites of right lung (HCC)   2024 Biopsy    Final Diagnosis   A. Lymph Node, Left retroperitoneal, Biopsy:  - Metastatic adenocarcinoma of lung primary.         2024 Initial Diagnosis    Adenocarcinoma of overlapping sites of right lung (HCC)     2024 -  Cancer Staged    Staging form: Lung, AJCC 8th Edition  - Clinical: Stage IV (cN3, cM1) - Signed by Maryana Sandoval MD on 2024 -  Chemotherapy    cyanocobalamin, 1,000 mcg, Intramuscular, Once, 1 of 3 cycles  Administration: 1,000 mcg (2024)  alteplase (CATHFLO), 2 mg, Intracatheter, Every 1 Minute as needed, 2 of 6 cycles  fosaprepitant (EMEND) IVPB, 150 mg, Intravenous, Once, 2 of 6 cycles  Administration: 150 mg (2024), 150 mg (2024)  CARBOplatin (PARAPLATIN) IVPB (GOG AUC DOSING), 672 mg, Intravenous, Once, 2 of 6 cycles  Administration: 672 mg (2024), 692.5 mg (2024)  pemetrexed (ALIMTA) chemo infusion, 1,020 mg, Intravenous, Once, 2 of 6 cycles  Administration: 1,000 mg (2024), 1,000 mg (2024)  pembrolizumab (KEYTRUDA) IVPB, 200 mg, Intravenous, Once, 2 of 6 cycles  Administration: 200 mg (2024), 200 mg (2024)           SUBJECTIVE  (INTERVAL HISTORY)      Clotting History None   Bleeding History None   Cancer History Lung Adeno   Family Cancer History None   H/O Blood/Plt Transfusion None   Tobacco/etoh/drug abuse 1.5 PPD x 40 years, quit 2024, no etoh abuse or rec drug use       Cancer Screening history C-scope , due for cologuard (ordered)   Occupation   in the past, now        Pain: mid chest (deep) since early May 2024, related to coughing which is improved.    Dyspnea with exertion since 2024 is improving and he's able to take deeper breaths. Feels stronger. Has chronic kaleidoscope ocular sensation over the past few years.    Did well with the last chemo.      I have reviewed the relevant past medical, surgical, social and family history. I have also reviewed allergies and medications for this patient.    Review of Systems  Baseline weight: 200-205 lbs    Denies F/C, N/V, CP, LH, HA, rash, itching, gen weakness, unilateral weakness, diplopia, melena, hematuria, hematochezia, falls, diarrhea, or constipation       A 10-point review of system was performed, pertinent positive and negative were detailed as above. Otherwise, the 10-point review of system was negative.      Past Medical History:   Diagnosis Date    Cancer (HCC) ,,    Diabetes mellitus (HCC)     High cholesterol     History of blood clots     Hypertension     Leucocytosis 2024    Lung cancer (HCC)     Pneumonia     Pulmonary embolism (HCC)        Past Surgical History:   Procedure Laterality Date    IR BIOPSY LYMPH NODE  2024    IR PORT PLACEMENT  2024    KNEE SURGERY      MANDIBLE FRACTURE SURGERY  1985    PATELLA FRACTURE SURGERY      RECTAL SURGERY         Family History   Problem Relation Age of Onset    No Known Problems Father     No Known Problems Mother        Social History     Socioeconomic History    Marital status: Single     Spouse name: Not on file    Number of children: Not on file    Years of education: Not on file    Highest education level: Not on file   Occupational History    Not on file   Tobacco Use    Smoking status: Former     Current packs/day: 0.00     Average packs/day: 1.5 packs/day for 35.0 years (52.5 ttl pk-yrs)     Types: Cigarettes     Quit date: 4/15/2024     Years since quittin.1     Passive exposure: Past    Smokeless  tobacco: Never   Vaping Use    Vaping status: Never Used   Substance and Sexual Activity    Alcohol use: Not Currently    Drug use: Never    Sexual activity: Yes     Partners: Female     Birth control/protection: Post-menopausal, None   Other Topics Concern    Not on file   Social History Narrative    Not on file     Social Determinants of Health     Financial Resource Strain: Not on file   Food Insecurity: Not on file   Transportation Needs: Not on file   Physical Activity: Not on file   Stress: Not on file   Social Connections: Not on file   Intimate Partner Violence: Not on file   Housing Stability: Not on file       No Known Allergies    Current Outpatient Medications   Medication Sig Dispense Refill    amitriptyline (ELAVIL) 100 mg tablet 200 mg daily at bedtime      apixaban (Eliquis) 5 mg Take 1 tablet (5 mg total) by mouth 2 (two) times a day Do not start before May 1, 2024. 60 tablet 5    atorvastatin (LIPITOR) 10 mg tablet Take 1 tablet (10 mg total) by mouth daily 90 tablet 3    benzonatate (TESSALON) 200 MG capsule Take 1 capsule (200 mg total) by mouth 3 (three) times a day as needed for cough 20 capsule 0    famotidine (PEPCID) 40 MG tablet Take 40 mg by mouth daily as needed for heartburn or indigestion Taking as needed      folic acid ( Folic Acid) 1 mg tablet Take 1 tablet (1 mg total) by mouth daily 90 tablet 2    glimepiride (AMARYL) 2 mg tablet Take 1 tablet (2 mg total) by mouth daily with breakfast 90 tablet 1    Glucagon, rDNA, (Glucagon Emergency) 1 MG KIT Inject 1 mg as directed once as needed (hypoglycemia) for up to 1 dose (Patient not taking: Reported on 6/20/2024) 2 kit 0    Lancets (OneTouch Delica Plus [id number]) MISC Use 1 Units 4 (four) times a day 100 each 5    levothyroxine (Synthroid) 25 mcg tablet Take 1 tablet (25 mcg total) by mouth daily 90 tablet 2    losartan (COZAAR) 25 mg tablet Take 25 mg by mouth daily      metFORMIN (GLUCOPHAGE) 500 mg tablet Take 1,000 mg by mouth 2  (two) times a day with meals      ondansetron (ZOFRAN-ODT) 8 mg disintegrating tablet Take 1 tablet (8 mg total) by mouth every 8 (eight) hours as needed for vomiting or nausea 20 tablet 1    OneTouch Verio test strip Use 1 each 4 (four) times a day 100 each 5    tiotropium-olodaterol (Stiolto Respimat) 2.5-2.5 MCG/ACT inhaler Inhale 2 puffs daily 4 g 3     No current facility-administered medications for this visit.       (Not in a hospital admission)      Objective:     24 Hour Vitals Assessment:     There were no vitals filed for this visit.    Below not updated as this was a telemed video visit    PHYSICIAN EXAM:    General: Appearance: alert, cooperative, no distress.  HEENT: Normocephalic, atraumatic. No scleral icterus. conjunctivae clear. EOMI.  Chest: No tenderness to palpation. No open wound noted.  Lungs: Clear to auscultation bilaterally, Respirations unlabored.  Cardiac: Tachycardia, +S1and S2  Abdomen: Soft, non-tender, non-distended. Bowel sounds are normal.  Extremities:  No edema, cyanosis, clubbing.  Skin: Skin color, turgor are normal. No rashes.  Lymphatics: no palpable supra-cervical, axillary, or inguinal adenopathy  Neurologic: Awake, Alert, and oriented, no gross focal deficits noted b/l.       DATA REVIEW:    Pathology Result:    Final Diagnosis   Date Value Ref Range Status   04/23/2024   Final    A. Lymph Node, Left retroperitoneal, Biopsy:  - Metastatic adenocarcinoma of lung primary.     Comment: Specimen (block A2) is being sent to [facility] for MI Profile Testing. Upon completion of testing, Dr. Z report will be sent directly to the requesting provider, as well as posted in the Media Tab of EPIC for this patient by the Pathology Department.          Image Results:   Image result are reviewed and documented in Hematology/Oncology history    MRI Brain BT w wo Contrast  Narrative: MRI BRAIN WITH AND WITHOUT CONTRAST    INDICATION: C79.31: Secondary malignant neoplasm of  brain.    COMPARISON: 5/4/2024    TECHNIQUE:  Multiplanar, multisequence imaging of the brain was performed before and after gadolinium administration. Perfusion imaging was performed.    IV Contrast:  10 mL of Gadobutrol injection (SINGLE-DOSE)    IMAGE QUALITY:   Diagnostic.    FINDINGS:    BRAIN PARENCHYMA: There is redemonstration of a 5 x 4 mm nodular metastatic lesion within the high right frontal lobe/precentral gyrus on series 13, image 127. This is mildly increased in size since the prior examination where it measured 4 mm and only   appreciated on T1 postcontrast imaging. This is not clearly seen on prior Bravo imaging.    There are no new areas of nodular parenchymal or leptomeningeal enhancement identified.    There is mild chronic microvascular ischemic change. There is a chronic right caudate head lacunar infarct. Note is made of new subacute foci of lacunar ischemia within the left parieto-occipital region on series 302, image 17 and 19 without clear   corresponding low ADC signal. There is corresponding FLAIR signal hyperintensity in these regions.    There is been evolution of the previously seen punctate left frontal lobe focus of ischemia without nodular enhancement in this region.    VENTRICLES:  Normal for the patient's age.    SELLA AND PITUITARY GLAND:  Normal.    ORBITS:  Normal.    PARANASAL SINUSES:  Normal.    VASCULATURE:  Evaluation of the major intracranial vasculature demonstrates appropriate flow voids.    CALVARIUM AND SKULL BASE:  Normal.    EXTRACRANIAL SOFT TISSUES:  Normal.  Impression: Mild increase in size of the high posterior right frontal lobe/precentral gyrus metastatic lesion. No definite new areas of metastatic disease identified.    Punctate foci of subacute lacunar ischemia noted within the left parieto-occipital region, new since the prior examination. Punctate focus of left frontal lobe lacunar ischemia has resolved.    Chronic cystic lacunar infarct within the right  "caudate head and scattered chronic microvascular ischemic change.    Workstation performed: [id number]      LABS:  Lab data are reviewed and documented in HemOnc history.       Lab Results   Component Value Date    HGB 9.8 (L) 06/07/2024    HCT 32.0 (L) 06/07/2024    MCV 79 (L) 06/07/2024     06/07/2024    WBC 3.99 (L) 06/07/2024    NRBC 0 06/07/2024     Lab Results   Component Value Date    K 4.3 06/07/2024     06/07/2024    CO2 28 06/07/2024    BUN 15 06/07/2024    CREATININE 0.87 06/07/2024    GLUF 133 (H) 05/17/2024    CALCIUM 9.4 06/07/2024    AST 16 06/07/2024    ALT 24 06/07/2024    ALKPHOS 80 06/07/2024    EGFR 97 06/07/2024       Lab Results   Component Value Date    IRON 28 (L) 04/19/2024    TIBC 212 (L) 04/19/2024    FERRITIN 63 04/19/2024       No results found for: \"[id number]\"    No results for input(s): \"WBC\", \"CREAT\", \"PLT\" in the last 72 hours.    By:  Maryana Sandoval MD, 6/22/2024, 3:02 PM                                  "

## 2024-06-26 ENCOUNTER — OFFICE VISIT (OUTPATIENT)
Dept: RADIATION ONCOLOGY | Facility: HOSPITAL | Age: 54
End: 2024-06-26
Attending: STUDENT IN AN ORGANIZED HEALTH CARE EDUCATION/TRAINING PROGRAM
Payer: COMMERCIAL

## 2024-06-26 VITALS
SYSTOLIC BLOOD PRESSURE: 132 MMHG | OXYGEN SATURATION: 98 % | WEIGHT: 207 LBS | DIASTOLIC BLOOD PRESSURE: 82 MMHG | TEMPERATURE: 97.8 F | HEART RATE: 121 BPM | BODY MASS INDEX: 32.49 KG/M2 | HEIGHT: 67 IN

## 2024-06-26 DIAGNOSIS — C79.31 METASTASIS TO BRAIN (HCC): Primary | ICD-10-CM

## 2024-06-26 PROCEDURE — 99215 OFFICE O/P EST HI 40 MIN: CPT | Performed by: STUDENT IN AN ORGANIZED HEALTH CARE EDUCATION/TRAINING PROGRAM

## 2024-06-26 PROCEDURE — 77263 THER RADIOLOGY TX PLNG CPLX: CPT | Performed by: STUDENT IN AN ORGANIZED HEALTH CARE EDUCATION/TRAINING PROGRAM

## 2024-06-26 PROCEDURE — G0463 HOSPITAL OUTPT CLINIC VISIT: HCPCS | Performed by: STUDENT IN AN ORGANIZED HEALTH CARE EDUCATION/TRAINING PROGRAM

## 2024-06-26 PROCEDURE — 99211 OFF/OP EST MAY X REQ PHY/QHP: CPT | Performed by: STUDENT IN AN ORGANIZED HEALTH CARE EDUCATION/TRAINING PROGRAM

## 2024-06-26 NOTE — PROGRESS NOTES
Papo Gibbs 1970 is a 54 y.o. male   with multifocal bilateral peripheral pulmonary embolism. Biopsy 24 left retroperitoneal lymph node revealed metastatic adenocarcinoma of lung primary. MRI brain 24 revealed 4 mm metastasis in the posterior right frontal lobe.  Patient last seen in Radiation Oncology on 24. Plan for repeat MRI Brain in 6 weeks with RTC thereafter.          24 Palliative Care  Pembrolizumab, pemetrexed, carboplatin q. 21 days. Patient taking Eliquis for discomfort in mid-chest.  Dyspnea with exertion. Uses oxygen from 2-3L.        24 Infusion  OP Pembrolizumab + PEMEtrexed + CARBOplatin Q 21 Days     24 IR - port placement      6/3/24 Dr. Sandoval  F/u JOSE ENRIQUE NGS sent off the  biopsy  F/u [facility] NGS sent off  Infusion chairs ordered for Carbo-Alimta-Keytruda q3 weeks while on preceding labs, C2 coming up   Follow Up: q3 weeks scheduled thru 2024-Palliative-Dr. Estrella  One episode of nausea after radiation. Taking Zofran for nausea as needed.  Patient able to eat without issues and denies bowel or bladder problems.       24-MRI Brain-Surveillance of metastatic lesions  Mild increase in size of the high posterior right frontal lobe/precentral gyrus metastatic lesion. No definite new areas of metastatic disease identified.   Punctate foci of subacute lacunar ischemia noted within the left parieto-occipital region, new since the prior examination. Punctate focus of left frontal lobe lacunar ischemia has resolved.   Chronic cystic lacunar infarct within the right caudate head and scattered chronic microvascular ischemic change.    [id number] [place], CRNP  following with oncology  Treatment therapy carbo-Alimta-Keytruda.  Patient endorses tolerating well at this time  Presented to office on room air saturation 97%  Continues to use 2 L with exertion as needed and at night  Follow-up in 4 months.      Upcomin24-Hem Onc-  Jaime  7/3/29-Xljfeebmme-Rc. Shiu  8/2/24-CT C/A/P W/ CON  8/5/24-Hem Onc-Dr. Sandoval  8/26/24-Hem Onc-Dr. Sandoval  9/24/24-Hem Onc-Dr. Sandoval        Follow up visit     Oncology History   Adenocarcinoma of overlapping sites of right lung (HCC)   4/23/2024 Biopsy    Final Diagnosis   A. Lymph Node, Left retroperitoneal, Biopsy:  - Metastatic adenocarcinoma of lung primary.         5/2/2024 Initial Diagnosis    Adenocarcinoma of overlapping sites of right lung (HCC)     5/2/2024 -  Cancer Staged    Staging form: Lung, AJCC 8th Edition  - Clinical: Stage IV (cN3, cM1) - Signed by Maryana Sandoval MD on 5/2/2024 5/21/2024 -  Chemotherapy    cyanocobalamin, 1,000 mcg, Intramuscular, Once, 1 of 3 cycles  Administration: 1,000 mcg (5/14/2024)  alteplase (CATHFLO), 2 mg, Intracatheter, Every 1 Minute as needed, 2 of 6 cycles  fosaprepitant (EMEND) IVPB, 150 mg, Intravenous, Once, 2 of 6 cycles  Administration: 150 mg (5/21/2024), 150 mg (6/11/2024)  CARBOplatin (PARAPLATIN) IVPB (GOG AUC DOSING), 672 mg, Intravenous, Once, 2 of 6 cycles  Administration: 672 mg (5/21/2024), 692.5 mg (6/11/2024)  pemetrexed (ALIMTA) chemo infusion, 1,020 mg, Intravenous, Once, 2 of 6 cycles  Administration: 1,000 mg (5/21/2024), 1,000 mg (6/11/2024)  pembrolizumab (KEYTRUDA) IVPB, 200 mg, Intravenous, Once, 2 of 6 cycles  Administration: 200 mg (5/21/2024), 200 mg (6/11/2024)         Review of Systems:  Review of Systems   Constitutional:  Positive for fatigue (Moderate).   HENT: Negative.     Eyes: Negative.    Respiratory:  Positive for shortness of breath (with exertion). Cough: Daily, dry.   Cardiovascular: Negative.    Gastrointestinal: Negative.    Endocrine: Negative.    Genitourinary: Negative.    Musculoskeletal: Negative.    Skin: Negative.    Allergic/Immunologic: Negative.    Neurological: Negative.    Hematological: Negative.    Psychiatric/Behavioral: Negative.         Clinical Trial: no    Teaching yes    Health  Maintenance   Topic Date Due    Hepatitis C Screening  Never done    Pneumococcal Vaccine: Pediatrics (0 to 5 Years) and At-Risk Patients (6 to 64 Years) (1 of 2 - PCV) Never done    DM Eye Exam  Never done    HIV Screening  Never done    BMI: Followup Plan  Never done    Zoster Vaccine (1 of 2) Never done    Hepatitis A Vaccine (1 of 2 - Risk 2-dose series) Never done    COVID-19 Vaccine (3 - Pfizer risk series) 05/20/2021    Influenza Vaccine (Season Ended) 09/01/2024    HEMOGLOBIN A1C  10/20/2024    Diabetic Foot Exam  04/30/2025    Annual Physical  06/04/2025    BMI: Adult  06/20/2025    Depression Screening  06/26/2025    Colorectal Cancer Screening  06/08/2027    RSV Vaccine Age 60+ Years (1 - 1-dose 60+ series) 04/13/2029    DTaP,Tdap,and Td Vaccines (2 - Td or Tdap) 04/03/2034    RSV Vaccine age 0-20 Months  Aged Out    HIB Vaccine  Aged Out    IPV Vaccine  Aged Out    Meningococcal ACWY Vaccine  Aged Out    HPV Vaccine  Aged Out     Patient Active Problem List   Diagnosis    Encounter to discuss test results    Gross hematuria    Acute pulmonary embolism with acute cor pulmonale, unspecified pulmonary embolism type (HCC)    Abnormal CT of the chest    HTN (hypertension)    Type 2 diabetes mellitus (HCC)    Tobacco dependence    SOB (shortness of breath)    Chest pain    Acute respiratory failure with hypoxia (HCC)    Adenocarcinoma of overlapping sites of right lung (HCC)    Chronic hypoxemic respiratory failure (HCC)    Restrictive lung disease    Palliative care encounter    History of pulmonary embolism    Adrenal nodule (HCC)    Acute deep vein thrombosis (DVT) of popliteal vein of left lower extremity (HCC)    Acute deep vein thrombosis (DVT) of left femoral vein (HCC)    Primary malignant neoplasm of right lung metastatic to other site (HCC)    Malignant neoplasm of overlapping sites of right lung (HCC)    Acquired left ventricular hypertrophy    Hypoglycemia    Port-A-Cath in place     Chemotherapy-induced nausea     Past Medical History:   Diagnosis Date    Cancer (HCC) ,    Diabetes mellitus (HCC)     High cholesterol     History of blood clots     Hypertension     Leucocytosis 2024    Lung cancer (HCC)     Pneumonia     Pulmonary embolism (HCC)      Past Surgical History:   Procedure Laterality Date    IR BIOPSY LYMPH NODE  2024    IR PORT PLACEMENT  2024    KNEE SURGERY      MANDIBLE FRACTURE SURGERY  1985    PATELLA FRACTURE SURGERY      RECTAL SURGERY       Family History   Problem Relation Age of Onset    No Known Problems Father     No Known Problems Mother      Social History     Socioeconomic History    Marital status: Single     Spouse name: Not on file    Number of children: Not on file    Years of education: Not on file    Highest education level: Not on file   Occupational History    Not on file   Tobacco Use    Smoking status: Former     Current packs/day: 0.00     Average packs/day: 1.5 packs/day for 35.0 years (52.5 ttl pk-yrs)     Types: Cigarettes     Quit date: 4/15/2024     Years since quittin.1     Passive exposure: Past    Smokeless tobacco: Never   Vaping Use    Vaping status: Never Used   Substance and Sexual Activity    Alcohol use: Not Currently    Drug use: Never    Sexual activity: Yes     Partners: Female     Birth control/protection: Post-menopausal, None   Other Topics Concern    Not on file   Social History Narrative    Not on file     Social Determinants of Health     Financial Resource Strain: Not on file   Food Insecurity: Not on file   Transportation Needs: Not on file   Physical Activity: Not on file   Stress: Not on file   Social Connections: Not on file   Intimate Partner Violence: Not on file   Housing Stability: Not on file       Current Outpatient Medications:     amitriptyline (ELAVIL) 100 mg tablet, 200 mg daily at bedtime, Disp: , Rfl:     apixaban (Eliquis) 5 mg, Take 1 tablet (5 mg total) by mouth 2 (two) times a day Do  "not start before May 1, 2024., Disp: 60 tablet, Rfl: 5    atorvastatin (LIPITOR) 10 mg tablet, Take 1 tablet (10 mg total) by mouth daily, Disp: 90 tablet, Rfl: 3    famotidine (PEPCID) 40 MG tablet, Take 40 mg by mouth daily as needed for heartburn or indigestion Taking as needed, Disp: , Rfl:     folic acid (KP Folic Acid) 1 mg tablet, Take 1 tablet (1 mg total) by mouth daily, Disp: 90 tablet, Rfl: 2    glimepiride (AMARYL) 2 mg tablet, Take 1 tablet (2 mg total) by mouth daily with breakfast, Disp: 90 tablet, Rfl: 1    Lancets (OneTouch Delica Plus [id number]) MISC, Use 1 Units 4 (four) times a day, Disp: 100 each, Rfl: 5    levothyroxine (Synthroid) 25 mcg tablet, Take 1 tablet (25 mcg total) by mouth daily, Disp: 90 tablet, Rfl: 2    losartan (COZAAR) 25 mg tablet, Take 25 mg by mouth daily, Disp: , Rfl:     metFORMIN (GLUCOPHAGE) 500 mg tablet, Take 1,000 mg by mouth 2 (two) times a day with meals, Disp: , Rfl:     ondansetron (ZOFRAN-ODT) 8 mg disintegrating tablet, Take 1 tablet (8 mg total) by mouth every 8 (eight) hours as needed for vomiting or nausea, Disp: 20 tablet, Rfl: 1    OneTouch Verio test strip, Use 1 each 4 (four) times a day, Disp: 100 each, Rfl: 5    tiotropium-olodaterol (Stiolto Respimat) 2.5-2.5 MCG/ACT inhaler, Inhale 2 puffs daily, Disp: 4 g, Rfl: 3    benzonatate (TESSALON) 200 MG capsule, Take 1 capsule (200 mg total) by mouth 3 (three) times a day as needed for cough (Patient not taking: Reported on 6/26/2024), Disp: 20 capsule, Rfl: 0    Glucagon, rDNA, (Glucagon Emergency) 1 MG KIT, Inject 1 mg as directed once as needed (hypoglycemia) for up to 1 dose (Patient not taking: Reported on 6/20/2024), Disp: 2 kit, Rfl: 0  No Known Allergies  Vitals:    06/26/24 1527   BP: 132/82   BP Location: Left arm   Patient Position: Sitting   Cuff Size: Large   Pulse: (!) 121   Temp: 97.8 °F (36.6 °C)   TempSrc: Temporal   SpO2: 98%   Weight: 93.9 kg (207 lb)   Height: 5' 7.01\" (1.702 m)      Pain " Score: 0-No pain

## 2024-06-27 NOTE — PROGRESS NOTES
Follow-up - Radiation Oncology   Papo Gibbs 1970 54 y.o. male [id number]      History of Present Illness   Cancer Staging   Adenocarcinoma of overlapping sites of right lung (HCC)  Staging form: Lung, AJCC 8th Edition  - Clinical: Stage IV (cN3, cM1) - Signed by Maryana Sandoval MD on 5/2/2024      Mr. Papo Gibbs is a 54 year old man with recently diagnosed Stage IV lung adenocarcinoma, found on screening MRI Brain to a have a small (4mm) right frontal metastasis. He was seen in initial consultation on 5/8/24 with plan for observation pending initiation of systemic therapy. He returns today for follow-up.     Interval History:  Since his initial visit the patient has initiated systemic therapy with combination carboplatin/pemetrexed/pembro (started 5/21/24).     MRI Brain (6/20/24) demonstrated:  Mild increase in size of the high posterior right frontal lobe/precentral gyrus metastatic lesion. No definite new areas of metastatic disease identified.  Punctate foci of subacute lacunar ischemia noted within the left parieto-occipital region, new since the prior examination. Punctate focus of left frontal lobe lacunar ischemia has resolved.  Chronic cystic lacunar infarct within the right caudate head and scattered chronic microvascular ischemic change.    Currently the patient is doing well overall. He has tolerated systemic therapy well overall without substantial difficulty. He remains asymptomatic of his small intracranial metastasis with no weakness, numbness, or headaches.       Historical Information   Oncology History   Adenocarcinoma of overlapping sites of right lung (HCC)   4/23/2024 Biopsy    Final Diagnosis   A. Lymph Node, Left retroperitoneal, Biopsy:  - Metastatic adenocarcinoma of lung primary.         5/2/2024 Initial Diagnosis    Adenocarcinoma of overlapping sites of right lung (HCC)     5/2/2024 -  Cancer Staged    Staging form: Lung, AJCC 8th Edition  - Clinical: Stage IV (cN3, cM1) -  "Signed by Maryana Sandoval MD on 5/2/2024 5/21/2024 -  Chemotherapy    cyanocobalamin, 1,000 mcg, Intramuscular, Once, 1 of 3 cycles  Administration: 1,000 mcg (5/14/2024)  alteplase (CATHFLO), 2 mg, Intracatheter, Every 1 Minute as needed, 2 of 6 cycles  fosaprepitant (EMEND) IVPB, 150 mg, Intravenous, Once, 2 of 6 cycles  Administration: 150 mg (5/21/2024), 150 mg (6/11/2024)  CARBOplatin (PARAPLATIN) IVPB (GOG AUC DOSING), 672 mg, Intravenous, Once, 2 of 6 cycles  Administration: 672 mg (5/21/2024), 692.5 mg (6/11/2024)  pemetrexed (ALIMTA) chemo infusion, 1,020 mg, Intravenous, Once, 2 of 6 cycles  Administration: 1,000 mg (5/21/2024), 1,000 mg (6/11/2024)  pembrolizumab (KEYTRUDA) IVPB, 200 mg, Intravenous, Once, 2 of 6 cycles  Administration: 200 mg (5/21/2024), 200 mg (6/11/2024)       Review of Systems   Constitutional:  Positive for fatigue (Moderate).   HENT: Negative.     Eyes: Negative.    Respiratory:  Positive for shortness of breath (with exertion). Cough: Daily, dry.   Cardiovascular: Negative.    Gastrointestinal: Negative.    Endocrine: Negative.    Genitourinary: Negative.    Musculoskeletal: Negative.    Skin: Negative.    Allergic/Immunologic: Negative.    Neurological: Negative.    Hematological: Negative.    Psychiatric/Behavioral: Negative.     OBJECTIVE:   /82 (BP Location: Left arm, Patient Position: Sitting, Cuff Size: Large)   Pulse (!) 121   Temp 97.8 °F (36.6 °C) (Temporal)   Ht 5' 7.01\" (1.702 m)   Wt 93.9 kg (207 lb)   SpO2 98%   BMI 32.41 kg/m²   Pain Assessment:  0  ECOG/Zubrod/WHO: 0 - Asymptomatic    Physical Exam   Well appearing. NAD.  No increased work of breathing.   Extremities warm and well perfused.   No overt neurologic deficits noted.     Assessment/Plan:  Orders Placed This Encounter   Procedures    Radiation Simulation Treatment      We reviewed the patient's repeat MRI Brain in detail in comparison to prior imaging studies. On my review this " "demonstrates mild interval progression of his previously seen right frontal metastasis. Options at this point would include SRS now vs continued observation with repeat MRI Brain in 2 months. Risks and benefits of each approach were reviewed in detail. Specifically, we reviewed that while continued observation may ultimately show treatment response, given the likelihood of continued growth, the higher risk of radionecrosis with increased volume, and the eloquent location of the metastasis I would favor upfront SRS at this point.     Side effects of SRS were reviewed in detail to include fatigue, headaches, nausea, neurologic injury, and radionecrosis. After considering options the patient would like to proceed with SRS as described; an informed consent was signed. He will undergo CT simulation later this week with plan for SRS next week.     Sergey Alcala MD  6/26/2024,10:14 PM    Portions of the record may have been created with voice recognition software.  Occasional wrong word or \"sound a like\" substitutions may have occurred due to the inherent limitations of voice recognition software.  Read the chart carefully and recognize, using context, where substitutions have occurred.      "

## 2024-06-28 ENCOUNTER — APPOINTMENT (OUTPATIENT)
Dept: LAB | Facility: CLINIC | Age: 54
End: 2024-06-28
Payer: COMMERCIAL

## 2024-06-28 ENCOUNTER — APPOINTMENT (OUTPATIENT)
Dept: RADIATION ONCOLOGY | Facility: HOSPITAL | Age: 54
End: 2024-06-28
Attending: STUDENT IN AN ORGANIZED HEALTH CARE EDUCATION/TRAINING PROGRAM
Payer: COMMERCIAL

## 2024-06-28 DIAGNOSIS — E11.00 TYPE 2 DIABETES MELLITUS WITH HYPEROSMOLARITY WITHOUT COMA, WITHOUT LONG-TERM CURRENT USE OF INSULIN (HCC): ICD-10-CM

## 2024-06-28 DIAGNOSIS — C34.81 ADENOCARCINOMA OF OVERLAPPING SITES OF RIGHT LUNG (HCC): ICD-10-CM

## 2024-06-28 LAB
ALBUMIN SERPL BCG-MCNC: 4.2 G/DL (ref 3.5–5)
ALP SERPL-CCNC: 91 U/L (ref 34–104)
ALT SERPL W P-5'-P-CCNC: 26 U/L (ref 7–52)
ANION GAP SERPL CALCULATED.3IONS-SCNC: 9 MMOL/L (ref 4–13)
AST SERPL W P-5'-P-CCNC: 14 U/L (ref 13–39)
BASOPHILS # BLD AUTO: 0.02 THOUSANDS/ÂΜL (ref 0–0.1)
BASOPHILS NFR BLD AUTO: 0 % (ref 0–1)
BILIRUB SERPL-MCNC: 0.23 MG/DL (ref 0.2–1)
BUN SERPL-MCNC: 22 MG/DL (ref 5–25)
CALCIUM SERPL-MCNC: 9.8 MG/DL (ref 8.4–10.2)
CHLORIDE SERPL-SCNC: 102 MMOL/L (ref 96–108)
CO2 SERPL-SCNC: 24 MMOL/L (ref 21–32)
CREAT SERPL-MCNC: 0.82 MG/DL (ref 0.6–1.3)
EOSINOPHIL # BLD AUTO: 0.04 THOUSAND/ÂΜL (ref 0–0.61)
EOSINOPHIL NFR BLD AUTO: 1 % (ref 0–6)
ERYTHROCYTE [DISTWIDTH] IN BLOOD BY AUTOMATED COUNT: 19.8 % (ref 11.6–15.1)
EST. AVERAGE GLUCOSE BLD GHB EST-MCNC: 146 MG/DL
GFR SERPL CREATININE-BSD FRML MDRD: 99 ML/MIN/1.73SQ M
GLUCOSE P FAST SERPL-MCNC: 100 MG/DL (ref 65–99)
HBA1C MFR BLD: 6.7 %
HCT VFR BLD AUTO: 31.7 % (ref 36.5–49.3)
HGB BLD-MCNC: 10 G/DL (ref 12–17)
IMM GRANULOCYTES # BLD AUTO: 0.02 THOUSAND/UL (ref 0–0.2)
IMM GRANULOCYTES NFR BLD AUTO: 0 % (ref 0–2)
LYMPHOCYTES # BLD AUTO: 1.5 THOUSANDS/ÂΜL (ref 0.6–4.47)
LYMPHOCYTES NFR BLD AUTO: 31 % (ref 14–44)
MCH RBC QN AUTO: 25 PG (ref 26.8–34.3)
MCHC RBC AUTO-ENTMCNC: 31.5 G/DL (ref 31.4–37.4)
MCV RBC AUTO: 79 FL (ref 82–98)
MONOCYTES # BLD AUTO: 0.55 THOUSAND/ÂΜL (ref 0.17–1.22)
MONOCYTES NFR BLD AUTO: 11 % (ref 4–12)
NEUTROPHILS # BLD AUTO: 2.76 THOUSANDS/ÂΜL (ref 1.85–7.62)
NEUTS SEG NFR BLD AUTO: 57 % (ref 43–75)
NRBC BLD AUTO-RTO: 0 /100 WBCS
PLATELET # BLD AUTO: 273 THOUSANDS/UL (ref 149–390)
PMV BLD AUTO: 9.1 FL (ref 8.9–12.7)
POTASSIUM SERPL-SCNC: 4.2 MMOL/L (ref 3.5–5.3)
PROT SERPL-MCNC: 7.1 G/DL (ref 6.4–8.4)
RBC # BLD AUTO: 4 MILLION/UL (ref 3.88–5.62)
SODIUM SERPL-SCNC: 135 MMOL/L (ref 135–147)
TSH SERPL DL<=0.05 MIU/L-ACNC: 0.68 UIU/ML (ref 0.45–4.5)
WBC # BLD AUTO: 4.89 THOUSAND/UL (ref 4.31–10.16)

## 2024-06-28 PROCEDURE — 77334 RADIATION TREATMENT AID(S): CPT | Performed by: STUDENT IN AN ORGANIZED HEALTH CARE EDUCATION/TRAINING PROGRAM

## 2024-06-28 PROCEDURE — 77290 THER RAD SIMULAJ FIELD CPLX: CPT | Performed by: STUDENT IN AN ORGANIZED HEALTH CARE EDUCATION/TRAINING PROGRAM

## 2024-06-28 PROCEDURE — 85025 COMPLETE CBC W/AUTO DIFF WBC: CPT

## 2024-06-28 PROCEDURE — 80053 COMPREHEN METABOLIC PANEL: CPT

## 2024-06-28 PROCEDURE — 84443 ASSAY THYROID STIM HORMONE: CPT

## 2024-06-28 PROCEDURE — 83036 HEMOGLOBIN GLYCOSYLATED A1C: CPT

## 2024-06-28 PROCEDURE — 36415 COLL VENOUS BLD VENIPUNCTURE: CPT

## 2024-07-01 ENCOUNTER — TELEMEDICINE (OUTPATIENT)
Dept: HEMATOLOGY ONCOLOGY | Facility: CLINIC | Age: 54
End: 2024-07-01
Payer: COMMERCIAL

## 2024-07-01 ENCOUNTER — APPOINTMENT (OUTPATIENT)
Dept: RADIATION ONCOLOGY | Facility: HOSPITAL | Age: 54
End: 2024-07-01
Attending: STUDENT IN AN ORGANIZED HEALTH CARE EDUCATION/TRAINING PROGRAM
Payer: COMMERCIAL

## 2024-07-01 DIAGNOSIS — C34.81 ADENOCARCINOMA OF OVERLAPPING SITES OF RIGHT LUNG (HCC): Primary | ICD-10-CM

## 2024-07-01 PROCEDURE — 99215 OFFICE O/P EST HI 40 MIN: CPT | Performed by: INTERNAL MEDICINE

## 2024-07-01 PROCEDURE — 77334 RADIATION TREATMENT AID(S): CPT | Performed by: STUDENT IN AN ORGANIZED HEALTH CARE EDUCATION/TRAINING PROGRAM

## 2024-07-01 PROCEDURE — 77295 3-D RADIOTHERAPY PLAN: CPT | Performed by: STUDENT IN AN ORGANIZED HEALTH CARE EDUCATION/TRAINING PROGRAM

## 2024-07-01 PROCEDURE — 77300 RADIATION THERAPY DOSE PLAN: CPT | Performed by: STUDENT IN AN ORGANIZED HEALTH CARE EDUCATION/TRAINING PROGRAM

## 2024-07-02 ENCOUNTER — HOSPITAL ENCOUNTER (OUTPATIENT)
Dept: INFUSION CENTER | Facility: CLINIC | Age: 54
Discharge: HOME | End: 2024-07-02
Payer: COMMERCIAL

## 2024-07-02 VITALS
SYSTOLIC BLOOD PRESSURE: 128 MMHG | TEMPERATURE: 97.4 F | DIASTOLIC BLOOD PRESSURE: 68 MMHG | WEIGHT: 209.5 LBS | HEART RATE: 114 BPM | RESPIRATION RATE: 18 BRPM | OXYGEN SATURATION: 93 % | BODY MASS INDEX: 32.88 KG/M2 | HEIGHT: 67 IN

## 2024-07-02 DIAGNOSIS — C34.81 ADENOCARCINOMA OF OVERLAPPING SITES OF RIGHT LUNG (HCC): Primary | ICD-10-CM

## 2024-07-02 PROCEDURE — 96368 THER/DIAG CONCURRENT INF: CPT

## 2024-07-02 PROCEDURE — 96417 CHEMO IV INFUS EACH ADDL SEQ: CPT

## 2024-07-02 PROCEDURE — 96413 CHEMO IV INFUSION 1 HR: CPT

## 2024-07-02 PROCEDURE — 96372 THER/PROPH/DIAG INJ SC/IM: CPT

## 2024-07-02 PROCEDURE — 96411 CHEMO IV PUSH ADDL DRUG: CPT

## 2024-07-02 RX ORDER — CYANOCOBALAMIN 1000 UG/ML
1000 INJECTION, SOLUTION INTRAMUSCULAR; SUBCUTANEOUS ONCE
Status: COMPLETED | OUTPATIENT
Start: 2024-07-02 | End: 2024-07-02

## 2024-07-02 RX ORDER — SODIUM CHLORIDE 9 MG/ML
20 INJECTION, SOLUTION INTRAVENOUS ONCE
Status: COMPLETED | OUTPATIENT
Start: 2024-07-02 | End: 2024-07-02

## 2024-07-02 RX ADMIN — FOSAPREPITANT 150 MG: 150 INJECTION, POWDER, LYOPHILIZED, FOR SOLUTION INTRAVENOUS at 08:41

## 2024-07-02 RX ADMIN — PEMETREXED DISODIUM 1000 MG: 500 INJECTION, POWDER, LYOPHILIZED, FOR SOLUTION INTRAVENOUS at 10:08

## 2024-07-02 RX ADMIN — CARBOPLATIN 692.5 MG: 600 INJECTION, SOLUTION INTRAVENOUS at 10:23

## 2024-07-02 RX ADMIN — SODIUM CHLORIDE 20 ML/HR: 0.9 INJECTION, SOLUTION INTRAVENOUS at 08:11

## 2024-07-02 RX ADMIN — SODIUM CHLORIDE 200 MG: 9 INJECTION, SOLUTION INTRAVENOUS at 09:22

## 2024-07-02 RX ADMIN — DEXAMETHASONE SODIUM PHOSPHATE: 100 INJECTION INTRAMUSCULAR; INTRAVENOUS at 08:21

## 2024-07-02 RX ADMIN — CYANOCOBALAMIN 1000 MCG: 1000 INJECTION, SOLUTION INTRAMUSCULAR at 09:22

## 2024-07-02 NOTE — PROGRESS NOTES
Pt tolerated treatment without incident. Confirmed next apt for 7/19/24 @ 3:30pm @ [facility].

## 2024-07-03 ENCOUNTER — APPOINTMENT (OUTPATIENT)
Dept: RADIATION ONCOLOGY | Facility: HOSPITAL | Age: 54
End: 2024-07-03
Attending: STUDENT IN AN ORGANIZED HEALTH CARE EDUCATION/TRAINING PROGRAM
Payer: COMMERCIAL

## 2024-07-03 ENCOUNTER — PATIENT OUTREACH (OUTPATIENT)
Dept: HEMATOLOGY ONCOLOGY | Facility: CLINIC | Age: 54
End: 2024-07-03

## 2024-07-03 ENCOUNTER — PROCEDURE VISIT (OUTPATIENT)
Dept: NEUROSURGERY | Facility: CLINIC | Age: 54
End: 2024-07-03
Payer: COMMERCIAL

## 2024-07-03 ENCOUNTER — OFFICE VISIT (OUTPATIENT)
Dept: PALLIATIVE MEDICINE | Facility: CLINIC | Age: 54
End: 2024-07-03
Payer: COMMERCIAL

## 2024-07-03 VITALS
DIASTOLIC BLOOD PRESSURE: 52 MMHG | WEIGHT: 210 LBS | TEMPERATURE: 97.9 F | OXYGEN SATURATION: 95 % | HEIGHT: 67 IN | BODY MASS INDEX: 32.96 KG/M2 | HEART RATE: 110 BPM | SYSTOLIC BLOOD PRESSURE: 100 MMHG

## 2024-07-03 DIAGNOSIS — Z51.5 PALLIATIVE CARE ENCOUNTER: ICD-10-CM

## 2024-07-03 DIAGNOSIS — J96.11 CHRONIC HYPOXEMIC RESPIRATORY FAILURE (HCC): ICD-10-CM

## 2024-07-03 DIAGNOSIS — C79.31 METASTASIS TO BRAIN (HCC): Primary | ICD-10-CM

## 2024-07-03 DIAGNOSIS — Z86.711 HISTORY OF PULMONARY EMBOLISM: ICD-10-CM

## 2024-07-03 DIAGNOSIS — J98.4 RESTRICTIVE LUNG DISEASE: ICD-10-CM

## 2024-07-03 DIAGNOSIS — E11.9 TYPE 2 DIABETES MELLITUS (HCC): ICD-10-CM

## 2024-07-03 DIAGNOSIS — C34.81 ADENOCARCINOMA OF OVERLAPPING SITES OF RIGHT LUNG (HCC): Primary | ICD-10-CM

## 2024-07-03 PROCEDURE — 77336 RADIATION PHYSICS CONSULT: CPT | Performed by: STUDENT IN AN ORGANIZED HEALTH CARE EDUCATION/TRAINING PROGRAM

## 2024-07-03 PROCEDURE — 77372 SRS LINEAR BASED: CPT | Performed by: STUDENT IN AN ORGANIZED HEALTH CARE EDUCATION/TRAINING PROGRAM

## 2024-07-03 PROCEDURE — 61796 SRS CRANIAL LESION SIMPLE: CPT | Performed by: NEUROLOGICAL SURGERY

## 2024-07-03 PROCEDURE — 99213 OFFICE O/P EST LOW 20 MIN: CPT | Performed by: STUDENT IN AN ORGANIZED HEALTH CARE EDUCATION/TRAINING PROGRAM

## 2024-07-03 PROCEDURE — 77432 STEREOTACTIC RADIATION TRMT: CPT | Performed by: STUDENT IN AN ORGANIZED HEALTH CARE EDUCATION/TRAINING PROGRAM

## 2024-07-03 NOTE — PATIENT INSTRUCTIONS
It was a pleasure speaking with you today.    As discussed:  -Continue your medications as prescribed.  -Continue with a bowel regimen to avoid constipation.    Follow-up in: 8 weeks or sooner as needed    Please do not hesitate to call me sooner should you have any questions/concerns or needs.

## 2024-07-03 NOTE — ASSESSMENT & PLAN NOTE
Care team:  Dr. Sandoval of medical oncology  Dr. Alcala of radiation oncology  Dr. Bacon - PCP    Patient is tolerating his chemotherapy treatment at this time is combination of carboplatin, Alimta, and Keytruda.  Denies any issues with nausea, vomiting, appetite or oral intake at this time.  Patient is scheduled to receive radiation to his intracranial lesion today with Dr. Alcala of radiation oncology.  Patient knows to call our office should there be any concerns from a symptomatic standpoint.    Patient does endorse some troubles with initiating sleep at times.  We discussed the option of trying melatonin over-the-counter with a maximum of 10 mg nightly to see if this can help him.  Also encouraged appropriate sleep hygiene and avoiding distractions such as TV that could contribute to difficulty initiating sleep.

## 2024-07-03 NOTE — PROGRESS NOTES
ASSESSMENT      Are you having any side effects ?  -No    Are you eating and drinking properly?  -yes    Are you having any pain?  -  no  Have needs changed for a palliative care referral?  - pt already established       Do you know when your upcoming appointments are?  -yes     Do you have a good support system?  - Yes     Are you interested in any support groups?  - no    Are there any changes in barriers to care?  -no    Do you have any questions or concerns regarding your treatment plan?  -Papo reports no questions or concerns at this time. He was very appreciative of my call.

## 2024-07-03 NOTE — PROGRESS NOTES
PATIENT NAME: Papo Gibbs  : 1970  MRN: [id number]  PROCEDURE DATE: 7/3/2024    Stereotactic Radiosurgery (SRS) Operative Note    Preop Diagnosis: right frontal brain metastasis    Postop Diagnosis: Same    Procedure Details: Frameless Stereotactic Radiosurgery for right frontal brain metastasis    Surgeon: Marlo Johnson MD, PhD     Assistants: none    No qualified resident was available to assist with this case.     Radiation Oncologist(s): Dr. BETHANIE Alcala    Estimated Blood Loss:  None           Specimens: None    Drains: None           Total IV Fluids: None              Findings: As above.    Complications:  None    Anesthesia: None      DETAILS OF PROCEDURE    The patient presented to the outpatient area of the Department of Radiation Oncology where an open faced immobilization mask was created. The patient then underwent a stereotactic head CT while immobilized in the mask. The patient was then released from the Department.    The patient’s stereotactic CT and previous stereotactic MRI scans were fused in the SRS planning software, which was used to develop the radiosurgical plan.    The radiosurgical prescription was delivered to the PTV. The PTV was created by expanding the GTV, as contoured by myself and/or Radiation Oncologist. The radiosurgical plan utilized the mini-multileaf columnator on the TrueBeam machine at the [facility].     When the final treatment plan had been developed and approved, the patient returned to the Department for their frameless SRS treatment.      The patient was positioned on the treatment couch. The Optic Surface Monitoring System (OSMS) was used initially to align the patient. The patient was then immobilized in their open faced mask. kV and then cone beam CT imaging was used to further align the patient. Once the radiation oncologist, physicist and I agreed the patient was in correct position, the fields were treated sequentially without complications. The OSMS  was used during the treatment to assure continued correct positioning of the patient, and if it detected patient motion, interrupted the treatment beam until the patient was back in alignment.      When the SRS treatment had been delivered, the patient was recovered from the treatment room, and discharged from the department. There was no blood loss and no specimen.      SIGNATURE: Marlo Johnson  DATE: 7/3/2024   TIME: 12:34 PM

## 2024-07-03 NOTE — ASSESSMENT & PLAN NOTE
Following pulmonology.  Respiratory status has been stable.  Patient has been using oxygen via nasal cannula at home particularly overnight at 3 L.  Otherwise he states he has not needed to use the oxygen during the daytime recently.  He does have his portable oxygen concentrator just in case he were to feel short of breath.  He does take some time to recover with physical exertion, however, he has remained fairly stable at this time.

## 2024-07-03 NOTE — ASSESSMENT & PLAN NOTE
Lab Results   Component Value Date    HGBA1C 6.7 (H) 06/28/2024   Patient with ongoing management by his PCP.  Patient states after his adjustments with his medications, he has had less episodes of hypoglycemia.  No concerns at this time.

## 2024-07-03 NOTE — PROGRESS NOTES
Ambulatory Visit  Name: Papo Gibbs      : 1970      MRN: [id number]  Encounter Provider: Rikki Estrella DO  Encounter Date: 7/3/2024   Encounter department: [facility] PALLIATIVE [facility]    Assessment & Plan   1. Adenocarcinoma of overlapping sites of right lung (HCC)  Assessment & Plan:  Care team:  Dr. Sandoval of medical oncology  Dr. Alcala of radiation oncology  Dr. Bacon - PCP    Patient is tolerating his chemotherapy treatment at this time is combination of carboplatin, Alimta, and Keytruda.  Denies any issues with nausea, vomiting, appetite or oral intake at this time.  Patient is scheduled to receive radiation to his intracranial lesion today with Dr. Alcala of radiation oncology.  Patient knows to call our office should there be any concerns from a symptomatic standpoint.    Patient does endorse some troubles with initiating sleep at times.  We discussed the option of trying melatonin over-the-counter with a maximum of 10 mg nightly to see if this can help him.  Also encouraged appropriate sleep hygiene and avoiding distractions such as TV that could contribute to difficulty initiating sleep.  2. History of pulmonary embolism  Assessment & Plan:  Anticoagulation with Eliquis.    3. Chronic hypoxemic respiratory failure (HCC)  4. Palliative care encounter  Assessment & Plan:  Support provided today.    Follow-up in 8 weeks or sooner as needed.  5. Restrictive lung disease  Assessment & Plan:  Following pulmonology.  Respiratory status has been stable.  Patient has been using oxygen via nasal cannula at home particularly overnight at 3 L.  Otherwise he states he has not needed to use the oxygen during the daytime recently.  He does have his portable oxygen concentrator just in case he were to feel short of breath.  He does take some time to recover with physical exertion, however, he has remained fairly stable at this time.  6. Type 2 diabetes mellitus (HCC)  Assessment & Plan:    Lab Results    Component Value Date    HGBA1C 6.7 (H) 06/28/2024   Patient with ongoing management by his PCP.  Patient states after his adjustments with his medications, he has had less episodes of hypoglycemia.  No concerns at this time.      Decisional apparatus: Patient is competent on my exam today. If competence is lost, patient's substitute decision maker would default to Spouse by [place] Act 169.   Advance Directive / Living Will / POLST: On file     PDMP Review: I have reviewed the patient's controlled substance dispensing history in the Prescription Drug Monitoring Program in compliance with the [facility] regulations before prescribing any controlled substances.    History of Present Illness     Papo Gibbs is a 54 y.o. male who presents for follow-up of adenocarcinoma of the right lung with involvement of the brain with a metastatic lesion to the high posterior right frontal lobe/precentral gyrus.  Patient is currently tolerating his systemic cancer therapy well at this time.  He does not endorse any new symptoms of concern.  He denies any pain, troubles with p.o. intake/nausea/vomiting at this time.  Patient has a good supply of Zofran as he only has needed to take it twice since I last saw him.  Patient is following with Dr. Alcala of radiation oncology for treatment today, 7/3/2024, for the lesion in his brain.  Patient denies any headaches, vision changes, dizziness, or loss of vision at this time.  Patient is in his baseline state of health.  He did endorse some trouble with initiating sleep at which time we talked about sleep hygiene as well as consideration of melatonin over-the-counter with 10 mg nightly as a maximum should he wish to proceed with this option.  Patient knows to call our office for any concerns or questions.  Since patient has been stable, he can return in 8 weeks time.  Should patient have symptoms requiring sooner evaluation, patient knows to call our office so that we can set him up  accordingly.    Review of Systems   Constitutional:  Negative for activity change, appetite change, chills, fatigue and fever.   HENT:  Negative for trouble swallowing.    Eyes:  Negative for pain and visual disturbance.   Respiratory:  Negative for cough and shortness of breath.    Cardiovascular:  Negative for chest pain, palpitations and leg swelling.   Gastrointestinal:  Negative for abdominal pain, constipation, diarrhea, nausea and vomiting.   Genitourinary:  Negative for difficulty urinating.   Musculoskeletal:  Negative for arthralgias, back pain and myalgias.   Skin:  Negative for color change and rash.   Neurological:  Negative for dizziness, light-headedness and headaches.   Psychiatric/Behavioral:  Positive for sleep disturbance (some trouble getting to sleep).    All other systems reviewed and are negative.    Pertinent Medical History       Medical History Reviewed by provider this encounter:  Tobacco  Allergies  Meds  Problems  Med Hx  Surg Hx  Fam Hx       Past Medical History   Past Medical History:   Diagnosis Date    Cancer (HCC) 04,25,2024    Diabetes mellitus (HCC)     High cholesterol     History of blood clots     Hypertension     Leucocytosis 04/20/2024    Lung cancer (HCC)     Pneumonia     Pulmonary embolism (HCC)      Past Surgical History:   Procedure Laterality Date    IR BIOPSY LYMPH NODE  4/23/2024    IR PORT PLACEMENT  5/20/2024    KNEE SURGERY      MANDIBLE FRACTURE SURGERY  1985    PATELLA FRACTURE SURGERY      RECTAL SURGERY       Family History   Problem Relation Age of Onset    No Known Problems Father     No Known Problems Mother      Current Outpatient Medications on File Prior to Visit   Medication Sig Dispense Refill    amitriptyline (ELAVIL) 100 mg tablet 200 mg daily at bedtime      apixaban (Eliquis) 5 mg Take 1 tablet (5 mg total) by mouth 2 (two) times a day Do not start before May 1, 2024. 60 tablet 5    atorvastatin (LIPITOR) 10 mg tablet Take 1 tablet (10 mg  total) by mouth daily 90 tablet 3    benzonatate (TESSALON) 200 MG capsule Take 1 capsule (200 mg total) by mouth 3 (three) times a day as needed for cough 20 capsule 0    famotidine (PEPCID) 40 MG tablet Take 40 mg by mouth daily as needed for heartburn or indigestion Taking as needed      folic acid (KP Folic Acid) 1 mg tablet Take 1 tablet (1 mg total) by mouth daily 90 tablet 2    glimepiride (AMARYL) 2 mg tablet Take 1 tablet (2 mg total) by mouth daily with breakfast 90 tablet 1    Lancets (OneTouch Delica Plus [id number]) MISC Use 1 Units 4 (four) times a day 100 each 5    levothyroxine (Synthroid) 25 mcg tablet Take 1 tablet (25 mcg total) by mouth daily 90 tablet 2    losartan (COZAAR) 25 mg tablet Take 25 mg by mouth daily      metFORMIN (GLUCOPHAGE) 1000 MG tablet Take 1,000 mg by mouth 2 (two) times a day      ondansetron (ZOFRAN-ODT) 8 mg disintegrating tablet Take 1 tablet (8 mg total) by mouth every 8 (eight) hours as needed for vomiting or nausea 20 tablet 1    OneTouch Verio test strip Use 1 each 4 (four) times a day 100 each 5    tiotropium-olodaterol (Stiolto Respimat) 2.5-2.5 MCG/ACT inhaler Inhale 2 puffs daily 4 g 3    Glucagon, rDNA, (Glucagon Emergency) 1 MG KIT Inject 1 mg as directed once as needed (hypoglycemia) for up to 1 dose (Patient not taking: Reported on 7/3/2024) 2 kit 0    metFORMIN (GLUCOPHAGE) 500 mg tablet Take 1,000 mg by mouth 2 (two) times a day with meals (Patient not taking: Reported on 7/3/2024)       Current Facility-Administered Medications on File Prior to Visit   Medication Dose Route Frequency Provider Last Rate Last Admin    alteplase (CATHFLO) injection 2 mg  2 mg Intracatheter Q1MIN PRN Maryana Sandoval MD        [COMPLETED] CARBOplatin (PARAPLATIN) 692.5 mg in sodium chloride 0.9 % 250 mL IVPB  692.5 mg Intravenous Once Maryana Sandoval MD   Stopped at 07/02/24 1116    [COMPLETED] sodium chloride 0.9 % infusion  20 mL/hr Intravenous Once Maryana Sandoval MD    Stopped at 07/02/24 1119   No Known Allergies   Current Outpatient Medications on File Prior to Visit   Medication Sig Dispense Refill    amitriptyline (ELAVIL) 100 mg tablet 200 mg daily at bedtime      apixaban (Eliquis) 5 mg Take 1 tablet (5 mg total) by mouth 2 (two) times a day Do not start before May 1, 2024. 60 tablet 5    atorvastatin (LIPITOR) 10 mg tablet Take 1 tablet (10 mg total) by mouth daily 90 tablet 3    benzonatate (TESSALON) 200 MG capsule Take 1 capsule (200 mg total) by mouth 3 (three) times a day as needed for cough 20 capsule 0    famotidine (PEPCID) 40 MG tablet Take 40 mg by mouth daily as needed for heartburn or indigestion Taking as needed      folic acid (KP Folic Acid) 1 mg tablet Take 1 tablet (1 mg total) by mouth daily 90 tablet 2    glimepiride (AMARYL) 2 mg tablet Take 1 tablet (2 mg total) by mouth daily with breakfast 90 tablet 1    Lancets (OneTouch Delica Plus [id number]) MISC Use 1 Units 4 (four) times a day 100 each 5    levothyroxine (Synthroid) 25 mcg tablet Take 1 tablet (25 mcg total) by mouth daily 90 tablet 2    losartan (COZAAR) 25 mg tablet Take 25 mg by mouth daily      metFORMIN (GLUCOPHAGE) 1000 MG tablet Take 1,000 mg by mouth 2 (two) times a day      ondansetron (ZOFRAN-ODT) 8 mg disintegrating tablet Take 1 tablet (8 mg total) by mouth every 8 (eight) hours as needed for vomiting or nausea 20 tablet 1    OneTouch Verio test strip Use 1 each 4 (four) times a day 100 each 5    tiotropium-olodaterol (Stiolto Respimat) 2.5-2.5 MCG/ACT inhaler Inhale 2 puffs daily 4 g 3    Glucagon, rDNA, (Glucagon Emergency) 1 MG KIT Inject 1 mg as directed once as needed (hypoglycemia) for up to 1 dose (Patient not taking: Reported on 7/3/2024) 2 kit 0    metFORMIN (GLUCOPHAGE) 500 mg tablet Take 1,000 mg by mouth 2 (two) times a day with meals (Patient not taking: Reported on 7/3/2024)       Current Facility-Administered Medications on File Prior to Visit   Medication Dose Route  "Frequency Provider Last Rate Last Admin    alteplase (CATHFLO) injection 2 mg  2 mg Intracatheter Q1MIN PRN Maryana Sandoval MD        [COMPLETED] CARBOplatin (PARAPLATIN) 692.5 mg in sodium chloride 0.9 % 250 mL IVPB  692.5 mg Intravenous Once Maryana Sandoval MD   Stopped at 24 1116    [COMPLETED] sodium chloride 0.9 % infusion  20 mL/hr Intravenous Once Maryana Sandoval MD   Stopped at 24 1119      Social History     Tobacco Use    Smoking status: Former     Current packs/day: 0.00     Average packs/day: 1.5 packs/day for 35.0 years (52.5 ttl pk-yrs)     Types: Cigarettes     Quit date: 4/15/2024     Years since quittin.2     Passive exposure: Past    Smokeless tobacco: Never   Vaping Use    Vaping status: Never Used   Substance and Sexual Activity    Alcohol use: Not Currently    Drug use: Never    Sexual activity: Yes     Partners: Female     Birth control/protection: Post-menopausal, None         Objective     /52 (BP Location: Left arm, Patient Position: Sitting, Cuff Size: Standard)   Pulse (!) 110   Temp 97.9 °F (36.6 °C) (Tympanic)   Ht 5' 7\" (1.702 m)   Wt 95.3 kg (210 lb)   SpO2 95%   BMI 32.89 kg/m²     Physical Exam  Vitals reviewed.   Constitutional:       General: He is not in acute distress.     Appearance: Normal appearance. He is well-developed. He is not ill-appearing, toxic-appearing or diaphoretic.   HENT:      Head: Normocephalic and atraumatic.      Nose: Nose normal.      Mouth/Throat:      Mouth: Mucous membranes are moist.   Eyes:      General:         Right eye: No discharge.         Left eye: No discharge.   Cardiovascular:      Rate and Rhythm: Normal rate and regular rhythm.      Pulses: Normal pulses.      Comments: Left radial pulse normal. Regular rhythm on palpation. HR recheck on palpation was 100 bpm. No shortness of breath or signs of chest pain on exam.  Pulmonary:      Effort: Pulmonary effort is normal. No respiratory distress.   Abdominal: "      General: Abdomen is flat. There is no distension.   Musculoskeletal:         General: No swelling.      Right lower leg: No edema.      Left lower leg: No edema.   Skin:     General: Skin is warm and dry.      Coloration: Skin is not jaundiced or pale.   Neurological:      General: No focal deficit present.      Mental Status: He is alert. Mental status is at baseline.   Psychiatric:         Mood and Affect: Mood normal.         Behavior: Behavior normal.         Thought Content: Thought content normal.         Judgment: Judgment normal.         Recent labs:  Lab Results   Component Value Date/Time    SODIUM 135 06/28/2024 01:52 PM    K 4.2 06/28/2024 01:52 PM    BUN 22 06/28/2024 01:52 PM    CREATININE 0.82 06/28/2024 01:52 PM    GLUC 123 06/07/2024 03:32 PM    CALCIUM 9.8 06/28/2024 01:52 PM    AST 14 06/28/2024 01:52 PM    ALT 26 06/28/2024 01:52 PM    ALB 4.2 06/28/2024 01:52 PM    TP 7.1 06/28/2024 01:52 PM    EGFR 99 06/28/2024 01:52 PM     Lab Results   Component Value Date/Time    HGB 10.0 (L) 06/28/2024 01:52 PM    WBC 4.89 06/28/2024 01:52 PM     06/28/2024 01:52 PM    INR 1.13 04/19/2024 08:39 PM    PTT 46 (H) 04/23/2024 03:56 AM     Lab Results   Component Value Date/Time    FOR1YJTPOMVA 0.675 06/28/2024 01:52 PM       Recent Imaging:  Procedure: MRI Brain BT w wo Contrast    Result Date: 6/21/2024  Narrative: MRI BRAIN WITH AND WITHOUT CONTRAST INDICATION: C79.31: Secondary malignant neoplasm of brain. COMPARISON: 5/4/2024 TECHNIQUE: Multiplanar, multisequence imaging of the brain was performed before and after gadolinium administration. Perfusion imaging was performed. IV Contrast:  10 mL of Gadobutrol injection (SINGLE-DOSE) IMAGE QUALITY:   Diagnostic. FINDINGS: BRAIN PARENCHYMA: There is redemonstration of a 5 x 4 mm nodular metastatic lesion within the high right frontal lobe/precentral gyrus on series 13, image 127. This is mildly increased in size since the prior examination where it  measured 4 mm and only appreciated on T1 postcontrast imaging. This is not clearly seen on prior Bravo imaging. There are no new areas of nodular parenchymal or leptomeningeal enhancement identified. There is mild chronic microvascular ischemic change. There is a chronic right caudate head lacunar infarct. Note is made of new subacute foci of lacunar ischemia within the left parieto-occipital region on series 302, image 17 and 19 without clear corresponding low ADC signal. There is corresponding FLAIR signal hyperintensity in these regions. There is been evolution of the previously seen punctate left frontal lobe focus of ischemia without nodular enhancement in this region. VENTRICLES:  Normal for the patient's age. SELLA AND PITUITARY GLAND:  Normal. ORBITS:  Normal. PARANASAL SINUSES:  Normal. VASCULATURE:  Evaluation of the major intracranial vasculature demonstrates appropriate flow voids. CALVARIUM AND SKULL BASE:  Normal. EXTRACRANIAL SOFT TISSUES:  Normal.     Impression: Mild increase in size of the high posterior right frontal lobe/precentral gyrus metastatic lesion. No definite new areas of metastatic disease identified. Punctate foci of subacute lacunar ischemia noted within the left parieto-occipital region, new since the prior examination. Punctate focus of left frontal lobe lacunar ischemia has resolved. Chronic cystic lacunar infarct within the right caudate head and scattered chronic microvascular ischemic change. Workstation performed: [id number]     Procedure: IR port placement    Result Date: 5/20/2024  Narrative: Procedure: 1. Ultrasound guided right sided internal jugular venous access. 2. Right sided chest port placement. Indication: 54year-old male with history of metastatic lung cancer requiring port placement for chemotherapy. Fluoroscopy time: 1.6 minutes Images: Multiple Guidance: Ultrasound was utilized for initial vessel access.  Fluoroscopy was used for the rest of the procedure.  Sedation: Moderate conscious sedation was utilized under my direct supervision administered by trained independent provider. A total of 30 minutes sedation time was utilized. I was present at the initial dose of sedation medication. Technique/Findings: After discussion of the benefits and risks of the procedure which included but are not limited to bleeding, infection, air embolism, and port dysfunction were discussed with the patient, questions were answered and written informed consent was obtained. The patient was placed supine on the fluoroscopy suite table.  A timeout was performed.  A limited ultrasound examination of the right  neck demonstrated a patent right  internal jugular vein without evidence of thrombus.  The right neck and upper chest were prepped and draped in the usual sterile fashion. All elements of maximal sterile barrier technique were followed (cap, mask, sterile gown, sterile gloves, large sterile sheet, hand hygiene, and 2% chlorhexidine for cutaneous antisepsis).  An appropriate entry site was chosen under ultrasound guidance.  The overlying skin and the right upper chest wall were anesthetized with 1% lidocaine. Under direct ultrasound visualization a micropuncture needle was advanced into the right  internal jugular vein with permanent recording and reporting.  Using standard Seldinger technique the introducer wire was advanced through the needle which was then  removed.  A small dermatotomy was made and the micropuncture sheath was placed over the wire.  Wire was used to estimate the length of the catheter using fluoroscopy. Next attention was turned to placing the port.  A small linear 4 cm incision was made approximately two fingerbreadths beneath the right clavicle and a pocket for the port was created using blunt dissection.  The Smart port was assembled and placed into the pocket.   The catheter was tunneled under the skin to the right internal jugular venous access site in the  neck.  The catheter was measured and cut using the 018 wire. The micropuncture sheath was exchanged for the peel away sheath and the catheter was advanced into its final position at the cavoatrial junction utilizing fluoroscopic guidance. The incision was closed in layers, first with an interrupted layer of 3-0 Vicryl and then with Histoacryl glue.  Histoacryl glue was also applied to the internal jugular venous access site. The port was accessed and noted to flush and aspirate without difficulty and then hep-locked. The patient tolerated the procedure well without any immediate complications.     Impression: Impression: Technically successful placement of right sided chest port which is ready to use. Workstation performed: [id number]     Procedure: POCT spirometry    Impression: Restrictive lung disease with an FEV1 of 41% predicted    Procedure: MRI brain w wo contrast    Result Date: 5/6/2024  Narrative: MRI BRAIN WITH AND WITHOUT CONTRAST INDICATION: C34.90: Malignant neoplasm of unspecified part of unspecified bronchus or lung. COMPARISON:  None. TECHNIQUE: Multiplanar, multisequence imaging of the brain was performed before and after gadolinium administration. IV Contrast:  9.2 mL of Gadobutrol injection (SINGLE-DOSE) IMAGE QUALITY:   Diagnostic. FINDINGS: BRAIN PARENCHYMA: There is the 4 mm enhancing lesion in the posterior right frontal lobe on image 23 series 10 suspicious for metastasis. No significant edema. No other definite enhancing lesions but evaluation is limited due to motion artifact on postcontrast images. There is tiny focus of restricted diffusion in the left frontal lobe that could represent recent infarct but additional metastasis is not excluded and close interval follow-up is recommended. Tiny linear focus of mild DWI signal in the right caudate head adjacent to chronic cystic lacunar infarct. No acute hemorrhage, mass effect, shift or herniation. Small scattered foci of T2/FLAIR  hyperintensity in the periventricular and subcortical matter due to mild chronic microangiopathy. VENTRICLES:  Normal for the patient's age. SELLA AND PITUITARY GLAND:  Normal. ORBITS:  Normal. PARANASAL SINUSES:  Normal. VASCULATURE:  Evaluation of the major intracranial vasculature demonstrates appropriate flow voids. CALVARIUM AND SKULL BASE:  Normal. EXTRACRANIAL SOFT TISSUES:  Normal.     Impression: 4 mm metastasis in the posterior right frontal lobe without significant edema. No other definite enhancing lesions but evaluation is limited due to motion artifact. Tiny focus of restricted diffusion in the left frontal lobe without definite enhancement could represent acute/early subacute infarct but recommend 2-month follow-up as additional metastasis is not excluded. The study was marked in EPIC for immediate notification. Workstation performed: [id number]     Procedure: IR biopsy lymph node    Result Date: 4/24/2024  Narrative: CT-scan guided needle biopsy of left para-aortic lymph node PROCEDURAL PERSONNEL: Attending physician(s): Vamsi Carter MD Resident physician(s): None Advanced practice provider(s): None INDICATION:  lymphadenopathy. COMPLICATIONS: No immediate complications. ESTIMATED BLOOD LOSS (mL): Less than 10 CONTRAST: Contrast agent: None Contrast volume (mL): 0 RADIATION DOSE: Reference air kerma: 968.16 mGy-cm ANESTHESIA/SEDATION: Level of anesthesia/sedation: Moderate sedation (conscious sedation) Anesthesia/sedation administered by: Independent trained observer under attending supervision with continuous monitoring of the patient's level of consciousness and physiologic status Total intra-service sedation time (minutes): 30 DESCRIPTION OF PROCEDURE: Informed consent for the procedure including risks, benefits and alternatives was obtained and time-out was performed prior to the procedure. The patient was brought to the CT scanner and placed prone on the table. After axial images were obtained  through the region of interest, the site was marked, prepared and draped using all elements of maximal sterile barrier technique including sterile gloves, sterile gown, cap, mask, large sterile sheet, sterile ultrasound probe cover, hand hygiene and cutaneous antisepsis with 2% chlorhexidine. This examination, like all CT scans performed in the [facility], was performed utilizing techniques to minimize radiation dose exposure, including the use of iterative reconstruction and automated exposure control. Lidocaine was administered to the skin and a small skin incision was made. A 17-gauge cannula was advanced up to the lesion, and through this, an 18-gauge core biopsy specimen was obtained. At the end of the procedure, D-Stat was used for hemostasis through the cannula as it was removed. The patient tolerated the procedure well and suffered no complication. FINDINGS: 1.  Planning CT abdomen redemonstrated confluence of multiple left-sided para-aortic lymph nodes. 2.  Interval CT demonstrated needle tip at the lymph nodes. 3.  Completion CT showed no immediate postprocedure hematoma in this region.     Impression: Successful percutaneous core biopsy of left para-aortic lymph node. Workstation performed: [id number]     Procedure: CT abdomen pelvis w wo contrast    Result Date: 4/22/2024  Narrative: CT ABDOMEN AND PELVIS WITH AND WITHOUT IV CONTRAST INDICATION: Abnormal CT chest concerning for lymphadenopathy. R/o malignancy/staging. COMPARISON: CTA chest 4/19/2024; CT abdomen pelvis without contrast 1/26/2022 TECHNIQUE: CT examination of the abdomen and pelvis was performed. Multiplanar 2D reformatted images were created from the source data. 15-minute delayed images of the adrenal glands were obtained to assess adrenal nodule. This examination, like all CT scans performed in the [facility], was performed utilizing techniques to minimize radiation dose exposure, including the use of  iterative reconstruction and automated exposure control. Radiation dose length product (DLP) for this visit: 1681 mGy-cm IV Contrast: 90 mL of iohexol (OMNIPAQUE) Enteric Contrast: Not administered. FINDINGS: ABDOMEN LOWER CHEST: Right middle lobe consolidation, grossly similar in appearance when compared to prior study from 4/19/2024. Trace left pleural effusion. Coronary artery calcifications. Pulmonary emboli remain visible, allowing for differences in technique and contrast timing. LIVER/BILIARY TREE: Unremarkable. GALLBLADDER: No calcified gallstones. No pericholecystic inflammatory change. SPLEEN: Unremarkable. PANCREAS: Unremarkable. ADRENAL GLANDS: Unremarkable. ADRENAL GLANDS: Adrenal nodule #1 - Location: Left adrenal gland. - Size: 2.1 x 1.8 x 2.6 cm. Previously measured similarly on study dated 4/19/2024, and new since 1/26/2022. - Attenuation: Homogenous. - Macroscopic fat: No. - Precontrast HU: 28 - Parenchymal phase HU: 51 - Delayed 15 min HU: 50 - Relative washout: 4% - Absolute washout: 2% CONCLUSION: Indeterminate. KIDNEYS/URETERS: Oval hypodensity in the upper lateral cortex, probably representing small cyst. No suspicious interval change. Left intrarenal calculus measuring 0.8 x 0.7 cm. No hydronephrosis. STOMACH AND BOWEL: Colonic diverticulosis without findings of acute diverticulitis. APPENDIX: Normal. ABDOMINOPELVIC CAVITY: Abdominal lymphadenopathy, representative lymph nodes as described: - Para-aortic/right retrocrural lymph node measuring 2.2 x 1.8 cm (series 2 image 34). - Left para-aortic lymph node measuring 1.5 cm in short axis (series 2 image 36). - Right para-aortic lymph node measuring 1.6 cm in short axis (series 2 image 43). - Aortocaval lymph node at the level of the renal arteries measuring 1.2 cm in short axis (series 2 image 64). No ascites. No pneumoperitoneum. VESSELS: Atherosclerosis without abdominal aortic aneurysm. PELVIS REPRODUCTIVE ORGANS: Mildly enlarged prostate.  URINARY BLADDER: Mildly distended urinary bladder. ABDOMINAL WALL/INGUINAL REGIONS: Unremarkable. BONES: No acute fracture or suspicious osseous lesion. Spinal degenerative changes.     Impression: 1. There is a 2.1 x 1.8 x 2.6 cm left adrenal nodule. This is new compared to 2022. This nodule remains indeterminate and does not have enhancement features to suggest an adenoma. Suggest continued surveillance given above adenopathy. Adrenal recommendation based on institutional consensus and Journal of American College of Radiology 2017;14:1373-3561. 2. Abdominal and thoracic lymphadenopathy with representative lymph nodes as described above. 3. Pulmonary emboli remain visualized. 4. Right middle lobe consolidation, suspicious for pneumonia, grossly similar in appearance when compared to prior study from 4/19/2024 Resident: CASSANDRA Hall I, the attending radiologist, have reviewed the images and agree with the final report above. Workstation performed: [id number]     Procedure: XR chest portable    Result Date: 4/22/2024  Narrative: XR CHEST PORTABLE INDICATION: chest pain. COMPARISON: 4/19/2024 FINDINGS: Persistent fairly dense right basal infiltrate persistent vascular congestion No pneumothorax or pleural effusion. Normal cardiomediastinal silhouette. Bones are unremarkable for age. Normal upper abdomen.     Impression: Persistent right basal infiltrate and mild vascular congestion Workstation performed: [id number]     Procedure:  VAS VENOUS DUPLEX - LOWER LIMB BILATERAL    Result Date: 4/20/2024  Narrative:  [facility] REPORT [facility]INICAL: Indications: Patient presents with recent discovery of pulmonary embolism and physician wants to determine potential source.  Patient reports worsening shortness of breath this past week upon arrival to ED . Operative History: Patient denies any cardiovascular surgery Risk Factors The patient has history of HTN, Diabetes (NIDDM (oral meds)) and smoking (current) 0.5 ppd.   FINDINGS:  Right       Impression              Peroneal    Occlusive Subsegmental  Calf Veins  Thrombosed (acute)       Left        Impression              FV Prox     Non Occlusive Thrombus  PostTibial  Occlusive Subsegmental  Peroneal    Occlusive Subsegmental     CONCLUSION: Impression: RIGHT LOWER LIMB: Acute deep vein thrombosis identified in the peroneal & soleal veins at the proximal to mid calf. No evidence of superficial thrombophlebitis noted. Doppler evaluation shows a normal response to augmentation maneuvers.. Popliteal, posterior tibial and anterior tibial arterial Doppler waveform's are triphasic.  LEFT LOWER LIMB: Acute deep vein thrombosis identified in the proximal femoral vein (extending into the confluence of the common femoral vein), and (1) posterior tibial, and peroneal veins at the proximal to mid calf. No evidence of superficial thrombophlebitis noted. Doppler evaluation shows a normal response to augmentation maneuvers. Popliteal, posterior tibial and anterior tibial arterial Doppler waveform's are triphasic.  Technical findings were given to Jason Turcios MD via [email or web address] following exam.  SIGNATURE: Electronically Signed by: ALMA LEBLANC MD, RPVI on 2024-04-20 07:12:03 PM    Procedure: Echo complete w/ contrast if indicated    Result Date: 4/20/2024  Narrative:   Left Ventricle: Left ventricular cavity size is normal. Wall thickness is mildly increased. There is mild concentric hypertrophy. The left ventricular ejection fraction is 60%. Systolic function is normal. Wall motion is normal. Diastolic function is normal.   Right Ventricle: Right ventricular cavity size is borderline dilated. Systolic function is normal.   Aortic Valve: There is mild regurgitation. There is aortic valve sclerosis.The aortic valve peak velocity is 1.95 m/s. The aortic valve area is 2.06 cm2. No echocardiographic evidence of RV dysfunction / strain.  No indirect evidence of pulmonary hypertension.      Procedure: XR chest 1 view portable    Result Date: 4/20/2024  Narrative: XR CHEST PORTABLE INDICATION: SOB. COMPARISON: None FINDINGS: Right middle lobe pneumonia. Chronic changes. Normal cardiomediastinal silhouette. Bones are unremarkable for age. Normal upper abdomen.     Impression: Right middle lobe pneumonia. Workstation performed: [id number]     Procedure: CTA ED chest PE study    Result Date: 4/19/2024  Narrative: CTA - CHEST WITH IV CONTRAST - PULMONARY ANGIOGRAM INDICATION: sob. COMPARISON: None. TECHNIQUE: CTA examination of the chest was performed using angiographic technique according to a protocol specifically tailored to evaluate for pulmonary embolism. Multiplanar 2D reformatted images were created from the source data. In addition, coronal  3D MIP postprocessing was performed on the acquisition scanner. Radiation dose length product (DLP) for this visit: 358 mGy-cm . This examination, like all CT scans performed in the [facility], was performed utilizing techniques to minimize radiation dose exposure, including the use of iterative reconstruction and automated exposure control. IV Contrast: 85 mL of iohexol (OMNIPAQUE) FINDINGS: PULMONARY ARTERIAL TREE: Intraluminal filling defects in multiple lobar, segmental, and subsegmental branches in the right lower lobe, left upper lobe, lingula, and left lower lobe noted with overall moderate clot burden. No central/saddle pulmonary embolism. RV:LV ratio is elevated at 1.1 suggestive of mild right heart strain. LUNGS: Central airways are patent. There is no tracheal or endobronchial lesion. Interlobular septal thickening noted bilaterally suggestive of interstitial pulmonary edema. Extensive right upper lobe and right middle lobe perihilar airspace opacities are seen. Additional subtle perihilar opacities in the right lower lobe also noted. Overall findings suggest multifocal infection of the right lung. PLEURA: No pneumothorax. Trace  bilateral pleural effusions with basal atelectasis. HEART/GREAT VESSELS: Heart is unremarkable for patient's age. No pericardial effusion. No thoracic aortic aneurysm or dissection. Mild calcific atherosclerosis of the aortic arch and coronary arteries noted. The visualized proximal thoracic great vessels  are patent with normal course and caliber.. MEDIASTINUM AND JEREMIAH: Multiple mediastinal and hilar pathologically enlarged lymph nodes are seen measuring up to 2.3 x 1.5 cm. The visualized thyroid glands are unremarkable. Esophagus is normal in course and caliber. No significant prevertebral soft tissue swelling. Multiple prominent para-aortic, retrocrural and upper abdominal lymph nodes are seen. CHEST WALL AND LOWER NECK: Unremarkable. VISUALIZED STRUCTURES IN THE UPPER ABDOMEN: No free air or free fluid in the imaged upper abdomen. Mild diffuse hepatic steatosis. 1.9 cm nonspecific left adrenal nodule noted measuring 52 Hounsfield units by density measurement. Recommend 1 year follow-up adrenal washout CT per current guidelines. Multiple nonspecific pathologically enlarged para-aortic, retrocrural, and upper abdominal lymph nodes are seen measuring up to 2.2 x 1.7 cm. OSSEOUS STRUCTURES: No acute fracture or destructive osseous lesion. Mild multilevel degenerative changes of the thoracic spine.     Impression: 1.  Acute moderate clot burden multifocal bilateral peripheral pulmonary embolism. RV:LV ratio is elevated at 1.1 suggestive of mild right heart strain. 2.  No thoracic aortic aneurysm or dissection. Mild calcific atherosclerosis of the aortic arch and coronary arteries noted. 3.  Trace bilateral pleural effusions with bibasilar atelectasis. Interstitial pulmonary edema bilaterally. 4.  Findings suggestive of multifocal pneumonia in the right lung worse in the right middle lobe. Recommend follow-up imaging after adequate therapy to ensure complete resolution and exclude possibility of underlying neoplasm.  5.  Pathologically enlarged mediastinal, hilar, para-aortic, retrocrural, and upper abdominal lymph nodes are seen which may be secondary to a diffuse infectious/inflammatory or neoplastic process including lymphoma. Recommend clinical correlation. 6.  Nonspecific 1.9 cm left adrenal nodule as described above. Recommend adrenal washout CT in 12 months per current guidelines. Findings discussed with RAFFI Anguiano at 10:33 p.m. Workstation performed: [id number]        Administrative Statements   I have spent a total time of 20 minutes in caring for this patient on the day of the visit/encounter including Risks and benefits of tx options, Instructions for management, Patient and family education, Impressions, Counseling / Coordination of care, Documenting in the medical record, Reviewing / ordering tests, medicine, procedures  , and Obtaining or reviewing history  . Topics discussed with the patient / family include symptom assessment and management, medication review, psychosocial support, supportive listening, and anticipatory guidance.

## 2024-07-19 ENCOUNTER — HOSPITAL ENCOUNTER (OUTPATIENT)
Dept: INFUSION CENTER | Facility: CLINIC | Age: 54
Discharge: HOME | End: 2024-07-19
Payer: COMMERCIAL

## 2024-07-19 DIAGNOSIS — C34.81 ADENOCARCINOMA OF OVERLAPPING SITES OF RIGHT LUNG (HCC): Primary | ICD-10-CM

## 2024-07-19 DIAGNOSIS — Z95.828 PORT-A-CATH IN PLACE: ICD-10-CM

## 2024-07-19 LAB
ALBUMIN SERPL BCG-MCNC: 4 G/DL (ref 3.5–5)
ALP SERPL-CCNC: 88 U/L (ref 34–104)
ALT SERPL W P-5'-P-CCNC: 25 U/L (ref 7–52)
ANION GAP SERPL CALCULATED.3IONS-SCNC: 8 MMOL/L (ref 4–13)
AST SERPL W P-5'-P-CCNC: 16 U/L (ref 13–39)
BASOPHILS # BLD AUTO: 0.01 THOUSANDS/ÂΜL (ref 0–0.1)
BASOPHILS NFR BLD AUTO: 0 % (ref 0–1)
BILIRUB SERPL-MCNC: 0.23 MG/DL (ref 0.2–1)
BUN SERPL-MCNC: 16 MG/DL (ref 5–25)
CALCIUM SERPL-MCNC: 9.4 MG/DL (ref 8.4–10.2)
CHLORIDE SERPL-SCNC: 104 MMOL/L (ref 96–108)
CO2 SERPL-SCNC: 26 MMOL/L (ref 21–32)
CREAT SERPL-MCNC: 0.85 MG/DL (ref 0.6–1.3)
EOSINOPHIL # BLD AUTO: 0.05 THOUSAND/ÂΜL (ref 0–0.61)
EOSINOPHIL NFR BLD AUTO: 2 % (ref 0–6)
ERYTHROCYTE [DISTWIDTH] IN BLOOD BY AUTOMATED COUNT: 20.9 % (ref 11.6–15.1)
GFR SERPL CREATININE-BSD FRML MDRD: 98 ML/MIN/1.73SQ M
GLUCOSE SERPL-MCNC: 112 MG/DL (ref 65–140)
HCT VFR BLD AUTO: 31.5 % (ref 36.5–49.3)
HGB BLD-MCNC: 9.7 G/DL (ref 12–17)
IMM GRANULOCYTES # BLD AUTO: 0.01 THOUSAND/UL (ref 0–0.2)
IMM GRANULOCYTES NFR BLD AUTO: 0 % (ref 0–2)
LYMPHOCYTES # BLD AUTO: 1.22 THOUSANDS/ÂΜL (ref 0.6–4.47)
LYMPHOCYTES NFR BLD AUTO: 36 % (ref 14–44)
MCH RBC QN AUTO: 25.1 PG (ref 26.8–34.3)
MCHC RBC AUTO-ENTMCNC: 30.8 G/DL (ref 31.4–37.4)
MCV RBC AUTO: 82 FL (ref 82–98)
MONOCYTES # BLD AUTO: 0.41 THOUSAND/ÂΜL (ref 0.17–1.22)
MONOCYTES NFR BLD AUTO: 12 % (ref 4–12)
NEUTROPHILS # BLD AUTO: 1.65 THOUSANDS/ÂΜL (ref 1.85–7.62)
NEUTS SEG NFR BLD AUTO: 50 % (ref 43–75)
NRBC BLD AUTO-RTO: 0 /100 WBCS
PLATELET # BLD AUTO: 221 THOUSANDS/UL (ref 149–390)
PMV BLD AUTO: 8.8 FL (ref 8.9–12.7)
POTASSIUM SERPL-SCNC: 4 MMOL/L (ref 3.5–5.3)
PROT SERPL-MCNC: 6.8 G/DL (ref 6.4–8.4)
RBC # BLD AUTO: 3.86 MILLION/UL (ref 3.88–5.62)
SODIUM SERPL-SCNC: 138 MMOL/L (ref 135–147)
TSH SERPL DL<=0.05 MIU/L-ACNC: 1.23 UIU/ML (ref 0.45–4.5)
WBC # BLD AUTO: 3.35 THOUSAND/UL (ref 4.31–10.16)

## 2024-07-19 PROCEDURE — 84443 ASSAY THYROID STIM HORMONE: CPT

## 2024-07-19 PROCEDURE — 85025 COMPLETE CBC W/AUTO DIFF WBC: CPT

## 2024-07-19 PROCEDURE — 80053 COMPREHEN METABOLIC PANEL: CPT

## 2024-07-19 RX ORDER — SODIUM CHLORIDE 9 MG/ML
20 INJECTION, SOLUTION INTRAVENOUS ONCE
OUTPATIENT
Start: 2024-08-13

## 2024-07-19 RX ORDER — SODIUM CHLORIDE 9 MG/ML
20 INJECTION, SOLUTION INTRAVENOUS ONCE
Status: CANCELLED | OUTPATIENT
Start: 2024-07-23

## 2024-07-19 NOTE — PROGRESS NOTES
Patient to infusion center for lab draw. Patient offers no complaints. Port accessed without incident with excellent blood return noted. Labs drawn as per order. Port flushed and de-accessed as per protocol. Next appointment confirmed for 7/23/24 at 0800. AVS offered and declined.

## 2024-07-22 DIAGNOSIS — C34.81 ADENOCARCINOMA OF OVERLAPPING SITES OF RIGHT LUNG (HCC): Primary | ICD-10-CM

## 2024-07-23 ENCOUNTER — HOSPITAL ENCOUNTER (OUTPATIENT)
Dept: INFUSION CENTER | Facility: CLINIC | Age: 54
Discharge: HOME | End: 2024-07-23
Payer: COMMERCIAL

## 2024-07-23 VITALS
SYSTOLIC BLOOD PRESSURE: 117 MMHG | DIASTOLIC BLOOD PRESSURE: 71 MMHG | WEIGHT: 210 LBS | HEIGHT: 67 IN | TEMPERATURE: 97.3 F | HEART RATE: 87 BPM | BODY MASS INDEX: 32.96 KG/M2 | OXYGEN SATURATION: 94 % | RESPIRATION RATE: 16 BRPM

## 2024-07-23 DIAGNOSIS — C34.81 ADENOCARCINOMA OF OVERLAPPING SITES OF RIGHT LUNG (HCC): Primary | ICD-10-CM

## 2024-07-23 PROCEDURE — 96413 CHEMO IV INFUSION 1 HR: CPT

## 2024-07-23 PROCEDURE — 96411 CHEMO IV PUSH ADDL DRUG: CPT

## 2024-07-23 PROCEDURE — 96367 TX/PROPH/DG ADDL SEQ IV INF: CPT

## 2024-07-23 PROCEDURE — 96417 CHEMO IV INFUS EACH ADDL SEQ: CPT

## 2024-07-23 RX ORDER — SODIUM CHLORIDE 9 MG/ML
20 INJECTION, SOLUTION INTRAVENOUS ONCE
Status: COMPLETED | OUTPATIENT
Start: 2024-07-23 | End: 2024-07-23

## 2024-07-23 RX ADMIN — CARBOPLATIN 659 MG: 10 INJECTION, SOLUTION INTRAVENOUS at 11:03

## 2024-07-23 RX ADMIN — FOSAPREPITANT 150 MG: 150 INJECTION, POWDER, LYOPHILIZED, FOR SOLUTION INTRAVENOUS at 09:08

## 2024-07-23 RX ADMIN — SODIUM CHLORIDE 20 ML/HR: 0.9 INJECTION, SOLUTION INTRAVENOUS at 09:00

## 2024-07-23 RX ADMIN — PEMETREXED DISODIUM 1000 MG: 500 INJECTION, POWDER, LYOPHILIZED, FOR SOLUTION INTRAVENOUS at 10:44

## 2024-07-23 RX ADMIN — SODIUM CHLORIDE 200 MG: 9 INJECTION, SOLUTION INTRAVENOUS at 09:47

## 2024-07-23 RX ADMIN — DEXAMETHASONE SODIUM PHOSPHATE: 10 INJECTION, SOLUTION INTRAMUSCULAR; INTRAVENOUS at 08:30

## 2024-07-23 NOTE — PROGRESS NOTES
Patient tolerated treatment without incident. Port flushed and de-accessed as per protocol. Next appointment for lab work confirmed for 8/9/24 at 1500. AVS offered and declined.

## 2024-07-23 NOTE — PROGRESS NOTES
Patient presents today for treatment with Keytruda, Alimta and Carboplatin. Patient offers no complaints. VSS. Labs from 7/19/2024 reviewed and within parameters to treat. Port accessed without incident with excellent blood return noted.

## 2024-07-26 PROBLEM — C34.81 MALIGNANT NEOPLASM OF OVERLAPPING SITES OF RIGHT LUNG (HCC): Status: RESOLVED | Noted: 2024-06-04 | Resolved: 2024-07-26

## 2024-07-26 NOTE — PROGRESS NOTES
Hematology/Oncology Outpatient Office Note    Date of Service: 2024    [facility] [address]  [place] [address]  [phone]    Reason for Consultation:   Chief Complaint   Patient presents with    Follow-up       Cancer Stage at diagnosis: IV    Referral Physician: No ref. provider found    Primary Care Physician:  Amelie Bacon MD     Nickname: Jessica    Spouse: Mabel  (RN at [employer] in Cardiology)    Original ECO    Today's ECO    Goals and Barriers:  Current Goal: Minimize effects of disease burden, extend life.   Barriers to accomplishing this: SOB    Patient's Capacity to Self Care:  Patient is able to self care    ASSESSMENT & PLAN      Diagnosis ICD-10-CM Associated Orders   1. Adenocarcinoma of overlapping sites of right lung (HCC)  C34.81       2. Acute deep vein thrombosis (DVT) of left femoral vein (HCC)  I82.412       3. Acute pulmonary embolism with acute cor pulmonale, unspecified pulmonary embolism type (HCC)  I26.09       4. Primary malignant neoplasm of right lung metastatic to other site (HCC)  C34.91             This is a 54 y.o. c PMHx notable for DM, HLP, blood clots, HTN, remote nicotine abuse, being seen in consultation for metastatic lung adeno       Discussion of decision making  Oncology history updated, accordingly, during this visit  Goals of care/patient communication  I discussed with the patient the clinical course leading up to their cancer diagnosis. I reviewed relevant office notes, imaging reports and pathology result as well.  I told the patient that this is a case of incurable disease and what this means. We discussed that the goal of anti-cancer therapy is to provide best quality of life, extend overall survival, and progression free survival as shown in clinical trials. We also discussed that there might be a point when the cancer will no longer respond to this anti-neoplastic therapy. As a  result, we also discussed the role of the palliative care team being introduced early in the treatment course. We will be making this referral  I explained the risks/benefits of the proposed cancer therapy: Carbo-Alimta-Keytruda and after discussion including understanding risks of possible life-threatening complications and therapy-related malignancy development, informed consent for blood products and treatment has been signed and obtained.  4/24/2024 CARIS NGS: TMB high 10 muts/Mb, TP53, KEAP1 mutated, STK11 mutated  TNM/Staging At Diagnosis  Cancer Staging   Adenocarcinoma of overlapping sites of right lung (HCC)  Staging form: Lung, AJCC 8th Edition  - Clinical: Stage IV (cN3, cM1) - Signed by Maryana Sandoval MD on 5/2/2024  Disease Features/Tumor Markers/Genetics  Tumor Marker: n/a  Notable Path Features: 4/23/2024 Lymph Node, Left retroperitoneal, Biopsy: Metastatic adenocarcinoma of lung primary  Treatment: Carbo-Alimta-Keytruda  Other Supportive care: Zofran, EMLA  Treatment Team Members  Surgeon  Rad Onc  Palliative: Dr. Estrella  Labs  Diagnostics  4/19/2024 CT Chest PE: Acute moderate clot burden multifocal bilateral peripheral pulmonary embolism, mild R heart strain. Findings suggestive of multifocal pneumonia. Pathologically enlarged mediastinal, hilar, para-aortic, retrocrural, and upper abdominal lymph nodes. Nonspecific 1.9 cm left adrenal nodule  4/20/2024 CT Abd/pelv w/wo c: There is a 2.1 x 1.8 x 2.6 cm left adrenal nodule. Abdominal and thoracic lymphadenopathy  5/4/2024 MRI Brain w/wo c: 4 mm metastasis in the posterior right frontal lobe without significant edema. No other definite enhancing lesions but evaluation is limited due to motion artifact.  Tiny focus of restricted diffusion in the left frontal lobe without definite enhancement could represent acute/early subacute infarct  5/8/2024: Neuro working group recommended close MRI Brain surveillance for the 2 small brain mets to see how it  responds to treatment, Dr. Paulo Alcala will trend it  8/2/2024 CT CAP w/c: tbd      Discussion of decision making    I personally reviewed the following lab results, the image studies, pathology, other specialty/physicians consult notes and recommendations, and outside medical records. I had a lengthy discussion with the patient and shared the work-up findings. We discussed the diagnosis and management plan as below. I spent 42 minutes reviewing the records (labs, clinician notes, outside records, medical history, ordering medicine/tests/procedures, monitoring of anti-neoplastic toxicities, interpreting the imaging/labs previously done) and coordination of care as well as direct time with the patient today, of which greater than 50% of the time was spent in counseling and coordination of care with the patient/family.      Plan/Labs  Restaging CT CAP w/c ordered for 10/25/2024  MRI Brain restaging as per Rad Onc  Rad Onc f/u for surveillance of 2 small brain mets  F/u palliative care   Infusion chairs ordered for Carbo-Alimta-Keytruda q3 weeks while on preceding labs, C5 coming up         Follow Up: q3 weeks scheduled thru 9/24/2024    All questions were answered to the patient's satisfaction during this encounter. The patient knows the contact information for our office and knows to reach out for any relevant concerns related to this encounter. They are to call for any temperature 100.4 or higher, new symptoms including but not restricted to shaking chills, decreased appetite, nausea, vomiting, diarrhea, increased fatigue, shortness of breath or chest pain, confusion, and not feeling the strength to come to the clinic. For all other listed problems and medical diagnosis in their chart - they are managed by PCP and/or other specialists, which the patient acknowledges. Thank you very much for your consultation and making us a part of this patient's care. We are continuing to follow closely with you. Please do not  hesitate to reach out to me with any additional questions or concerns.    Maryana Sandoval MD  Hematology & Medical Oncology Staff Physician             Disclaimer: This document was prepared using [employer] technology. If a word or phrase is confusing, or does not make sense, this is likely due to recognition error which was not discovered during this clinician's review. If you believe an error has occurred, please contact me through [facility] service line for juliana?cation.      ONCOLOGY HISTORY OF PRESENT ILLNESS        Oncology History   Adenocarcinoma of overlapping sites of right lung (HCC)   4/23/2024 Biopsy    Final Diagnosis   A. Lymph Node, Left retroperitoneal, Biopsy:  - Metastatic adenocarcinoma of lung primary.         5/2/2024 Initial Diagnosis    Adenocarcinoma of overlapping sites of right lung (HCC)     5/2/2024 -  Cancer Staged    Staging form: Lung, AJCC 8th Edition  - Clinical: Stage IV (cN3, cM1) - Signed by Maryana Sandoval MD on 5/2/2024 5/21/2024 -  Chemotherapy    cyanocobalamin, 1,000 mcg, Intramuscular, Once, 2 of 3 cycles  Administration: 1,000 mcg (5/14/2024), 1,000 mcg (7/2/2024)  alteplase (CATHFLO), 2 mg, Intracatheter, Every 1 Minute as needed, 4 of 6 cycles  fosaprepitant (EMEND) IVPB, 150 mg, Intravenous, Once, 4 of 6 cycles  Administration: 150 mg (5/21/2024), 150 mg (7/2/2024), 150 mg (7/23/2024), 150 mg (6/11/2024)  CARBOplatin (PARAPLATIN) IVPB (GOG AUC DOSING), 672 mg, Intravenous, Once, 4 of 6 cycles  Administration: 672 mg (5/21/2024), 692.5 mg (7/2/2024), 659 mg (7/23/2024), 692.5 mg (6/11/2024)  pemetrexed (ALIMTA) chemo infusion, 1,020 mg, Intravenous, Once, 4 of 6 cycles  Administration: 1,000 mg (5/21/2024), 1,000 mg (7/2/2024), 1,000 mg (7/23/2024), 1,000 mg (6/11/2024)  pembrolizumab (KEYTRUDA) IVPB, 200 mg, Intravenous, Once, 4 of 6 cycles  Administration: 200 mg (5/21/2024), 200 mg (7/2/2024), 200 mg (7/23/2024), 200 mg (6/11/2024)            SUBJECTIVE  (INTERVAL HISTORY)      Clotting History None   Bleeding History None   Cancer History Lung Adeno   Family Cancer History None   H/O Blood/Plt Transfusion None   Tobacco/etoh/drug abuse 1.5 PPD x 40 years, quit 2024, no etoh abuse or rec drug use       Cancer Screening history C-scope , due for cologuard (ordered)   Occupation  in the past, now        Pain: mid chest (deep) since early May 2024, related to coughing which is improved.    Dyspnea with exertion since 2024 is improving and he's able to take deeper breaths. Feels stronger. Has chronic kaleidoscope ocular sensation over the past few years.    Did well with the last chemo.  With on and off cough, non productive.   ECO      I have reviewed the relevant past medical, surgical, social and family history. I have also reviewed allergies and medications for this patient.    Review of Systems  Baseline weight: 200-205 lbs    Denies F/C, N/V, CP, LH, HA, rash, itching, gen weakness, unilateral weakness, diplopia, melena, hematuria, hematochezia, falls, diarrhea, or constipation       A 10-point review of system was performed, pertinent positive and negative were detailed as above. Otherwise, the 10-point review of system was negative.      Past Medical History:   Diagnosis Date    Cancer (HCC) ,    Diabetes mellitus (HCC)     High cholesterol     History of blood clots     Hypertension     Leucocytosis 2024    Lung cancer (HCC)     Malignant neoplasm of overlapping sites of right lung (HCC) 2024    Pneumonia     Pulmonary embolism (HCC)        Past Surgical History:   Procedure Laterality Date    IR BIOPSY LYMPH NODE  2024    IR PORT PLACEMENT  2024    KNEE SURGERY      MANDIBLE FRACTURE SURGERY  1985    PATELLA FRACTURE SURGERY      RECTAL SURGERY         Family History   Problem Relation Age of Onset    No Known Problems Father     No Known Problems Mother        Social  History     Socioeconomic History    Marital status: Single     Spouse name: Not on file    Number of children: Not on file    Years of education: Not on file    Highest education level: Not on file   Occupational History    Not on file   Tobacco Use    Smoking status: Former     Current packs/day: 0.00     Average packs/day: 1.5 packs/day for 35.0 years (52.5 ttl pk-yrs)     Types: Cigarettes     Quit date: 4/15/2024     Years since quittin.3     Passive exposure: Past    Smokeless tobacco: Never   Vaping Use    Vaping status: Never Used   Substance and Sexual Activity    Alcohol use: Not Currently    Drug use: Never    Sexual activity: Yes     Partners: Female     Birth control/protection: Post-menopausal, None   Other Topics Concern    Not on file   Social History Narrative    Not on file     Social Determinants of Health     Financial Resource Strain: Not on file   Food Insecurity: Not on file   Transportation Needs: Not on file   Physical Activity: Not on file   Stress: Not on file   Social Connections: Not on file   Intimate Partner Violence: Not on file   Housing Stability: Not on file       No Known Allergies    Current Outpatient Medications   Medication Sig Dispense Refill    amitriptyline (ELAVIL) 100 mg tablet 200 mg daily at bedtime      apixaban (Eliquis) 5 mg Take 1 tablet (5 mg total) by mouth 2 (two) times a day 60 tablet 5    atorvastatin (LIPITOR) 10 mg tablet Take 1 tablet (10 mg total) by mouth daily 90 tablet 3    benzonatate (TESSALON) 200 MG capsule Take 1 capsule (200 mg total) by mouth 3 (three) times a day as needed for cough 20 capsule 0    famotidine (PEPCID) 40 MG tablet Take 40 mg by mouth daily as needed for heartburn or indigestion Taking as needed      folic acid (KP Folic Acid) 1 mg tablet Take 1 tablet (1 mg total) by mouth daily 90 tablet 0    glimepiride (AMARYL) 2 mg tablet Take 1 tablet (2 mg total) by mouth daily with breakfast 90 tablet 1    Lancets (OneTouch Delica Plus  Hnciga02D) MISC Use 1 Units 4 (four) times a day 100 each 5    levothyroxine (Synthroid) 25 mcg tablet Take 1 tablet (25 mcg total) by mouth daily 90 tablet 2    losartan (COZAAR) 25 mg tablet Take 25 mg by mouth daily      metFORMIN (GLUCOPHAGE) 1000 MG tablet Take 1,000 mg by mouth 2 (two) times a day      ondansetron (ZOFRAN-ODT) 8 mg disintegrating tablet Take 1 tablet (8 mg total) by mouth every 8 (eight) hours as needed for vomiting or nausea 20 tablet 1    OneTouch Verio test strip Use 1 each 4 (four) times a day 100 each 5    tiotropium-olodaterol (Stiolto Respimat) 2.5-2.5 MCG/ACT inhaler Inhale 2 puffs daily 4 g 3    Glucagon, rDNA, (Glucagon Emergency) 1 MG KIT Inject 1 mg as directed once as needed (hypoglycemia) for up to 1 dose (Patient not taking: Reported on 7/3/2024) 2 kit 0    metFORMIN (GLUCOPHAGE) 500 mg tablet Take 1,000 mg by mouth 2 (two) times a day with meals (Patient not taking: Reported on 7/3/2024)       No current facility-administered medications for this visit.       (Not in a hospital admission)      Objective:     24 Hour Vitals Assessment:     Vitals:    08/05/24 1115   BP: 120/70   Pulse: (!) 122   Resp: 16   Temp: 97.8 °F (36.6 °C)   SpO2: 96%       Weight today: 210 lbs    PHYSICIAN EXAM:    General: Appearance: alert, cooperative, no distress.  HEENT: Normocephalic, atraumatic. No scleral icterus. conjunctivae clear. EOMI.  Chest: No tenderness to palpation. No open wound noted.  Lungs: Clear to auscultation bilaterally, Respirations unlabored.  Cardiac: Tachycardia, +S1and S2  Abdomen: Soft, non-tender, non-distended. Bowel sounds are normal.  Extremities:  No edema, cyanosis, clubbing.  Skin: Skin color, turgor are normal. No rashes.  Lymphatics: no palpable supra-cervical, axillary, or inguinal adenopathy  Neurologic: Awake, Alert, and oriented, no gross focal deficits noted b/l.       DATA REVIEW:    Pathology Result:    Final Diagnosis   Date Value Ref Range Status    04/23/2024   Final    A. Lymph Node, Left retroperitoneal, Biopsy:  - Metastatic adenocarcinoma of lung primary.     Comment: Specimen (block A2) is being sent to [facility] for MI Profile Testing. Upon completion of testing, [employer] report will be sent directly to the requesting provider, as well as posted in the Media Tab of EPIC for this patient by the Pathology Department.          Image Results:   Image result are reviewed and documented in Hematology/Oncology history    MRI Brain BT w wo Contrast  Narrative: MRI BRAIN WITH AND WITHOUT CONTRAST    INDICATION: C79.31: Secondary malignant neoplasm of brain.    COMPARISON: 5/4/2024    TECHNIQUE:  Multiplanar, multisequence imaging of the brain was performed before and after gadolinium administration. Perfusion imaging was performed.    IV Contrast:  10 mL of Gadobutrol injection (SINGLE-DOSE)    IMAGE QUALITY:   Diagnostic.    FINDINGS:    BRAIN PARENCHYMA: There is redemonstration of a 5 x 4 mm nodular metastatic lesion within the high right frontal lobe/precentral gyrus on series 13, image 127. This is mildly increased in size since the prior examination where it measured 4 mm and only   appreciated on T1 postcontrast imaging. This is not clearly seen on prior Bravo imaging.    There are no new areas of nodular parenchymal or leptomeningeal enhancement identified.    There is mild chronic microvascular ischemic change. There is a chronic right caudate head lacunar infarct. Note is made of new subacute foci of lacunar ischemia within the left parieto-occipital region on series 302, image 17 and 19 without clear   corresponding low ADC signal. There is corresponding FLAIR signal hyperintensity in these regions.    There is been evolution of the previously seen punctate left frontal lobe focus of ischemia without nodular enhancement in this region.    VENTRICLES:  Normal for the patient's age.    SELLA AND PITUITARY GLAND:  Normal.    ORBITS:   "Normal.    PARANASAL SINUSES:  Normal.    VASCULATURE:  Evaluation of the major intracranial vasculature demonstrates appropriate flow voids.    CALVARIUM AND SKULL BASE:  Normal.    EXTRACRANIAL SOFT TISSUES:  Normal.  Impression: Mild increase in size of the high posterior right frontal lobe/precentral gyrus metastatic lesion. No definite new areas of metastatic disease identified.    Punctate foci of subacute lacunar ischemia noted within the left parieto-occipital region, new since the prior examination. Punctate focus of left frontal lobe lacunar ischemia has resolved.    Chronic cystic lacunar infarct within the right caudate head and scattered chronic microvascular ischemic change.    Workstation performed: [id number]      LABS:  Lab data are reviewed and documented in HemOn history.       Lab Results   Component Value Date    HGB 9.7 (L) 07/19/2024    HCT 31.5 (L) 07/19/2024    MCV 82 07/19/2024     07/19/2024    WBC 3.35 (L) 07/19/2024    NRBC 0 07/19/2024     Lab Results   Component Value Date    K 4.0 07/19/2024     07/19/2024    CO2 26 07/19/2024    BUN 16 07/19/2024    CREATININE 0.85 07/19/2024    GLUF 100 (H) 06/28/2024    CALCIUM 9.4 07/19/2024    AST 16 07/19/2024    ALT 25 07/19/2024    ALKPHOS 88 07/19/2024    EGFR 98 07/19/2024       Lab Results   Component Value Date    IRON 28 (L) 04/19/2024    TIBC 212 (L) 04/19/2024    FERRITIN 63 04/19/2024       No results found for: \"HIYLFJFG09\"    No results for input(s): \"WBC\", \"CREAT\", \"PLT\" in the last 72 hours.    By:  Maryana Sandoval MD, 8/5/2024, 11:36 AM                                  "

## 2024-08-02 ENCOUNTER — PATIENT OUTREACH (OUTPATIENT)
Dept: HEMATOLOGY ONCOLOGY | Facility: CLINIC | Age: 54
End: 2024-08-02

## 2024-08-02 ENCOUNTER — HOSPITAL ENCOUNTER (OUTPATIENT)
Dept: INFUSION CENTER | Facility: CLINIC | Age: 54
Discharge: HOME | End: 2024-08-02
Payer: COMMERCIAL

## 2024-08-02 ENCOUNTER — HOSPITAL ENCOUNTER (OUTPATIENT)
Dept: CT IMAGING | Facility: HOSPITAL | Age: 54
Discharge: HOME | End: 2024-08-02
Attending: INTERNAL MEDICINE
Payer: COMMERCIAL

## 2024-08-02 DIAGNOSIS — C34.81 ADENOCARCINOMA OF OVERLAPPING SITES OF RIGHT LUNG (HCC): ICD-10-CM

## 2024-08-02 DIAGNOSIS — Z95.828 PORT-A-CATH IN PLACE: Primary | ICD-10-CM

## 2024-08-02 PROCEDURE — 71260 CT THORAX DX C+: CPT

## 2024-08-02 PROCEDURE — 74177 CT ABD & PELVIS W/CONTRAST: CPT

## 2024-08-02 RX ADMIN — IOHEXOL 100 ML: 350 INJECTION, SOLUTION INTRAVENOUS at 14:33

## 2024-08-02 NOTE — PROGRESS NOTES
Patient arrives to infusion center for port access for CT today. R PAC accessed with 3/4 inch POWERPORT needle without issue, brisk blood return noted. Port flushed well without resistance. CHG dressing applied dry and intact, dressing initialed and dated. Clamp secured, passive disinfecting cap applied. Patient aware of lab draw appt 8/9 at 1500, declines AVS.

## 2024-08-04 DIAGNOSIS — I26.99 PULMONARY EMBOLI (HCC): ICD-10-CM

## 2024-08-04 DIAGNOSIS — C34.81 ADENOCARCINOMA OF OVERLAPPING SITES OF RIGHT LUNG (HCC): ICD-10-CM

## 2024-08-05 ENCOUNTER — OFFICE VISIT (OUTPATIENT)
Dept: HEMATOLOGY ONCOLOGY | Facility: CLINIC | Age: 54
End: 2024-08-05
Payer: COMMERCIAL

## 2024-08-05 VITALS
WEIGHT: 210 LBS | BODY MASS INDEX: 32.96 KG/M2 | RESPIRATION RATE: 16 BRPM | HEART RATE: 122 BPM | HEIGHT: 67 IN | TEMPERATURE: 97.8 F | SYSTOLIC BLOOD PRESSURE: 120 MMHG | DIASTOLIC BLOOD PRESSURE: 70 MMHG | OXYGEN SATURATION: 96 %

## 2024-08-05 DIAGNOSIS — I26.09 ACUTE PULMONARY EMBOLISM WITH ACUTE COR PULMONALE, UNSPECIFIED PULMONARY EMBOLISM TYPE (HCC): ICD-10-CM

## 2024-08-05 DIAGNOSIS — C34.91 PRIMARY MALIGNANT NEOPLASM OF RIGHT LUNG METASTATIC TO OTHER SITE (HCC): ICD-10-CM

## 2024-08-05 DIAGNOSIS — C34.81 ADENOCARCINOMA OF OVERLAPPING SITES OF RIGHT LUNG (HCC): Primary | ICD-10-CM

## 2024-08-05 DIAGNOSIS — I82.412 ACUTE DEEP VEIN THROMBOSIS (DVT) OF LEFT FEMORAL VEIN (HCC): ICD-10-CM

## 2024-08-05 PROCEDURE — 99215 OFFICE O/P EST HI 40 MIN: CPT | Performed by: INTERNAL MEDICINE

## 2024-08-05 RX ORDER — FOLIC ACID 1 MG/1
1 TABLET ORAL DAILY
Qty: 90 TABLET | Refills: 0 | Status: SHIPPED | OUTPATIENT
Start: 2024-08-05

## 2024-08-07 ENCOUNTER — TELEPHONE (OUTPATIENT)
Dept: INFUSION CENTER | Facility: CLINIC | Age: 54
End: 2024-08-07

## 2024-08-09 ENCOUNTER — HOSPITAL ENCOUNTER (OUTPATIENT)
Dept: INFUSION CENTER | Facility: CLINIC | Age: 54
End: 2024-08-09
Payer: COMMERCIAL

## 2024-08-09 DIAGNOSIS — Z95.828 PORT-A-CATH IN PLACE: Primary | ICD-10-CM

## 2024-08-09 DIAGNOSIS — C34.81 ADENOCARCINOMA OF OVERLAPPING SITES OF RIGHT LUNG (HCC): ICD-10-CM

## 2024-08-09 LAB
ALBUMIN SERPL BCG-MCNC: 4.1 G/DL (ref 3.5–5)
ALP SERPL-CCNC: 90 U/L (ref 34–104)
ALT SERPL W P-5'-P-CCNC: 23 U/L (ref 7–52)
ANION GAP SERPL CALCULATED.3IONS-SCNC: 7 MMOL/L (ref 4–13)
AST SERPL W P-5'-P-CCNC: 14 U/L (ref 13–39)
BASOPHILS # BLD AUTO: 0.02 THOUSANDS/ÂΜL (ref 0–0.1)
BASOPHILS NFR BLD AUTO: 1 % (ref 0–1)
BILIRUB SERPL-MCNC: 0.19 MG/DL (ref 0.2–1)
BUN SERPL-MCNC: 18 MG/DL (ref 5–25)
CALCIUM SERPL-MCNC: 9.5 MG/DL (ref 8.4–10.2)
CHLORIDE SERPL-SCNC: 103 MMOL/L (ref 96–108)
CO2 SERPL-SCNC: 28 MMOL/L (ref 21–32)
CREAT SERPL-MCNC: 0.87 MG/DL (ref 0.6–1.3)
EOSINOPHIL # BLD AUTO: 0.04 THOUSAND/ÂΜL (ref 0–0.61)
EOSINOPHIL NFR BLD AUTO: 1 % (ref 0–6)
ERYTHROCYTE [DISTWIDTH] IN BLOOD BY AUTOMATED COUNT: 20.5 % (ref 11.6–15.1)
GFR SERPL CREATININE-BSD FRML MDRD: 97 ML/MIN/1.73SQ M
GLUCOSE SERPL-MCNC: 148 MG/DL (ref 65–140)
HCT VFR BLD AUTO: 33.4 % (ref 36.5–49.3)
HGB BLD-MCNC: 10.2 G/DL (ref 12–17)
IMM GRANULOCYTES # BLD AUTO: 0.01 THOUSAND/UL (ref 0–0.2)
IMM GRANULOCYTES NFR BLD AUTO: 0 % (ref 0–2)
LYMPHOCYTES # BLD AUTO: 1.23 THOUSANDS/ÂΜL (ref 0.6–4.47)
LYMPHOCYTES NFR BLD AUTO: 36 % (ref 14–44)
MCH RBC QN AUTO: 25.4 PG (ref 26.8–34.3)
MCHC RBC AUTO-ENTMCNC: 30.5 G/DL (ref 31.4–37.4)
MCV RBC AUTO: 83 FL (ref 82–98)
MONOCYTES # BLD AUTO: 0.44 THOUSAND/ÂΜL (ref 0.17–1.22)
MONOCYTES NFR BLD AUTO: 13 % (ref 4–12)
NEUTROPHILS # BLD AUTO: 1.69 THOUSANDS/ÂΜL (ref 1.85–7.62)
NEUTS SEG NFR BLD AUTO: 49 % (ref 43–75)
NRBC BLD AUTO-RTO: 0 /100 WBCS
PLATELET # BLD AUTO: 243 THOUSANDS/UL (ref 149–390)
PMV BLD AUTO: 8.7 FL (ref 8.9–12.7)
POTASSIUM SERPL-SCNC: 4.1 MMOL/L (ref 3.5–5.3)
PROT SERPL-MCNC: 6.9 G/DL (ref 6.4–8.4)
RBC # BLD AUTO: 4.02 MILLION/UL (ref 3.88–5.62)
SODIUM SERPL-SCNC: 138 MMOL/L (ref 135–147)
TSH SERPL DL<=0.05 MIU/L-ACNC: 0.92 UIU/ML (ref 0.45–4.5)
WBC # BLD AUTO: 3.43 THOUSAND/UL (ref 4.31–10.16)

## 2024-08-09 PROCEDURE — 80053 COMPREHEN METABOLIC PANEL: CPT | Performed by: INTERNAL MEDICINE

## 2024-08-09 PROCEDURE — 84443 ASSAY THYROID STIM HORMONE: CPT | Performed by: INTERNAL MEDICINE

## 2024-08-09 PROCEDURE — 85025 COMPLETE CBC W/AUTO DIFF WBC: CPT | Performed by: INTERNAL MEDICINE

## 2024-08-09 NOTE — PROGRESS NOTES
Pt here for central labs, offers no complaints, next treatment scheduled for 8/13 at 130, declined AVS

## 2024-08-13 ENCOUNTER — HOSPITAL ENCOUNTER (OUTPATIENT)
Dept: INFUSION CENTER | Facility: CLINIC | Age: 54
Discharge: HOME | End: 2024-08-13
Payer: COMMERCIAL

## 2024-08-13 VITALS
HEART RATE: 90 BPM | HEIGHT: 67 IN | WEIGHT: 215 LBS | TEMPERATURE: 97.4 F | SYSTOLIC BLOOD PRESSURE: 126 MMHG | BODY MASS INDEX: 33.74 KG/M2 | DIASTOLIC BLOOD PRESSURE: 73 MMHG | OXYGEN SATURATION: 94 %

## 2024-08-13 DIAGNOSIS — C34.81 ADENOCARCINOMA OF OVERLAPPING SITES OF RIGHT LUNG (HCC): Primary | ICD-10-CM

## 2024-08-13 PROCEDURE — 96417 CHEMO IV INFUS EACH ADDL SEQ: CPT

## 2024-08-13 PROCEDURE — 96413 CHEMO IV INFUSION 1 HR: CPT

## 2024-08-13 PROCEDURE — 96411 CHEMO IV PUSH ADDL DRUG: CPT

## 2024-08-13 PROCEDURE — 96367 TX/PROPH/DG ADDL SEQ IV INF: CPT

## 2024-08-13 RX ORDER — SODIUM CHLORIDE 9 MG/ML
20 INJECTION, SOLUTION INTRAVENOUS ONCE
Status: COMPLETED | OUTPATIENT
Start: 2024-08-13 | End: 2024-08-13

## 2024-08-13 RX ADMIN — SODIUM CHLORIDE 20 ML/HR: 0.9 INJECTION, SOLUTION INTRAVENOUS at 13:30

## 2024-08-13 RX ADMIN — CARBOPLATIN 647 MG: 10 INJECTION, SOLUTION INTRAVENOUS at 15:53

## 2024-08-13 RX ADMIN — DEXAMETHASONE SODIUM PHOSPHATE: 100 INJECTION INTRAMUSCULAR; INTRAVENOUS at 13:32

## 2024-08-13 RX ADMIN — PEMETREXED DISODIUM 1000 MG: 500 INJECTION, POWDER, LYOPHILIZED, FOR SOLUTION INTRAVENOUS at 15:35

## 2024-08-13 RX ADMIN — FOSAPREPITANT 150 MG: 150 INJECTION, POWDER, LYOPHILIZED, FOR SOLUTION INTRAVENOUS at 13:55

## 2024-08-13 RX ADMIN — SODIUM CHLORIDE 200 MG: 9 INJECTION, SOLUTION INTRAVENOUS at 14:45

## 2024-08-13 NOTE — PROGRESS NOTES
Pt here for Alimta/Carbo/Keytruda. Vitals stable; labs within parameters for treatment. CrCl > 45. Callbell within reach.

## 2024-08-13 NOTE — PROGRESS NOTES
Tolerated infusion without incident: No adverse reactions noted: Verified follow up appt with patient ( 08/30/24 ): AVS offered and declined

## 2024-08-14 ENCOUNTER — OFFICE VISIT (OUTPATIENT)
Dept: PALLIATIVE MEDICINE | Facility: CLINIC | Age: 54
End: 2024-08-14
Payer: COMMERCIAL

## 2024-08-14 VITALS
SYSTOLIC BLOOD PRESSURE: 130 MMHG | OXYGEN SATURATION: 97 % | WEIGHT: 215 LBS | BODY MASS INDEX: 33.74 KG/M2 | DIASTOLIC BLOOD PRESSURE: 62 MMHG | HEART RATE: 114 BPM | TEMPERATURE: 96.5 F | HEIGHT: 67 IN

## 2024-08-14 DIAGNOSIS — C34.91 PRIMARY MALIGNANT NEOPLASM OF RIGHT LUNG METASTATIC TO OTHER SITE (HCC): ICD-10-CM

## 2024-08-14 DIAGNOSIS — T45.1X5A CHEMOTHERAPY INDUCED NAUSEA AND VOMITING: ICD-10-CM

## 2024-08-14 DIAGNOSIS — R11.2 CHEMOTHERAPY INDUCED NAUSEA AND VOMITING: ICD-10-CM

## 2024-08-14 DIAGNOSIS — C34.81 ADENOCARCINOMA OF OVERLAPPING SITES OF RIGHT LUNG (HCC): ICD-10-CM

## 2024-08-14 DIAGNOSIS — R11.0 CHEMOTHERAPY-INDUCED NAUSEA: ICD-10-CM

## 2024-08-14 DIAGNOSIS — T45.1X5A CHEMOTHERAPY-INDUCED NAUSEA: ICD-10-CM

## 2024-08-14 DIAGNOSIS — K02.9 DENTAL CARIES, UNSPECIFIED: Primary | ICD-10-CM

## 2024-08-14 DIAGNOSIS — Z51.5 PALLIATIVE CARE ENCOUNTER: ICD-10-CM

## 2024-08-14 PROCEDURE — 99213 OFFICE O/P EST LOW 20 MIN: CPT | Performed by: STUDENT IN AN ORGANIZED HEALTH CARE EDUCATION/TRAINING PROGRAM

## 2024-08-14 RX ORDER — ONDANSETRON 8 MG/1
8 TABLET, ORALLY DISINTEGRATING ORAL EVERY 8 HOURS PRN
Qty: 30 TABLET | Refills: 0 | Status: SHIPPED | OUTPATIENT
Start: 2024-08-14

## 2024-08-14 NOTE — ASSESSMENT & PLAN NOTE
Scheduled follow-up with Radiation Oncology with Dr. Alcala s/p treatment with ongoing monitoring of patient's small brain lesions.  Following Dr. Sandoval for Medical Oncology for systemic cancer treatment Keytruda, Pemetrexed, Carboplatin q21 days. Next scheduled infusion for 9/3/2024.

## 2024-08-14 NOTE — PROGRESS NOTES
Ambulatory Visit  Name: Papo Gibbs      : 1970      MRN: [id number]  Encounter Provider: Rikki Estrella DO  Encounter Date: 2024   Encounter department: [facility] PALLIATIVE [facility]    Assessment & Plan   1. Dental caries, unspecified  -     Ambulatory Referral to Dentistry; Future; Expected date: 2024  2. Chemotherapy induced nausea and vomiting  -     ondansetron (ZOFRAN-ODT) 8 mg disintegrating tablet; Take 1 tablet (8 mg total) by mouth every 8 (eight) hours as needed for vomiting or nausea  3. Adenocarcinoma of overlapping sites of right lung (HCC)  4. Palliative care encounter  5. Primary malignant neoplasm of right lung metastatic to other site (HCC)  Assessment & Plan:  Scheduled follow-up with Radiation Oncology with Dr. Alcala s/p treatment with ongoing monitoring of patient's small brain lesions.  Following Dr. Sandoval for Medical Oncology for systemic cancer treatment Keytruda, Pemetrexed, Carboplatin q21 days. Next scheduled infusion for 9/3/2024.  6. Chemotherapy-induced nausea  Assessment & Plan:  Patient respond well to Zofran 8mg ODT PRN.  Typically only needs to take 1 tablet a few days after treatment which resolves his nausea.  Denies any emesis. He is able to tolerate PO intake and continues to have an appetite.    Will provide refill for Zofran today to have on hand.      Decisional apparatus: Patient is competent on my exam today. If competence is lost, patient's substitute decision maker would default to spouse - Tomasa Hampton by [place] Act 169.   Advance Directive / Living Will / POLST: Has ACP on file     PDMP Review: I have reviewed the patient's controlled substance dispensing history in the Prescription Drug Monitoring Program in compliance with the [facility] regulations before prescribing any controlled substances.    History of Present Illness     Papo Gibbs is a 54 y.o. male who presents for follow-up.  Palliative Diagnosis of metastatic lung cancer.    Patient is  here for follow-up today as he is receiving ongoing systemic cancer treatment under Dr. Sandoval of Medical Oncology. He has been tolerating his treatments well without issues or concerns at this time. He is tolerating PO intake with good appetite. He does have occasional nausea after treatment and is needing refill of his Zofran.    He is otherwise doing well without complaints of pain or discomfort. Able to sleep well without issues and remains at his baseline at this time. He did inquire if a Dentist referral could be placed as he would like to follow-up with a dentist for routine cares. He has a history of dental caries and would like to have a check-up for his dental health. He previously followed a dentist but has had trouble finding a new one upon his own inquiry. Denies pain, bleeding or acute issues to his gums/oral cavity.  Otherwise, patient with no further questions or concerns today. He knows to call our office should any questions or concerns arise.    Review of Systems   Constitutional:  Negative for activity change, appetite change, chills, fatigue and fever.   HENT:  Negative for trouble swallowing.    Respiratory:  Negative for shortness of breath.    Cardiovascular:  Negative for chest pain and leg swelling.   Gastrointestinal:  Negative for abdominal pain, constipation, diarrhea, nausea and vomiting.   Genitourinary:  Negative for difficulty urinating.   Musculoskeletal:  Negative for arthralgias and myalgias.   Neurological:  Negative for weakness.   Psychiatric/Behavioral:  Negative for sleep disturbance.    All other systems reviewed and are negative.    Pertinent Medical History       Medical History Reviewed by provider this encounter:       Past Medical History   Past Medical History:   Diagnosis Date    Cancer (HCC) 04,25,2024    Diabetes mellitus (HCC)     High cholesterol     History of blood clots     Hypertension     Leucocytosis 04/20/2024    Lung cancer (HCC)     Malignant neoplasm  of overlapping sites of right lung (HCC) 06/04/2024    Pneumonia     Pulmonary embolism (HCC)      Past Surgical History:   Procedure Laterality Date    IR BIOPSY LYMPH NODE  4/23/2024    IR PORT PLACEMENT  5/20/2024    KNEE SURGERY      MANDIBLE FRACTURE SURGERY  1985    PATELLA FRACTURE SURGERY      RECTAL SURGERY       Family History   Problem Relation Age of Onset    No Known Problems Father     No Known Problems Mother      Current Outpatient Medications on File Prior to Visit   Medication Sig Dispense Refill    amitriptyline (ELAVIL) 100 mg tablet 200 mg daily at bedtime      apixaban (Eliquis) 5 mg Take 1 tablet (5 mg total) by mouth 2 (two) times a day 60 tablet 5    atorvastatin (LIPITOR) 10 mg tablet Take 1 tablet (10 mg total) by mouth daily 90 tablet 3    benzonatate (TESSALON) 200 MG capsule Take 1 capsule (200 mg total) by mouth 3 (three) times a day as needed for cough 20 capsule 0    famotidine (PEPCID) 40 MG tablet Take 40 mg by mouth daily as needed for heartburn or indigestion Taking as needed      folic acid ( Folic Acid) 1 mg tablet Take 1 tablet (1 mg total) by mouth daily 90 tablet 0    glimepiride (AMARYL) 2 mg tablet Take 1 tablet (2 mg total) by mouth daily with breakfast 90 tablet 1    Lancets (OneTouch Delica Plus [id number]) MISC Use 1 Units 4 (four) times a day 100 each 5    levothyroxine (Synthroid) 25 mcg tablet Take 1 tablet (25 mcg total) by mouth daily 90 tablet 2    losartan (COZAAR) 25 mg tablet Take 25 mg by mouth daily      metFORMIN (GLUCOPHAGE) 1000 MG tablet Take 1,000 mg by mouth 2 (two) times a day      metFORMIN (GLUCOPHAGE) 500 mg tablet Take 1,000 mg by mouth 2 (two) times a day with meals      OneTouch Verio test strip Use 1 each 4 (four) times a day 100 each 5    tiotropium-olodaterol (Stiolto Respimat) 2.5-2.5 MCG/ACT inhaler Inhale 2 puffs daily 4 g 3    [DISCONTINUED] ondansetron (ZOFRAN-ODT) 8 mg disintegrating tablet Take 1 tablet (8 mg total) by mouth every 8  (eight) hours as needed for vomiting or nausea 20 tablet 1    Glucagon, rDNA, (Glucagon Emergency) 1 MG KIT Inject 1 mg as directed once as needed (hypoglycemia) for up to 1 dose (Patient not taking: Reported on 7/3/2024) 2 kit 0     Current Facility-Administered Medications on File Prior to Visit   Medication Dose Route Frequency Provider Last Rate Last Admin    alteplase (CATHFLO) injection 2 mg  2 mg Intracatheter Q1MIN PRN Maryana Sandoval MD        [COMPLETED] CARBOplatin (PARAPLATIN) 647 mg in sodium chloride 0.9 % 250 mL IVPB  647 mg Intravenous Once Maryana Sandoval MD   Stopped at 08/13/24 1653    [COMPLETED] fosaprepitant (EMEND) 150 mg in sodium chloride 0.9 % 250 mL IVPB  150 mg Intravenous Once Maryana Sandoval MD   Stopped at 08/13/24 1425    [COMPLETED] ondansetron (ZOFRAN) 16 mg, dexamethasone (DECADRON) 20 mg in sodium chloride 0.9 % 50 mL IVPB   Intravenous Once Maryana Sandoval MD   Stopped at 08/13/24 1352    [COMPLETED] pembrolizumab (KEYTRUDA) 200 mg in sodium chloride 0.9 % 50 mL IVPB  200 mg Intravenous Once Maryana Sandoval MD   Stopped at 08/13/24 1515    [COMPLETED] PEMEtrexed (ALIMTA) 1,000 mg in sodium chloride 0.9 % 100 mL chemo infusion  1,000 mg Intravenous Once Maryana Sandoval MD   Stopped at 08/13/24 1548    [COMPLETED] sodium chloride 0.9 % infusion  20 mL/hr Intravenous Once Maryana Sandoval MD   Stopped at 08/13/24 1700   No Known Allergies   Current Outpatient Medications on File Prior to Visit   Medication Sig Dispense Refill    amitriptyline (ELAVIL) 100 mg tablet 200 mg daily at bedtime      apixaban (Eliquis) 5 mg Take 1 tablet (5 mg total) by mouth 2 (two) times a day 60 tablet 5    atorvastatin (LIPITOR) 10 mg tablet Take 1 tablet (10 mg total) by mouth daily 90 tablet 3    benzonatate (TESSALON) 200 MG capsule Take 1 capsule (200 mg total) by mouth 3 (three) times a day as needed for cough 20 capsule 0    famotidine (PEPCID) 40 MG tablet Take 40 mg by mouth  daily as needed for heartburn or indigestion Taking as needed      folic acid ( Folic Acid) 1 mg tablet Take 1 tablet (1 mg total) by mouth daily 90 tablet 0    glimepiride (AMARYL) 2 mg tablet Take 1 tablet (2 mg total) by mouth daily with breakfast 90 tablet 1    Lancets (OneTouch Delica Plus [id number]) MISC Use 1 Units 4 (four) times a day 100 each 5    levothyroxine (Synthroid) 25 mcg tablet Take 1 tablet (25 mcg total) by mouth daily 90 tablet 2    losartan (COZAAR) 25 mg tablet Take 25 mg by mouth daily      metFORMIN (GLUCOPHAGE) 1000 MG tablet Take 1,000 mg by mouth 2 (two) times a day      metFORMIN (GLUCOPHAGE) 500 mg tablet Take 1,000 mg by mouth 2 (two) times a day with meals      OneTouch Verio test strip Use 1 each 4 (four) times a day 100 each 5    tiotropium-olodaterol (Stiolto Respimat) 2.5-2.5 MCG/ACT inhaler Inhale 2 puffs daily 4 g 3    [DISCONTINUED] ondansetron (ZOFRAN-ODT) 8 mg disintegrating tablet Take 1 tablet (8 mg total) by mouth every 8 (eight) hours as needed for vomiting or nausea 20 tablet 1    Glucagon, rDNA, (Glucagon Emergency) 1 MG KIT Inject 1 mg as directed once as needed (hypoglycemia) for up to 1 dose (Patient not taking: Reported on 7/3/2024) 2 kit 0     Current Facility-Administered Medications on File Prior to Visit   Medication Dose Route Frequency Provider Last Rate Last Admin    alteplase (CATHFLO) injection 2 mg  2 mg Intracatheter Q1MIN PRN Maryana Sandoval MD        [COMPLETED] CARBOplatin (PARAPLATIN) 647 mg in sodium chloride 0.9 % 250 mL IVPB  647 mg Intravenous Once Maryana Sandoval MD   Stopped at 08/13/24 1653    [COMPLETED] fosaprepitant (EMEND) 150 mg in sodium chloride 0.9 % 250 mL IVPB  150 mg Intravenous Once Maryana Sandoval MD   Stopped at 08/13/24 1425    [COMPLETED] ondansetron (ZOFRAN) 16 mg, dexamethasone (DECADRON) 20 mg in sodium chloride 0.9 % 50 mL IVPB   Intravenous Once Maryana Sandoval MD   Stopped at 08/13/24 8235    [COMPLETED]  "pembrolizumab (KEYTRUDA) 200 mg in sodium chloride 0.9 % 50 mL IVPB  200 mg Intravenous Once Maryana Sandoval MD   Stopped at 24 1515    [COMPLETED] PEMEtrexed (ALIMTA) 1,000 mg in sodium chloride 0.9 % 100 mL chemo infusion  1,000 mg Intravenous Once Maryana Sandoval MD   Stopped at 24 1548    [COMPLETED] sodium chloride 0.9 % infusion  20 mL/hr Intravenous Once Maryana Sandoval MD   Stopped at 24 1700      Social History     Tobacco Use    Smoking status: Former     Current packs/day: 0.00     Average packs/day: 1.5 packs/day for 35.0 years (52.5 ttl pk-yrs)     Types: Cigarettes     Quit date: 4/15/2024     Years since quittin.3     Passive exposure: Past    Smokeless tobacco: Never   Vaping Use    Vaping status: Never Used   Substance and Sexual Activity    Alcohol use: Not Currently    Drug use: Never    Sexual activity: Yes     Partners: Female     Birth control/protection: Post-menopausal, None         Objective     /62 (BP Location: Left arm, Patient Position: Sitting, Cuff Size: Standard)   Pulse (!) 114   Temp (!) 96.5 °F (35.8 °C) (Temporal)   Ht 5' 7\" (1.702 m)   Wt 97.5 kg (215 lb)   SpO2 97%   BMI 33.67 kg/m²     Physical Exam  Vitals reviewed.   Constitutional:       General: He is not in acute distress.     Appearance: Normal appearance. He is well-developed. He is not ill-appearing, toxic-appearing or diaphoretic.   HENT:      Head: Normocephalic and atraumatic.      Mouth/Throat:      Mouth: Mucous membranes are moist.   Eyes:      General:         Right eye: No discharge.         Left eye: No discharge.   Cardiovascular:      Rate and Rhythm: Normal rate and regular rhythm.      Pulses: Normal pulses.      Comments: Left radial pulse palpated personally with regular rhythm. HR of 96 on recheck.  Pulmonary:      Effort: Pulmonary effort is normal. No respiratory distress.   Abdominal:      Palpations: Abdomen is soft.      Tenderness: There is no abdominal " tenderness.   Musculoskeletal:         General: No swelling or tenderness.      Right lower leg: No edema.      Left lower leg: No edema.   Skin:     General: Skin is warm and dry.      Coloration: Skin is not jaundiced or pale.   Neurological:      General: No focal deficit present.      Mental Status: He is alert. Mental status is at baseline.   Psychiatric:         Mood and Affect: Mood normal.         Behavior: Behavior normal.         Thought Content: Thought content normal.         Judgment: Judgment normal.         Recent labs:  Lab Results   Component Value Date/Time    SODIUM 138 08/09/2024 02:52 PM    K 4.1 08/09/2024 02:52 PM    BUN 18 08/09/2024 02:52 PM    CREATININE 0.87 08/09/2024 02:52 PM    GLUC 148 (H) 08/09/2024 02:52 PM    CALCIUM 9.5 08/09/2024 02:52 PM    AST 14 08/09/2024 02:52 PM    ALT 23 08/09/2024 02:52 PM    ALB 4.1 08/09/2024 02:52 PM    TP 6.9 08/09/2024 02:52 PM    EGFR 97 08/09/2024 02:52 PM     Lab Results   Component Value Date/Time    HGB 10.2 (L) 08/09/2024 02:52 PM    WBC 3.43 (L) 08/09/2024 02:52 PM     08/09/2024 02:52 PM    INR 1.13 04/19/2024 08:39 PM    PTT 46 (H) 04/23/2024 03:56 AM     Lab Results   Component Value Date/Time    [id number] 0.919 08/09/2024 02:52 PM       Recent Imaging:  Procedure: CT chest abdomen pelvis w contrast    Result Date: 8/5/2024  Narrative: CT CHEST, ABDOMEN AND PELVIS WITH IV CONTRAST INDICATION:   Malignant neoplasm of overlapping sites of right bronchus and lung. . COMPARISON: CT abdomen and pelvis dated 4/20/2024. CT chest dated 4/19/2024. TECHNIQUE: CT examination of the chest, abdomen and pelvis was performed. Multiplanar 2D reformatted images were created from the source data. This examination, like all CT scans performed in the [facility], was performed utilizing techniques to minimize radiation dose exposure, including the use of iterative reconstruction and automated exposure control. Radiation dose length  product (DLP) for this visit: IV Contrast: Enteric Contrast: Enteric contrast was not administered. FINDINGS: CHEST LUNGS: Significant interval decrease in size of right upper lobe mass with lymphangitic carcinomatosis and extension into the right middle lobe with residual wedge-shaped airspace opacity now measuring 4.6 x 2.6 cm image 118 series 4. Mild residual interlobular septal wall thickening is noted in the right lower lobe. There is no tracheal or endobronchial lesion. PLEURA:  Unremarkable. HEART/GREAT VESSELS: Heavy atherosclerotic coronary artery calcification is noted.  Heart is otherwise unremarkable.  No thoracic aortic aneurysm. MEDIASTINUM AND JEREMIAH: Interval decrease in size of mediastinal and hilar lymph nodes. Left hilar lymph node measures 1.3 x 1.1 cm compared to 2.3 x 2.8 cm previously. Subcarinal lymph node measures 1.7 x 1 cm compared to 4.3 x 2.5 cm previously. 2.. CHEST WALL AND LOWER NECK:  Unremarkable. Right anterior chest wall Port-A-Cath ABDOMEN LIVER/BILIARY TREE: Hepatomegaly measuring 20 cm with mild steatosis. No suspicious hepatic lesions. GALLBLADDER:  No calcified gallstones. No pericholecystic inflammatory change. SPLEEN:  Unremarkable. PANCREAS:  Unremarkable. ADRENAL GLANDS: Interval decrease in size of left adrenal lesion measuring 1.2 x 1.2 cm compared to 2.1 x 1.8 cm previously image 109 series 2. Normal size right adrenal gland. KIDNEYS/URETERS: 1.5 cm nonobstructing left renal stone.. No hydronephrosis. STOMACH AND BOWEL:  There is colonic diverticulosis without evidence of acute diverticulitis. APPENDIX:  No findings to suggest appendicitis. ABDOMINOPELVIC CAVITY:  No ascites.  No pneumoperitoneum.  No lymphadenopathy. VESSELS:  Atherosclerotic changes are present.  No evidence of aneurysm. PELVIS REPRODUCTIVE ORGANS:  The prostate is enlarged. URINARY BLADDER: Mild bladder wall thickening. ABDOMINAL WALL/INGUINAL REGIONS:  There is a small fat-containing umbilical  hernia. Small fat-containing inguinal hernias OSSEOUS STRUCTURES:  No acute fracture or destructive osseous lesion.  Spinal degenerative changes are noted.     Impression: 1. Treatment response. Interval decrease in size of mediastinal and hilar lymphadenopathy as well as interval decrease in size of infiltrating mass in the right upper lobe and involving portions of the right middle lobe with lymphangitic carcinomatosis. Interval decrease in size of metastatic lesion to the left adrenal gland. No new areas of thoracic or intra-abdominal lymphadenopathy. No osseous metastatic disease. 2. Nonobstructing left renal stone measuring 1.5 cm 3. Hepatomegaly with steatosis. No suspicious hepatic lesions. Electronically signed: 08/05/2024 10:38 PM Fer Lawrence MD    Procedure: MRI Brain BT w wo Contrast    Result Date: 6/21/2024  Narrative: MRI BRAIN WITH AND WITHOUT CONTRAST INDICATION: C79.31: Secondary malignant neoplasm of brain. COMPARISON: 5/4/2024 TECHNIQUE: Multiplanar, multisequence imaging of the brain was performed before and after gadolinium administration. Perfusion imaging was performed. IV Contrast:  10 mL of Gadobutrol injection (SINGLE-DOSE) IMAGE QUALITY:   Diagnostic. FINDINGS: BRAIN PARENCHYMA: There is redemonstration of a 5 x 4 mm nodular metastatic lesion within the high right frontal lobe/precentral gyrus on series 13, image 127. This is mildly increased in size since the prior examination where it measured 4 mm and only appreciated on T1 postcontrast imaging. This is not clearly seen on prior Bravo imaging. There are no new areas of nodular parenchymal or leptomeningeal enhancement identified. There is mild chronic microvascular ischemic change. There is a chronic right caudate head lacunar infarct. Note is made of new subacute foci of lacunar ischemia within the left parieto-occipital region on series 302, image 17 and 19 without clear corresponding low ADC signal. There is corresponding FLAIR  signal hyperintensity in these regions. There is been evolution of the previously seen punctate left frontal lobe focus of ischemia without nodular enhancement in this region. VENTRICLES:  Normal for the patient's age. SELLA AND PITUITARY GLAND:  Normal. ORBITS:  Normal. PARANASAL SINUSES:  Normal. VASCULATURE:  Evaluation of the major intracranial vasculature demonstrates appropriate flow voids. CALVARIUM AND SKULL BASE:  Normal. EXTRACRANIAL SOFT TISSUES:  Normal.     Impression: Mild increase in size of the high posterior right frontal lobe/precentral gyrus metastatic lesion. No definite new areas of metastatic disease identified. Punctate foci of subacute lacunar ischemia noted within the left parieto-occipital region, new since the prior examination. Punctate focus of left frontal lobe lacunar ischemia has resolved. Chronic cystic lacunar infarct within the right caudate head and scattered chronic microvascular ischemic change. Workstation performed: [id number]     Procedure: IR port placement    Result Date: 5/20/2024  Narrative: Procedure: 1. Ultrasound guided right sided internal jugular venous access. 2. Right sided chest port placement. Indication: 54year-old male with history of metastatic lung cancer requiring port placement for chemotherapy. Fluoroscopy time: 1.6 minutes Images: Multiple Guidance: Ultrasound was utilized for initial vessel access.  Fluoroscopy was used for the rest of the procedure. Sedation: Moderate conscious sedation was utilized under my direct supervision administered by trained independent provider. A total of 30 minutes sedation time was utilized. I was present at the initial dose of sedation medication. Technique/Findings: After discussion of the benefits and risks of the procedure which included but are not limited to bleeding, infection, air embolism, and port dysfunction were discussed with the patient, questions were answered and written informed consent was obtained. The  patient was placed supine on the fluoroscopy suite table.  A timeout was performed.  A limited ultrasound examination of the right  neck demonstrated a patent right  internal jugular vein without evidence of thrombus.  The right neck and upper chest were prepped and draped in the usual sterile fashion. All elements of maximal sterile barrier technique were followed (cap, mask, sterile gown, sterile gloves, large sterile sheet, hand hygiene, and 2% chlorhexidine for cutaneous antisepsis).  An appropriate entry site was chosen under ultrasound guidance.  The overlying skin and the right upper chest wall were anesthetized with 1% lidocaine. Under direct ultrasound visualization a micropuncture needle was advanced into the right  internal jugular vein with permanent recording and reporting.  Using standard Seldinger technique the introducer wire was advanced through the needle which was then  removed.  A small dermatotomy was made and the micropuncture sheath was placed over the wire.  Wire was used to estimate the length of the catheter using fluoroscopy. Next attention was turned to placing the port.  A small linear 4 cm incision was made approximately two fingerbreadths beneath the right clavicle and a pocket for the port was created using blunt dissection.  The Smart port was assembled and placed into the pocket.   The catheter was tunneled under the skin to the right internal jugular venous access site in the neck.  The catheter was measured and cut using the 018 wire. The micropuncture sheath was exchanged for the peel away sheath and the catheter was advanced into its final position at the cavoatrial junction utilizing fluoroscopic guidance. The incision was closed in layers, first with an interrupted layer of 3-0 Vicryl and then with Histoacryl glue.  Histoacryl glue was also applied to the internal jugular venous access site. The port was accessed and noted to flush and aspirate without difficulty and then  hep-locked. The patient tolerated the procedure well without any immediate complications.     Impression: Impression: Technically successful placement of right sided chest port which is ready to use. Workstation performed: [id number]     Procedure: POCT spirometry    Impression: Restrictive lung disease with an FEV1 of 41% predicted    Procedure: MRI brain w wo contrast    Result Date: 5/6/2024  Narrative: MRI BRAIN WITH AND WITHOUT CONTRAST INDICATION: C34.90: Malignant neoplasm of unspecified part of unspecified bronchus or lung. COMPARISON:  None. TECHNIQUE: Multiplanar, multisequence imaging of the brain was performed before and after gadolinium administration. IV Contrast:  9.2 mL of Gadobutrol injection (SINGLE-DOSE) IMAGE QUALITY:   Diagnostic. FINDINGS: BRAIN PARENCHYMA: There is the 4 mm enhancing lesion in the posterior right frontal lobe on image 23 series 10 suspicious for metastasis. No significant edema. No other definite enhancing lesions but evaluation is limited due to motion artifact on postcontrast images. There is tiny focus of restricted diffusion in the left frontal lobe that could represent recent infarct but additional metastasis is not excluded and close interval follow-up is recommended. Tiny linear focus of mild DWI signal in the right caudate head adjacent to chronic cystic lacunar infarct. No acute hemorrhage, mass effect, shift or herniation. Small scattered foci of T2/FLAIR hyperintensity in the periventricular and subcortical matter due to mild chronic microangiopathy. VENTRICLES:  Normal for the patient's age. SELLA AND PITUITARY GLAND:  Normal. ORBITS:  Normal. PARANASAL SINUSES:  Normal. VASCULATURE:  Evaluation of the major intracranial vasculature demonstrates appropriate flow voids. CALVARIUM AND SKULL BASE:  Normal. EXTRACRANIAL SOFT TISSUES:  Normal.     Impression: 4 mm metastasis in the posterior right frontal lobe without significant edema. No other definite enhancing  lesions but evaluation is limited due to motion artifact. Tiny focus of restricted diffusion in the left frontal lobe without definite enhancement could represent acute/early subacute infarct but recommend 2-month follow-up as additional metastasis is not excluded. The study was marked in EPIC for immediate notification. Workstation performed: [id number]     Procedure: IR biopsy lymph node    Result Date: 4/24/2024  Narrative: CT-scan guided needle biopsy of left para-aortic lymph node PROCEDURAL PERSONNEL: Attending physician(s): Vamsi Carter MD Resident physician(s): None Advanced practice provider(s): None INDICATION:  lymphadenopathy. COMPLICATIONS: No immediate complications. ESTIMATED BLOOD LOSS (mL): Less than 10 CONTRAST: Contrast agent: None Contrast volume (mL): 0 RADIATION DOSE: Reference air kerma: 968.16 mGy-cm ANESTHESIA/SEDATION: Level of anesthesia/sedation: Moderate sedation (conscious sedation) Anesthesia/sedation administered by: Independent trained observer under attending supervision with continuous monitoring of the patient's level of consciousness and physiologic status Total intra-service sedation time (minutes): 30 DESCRIPTION OF PROCEDURE: Informed consent for the procedure including risks, benefits and alternatives was obtained and time-out was performed prior to the procedure. The patient was brought to the CT scanner and placed prone on the table. After axial images were obtained through the region of interest, the site was marked, prepared and draped using all elements of maximal sterile barrier technique including sterile gloves, sterile gown, cap, mask, large sterile sheet, sterile ultrasound probe cover, hand hygiene and cutaneous antisepsis with 2% chlorhexidine. This examination, like all CT scans performed in the [facility], was performed utilizing techniques to minimize radiation dose exposure, including the use of iterative reconstruction and automated exposure  control. Lidocaine was administered to the skin and a small skin incision was made. A 17-gauge cannula was advanced up to the lesion, and through this, an 18-gauge core biopsy specimen was obtained. At the end of the procedure, D-Stat was used for hemostasis through the cannula as it was removed. The patient tolerated the procedure well and suffered no complication. FINDINGS: 1.  Planning CT abdomen redemonstrated confluence of multiple left-sided para-aortic lymph nodes. 2.  Interval CT demonstrated needle tip at the lymph nodes. 3.  Completion CT showed no immediate postprocedure hematoma in this region.     Impression: Successful percutaneous core biopsy of left para-aortic lymph node. Workstation performed: [id number]     Procedure: CT abdomen pelvis w wo contrast    Result Date: 4/22/2024  Narrative: CT ABDOMEN AND PELVIS WITH AND WITHOUT IV CONTRAST INDICATION: Abnormal CT chest concerning for lymphadenopathy. R/o malignancy/staging. COMPARISON: CTA chest 4/19/2024; CT abdomen pelvis without contrast 1/26/2022 TECHNIQUE: CT examination of the abdomen and pelvis was performed. Multiplanar 2D reformatted images were created from the source data. 15-minute delayed images of the adrenal glands were obtained to assess adrenal nodule. This examination, like all CT scans performed in the [facility], was performed utilizing techniques to minimize radiation dose exposure, including the use of iterative reconstruction and automated exposure control. Radiation dose length product (DLP) for this visit: 1681 mGy-cm IV Contrast: 90 mL of iohexol (OMNIPAQUE) Enteric Contrast: Not administered. FINDINGS: ABDOMEN LOWER CHEST: Right middle lobe consolidation, grossly similar in appearance when compared to prior study from 4/19/2024. Trace left pleural effusion. Coronary artery calcifications. Pulmonary emboli remain visible, allowing for differences in technique and contrast timing. LIVER/BILIARY TREE:  Unremarkable. GALLBLADDER: No calcified gallstones. No pericholecystic inflammatory change. SPLEEN: Unremarkable. PANCREAS: Unremarkable. ADRENAL GLANDS: Unremarkable. ADRENAL GLANDS: Adrenal nodule #1 - Location: Left adrenal gland. - Size: 2.1 x 1.8 x 2.6 cm. Previously measured similarly on study dated 4/19/2024, and new since 1/26/2022. - Attenuation: Homogenous. - Macroscopic fat: No. - Precontrast HU: 28 - Parenchymal phase HU: 51 - Delayed 15 min HU: 50 - Relative washout: 4% - Absolute washout: 2% CONCLUSION: Indeterminate. KIDNEYS/URETERS: Oval hypodensity in the upper lateral cortex, probably representing small cyst. No suspicious interval change. Left intrarenal calculus measuring 0.8 x 0.7 cm. No hydronephrosis. STOMACH AND BOWEL: Colonic diverticulosis without findings of acute diverticulitis. APPENDIX: Normal. ABDOMINOPELVIC CAVITY: Abdominal lymphadenopathy, representative lymph nodes as described: - Para-aortic/right retrocrural lymph node measuring 2.2 x 1.8 cm (series 2 image 34). - Left para-aortic lymph node measuring 1.5 cm in short axis (series 2 image 36). - Right para-aortic lymph node measuring 1.6 cm in short axis (series 2 image 43). - Aortocaval lymph node at the level of the renal arteries measuring 1.2 cm in short axis (series 2 image 64). No ascites. No pneumoperitoneum. VESSELS: Atherosclerosis without abdominal aortic aneurysm. PELVIS REPRODUCTIVE ORGANS: Mildly enlarged prostate. URINARY BLADDER: Mildly distended urinary bladder. ABDOMINAL WALL/INGUINAL REGIONS: Unremarkable. BONES: No acute fracture or suspicious osseous lesion. Spinal degenerative changes.     Impression: 1. There is a 2.1 x 1.8 x 2.6 cm left adrenal nodule. This is new compared to 2022. This nodule remains indeterminate and does not have enhancement features to suggest an adenoma. Suggest continued surveillance given above adenopathy. Adrenal recommendation based on institutional consensus and Journal of American  College of Radiology 2017;14:2530-0448. 2. Abdominal and thoracic lymphadenopathy with representative lymph nodes as described above. 3. Pulmonary emboli remain visualized. 4. Right middle lobe consolidation, suspicious for pneumonia, grossly similar in appearance when compared to prior study from 4/19/2024 Resident: CASSANDRA Hall I, the attending radiologist, have reviewed the images and agree with the final report above. Workstation performed: [id number]     Procedure: XR chest portable    Result Date: 4/22/2024  Narrative: XR CHEST PORTABLE INDICATION: chest pain. COMPARISON: 4/19/2024 FINDINGS: Persistent fairly dense right basal infiltrate persistent vascular congestion No pneumothorax or pleural effusion. Normal cardiomediastinal silhouette. Bones are unremarkable for age. Normal upper abdomen.     Impression: Persistent right basal infiltrate and mild vascular congestion Workstation performed: [id number]     Procedure:  VAS VENOUS DUPLEX - LOWER LIMB BILATERAL    Result Date: 4/20/2024  Narrative:  [facility] REPORT CLINICAL: Indications: Patient presents with recent discovery of pulmonary embolism and physician wants to determine potential source.  Patient reports worsening shortness of breath this past week upon arrival to ED . Operative History: Patient denies any cardiovascular surgery Risk Factors The patient has history of HTN, Diabetes (NIDDM (oral meds)) and smoking (current) 0.5 ppd.  FINDINGS:  Right       Impression              Peroneal    Occlusive Subsegmental  Calf Veins  Thrombosed (acute)       Left        Impression              FV Prox     Non Occlusive Thrombus  PostTibial  Occlusive Subsegmental  Peroneal    Occlusive Subsegmental     CONCLUSION: Impression: RIGHT LOWER LIMB: Acute deep vein thrombosis identified in the peroneal & soleal veins at the proximal to mid calf. No evidence of superficial thrombophlebitis noted. Doppler evaluation shows a normal response to augmentation  maneuvers.. Popliteal, posterior tibial and anterior tibial arterial Doppler waveform's are triphasic.  LEFT LOWER LIMB: Acute deep vein thrombosis identified in the proximal femoral vein (extending into the confluence of the common femoral vein), and (1) posterior tibial, and peroneal veins at the proximal to mid calf. No evidence of superficial thrombophlebitis noted. Doppler evaluation shows a normal response to augmentation maneuvers. Popliteal, posterior tibial and anterior tibial arterial Doppler waveform's are triphasic.  Technical findings were given to Jason Turcios MD via [employer] following exam.  SIGNATURE: Electronically Signed by: ALMA LEBLANC MD, VI on 2024-04-20 07:12:03 PM    Procedure: Echo complete w/ contrast if indicated    Result Date: 4/20/2024  Narrative:   Left Ventricle: Left ventricular cavity size is normal. Wall thickness is mildly increased. There is mild concentric hypertrophy. The left ventricular ejection fraction is 60%. Systolic function is normal. Wall motion is normal. Diastolic function is normal.   Right Ventricle: Right ventricular cavity size is borderline dilated. Systolic function is normal.   Aortic Valve: There is mild regurgitation. There is aortic valve sclerosis.The aortic valve peak velocity is 1.95 m/s. The aortic valve area is 2.06 cm2. No echocardiographic evidence of RV dysfunction / strain.  No indirect evidence of pulmonary hypertension.     Procedure: XR chest 1 view portable    Result Date: 4/20/2024  Narrative: XR CHEST PORTABLE INDICATION: SOB. COMPARISON: None FINDINGS: Right middle lobe pneumonia. Chronic changes. Normal cardiomediastinal silhouette. Bones are unremarkable for age. Normal upper abdomen.     Impression: Right middle lobe pneumonia. Workstation performed: [id number]     Procedure: CTA ED chest PE study    Result Date: 4/19/2024  Narrative: CTA - CHEST WITH IV CONTRAST - PULMONARY ANGIOGRAM INDICATION: sob. COMPARISON: None. TECHNIQUE:  CTA examination of the chest was performed using angiographic technique according to a protocol specifically tailored to evaluate for pulmonary embolism. Multiplanar 2D reformatted images were created from the source data. In addition, coronal  3D MIP postprocessing was performed on the acquisition scanner. Radiation dose length product (DLP) for this visit: 358 mGy-cm . This examination, like all CT scans performed in the [facility], was performed utilizing techniques to minimize radiation dose exposure, including the use of iterative reconstruction and automated exposure control. IV Contrast: 85 mL of iohexol (OMNIPAQUE) FINDINGS: PULMONARY ARTERIAL TREE: Intraluminal filling defects in multiple lobar, segmental, and subsegmental branches in the right lower lobe, left upper lobe, lingula, and left lower lobe noted with overall moderate clot burden. No central/saddle pulmonary embolism. RV:LV ratio is elevated at 1.1 suggestive of mild right heart strain. LUNGS: Central airways are patent. There is no tracheal or endobronchial lesion. Interlobular septal thickening noted bilaterally suggestive of interstitial pulmonary edema. Extensive right upper lobe and right middle lobe perihilar airspace opacities are seen. Additional subtle perihilar opacities in the right lower lobe also noted. Overall findings suggest multifocal infection of the right lung. PLEURA: No pneumothorax. Trace bilateral pleural effusions with basal atelectasis. HEART/GREAT VESSELS: Heart is unremarkable for patient's age. No pericardial effusion. No thoracic aortic aneurysm or dissection. Mild calcific atherosclerosis of the aortic arch and coronary arteries noted. The visualized proximal thoracic great vessels  are patent with normal course and caliber.. MEDIASTINUM AND JEREMIAH: Multiple mediastinal and hilar pathologically enlarged lymph nodes are seen measuring up to 2.3 x 1.5 cm. The visualized thyroid glands are unremarkable.  Esophagus is normal in course and caliber. No significant prevertebral soft tissue swelling. Multiple prominent para-aortic, retrocrural and upper abdominal lymph nodes are seen. CHEST WALL AND LOWER NECK: Unremarkable. VISUALIZED STRUCTURES IN THE UPPER ABDOMEN: No free air or free fluid in the imaged upper abdomen. Mild diffuse hepatic steatosis. 1.9 cm nonspecific left adrenal nodule noted measuring 52 Hounsfield units by density measurement. Recommend 1 year follow-up adrenal washout CT per current guidelines. Multiple nonspecific pathologically enlarged para-aortic, retrocrural, and upper abdominal lymph nodes are seen measuring up to 2.2 x 1.7 cm. OSSEOUS STRUCTURES: No acute fracture or destructive osseous lesion. Mild multilevel degenerative changes of the thoracic spine.     Impression: 1.  Acute moderate clot burden multifocal bilateral peripheral pulmonary embolism. RV:LV ratio is elevated at 1.1 suggestive of mild right heart strain. 2.  No thoracic aortic aneurysm or dissection. Mild calcific atherosclerosis of the aortic arch and coronary arteries noted. 3.  Trace bilateral pleural effusions with bibasilar atelectasis. Interstitial pulmonary edema bilaterally. 4.  Findings suggestive of multifocal pneumonia in the right lung worse in the right middle lobe. Recommend follow-up imaging after adequate therapy to ensure complete resolution and exclude possibility of underlying neoplasm. 5.  Pathologically enlarged mediastinal, hilar, para-aortic, retrocrural, and upper abdominal lymph nodes are seen which may be secondary to a diffuse infectious/inflammatory or neoplastic process including lymphoma. Recommend clinical correlation. 6.  Nonspecific 1.9 cm left adrenal nodule as described above. Recommend adrenal washout CT in 12 months per current guidelines. Findings discussed with RAFFI Anguiano at 10:33 p.m. Workstation performed: [id number]        Administrative Statements   I have spent a total time of 20  minutes in caring for this patient on the day of the visit/encounter including Patient and family education, Impressions, Counseling / Coordination of care, Documenting in the medical record, Reviewing / ordering tests, medicine, procedures  , and Obtaining or reviewing history  . Topics discussed with the patient / family include symptom assessment and management, medication review, psychosocial support, supportive listening, and anticipatory guidance.

## 2024-08-14 NOTE — ASSESSMENT & PLAN NOTE
Patient respond well to Zofran 8mg ODT PRN.  Typically only needs to take 1 tablet a few days after treatment which resolves his nausea.  Denies any emesis. He is able to tolerate PO intake and continues to have an appetite.    Will provide refill for Zofran today to have on hand.

## 2024-08-20 NOTE — PROGRESS NOTES
Hematology/Oncology Outpatient Office Note    Date of Service: 2024    [facility]AT[facility] [address]  [place] [address]  [phone]    Reason for Consultation:   Chief Complaint   Patient presents with    Follow-up       Cancer Stage at diagnosis: IV    Referral Physician: No ref. provider found    Primary Care Physician:  Amelie Bacon MD     Nickname: Jessica    Spouse: Mabel  (RN at [employer] in Cardiology)    Original ECO    Today's ECO    Goals and Barriers:  Current Goal: Minimize effects of disease burden, extend life.   Barriers to accomplishing this: SOB    Patient's Capacity to Self Care:  Patient is able to self care    ASSESSMENT & PLAN      Diagnosis ICD-10-CM Associated Orders   1. Adenocarcinoma of overlapping sites of right lung (HCC)  C34.81       2. Chemotherapy-induced nausea  R11.0     T45.1X5A             This is a 54 y.o. c PMHx notable for DM, HLP, blood clots, HTN, remote nicotine abuse, being seen in consultation for metastatic lung adeno       Discussion of decision making  Oncology history updated, accordingly, during this visit  Goals of care/patient communication  I discussed with the patient the clinical course leading up to their cancer diagnosis. I reviewed relevant office notes, imaging reports and pathology result as well.  I told the patient that this is a case of incurable disease and what this means. We discussed that the goal of anti-cancer therapy is to provide best quality of life, extend overall survival, and progression free survival as shown in clinical trials. We also discussed that there might be a point when the cancer will no longer respond to this anti-neoplastic therapy. As a result, we also discussed the role of the palliative care team being introduced early in the treatment course. We will be making this referral  I explained the risks/benefits of the proposed cancer therapy:  Carbo-Alimta-Keytruda and after discussion including understanding risks of possible life-threatening complications and therapy-related malignancy development, informed consent for blood products and treatment has been signed and obtained.  4/24/2024 CARIS NGS: TMB high 10 muts/Mb, TP53, KEAP1 mutated, STK11 mutated  TNM/Staging At Diagnosis  Cancer Staging   Adenocarcinoma of overlapping sites of right lung (HCC)  Staging form: Lung, AJCC 8th Edition  - Clinical: Stage IV (cN3, cM1) - Signed by Maryana Sandoval MD on 5/2/2024  Disease Features/Tumor Markers/Genetics  Tumor Marker: n/a  Notable Path Features: 4/23/2024 Lymph Node, Left retroperitoneal, Biopsy: Metastatic adenocarcinoma of lung primary  Treatment: Carbo-Alimta-Keytruda  Other Supportive care: Zofran, EMLA  Treatment Team Members  Surgeon  Rad Onc  Palliative: Dr. Estrella  Labs  Diagnostics  4/19/2024 CT Chest PE: Acute moderate clot burden multifocal bilateral peripheral pulmonary embolism, mild R heart strain. Findings suggestive of multifocal pneumonia. Pathologically enlarged mediastinal, hilar, para-aortic, retrocrural, and upper abdominal lymph nodes. Nonspecific 1.9 cm left adrenal nodule  4/20/2024 CT Abd/pelv w/wo c: There is a 2.1 x 1.8 x 2.6 cm left adrenal nodule. Abdominal and thoracic lymphadenopathy  5/4/2024 MRI Brain w/wo c: 4 mm metastasis in the posterior right frontal lobe without significant edema. No other definite enhancing lesions but evaluation is limited due to motion artifact.  Tiny focus of restricted diffusion in the left frontal lobe without definite enhancement could represent acute/early subacute infarct  5/8/2024: Neuro working group recommended close MRI Brain surveillance for the 2 small brain mets to see how it responds to treatment, Dr. Paulo Alcala will trend it  8/2/2024 CT CAP w/c: [place]!  Interval decrease in size of mediastinal and hilar lymph nodes. Left hilar lymph node measures 1.3 x 1.1 cm compared to 2.3 x 2.8  cm previously. Subcarinal lymph node measures 1.7 x 1 cm compared to 4.3 x 2.5 cm  Significant interval decrease in size of right upper lobe mass with lymphangitic carcinomatosis and extension into the right middle lobe with residual wedge-shaped airspace opacity now measuring 4.6 x 2.6 cm   Interval decrease in size of left adrenal lesion measuring 1.2 x 1.2 cm compared to 2.1 x 1.8 cm previously      Discussion of decision making    I personally reviewed the following lab results, the image studies, pathology, other specialty/physicians consult notes and recommendations, and outside medical records. I had a lengthy discussion with the patient and shared the work-up findings. We discussed the diagnosis and management plan as below. I spent 42 minutes reviewing the records (labs, clinician notes, outside records, medical history, ordering medicine/tests/procedures, monitoring of anti-neoplastic toxicities, interpreting the imaging/labs previously done) and coordination of care as well as direct time with the patient today, of which greater than 50% of the time was spent in counseling and coordination of care with the patient/family.      Plan/Labs  Restaging CT CAP w/c scheduled 10/25/2024  MRI Brain restaging as per Rad Onc  Rad Onc f/u for surveillance of 2 small brain mets  F/u palliative care   Infusion chairs ordered for Carbo-Alimta-Keytruda q3 weeks while on preceding labs, C6 coming up         Follow Up: q3 weeks scheduled thru 9/24/2024    All questions were answered to the patient's satisfaction during this encounter. The patient knows the contact information for our office and knows to reach out for any relevant concerns related to this encounter. They are to call for any temperature 100.4 or higher, new symptoms including but not restricted to shaking chills, decreased appetite, nausea, vomiting, diarrhea, increased fatigue, shortness of breath or chest pain, confusion, and not feeling the strength to  come to the clinic. For all other listed problems and medical diagnosis in their chart - they are managed by PCP and/or other specialists, which the patient acknowledges. Thank you very much for your consultation and making us a part of this patient's care. We are continuing to follow closely with you. Please do not hesitate to reach out to me with any additional questions or concerns.    Maryana Sandoval MD  Hematology & Medical Oncology Staff Physician             Disclaimer: This document was prepared using [employer] Direct technology. If a word or phrase is confusing, or does not make sense, this is likely due to recognition error which was not discovered during this clinician's review. If you believe an error has occurred, please contact me through beSUCCESS service line for [email or web address]?cation.      ONCOLOGY HISTORY OF PRESENT ILLNESS        Oncology History   Adenocarcinoma of overlapping sites of right lung (HCC)   4/23/2024 Biopsy    Final Diagnosis   A. Lymph Node, Left retroperitoneal, Biopsy:  - Metastatic adenocarcinoma of lung primary.         5/2/2024 Initial Diagnosis    Adenocarcinoma of overlapping sites of right lung (HCC)     5/2/2024 -  Cancer Staged    Staging form: Lung, AJCC 8th Edition  - Clinical: Stage IV (cN3, cM1) - Signed by Maryana Sandoval MD on 5/2/2024 5/21/2024 -  Chemotherapy    cyanocobalamin, 1,000 mcg, Intramuscular, Once, 2 of 3 cycles  Administration: 1,000 mcg (5/14/2024), 1,000 mcg (7/2/2024)  alteplase (CATHFLO), 2 mg, Intracatheter, Every 1 Minute as needed, 5 of 6 cycles  fosaprepitant (EMEND) IVPB, 150 mg, Intravenous, Once, 5 of 6 cycles  Administration: 150 mg (5/21/2024), 150 mg (7/2/2024), 150 mg (7/23/2024), 150 mg (6/11/2024), 150 mg (8/13/2024)  CARBOplatin (PARAPLATIN) IVPB (GOG AUC DOSING), 672 mg, Intravenous, Once, 5 of 6 cycles  Administration: 672 mg (5/21/2024), 692.5 mg (7/2/2024), 659 mg (7/23/2024), 692.5 mg (6/11/2024), 647 mg  (8/13/2024)  pemetrexed (ALIMTA) chemo infusion, 1,020 mg, Intravenous, Once, 5 of 6 cycles  Administration: 1,000 mg (5/21/2024), 1,000 mg (7/2/2024), 1,000 mg (7/23/2024), 1,000 mg (6/11/2024), 1,000 mg (8/13/2024)  pembrolizumab (KEYTRUDA) IVPB, 200 mg, Intravenous, Once, 5 of 6 cycles  Administration: 200 mg (5/21/2024), 200 mg (7/2/2024), 200 mg (7/23/2024), 200 mg (6/11/2024), 200 mg (8/13/2024)           SUBJECTIVE  (INTERVAL HISTORY)      Clotting History None   Bleeding History None   Cancer History Lung Adeno   Family Cancer History None   H/O Blood/Plt Transfusion None   Tobacco/etoh/drug abuse 1.5 PPD x 40 years, quit 4/2024, no etoh abuse or rec drug use       Cancer Screening history C-scope 2014, due for Myngle (ordered)   Occupation  in the past, now        Pain: mid chest (deep) since early May 2024, related to coughing which is now resolved.    Dyspnea with exertion since 4/2024 is improving greatly and he's able to take deeper breaths. Feels stronger. Has chronic kaleidoscope ocular sensation over the past few years.    Did well with the last chemo.         I have reviewed the relevant past medical, surgical, social and family history. I have also reviewed allergies and medications for this patient.    Review of Systems  Baseline weight: 200-205 lbs    Denies F/C, N/V, CP, LH, HA, rash, itching, gen weakness, unilateral weakness, diplopia, melena, hematuria, hematochezia, falls, diarrhea, or constipation       A 10-point review of system was performed, pertinent positive and negative were detailed as above. Otherwise, the 10-point review of system was negative.      Past Medical History:   Diagnosis Date    Cancer (HCC) 04,25,2024    Diabetes mellitus (HCC)     High cholesterol     History of blood clots     Hypertension     Leucocytosis 04/20/2024    Lung cancer (HCC)     Malignant neoplasm of overlapping sites of right lung (HCC) 06/04/2024    Pneumonia      Pulmonary embolism (HCC)        Past Surgical History:   Procedure Laterality Date    IR BIOPSY LYMPH NODE  2024    IR PORT PLACEMENT  2024    KNEE SURGERY      MANDIBLE FRACTURE SURGERY  1985    PATELLA FRACTURE SURGERY      RECTAL SURGERY         Family History   Problem Relation Age of Onset    No Known Problems Father     No Known Problems Mother        Social History     Socioeconomic History    Marital status: Single     Spouse name: Not on file    Number of children: Not on file    Years of education: Not on file    Highest education level: Not on file   Occupational History    Not on file   Tobacco Use    Smoking status: Former     Current packs/day: 0.00     Average packs/day: 1.5 packs/day for 35.0 years (52.5 ttl pk-yrs)     Types: Cigarettes     Quit date: 4/15/2024     Years since quittin.3     Passive exposure: Past    Smokeless tobacco: Never   Vaping Use    Vaping status: Never Used   Substance and Sexual Activity    Alcohol use: Not Currently    Drug use: Never    Sexual activity: Yes     Partners: Female     Birth control/protection: Post-menopausal, None   Other Topics Concern    Not on file   Social History Narrative    Not on file     Social Determinants of Health     Financial Resource Strain: Not on file   Food Insecurity: Not on file   Transportation Needs: Not on file   Physical Activity: Not on file   Stress: Not on file   Social Connections: Not on file   Intimate Partner Violence: Not on file   Housing Stability: Not on file       No Known Allergies    Current Outpatient Medications   Medication Sig Dispense Refill    amitriptyline (ELAVIL) 100 mg tablet 200 mg daily at bedtime      apixaban (Eliquis) 5 mg Take 1 tablet (5 mg total) by mouth 2 (two) times a day 60 tablet 5    atorvastatin (LIPITOR) 10 mg tablet Take 1 tablet (10 mg total) by mouth daily 90 tablet 3    benzonatate (TESSALON) 200 MG capsule Take 1 capsule (200 mg total) by mouth 3 (three) times a day as needed  for cough 20 capsule 0    famotidine (PEPCID) 40 MG tablet Take 40 mg by mouth daily as needed for heartburn or indigestion Taking as needed      folic acid ( Folic Acid) 1 mg tablet Take 1 tablet (1 mg total) by mouth daily 90 tablet 0    glimepiride (AMARYL) 2 mg tablet Take 1 tablet (2 mg total) by mouth daily with breakfast 90 tablet 1    Lancets (OneTouch Delica Plus Tchngd17P) MISC Use 1 Units 4 (four) times a day 100 each 5    levothyroxine (Synthroid) 25 mcg tablet Take 1 tablet (25 mcg total) by mouth daily 90 tablet 2    losartan (COZAAR) 25 mg tablet Take 25 mg by mouth daily      metFORMIN (GLUCOPHAGE) 1000 MG tablet Take 1,000 mg by mouth 2 (two) times a day      metFORMIN (GLUCOPHAGE) 500 mg tablet Take 1,000 mg by mouth 2 (two) times a day with meals      ondansetron (ZOFRAN-ODT) 8 mg disintegrating tablet Take 1 tablet (8 mg total) by mouth every 8 (eight) hours as needed for vomiting or nausea 30 tablet 0    OneTouch Verio test strip Use 1 each 4 (four) times a day 100 each 5    tiotropium-olodaterol (Stiolto Respimat) 2.5-2.5 MCG/ACT inhaler Inhale 2 puffs daily 4 g 3    Glucagon, rDNA, (Glucagon Emergency) 1 MG KIT Inject 1 mg as directed once as needed (hypoglycemia) for up to 1 dose (Patient not taking: Reported on 7/3/2024) 2 kit 0     No current facility-administered medications for this visit.       (Not in a hospital admission)      Objective:     24 Hour Vitals Assessment:     Vitals:    08/26/24 0855   BP: 112/62   Pulse: (!) 109   Resp: 18   Temp: 97.9 °F (36.6 °C)   SpO2: 96%         Weight at last visit: 210 lbs  Weight today: 215 lbs    PHYSICIAN EXAM:    General: Appearance: alert, cooperative, no distress.  HEENT: Normocephalic, atraumatic. No scleral icterus. conjunctivae clear. EOMI.  Chest: No tenderness to palpation. No open wound noted.  Lungs: Clear to auscultation bilaterally, Respirations unlabored.  Cardiac: Tachycardia, +S1and S2  Abdomen: Soft, non-tender, non-distended.  Bowel sounds are normal.  Extremities:  No edema, cyanosis, clubbing.  Skin: Skin color, turgor are normal. No rashes.  Lymphatics: no palpable supra-cervical, axillary, or inguinal adenopathy  Neurologic: Awake, Alert, and oriented, no gross focal deficits noted b/l.       DATA REVIEW:    Pathology Result:    Final Diagnosis   Date Value Ref Range Status   04/23/2024   Final    A. Lymph Node, Left retroperitoneal, Biopsy:  - Metastatic adenocarcinoma of lung primary.     Comment: Specimen (block A2) is being sent to [facility] for MI Profile Testing. Upon completion of testing, cuaQea report will be sent directly to the requesting provider, as well as posted in the Media Tab of EPIC for this patient by the Pathology Department.          Image Results:   Image result are reviewed and documented in Hematology/Oncology history    CT chest abdomen pelvis w contrast  Narrative: CT CHEST, ABDOMEN AND PELVIS WITH IV CONTRAST    INDICATION:   Malignant neoplasm of overlapping sites of right bronchus and lung. .    COMPARISON: CT abdomen and pelvis dated 4/20/2024. CT chest dated 4/19/2024.    TECHNIQUE: CT examination of the chest, abdomen and pelvis was performed. Multiplanar 2D reformatted images were created from the source data.    This examination, like all CT scans performed in the [facility], was performed utilizing techniques to minimize radiation dose exposure, including the use of iterative reconstruction and automated exposure control. Radiation dose length   product (DLP) for this visit:    IV Contrast:  Enteric Contrast: Enteric contrast was not administered.    FINDINGS:    CHEST    LUNGS: Significant interval decrease in size of right upper lobe mass with lymphangitic carcinomatosis and extension into the right middle lobe with residual wedge-shaped airspace opacity now measuring 4.6 x 2.6 cm image 118 series 4. Mild residual   interlobular septal wall thickening is noted in the  right lower lobe. There is no tracheal or endobronchial lesion.    PLEURA:  Unremarkable.    HEART/GREAT VESSELS: Heavy atherosclerotic coronary artery calcification is noted.  Heart is otherwise unremarkable.  No thoracic aortic aneurysm.    MEDIASTINUM AND JEREMIAH: Interval decrease in size of mediastinal and hilar lymph nodes. Left hilar lymph node measures 1.3 x 1.1 cm compared to 2.3 x 2.8 cm previously. Subcarinal lymph node measures 1.7 x 1 cm compared to 4.3 x 2.5 cm previously. 2..    CHEST WALL AND LOWER NECK:  Unremarkable. Right anterior chest wall Port-A-Cath    ABDOMEN    LIVER/BILIARY TREE: Hepatomegaly measuring 20 cm with mild steatosis. No suspicious hepatic lesions.    GALLBLADDER:  No calcified gallstones. No pericholecystic inflammatory change.    SPLEEN:  Unremarkable.    PANCREAS:  Unremarkable.    ADRENAL GLANDS: Interval decrease in size of left adrenal lesion measuring 1.2 x 1.2 cm compared to 2.1 x 1.8 cm previously image 109 series 2. Normal size right adrenal gland.    KIDNEYS/URETERS: 1.5 cm nonobstructing left renal stone.. No hydronephrosis.    STOMACH AND BOWEL:  There is colonic diverticulosis without evidence of acute diverticulitis.    APPENDIX:  No findings to suggest appendicitis.    ABDOMINOPELVIC CAVITY:  No ascites.  No pneumoperitoneum.  No lymphadenopathy.    VESSELS:  Atherosclerotic changes are present.  No evidence of aneurysm.    PELVIS    REPRODUCTIVE ORGANS:  The prostate is enlarged.    URINARY BLADDER: Mild bladder wall thickening.    ABDOMINAL WALL/INGUINAL REGIONS:  There is a small fat-containing umbilical hernia. Small fat-containing inguinal hernias    OSSEOUS STRUCTURES:  No acute fracture or destructive osseous lesion.  Spinal degenerative changes are noted.  Impression: 1. Treatment response. Interval decrease in size of mediastinal and hilar lymphadenopathy as well as interval decrease in size of infiltrating mass in the right upper lobe and involving portions  "of the right middle lobe with lymphangitic carcinomatosis.   Interval decrease in size of metastatic lesion to the left adrenal gland. No new areas of thoracic or intra-abdominal lymphadenopathy. No osseous metastatic disease.    2. Nonobstructing left renal stone measuring 1.5 cm    3. Hepatomegaly with steatosis. No suspicious hepatic lesions.    Electronically signed: 08/05/2024 10:38 PM Fer Lawrence MD      LABS:  Lab data are reviewed and documented in [facility] history.       Lab Results   Component Value Date    HGB 10.2 (L) 08/09/2024    HCT 33.4 (L) 08/09/2024    MCV 83 08/09/2024     08/09/2024    WBC 3.43 (L) 08/09/2024    NRBC 0 08/09/2024     Lab Results   Component Value Date    K 4.1 08/09/2024     08/09/2024    CO2 28 08/09/2024    BUN 18 08/09/2024    CREATININE 0.87 08/09/2024    GLUF 100 (H) 06/28/2024    CALCIUM 9.5 08/09/2024    AST 14 08/09/2024    ALT 23 08/09/2024    ALKPHOS 90 08/09/2024    EGFR 97 08/09/2024       Lab Results   Component Value Date    IRON 28 (L) 04/19/2024    TIBC 212 (L) 04/19/2024    FERRITIN 63 04/19/2024       No results found for: \"UYNNVLIH39\"    No results for input(s): \"WBC\", \"CREAT\", \"PLT\" in the last 72 hours.    By:  Maryana Sandoval MD, 8/26/2024, 9:00 AM                                  "

## 2024-08-26 ENCOUNTER — OFFICE VISIT (OUTPATIENT)
Dept: HEMATOLOGY ONCOLOGY | Facility: CLINIC | Age: 54
End: 2024-08-26
Payer: COMMERCIAL

## 2024-08-26 VITALS
HEART RATE: 109 BPM | SYSTOLIC BLOOD PRESSURE: 112 MMHG | DIASTOLIC BLOOD PRESSURE: 62 MMHG | HEIGHT: 67 IN | WEIGHT: 215.5 LBS | TEMPERATURE: 97.9 F | RESPIRATION RATE: 18 BRPM | BODY MASS INDEX: 33.82 KG/M2 | OXYGEN SATURATION: 96 %

## 2024-08-26 DIAGNOSIS — C34.81 ADENOCARCINOMA OF OVERLAPPING SITES OF RIGHT LUNG (HCC): Primary | ICD-10-CM

## 2024-08-26 DIAGNOSIS — R11.0 CHEMOTHERAPY-INDUCED NAUSEA: ICD-10-CM

## 2024-08-26 DIAGNOSIS — T45.1X5A CHEMOTHERAPY-INDUCED NAUSEA: ICD-10-CM

## 2024-08-26 PROCEDURE — 99215 OFFICE O/P EST HI 40 MIN: CPT | Performed by: INTERNAL MEDICINE

## 2024-08-27 ENCOUNTER — RA CDI HCC (OUTPATIENT)
Dept: OTHER | Facility: HOSPITAL | Age: 54
End: 2024-08-27

## 2024-08-28 ENCOUNTER — TELEPHONE (OUTPATIENT)
Dept: HEMATOLOGY ONCOLOGY | Facility: CLINIC | Age: 54
End: 2024-08-28

## 2024-08-28 DIAGNOSIS — C34.81 ADENOCARCINOMA OF OVERLAPPING SITES OF RIGHT LUNG (HCC): Primary | ICD-10-CM

## 2024-08-28 NOTE — TELEPHONE ENCOUNTER
Patient is scheduled out further. I will give patient an updated calendar during appointment Friday 8/30

## 2024-08-30 ENCOUNTER — HOSPITAL ENCOUNTER (OUTPATIENT)
Dept: INFUSION CENTER | Facility: CLINIC | Age: 54
End: 2024-08-30
Payer: COMMERCIAL

## 2024-08-30 ENCOUNTER — PATIENT OUTREACH (OUTPATIENT)
Dept: HEMATOLOGY ONCOLOGY | Facility: CLINIC | Age: 54
End: 2024-08-30

## 2024-08-30 DIAGNOSIS — Z95.828 PORT-A-CATH IN PLACE: Primary | ICD-10-CM

## 2024-08-30 DIAGNOSIS — C34.81 ADENOCARCINOMA OF OVERLAPPING SITES OF RIGHT LUNG (HCC): ICD-10-CM

## 2024-08-30 LAB
ALBUMIN SERPL BCG-MCNC: 4.1 G/DL (ref 3.5–5)
ALP SERPL-CCNC: 77 U/L (ref 34–104)
ALT SERPL W P-5'-P-CCNC: 32 U/L (ref 7–52)
ANION GAP SERPL CALCULATED.3IONS-SCNC: 8 MMOL/L (ref 4–13)
AST SERPL W P-5'-P-CCNC: 20 U/L (ref 13–39)
BASOPHILS # BLD AUTO: 0.02 THOUSANDS/ÂΜL (ref 0–0.1)
BASOPHILS NFR BLD AUTO: 1 % (ref 0–1)
BILIRUB SERPL-MCNC: 0.19 MG/DL (ref 0.2–1)
BUN SERPL-MCNC: 13 MG/DL (ref 5–25)
CALCIUM SERPL-MCNC: 9.6 MG/DL (ref 8.4–10.2)
CHLORIDE SERPL-SCNC: 104 MMOL/L (ref 96–108)
CO2 SERPL-SCNC: 28 MMOL/L (ref 21–32)
CREAT SERPL-MCNC: 0.88 MG/DL (ref 0.6–1.3)
EOSINOPHIL # BLD AUTO: 0.05 THOUSAND/ÂΜL (ref 0–0.61)
EOSINOPHIL NFR BLD AUTO: 1 % (ref 0–6)
ERYTHROCYTE [DISTWIDTH] IN BLOOD BY AUTOMATED COUNT: 20.3 % (ref 11.6–15.1)
GFR SERPL CREATININE-BSD FRML MDRD: 96 ML/MIN/1.73SQ M
GLUCOSE SERPL-MCNC: 102 MG/DL (ref 65–140)
HCT VFR BLD AUTO: 33.3 % (ref 36.5–49.3)
HGB BLD-MCNC: 10.5 G/DL (ref 12–17)
IMM GRANULOCYTES # BLD AUTO: 0.02 THOUSAND/UL (ref 0–0.2)
IMM GRANULOCYTES NFR BLD AUTO: 1 % (ref 0–2)
LYMPHOCYTES # BLD AUTO: 1.33 THOUSANDS/ÂΜL (ref 0.6–4.47)
LYMPHOCYTES NFR BLD AUTO: 34 % (ref 14–44)
MCH RBC QN AUTO: 26.3 PG (ref 26.8–34.3)
MCHC RBC AUTO-ENTMCNC: 31.5 G/DL (ref 31.4–37.4)
MCV RBC AUTO: 83 FL (ref 82–98)
MONOCYTES # BLD AUTO: 0.48 THOUSAND/ÂΜL (ref 0.17–1.22)
MONOCYTES NFR BLD AUTO: 12 % (ref 4–12)
NEUTROPHILS # BLD AUTO: 2.07 THOUSANDS/ÂΜL (ref 1.85–7.62)
NEUTS SEG NFR BLD AUTO: 51 % (ref 43–75)
NRBC BLD AUTO-RTO: 0 /100 WBCS
PLATELET # BLD AUTO: 211 THOUSANDS/UL (ref 149–390)
PMV BLD AUTO: 9.1 FL (ref 8.9–12.7)
POTASSIUM SERPL-SCNC: 4 MMOL/L (ref 3.5–5.3)
PROT SERPL-MCNC: 6.9 G/DL (ref 6.4–8.4)
RBC # BLD AUTO: 4 MILLION/UL (ref 3.88–5.62)
SODIUM SERPL-SCNC: 140 MMOL/L (ref 135–147)
TSH SERPL DL<=0.05 MIU/L-ACNC: 1.13 UIU/ML (ref 0.45–4.5)
WBC # BLD AUTO: 3.97 THOUSAND/UL (ref 4.31–10.16)

## 2024-08-30 PROCEDURE — 85025 COMPLETE CBC W/AUTO DIFF WBC: CPT | Performed by: INTERNAL MEDICINE

## 2024-08-30 PROCEDURE — 80053 COMPREHEN METABOLIC PANEL: CPT | Performed by: INTERNAL MEDICINE

## 2024-08-30 PROCEDURE — 84443 ASSAY THYROID STIM HORMONE: CPT | Performed by: INTERNAL MEDICINE

## 2024-08-30 NOTE — PROGRESS NOTES
ASSESSMENT      Are you having any side effects from your treatment?  -no    Are you eating and drinking properly?  -yes    Are you having any pain?  - no    Have needs changed for a palliative care referral?  -pt established     Do you know when your upcoming appointments are?  -yes    Do you have a good support system?  -yes    Are you interested in any support groups?  - no    Are there any changes in barriers to care?  -no    Do you have any questions or concerns regarding your treatment plan?  -Jessica stated he is interested in getting dental work done. I provided him with [facility] dental provider information. Jessica was very appreciative of my call.

## 2024-08-30 NOTE — PROGRESS NOTES
Patient presents today for lab work. Patient offers no complaints. Port accessed without incident with excellent blood return noted. Labs drawn as per order. Port flushed and de-accessed as per protocol. Next appointment confirmed for 9/3/2024 at 1230. AVS declined.

## 2024-08-31 RX ORDER — CYANOCOBALAMIN 1000 UG/ML
1000 INJECTION, SOLUTION INTRAMUSCULAR; SUBCUTANEOUS ONCE
OUTPATIENT
Start: 2024-09-03 | End: 2024-09-03

## 2024-08-31 RX ORDER — SODIUM CHLORIDE 9 MG/ML
20 INJECTION, SOLUTION INTRAVENOUS ONCE
OUTPATIENT
Start: 2024-09-03

## 2024-09-03 ENCOUNTER — HOSPITAL ENCOUNTER (OUTPATIENT)
Dept: INFUSION CENTER | Facility: CLINIC | Age: 54
Discharge: HOME | End: 2024-09-03
Payer: COMMERCIAL

## 2024-09-03 VITALS
DIASTOLIC BLOOD PRESSURE: 80 MMHG | SYSTOLIC BLOOD PRESSURE: 138 MMHG | WEIGHT: 217.5 LBS | BODY MASS INDEX: 34.14 KG/M2 | OXYGEN SATURATION: 97 % | HEIGHT: 67 IN | RESPIRATION RATE: 18 BRPM | TEMPERATURE: 97.2 F | HEART RATE: 84 BPM

## 2024-09-03 DIAGNOSIS — C34.81 ADENOCARCINOMA OF OVERLAPPING SITES OF RIGHT LUNG (HCC): Primary | ICD-10-CM

## 2024-09-03 PROCEDURE — 96411 CHEMO IV PUSH ADDL DRUG: CPT

## 2024-09-03 PROCEDURE — 96372 THER/PROPH/DIAG INJ SC/IM: CPT

## 2024-09-03 PROCEDURE — 96413 CHEMO IV INFUSION 1 HR: CPT

## 2024-09-03 PROCEDURE — 96417 CHEMO IV INFUS EACH ADDL SEQ: CPT

## 2024-09-03 PROCEDURE — 96367 TX/PROPH/DG ADDL SEQ IV INF: CPT

## 2024-09-03 RX ORDER — CYANOCOBALAMIN 1000 UG/ML
1000 INJECTION, SOLUTION INTRAMUSCULAR; SUBCUTANEOUS ONCE
Status: COMPLETED | OUTPATIENT
Start: 2024-09-03 | End: 2024-09-03

## 2024-09-03 RX ORDER — SODIUM CHLORIDE 9 MG/ML
20 INJECTION, SOLUTION INTRAVENOUS ONCE
Status: COMPLETED | OUTPATIENT
Start: 2024-09-03 | End: 2024-09-03

## 2024-09-03 RX ADMIN — SODIUM CHLORIDE 200 MG: 9 INJECTION, SOLUTION INTRAVENOUS at 14:28

## 2024-09-03 RX ADMIN — CARBOPLATIN 641 MG: 10 INJECTION, SOLUTION INTRAVENOUS at 15:30

## 2024-09-03 RX ADMIN — PEMETREXED DISODIUM 1000 MG: 500 INJECTION, POWDER, LYOPHILIZED, FOR SOLUTION INTRAVENOUS at 15:14

## 2024-09-03 RX ADMIN — SODIUM CHLORIDE 20 ML/HR: 0.9 INJECTION, SOLUTION INTRAVENOUS at 13:25

## 2024-09-03 RX ADMIN — CYANOCOBALAMIN 1000 MCG: 1000 INJECTION, SOLUTION INTRAMUSCULAR at 15:47

## 2024-09-03 RX ADMIN — DEXAMETHASONE SODIUM PHOSPHATE: 10 INJECTION, SOLUTION INTRAMUSCULAR; INTRAVENOUS at 13:25

## 2024-09-03 RX ADMIN — FOSAPREPITANT 150 MG: 150 INJECTION, POWDER, LYOPHILIZED, FOR SOLUTION INTRAVENOUS at 13:49

## 2024-09-03 NOTE — PROGRESS NOTES
Patient presents to [facility] for Keytruda, Alimta, and Carboplatin. Patient offers no complaints at this time. CrCl within parameters for treatment today. Labs reviewed from 8/30- within parameters for treatment. Port accessed with brisk blood return.

## 2024-09-03 NOTE — PROGRESS NOTES
Patient tolerated treatment without incident. Next appt confirmed with patient for 9/20 at 1500 at [place]. Patient declined AVS.

## 2024-09-04 ENCOUNTER — OFFICE VISIT (OUTPATIENT)
Dept: FAMILY MEDICINE CLINIC | Facility: CLINIC | Age: 54
End: 2024-09-04
Payer: COMMERCIAL

## 2024-09-04 VITALS
BODY MASS INDEX: 33.9 KG/M2 | SYSTOLIC BLOOD PRESSURE: 128 MMHG | TEMPERATURE: 97.1 F | OXYGEN SATURATION: 98 % | RESPIRATION RATE: 18 BRPM | HEIGHT: 67 IN | WEIGHT: 216 LBS | HEART RATE: 116 BPM | DIASTOLIC BLOOD PRESSURE: 60 MMHG

## 2024-09-04 DIAGNOSIS — I26.09 ACUTE PULMONARY EMBOLISM WITH ACUTE COR PULMONALE, UNSPECIFIED PULMONARY EMBOLISM TYPE (HCC): ICD-10-CM

## 2024-09-04 DIAGNOSIS — E27.8 ADRENAL NODULE (HCC): ICD-10-CM

## 2024-09-04 DIAGNOSIS — E03.9 ACQUIRED HYPOTHYROIDISM: ICD-10-CM

## 2024-09-04 DIAGNOSIS — E11.00 TYPE 2 DIABETES MELLITUS WITH HYPEROSMOLARITY WITHOUT COMA, WITHOUT LONG-TERM CURRENT USE OF INSULIN (HCC): Primary | ICD-10-CM

## 2024-09-04 DIAGNOSIS — K76.0 FATTY LIVER: ICD-10-CM

## 2024-09-04 DIAGNOSIS — J96.11 CHRONIC HYPOXEMIC RESPIRATORY FAILURE (HCC): ICD-10-CM

## 2024-09-04 DIAGNOSIS — C34.91 PRIMARY MALIGNANT NEOPLASM OF RIGHT LUNG METASTATIC TO OTHER SITE (HCC): ICD-10-CM

## 2024-09-04 DIAGNOSIS — R16.0 HEPATOMEGALY: ICD-10-CM

## 2024-09-04 DIAGNOSIS — C34.81 ADENOCARCINOMA OF OVERLAPPING SITES OF RIGHT LUNG (HCC): ICD-10-CM

## 2024-09-04 DIAGNOSIS — J98.4 RESTRICTIVE LUNG DISEASE: ICD-10-CM

## 2024-09-04 DIAGNOSIS — I82.432 ACUTE DEEP VEIN THROMBOSIS (DVT) OF POPLITEAL VEIN OF LEFT LOWER EXTREMITY (HCC): ICD-10-CM

## 2024-09-04 DIAGNOSIS — N20.0 NEPHROLITHIASIS: ICD-10-CM

## 2024-09-04 DIAGNOSIS — Z79.01 CHRONIC ANTICOAGULATION: ICD-10-CM

## 2024-09-04 DIAGNOSIS — E11.9 TYPE 2 DIABETES MELLITUS WITHOUT COMPLICATION, WITHOUT LONG-TERM CURRENT USE OF INSULIN (HCC): ICD-10-CM

## 2024-09-04 PROBLEM — E16.2 HYPOGLYCEMIA: Status: RESOLVED | Noted: 2024-06-04 | Resolved: 2024-09-04

## 2024-09-04 PROCEDURE — 99214 OFFICE O/P EST MOD 30 MIN: CPT | Performed by: FAMILY MEDICINE

## 2024-09-04 PROCEDURE — 3725F SCREEN DEPRESSION PERFORMED: CPT | Performed by: FAMILY MEDICINE

## 2024-09-05 PROBLEM — Z79.01 CHRONIC ANTICOAGULATION: Status: ACTIVE | Noted: 2024-09-05

## 2024-09-05 PROBLEM — N20.0 NEPHROLITHIASIS: Status: ACTIVE | Noted: 2024-09-05

## 2024-09-05 PROBLEM — R16.0 HEPATOMEGALY: Status: ACTIVE | Noted: 2024-09-05

## 2024-09-05 PROBLEM — K76.0 FATTY LIVER: Status: ACTIVE | Noted: 2024-09-05

## 2024-09-05 NOTE — PROGRESS NOTES
Subjective:      Patient ID: Papo Gibsb is a 54 y.o. male.    Type 2 diabetes-on metformin and glimepiride  Hypertension-on losartan  Hyperlipidemia- on Atorvastatin  Pulmonary embolism in setting of lung cancer- on eliquis  Adenocarcinoma of lung with mets- on chemotherapy with decrease in size of lesions on recent imaging, follows with heme onc        Past Medical History:   Diagnosis Date    Cancer (HCC) 04,25,2024    Diabetes mellitus (HCC)     High cholesterol     History of blood clots     Hypertension     Leucocytosis 04/20/2024    Lung cancer (HCC)     Malignant neoplasm of overlapping sites of right lung (HCC) 06/04/2024    Pneumonia     Pulmonary embolism (HCC)        Family History   Problem Relation Age of Onset    No Known Problems Father     No Known Problems Mother        Past Surgical History:   Procedure Laterality Date    IR BIOPSY LYMPH NODE  4/23/2024    IR PORT PLACEMENT  5/20/2024    KNEE SURGERY      MANDIBLE FRACTURE SURGERY  1985    PATELLA FRACTURE SURGERY      RECTAL SURGERY          reports that he quit smoking about 4 months ago. His smoking use included cigarettes. He has a 52.5 pack-year smoking history. He has been exposed to tobacco smoke. He has never used smokeless tobacco. He reports that he does not currently use alcohol. He reports that he does not use drugs.      Current Outpatient Medications:     amitriptyline (ELAVIL) 100 mg tablet, 200 mg daily at bedtime, Disp: , Rfl:     apixaban (Eliquis) 5 mg, Take 1 tablet (5 mg total) by mouth 2 (two) times a day, Disp: 60 tablet, Rfl: 5    atorvastatin (LIPITOR) 10 mg tablet, Take 1 tablet (10 mg total) by mouth daily, Disp: 90 tablet, Rfl: 3    famotidine (PEPCID) 40 MG tablet, Take 40 mg by mouth daily as needed for heartburn or indigestion Taking as needed, Disp: , Rfl:     folic acid (KP Folic Acid) 1 mg tablet, Take 1 tablet (1 mg total) by mouth daily, Disp: 90 tablet, Rfl: 0    glimepiride (AMARYL) 2 mg tablet, Take 1  tablet (2 mg total) by mouth daily with breakfast, Disp: 90 tablet, Rfl: 1    Lancets (OneTouch Delica Plus [id number]) MISC, Use 1 Units 4 (four) times a day, Disp: 100 each, Rfl: 5    levothyroxine (Synthroid) 25 mcg tablet, Take 1 tablet (25 mcg total) by mouth daily, Disp: 90 tablet, Rfl: 2    losartan (COZAAR) 25 mg tablet, Take 25 mg by mouth daily, Disp: , Rfl:     metFORMIN (GLUCOPHAGE) 1000 MG tablet, Take 1,000 mg by mouth 2 (two) times a day, Disp: , Rfl:     metFORMIN (GLUCOPHAGE) 500 mg tablet, Take 1,000 mg by mouth 2 (two) times a day with meals, Disp: , Rfl:     ondansetron (ZOFRAN-ODT) 8 mg disintegrating tablet, Take 1 tablet (8 mg total) by mouth every 8 (eight) hours as needed for vomiting or nausea, Disp: 30 tablet, Rfl: 0    OneTouch Verio test strip, Use 1 each 4 (four) times a day, Disp: 100 each, Rfl: 5    tiotropium-olodaterol (Stiolto Respimat) 2.5-2.5 MCG/ACT inhaler, Inhale 2 puffs daily, Disp: 4 g, Rfl: 3    benzonatate (TESSALON) 200 MG capsule, Take 1 capsule (200 mg total) by mouth 3 (three) times a day as needed for cough (Patient not taking: Reported on 9/4/2024), Disp: 20 capsule, Rfl: 0    Glucagon, rDNA, (Glucagon Emergency) 1 MG KIT, Inject 1 mg as directed once as needed (hypoglycemia) for up to 1 dose (Patient not taking: Reported on 7/3/2024), Disp: 2 kit, Rfl: 0  No current facility-administered medications for this visit.    Facility-Administered Medications Ordered in Other Visits:     alteplase (CATHFLO) injection 2 mg, 2 mg, Intracatheter, Q1MIN PRN, Maryana Sandoval MD    The following portions of the patient's history were reviewed and updated as appropriate: allergies, current medications, past family history, past medical history, past social history, past surgical history and problem list.    Review of Systems   Constitutional:  Negative for chills and fever.   HENT:  Negative for congestion, rhinorrhea and sore throat.    Eyes:  Negative for discharge, redness  "and itching.   Respiratory:  Negative for chest tightness, shortness of breath and wheezing.    Cardiovascular:  Negative for chest pain and palpitations.   Gastrointestinal:  Negative for abdominal pain, constipation and diarrhea.   Genitourinary:  Negative for dysuria.   Skin:  Negative for pallor and rash.   Neurological:  Negative for dizziness, weakness, numbness and headaches.         PHQ-2/9 Depression Screening    Little interest or pleasure in doing things: 0 - not at all  Feeling down, depressed, or hopeless: 0 - not at all  PHQ-2 Score: 0  PHQ-2 Interpretation: Negative depression screen             Objective:    /60 (BP Location: Left arm, Patient Position: Sitting, Cuff Size: Adult)   Pulse (!) 116   Temp (!) 97.1 °F (36.2 °C) (Tympanic)   Resp 18   Ht 5' 7\" (1.702 m)   Wt 98 kg (216 lb)   SpO2 98%   BMI 33.83 kg/m²      Physical Exam  Vitals and nursing note reviewed.   Constitutional:       Appearance: Normal appearance. He is well-developed.   HENT:      Head: Normocephalic and atraumatic.      Right Ear: External ear normal.      Left Ear: External ear normal.      Nose: Nose normal.   Eyes:      Conjunctiva/sclera: Conjunctivae normal.      Pupils: Pupils are equal, round, and reactive to light.   Cardiovascular:      Rate and Rhythm: Normal rate and regular rhythm.      Heart sounds: Normal heart sounds. No murmur heard.     No gallop.   Pulmonary:      Effort: Pulmonary effort is normal. No respiratory distress.      Breath sounds: Normal breath sounds. No wheezing or rales.   Abdominal:      General: There is no distension.      Palpations: Abdomen is soft.      Tenderness: There is no abdominal tenderness.   Musculoskeletal:         General: No tenderness or deformity.      Cervical back: Normal range of motion and neck supple.      Right lower leg: No edema.      Left lower leg: No edema.   Lymphadenopathy:      Cervical: No cervical adenopathy.   Skin:     Coloration: Skin is not " pale.      Findings: No erythema or rash.   Neurological:      General: No focal deficit present.      Mental Status: He is alert. Mental status is at baseline.   Psychiatric:         Mood and Affect: Mood normal.         Behavior: Behavior normal.           Recent Results (from the past 8736 hour(s))   ECG 12 lead    Collection Time: 04/19/24  8:33 PM   Result Value Ref Range    Ventricular Rate 110 BPM    Atrial Rate 110 BPM    NE Interval 164 ms    QRSD Interval 88 ms    QT Interval 326 ms    QTC Interval 441 ms    P Smyer 57 degrees    QRS Axis 65 degrees    T Wave Axis 45 degrees   CBC and differential    Collection Time: 04/19/24  8:39 PM   Result Value Ref Range    WBC 11.45 (H) 4.31 - 10.16 Thousand/uL    RBC 4.89 3.88 - 5.62 Million/uL    Hemoglobin 12.1 12.0 - 17.0 g/dL    Hematocrit 38.7 36.5 - 49.3 %    MCV 79 (L) 82 - 98 fL    MCH 24.7 (L) 26.8 - 34.3 pg    MCHC 31.3 (L) 31.4 - 37.4 g/dL    RDW 15.6 (H) 11.6 - 15.1 %    MPV 8.8 (L) 8.9 - 12.7 fL    Platelets 277 149 - 390 Thousands/uL    nRBC 0 /100 WBCs    Segmented % 61 43 - 75 %    Immature Grans % 1 0 - 2 %    Lymphocytes % 27 14 - 44 %    Monocytes % 8 4 - 12 %    Eosinophils Relative 2 0 - 6 %    Basophils Relative 1 0 - 1 %    Absolute Neutrophils 7.11 1.85 - 7.62 Thousands/µL    Absolute Immature Grans 0.06 0.00 - 0.20 Thousand/uL    Absolute Lymphocytes 3.09 0.60 - 4.47 Thousands/µL    Absolute Monocytes 0.96 0.17 - 1.22 Thousand/µL    Eosinophils Absolute 0.17 0.00 - 0.61 Thousand/µL    Basophils Absolute 0.06 0.00 - 0.10 Thousands/µL   Comprehensive metabolic panel    Collection Time: 04/19/24  8:39 PM   Result Value Ref Range    Sodium 137 135 - 147 mmol/L    Potassium 4.1 3.5 - 5.3 mmol/L    Chloride 104 96 - 108 mmol/L    CO2 25 21 - 32 mmol/L    ANION GAP 8 4 - 13 mmol/L    BUN 21 5 - 25 mg/dL    Creatinine 0.74 0.60 - 1.30 mg/dL    Glucose 100 65 - 140 mg/dL    Calcium 9.7 8.4 - 10.2 mg/dL    AST 13 13 - 39 U/L    ALT 20 7 - 52 U/L     "Alkaline Phosphatase 71 34 - 104 U/L    Total Protein 7.5 6.4 - 8.4 g/dL    Albumin 4.1 3.5 - 5.0 g/dL    Total Bilirubin 0.33 0.20 - 1.00 mg/dL    eGFR 103 ml/min/1.73sq m   HS Troponin 0hr (reflex protocol)    Collection Time: 04/19/24  8:39 PM   Result Value Ref Range    hs TnI 0hr 24 \"Refer to ACS Flowchart\"- see link ng/L   D-dimer, quantitative    Collection Time: 04/19/24  8:39 PM   Result Value Ref Range    D-Dimer, Quant 13.70 (H) <0.50 ug/ml FEU   APTT    Collection Time: 04/19/24  8:39 PM   Result Value Ref Range    PTT 40 (H) 23 - 37 seconds   Protime-INR    Collection Time: 04/19/24  8:39 PM   Result Value Ref Range    Protime 15.2 (H) 11.6 - 14.5 seconds    INR 1.13 0.84 - 1.19   B-Type Natriuretic Peptide(BNP)    Collection Time: 04/19/24  8:39 PM   Result Value Ref Range    BNP 11 0 - 100 pg/mL   Procalcitonin    Collection Time: 04/19/24  8:39 PM   Result Value Ref Range    Procalcitonin 0.11 <=0.25 ng/ml   TIBC Panel (incl. Iron, TIBC, % Iron Saturation)    Collection Time: 04/19/24  8:39 PM   Result Value Ref Range    Iron Saturation 13 (L) 15 - 50 %    TIBC 212 (L) 250 - 450 ug/dL    Iron 28 (L) 50 - 212 ug/dL    UIBC 184 155 - 355 ug/dL   Ferritin    Collection Time: 04/19/24  8:39 PM   Result Value Ref Range    Ferritin 63 24 - 336 ng/mL   HS Troponin I 2hr    Collection Time: 04/19/24 10:59 PM   Result Value Ref Range    hs TnI 2hr 26 \"Refer to ACS Flowchart\"- see link ng/L    Delta 2hr hsTnI 2 <20 ng/L   Blood culture #1    Collection Time: 04/19/24 11:45 PM    Specimen: Hand, Right; Blood   Result Value Ref Range    Blood Culture No Growth After 5 Days.    Blood culture #2    Collection Time: 04/19/24 11:45 PM    Specimen: Arm, Left; Blood   Result Value Ref Range    Blood Culture No Growth After 5 Days.    COVID/FLU/RSV    Collection Time: 04/20/24 12:08 AM    Specimen: Nose; Nares   Result Value Ref Range    SARS-CoV-2 Negative Negative    INFLUENZA A PCR Negative Negative    INFLUENZA B " "PCR Negative Negative    RSV PCR Negative Negative   Fingerstick Glucose (POCT)    Collection Time: 04/20/24  3:48 AM   Result Value Ref Range    POC Glucose 191 (H) 65 - 140 mg/dl   APTT    Collection Time: 04/20/24  5:05 AM   Result Value Ref Range    PTT 57 (H) 23 - 37 seconds   CBC    Collection Time: 04/20/24  5:05 AM   Result Value Ref Range    WBC 10.33 (H) 4.31 - 10.16 Thousand/uL    RBC 4.72 3.88 - 5.62 Million/uL    Hemoglobin 11.7 (L) 12.0 - 17.0 g/dL    Hematocrit 37.4 36.5 - 49.3 %    MCV 79 (L) 82 - 98 fL    MCH 24.8 (L) 26.8 - 34.3 pg    MCHC 31.3 (L) 31.4 - 37.4 g/dL    RDW 15.8 (H) 11.6 - 15.1 %    Platelets 262 149 - 390 Thousands/uL    MPV 9.0 8.9 - 12.7 fL   Procalcitonin    Collection Time: 04/20/24  5:05 AM   Result Value Ref Range    Procalcitonin 0.13 <=0.25 ng/ml   HS Troponin I 4hr    Collection Time: 04/20/24  5:05 AM   Result Value Ref Range    hs TnI 4hr 17 \"Refer to ACS Flowchart\"- see link ng/L    Delta 4hr hsTnI -7 <20 ng/L   Hemoglobin A1C    Collection Time: 04/20/24  5:05 AM   Result Value Ref Range    Hemoglobin A1C 7.1 (H) Normal 4.0-5.6%; PreDiabetic 5.7-6.4%; Diabetic >=6.5%; Glycemic control for adults with diabetes <7.0% %     mg/dl   Echo complete w/ contrast if indicated    Collection Time: 04/20/24  7:52 AM   Result Value Ref Range    BSA 2.06 m2    A4C EF 56 %    LVOT stroke volume 70.36     LVOT stroke volume index 33.00 ml/m2    LVOT Cardiac Output 6.37 l/min    LVOT Cardiac Index 3.09 l/min/m2    LVIDd 4.70 cm    LVIDS 3.30 cm    IVSd 1.30 cm    LVPWd 1.30 cm    LVOT diameter 2.2 cm    LVOT peak VTI 18.52 cm    FS 30 28 - 44    MV E' Tissue Velocity Septal 10 cm/s    LA Volume Index (BP) 33.0 mL/m2    E/A ratio 0.83     E wave deceleration time 168 ms    MV Peak E Aldo 88 cm/s    MV Peak A Aldo 1.06 m/s    AV LVOT peak gradient 4 mmHg    LVOT peak aldo 0.94 m/s    RVID d 4.2 cm    Tricuspid annular plane systolic excursion 2.30 cm    LA size 3.9 cm    LA length " (A2C) 5.10 cm    LA volume (BP) 68 mL    RA 2D Volume 42.0 mL    RAA A4C 15.1 cm2    Aortic valve peak velocity 1.95 m/s    Ao VTI 34.22 cm    AV mean gradient 9 mmHg    LVOT mn grad 2.0 mmHg    AV peak gradient 15 mmHg    AV area by cont VTI 2.1 cm2    AV area peak carmella 1.8 cm2    MV stenosis pressure 1/2 time 49 ms    MV valve area p 1/2 method 4.49     Ao root 4.00 cm    Asc Ao 3.5 cm    Aortic valve mean velocity 13.80 m/s    Left ventricular stroke volume (2D) 58.00 mL    IVS 1.3 cm    LEFT VENTRICLE SYSTOLIC VOLUME (MOD BIPLANE) 2D 45 mL    LV DIASTOLIC VOLUME (MOD BIPLANE) 2D 104 mL    Left Atrium Area-systolic Four Chamber 20.7 cm2    Left Atrium Area-systolic Apical Two Chamber 21.6 cm2    LVSV, 2D 58 mL    LVOT area 3.80 cm2    DVI 0.48     AV valve area 2.06 cm2    LV EF 60    Fingerstick Glucose (POCT)    Collection Time: 04/20/24  8:01 AM   Result Value Ref Range    POC Glucose 168 (H) 65 - 140 mg/dl   Fingerstick Glucose (POCT)    Collection Time: 04/20/24 11:45 AM   Result Value Ref Range    POC Glucose 135 65 - 140 mg/dl   APTT    Collection Time: 04/20/24 12:30 PM   Result Value Ref Range    PTT 64 (H) 23 - 37 seconds   Fingerstick Glucose (POCT)    Collection Time: 04/20/24  4:23 PM   Result Value Ref Range    POC Glucose 133 65 - 140 mg/dl   APTT    Collection Time: 04/20/24  7:04 PM   Result Value Ref Range    PTT 64 (H) 23 - 37 seconds   Fingerstick Glucose (POCT)    Collection Time: 04/20/24  9:07 PM   Result Value Ref Range    POC Glucose 118 65 - 140 mg/dl   APTT    Collection Time: 04/21/24  4:35 AM   Result Value Ref Range    PTT 66 (H) 23 - 37 seconds   Basic metabolic panel    Collection Time: 04/21/24  4:35 AM   Result Value Ref Range    Sodium 137 135 - 147 mmol/L    Potassium 4.0 3.5 - 5.3 mmol/L    Chloride 104 96 - 108 mmol/L    CO2 23 21 - 32 mmol/L    ANION GAP 10 4 - 13 mmol/L    BUN 15 5 - 25 mg/dL    Creatinine 0.61 0.60 - 1.30 mg/dL    Glucose 147 (H) 65 - 140 mg/dL    Calcium  "9.3 8.4 - 10.2 mg/dL    eGFR 112 ml/min/1.73sq m   CBC    Collection Time: 04/21/24  4:35 AM   Result Value Ref Range    WBC 8.49 4.31 - 10.16 Thousand/uL    RBC 4.74 3.88 - 5.62 Million/uL    Hemoglobin 11.6 (L) 12.0 - 17.0 g/dL    Hematocrit 38.4 36.5 - 49.3 %    MCV 81 (L) 82 - 98 fL    MCH 24.5 (L) 26.8 - 34.3 pg    MCHC 30.2 (L) 31.4 - 37.4 g/dL    RDW 15.6 (H) 11.6 - 15.1 %    Platelets 285 149 - 390 Thousands/uL    MPV 9.1 8.9 - 12.7 fL   Fingerstick Glucose (POCT)    Collection Time: 04/21/24  7:37 AM   Result Value Ref Range    POC Glucose 140 65 - 140 mg/dl   Fingerstick Glucose (POCT)    Collection Time: 04/21/24 11:18 AM   Result Value Ref Range    POC Glucose 139 65 - 140 mg/dl   Fingerstick Glucose (POCT)    Collection Time: 04/21/24  3:09 PM   Result Value Ref Range    POC Glucose 183 (H) 65 - 140 mg/dl   Fingerstick Glucose (POCT)    Collection Time: 04/21/24  8:58 PM   Result Value Ref Range    POC Glucose 113 65 - 140 mg/dl   ECG 12 lead    Collection Time: 04/22/24  3:55 AM   Result Value Ref Range    Ventricular Rate 97 BPM    Atrial Rate 97 BPM    FL Interval 162 ms    QRSD Interval 84 ms    QT Interval 338 ms    QTC Interval 429 ms    P Axis 60 degrees    QRS Axis 61 degrees    T Wave Axis 29 degrees   APTT    Collection Time: 04/22/24  4:55 AM   Result Value Ref Range    PTT 56 (H) 23 - 37 seconds   CBC and Platelet    Collection Time: 04/22/24  4:55 AM   Result Value Ref Range    WBC 10.53 (H) 4.31 - 10.16 Thousand/uL    RBC 5.20 3.88 - 5.62 Million/uL    Hemoglobin 12.7 12.0 - 17.0 g/dL    Hematocrit 41.5 36.5 - 49.3 %    MCV 80 (L) 82 - 98 fL    MCH 24.4 (L) 26.8 - 34.3 pg    MCHC 30.6 (L) 31.4 - 37.4 g/dL    RDW 15.4 (H) 11.6 - 15.1 %    Platelets 321 149 - 390 Thousands/uL    MPV 9.1 8.9 - 12.7 fL   HS Troponin 0hr (reflex protocol)    Collection Time: 04/22/24  4:55 AM   Result Value Ref Range    hs TnI 0hr 5 \"Refer to ACS Flowchart\"- see link ng/L   Magnesium    Collection Time: " "04/22/24  4:55 AM   Result Value Ref Range    Magnesium 1.9 1.9 - 2.7 mg/dL   Basic metabolic panel    Collection Time: 04/22/24  4:55 AM   Result Value Ref Range    Sodium 136 135 - 147 mmol/L    Potassium 4.1 3.5 - 5.3 mmol/L    Chloride 102 96 - 108 mmol/L    CO2 25 21 - 32 mmol/L    ANION GAP 9 4 - 13 mmol/L    BUN 15 5 - 25 mg/dL    Creatinine 0.68 0.60 - 1.30 mg/dL    Glucose 168 (H) 65 - 140 mg/dL    Calcium 9.9 8.4 - 10.2 mg/dL    eGFR 107 ml/min/1.73sq m   Fingerstick Glucose (POCT)    Collection Time: 04/22/24  8:12 AM   Result Value Ref Range    POC Glucose 200 (H) 65 - 140 mg/dl   HS Troponin I 2hr    Collection Time: 04/22/24  8:16 AM   Result Value Ref Range    hs TnI 2hr 4 \"Refer to ACS Flowchart\"- see link ng/L    Delta 2hr hsTnI -1 <20 ng/L   Fingerstick Glucose (POCT)    Collection Time: 04/22/24 11:42 AM   Result Value Ref Range    POC Glucose 181 (H) 65 - 140 mg/dl   APTT    Collection Time: 04/22/24  1:48 PM   Result Value Ref Range    PTT 50 (H) 23 - 37 seconds   Fingerstick Glucose (POCT)    Collection Time: 04/22/24  4:05 PM   Result Value Ref Range    POC Glucose 169 (H) 65 - 140 mg/dl   Fingerstick Glucose (POCT)    Collection Time: 04/22/24  9:08 PM   Result Value Ref Range    POC Glucose 193 (H) 65 - 140 mg/dl   APTT    Collection Time: 04/22/24  9:19 PM   Result Value Ref Range    PTT 79 (H) 23 - 37 seconds   APTT    Collection Time: 04/23/24  3:56 AM   Result Value Ref Range    PTT 46 (H) 23 - 37 seconds   Basic metabolic panel    Collection Time: 04/23/24  3:56 AM   Result Value Ref Range    Sodium 136 135 - 147 mmol/L    Potassium 4.1 3.5 - 5.3 mmol/L    Chloride 102 96 - 108 mmol/L    CO2 26 21 - 32 mmol/L    ANION GAP 8 4 - 13 mmol/L    BUN 13 5 - 25 mg/dL    Creatinine 0.63 0.60 - 1.30 mg/dL    Glucose 165 (H) 65 - 140 mg/dL    Calcium 9.7 8.4 - 10.2 mg/dL    eGFR 110 ml/min/1.73sq m   CBC    Collection Time: 04/23/24  3:56 AM   Result Value Ref Range    WBC 9.44 4.31 - 10.16 " Thousand/uL    RBC 4.95 3.88 - 5.62 Million/uL    Hemoglobin 12.1 12.0 - 17.0 g/dL    Hematocrit 39.2 36.5 - 49.3 %    MCV 79 (L) 82 - 98 fL    MCH 24.4 (L) 26.8 - 34.3 pg    MCHC 30.9 (L) 31.4 - 37.4 g/dL    RDW 15.3 (H) 11.6 - 15.1 %    Platelets 296 149 - 390 Thousands/uL    MPV 8.8 (L) 8.9 - 12.7 fL   Fingerstick Glucose (POCT)    Collection Time: 04/23/24  7:04 AM   Result Value Ref Range    POC Glucose 167 (H) 65 - 140 mg/dl   Fingerstick Glucose (POCT)    Collection Time: 04/23/24 11:40 AM   Result Value Ref Range    POC Glucose 167 (H) 65 - 140 mg/dl   Tissue Exam    Collection Time: 04/23/24  3:39 PM   Result Value Ref Range    Case Report       Surgical Pathology Report                         Case: [id number]                                  Authorizing Provider:  Geraldine Anne MD             Collected:           04/23/2024 1539              Ordering Location:     [facility]        Received:            04/23/2024 [address] [place] [address] 3rd  Floor Med                                                                             Surg Unit                                                                    Pathologist:           Rufina Lopez MD                                                                  Specimen:    Lymph Node, left, retroperitoneal                                                          Final Diagnosis       A. Lymph Node, Left retroperitoneal, Biopsy:  - Metastatic adenocarcinoma of lung primary.     Comment: Specimen (block A2) is being sent to [facility] for MI Profile Testing. Upon completion of testing, [employer] report will be sent directly to the requesting provider, as well as posted in the Media Tab of EPIC for this patient by the Pathology Department.      Microscopic Description       Immunochemical stains performed on block A1 with appropriate controls show the tumor cells are positive for CK7, TTF-1, napsin A and SATB2 (focal, weak),  "negative for CK20, CDX-2, GATA3, NKX3.1 and [place]-8.    Best representative tumor block: A1-3.      Note       Dr. Houston notified electronically ([employer]) by Dr. Lopez on 4/25/2024 at 10:27.    Intradepartmental consultation in agreement (CV).    Flow cytometry ([facility]#[id number] / [id number], evaluated by Nathan Piedra M.D.):    No flow immunophenotypic evidence of a lymphoproliferative disorder.      Additional Information       All reported additional testing was performed with appropriately reactive controls.  These tests were developed and their performance characteristics determined by [facility] or appropriate performing facility, though some tests may be performed on tissues which have not been validated for performance characteristics (such as staining performed on alcohol exposed cell blocks and decalcified tissues).  Results should be interpreted with caution and in the context of the patients’ clinical condition. These tests may not be cleared or approved by the U.S. Food and Drug Administration, though the FDA has determined that such clearance or approval is not necessary. These tests are used for clinical purposes and they should not be regarded as investigational or for research. This laboratory has been approved by CLIA 88, designated as a high-complexity laboratory and is qualified to perform these tests.    Interpretation performed at [place] [address]      Gross Description       A. The specimen is received in formalin, labeled with the patient's name and hospital number, and is designated \"Lymph node, left retroperitoneal\".  The specimen consists of 3 tan-brown cores of filiform and friable soft tissue measuring less than 0.1 cm-0.1 cm in diameter and ranging from 0.5-1.1 cm in length.  Entirely submitted. Three cassettes.  Between sponges.    Note: The estimated total formalin fixation time based upon information provided by the " submitting clinician and the standard processing schedule is under 72 hours. -[facility] Information       CT (4/20/2024)  1. There is a 2.1 x 1.8 x 2.6 cm left adrenal nodule. This is new compared to 2022. This nodule remains indeterminate and does not have enhancement features to suggest an adenoma. Suggest continued surveillance given above adenopathy.     Adrenal recommendation based on institutional consensus and Journal of American College of Radiology 2017;14:1228-5602.     2. Abdominal and thoracic lymphadenopathy with representative lymph nodes as described above.     3. Pulmonary emboli remain visualized.     4. Right middle lobe consolidation, suspicious for pneumonia, grossly similar in appearance when compared to prior study from 4/19/2024   Leukemia/Lymphoma flow cytometry    Collection Time: 04/23/24  3:39 PM   Result Value Ref Range    Scan Result SEE WRITTEN REPORT    Fingerstick Glucose (POCT)    Collection Time: 04/23/24  4:30 PM   Result Value Ref Range    POC Glucose 156 (H) 65 - 140 mg/dl   Fingerstick Glucose (POCT)    Collection Time: 04/23/24  9:05 PM   Result Value Ref Range    POC Glucose 193 (H) 65 - 140 mg/dl   CBC    Collection Time: 04/24/24  4:52 AM   Result Value Ref Range    WBC 8.71 4.31 - 10.16 Thousand/uL    RBC 4.94 3.88 - 5.62 Million/uL    Hemoglobin 12.1 12.0 - 17.0 g/dL    Hematocrit 39.4 36.5 - 49.3 %    MCV 80 (L) 82 - 98 fL    MCH 24.5 (L) 26.8 - 34.3 pg    MCHC 30.7 (L) 31.4 - 37.4 g/dL    RDW 15.5 (H) 11.6 - 15.1 %    Platelets 312 149 - 390 Thousands/uL    MPV 9.0 8.9 - 12.7 fL   Basic metabolic panel    Collection Time: 04/24/24  4:52 AM   Result Value Ref Range    Sodium 136 135 - 147 mmol/L    Potassium 4.1 3.5 - 5.3 mmol/L    Chloride 102 96 - 108 mmol/L    CO2 25 21 - 32 mmol/L    ANION GAP 9 4 - 13 mmol/L    BUN 18 5 - 25 mg/dL    Creatinine 0.64 0.60 - 1.30 mg/dL    Glucose 154 (H) 65 - 140 mg/dL    Calcium 9.8 8.4 - 10.2 mg/dL    eGFR 110  ml/min/1.73sq m   Fingerstick Glucose (POCT)    Collection Time: 04/24/24  7:13 AM   Result Value Ref Range    POC Glucose 164 (H) 65 - 140 mg/dl   Home O2 Setup    Collection Time: 04/24/24  9:51 AM   Result Value Ref Range    Supplier Name [employer]     Supplier Phone Number [phone]     Order Status Delivery Successful     Delivery Note      Delivery Request Date 04/24/2024     Date Delivered  04/25/2024     Supplier Name 04/25/2024     Item Description       Home Oxygen Concentrator with Portability, Adult, Standard Liter Flow    Item Description Portable Gaseous Oxygen System     Item Description Portable O2 Contents, Gas     Item Description No Conserving Device     Item Description O2 Humidifier Bottle, Standard Liter Flow    Fingerstick Glucose (POCT)    Collection Time: 04/24/24 11:13 AM   Result Value Ref Range    POC Glucose 168 (H) 65 - 140 mg/dl   [facility] MI Tumor Seek Hybrid™ + IHCs and Other Tests by Tumor Type (MI Profile Comprehensive Testing)    Collection Time: 04/24/24  3:34 PM    Specimen: Tissue   Result Value Ref Range    CARIS PD-L1 (22C3) Negative     CARIS PD-L1 FDA() Negative     CARIS Genomic Loss of Heterozygosity - Exome Equivocal 14%     CARIS Microsatellite Instability - Exome Stable     CARIS Tumor Mutational [place] - Exome High 10 per Mb     CARIS PD-L1 FDA (28-8) Negative     CARIS PD-L1 () Negative     CARIS PTEN Positive     CARIS ALK Negative  Negative    TSH, 3rd generation with Free T4 reflex    Collection Time: 05/17/24  9:04 AM   Result Value Ref Range    TSH 3RD GENERATON 0.233 (L) 0.450 - 4.500 uIU/mL   T4, free    Collection Time: 05/17/24  9:04 AM   Result Value Ref Range    Free T4 0.51 (L) 0.61 - 1.12 ng/dL   Comprehensive metabolic panel    Collection Time: 05/17/24  9:07 AM   Result Value Ref Range    Sodium 137 135 - 147 mmol/L    Potassium 4.4 3.5 - 5.3 mmol/L    Chloride 101 96 - 108 mmol/L    CO2 27 21 - 32 mmol/L    ANION  GAP 9 4 - 13 mmol/L    BUN 13 5 - 25 mg/dL    Creatinine 0.83 0.60 - 1.30 mg/dL    Glucose, Fasting 133 (H) 65 - 99 mg/dL    Calcium 9.2 8.4 - 10.2 mg/dL    AST 17 13 - 39 U/L    ALT 18 7 - 52 U/L    Alkaline Phosphatase 70 34 - 104 U/L    Total Protein 7.1 6.4 - 8.4 g/dL    Albumin 3.8 3.5 - 5.0 g/dL    Total Bilirubin 0.34 0.20 - 1.00 mg/dL    eGFR 99 ml/min/1.73sq m   CBC and differential    Collection Time: 05/17/24  9:07 AM   Result Value Ref Range    WBC 9.71 4.31 - 10.16 Thousand/uL    RBC 4.63 3.88 - 5.62 Million/uL    Hemoglobin 11.1 (L) 12.0 - 17.0 g/dL    Hematocrit 36.6 36.5 - 49.3 %    MCV 79 (L) 82 - 98 fL    MCH 24.0 (L) 26.8 - 34.3 pg    MCHC 30.3 (L) 31.4 - 37.4 g/dL    RDW 16.1 (H) 11.6 - 15.1 %    MPV 8.9 8.9 - 12.7 fL    Platelets 267 149 - 390 Thousands/uL    nRBC 0 /100 WBCs    Segmented % 73 43 - 75 %    Immature Grans % 0 0 - 2 %    Lymphocytes % 18 14 - 44 %    Monocytes % 7 4 - 12 %    Eosinophils Relative 1 0 - 6 %    Basophils Relative 1 0 - 1 %    Absolute Neutrophils 7.06 1.85 - 7.62 Thousands/µL    Absolute Immature Grans 0.03 0.00 - 0.20 Thousand/uL    Absolute Lymphocytes 1.78 0.60 - 4.47 Thousands/µL    Absolute Monocytes 0.63 0.17 - 1.22 Thousand/µL    Eosinophils Absolute 0.14 0.00 - 0.61 Thousand/µL    Basophils Absolute 0.07 0.00 - 0.10 Thousands/µL   Fingerstick Glucose (POCT)    Collection Time: 05/20/24  1:05 PM   Result Value Ref Range    POC Glucose 122 65 - 140 mg/dl   Albumin / creatinine urine ratio    Collection Time: 06/03/24  8:20 AM   Result Value Ref Range    Creatinine, Ur 58.8 Reference range not established. mg/dL    Albumin,U,Random <7.0 <20.0 mg/L    Albumin Creat Ratio <12 0 - 30 mg/g creatinine   CBC and differential    Collection Time: 06/07/24  3:32 PM   Result Value Ref Range    WBC 3.99 (L) 4.31 - 10.16 Thousand/uL    RBC 4.03 3.88 - 5.62 Million/uL    Hemoglobin 9.8 (L) 12.0 - 17.0 g/dL    Hematocrit 32.0 (L) 36.5 - 49.3 %    MCV 79 (L) 82 - 98 fL    MCH  24.3 (L) 26.8 - 34.3 pg    MCHC 30.6 (L) 31.4 - 37.4 g/dL    RDW 17.5 (H) 11.6 - 15.1 %    MPV 8.8 (L) 8.9 - 12.7 fL    Platelets 267 149 - 390 Thousands/uL    nRBC 0 /100 WBCs    Segmented % 58 43 - 75 %    Immature Grans % 1 0 - 2 %    Lymphocytes % 30 14 - 44 %    Monocytes % 10 4 - 12 %    Eosinophils Relative 1 0 - 6 %    Basophils Relative 0 0 - 1 %    Absolute Neutrophils 2.33 1.85 - 7.62 Thousands/µL    Absolute Immature Grans 0.02 0.00 - 0.20 Thousand/uL    Absolute Lymphocytes 1.19 0.60 - 4.47 Thousands/µL    Absolute Monocytes 0.39 0.17 - 1.22 Thousand/µL    Eosinophils Absolute 0.05 0.00 - 0.61 Thousand/µL    Basophils Absolute 0.01 0.00 - 0.10 Thousands/µL   Comprehensive metabolic panel    Collection Time: 06/07/24  3:32 PM   Result Value Ref Range    Sodium 142 135 - 147 mmol/L    Potassium 4.3 3.5 - 5.3 mmol/L    Chloride 106 96 - 108 mmol/L    CO2 28 21 - 32 mmol/L    ANION GAP 8 4 - 13 mmol/L    BUN 15 5 - 25 mg/dL    Creatinine 0.87 0.60 - 1.30 mg/dL    Glucose 123 65 - 140 mg/dL    Calcium 9.4 8.4 - 10.2 mg/dL    AST 16 13 - 39 U/L    ALT 24 7 - 52 U/L    Alkaline Phosphatase 80 34 - 104 U/L    Total Protein 6.9 6.4 - 8.4 g/dL    Albumin 3.9 3.5 - 5.0 g/dL    Total Bilirubin 0.23 0.20 - 1.00 mg/dL    eGFR 97 ml/min/1.73sq m   TSH, 3rd generation with Free T4 reflex    Collection Time: 06/07/24  3:32 PM   Result Value Ref Range    TSH 3RD GENERATON 0.645 0.450 - 4.500 uIU/mL   COLOGUARD    Collection Time: 06/08/24  9:35 AM   Result Value Ref Range    Cologuard Result Negative Negative   Cologuard    Collection Time: 06/08/24  1:35 PM   Result Value Ref Range    Cologuard Result Negative Negative   TSH, 3rd generation with Free T4 reflex    Collection Time: 06/28/24  1:52 PM   Result Value Ref Range    TSH 3RD GENERATON 0.675 0.450 - 4.500 uIU/mL   Comprehensive metabolic panel    Collection Time: 06/28/24  1:52 PM   Result Value Ref Range    Sodium 135 135 - 147 mmol/L    Potassium 4.2 3.5 - 5.3  mmol/L    Chloride 102 96 - 108 mmol/L    CO2 24 21 - 32 mmol/L    ANION GAP 9 4 - 13 mmol/L    BUN 22 5 - 25 mg/dL    Creatinine 0.82 0.60 - 1.30 mg/dL    Glucose, Fasting 100 (H) 65 - 99 mg/dL    Calcium 9.8 8.4 - 10.2 mg/dL    AST 14 13 - 39 U/L    ALT 26 7 - 52 U/L    Alkaline Phosphatase 91 34 - 104 U/L    Total Protein 7.1 6.4 - 8.4 g/dL    Albumin 4.2 3.5 - 5.0 g/dL    Total Bilirubin 0.23 0.20 - 1.00 mg/dL    eGFR 99 ml/min/1.73sq m   CBC and differential    Collection Time: 06/28/24  1:52 PM   Result Value Ref Range    WBC 4.89 4.31 - 10.16 Thousand/uL    RBC 4.00 3.88 - 5.62 Million/uL    Hemoglobin 10.0 (L) 12.0 - 17.0 g/dL    Hematocrit 31.7 (L) 36.5 - 49.3 %    MCV 79 (L) 82 - 98 fL    MCH 25.0 (L) 26.8 - 34.3 pg    MCHC 31.5 31.4 - 37.4 g/dL    RDW 19.8 (H) 11.6 - 15.1 %    MPV 9.1 8.9 - 12.7 fL    Platelets 273 149 - 390 Thousands/uL    nRBC 0 /100 WBCs    Segmented % 57 43 - 75 %    Immature Grans % 0 0 - 2 %    Lymphocytes % 31 14 - 44 %    Monocytes % 11 4 - 12 %    Eosinophils Relative 1 0 - 6 %    Basophils Relative 0 0 - 1 %    Absolute Neutrophils 2.76 1.85 - 7.62 Thousands/µL    Absolute Immature Grans 0.02 0.00 - 0.20 Thousand/uL    Absolute Lymphocytes 1.50 0.60 - 4.47 Thousands/µL    Absolute Monocytes 0.55 0.17 - 1.22 Thousand/µL    Eosinophils Absolute 0.04 0.00 - 0.61 Thousand/µL    Basophils Absolute 0.02 0.00 - 0.10 Thousands/µL   Hemoglobin A1C    Collection Time: 06/28/24  1:52 PM   Result Value Ref Range    Hemoglobin A1C 6.7 (H) Normal 4.0-5.6%; PreDiabetic 5.7-6.4%; Diabetic >=6.5%; Glycemic control for adults with diabetes <7.0% %     mg/dl   TSH, 3rd generation with Free T4 reflex    Collection Time: 07/19/24  3:27 PM   Result Value Ref Range    TSH 3RD GENERATON 1.231 0.450 - 4.500 uIU/mL   Comprehensive metabolic panel    Collection Time: 07/19/24  3:27 PM   Result Value Ref Range    Sodium 138 135 - 147 mmol/L    Potassium 4.0 3.5 - 5.3 mmol/L    Chloride 104 96 - 108  mmol/L    CO2 26 21 - 32 mmol/L    ANION GAP 8 4 - 13 mmol/L    BUN 16 5 - 25 mg/dL    Creatinine 0.85 0.60 - 1.30 mg/dL    Glucose 112 65 - 140 mg/dL    Calcium 9.4 8.4 - 10.2 mg/dL    AST 16 13 - 39 U/L    ALT 25 7 - 52 U/L    Alkaline Phosphatase 88 34 - 104 U/L    Total Protein 6.8 6.4 - 8.4 g/dL    Albumin 4.0 3.5 - 5.0 g/dL    Total Bilirubin 0.23 0.20 - 1.00 mg/dL    eGFR 98 ml/min/1.73sq m   CBC and differential    Collection Time: 07/19/24  3:27 PM   Result Value Ref Range    WBC 3.35 (L) 4.31 - 10.16 Thousand/uL    RBC 3.86 (L) 3.88 - 5.62 Million/uL    Hemoglobin 9.7 (L) 12.0 - 17.0 g/dL    Hematocrit 31.5 (L) 36.5 - 49.3 %    MCV 82 82 - 98 fL    MCH 25.1 (L) 26.8 - 34.3 pg    MCHC 30.8 (L) 31.4 - 37.4 g/dL    RDW 20.9 (H) 11.6 - 15.1 %    MPV 8.8 (L) 8.9 - 12.7 fL    Platelets 221 149 - 390 Thousands/uL    nRBC 0 /100 WBCs    Segmented % 50 43 - 75 %    Immature Grans % 0 0 - 2 %    Lymphocytes % 36 14 - 44 %    Monocytes % 12 4 - 12 %    Eosinophils Relative 2 0 - 6 %    Basophils Relative 0 0 - 1 %    Absolute Neutrophils 1.65 (L) 1.85 - 7.62 Thousands/µL    Absolute Immature Grans 0.01 0.00 - 0.20 Thousand/uL    Absolute Lymphocytes 1.22 0.60 - 4.47 Thousands/µL    Absolute Monocytes 0.41 0.17 - 1.22 Thousand/µL    Eosinophils Absolute 0.05 0.00 - 0.61 Thousand/µL    Basophils Absolute 0.01 0.00 - 0.10 Thousands/µL   CBC and differential    Collection Time: 08/09/24  2:52 PM   Result Value Ref Range    WBC 3.43 (L) 4.31 - 10.16 Thousand/uL    RBC 4.02 3.88 - 5.62 Million/uL    Hemoglobin 10.2 (L) 12.0 - 17.0 g/dL    Hematocrit 33.4 (L) 36.5 - 49.3 %    MCV 83 82 - 98 fL    MCH 25.4 (L) 26.8 - 34.3 pg    MCHC 30.5 (L) 31.4 - 37.4 g/dL    RDW 20.5 (H) 11.6 - 15.1 %    MPV 8.7 (L) 8.9 - 12.7 fL    Platelets 243 149 - 390 Thousands/uL    nRBC 0 /100 WBCs    Segmented % 49 43 - 75 %    Immature Grans % 0 0 - 2 %    Lymphocytes % 36 14 - 44 %    Monocytes % 13 (H) 4 - 12 %    Eosinophils Relative 1 0 - 6 %     Basophils Relative 1 0 - 1 %    Absolute Neutrophils 1.69 (L) 1.85 - 7.62 Thousands/µL    Absolute Immature Grans 0.01 0.00 - 0.20 Thousand/uL    Absolute Lymphocytes 1.23 0.60 - 4.47 Thousands/µL    Absolute Monocytes 0.44 0.17 - 1.22 Thousand/µL    Eosinophils Absolute 0.04 0.00 - 0.61 Thousand/µL    Basophils Absolute 0.02 0.00 - 0.10 Thousands/µL   Comprehensive metabolic panel    Collection Time: 08/09/24  2:52 PM   Result Value Ref Range    Sodium 138 135 - 147 mmol/L    Potassium 4.1 3.5 - 5.3 mmol/L    Chloride 103 96 - 108 mmol/L    CO2 28 21 - 32 mmol/L    ANION GAP 7 4 - 13 mmol/L    BUN 18 5 - 25 mg/dL    Creatinine 0.87 0.60 - 1.30 mg/dL    Glucose 148 (H) 65 - 140 mg/dL    Calcium 9.5 8.4 - 10.2 mg/dL    AST 14 13 - 39 U/L    ALT 23 7 - 52 U/L    Alkaline Phosphatase 90 34 - 104 U/L    Total Protein 6.9 6.4 - 8.4 g/dL    Albumin 4.1 3.5 - 5.0 g/dL    Total Bilirubin 0.19 (L) 0.20 - 1.00 mg/dL    eGFR 97 ml/min/1.73sq m   TSH, 3rd generation with Free T4 reflex    Collection Time: 08/09/24  2:52 PM   Result Value Ref Range    TSH 3RD GENERATON 0.919 0.450 - 4.500 uIU/mL   CBC and differential    Collection Time: 08/30/24  3:13 PM   Result Value Ref Range    WBC 3.97 (L) 4.31 - 10.16 Thousand/uL    RBC 4.00 3.88 - 5.62 Million/uL    Hemoglobin 10.5 (L) 12.0 - 17.0 g/dL    Hematocrit 33.3 (L) 36.5 - 49.3 %    MCV 83 82 - 98 fL    MCH 26.3 (L) 26.8 - 34.3 pg    MCHC 31.5 31.4 - 37.4 g/dL    RDW 20.3 (H) 11.6 - 15.1 %    MPV 9.1 8.9 - 12.7 fL    Platelets 211 149 - 390 Thousands/uL    nRBC 0 /100 WBCs    Segmented % 51 43 - 75 %    Immature Grans % 1 0 - 2 %    Lymphocytes % 34 14 - 44 %    Monocytes % 12 4 - 12 %    Eosinophils Relative 1 0 - 6 %    Basophils Relative 1 0 - 1 %    Absolute Neutrophils 2.07 1.85 - 7.62 Thousands/µL    Absolute Immature Grans 0.02 0.00 - 0.20 Thousand/uL    Absolute Lymphocytes 1.33 0.60 - 4.47 Thousands/µL    Absolute Monocytes 0.48 0.17 - 1.22 Thousand/µL    Eosinophils  Absolute 0.05 0.00 - 0.61 Thousand/µL    Basophils Absolute 0.02 0.00 - 0.10 Thousands/µL   Comprehensive metabolic panel    Collection Time: 08/30/24  3:13 PM   Result Value Ref Range    Sodium 140 135 - 147 mmol/L    Potassium 4.0 3.5 - 5.3 mmol/L    Chloride 104 96 - 108 mmol/L    CO2 28 21 - 32 mmol/L    ANION GAP 8 4 - 13 mmol/L    BUN 13 5 - 25 mg/dL    Creatinine 0.88 0.60 - 1.30 mg/dL    Glucose 102 65 - 140 mg/dL    Calcium 9.6 8.4 - 10.2 mg/dL    AST 20 13 - 39 U/L    ALT 32 7 - 52 U/L    Alkaline Phosphatase 77 34 - 104 U/L    Total Protein 6.9 6.4 - 8.4 g/dL    Albumin 4.1 3.5 - 5.0 g/dL    Total Bilirubin 0.19 (L) 0.20 - 1.00 mg/dL    eGFR 96 ml/min/1.73sq m   TSH, 3rd generation with Free T4 reflex    Collection Time: 08/30/24  3:13 PM   Result Value Ref Range    TSH 3RD GENERATON 1.128 0.450 - 4.500 uIU/mL       Laboratory Results: I have personally reviewed the pertinent laboratory results/reports     Radiology/Other Diagnostic Testing Results: I have personally reviewed pertinent reports.      CT CHEST, ABDOMEN AND PELVIS WITH IV CONTRAST     INDICATION:   Malignant neoplasm of overlapping sites of right bronchus and lung. .     COMPARISON: CT abdomen and pelvis dated 4/20/2024. CT chest dated 4/19/2024.     TECHNIQUE: CT examination of the chest, abdomen and pelvis was performed. Multiplanar 2D reformatted images were created from the source data.     This examination, like all CT scans performed in the [facility], was performed utilizing techniques to minimize radiation dose exposure, including the use of iterative reconstruction and automated exposure control. Radiation dose length   product (DLP) for this visit:     IV Contrast:  Enteric Contrast: Enteric contrast was not administered.     FINDINGS:     CHEST     LUNGS: Significant interval decrease in size of right upper lobe mass with lymphangitic carcinomatosis and extension into the right middle lobe with residual  wedge-shaped airspace opacity now measuring 4.6 x 2.6 cm image 118 series 4. Mild residual   interlobular septal wall thickening is noted in the right lower lobe. There is no tracheal or endobronchial lesion.     PLEURA:  Unremarkable.     HEART/GREAT VESSELS: Heavy atherosclerotic coronary artery calcification is noted.  Heart is otherwise unremarkable.  No thoracic aortic aneurysm.     MEDIASTINUM AND JEREMIAH: Interval decrease in size of mediastinal and hilar lymph nodes. Left hilar lymph node measures 1.3 x 1.1 cm compared to 2.3 x 2.8 cm previously. Subcarinal lymph node measures 1.7 x 1 cm compared to 4.3 x 2.5 cm previously. 2..     CHEST WALL AND LOWER NECK:  Unremarkable. Right anterior chest wall Port-A-Cath     ABDOMEN     LIVER/BILIARY TREE: Hepatomegaly measuring 20 cm with mild steatosis. No suspicious hepatic lesions.     GALLBLADDER:  No calcified gallstones. No pericholecystic inflammatory change.     SPLEEN:  Unremarkable.     PANCREAS:  Unremarkable.     ADRENAL GLANDS: Interval decrease in size of left adrenal lesion measuring 1.2 x 1.2 cm compared to 2.1 x 1.8 cm previously image 109 series 2. Normal size right adrenal gland.     KIDNEYS/URETERS: 1.5 cm nonobstructing left renal stone.. No hydronephrosis.     STOMACH AND BOWEL:  There is colonic diverticulosis without evidence of acute diverticulitis.     APPENDIX:  No findings to suggest appendicitis.     ABDOMINOPELVIC CAVITY:  No ascites.  No pneumoperitoneum.  No lymphadenopathy.     VESSELS:  Atherosclerotic changes are present.  No evidence of aneurysm.     PELVIS     REPRODUCTIVE ORGANS:  The prostate is enlarged.     URINARY BLADDER: Mild bladder wall thickening.     ABDOMINAL WALL/INGUINAL REGIONS:  There is a small fat-containing umbilical hernia. Small fat-containing inguinal hernias     OSSEOUS STRUCTURES:  No acute fracture or destructive osseous lesion.  Spinal degenerative changes are noted.     IMPRESSION:     1. Treatment response.  Interval decrease in size of mediastinal and hilar lymphadenopathy as well as interval decrease in size of infiltrating mass in the right upper lobe and involving portions of the right middle lobe with lymphangitic carcinomatosis.   Interval decrease in size of metastatic lesion to the left adrenal gland. No new areas of thoracic or intra-abdominal lymphadenopathy. No osseous metastatic disease.     2. Nonobstructing left renal stone measuring 1.5 cm     3. Hepatomegaly with steatosis. No suspicious hepatic lesions.       Assessment/Plan:  1. Type 2 diabetes mellitus with hyperosmolarity without coma, without long-term current use of insulin (HCC)  2. Acute deep vein thrombosis (DVT) of popliteal vein of left lower extremity (HCC)  3. Restrictive lung disease  4. Chronic hypoxemic respiratory failure (HCC)  5. Type 2 diabetes mellitus without complication, without long-term current use of insulin (HCC)  -     CBC and differential; Future  -     Comprehensive metabolic panel; Future  -     Lipid panel; Future  -     Hemoglobin A1C; Future  6. Adrenal nodule (HCC)  7. Primary malignant neoplasm of right lung metastatic to other site (HCC)  8. Acquired hypothyroidism  -     TSH, 3rd generation with Free T4 reflex; Future  9. Adenocarcinoma of overlapping sites of right lung (HCC)  10. Nephrolithiasis  11. Fatty liver  12. Hepatomegaly  13. Chronic anticoagulation  14. Acute pulmonary embolism with acute cor pulmonale, unspecified pulmonary embolism type (HCC)    Primary management of adenocarcinoma of the lung oncologist.  Continue metformin and glimepiride.  Zofran as needed.  Continue atorvastatin 10 mg.  Sleep is okay on amitriptyline  200 mg at bedtime  Bleeding and fall precautions on Eliquis  Continue current medications.  Diabetic diet.        Depression Screening and Follow-up Plan: Patient was screened for depression during today's encounter. They screened negative with a PHQ-2 score of 0.          Read  "package inserts for all medications before starting a new medications, call me if you have any questions.    Patient was given opportunity to ask questions and all questions were answered.    Disclaimer: Portions of the record may have been created with voice recognition software. Occasional wrong word or \"sound a like\" substitutions may have occurred due to the inherent limitations of voice recognition software. Read the chart carefully and recognize, using context, where substitutions have occurred. I have used the Epic copy/forward function to compose this note. I have reviewed my current note to ensure it reflects the current patient status, exam, assessment and plan.    "

## 2024-09-12 ENCOUNTER — HOSPITAL ENCOUNTER (OUTPATIENT)
Dept: MRI IMAGING | Facility: HOSPITAL | Age: 54
Discharge: HOME | End: 2024-09-12
Attending: STUDENT IN AN ORGANIZED HEALTH CARE EDUCATION/TRAINING PROGRAM
Payer: COMMERCIAL

## 2024-09-12 DIAGNOSIS — C79.31 METASTASIS TO BRAIN (HCC): ICD-10-CM

## 2024-09-12 PROCEDURE — A9585 GADOBUTROL INJECTION: HCPCS | Performed by: STUDENT IN AN ORGANIZED HEALTH CARE EDUCATION/TRAINING PROGRAM

## 2024-09-12 PROCEDURE — 70553 MRI BRAIN STEM W/O & W/DYE: CPT

## 2024-09-12 RX ORDER — GADOBUTROL 604.72 MG/ML
10 INJECTION INTRAVENOUS
Status: COMPLETED | OUTPATIENT
Start: 2024-09-12 | End: 2024-09-12

## 2024-09-12 RX ADMIN — GADOBUTROL 10 ML: 604.72 INJECTION INTRAVENOUS at 15:08

## 2024-09-16 NOTE — PROGRESS NOTES
Hematology/Oncology Outpatient Office Note    Date of Service: 2024    [facility] [address]  [place] [address]  [phone]    Reason for Consultation:   No chief complaint on file.      Cancer Stage at diagnosis: IV    Referral Physician: No ref. provider found    Primary Care Physician:  Amelie Bacon MD     Nickname: Jessica    Spouse: Mabel  (RN at [employer] in Cardiology)    Original ECO    Today's ECO    Goals and Barriers:  Current Goal: Minimize effects of disease burden, extend life.   Barriers to accomplishing this: SOB    Patient's Capacity to Self Care:  Patient is able to self care    ASSESSMENT & PLAN      Diagnosis ICD-10-CM Associated Orders   1. Acute deep vein thrombosis (DVT) of popliteal vein of left lower extremity (HCC)  I82.432       2. Adenocarcinoma of overlapping sites of right lung (HCC)  C34.81       3. Chemotherapy-induced nausea  R11.0     T45.1X5A             This is a 54 y.o. c PMHx notable for DM, HLP, blood clots, HTN, remote nicotine abuse, being seen in consultation for metastatic lung adeno       Discussion of decision making  Oncology history updated, accordingly, during this visit  Goals of care/patient communication  I discussed with the patient the clinical course leading up to their cancer diagnosis. I reviewed relevant office notes, imaging reports and pathology result as well.  I told the patient that this is a case of incurable disease and what this means. We discussed that the goal of anti-cancer therapy is to provide best quality of life, extend overall survival, and progression free survival as shown in clinical trials. We also discussed that there might be a point when the cancer will no longer respond to this anti-neoplastic therapy. As a result, we also discussed the role of the palliative care team being introduced early in the treatment course. We will be making this referral  I  explained the risks/benefits of the proposed cancer therapy: Carbo-Alimta-Keytruda and after discussion including understanding risks of possible life-threatening complications and therapy-related malignancy development, informed consent for blood products and treatment has been signed and obtained.  4/24/2024 CARVER[facility]NICA NGS: TMB high 10 muts/Mb, TP53, KEAP1 mutated, STK11 mutated  TNM/Staging At Diagnosis  Cancer Staging   Adenocarcinoma of overlapping sites of right lung (HCC)  Staging form: Lung, AJCC 8th Edition  - Clinical: Stage IV (cN3, cM1) - Signed by Maryana Sandoval MD on 5/2/2024  Disease Features/Tumor Markers/Genetics  Tumor Marker: n/a  Notable Path Features: 4/23/2024 Lymph Node, Left retroperitoneal, Biopsy: Metastatic adenocarcinoma of lung primary  Treatment: Carbo-Alimta-Keytruda  Other Supportive care: Zofran, EMLA  Treatment Team Members  Surgeon  Rad Onc  Palliative: Dr. Estrella  Labs  Diagnostics  4/19/2024 CT Chest PE: Acute moderate clot burden multifocal bilateral peripheral pulmonary embolism, mild R heart strain. Findings suggestive of multifocal pneumonia. Pathologically enlarged mediastinal, hilar, para-aortic, retrocrural, and upper abdominal lymph nodes. Nonspecific 1.9 cm left adrenal nodule  4/20/2024 CT Abd/pelv w/wo c: There is a 2.1 x 1.8 x 2.6 cm left adrenal nodule. Abdominal and thoracic lymphadenopathy  5/4/2024 MRI Brain w/wo c: 4 mm metastasis in the posterior right frontal lobe without significant edema. No other definite enhancing lesions but evaluation is limited due to motion artifact.  Tiny focus of restricted diffusion in the left frontal lobe without definite enhancement could represent acute/early subacute infarct  5/8/2024: Neuro working group recommended close MRI Brain surveillance for the 2 small brain mets to see how it responds to treatment, Dr. Paulo Alcala will trend it  8/2/2024 CT CAP w/c: HI!  Interval decrease in size of mediastinal and hilar lymph nodes. Left  hilar lymph node measures 1.3 x 1.1 cm compared to 2.3 x 2.8 cm previously. Subcarinal lymph node measures 1.7 x 1 cm compared to 4.3 x 2.5 cm  Significant interval decrease in size of right upper lobe mass with lymphangitic carcinomatosis and extension into the right middle lobe with residual wedge-shaped airspace opacity now measuring 4.6 x 2.6 cm   Interval decrease in size of left adrenal lesion measuring 1.2 x 1.2 cm compared to 2.1 x 1.8 cm previously      Discussion of decision making    I personally reviewed the following lab results, the image studies, pathology, other specialty/physicians consult notes and recommendations, and outside medical records. I had a lengthy discussion with the patient and shared the work-up findings. We discussed the diagnosis and management plan as below. I spent 43 minutes reviewing the records (labs, clinician notes, outside records, medical history, ordering medicine/tests/procedures, monitoring of anti-neoplastic toxicities, interpreting the imaging/labs previously done) and coordination of care as well as direct time with the patient today, of which greater than 50% of the time was spent in counseling and coordination of care with the patient/family.      Plan/Labs  Restaging CT CAP w/c scheduled 10/25/2024  MRI Brain restaging as per Rad Onc  Rad Onc f/u for surveillance of 2 small brain mets  F/u palliative care   Consider Pepcid or Gas-x for dyspepsia; pt will trial  Infusion chairs ordered for Alimta-Keytruda q3 weeks while on preceding labs, C7 coming up  (Maintenance)        Follow Up: q6 weeks scheduled thru November    All questions were answered to the patient's satisfaction during this encounter. The patient knows the contact information for our office and knows to reach out for any relevant concerns related to this encounter. They are to call for any temperature 100.4 or higher, new symptoms including but not restricted to shaking chills, decreased appetite,  nausea, vomiting, diarrhea, increased fatigue, shortness of breath or chest pain, confusion, and not feeling the strength to come to the clinic. For all other listed problems and medical diagnosis in their chart - they are managed by PCP and/or other specialists, which the patient acknowledges. Thank you very much for your consultation and making us a part of this patient's care. We are continuing to follow closely with you. Please do not hesitate to reach out to me with any additional questions or concerns.    Maryana Sandoval MD  Hematology & Medical Oncology Staff Physician             Disclaimer: This document was prepared using [employer] technology. If a word or phrase is confusing, or does not make sense, this is likely due to recognition error which was not discovered during this clinician's review. If you believe an error has occurred, please contact me through [employer] service line for juliana?cation.      ONCOLOGY HISTORY OF PRESENT ILLNESS        Oncology History   Adenocarcinoma of overlapping sites of right lung (HCC)   4/23/2024 Biopsy    Final Diagnosis   A. Lymph Node, Left retroperitoneal, Biopsy:  - Metastatic adenocarcinoma of lung primary.         5/2/2024 Initial Diagnosis    Adenocarcinoma of overlapping sites of right lung (HCC)     5/2/2024 -  Cancer Staged    Staging form: Lung, AJCC 8th Edition  - Clinical: Stage IV (cN3, cM1) - Signed by Maryana Sandoval MD on 5/2/2024 5/21/2024 -  Chemotherapy    cyanocobalamin, 1,000 mcg, Intramuscular, Once, 3 of 5 cycles  Administration: 1,000 mcg (5/14/2024), 1,000 mcg (7/2/2024), 1,000 mcg (9/3/2024)  alteplase (CATHFLO), 2 mg, Intracatheter, Every 1 Minute as needed, 6 of 10 cycles  fosaprepitant (EMEND) IVPB, 150 mg, Intravenous, Once, 6 of 6 cycles  Administration: 150 mg (5/21/2024), 150 mg (7/2/2024), 150 mg (7/23/2024), 150 mg (9/3/2024), 150 mg (6/11/2024), 150 mg (8/13/2024)  CARBOplatin (PARAPLATIN) IVPB (GOG AUC DOSING),  672 mg, Intravenous, Once, 6 of 6 cycles  Administration: 672 mg (5/21/2024), 692.5 mg (7/2/2024), 659 mg (7/23/2024), 641 mg (9/3/2024), 692.5 mg (6/11/2024), 647 mg (8/13/2024)  pemetrexed (ALIMTA) chemo infusion, 1,020 mg, Intravenous, Once, 6 of 10 cycles  Administration: 1,000 mg (5/21/2024), 1,000 mg (7/2/2024), 1,000 mg (7/23/2024), 1,000 mg (9/3/2024), 1,000 mg (6/11/2024), 1,000 mg (8/13/2024)  pembrolizumab (KEYTRUDA) IVPB, 200 mg, Intravenous, Once, 6 of 10 cycles  Administration: 200 mg (5/21/2024), 200 mg (7/2/2024), 200 mg (7/23/2024), 200 mg (9/3/2024), 200 mg (6/11/2024), 200 mg (8/13/2024)     7/3/2024 - 7/3/2024 Radiation    Treatments:  Course: C1 SRS    Plan ID Energy Fractions Dose per Fraction (cGy) Dose Correction (cGy) Total Dose Delivered (cGy) Elapsed Days   [id number] 6X-FFF 1 / 1 2,000 0 2,000 0      Treatment Dates:  7/3/2024 - 7/3/2024.      Primary malignant neoplasm of right lung metastatic to other site (HCC)   6/4/2024 Initial Diagnosis    Primary malignant neoplasm of right lung metastatic to other site (HCC)           SUBJECTIVE  (INTERVAL HISTORY)      Clotting History None   Bleeding History None   Cancer History Lung Adeno   Family Cancer History None   H/O Blood/Plt Transfusion None   Tobacco/etoh/drug abuse 1.5 PPD x 40 years, quit 4/2024, no etoh abuse or rec drug use       Cancer Screening history C-scope 2014, due for cologuard (ordered)   Occupation  in the past, now          Some gas, reflux.     Dyspnea with exertion since 4/2024 is improving greatly and now is intermittent. Has chronic kaleidoscope ocular sensation over the past few years.    Did well with the last chemo.         I have reviewed the relevant past medical, surgical, social and family history. I have also reviewed allergies and medications for this patient.    Review of Systems  Baseline weight: 200-205 lbs    Denies F/C, N/V, CP, LH, HA, rash, itching, gen weakness,  unilateral weakness, diplopia, melena, hematuria, hematochezia, falls, diarrhea, or constipation       A 10-point review of system was performed, pertinent positive and negative were detailed as above. Otherwise, the 10-point review of system was negative.      Past Medical History:   Diagnosis Date    Cancer (HCC) ,,    Diabetes mellitus (HCC)     High cholesterol     History of blood clots     Hypertension     Leucocytosis 2024    Lung cancer (HCC)     Malignant neoplasm of overlapping sites of right lung (HCC) 2024    Pneumonia     Pulmonary embolism (HCC)        Past Surgical History:   Procedure Laterality Date    IR BIOPSY LYMPH NODE  2024    IR PORT PLACEMENT  2024    KNEE SURGERY      MANDIBLE FRACTURE SURGERY  1985    PATELLA FRACTURE SURGERY      RECTAL SURGERY         Family History   Problem Relation Age of Onset    No Known Problems Father     No Known Problems Mother        Social History     Socioeconomic History    Marital status: Single     Spouse name: Not on file    Number of children: Not on file    Years of education: Not on file    Highest education level: Not on file   Occupational History    Not on file   Tobacco Use    Smoking status: Former     Current packs/day: 0.00     Average packs/day: 1.5 packs/day for 35.0 years (52.5 ttl pk-yrs)     Types: Cigarettes     Quit date: 4/15/2024     Years since quittin.4     Passive exposure: Past    Smokeless tobacco: Never   Vaping Use    Vaping status: Never Used   Substance and Sexual Activity    Alcohol use: Not Currently    Drug use: Never    Sexual activity: Yes     Partners: Female     Birth control/protection: Post-menopausal, None   Other Topics Concern    Not on file   Social History Narrative    Not on file     Social Determinants of Health     Financial Resource Strain: Not on file   Food Insecurity: Not on file   Transportation Needs: Not on file   Physical Activity: Not on file   Stress: Not on file    Social Connections: Not on file   Intimate Partner Violence: Not on file   Housing Stability: Not on file       No Known Allergies    Current Outpatient Medications   Medication Sig Dispense Refill    amitriptyline (ELAVIL) 100 mg tablet 200 mg daily at bedtime      apixaban (Eliquis) 5 mg Take 1 tablet (5 mg total) by mouth 2 (two) times a day 60 tablet 5    atorvastatin (LIPITOR) 10 mg tablet Take 1 tablet (10 mg total) by mouth daily 90 tablet 3    famotidine (PEPCID) 40 MG tablet Take 40 mg by mouth daily as needed for heartburn or indigestion Taking as needed      folic acid ( Folic Acid) 1 mg tablet Take 1 tablet (1 mg total) by mouth daily 90 tablet 0    glimepiride (AMARYL) 2 mg tablet Take 1 tablet (2 mg total) by mouth daily with breakfast 90 tablet 1    Lancets (OneTouch Delica Plus [id number]) MISC Use 1 Units 4 (four) times a day 400 each 2    levothyroxine (Synthroid) 25 mcg tablet Take 1 tablet (25 mcg total) by mouth daily 90 tablet 2    losartan (COZAAR) 25 mg tablet Take 25 mg by mouth daily      metFORMIN (GLUCOPHAGE) 1000 MG tablet Take 1,000 mg by mouth 2 (two) times a day      metFORMIN (GLUCOPHAGE) 500 mg tablet Take 1,000 mg by mouth 2 (two) times a day with meals      ondansetron (ZOFRAN-ODT) 8 mg disintegrating tablet Take 1 tablet (8 mg total) by mouth every 8 (eight) hours as needed for vomiting or nausea 30 tablet 0    OneTouch Verio test strip Use 1 each 4 (four) times a day 400 each 2    tiotropium-olodaterol (Stiolto Respimat) 2.5-2.5 MCG/ACT inhaler Inhale 2 puffs daily 4 g 3    benzonatate (TESSALON) 200 MG capsule Take 1 capsule (200 mg total) by mouth 3 (three) times a day as needed for cough (Patient not taking: Reported on 9/4/2024) 20 capsule 0    Glucagon, rDNA, (Glucagon Emergency) 1 MG KIT Inject 1 mg as directed once as needed (hypoglycemia) for up to 1 dose (Patient not taking: Reported on 7/3/2024) 2 kit 0     No current facility-administered medications for this  visit.       (Not in a hospital admission)      Objective:     24 Hour Vitals Assessment:     Vitals:    09/24/24 1352   BP: 138/62   Pulse: 91   Resp: 16   Temp: 97.6 °F (36.4 °C)   SpO2: 96%         Weight at last visit: 215 lbs  Weight today: 223 lbs    PHYSICIAN EXAM:    General: Appearance: alert, cooperative, no distress.  HEENT: Normocephalic, atraumatic. No scleral icterus. conjunctivae clear. EOMI.  Chest: No tenderness to palpation. No open wound noted.  Lungs: Clear to auscultation bilaterally, Respirations unlabored.  Cardiac: Regular rate, +S1and S2  Abdomen: Soft, non-tender, non-distended. Bowel sounds are normal.  Extremities:  No edema, cyanosis, clubbing.  Skin: Skin color, turgor are normal. No rashes.  Lymphatics: no palpable supra-cervical, axillary, or inguinal adenopathy  Neurologic: Awake, Alert, and oriented, no gross focal deficits noted b/l.       DATA REVIEW:    Pathology Result:    Final Diagnosis   Date Value Ref Range Status   04/23/2024   Final    A. Lymph Node, Left retroperitoneal, Biopsy:  - Metastatic adenocarcinoma of lung primary.     Comment: Specimen (block A2) is being sent to [facility] for MI Profile Testing. Upon completion of testing, [facility] report will be sent directly to the requesting provider, as well as posted in the Media Tab of EPIC for this patient by the Pathology Department.          Image Results:   Image result are reviewed and documented in Hematology/Oncology history    MRI Brain BT w wo Contrast  Narrative: MRI BRAIN WITH AND WITHOUT CONTRAST    INDICATION: C79.31: Secondary malignant neoplasm of brain.   Additional history from [facility]: Brain metastasis s/p SRS. monitoring., Metastasis to brain (HCC)    COMPARISON: MRI brain 6/20/2024    TECHNIQUE:  Multiplanar, multisequence imaging of the brain and sella was performed before and after gadolinium administration.    IV Contrast:  10 mL of Gadobutrol injection (SINGLE-DOSE)    IMAGE QUALITY:    "Diagnostic.    FINDINGS:  High right frontal lobe/precentral gyrus lesion, decreased in size, currently measuring 0.3 cm (previous 0.5 cm) (series 13: 22).    There is no new pathologic area of intra-axial enhancement.    Perfusion:  No evidence of abnormal hyperperfusion    Diffusion:  No diffusion abnormality to suggest hypercellularity.    OTHER FINDINGS:  Chronic right caudate head lacunar infarct. Mild chronic microangiopathic ischemic changes.    VENTRICLES:  Normal for the patient's age.    SELLA AND PITUITARY GLAND:  Normal.    ORBITS:  Normal.    PARANASAL SINUSES:  Normal.    VASCULATURE:  Evaluation of the major intracranial vasculature demonstrates appropriate flow voids.    CALVARIUM AND SKULL BASE:  Normal.    EXTRACRANIAL SOFT TISSUES:  Normal.  Impression: Previous high right frontal lobe/precentral gyrus lesion, decreased in size status post SRS compared to 6/20/2024.    There is no new pathologic area of intra-axial enhancement.    Mild chronic microangiopathic ischemic changes.    Workstation performed: [id number]      LABS:  Lab data are reviewed and documented in HemOn history.       Lab Results   Component Value Date    HGB 11.0 (L) 09/20/2024    HCT 34.5 (L) 09/20/2024    MCV 85 09/20/2024     09/20/2024    WBC 4.21 (L) 09/20/2024    NRBC 0 09/20/2024     Lab Results   Component Value Date    K 4.1 09/20/2024     09/20/2024    CO2 28 09/20/2024    BUN 15 09/20/2024    CREATININE 0.78 09/20/2024    GLUF 100 (H) 06/28/2024    CALCIUM 10.6 (H) 09/20/2024    AST 21 09/20/2024    ALT 35 09/20/2024    ALKPHOS 79 09/20/2024    EGFR 101 09/20/2024       Lab Results   Component Value Date    IRON 28 (L) 04/19/2024    TIBC 212 (L) 04/19/2024    FERRITIN 63 04/19/2024       No results found for: \"[id number]\"    No results for input(s): \"WBC\", \"CREAT\", \"PLT\" in the last 72 hours.    By:  Maryana Sandoval MD, 9/24/2024, 1:58 PM                                  "

## 2024-09-18 ENCOUNTER — OFFICE VISIT (OUTPATIENT)
Dept: RADIATION ONCOLOGY | Facility: HOSPITAL | Age: 54
End: 2024-09-18
Attending: RADIOLOGY
Payer: COMMERCIAL

## 2024-09-18 VITALS
OXYGEN SATURATION: 94 % | HEART RATE: 108 BPM | RESPIRATION RATE: 18 BRPM | SYSTOLIC BLOOD PRESSURE: 132 MMHG | DIASTOLIC BLOOD PRESSURE: 86 MMHG | TEMPERATURE: 97.6 F

## 2024-09-18 DIAGNOSIS — C79.31 METASTASIS TO BRAIN (HCC): Primary | ICD-10-CM

## 2024-09-18 PROCEDURE — G0463 HOSPITAL OUTPT CLINIC VISIT: HCPCS | Performed by: STUDENT IN AN ORGANIZED HEALTH CARE EDUCATION/TRAINING PROGRAM

## 2024-09-18 PROCEDURE — 99024 POSTOP FOLLOW-UP VISIT: CPT | Performed by: STUDENT IN AN ORGANIZED HEALTH CARE EDUCATION/TRAINING PROGRAM

## 2024-09-18 PROCEDURE — 99211 OFF/OP EST MAY X REQ PHY/QHP: CPT | Performed by: STUDENT IN AN ORGANIZED HEALTH CARE EDUCATION/TRAINING PROGRAM

## 2024-09-18 NOTE — PROGRESS NOTES
Papo Gibbs 1970 is a 54 y.o. male with multifocal bilateral peripheral pulmonary embolism. Biopsy 24 left retroperitoneal lymph node revealed metastatic adenocarcinoma of lung primary. MRI brain 24 revealed 4 mm metastasis in the posterior right frontal lobe. On 7/3/24 he underwent SRS to a small right frontal metastasis to a dose of 2000cGy in 1 fraction. The pt returns today for follow up.    24 - Jaime Ly  Pt to have restaging CT in October  Cycle 5 coming up    24 - MRI Brain BT w wo contrast  IMPRESSION:  Previous high right frontal lobe/precentral gyrus lesion, decreased in size status post SRS compared to 2024.  There is no new pathologic area of intra-axial enhancement.  Mild chronic microangiopathic ischemic changes.    Upcomin24 - Jaime Ly - Cycle 7  10/25/24 - CT chest abdomen pelvis w contrast    Oncology History   Adenocarcinoma of overlapping sites of right lung (HCC)   2024 Biopsy    Final Diagnosis   A. Lymph Node, Left retroperitoneal, Biopsy:  - Metastatic adenocarcinoma of lung primary.         2024 Initial Diagnosis    Adenocarcinoma of overlapping sites of right lung (HCC)     2024 -  Cancer Staged    Staging form: Lung, AJCC 8th Edition  - Clinical: Stage IV (cN3, cM1) - Signed by Maryana Sandoval MD on 2024 -  Chemotherapy    cyanocobalamin, 1,000 mcg, Intramuscular, Once, 3 of 5 cycles  Administration: 1,000 mcg (2024), 1,000 mcg (2024), 1,000 mcg (9/3/2024)  alteplase (CATHFLO), 2 mg, Intracatheter, Every 1 Minute as needed, 6 of 10 cycles  fosaprepitant (EMEND) IVPB, 150 mg, Intravenous, Once, 6 of 6 cycles  Administration: 150 mg (2024), 150 mg (2024), 150 mg (2024), 150 mg (9/3/2024), 150 mg (2024), 150 mg (2024)  CARBOplatin (PARAPLATIN) IVPB (GOG AUC DOSING), 672 mg, Intravenous, Once, 6 of 6 cycles  Administration: 672 mg (2024), 692.5 mg (2024),  659 mg (7/23/2024), 641 mg (9/3/2024), 692.5 mg (6/11/2024), 647 mg (8/13/2024)  pemetrexed (ALIMTA) chemo infusion, 1,020 mg, Intravenous, Once, 6 of 10 cycles  Administration: 1,000 mg (5/21/2024), 1,000 mg (7/2/2024), 1,000 mg (7/23/2024), 1,000 mg (9/3/2024), 1,000 mg (6/11/2024), 1,000 mg (8/13/2024)  pembrolizumab (KEYTRUDA) IVPB, 200 mg, Intravenous, Once, 6 of 10 cycles  Administration: 200 mg (5/21/2024), 200 mg (7/2/2024), 200 mg (7/23/2024), 200 mg (9/3/2024), 200 mg (6/11/2024), 200 mg (8/13/2024)     7/3/2024 - 7/3/2024 Radiation    Treatments:  Course: C1 SRS    Plan ID Energy Fractions Dose per Fraction (cGy) Dose Correction (cGy) Total Dose Delivered (cGy) Elapsed Days   SRSRFrontN 6X-FFF 1 / 1 2,000 0 2,000 0      Treatment Dates:  7/3/2024 - 7/3/2024.      Primary malignant neoplasm of right lung metastatic to other site (HCC)   6/4/2024 Initial Diagnosis    Primary malignant neoplasm of right lung metastatic to other site (HCC)         Review of Systems:  Review of Systems   Constitutional: Negative.    HENT: Negative.          Heartburn   Eyes: Negative.    Respiratory:  Positive for shortness of breath (Minimal upon exertion). Negative for cough.    Cardiovascular: Negative.    Gastrointestinal:  Positive for nausea (Occasional after chemo).   Endocrine: Negative.    Genitourinary: Negative.    Musculoskeletal: Negative.    Skin: Negative.    Allergic/Immunologic: Negative.    Neurological: Negative.    Hematological: Negative.    Psychiatric/Behavioral: Negative.         Clinical Trial: no        Health Maintenance   Topic Date Due    Hepatitis C Screening  Never done    Pneumococcal Vaccine: Pediatrics (0 to 5 Years) and At-Risk Patients (6 to 64 Years) (1 of 2 - PCV) Never done    HIV Screening  Never done    BMI: Followup Plan  Never done    Zoster Vaccine (1 of 2) Never done    Hepatitis A Vaccine (1 of 2 - Risk 2-dose series) Never done    COVID-19 Vaccine (3 - Pfizer risk series)  05/20/2021    Influenza Vaccine (1) 09/01/2024    Annual Physical  06/04/2025    BMI: Adult  09/04/2025    Depression Screening  09/18/2025    Colorectal Cancer Screening  06/08/2027    RSV Vaccine Age 60+ Years (1 - 1-dose 60+ series) 04/13/2029    DTaP,Tdap,and Td Vaccines (2 - Td or Tdap) 04/03/2034    RSV Vaccine age 0-20 Months  Aged Out    HIB Vaccine  Aged Out    IPV Vaccine  Aged Out    Meningococcal ACWY Vaccine  Aged Out    HPV Vaccine  Aged Out     Patient Active Problem List   Diagnosis    Encounter to discuss test results    Gross hematuria    Acute pulmonary embolism with acute cor pulmonale, unspecified pulmonary embolism type (HCC)    Abnormal CT of the chest    HTN (hypertension)    Tobacco dependence    SOB (shortness of breath)    Chest pain    Acute respiratory failure with hypoxia (HCC)    Adenocarcinoma of overlapping sites of right lung (HCC)    Chronic hypoxemic respiratory failure (HCC)    Restrictive lung disease    Palliative care encounter    History of pulmonary embolism    Adrenal nodule (HCC)    Acute deep vein thrombosis (DVT) of popliteal vein of left lower extremity (HCC)    Acute deep vein thrombosis (DVT) of left femoral vein (HCC)    Primary malignant neoplasm of right lung metastatic to other site (HCC)    Acquired left ventricular hypertrophy    Port-A-Cath in place    Chemotherapy-induced nausea    Nephrolithiasis    Fatty liver    Hepatomegaly    Chronic anticoagulation     Past Medical History:   Diagnosis Date    Cancer (HCC) 04,25,2024    Diabetes mellitus (HCC)     High cholesterol     History of blood clots     Hypertension     Leucocytosis 04/20/2024    Lung cancer (HCC)     Malignant neoplasm of overlapping sites of right lung (HCC) 06/04/2024    Pneumonia     Pulmonary embolism (HCC)      Past Surgical History:   Procedure Laterality Date    IR BIOPSY LYMPH NODE  4/23/2024    IR PORT PLACEMENT  5/20/2024    KNEE SURGERY      MANDIBLE FRACTURE SURGERY  1985    PATELLA  FRACTURE SURGERY      RECTAL SURGERY       Family History   Problem Relation Age of Onset    No Known Problems Father     No Known Problems Mother      Social History     Socioeconomic History    Marital status: Single     Spouse name: Not on file    Number of children: Not on file    Years of education: Not on file    Highest education level: Not on file   Occupational History    Not on file   Tobacco Use    Smoking status: Former     Current packs/day: 0.00     Average packs/day: 1.5 packs/day for 35.0 years (52.5 ttl pk-yrs)     Types: Cigarettes     Quit date: 4/15/2024     Years since quittin.4     Passive exposure: Past    Smokeless tobacco: Never   Vaping Use    Vaping status: Never Used   Substance and Sexual Activity    Alcohol use: Not Currently    Drug use: Never    Sexual activity: Yes     Partners: Female     Birth control/protection: Post-menopausal, None   Other Topics Concern    Not on file   Social History Narrative    Not on file     Social Determinants of Health     Financial Resource Strain: Not on file   Food Insecurity: Not on file   Transportation Needs: Not on file   Physical Activity: Not on file   Stress: Not on file   Social Connections: Not on file   Intimate Partner Violence: Not on file   Housing Stability: Not on file       Current Outpatient Medications:     amitriptyline (ELAVIL) 100 mg tablet, 200 mg daily at bedtime, Disp: , Rfl:     apixaban (Eliquis) 5 mg, Take 1 tablet (5 mg total) by mouth 2 (two) times a day, Disp: 60 tablet, Rfl: 5    atorvastatin (LIPITOR) 10 mg tablet, Take 1 tablet (10 mg total) by mouth daily, Disp: 90 tablet, Rfl: 3    famotidine (PEPCID) 40 MG tablet, Take 40 mg by mouth daily as needed for heartburn or indigestion Taking as needed, Disp: , Rfl:     folic acid (KP Folic Acid) 1 mg tablet, Take 1 tablet (1 mg total) by mouth daily, Disp: 90 tablet, Rfl: 0    glimepiride (AMARYL) 2 mg tablet, Take 1 tablet (2 mg total) by mouth daily with breakfast,  Disp: 90 tablet, Rfl: 1    Lancets (OneTouch Delica Plus [id number]) MISC, Use 1 Units 4 (four) times a day, Disp: 100 each, Rfl: 5    levothyroxine (Synthroid) 25 mcg tablet, Take 1 tablet (25 mcg total) by mouth daily, Disp: 90 tablet, Rfl: 2    losartan (COZAAR) 25 mg tablet, Take 25 mg by mouth daily, Disp: , Rfl:     metFORMIN (GLUCOPHAGE) 1000 MG tablet, Take 1,000 mg by mouth 2 (two) times a day, Disp: , Rfl:     metFORMIN (GLUCOPHAGE) 500 mg tablet, Take 1,000 mg by mouth 2 (two) times a day with meals, Disp: , Rfl:     ondansetron (ZOFRAN-ODT) 8 mg disintegrating tablet, Take 1 tablet (8 mg total) by mouth every 8 (eight) hours as needed for vomiting or nausea, Disp: 30 tablet, Rfl: 0    OneTouch Verio test strip, Use 1 each 4 (four) times a day, Disp: 100 each, Rfl: 5    tiotropium-olodaterol (Stiolto Respimat) 2.5-2.5 MCG/ACT inhaler, Inhale 2 puffs daily, Disp: 4 g, Rfl: 3    benzonatate (TESSALON) 200 MG capsule, Take 1 capsule (200 mg total) by mouth 3 (three) times a day as needed for cough (Patient not taking: Reported on 9/4/2024), Disp: 20 capsule, Rfl: 0    Glucagon, rDNA, (Glucagon Emergency) 1 MG KIT, Inject 1 mg as directed once as needed (hypoglycemia) for up to 1 dose (Patient not taking: Reported on 7/3/2024), Disp: 2 kit, Rfl: 0  No Known Allergies  Vitals:    09/18/24 0857   BP: 132/86   BP Location: Left arm   Patient Position: Sitting   Cuff Size: Standard   Pulse: (!) 108   Resp: 18   Temp: 97.6 °F (36.4 °C)   TempSrc: Temporal   SpO2: 94%      Pain Score: 0-No pain

## 2024-09-18 NOTE — PROGRESS NOTES
Follow-up - Radiation Oncology   Papo Gibbs 1970 54 y.o. male [id number]      History of Present Illness   Cancer Staging   Adenocarcinoma of overlapping sites of right lung (HCC)  Staging form: Lung, AJCC 8th Edition  - Clinical: Stage IV (cN3, cM1) - Signed by Maryana Sandoval MD on 5/2/2024      Mr. Papo Gibbs is a 54 year old man with Stage IV NSCLC with a small 5mm right frontal metastasis, enlarging on interval MRI despite systemic therapy. On 7/3/24 he underwent SRS to a small right frontal metastasis to a dose of 2000cGy in 1 fraction. He returns today for follow-up.     Interval History:  The patient was last seen in clinic on 7/3/24 at the time of SRS. He tolerated SRS well overall with no significant immediate adverse effects.  Since then he is continue to follow with medical oncology receiving combination carboplatin, pemetrexed, and pembrolizumab.    MRI brain (9/12/2024) demonstrated:  Previous high right frontal lobe/precentral gyrus lesion, decreased in size status post SRS compared to 6/20/2024.  There is no new pathologic area of intra-axial enhancement.    Historical Information   Oncology History   Adenocarcinoma of overlapping sites of right lung (HCC)   4/23/2024 Biopsy    Final Diagnosis   A. Lymph Node, Left retroperitoneal, Biopsy:  - Metastatic adenocarcinoma of lung primary.         5/2/2024 Initial Diagnosis    Adenocarcinoma of overlapping sites of right lung (HCC)     5/2/2024 -  Cancer Staged    Staging form: Lung, AJCC 8th Edition  - Clinical: Stage IV (cN3, cM1) - Signed by Maryana Sandoval MD on 5/2/2024 5/21/2024 -  Chemotherapy    cyanocobalamin, 1,000 mcg, Intramuscular, Once, 3 of 5 cycles  Administration: 1,000 mcg (5/14/2024), 1,000 mcg (7/2/2024), 1,000 mcg (9/3/2024)  alteplase (CATHFLO), 2 mg, Intracatheter, Every 1 Minute as needed, 6 of 10 cycles  fosaprepitant (EMEND) IVPB, 150 mg, Intravenous, Once, 6 of 6 cycles  Administration: 150 mg (5/21/2024),  150 mg (7/2/2024), 150 mg (7/23/2024), 150 mg (9/3/2024), 150 mg (6/11/2024), 150 mg (8/13/2024)  CARBOplatin (PARAPLATIN) IVPB (GOG AUC DOSING), 672 mg, Intravenous, Once, 6 of 6 cycles  Administration: 672 mg (5/21/2024), 692.5 mg (7/2/2024), 659 mg (7/23/2024), 641 mg (9/3/2024), 692.5 mg (6/11/2024), 647 mg (8/13/2024)  pemetrexed (ALIMTA) chemo infusion, 1,020 mg, Intravenous, Once, 6 of 10 cycles  Administration: 1,000 mg (5/21/2024), 1,000 mg (7/2/2024), 1,000 mg (7/23/2024), 1,000 mg (9/3/2024), 1,000 mg (6/11/2024), 1,000 mg (8/13/2024)  pembrolizumab (KEYTRUDA) IVPB, 200 mg, Intravenous, Once, 6 of 10 cycles  Administration: 200 mg (5/21/2024), 200 mg (7/2/2024), 200 mg (7/23/2024), 200 mg (9/3/2024), 200 mg (6/11/2024), 200 mg (8/13/2024)     7/3/2024 - 7/3/2024 Radiation    Treatments:  Course: C1 SRS    Plan ID Energy Fractions Dose per Fraction (cGy) Dose Correction (cGy) Total Dose Delivered (cGy) Elapsed Days   SRSRFrontN 6X-FFF 1 / 1 2,000 0 2,000 0      Treatment Dates:  7/3/2024 - 7/3/2024.      Primary malignant neoplasm of right lung metastatic to other site (HCC)   6/4/2024 Initial Diagnosis    Primary malignant neoplasm of right lung metastatic to other site (HCC)           OBJECTIVE:   /86 (BP Location: Left arm, Patient Position: Sitting, Cuff Size: Standard)   Pulse (!) 108   Temp 97.6 °F (36.4 °C) (Temporal)   Resp 18   SpO2 94%   Pain Assessment:  0  ECOG/Zubrod/WHO: 0 - Asymptomatic    Physical Exam   Well-appearing.  NAD.  No increased work of breathing.  Extremities warm well-perfused.  No overt neurologic deficits noted.        Assessment/Plan:  Orders Placed This Encounter   Procedures    MRI Brain BT w wo Contrast      Approximately 2 months following completion of SRS, the patient is doing well overall.  We reviewed his repeat MRI brain in detail comparison to prior imaging studies.  On my review, this shows diminution of his previously seen high right frontal metastasis.  " Additionally there are no other new sites of abnormal enhancement.  I have recommended repeat MRI brain in approximately 3 months with return to clinic thereafter.  I will remain available in interim should questions or concerns arise.    Sergey Alcala MD  9/18/2024,12:30 PM    Portions of the record may have been created with voice recognition software.  Occasional wrong word or \"sound a like\" substitutions may have occurred due to the inherent limitations of voice recognition software.  Read the chart carefully and recognize, using context, where substitutions have occurred.        "

## 2024-09-20 ENCOUNTER — HOSPITAL ENCOUNTER (OUTPATIENT)
Dept: INFUSION CENTER | Facility: CLINIC | Age: 54
End: 2024-09-20
Payer: COMMERCIAL

## 2024-09-20 DIAGNOSIS — C34.81 ADENOCARCINOMA OF OVERLAPPING SITES OF RIGHT LUNG (HCC): ICD-10-CM

## 2024-09-20 DIAGNOSIS — Z95.828 PORT-A-CATH IN PLACE: Primary | ICD-10-CM

## 2024-09-20 LAB
ALBUMIN SERPL BCG-MCNC: 4.4 G/DL (ref 3.5–5)
ALP SERPL-CCNC: 79 U/L (ref 34–104)
ALT SERPL W P-5'-P-CCNC: 35 U/L (ref 7–52)
ANION GAP SERPL CALCULATED.3IONS-SCNC: 9 MMOL/L (ref 4–13)
AST SERPL W P-5'-P-CCNC: 21 U/L (ref 13–39)
BASOPHILS # BLD AUTO: 0.01 THOUSANDS/ΜL (ref 0–0.1)
BASOPHILS NFR BLD AUTO: 0 % (ref 0–1)
BILIRUB SERPL-MCNC: 0.25 MG/DL (ref 0.2–1)
BUN SERPL-MCNC: 15 MG/DL (ref 5–25)
CALCIUM SERPL-MCNC: 10.6 MG/DL (ref 8.4–10.2)
CHLORIDE SERPL-SCNC: 101 MMOL/L (ref 96–108)
CO2 SERPL-SCNC: 28 MMOL/L (ref 21–32)
CREAT SERPL-MCNC: 0.78 MG/DL (ref 0.6–1.3)
EOSINOPHIL # BLD AUTO: 0.04 THOUSAND/ΜL (ref 0–0.61)
EOSINOPHIL NFR BLD AUTO: 1 % (ref 0–6)
ERYTHROCYTE [DISTWIDTH] IN BLOOD BY AUTOMATED COUNT: 20.1 % (ref 11.6–15.1)
GFR SERPL CREATININE-BSD FRML MDRD: 101 ML/MIN/1.73SQ M
GLUCOSE SERPL-MCNC: 81 MG/DL (ref 65–140)
HCT VFR BLD AUTO: 34.5 % (ref 36.5–49.3)
HGB BLD-MCNC: 11 G/DL (ref 12–17)
IMM GRANULOCYTES # BLD AUTO: 0.01 THOUSAND/UL (ref 0–0.2)
IMM GRANULOCYTES NFR BLD AUTO: 0 % (ref 0–2)
LYMPHOCYTES # BLD AUTO: 1.6 THOUSANDS/ΜL (ref 0.6–4.47)
LYMPHOCYTES NFR BLD AUTO: 38 % (ref 14–44)
MCH RBC QN AUTO: 27.1 PG (ref 26.8–34.3)
MCHC RBC AUTO-ENTMCNC: 31.9 G/DL (ref 31.4–37.4)
MCV RBC AUTO: 85 FL (ref 82–98)
MONOCYTES # BLD AUTO: 0.52 THOUSAND/ΜL (ref 0.17–1.22)
MONOCYTES NFR BLD AUTO: 12 % (ref 4–12)
NEUTROPHILS # BLD AUTO: 2.03 THOUSANDS/ΜL (ref 1.85–7.62)
NEUTS SEG NFR BLD AUTO: 49 % (ref 43–75)
NRBC BLD AUTO-RTO: 0 /100 WBCS
PLATELET # BLD AUTO: 198 THOUSANDS/UL (ref 149–390)
PMV BLD AUTO: 9 FL (ref 8.9–12.7)
POTASSIUM SERPL-SCNC: 4.1 MMOL/L (ref 3.5–5.3)
PROT SERPL-MCNC: 7.2 G/DL (ref 6.4–8.4)
RBC # BLD AUTO: 4.06 MILLION/UL (ref 3.88–5.62)
SODIUM SERPL-SCNC: 138 MMOL/L (ref 135–147)
TSH SERPL DL<=0.05 MIU/L-ACNC: 1.26 UIU/ML (ref 0.45–4.5)
WBC # BLD AUTO: 4.21 THOUSAND/UL (ref 4.31–10.16)

## 2024-09-20 PROCEDURE — 84443 ASSAY THYROID STIM HORMONE: CPT | Performed by: INTERNAL MEDICINE

## 2024-09-20 PROCEDURE — 80053 COMPREHEN METABOLIC PANEL: CPT | Performed by: INTERNAL MEDICINE

## 2024-09-20 PROCEDURE — 85025 COMPLETE CBC W/AUTO DIFF WBC: CPT | Performed by: INTERNAL MEDICINE

## 2024-09-20 RX ORDER — SODIUM CHLORIDE 9 MG/ML
20 INJECTION, SOLUTION INTRAVENOUS ONCE
Status: CANCELLED | OUTPATIENT
Start: 2024-09-24

## 2024-09-20 NOTE — PROGRESS NOTES
Pt. offers no complaints.  Port accessed, labs drawn.  AVS declined, next appt. confirmed for 9/24 @ 3:30.

## 2024-09-24 ENCOUNTER — HOSPITAL ENCOUNTER (OUTPATIENT)
Dept: INFUSION CENTER | Facility: CLINIC | Age: 54
Discharge: HOME | End: 2024-09-24
Payer: COMMERCIAL

## 2024-09-24 ENCOUNTER — PATIENT MESSAGE (OUTPATIENT)
Dept: FAMILY MEDICINE CLINIC | Facility: CLINIC | Age: 54
End: 2024-09-24

## 2024-09-24 ENCOUNTER — OFFICE VISIT (OUTPATIENT)
Dept: HEMATOLOGY ONCOLOGY | Facility: CLINIC | Age: 54
End: 2024-09-24
Payer: COMMERCIAL

## 2024-09-24 VITALS
WEIGHT: 222 LBS | BODY MASS INDEX: 34.84 KG/M2 | HEART RATE: 88 BPM | OXYGEN SATURATION: 94 % | TEMPERATURE: 97.9 F | SYSTOLIC BLOOD PRESSURE: 143 MMHG | DIASTOLIC BLOOD PRESSURE: 64 MMHG | HEIGHT: 67 IN

## 2024-09-24 VITALS
TEMPERATURE: 97.6 F | HEIGHT: 67 IN | SYSTOLIC BLOOD PRESSURE: 138 MMHG | RESPIRATION RATE: 16 BRPM | OXYGEN SATURATION: 96 % | HEART RATE: 91 BPM | DIASTOLIC BLOOD PRESSURE: 62 MMHG | WEIGHT: 223 LBS | BODY MASS INDEX: 35 KG/M2

## 2024-09-24 DIAGNOSIS — T45.1X5A CHEMOTHERAPY-INDUCED NAUSEA: ICD-10-CM

## 2024-09-24 DIAGNOSIS — R11.0 CHEMOTHERAPY-INDUCED NAUSEA: ICD-10-CM

## 2024-09-24 DIAGNOSIS — C34.81 ADENOCARCINOMA OF OVERLAPPING SITES OF RIGHT LUNG (HCC): ICD-10-CM

## 2024-09-24 DIAGNOSIS — I82.432 ACUTE DEEP VEIN THROMBOSIS (DVT) OF POPLITEAL VEIN OF LEFT LOWER EXTREMITY (HCC): Primary | ICD-10-CM

## 2024-09-24 DIAGNOSIS — C34.81 ADENOCARCINOMA OF OVERLAPPING SITES OF RIGHT LUNG (HCC): Primary | ICD-10-CM

## 2024-09-24 DIAGNOSIS — E11.00 TYPE 2 DIABETES MELLITUS WITH HYPEROSMOLARITY WITHOUT COMA, WITHOUT LONG-TERM CURRENT USE OF INSULIN (HCC): ICD-10-CM

## 2024-09-24 PROCEDURE — 96411 CHEMO IV PUSH ADDL DRUG: CPT

## 2024-09-24 PROCEDURE — 99215 OFFICE O/P EST HI 40 MIN: CPT | Performed by: INTERNAL MEDICINE

## 2024-09-24 PROCEDURE — 96413 CHEMO IV INFUSION 1 HR: CPT

## 2024-09-24 PROCEDURE — 96367 TX/PROPH/DG ADDL SEQ IV INF: CPT

## 2024-09-24 RX ORDER — LANCETS 33 GAUGE
1 EACH MISCELLANEOUS 4 TIMES DAILY
Qty: 400 EACH | Refills: 2 | Status: SHIPPED | OUTPATIENT
Start: 2024-09-24

## 2024-09-24 RX ORDER — BLOOD SUGAR DIAGNOSTIC
1 STRIP MISCELLANEOUS 4 TIMES DAILY
Qty: 400 EACH | Refills: 2 | Status: SHIPPED | OUTPATIENT
Start: 2024-09-24

## 2024-09-24 RX ORDER — SODIUM CHLORIDE 9 MG/ML
20 INJECTION, SOLUTION INTRAVENOUS ONCE
Status: COMPLETED | OUTPATIENT
Start: 2024-09-24 | End: 2024-09-24

## 2024-09-24 RX ADMIN — SODIUM CHLORIDE 20 ML/HR: 0.9 INJECTION, SOLUTION INTRAVENOUS at 15:04

## 2024-09-24 RX ADMIN — SODIUM CHLORIDE 200 MG: 9 INJECTION, SOLUTION INTRAVENOUS at 15:37

## 2024-09-24 RX ADMIN — PEMETREXED DISODIUM 1000 MG: 500 INJECTION, POWDER, LYOPHILIZED, FOR SOLUTION INTRAVENOUS at 16:29

## 2024-09-24 RX ADMIN — DEXAMETHASONE SODIUM PHOSPHATE: 10 INJECTION, SOLUTION INTRAMUSCULAR; INTRAVENOUS at 15:05

## 2024-09-24 NOTE — PROGRESS NOTES
Patient tolerated treatment without incident. Port with excellent blood return noted prior to flushing and de-accessing as per protocol. Next appointment confirmed for 10/11/2024 at 1530. AVS offered and declined.

## 2024-09-27 ENCOUNTER — PATIENT OUTREACH (OUTPATIENT)
Dept: HEMATOLOGY ONCOLOGY | Facility: CLINIC | Age: 54
End: 2024-09-27

## 2024-09-27 NOTE — PROGRESS NOTES
ASSESSMENT      Are you having any side effects from your treatment?  -no     Are you eating and drinking properly?  -yes     Are you having any pain?  -no     Have needs changed for a palliative care referral?  -pt already established     Do you know when your upcoming appointments are?  -yes     Do you have a good support system?  -yes     Are you interested in any support groups?  - no    Are there any changes in barriers to care?  -no    Do you have any questions or concerns regarding your treatment plan?  -Not at this time. Jessica was very appreciative of ,my call

## 2024-10-03 ENCOUNTER — NURSE TRIAGE (OUTPATIENT)
Age: 54
End: 2024-10-03

## 2024-10-03 DIAGNOSIS — I82.412 ACUTE DEEP VEIN THROMBOSIS (DVT) OF LEFT FEMORAL VEIN (HCC): ICD-10-CM

## 2024-10-03 DIAGNOSIS — I26.09 ACUTE PULMONARY EMBOLISM WITH ACUTE COR PULMONALE, UNSPECIFIED PULMONARY EMBOLISM TYPE (HCC): ICD-10-CM

## 2024-10-03 DIAGNOSIS — R06.09 DOE (DYSPNEA ON EXERTION): ICD-10-CM

## 2024-10-03 DIAGNOSIS — C34.81 ADENOCARCINOMA OF OVERLAPPING SITES OF RIGHT LUNG (HCC): Primary | ICD-10-CM

## 2024-10-03 NOTE — TELEPHONE ENCOUNTER
"Per Dr. Sandoval  \"Let's order CTA PE protocol STAT    IF SOB gets worse, would go to ER\"   Call out to patient, patient prefers 10/4 due to work schedule and transportation.  Patient stated that it is not \"urgent\" at this time, but he had noticed increase in cough and ROBERTSON. Encouraged patient to go to the ED if any symptoms continue or worsen.     Patient scheduled for 10/4/24 at [facility] at 1230 pm.  Reviewed prep instructions. Patient agreeable.    Reviewed with patient increase in blood glucose levels can be from the IV dexamethasone, patient stated that he does not want to change anything at the moment and would like to continue to monitor those levels.   "

## 2024-10-03 NOTE — TELEPHONE ENCOUNTER
Pt stated Oncology Provider:  Dr. Sandoval    Actionable item:  Please call back and advise re: SOB/cough and increased blood sugars    What is the reason for the call/chief complaint?  Patient called in to report that for the past 1.5 weeks he has had increasing SOB on exertion and intermittent cough. He states laughing triggers coughing, no chest pain reported. He has SOB with exertion, noting that he gets winded just bringing in the trash cans and that his friends are noticing that he is more short of breath as well. He states at this point it is only on exertion and he sometimes notices slight wheezing with a lot of exertion. Denies other symptoms at this time. Patient has history of PE. Patient states this is not as severe but feels as though it is getting slightly worse and beginning to feel similar to when he previously had PE. Patient states he is taking Eliquis. Advised patient Dr. Sandoval would review and our office would call back with recommendations. If SOB worsens or new symptoms develop, he should seek immediate attention in the ER. Patient verbalized understanding.    Patient also would like to mention that his blood sugars have been elevated for the past 1.5 weeks. They have been OK today but they were in the 200's earlier in the week which is not normal for him. Would like input as to whether this may be related to the treatment or if he should change anything.    Priority:  High        Reason for Disposition   Increasing dyspnea with activity    Answer Assessment - Initial Assessment Questions  1. What is the cancer diagnosis, and what treatment is the patient receiving?  Review past medical history (Is there a history of PE?)  Lung Cancer - currently on treatment with Pembrolizumab/Pemetrexed maintenance    2. What medications is the patient taking? (Obtain medication history, including antineoplastic agents, anticoagulation for history of clots, and prescription and over-the-counter  medications.)   Taking Eliquis for history of PE    3.Ask the patient to describe symptoms in detail, including timing of symptoms: Is the dyspnea acute or chronic? Does a symptom occur all day or only in the morning? Are there any aggravating factors?  For approximately 1.5 weeks patient has had increasing SOB and cough. States he gets winded just bringing trash cans in and he has been told that he seems visibly short of breath on exertion to his friends    4. Is there elevated respiratory and/or pulse rate? Is there any associated wheezing, rhonchi, or stridor (If not talking to the patient, ask to speak to the patient and listen to their breathing, if possible)    Notes only SOB on exertion, if he exerts a lot may notice some wheezing    5. Is there any associated chest pain, cough, or fever? If cough present: Ask whether it is a productive versus a nonproductive cough. If productive, qualify and quantify sputum production, including its amount, color, whether it is with or without blood, and any other measurable features.  States that laughing triggers coughing, getting slightly worse each day    6. Is there any associated cyanosis (blue-leonardo color to lips, fingers, toes) or pallor?    7.Are there any changes in mental status (e.g., somnolence, restlessness, confusion)   Denies    8.Are there any comorbid lung conditions (e.g., asthma or chronic obstructive pulmonary disease, pneumonia, bronchitis, neurologic disease, recent upper or lower respiratory tract infection, tuberculosis) or any underlying cardiac disease (e.g., congestive heart failure, cardiomegaly)  History or PE    9. Any history of anemia or obesity?  Slight anemia    Protocols used: ONC- Dyspnea- ADULT-OH

## 2024-10-04 ENCOUNTER — HOSPITAL ENCOUNTER (OUTPATIENT)
Dept: CT IMAGING | Facility: HOSPITAL | Age: 54
End: 2024-10-04
Attending: INTERNAL MEDICINE
Payer: COMMERCIAL

## 2024-10-04 DIAGNOSIS — R06.09 DOE (DYSPNEA ON EXERTION): Primary | ICD-10-CM

## 2024-10-04 DIAGNOSIS — I26.09 ACUTE PULMONARY EMBOLISM WITH ACUTE COR PULMONALE, UNSPECIFIED PULMONARY EMBOLISM TYPE (HCC): ICD-10-CM

## 2024-10-04 DIAGNOSIS — C34.81 ADENOCARCINOMA OF OVERLAPPING SITES OF RIGHT LUNG (HCC): ICD-10-CM

## 2024-10-04 DIAGNOSIS — R05.9 COUGH, UNSPECIFIED TYPE: ICD-10-CM

## 2024-10-04 DIAGNOSIS — R06.09 DOE (DYSPNEA ON EXERTION): ICD-10-CM

## 2024-10-04 DIAGNOSIS — I82.412 ACUTE DEEP VEIN THROMBOSIS (DVT) OF LEFT FEMORAL VEIN (HCC): ICD-10-CM

## 2024-10-04 PROCEDURE — 71275 CT ANGIOGRAPHY CHEST: CPT

## 2024-10-04 RX ORDER — LEVOFLOXACIN 750 MG/1
750 TABLET, FILM COATED ORAL EVERY 24 HOURS
Qty: 7 TABLET | Refills: 0 | Status: SHIPPED | OUTPATIENT
Start: 2024-10-04 | End: 2024-10-11

## 2024-10-04 RX ADMIN — IOHEXOL 50 ML: 350 INJECTION, SOLUTION INTRAVENOUS at 12:14

## 2024-10-04 NOTE — TELEPHONE ENCOUNTER
Per Dr. Sandoval post review of CTA treat as Right lobe PNA    Levaquin once a day for 7 days 750 mg     Call out to patient and reviewed above. Patient agreeable. Reviewed to call if any symptoms continue or worsen.

## 2024-10-09 ENCOUNTER — OFFICE VISIT (OUTPATIENT)
Dept: PALLIATIVE MEDICINE | Facility: CLINIC | Age: 54
End: 2024-10-09

## 2024-10-09 VITALS
WEIGHT: 220 LBS | SYSTOLIC BLOOD PRESSURE: 118 MMHG | BODY MASS INDEX: 34.53 KG/M2 | TEMPERATURE: 97.9 F | DIASTOLIC BLOOD PRESSURE: 68 MMHG | OXYGEN SATURATION: 93 % | HEIGHT: 67 IN | HEART RATE: 102 BPM

## 2024-10-09 DIAGNOSIS — K21.9 ACID REFLUX: Primary | ICD-10-CM

## 2024-10-09 DIAGNOSIS — C34.81 ADENOCARCINOMA OF OVERLAPPING SITES OF RIGHT LUNG (HCC): ICD-10-CM

## 2024-10-09 DIAGNOSIS — Z51.5 PALLIATIVE CARE ENCOUNTER: ICD-10-CM

## 2024-10-09 NOTE — PROGRESS NOTES
Ambulatory Visit - Progress Note  Name: Papo Gibbs      : 1970      MRN: [id number]  Encounter Provider: Rikki Estrella DO  Encounter Date: 10/9/2024   Encounter department: [facility]    Assessment & Plan  Adenocarcinoma of overlapping sites of right lung (HCC)  Following Dr. Sandoval for Medical Oncology.  Doing well with Keytruda and Alimta at this time.  Next treatment scheduled for 10/15/2024.       Acid reflux  Patient has no pain but does have reflux.   Discussed options to try for reflux. Currently taking famotidine at 40mg daily with little benefit. Feels heartburn sensation in the mid-chest area. We discussed other options but PPI like Protonix (per UptoDate) may affect Keytruda efficacy. Therefore, we discussed avoidance of potential foods that could cause his reflux.  Patient states drinking water does calm down his reflux and will continue to do this to help. Also uses tums and rolaid.         Palliative care encounter  Psychosocial   Supportive listening provided  Normalized experience of patient/family  Provided anxiety containment     Referrals Placed / Medical Equipment Ordered  None today    Follow-Up Recommendations  -Follow-up with PCP and current medical specialists  -Follow-up with palliative care: 6 weeks           Decisional apparatus: Patient is competent on my exam today. If competence is lost, patient's substitute decision maker would default to spouse by [place] Act 169.   Advance Directive / Living Will / POLST: Yes     PDMP Review: I have reviewed the patient's controlled substance dispensing history in the Prescription Drug Monitoring Program in compliance with the [facility] regulations before prescribing any controlled substances.    History of Present Illness     Papo iGbbs is a 54 y.o. male who presents for follow-up  Palliative Diagnosis of lung cancer.    Following Dr. Sandoval for Medical Oncology.  Doing well with Keytruda and Alimta at this time.  Next  treatment scheduled for 10/15/2024.    Patient has no pain but does have reflux.   Discussed options to try for reflux. Currently taking famotidine at 40mg daily with little benefit. Feels heartburn sensation in the mid-chest area. We discussed other options but PPI like Protonix (per UptoDate) may affect Keytruda efficacy. Therefore, we discussed avoidance of potential foods that could cause his reflux.  Patient states drinking water does calm down his reflux and will continue to do this to help. Also uses tums and rolaid.    No other concerns at this time. Will continue to follow while he continues treatment.    History obtained from : patient  Review of Systems   Constitutional:  Negative for activity change, appetite change, chills, fatigue and fever.   HENT:  Negative for trouble swallowing.         Positive for acid reflux     Respiratory:  Negative for shortness of breath.    Cardiovascular:  Negative for chest pain and leg swelling.   Gastrointestinal:  Negative for abdominal pain, constipation, diarrhea, nausea and vomiting.   Genitourinary:  Negative for difficulty urinating and dysuria.   Musculoskeletal:  Negative for arthralgias, back pain and myalgias.   Skin:  Negative for wound.   Neurological:  Negative for weakness.   Psychiatric/Behavioral:  Negative for confusion.    All other systems reviewed and are negative.    Medical History Reviewed by provider this encounter:  Tobacco  Allergies  Meds  Problems  Med Hx  Surg Hx  Fam Hx       Past Medical History   Past Medical History:   Diagnosis Date    Cancer (HCC) 04,25,2024    Diabetes mellitus (HCC)     High cholesterol     History of blood clots     Hypertension     Leucocytosis 04/20/2024    Lung cancer (HCC)     Malignant neoplasm of overlapping sites of right lung (HCC) 06/04/2024    Pneumonia     Pulmonary embolism (HCC)      Past Surgical History:   Procedure Laterality Date    IR BIOPSY LYMPH NODE  4/23/2024    IR PORT PLACEMENT   5/20/2024    KNEE SURGERY      MANDIBLE FRACTURE SURGERY  1985    PATELLA FRACTURE SURGERY      RECTAL SURGERY       Family History   Problem Relation Age of Onset    No Known Problems Father     No Known Problems Mother      Current Outpatient Medications on File Prior to Visit   Medication Sig Dispense Refill    amitriptyline (ELAVIL) 100 mg tablet 200 mg daily at bedtime      apixaban (Eliquis) 5 mg Take 1 tablet (5 mg total) by mouth 2 (two) times a day 60 tablet 5    atorvastatin (LIPITOR) 10 mg tablet Take 1 tablet (10 mg total) by mouth daily 90 tablet 3    benzonatate (TESSALON) 200 MG capsule Take 1 capsule (200 mg total) by mouth 3 (three) times a day as needed for cough 20 capsule 0    famotidine (PEPCID) 40 MG tablet Take 40 mg by mouth daily as needed for heartburn or indigestion Taking as needed      folic acid (KP Folic Acid) 1 mg tablet Take 1 tablet (1 mg total) by mouth daily 90 tablet 0    glimepiride (AMARYL) 2 mg tablet Take 1 tablet (2 mg total) by mouth daily with breakfast 90 tablet 1    Lancets (OneTouch Delica Plus [id number]) MISC Use 1 Units 4 (four) times a day 400 each 2    levofloxacin (LEVAQUIN) 750 mg tablet Take 1 tablet (750 mg total) by mouth every 24 hours for 7 days 7 tablet 0    levothyroxine (Synthroid) 25 mcg tablet Take 1 tablet (25 mcg total) by mouth daily 90 tablet 2    losartan (COZAAR) 25 mg tablet Take 25 mg by mouth daily      metFORMIN (GLUCOPHAGE) 1000 MG tablet Take 1,000 mg by mouth 2 (two) times a day      ondansetron (ZOFRAN-ODT) 8 mg disintegrating tablet Take 1 tablet (8 mg total) by mouth every 8 (eight) hours as needed for vomiting or nausea 30 tablet 0    OneTouch Verio test strip Use 1 each 4 (four) times a day 400 each 2    tiotropium-olodaterol (Stiolto Respimat) 2.5-2.5 MCG/ACT inhaler Inhale 2 puffs daily 4 g 3    Glucagon, rDNA, (Glucagon Emergency) 1 MG KIT Inject 1 mg as directed once as needed (hypoglycemia) for up to 1 dose (Patient not taking:  Reported on 7/3/2024) 2 kit 0    metFORMIN (GLUCOPHAGE) 500 mg tablet Take 1,000 mg by mouth 2 (two) times a day with meals (Patient not taking: Reported on 10/9/2024)       No current facility-administered medications on file prior to visit.   No Known Allergies   Current Outpatient Medications on File Prior to Visit   Medication Sig Dispense Refill    amitriptyline (ELAVIL) 100 mg tablet 200 mg daily at bedtime      apixaban (Eliquis) 5 mg Take 1 tablet (5 mg total) by mouth 2 (two) times a day 60 tablet 5    atorvastatin (LIPITOR) 10 mg tablet Take 1 tablet (10 mg total) by mouth daily 90 tablet 3    benzonatate (TESSALON) 200 MG capsule Take 1 capsule (200 mg total) by mouth 3 (three) times a day as needed for cough 20 capsule 0    famotidine (PEPCID) 40 MG tablet Take 40 mg by mouth daily as needed for heartburn or indigestion Taking as needed      folic acid (KP Folic Acid) 1 mg tablet Take 1 tablet (1 mg total) by mouth daily 90 tablet 0    glimepiride (AMARYL) 2 mg tablet Take 1 tablet (2 mg total) by mouth daily with breakfast 90 tablet 1    Lancets (OneTouch Delica Plus [id number]) MISC Use 1 Units 4 (four) times a day 400 each 2    levofloxacin (LEVAQUIN) 750 mg tablet Take 1 tablet (750 mg total) by mouth every 24 hours for 7 days 7 tablet 0    levothyroxine (Synthroid) 25 mcg tablet Take 1 tablet (25 mcg total) by mouth daily 90 tablet 2    losartan (COZAAR) 25 mg tablet Take 25 mg by mouth daily      metFORMIN (GLUCOPHAGE) 1000 MG tablet Take 1,000 mg by mouth 2 (two) times a day      ondansetron (ZOFRAN-ODT) 8 mg disintegrating tablet Take 1 tablet (8 mg total) by mouth every 8 (eight) hours as needed for vomiting or nausea 30 tablet 0    OneTouch Verio test strip Use 1 each 4 (four) times a day 400 each 2    tiotropium-olodaterol (Stiolto Respimat) 2.5-2.5 MCG/ACT inhaler Inhale 2 puffs daily 4 g 3    Glucagon, rDNA, (Glucagon Emergency) 1 MG KIT Inject 1 mg as directed once as needed (hypoglycemia)  "for up to 1 dose (Patient not taking: Reported on 7/3/2024) 2 kit 0    metFORMIN (GLUCOPHAGE) 500 mg tablet Take 1,000 mg by mouth 2 (two) times a day with meals (Patient not taking: Reported on 10/9/2024)       No current facility-administered medications on file prior to visit.      Social History     Tobacco Use    Smoking status: Former     Current packs/day: 0.00     Average packs/day: 1.5 packs/day for 35.0 years (52.5 ttl pk-yrs)     Types: Cigarettes     Quit date: 4/15/2024     Years since quittin.4     Passive exposure: Past    Smokeless tobacco: Never   Vaping Use    Vaping status: Never Used   Substance and Sexual Activity    Alcohol use: Not Currently    Drug use: Never    Sexual activity: Yes     Partners: Female     Birth control/protection: Post-menopausal, None             Objective     /68 (BP Location: Left arm, Patient Position: Sitting, Cuff Size: Standard)   Pulse 102   Temp 97.9 °F (36.6 °C) (Temporal)   Ht 5' 7\" (1.702 m)   Wt 99.8 kg (220 lb)   SpO2 93%   BMI 34.46 kg/m²     Physical Exam  Vitals reviewed.   Constitutional:       General: He is not in acute distress.     Appearance: He is not ill-appearing, toxic-appearing or diaphoretic.   HENT:      Head: Normocephalic and atraumatic.      Nose: Nose normal.      Mouth/Throat:      Mouth: Mucous membranes are moist.   Eyes:      General:         Right eye: No discharge.         Left eye: No discharge.   Cardiovascular:      Rate and Rhythm: Normal rate.   Pulmonary:      Effort: Pulmonary effort is normal. No respiratory distress.      Breath sounds: No stridor. No wheezing.   Abdominal:      General: Abdomen is flat. There is no distension.   Musculoskeletal:         General: No swelling.   Skin:     General: Skin is warm and dry.      Coloration: Skin is not jaundiced or pale.   Neurological:      General: No focal deficit present.      Mental Status: He is alert. Mental status is at baseline.   Psychiatric:         Mood " "and Affect: Mood normal.         Behavior: Behavior normal.         Thought Content: Thought content normal.         Judgment: Judgment normal.         Recent labs:  Lab Results   Component Value Date/Time    SODIUM 138 09/20/2024 03:10 PM    K 4.1 09/20/2024 03:10 PM    BUN 15 09/20/2024 03:10 PM    CREATININE 0.78 09/20/2024 03:10 PM    GLUC 81 09/20/2024 03:10 PM    CALCIUM 10.6 (H) 09/20/2024 03:10 PM    AST 21 09/20/2024 03:10 PM    ALT 35 09/20/2024 03:10 PM    ALB 4.4 09/20/2024 03:10 PM    TP 7.2 09/20/2024 03:10 PM    EGFR 101 09/20/2024 03:10 PM     Lab Results   Component Value Date/Time    HGB 11.0 (L) 09/20/2024 03:10 PM    WBC 4.21 (L) 09/20/2024 03:10 PM     09/20/2024 03:10 PM    INR 1.13 04/19/2024 08:39 PM    PTT 46 (H) 04/23/2024 03:56 AM     Lab Results   Component Value Date/Time    WXD9JDWVLPTE 1.260 09/20/2024 03:10 PM       Recent Imaging:  Procedure: CTA chest pe study    Result Date: 10/4/2024  Narrative: CTA - CHEST WITH IV CONTRAST - PULMONARY ANGIOGRAM INDICATION:   C34.81: Malignant neoplasm of overlapping sites of right bronchus and lung I82.412: Acute embolism and thrombosis of left femoral vein I26.09: Other pulmonary embolism with acute cor pulmonale R06.09: Other forms of dyspnea. \"Acute shortness of breath with exertion.\" Per my review of the medical record, metastatic adenocarcinoma of the right lung diagnosed by left retroperitoneal lymph node biopsy on 4/23/2024. Treated with chemotherapy and radiation to C1. COMPARISON: Chest CT 8/2/2024 and 4/19/2024, CXR 4/22/2024. TECHNIQUE: CT angiogram timed for optimal opacification of the pulmonary arteries.  Axial, sagittal, and coronal 2D reformats created from source data.  Coronal 3D MIP postprocessing on the acquisition scanner. Radiation dose length product (DLP):  378.66 mGy-cm .  Radiation dose exposure minimized using iterative reconstruction and automated exposure control. IV Contrast:  50 mL of iohexol (OMNIPAQUE) " FINDINGS: PULMONARY ARTERIES: Tiny barely conspicuous subsegmental pulmonary embolus in the right lower lobe (301/119-127. No additional acute pulmonary emboli. LUNGS: Stable 4.5 cm tumor in the right middle lobe abutting the minor fissure, possibly extending to the right upper lobe. Mild progression of thickening of the bronchovascular bundles and diffuse septal thickening in the right lung due to lymphangitic spread of tumor. Patchy new consolidation in the right lower lobe. Mild emphysema. AIRWAYS: No significant filling defects. PLEURA:  Unremarkable. HEART/GREAT VESSELS: Normal heart size. RV/LV diameter ratio less than 1 with nothing to indicate right heart strain. Moderate coronary artery calcification indicating atherosclerotic heart disease. MEDIASTINUM AND JEREMIAH: Right port in right atrium. Stable prevascular node at the upper limit of normal in size at 9 mm (301/49). CHEST WALL AND LOWER NECK: Unremarkable. UPPER ABDOMEN: Benign calcified hepatic granuloma OSSEOUS STRUCTURES:  Normal for age.     Impression: Tiny subsegmental embolus in the right lower lobe. Given its small size, it is of doubtful clinical significance. Patchy new consolidation in the right lower lobe, infectious or inflammatory. Stable tumor in the right midlung with progression of lymphangitic spread throughout the right lung. I notified Dr. DAREN ELLISON on 10/4/2024 12:58 PM by [employer] secure chat and he responded immediately Workstation performed: [id number]     Procedure: MRI Brain BT w wo Contrast    Result Date: 9/13/2024  Narrative: MRI BRAIN WITH AND WITHOUT CONTRAST INDICATION: C79.31: Secondary malignant neoplasm of brain.   Additional history from [facility]: Brain metastasis s/p SRS. monitoring., Metastasis to brain (HCC) COMPARISON: MRI brain 6/20/2024 TECHNIQUE: Multiplanar, multisequence imaging of the brain and sella was performed before and after gadolinium administration. IV Contrast:  10 mL of Gadobutrol injection  (SINGLE-DOSE) IMAGE QUALITY:   Diagnostic. FINDINGS: High right frontal lobe/precentral gyrus lesion, decreased in size, currently measuring 0.3 cm (previous 0.5 cm) (series 13: 22). There is no new pathologic area of intra-axial enhancement. Perfusion: No evidence of abnormal hyperperfusion Diffusion: No diffusion abnormality to suggest hypercellularity. OTHER FINDINGS: Chronic right caudate head lacunar infarct. Mild chronic microangiopathic ischemic changes. VENTRICLES:  Normal for the patient's age. SELLA AND PITUITARY GLAND:  Normal. ORBITS:  Normal. PARANASAL SINUSES:  Normal. VASCULATURE:  Evaluation of the major intracranial vasculature demonstrates appropriate flow voids. CALVARIUM AND SKULL BASE:  Normal. EXTRACRANIAL SOFT TISSUES:  Normal.     Impression: Previous high right frontal lobe/precentral gyrus lesion, decreased in size status post SRS compared to 6/20/2024. There is no new pathologic area of intra-axial enhancement. Mild chronic microangiopathic ischemic changes. Workstation performed: [id number]     Procedure: CT chest abdomen pelvis w contrast    Result Date: 8/5/2024  Narrative: CT CHEST, ABDOMEN AND PELVIS WITH IV CONTRAST INDICATION:   Malignant neoplasm of overlapping sites of right bronchus and lung. . COMPARISON: CT abdomen and pelvis dated 4/20/2024. CT chest dated 4/19/2024. TECHNIQUE: CT examination of the chest, abdomen and pelvis was performed. Multiplanar 2D reformatted images were created from the source data. This examination, like all CT scans performed in the [facility], was performed utilizing techniques to minimize radiation dose exposure, including the use of iterative reconstruction and automated exposure control. Radiation dose length product (DLP) for this visit: IV Contrast: Enteric Contrast: Enteric contrast was not administered. FINDINGS: CHEST LUNGS: Significant interval decrease in size of right upper lobe mass with lymphangitic carcinomatosis and  extension into the right middle lobe with residual wedge-shaped airspace opacity now measuring 4.6 x 2.6 cm image 118 series 4. Mild residual interlobular septal wall thickening is noted in the right lower lobe. There is no tracheal or endobronchial lesion. PLEURA:  Unremarkable. HEART/GREAT VESSELS: Heavy atherosclerotic coronary artery calcification is noted.  Heart is otherwise unremarkable.  No thoracic aortic aneurysm. MEDIASTINUM AND JEREMIAH: Interval decrease in size of mediastinal and hilar lymph nodes. Left hilar lymph node measures 1.3 x 1.1 cm compared to 2.3 x 2.8 cm previously. Subcarinal lymph node measures 1.7 x 1 cm compared to 4.3 x 2.5 cm previously. 2.. CHEST WALL AND LOWER NECK:  Unremarkable. Right anterior chest wall Port-A-Cath ABDOMEN LIVER/BILIARY TREE: Hepatomegaly measuring 20 cm with mild steatosis. No suspicious hepatic lesions. GALLBLADDER:  No calcified gallstones. No pericholecystic inflammatory change. SPLEEN:  Unremarkable. PANCREAS:  Unremarkable. ADRENAL GLANDS: Interval decrease in size of left adrenal lesion measuring 1.2 x 1.2 cm compared to 2.1 x 1.8 cm previously image 109 series 2. Normal size right adrenal gland. KIDNEYS/URETERS: 1.5 cm nonobstructing left renal stone.. No hydronephrosis. STOMACH AND BOWEL:  There is colonic diverticulosis without evidence of acute diverticulitis. APPENDIX:  No findings to suggest appendicitis. ABDOMINOPELVIC CAVITY:  No ascites.  No pneumoperitoneum.  No lymphadenopathy. VESSELS:  Atherosclerotic changes are present.  No evidence of aneurysm. PELVIS REPRODUCTIVE ORGANS:  The prostate is enlarged. URINARY BLADDER: Mild bladder wall thickening. ABDOMINAL WALL/INGUINAL REGIONS:  There is a small fat-containing umbilical hernia. Small fat-containing inguinal hernias OSSEOUS STRUCTURES:  No acute fracture or destructive osseous lesion.  Spinal degenerative changes are noted.     Impression: 1. Treatment response. Interval decrease in size of  mediastinal and hilar lymphadenopathy as well as interval decrease in size of infiltrating mass in the right upper lobe and involving portions of the right middle lobe with lymphangitic carcinomatosis. Interval decrease in size of metastatic lesion to the left adrenal gland. No new areas of thoracic or intra-abdominal lymphadenopathy. No osseous metastatic disease. 2. Nonobstructing left renal stone measuring 1.5 cm 3. Hepatomegaly with steatosis. No suspicious hepatic lesions. Electronically signed: 08/05/2024 10:38 PM Fer Lawrence MD    Procedure: MRI Brain BT w wo Contrast    Result Date: 6/21/2024  Narrative: MRI BRAIN WITH AND WITHOUT CONTRAST INDICATION: C79.31: Secondary malignant neoplasm of brain. COMPARISON: 5/4/2024 TECHNIQUE: Multiplanar, multisequence imaging of the brain was performed before and after gadolinium administration. Perfusion imaging was performed. IV Contrast:  10 mL of Gadobutrol injection (SINGLE-DOSE) IMAGE QUALITY:   Diagnostic. FINDINGS: BRAIN PARENCHYMA: There is redemonstration of a 5 x 4 mm nodular metastatic lesion within the high right frontal lobe/precentral gyrus on series 13, image 127. This is mildly increased in size since the prior examination where it measured 4 mm and only appreciated on T1 postcontrast imaging. This is not clearly seen on prior Bravo imaging. There are no new areas of nodular parenchymal or leptomeningeal enhancement identified. There is mild chronic microvascular ischemic change. There is a chronic right caudate head lacunar infarct. Note is made of new subacute foci of lacunar ischemia within the left parieto-occipital region on series 302, image 17 and 19 without clear corresponding low ADC signal. There is corresponding FLAIR signal hyperintensity in these regions. There is been evolution of the previously seen punctate left frontal lobe focus of ischemia without nodular enhancement in this region. VENTRICLES:  Normal for the patient's age. SELLA  AND PITUITARY GLAND:  Normal. ORBITS:  Normal. PARANASAL SINUSES:  Normal. VASCULATURE:  Evaluation of the major intracranial vasculature demonstrates appropriate flow voids. CALVARIUM AND SKULL BASE:  Normal. EXTRACRANIAL SOFT TISSUES:  Normal.     Impression: Mild increase in size of the high posterior right frontal lobe/precentral gyrus metastatic lesion. No definite new areas of metastatic disease identified. Punctate foci of subacute lacunar ischemia noted within the left parieto-occipital region, new since the prior examination. Punctate focus of left frontal lobe lacunar ischemia has resolved. Chronic cystic lacunar infarct within the right caudate head and scattered chronic microvascular ischemic change. Workstation performed: [id number]      Administrative Statements   I have spent a total time of 26 minutes in caring for this patient on the day of the visit/encounter including Risks and benefits of tx options, Instructions for management, Patient and family education, Importance of tx compliance, Risk factor reductions, Impressions, Counseling / Coordination of care, Documenting in the medical record, Reviewing / ordering tests, medicine, procedures  , and Obtaining or reviewing history  . Topics discussed with the patient / family include symptom assessment and management, medication review, psychosocial support, supportive listening, and anticipatory guidance.

## 2024-10-10 PROBLEM — K21.9 ACID REFLUX: Status: ACTIVE | Noted: 2024-10-10

## 2024-10-11 ENCOUNTER — HOSPITAL ENCOUNTER (OUTPATIENT)
Dept: INFUSION CENTER | Facility: CLINIC | Age: 54
End: 2024-10-11
Payer: COMMERCIAL

## 2024-10-11 DIAGNOSIS — C34.81 ADENOCARCINOMA OF OVERLAPPING SITES OF RIGHT LUNG (HCC): ICD-10-CM

## 2024-10-11 LAB
ALBUMIN SERPL BCG-MCNC: 4.3 G/DL (ref 3.5–5)
ALP SERPL-CCNC: 79 U/L (ref 34–104)
ALT SERPL W P-5'-P-CCNC: 50 U/L (ref 7–52)
ANION GAP SERPL CALCULATED.3IONS-SCNC: 9 MMOL/L (ref 4–13)
AST SERPL W P-5'-P-CCNC: 27 U/L (ref 13–39)
BASOPHILS # BLD AUTO: 0.01 THOUSANDS/ΜL (ref 0–0.1)
BASOPHILS NFR BLD AUTO: 0 % (ref 0–1)
BILIRUB SERPL-MCNC: 0.35 MG/DL (ref 0.2–1)
BUN SERPL-MCNC: 18 MG/DL (ref 5–25)
CALCIUM SERPL-MCNC: 10.1 MG/DL (ref 8.4–10.2)
CHLORIDE SERPL-SCNC: 104 MMOL/L (ref 96–108)
CO2 SERPL-SCNC: 25 MMOL/L (ref 21–32)
CREAT SERPL-MCNC: 0.73 MG/DL (ref 0.6–1.3)
EOSINOPHIL # BLD AUTO: 0.03 THOUSAND/ΜL (ref 0–0.61)
EOSINOPHIL NFR BLD AUTO: 1 % (ref 0–6)
ERYTHROCYTE [DISTWIDTH] IN BLOOD BY AUTOMATED COUNT: 19 % (ref 11.6–15.1)
GFR SERPL CREATININE-BSD FRML MDRD: 104 ML/MIN/1.73SQ M
GLUCOSE SERPL-MCNC: 103 MG/DL (ref 65–140)
HCT VFR BLD AUTO: 35.5 % (ref 36.5–49.3)
HGB BLD-MCNC: 11.3 G/DL (ref 12–17)
IMM GRANULOCYTES # BLD AUTO: 0.02 THOUSAND/UL (ref 0–0.2)
IMM GRANULOCYTES NFR BLD AUTO: 0 % (ref 0–2)
LYMPHOCYTES # BLD AUTO: 1.39 THOUSANDS/ΜL (ref 0.6–4.47)
LYMPHOCYTES NFR BLD AUTO: 24 % (ref 14–44)
MCH RBC QN AUTO: 27.3 PG (ref 26.8–34.3)
MCHC RBC AUTO-ENTMCNC: 31.8 G/DL (ref 31.4–37.4)
MCV RBC AUTO: 86 FL (ref 82–98)
MONOCYTES # BLD AUTO: 0.67 THOUSAND/ΜL (ref 0.17–1.22)
MONOCYTES NFR BLD AUTO: 12 % (ref 4–12)
NEUTROPHILS # BLD AUTO: 3.6 THOUSANDS/ΜL (ref 1.85–7.62)
NEUTS SEG NFR BLD AUTO: 63 % (ref 43–75)
NRBC BLD AUTO-RTO: 0 /100 WBCS
PLATELET # BLD AUTO: 267 THOUSANDS/UL (ref 149–390)
PMV BLD AUTO: 9.4 FL (ref 8.9–12.7)
POTASSIUM SERPL-SCNC: 4 MMOL/L (ref 3.5–5.3)
PROT SERPL-MCNC: 7.1 G/DL (ref 6.4–8.4)
RBC # BLD AUTO: 4.14 MILLION/UL (ref 3.88–5.62)
SODIUM SERPL-SCNC: 138 MMOL/L (ref 135–147)
TSH SERPL DL<=0.05 MIU/L-ACNC: 1.29 UIU/ML (ref 0.45–4.5)
WBC # BLD AUTO: 5.72 THOUSAND/UL (ref 4.31–10.16)

## 2024-10-11 PROCEDURE — 80053 COMPREHEN METABOLIC PANEL: CPT | Performed by: INTERNAL MEDICINE

## 2024-10-11 PROCEDURE — 84443 ASSAY THYROID STIM HORMONE: CPT | Performed by: INTERNAL MEDICINE

## 2024-10-11 PROCEDURE — 85025 COMPLETE CBC W/AUTO DIFF WBC: CPT | Performed by: INTERNAL MEDICINE

## 2024-10-11 RX ORDER — SODIUM CHLORIDE 9 MG/ML
20 INJECTION, SOLUTION INTRAVENOUS ONCE
Status: CANCELLED | OUTPATIENT
Start: 2024-10-15

## 2024-10-11 RX ORDER — CYANOCOBALAMIN 1000 UG/ML
1000 INJECTION, SOLUTION INTRAMUSCULAR; SUBCUTANEOUS ONCE
Status: CANCELLED | OUTPATIENT
Start: 2024-10-15 | End: 2024-10-15

## 2024-10-11 NOTE — PROGRESS NOTES
Pt here for labs, tolerated well with no complaints at this time. Next appt 10/15 at 1400 at [place]. Declined AVS.

## 2024-10-11 NOTE — PATIENT INSTRUCTIONS
It was a pleasure speaking with you today.    As discussed:  -Continue your medications as prescribed.  -Continue with a bowel regimen to avoid constipation.    Follow-up in: 6 weeks    Please do not hesitate to call me sooner should you have any questions/concerns or needs.    Medication safety issues - Do not drive under the influence of narcotics (including opioids), watch for adverse effects including confusion / altered mental status / respiratory depression (slowed breathing), keep medications stored in a safe/locked environment, do not use alcohol while opioids or other narcotics are in your system. Do not travel with more than the minimum number of tablets or capsules required for the trip.    ER Precautions  Watch for red flag symptoms including, but not limited to fevers, chills, chest pain, shortness of breath, intractable nausea/vomiting/diarrhea, or acute intractable pain (especially if pain is new or has changed).

## 2024-10-11 NOTE — ASSESSMENT & PLAN NOTE
Following Dr. Sandoval for Medical Oncology.  Doing well with Keytruda and Alimta at this time.  Next treatment scheduled for 10/15/2024.

## 2024-10-11 NOTE — ASSESSMENT & PLAN NOTE
Patient has no pain but does have reflux.   Discussed options to try for reflux. Currently taking famotidine at 40mg daily with little benefit. Feels heartburn sensation in the mid-chest area. We discussed other options but PPI like Protonix (per UptoDate) may affect Keytruda efficacy. Therefore, we discussed avoidance of potential foods that could cause his reflux.  Patient states drinking water does calm down his reflux and will continue to do this to help. Also uses tums and rolaid.

## 2024-10-11 NOTE — ASSESSMENT & PLAN NOTE
Psychosocial   Supportive listening provided  Normalized experience of patient/family  Provided anxiety containment     Referrals Placed / Medical Equipment Ordered  None today    Follow-Up Recommendations  -Follow-up with PCP and current medical specialists  -Follow-up with palliative care: 6 weeks

## 2024-10-15 ENCOUNTER — HOSPITAL ENCOUNTER (OUTPATIENT)
Dept: INFUSION CENTER | Facility: CLINIC | Age: 54
Discharge: HOME | End: 2024-10-15
Payer: COMMERCIAL

## 2024-10-15 VITALS
BODY MASS INDEX: 34.06 KG/M2 | TEMPERATURE: 97.1 F | WEIGHT: 217 LBS | RESPIRATION RATE: 16 BRPM | DIASTOLIC BLOOD PRESSURE: 82 MMHG | OXYGEN SATURATION: 94 % | HEIGHT: 67 IN | SYSTOLIC BLOOD PRESSURE: 144 MMHG | HEART RATE: 122 BPM

## 2024-10-15 DIAGNOSIS — C34.81 ADENOCARCINOMA OF OVERLAPPING SITES OF RIGHT LUNG (HCC): Primary | ICD-10-CM

## 2024-10-15 PROCEDURE — 96367 TX/PROPH/DG ADDL SEQ IV INF: CPT

## 2024-10-15 PROCEDURE — 96411 CHEMO IV PUSH ADDL DRUG: CPT

## 2024-10-15 PROCEDURE — 96372 THER/PROPH/DIAG INJ SC/IM: CPT

## 2024-10-15 PROCEDURE — 96413 CHEMO IV INFUSION 1 HR: CPT

## 2024-10-15 RX ORDER — SODIUM CHLORIDE 9 MG/ML
20 INJECTION, SOLUTION INTRAVENOUS ONCE
Status: COMPLETED | OUTPATIENT
Start: 2024-10-15 | End: 2024-10-15

## 2024-10-15 RX ORDER — CYANOCOBALAMIN 1000 UG/ML
1000 INJECTION, SOLUTION INTRAMUSCULAR; SUBCUTANEOUS ONCE
Status: COMPLETED | OUTPATIENT
Start: 2024-10-15 | End: 2024-10-15

## 2024-10-15 RX ADMIN — DEXAMETHASONE SODIUM PHOSPHATE: 10 INJECTION, SOLUTION INTRAMUSCULAR; INTRAVENOUS at 14:13

## 2024-10-15 RX ADMIN — CYANOCOBALAMIN 1000 MCG: 1000 INJECTION INTRAMUSCULAR; SUBCUTANEOUS at 14:52

## 2024-10-15 RX ADMIN — PEMETREXED DISODIUM 1000 MG: 500 INJECTION, POWDER, LYOPHILIZED, FOR SOLUTION INTRAVENOUS at 15:40

## 2024-10-15 RX ADMIN — SODIUM CHLORIDE 20 ML/HR: 0.9 INJECTION, SOLUTION INTRAVENOUS at 14:13

## 2024-10-15 RX ADMIN — SODIUM CHLORIDE 200 MG: 9 INJECTION, SOLUTION INTRAVENOUS at 14:51

## 2024-10-15 NOTE — PROGRESS NOTES
Patient to infusion center for treatment with Keytruda and Alimta. Patient offers no complaints. VSS. Labs from 10/11/2024 reviewed and within parameters to treat. Port accessed without incident with excellent blood return noted.

## 2024-10-15 NOTE — PROGRESS NOTES
Pt tolerated tx well with no complaints at this time. Next appt 11/1 at 1500 at [place]. Declined AVS.

## 2024-10-21 ENCOUNTER — OFFICE VISIT (OUTPATIENT)
Dept: PULMONOLOGY | Facility: CLINIC | Age: 54
End: 2024-10-21
Payer: COMMERCIAL

## 2024-10-21 VITALS
WEIGHT: 213.6 LBS | TEMPERATURE: 97.3 F | DIASTOLIC BLOOD PRESSURE: 80 MMHG | BODY MASS INDEX: 33.53 KG/M2 | HEIGHT: 67 IN | HEART RATE: 133 BPM | SYSTOLIC BLOOD PRESSURE: 130 MMHG | OXYGEN SATURATION: 97 %

## 2024-10-21 DIAGNOSIS — I26.09 ACUTE PULMONARY EMBOLISM WITH ACUTE COR PULMONALE, UNSPECIFIED PULMONARY EMBOLISM TYPE (HCC): ICD-10-CM

## 2024-10-21 DIAGNOSIS — C34.81 ADENOCARCINOMA OF OVERLAPPING SITES OF RIGHT LUNG (HCC): ICD-10-CM

## 2024-10-21 DIAGNOSIS — F17.200 TOBACCO DEPENDENCE: ICD-10-CM

## 2024-10-21 DIAGNOSIS — J98.4 RESTRICTIVE LUNG DISEASE: Primary | ICD-10-CM

## 2024-10-21 DIAGNOSIS — J96.11 CHRONIC HYPOXEMIC RESPIRATORY FAILURE (HCC): ICD-10-CM

## 2024-10-21 PROCEDURE — 95012 NITRIC OXIDE EXP GAS DETER: CPT | Performed by: NURSE PRACTITIONER

## 2024-10-21 PROCEDURE — 99213 OFFICE O/P EST LOW 20 MIN: CPT | Performed by: NURSE PRACTITIONER

## 2024-10-21 RX ORDER — ALBUTEROL SULFATE 90 UG/1
2 INHALANT RESPIRATORY (INHALATION) EVERY 6 HOURS PRN
Qty: 8.5 G | Refills: 2 | Status: SHIPPED | OUTPATIENT
Start: 2024-10-21

## 2024-10-21 NOTE — PROGRESS NOTES
Pulmonary Follow-Up Note   Papo Gibbs 54 y.o. male MRN: [id number]  10/21/2024      Assessment/Plan:    Problem List Items Addressed This Visit       Acute pulmonary embolism with acute cor pulmonale, unspecified pulmonary embolism type (HCC)     Remains on Eliquis  Will likely need lifelong anticoagulation due to cancer diagnosis           Tobacco dependence     35-pack-year history of smoking  Endorses quitting in April  Congratulated on continued cessation         Adenocarcinoma of overlapping sites of right lung (HCC)     Following with Dr. Sandoval  Currently on Keytruda and Alimta, tolerating well  Last treatment 10/15/2024    Recent CT ordered for complaints of right sided tenderness and pain showed  Has follow-up CT scheduled for 10/21/2024         Relevant Medications    albuterol (ProAir HFA) 90 mcg/act inhaler    Chronic hypoxemic respiratory failure (HCC)     Presents to the office on room air.  Current saturation is 95%  Uses 2 L supplemental O2 as needed and at night  Continue to monitor SpO2 at home, maintain saturation greater than 90%         Restrictive lung disease - Primary     Does not appear in exacerbation  Complained of right-sided chest tenderness recent CAT scan ordered by oncology shows tiny subsegmental embolus in the right lower lobe. Given its small size, it is of doubtful clinical significance.  Patchy new consolidation in the right lower lobe, infectious or inflammatory.   Stable tumor in the right midlung with progression of lymphangitic spread throughout the right lung.  Patient treated with 7 days of Levaquin.  Repeat CT scan scheduled for this Friday, 10/25/2024.    Home medication regime Stiolto 2.25-2.252 puffs daily         Relevant Medications    albuterol (ProAir HFA) 90 mcg/act inhaler    Other Relevant Orders    POCT FeNO     Vaccines: Recommend flu vaccine    No follow-ups on file.    All of Jessica's questions were answered prior to leaving the office today. He will  follow-up in 4 months or sooner should the need arise. He is aware to call our office with any further questions or concerns.    History of Present Illness   Reason for Visit: 4 month follow up  Chief Complaint: right sided chest pain  HPI: Papo Gibbs is a 54 y.o. male who presents to the office today for 4-month follow-up.    Review of Systems   Constitutional:  Negative for appetite change and fever.   HENT:  Negative for ear pain, postnasal drip, rhinorrhea, sneezing, sore throat and trouble swallowing.    Respiratory:  Positive for shortness of breath.    Cardiovascular:  Negative for chest pain.   Musculoskeletal:  Negative for myalgias.   Neurological:  Negative for headaches.       Historical Information   Past Medical History:   Diagnosis Date    Cancer (HCC)     Diabetes mellitus (HCC)     High cholesterol     History of blood clots     Hypertension     Leucocytosis 2024    Lung cancer (HCC)     Malignant neoplasm of overlapping sites of right lung (HCC) 2024    Pneumonia     Pulmonary embolism (HCC)      Past Surgical History:   Procedure Laterality Date    IR BIOPSY LYMPH NODE  2024    IR PORT PLACEMENT  2024    KNEE SURGERY      MANDIBLE FRACTURE SURGERY  1985    PATELLA FRACTURE SURGERY      RECTAL SURGERY       Family History   Problem Relation Age of Onset    No Known Problems Father     No Known Problems Mother      Social History   Social History     Substance and Sexual Activity   Alcohol Use Not Currently     Social History     Substance and Sexual Activity   Drug Use Never     Social History     Tobacco Use   Smoking Status Former    Current packs/day: 0.00    Average packs/day: 1.5 packs/day for 35.0 years (52.5 ttl pk-yrs)    Types: Cigarettes    Quit date: 4/15/2024    Years since quittin.5    Passive exposure: Past   Smokeless Tobacco Never     E-Cigarette/Vaping    E-Cigarette Use Never User      E-Cigarette/Vaping Substances    Nicotine No     THC No      CBD No     Flavoring No     Other No     Unknown No        Meds/Allergies     Current Outpatient Medications:     amitriptyline (ELAVIL) 100 mg tablet, 200 mg daily at bedtime, Disp: , Rfl:     apixaban (Eliquis) 5 mg, Take 1 tablet (5 mg total) by mouth 2 (two) times a day, Disp: 60 tablet, Rfl: 5    atorvastatin (LIPITOR) 10 mg tablet, Take 1 tablet (10 mg total) by mouth daily, Disp: 90 tablet, Rfl: 3    benzonatate (TESSALON) 200 MG capsule, Take 1 capsule (200 mg total) by mouth 3 (three) times a day as needed for cough, Disp: 20 capsule, Rfl: 0    famotidine (PEPCID) 40 MG tablet, Take 40 mg by mouth daily as needed for heartburn or indigestion Taking as needed, Disp: , Rfl:     folic acid (KP Folic Acid) 1 mg tablet, Take 1 tablet (1 mg total) by mouth daily, Disp: 90 tablet, Rfl: 0    glimepiride (AMARYL) 2 mg tablet, Take 1 tablet (2 mg total) by mouth daily with breakfast, Disp: 90 tablet, Rfl: 1    Lancets (OneTouch Delica Plus [id number]) MISC, Use 1 Units 4 (four) times a day, Disp: 400 each, Rfl: 2    levothyroxine (Synthroid) 25 mcg tablet, Take 1 tablet (25 mcg total) by mouth daily, Disp: 90 tablet, Rfl: 2    losartan (COZAAR) 25 mg tablet, Take 25 mg by mouth daily, Disp: , Rfl:     metFORMIN (GLUCOPHAGE) 1000 MG tablet, Take 1,000 mg by mouth 2 (two) times a day, Disp: , Rfl:     ondansetron (ZOFRAN-ODT) 8 mg disintegrating tablet, Take 1 tablet (8 mg total) by mouth every 8 (eight) hours as needed for vomiting or nausea, Disp: 30 tablet, Rfl: 0    OneTouch Verio test strip, Use 1 each 4 (four) times a day, Disp: 400 each, Rfl: 2    tiotropium-olodaterol (Stiolto Respimat) 2.5-2.5 MCG/ACT inhaler, Inhale 2 puffs daily, Disp: 4 g, Rfl: 3    Glucagon, rDNA, (Glucagon Emergency) 1 MG KIT, Inject 1 mg as directed once as needed (hypoglycemia) for up to 1 dose (Patient not taking: Reported on 7/3/2024), Disp: 2 kit, Rfl: 0    metFORMIN (GLUCOPHAGE) 500 mg tablet, Take 1,000 mg by mouth 2 (two) times  "a day with meals (Patient not taking: Reported on 10/9/2024), Disp: , Rfl:   No Known Allergies    Vitals: Blood pressure 130/80, pulse (!) 133, temperature (!) 97.3 °F (36.3 °C), temperature source Tympanic, height 5' 7\" (1.702 m), weight 96.9 kg (213 lb 9.6 oz), SpO2 97%. Body mass index is 33.45 kg/m². Oxygen Therapy  SpO2: 97 %    Physical Exam:  Physical Exam  Vitals reviewed.   Constitutional:       General: He is not in acute distress.     Appearance: He is not ill-appearing.   HENT:      Head: Normocephalic and atraumatic.      Right Ear: External ear normal.      Left Ear: External ear normal.      Nose: Nose normal.      Mouth/Throat:      Mouth: Mucous membranes are moist.      Pharynx: Oropharynx is clear.   Eyes:      Extraocular Movements: Extraocular movements intact.      Pupils: Pupils are equal, round, and reactive to light.   Cardiovascular:      Rate and Rhythm: Normal rate and regular rhythm.      Pulses: Normal pulses.   Pulmonary:      Effort: Pulmonary effort is normal.      Breath sounds: Normal breath sounds.   Abdominal:      General: Bowel sounds are normal.   Musculoskeletal:      Cervical back: Normal range of motion and neck supple.   Neurological:      Mental Status: He is alert and oriented to person, place, and time.   Psychiatric:         Mood and Affect: Mood normal.         Behavior: Behavior normal.         Thought Content: Thought content normal.         Judgment: Judgment normal.         Imaging and other studies: Results Review Statement: I reviewed radiology reports from this admission including: CT chest.   CTA chest PE study 10/4/2024 Tiny subsegmental embolus in the right lower lobe. Given its small size, it is of doubtful clinical significance.   Patchy new consolidation in the right lower lobe, infectious or inflammatory.   Stable tumor in the right midlung with progression of lymphangitic spread throughout the right lung    Pulmonary Results (PFTs, PSG): Results Review " "Statement: I reviewed radiology reports from this admission including: PFT.   Office spirometry completed 05/07/2024 shows restrictive lung disease with FEV1 of 41%    DIANNE Marroquin  [facility]    Portions of the record may have been created with voice recognition software.  Occasional wrong word or \"sound a like\" substitutions may have occurred due to the inherent limitations of voice recognition software.  Read the chart carefully and recognize, using context, where substitutions have occurred or contact the dictating provider.  Answers submitted by the patient for this visit:  Pulmonology Questionnaire (Submitted on 10/14/2024)  Chief Complaint: Primary symptoms  Chronicity: new  When did you first notice your symptoms?: 1 to 4 weeks ago  How often do your symptoms occur?: intermittently  Since you first noticed this problem, how has it changed?: gradually improving  Do you have shortness of breath that occurs with effort or exertion?: Yes  Do you have ear congestion?: No  Do you have heartburn?: Yes  Do you have fatigue?: No  Do you have nasal congestion?: No  Do you have shortness of breath when lying flat?: No  Do you have shortness of breath when you wake up?: No  Do you have sweats?: No  Have you experienced weight loss?: No  Which of the following makes your symptoms worse?: nothing  Which of the following makes your symptoms better?: nothing    "

## 2024-10-21 NOTE — ASSESSMENT & PLAN NOTE
Presents to the office on room air.  Current saturation is 95%  Uses 2 L supplemental O2 as needed and at night  Continue to monitor SpO2 at home, maintain saturation greater than 90%

## 2024-10-21 NOTE — ASSESSMENT & PLAN NOTE
Following with Dr. Sandoval  Currently on Keytruda and Alimta, tolerating well  Last treatment 10/15/2024    Recent CT ordered for complaints of right sided tenderness and pain showed  Has follow-up CT scheduled for 10/21/2024

## 2024-10-21 NOTE — ASSESSMENT & PLAN NOTE
Does not appear in exacerbation  Complained of right-sided chest tenderness recent CAT scan ordered by oncology shows tiny subsegmental embolus in the right lower lobe. Given its small size, it is of doubtful clinical significance.  Patchy new consolidation in the right lower lobe, infectious or inflammatory.   Stable tumor in the right midlung with progression of lymphangitic spread throughout the right lung.  Patient treated with 7 days of Levaquin.  Repeat CT scan scheduled for this Friday, 10/25/2024.    Home medication regime Stiolto 2.25-2.252 puffs daily

## 2024-10-21 NOTE — PATIENT INSTRUCTIONS
Continue stiolto and albuterol as prescribed  Continue O2 as needed.   Monitor O2 at home  Follow up in 4 months

## 2024-10-25 ENCOUNTER — PATIENT OUTREACH (OUTPATIENT)
Dept: HEMATOLOGY ONCOLOGY | Facility: CLINIC | Age: 54
End: 2024-10-25

## 2024-10-25 ENCOUNTER — HOSPITAL ENCOUNTER (OUTPATIENT)
Dept: CT IMAGING | Facility: HOSPITAL | Age: 54
Discharge: HOME | End: 2024-10-25
Attending: INTERNAL MEDICINE
Payer: COMMERCIAL

## 2024-10-25 DIAGNOSIS — C34.91 PRIMARY MALIGNANT NEOPLASM OF RIGHT LUNG METASTATIC TO OTHER SITE (HCC): ICD-10-CM

## 2024-10-25 PROCEDURE — 74177 CT ABD & PELVIS W/CONTRAST: CPT

## 2024-10-25 PROCEDURE — 71260 CT THORAX DX C+: CPT

## 2024-10-25 RX ADMIN — IOHEXOL 100 ML: 350 INJECTION, SOLUTION INTRAVENOUS at 12:55

## 2024-10-25 NOTE — PROGRESS NOTES
ASSESSMENT      Are you having any side effects from your treatment?  -no     Are you eating and drinking properly?  -yes     Are you having any pain?  -no     Have needs changed for a palliative care referral?  -  Pt established already     Do you know when your upcoming appointments are?  -yes     Do you have a good support system?  -yes     Are you interested in any support groups?  - declined     Are there any changes in barriers to care?  -yes     Do you have any questions or concerns regarding your treatment plan?  -Not at this time. Jessica was very appreciative of my call.

## 2024-10-25 NOTE — PROGRESS NOTES
Hematology/Oncology Outpatient Office Note    Date of Service: 2024    [facility]ATOLOG[facility] [place]  [address]  [place] [address]  [phone]    Reason for Consultation:   Chief Complaint   Patient presents with    Follow-up       Cancer Stage at diagnosis: IV    Referral Physician: No ref. provider found    Primary Care Physician:  Amelie Bacon MD     Nickname: Jessica    Spouse: Mabel  (RN at [employer] in Cardiology)    Original ECO    Today's ECO    Goals and Barriers:  Current Goal: Minimize effects of disease burden, extend life.   Barriers to accomplishing this: SOB    Patient's Capacity to Self Care:  Patient is able to self care    ASSESSMENT & PLAN      Diagnosis ICD-10-CM Associated Orders   1. Adenocarcinoma of overlapping sites of right lung (HCC)  C34.81       2. Chemotherapy-induced nausea  R11.0     T45.1X5A             This is a 54 y.o. c PMHx notable for DM, HLP, blood clots, HTN, remote nicotine abuse, being seen in consultation for metastatic lung adeno       Discussion of decision making  Oncology history updated, accordingly, during this visit  Goals of care/patient communication  I discussed with the patient the clinical course leading up to their cancer diagnosis. I reviewed relevant office notes, imaging reports and pathology result as well.  I told the patient that this is a case of incurable disease and what this means. We discussed that the goal of anti-cancer therapy is to provide best quality of life, extend overall survival, and progression free survival as shown in clinical trials. We also discussed that there might be a point when the cancer will no longer respond to this anti-neoplastic therapy. As a result, we also discussed the role of the palliative care team being introduced early in the treatment course. We will be making this referral  I explained the risks/benefits of the proposed cancer therapy:  Carbo-Alimta-Keytruda and after discussion including understanding risks of possible life-threatening complications and therapy-related malignancy development, informed consent for blood products and treatment has been signed and obtained.  4/24/2024 CARIS NGS: TMB high 10 muts/Mb, TP53, KEAP1 mutated, STK11 mutated  TNM/Staging At Diagnosis  Cancer Staging   Adenocarcinoma of overlapping sites of right lung (HCC)  Staging form: Lung, AJCC 8th Edition  - Clinical: Stage IV (cN3, cM1) - Signed by Maryana Sandoval MD on 5/2/2024  Disease Features/Tumor Markers/Genetics  Tumor Marker: n/a  Notable Path Features: 4/23/2024 Lymph Node, Left retroperitoneal, Biopsy: Metastatic adenocarcinoma of lung primary  Treatment: D/C Carbo-Alimta-Keytruda -after disease progression now on Ramucirumab + Docetaxel  Other Supportive care: Zofran, EMLA  Treatment Team Members  Surgeon  Rad Onc  Palliative: Dr. Estrella  Labs  Diagnostics  4/19/2024 CT Chest PE: Acute moderate clot burden multifocal bilateral peripheral pulmonary embolism, mild R heart strain. Findings suggestive of multifocal pneumonia. Pathologically enlarged mediastinal, hilar, para-aortic, retrocrural, and upper abdominal lymph nodes. Nonspecific 1.9 cm left adrenal nodule  4/20/2024 CT Abd/pelv w/wo c: There is a 2.1 x 1.8 x 2.6 cm left adrenal nodule. Abdominal and thoracic lymphadenopathy  5/4/2024 MRI Brain w/wo c: 4 mm metastasis in the posterior right frontal lobe without significant edema. No other definite enhancing lesions but evaluation is limited due to motion artifact.  Tiny focus of restricted diffusion in the left frontal lobe without definite enhancement could represent acute/early subacute infarct  5/8/2024: Neuro working group recommended close MRI Brain surveillance for the 2 small brain mets to see how it responds to treatment, Dr. Paulo Alcala will trend it  8/2/2024 CT CAP w/c: IL!  Interval decrease in size of mediastinal and hilar lymph nodes. Left  hilar lymph node measures 1.3 x 1.1 cm compared to 2.3 x 2.8 cm previously. Subcarinal lymph node measures 1.7 x 1 cm compared to 4.3 x 2.5 cm  Significant interval decrease in size of right upper lobe mass with lymphangitic carcinomatosis and extension into the right middle lobe with residual wedge-shaped airspace opacity now measuring 4.6 x 2.6 cm   Interval decrease in size of left adrenal lesion measuring 1.2 x 1.2 cm compared to 2.1 x 1.8 cm previously  10/25/2024 CT CAP w/c: Worsening lymphangitic tumor progression compared to prior. New/increased small right pleural effusion. Resolving nodular thickening medial limb left adrenal gland. No new metastatic disease in the abdomen or pelvis.     Discussion of decision making    I personally reviewed the following lab results, the image studies, pathology, other specialty/physicians consult notes and recommendations, and outside medical records. I had a lengthy discussion with the patient and shared the work-up findings. We discussed the diagnosis and management plan as below. I spent 42 minutes reviewing the records (labs, clinician notes, outside records, medical history, ordering medicine/tests/procedures, monitoring of anti-neoplastic toxicities, interpreting the imaging/labs previously done) and coordination of care as well as direct time with the patient today, of which greater than 50% of the time was spent in counseling and coordination of care with the patient/family.      Plan/Labs  Restaging CT CAP w/c due 1/25/2024   MRI Brain restaging as per Rad Onc  Rad Onc f/u for surveillance of 2 small brain mets  F/u palliative care   Consider Pepcid or Gas-x for dyspepsia; pt will trial  Most recent imaging [10/25/2024] indicates disease progression.  Discontinue Alimta-Keytruda and consider starting patient on ramucirumab plus docetaxel.  Tumor board discussion requested to discuss best next course of action        Follow Up: 3 weeks    All questions were  answered to the patient's satisfaction during this encounter. The patient knows the contact information for our office and knows to reach out for any relevant concerns related to this encounter. They are to call for any temperature 100.4 or higher, new symptoms including but not restricted to shaking chills, decreased appetite, nausea, vomiting, diarrhea, increased fatigue, shortness of breath or chest pain, confusion, and not feeling the strength to come to the clinic. For all other listed problems and medical diagnosis in their chart - they are managed by PCP and/or other specialists, which the patient acknowledges. Thank you very much for your consultation and making us a part of this patient's care. We are continuing to follow closely with you. Please do not hesitate to reach out to me with any additional questions or concerns.    Maryana Sandoval MD  Hematology & Medical Oncology Staff Physician             Disclaimer: This document was prepared using [employer] technology. If a word or phrase is confusing, or does not make sense, this is likely due to recognition error which was not discovered during this clinician's review. If you believe an error has occurred, please contact me through [employer] service line for juliana?cation.      ONCOLOGY HISTORY OF PRESENT ILLNESS        Oncology History   Adenocarcinoma of overlapping sites of right lung (HCC)   4/23/2024 Biopsy    Final Diagnosis   A. Lymph Node, Left retroperitoneal, Biopsy:  - Metastatic adenocarcinoma of lung primary.         5/2/2024 Initial Diagnosis    Adenocarcinoma of overlapping sites of right lung (HCC)     5/2/2024 -  Cancer Staged    Staging form: Lung, AJCC 8th Edition  - Clinical: Stage IV (cN3, cM1) - Signed by Maryana Sandoval MD on 5/2/2024 5/21/2024 -  Chemotherapy    cyanocobalamin, 1,000 mcg, Intramuscular, Once, 4 of 5 cycles  Administration: 1,000 mcg (5/14/2024), 1,000 mcg (7/2/2024), 1,000 mcg (9/3/2024), 1,000  mcg (10/15/2024)  alteplase (CATHFLO), 2 mg, Intracatheter, Every 1 Minute as needed, 8 of 10 cycles  fosaprepitant (EMEND) IVPB, 150 mg, Intravenous, Once, 6 of 6 cycles  Administration: 150 mg (5/21/2024), 150 mg (7/2/2024), 150 mg (7/23/2024), 150 mg (9/3/2024), 150 mg (6/11/2024), 150 mg (8/13/2024)  CARBOplatin (PARAPLATIN) IVPB (GO AUC DOSING), 672 mg, Intravenous, Once, 6 of 6 cycles  Administration: 672 mg (5/21/2024), 692.5 mg (7/2/2024), 659 mg (7/23/2024), 641 mg (9/3/2024), 692.5 mg (6/11/2024), 647 mg (8/13/2024)  pemetrexed (ALIMTA) chemo infusion, 1,020 mg, Intravenous, Once, 8 of 10 cycles  Administration: 1,000 mg (5/21/2024), 1,000 mg (7/2/2024), 1,000 mg (7/23/2024), 1,000 mg (9/3/2024), 1,000 mg (6/11/2024), 1,000 mg (8/13/2024), 1,000 mg (9/24/2024), 1,000 mg (10/15/2024)  pembrolizumab (KEYTRUDA) IVPB, 200 mg, Intravenous, Once, 8 of 10 cycles  Administration: 200 mg (5/21/2024), 200 mg (7/2/2024), 200 mg (7/23/2024), 200 mg (9/3/2024), 200 mg (6/11/2024), 200 mg (8/13/2024), 200 mg (9/24/2024), 200 mg (10/15/2024)     7/3/2024 - 7/3/2024 Radiation    Treatments:  Course: C1 SRS    Plan ID Energy Fractions Dose per Fraction (cGy) Dose Correction (cGy) Total Dose Delivered (cGy) Elapsed Days   [id number] 1 / 1 2,000 0 2,000 0      Treatment Dates:  7/3/2024 - 7/3/2024.      Primary malignant neoplasm of right lung metastatic to other site (HCC)   6/4/2024 Initial Diagnosis    Primary malignant neoplasm of right lung metastatic to other site (HCC)           SUBJECTIVE  (INTERVAL HISTORY)      Clotting History None   Bleeding History None   Cancer History Lung Adeno   Family Cancer History None   H/O Blood/Plt Transfusion None   Tobacco/etoh/drug abuse 1.5 PPD x 40 years, quit 4/2024, no etoh abuse or rec drug use       Cancer Screening history C-scope 2014, due for cologuard (ordered)   Occupation  in the past, now          Some gas, reflux getting better on  famotidine.     Non-productive cough which is worsening (he completed the antibiotics course which did not relieve the symptoms), ROBERTSON getting worse.    Prednisone taper dosing was started but has not helped with his symptoms.  Other wise doing well.  Tolerating diet well.   Vision is ok. (Still has Kaleidescope vision which had for years - prior to cancer diagnosis)    Did well with the last chemo.     I have reviewed the relevant past medical, surgical, social and family history. I have also reviewed allergies and medications for this patient.    Review of Systems  Baseline weight: 200-205 lbs  Today's weight: 215 lbs    Denies F/C, N/V, CP, LH, HA, rash, itching, gen weakness, unilateral weakness, diplopia, melena, hematuria, hematochezia, falls, diarrhea, or constipation       A 10-point review of system was performed, pertinent positive and negative were detailed as above. Otherwise, the 10-point review of system was negative.      Past Medical History:   Diagnosis Date    Cancer (HCC) 04,25,2024    Diabetes mellitus (HCC)     High cholesterol     History of blood clots     Hypertension     Leucocytosis 04/20/2024    Lung cancer (HCC)     Malignant neoplasm of overlapping sites of right lung (HCC) 06/04/2024    Pneumonia     Pulmonary embolism (HCC)        Past Surgical History:   Procedure Laterality Date    IR BIOPSY LYMPH NODE  4/23/2024    IR PORT PLACEMENT  5/20/2024    KNEE SURGERY      MANDIBLE FRACTURE SURGERY  1985    PATELLA FRACTURE SURGERY      RECTAL SURGERY         Family History   Problem Relation Age of Onset    No Known Problems Father     No Known Problems Mother        Social History     Socioeconomic History    Marital status: Single     Spouse name: Not on file    Number of children: Not on file    Years of education: Not on file    Highest education level: Not on file   Occupational History    Not on file   Tobacco Use    Smoking status: Former     Current packs/day: 0.00     Average  packs/day: 1.5 packs/day for 35.0 years (52.5 ttl pk-yrs)     Types: Cigarettes     Quit date: 4/15/2024     Years since quittin.5     Passive exposure: Past    Smokeless tobacco: Never   Vaping Use    Vaping status: Never Used   Substance and Sexual Activity    Alcohol use: Not Currently    Drug use: Never    Sexual activity: Yes     Partners: Female     Birth control/protection: Post-menopausal, None   Other Topics Concern    Not on file   Social History Narrative    Not on file     Social Determinants of Health     Financial Resource Strain: Not on file   Food Insecurity: Not on file   Transportation Needs: Not on file   Physical Activity: Not on file   Stress: Not on file   Social Connections: Not on file   Intimate Partner Violence: Not on file   Housing Stability: Not on file       No Known Allergies    Current Outpatient Medications   Medication Sig Dispense Refill    albuterol (ProAir HFA) 90 mcg/act inhaler Inhale 2 puffs every 6 (six) hours as needed for wheezing 8.5 g 2    amitriptyline (ELAVIL) 100 mg tablet 200 mg daily at bedtime      apixaban (Eliquis) 5 mg Take 1 tablet (5 mg total) by mouth 2 (two) times a day 60 tablet 5    atorvastatin (LIPITOR) 10 mg tablet Take 1 tablet (10 mg total) by mouth daily 90 tablet 3    benzonatate (TESSALON) 200 MG capsule Take 1 capsule (200 mg total) by mouth 3 (three) times a day as needed for cough 20 capsule 0    famotidine (PEPCID) 40 MG tablet Take 40 mg by mouth daily as needed for heartburn or indigestion Taking as needed      folic acid (FOLVITE) 1 mg tablet TAKE 1 TABLET BY MOUTH EVERY DAY 90 tablet 2    glimepiride (AMARYL) 2 mg tablet Take 1 tablet (2 mg total) by mouth daily with breakfast 90 tablet 1    Lancets (OneTouch Delica Plus [id number]) MISC Use 1 Units 4 (four) times a day 400 each 2    levothyroxine (Synthroid) 25 mcg tablet Take 1 tablet (25 mcg total) by mouth daily 90 tablet 2    losartan (COZAAR) 25 mg tablet Take 25 mg by mouth daily       metFORMIN (GLUCOPHAGE) 1000 MG tablet Take 1,000 mg by mouth 2 (two) times a day      ondansetron (ZOFRAN-ODT) 8 mg disintegrating tablet Take 1 tablet (8 mg total) by mouth every 8 (eight) hours as needed for vomiting or nausea 30 tablet 0    OneTouch Verio test strip Use 1 each 4 (four) times a day 400 each 2    predniSONE 20 mg tablet Take 5 tablets (100 mg total) by mouth daily for 7 days, THEN 4 tablets (80 mg total) daily for 7 days, THEN 3 tablets (60 mg total) daily for 7 days, THEN 2 tablets (40 mg total) daily for 7 days, THEN 1 tablet (20 mg total) daily for 7 days. 105 tablet 0    tiotropium-olodaterol (Stiolto Respimat) 2.5-2.5 MCG/ACT inhaler Inhale 2 puffs daily 4 g 3    Glucagon, rDNA, (Glucagon Emergency) 1 MG KIT Inject 1 mg as directed once as needed (hypoglycemia) for up to 1 dose (Patient not taking: Reported on 7/3/2024) 2 kit 0    metFORMIN (GLUCOPHAGE) 500 mg tablet Take 1,000 mg by mouth 2 (two) times a day with meals (Patient not taking: Reported on 10/9/2024)       No current facility-administered medications for this visit.       (Not in a hospital admission)      Objective:     24 Hour Vitals Assessment:     Vitals:    11/04/24 1350   BP: 128/70   Pulse: 103   Resp: 16   Temp: (!) 97.3 °F (36.3 °C)   SpO2: 95%           Weight at last visit: 223 lbs  Weight today: 215 lbs    PHYSICIAN EXAM:    General: Appearance: alert, cooperative, no distress.  HEENT: Normocephalic, atraumatic. No scleral icterus. conjunctivae clear. EOMI.  Chest: No tenderness to palpation. No open wound noted.  Lungs: Clear to auscultation bilaterally, Respirations unlabored.  Cardiac: Regular rate, +S1and S2  Abdomen: Soft, non-tender, non-distended. Bowel sounds are normal.  Extremities:  No edema, cyanosis, clubbing.  Skin: Skin color, turgor are normal. No rashes.  Lymphatics: no palpable supra-cervical, axillary, or inguinal adenopathy  Neurologic: Awake, Alert, and oriented, no gross focal deficits noted  b/l.       DATA REVIEW:    Pathology Result:    Final Diagnosis   Date Value Ref Range Status   04/23/2024   Final    A. Lymph Node, Left retroperitoneal, Biopsy:  - Metastatic adenocarcinoma of lung primary.     Comment: Specimen (block A2) is being sent to [facility] for MI Profile Testing. Upon completion of testing, [facility] report will be sent directly to the requesting provider, as well as posted in the Media Tab of EPIC for this patient by the Pathology Department.          Image Results:   Image result are reviewed and documented in Hematology/Oncology history    CT chest abdomen pelvis w contrast  Narrative: CT CHEST, ABDOMEN AND PELVIS WITH IV CONTRAST    INDICATION: C34.91: Malignant neoplasm of unspecified part of right bronchus or lung.    COMPARISON: CT chest from 10/4/2024 and CT chest abdomen pelvis from August 2, 2024    TECHNIQUE: CT examination of the chest, abdomen and pelvis was performed. Multiplanar 2D reformatted images were created from the source data.    This examination, like all CT scans performed in the [facility], was performed utilizing techniques to minimize radiation dose exposure, including the use of iterative reconstruction and automated exposure control. Radiation dose length   product (DLP) for this visit: 1096 mGy-cm    IV Contrast: 100 mL of iohexol (OMNIPAQUE)  Enteric Contrast: Not administered.    FINDINGS:    CHEST    LUNGS: Primary lesion right middle lobe measures approximately 4.0 x 2.8 cm, stable allowing for differences in technique and slice selection. Significant progression of lymphangitic tumor compared to August 2, 2024 and October 4, 2024, with worsening   diffuse interlobular septal thickening throughout the right lung, peribronchovascular nodularity.    PLEURA: Small right pleural effusion, new/increased compared to 10/4/2024.    HEART/GREAT VESSELS: Heavy atherosclerotic coronary artery calcification. No thoracic aortic  aneurysm.    MEDIASTINUM AND JEREMIAH: Stable shotty prevascular lymph nodes. No new adenopathy.    CHEST WALL AND LOWER NECK: Unremarkable.    ABDOMEN    LIVER/BILIARY TREE: Hepatic steatosis.    GALLBLADDER: Contracted.    SPLEEN: Unremarkable.    PANCREAS: Unremarkable.    ADRENAL GLANDS: Mild nodular thickening of the medial limb of the left adrenal gland, decreased since August 2, 2024 without measurable disease. Normal right adrenal gland.    KIDNEYS/URETERS: Left lower pole renal calculus. No hydronephrosis or perinephric stranding.    STOMACH AND BOWEL: Unremarkable.    APPENDIX: No findings to suggest appendicitis.    ABDOMINOPELVIC CAVITY: No ascites. No pneumoperitoneum. No lymphadenopathy.    VESSELS: Atherosclerosis without abdominal aortic aneurysm.    PELVIS    REPRODUCTIVE ORGANS: Enlarged prostate.    URINARY BLADDER: Normal.    ABDOMINAL WALL/INGUINAL REGIONS: Small bilateral fat-containing inguinal hernias.    BONES: No acute fracture or suspicious osseous lesion.  Impression: Worsening lymphangitic tumor progression compared to prior from 10/4/2024 and August 2, 2024.    New/increased small right pleural effusion, probably malignant.    Resolving nodular thickening medial limb left adrenal gland. No new metastatic disease in the abdomen or pelvis.    Nonobstructing left nephrolithiasis.    The study was marked in EPIC for significant notification.    Workstation performed: [id number]      LABS:  Lab data are reviewed and documented in HemOnc history.       Lab Results   Component Value Date    HGB 10.2 (L) 11/01/2024    HCT 32.5 (L) 11/01/2024    MCV 87 11/01/2024     11/01/2024    WBC 5.35 11/01/2024    NRBC 0 11/01/2024     Lab Results   Component Value Date    K 4.1 11/01/2024     11/01/2024    CO2 26 11/01/2024    BUN 17 11/01/2024    CREATININE 0.92 11/01/2024    GLUF 100 (H) 06/28/2024    CALCIUM 9.2 11/01/2024    AST 17 11/01/2024    ALT 24 11/01/2024    ALKPHOS 73 11/01/2024     "EGFR 93 11/01/2024       Lab Results   Component Value Date    IRON 28 (L) 04/19/2024    TIBC 212 (L) 04/19/2024    FERRITIN 63 04/19/2024       No results found for: \"[id number]\"    No results for input(s): \"WBC\", \"CREAT\", \"PLT\" in the last 72 hours.    By:  Mis Rea MD, 11/4/2024, 2:01 PM                                  "

## 2024-11-01 ENCOUNTER — HOSPITAL ENCOUNTER (OUTPATIENT)
Dept: INFUSION CENTER | Facility: CLINIC | Age: 54
End: 2024-11-01
Payer: COMMERCIAL

## 2024-11-01 DIAGNOSIS — J98.4 PNEUMONITIS: ICD-10-CM

## 2024-11-01 DIAGNOSIS — R05.9 COUGH, UNSPECIFIED TYPE: ICD-10-CM

## 2024-11-01 DIAGNOSIS — C34.81 ADENOCARCINOMA OF OVERLAPPING SITES OF RIGHT LUNG (HCC): ICD-10-CM

## 2024-11-01 DIAGNOSIS — C34.81 ADENOCARCINOMA OF OVERLAPPING SITES OF RIGHT LUNG (HCC): Primary | ICD-10-CM

## 2024-11-01 DIAGNOSIS — R06.09 DOE (DYSPNEA ON EXERTION): ICD-10-CM

## 2024-11-01 DIAGNOSIS — Z95.828 PORT-A-CATH IN PLACE: Primary | ICD-10-CM

## 2024-11-01 LAB
ALBUMIN SERPL BCG-MCNC: 3.6 G/DL (ref 3.5–5)
ALP SERPL-CCNC: 73 U/L (ref 34–104)
ALT SERPL W P-5'-P-CCNC: 24 U/L (ref 7–52)
ANION GAP SERPL CALCULATED.3IONS-SCNC: 6 MMOL/L (ref 4–13)
AST SERPL W P-5'-P-CCNC: 17 U/L (ref 13–39)
BASOPHILS # BLD AUTO: 0.01 THOUSANDS/ΜL (ref 0–0.1)
BASOPHILS NFR BLD AUTO: 0 % (ref 0–1)
BILIRUB SERPL-MCNC: 0.3 MG/DL (ref 0.2–1)
BUN SERPL-MCNC: 17 MG/DL (ref 5–25)
CALCIUM SERPL-MCNC: 9.2 MG/DL (ref 8.4–10.2)
CHLORIDE SERPL-SCNC: 108 MMOL/L (ref 96–108)
CO2 SERPL-SCNC: 26 MMOL/L (ref 21–32)
CREAT SERPL-MCNC: 0.92 MG/DL (ref 0.6–1.3)
EOSINOPHIL # BLD AUTO: 0.06 THOUSAND/ΜL (ref 0–0.61)
EOSINOPHIL NFR BLD AUTO: 1 % (ref 0–6)
ERYTHROCYTE [DISTWIDTH] IN BLOOD BY AUTOMATED COUNT: 17.1 % (ref 11.6–15.1)
GFR SERPL CREATININE-BSD FRML MDRD: 93 ML/MIN/1.73SQ M
GLUCOSE SERPL-MCNC: 155 MG/DL (ref 65–140)
HCT VFR BLD AUTO: 32.5 % (ref 36.5–49.3)
HGB BLD-MCNC: 10.2 G/DL (ref 12–17)
IMM GRANULOCYTES # BLD AUTO: 0.02 THOUSAND/UL (ref 0–0.2)
IMM GRANULOCYTES NFR BLD AUTO: 0 % (ref 0–2)
LYMPHOCYTES # BLD AUTO: 0.95 THOUSANDS/ΜL (ref 0.6–4.47)
LYMPHOCYTES NFR BLD AUTO: 18 % (ref 14–44)
MCH RBC QN AUTO: 27.3 PG (ref 26.8–34.3)
MCHC RBC AUTO-ENTMCNC: 31.4 G/DL (ref 31.4–37.4)
MCV RBC AUTO: 87 FL (ref 82–98)
MONOCYTES # BLD AUTO: 0.55 THOUSAND/ΜL (ref 0.17–1.22)
MONOCYTES NFR BLD AUTO: 10 % (ref 4–12)
NEUTROPHILS # BLD AUTO: 3.76 THOUSANDS/ΜL (ref 1.85–7.62)
NEUTS SEG NFR BLD AUTO: 71 % (ref 43–75)
NRBC BLD AUTO-RTO: 0 /100 WBCS
PLATELET # BLD AUTO: 240 THOUSANDS/UL (ref 149–390)
PMV BLD AUTO: 9 FL (ref 8.9–12.7)
POTASSIUM SERPL-SCNC: 4.1 MMOL/L (ref 3.5–5.3)
PROT SERPL-MCNC: 6.6 G/DL (ref 6.4–8.4)
RBC # BLD AUTO: 3.73 MILLION/UL (ref 3.88–5.62)
SODIUM SERPL-SCNC: 140 MMOL/L (ref 135–147)
T4 FREE SERPL-MCNC: 1 NG/DL (ref 0.61–1.12)
TSH SERPL DL<=0.05 MIU/L-ACNC: 0.37 UIU/ML (ref 0.45–4.5)
WBC # BLD AUTO: 5.35 THOUSAND/UL (ref 4.31–10.16)

## 2024-11-01 PROCEDURE — 84439 ASSAY OF FREE THYROXINE: CPT | Performed by: INTERNAL MEDICINE

## 2024-11-01 PROCEDURE — 80053 COMPREHEN METABOLIC PANEL: CPT | Performed by: INTERNAL MEDICINE

## 2024-11-01 PROCEDURE — 85025 COMPLETE CBC W/AUTO DIFF WBC: CPT | Performed by: INTERNAL MEDICINE

## 2024-11-01 PROCEDURE — 84443 ASSAY THYROID STIM HORMONE: CPT | Performed by: INTERNAL MEDICINE

## 2024-11-01 RX ORDER — PREDNISONE 20 MG/1
TABLET ORAL
Qty: 105 TABLET | Refills: 0 | Status: SHIPPED | OUTPATIENT
Start: 2024-11-01 | End: 2024-12-06

## 2024-11-01 RX ORDER — FOLIC ACID 1 MG/1
1000 TABLET ORAL DAILY
Qty: 90 TABLET | Refills: 2 | Status: SHIPPED | OUTPATIENT
Start: 2024-11-01

## 2024-11-01 NOTE — PROGRESS NOTES
Received for labs. No complaints offered. RPAC accessed without issue. Brisk blood return noted, flushes easily. Lab work drawn per md order. Pt to return 11/5 at 2pm. AVS declined.

## 2024-11-02 RX ORDER — SODIUM CHLORIDE 9 MG/ML
20 INJECTION, SOLUTION INTRAVENOUS ONCE
Status: CANCELLED | OUTPATIENT
Start: 2024-11-05

## 2024-11-04 ENCOUNTER — OFFICE VISIT (OUTPATIENT)
Dept: HEMATOLOGY ONCOLOGY | Facility: CLINIC | Age: 54
End: 2024-11-04
Payer: COMMERCIAL

## 2024-11-04 ENCOUNTER — TELEPHONE (OUTPATIENT)
Dept: HEMATOLOGY ONCOLOGY | Facility: CLINIC | Age: 54
End: 2024-11-04

## 2024-11-04 VITALS
TEMPERATURE: 97.3 F | OXYGEN SATURATION: 95 % | HEART RATE: 103 BPM | BODY MASS INDEX: 33.74 KG/M2 | DIASTOLIC BLOOD PRESSURE: 70 MMHG | SYSTOLIC BLOOD PRESSURE: 128 MMHG | RESPIRATION RATE: 16 BRPM | WEIGHT: 215 LBS | HEIGHT: 67 IN

## 2024-11-04 DIAGNOSIS — T45.1X5A CHEMOTHERAPY-INDUCED NAUSEA: ICD-10-CM

## 2024-11-04 DIAGNOSIS — R11.0 CHEMOTHERAPY-INDUCED NAUSEA: ICD-10-CM

## 2024-11-04 DIAGNOSIS — C34.81 ADENOCARCINOMA OF OVERLAPPING SITES OF RIGHT LUNG (HCC): Primary | ICD-10-CM

## 2024-11-04 PROCEDURE — 99215 OFFICE O/P EST HI 40 MIN: CPT | Performed by: INTERNAL MEDICINE

## 2024-11-04 NOTE — PROGRESS NOTES
Reviewed [employer] printout for Docetaxel and Ramucirumab. Reviewed possible side effects with lab recommendations.    Reviewed office handouts.    Consent obtained. Copy given to patient and uploaded into patient chart.

## 2024-11-04 NOTE — TELEPHONE ENCOUNTER
Call out to [facility] infusion spoke with Staff that reviewed patient was cancelled for treatment tomorrow.  Please review changes in treatment times. Planned for 11/26. Thanks!

## 2024-11-05 ENCOUNTER — HOSPITAL ENCOUNTER (OUTPATIENT)
Dept: INFUSION CENTER | Facility: CLINIC | Age: 54
Discharge: HOME | End: 2024-11-05

## 2024-11-05 ENCOUNTER — DOCUMENTATION (OUTPATIENT)
Dept: HEMATOLOGY ONCOLOGY | Facility: CLINIC | Age: 54
End: 2024-11-05

## 2024-11-05 NOTE — PROGRESS NOTES
In-basket message received from Dr. Sandoval to add patient to the thoracic [facility] on 11/11/2024. Chart reviewed and prep completed.

## 2024-11-11 ENCOUNTER — DOCUMENTATION (OUTPATIENT)
Dept: HEMATOLOGY ONCOLOGY | Facility: CLINIC | Age: 54
End: 2024-11-11

## 2024-11-11 ENCOUNTER — TELEPHONE (OUTPATIENT)
Dept: HEMATOLOGY ONCOLOGY | Facility: CLINIC | Age: 54
End: 2024-11-11

## 2024-11-11 DIAGNOSIS — C34.81 ADENOCARCINOMA OF OVERLAPPING SITES OF RIGHT LUNG (HCC): Primary | ICD-10-CM

## 2024-11-11 RX ORDER — CYANOCOBALAMIN 1000 UG/ML
1000 INJECTION, SOLUTION INTRAMUSCULAR; SUBCUTANEOUS ONCE
Status: CANCELLED | OUTPATIENT
Start: 2024-11-19 | End: 2024-11-12

## 2024-11-11 RX ORDER — SODIUM CHLORIDE 9 MG/ML
20 INJECTION, SOLUTION INTRAVENOUS ONCE
OUTPATIENT
Start: 2024-11-26

## 2024-11-11 NOTE — TELEPHONE ENCOUNTER
Please review infusion appointment times. Can move up vitamin b12 injection one week prior to 11/26. Thank you!    Treatment plan originally was  Carbo-Alimta-Keytruda , then changed to Docetaxel and Cyramza. Now recommended Alimta with possible keytruda.

## 2024-11-11 NOTE — PROGRESS NOTES
Treatment plan originally was  Carbo-Alimta-Keytruda , then changed to Docetaxel and Cyramza. Now recommended Alimta with possible keytruda.

## 2024-11-11 NOTE — PROGRESS NOTES
Do not charge copay as this was a simple telephone call    Telemedicine consent    Patient: Papo Gibbs  Provider: Maryana Sandoval MD  Provider located at [address] [place] [address] [facility] [address] [id number]    The patient was identified by name and date of birth. Papo Gibbs was informed that this is a telemedicine visit and that the visit is being conducted through Telephone.  My office door was closed. No one else was in the room.  He acknowledged consent and understanding of privacy and security of the video platform. The patient has agreed to participate and understands they can discontinue the visit at any time.    Patient is aware this is a billable service.                                Hematology/Oncology Outpatient Office Note    Date of Service: 2024    [facility] [address] [address] [phone]    Reason for Consultation:   No chief complaint on file.      Cancer Stage at diagnosis: IV    Referral Physician: No ref. provider found    Primary Care Physician:  Amelie Bacon MD     Nickname: Jessica    Spouse: Mabel  (RN at [employer] in Cardiology)    Original ECO    Today's ECO    Goals and Barriers:  Current Goal: Minimize effects of disease burden, extend life.   Barriers to accomplishing this: SOB    Patient's Capacity to Self Care:  Patient is able to self care    ASSESSMENT & PLAN      Diagnosis ICD-10-CM Associated Orders   1. Adenocarcinoma of overlapping sites of right lung (HCC)  C34.81 Infusion Appointment Request     Infusion Calculated Appointment Request     CBC and differential     Comprehensive metabolic panel     Infusion Calculated Appointment Request     CBC and differential     Comprehensive metabolic panel     Infusion Calculated Appointment Request     CBC and differential     Comprehensive metabolic panel          This is a 54 y.o. c PMHx notable for DM,  HLP, blood clots, HTN, remote nicotine abuse, being seen in consultation for metastatic lung adeno       Discussion of decision making  Oncology history updated, accordingly, during this visit  Goals of care/patient communication  I discussed with the patient the clinical course leading up to their cancer diagnosis. I reviewed relevant office notes, imaging reports and pathology result as well.  I told the patient that this is a case of incurable disease and what this means. We discussed that the goal of anti-cancer therapy is to provide best quality of life, extend overall survival, and progression free survival as shown in clinical trials. We also discussed that there might be a point when the cancer will no longer respond to this anti-neoplastic therapy. As a result, we also discussed the role of the palliative care team being introduced early in the treatment course. We will be making this referral  I explained the risks/benefits of the proposed cancer therapy: Carbo-Alimta-Keytruda and after discussion including understanding risks of possible life-threatening complications and therapy-related malignancy development, informed consent for blood products and treatment has been signed and obtained.  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, TP53, KEAP1 mutated, STK11 mutated  TNM/Staging At Diagnosis  Cancer Staging   Adenocarcinoma of overlapping sites of right lung (HCC)  Staging form: Lung, AJCC 8th Edition  - Clinical: Stage IV (cN3, cM1) - Signed by Maryana Sandoval MD on 5/2/2024  Disease Features/Tumor Markers/Genetics  Tumor Marker: n/a  Notable Path Features: 4/23/2024 Lymph Node, Left retroperitoneal, Biopsy: Metastatic adenocarcinoma of lung primary  Treatment: s/p Carbo-Alimta-Keytruda, holding Ramucirumab + Docetaxel for now and doing Alimta alone  Other Supportive care: Zofran, EMLA  Treatment Team Members  Surgeon  Rad Onc  Palliative: Dr. Estrella  Labs  Diagnostics  4/19/2024 CT Chest PE: Acute  moderate clot burden multifocal bilateral peripheral pulmonary embolism, mild R heart strain. Findings suggestive of multifocal pneumonia. Pathologically enlarged mediastinal, hilar, para-aortic, retrocrural, and upper abdominal lymph nodes. Nonspecific 1.9 cm left adrenal nodule  4/20/2024 CT Abd/pelv w/wo c: There is a 2.1 x 1.8 x 2.6 cm left adrenal nodule. Abdominal and thoracic lymphadenopathy  5/4/2024 MRI Brain w/wo c: 4 mm metastasis in the posterior right frontal lobe without significant edema. No other definite enhancing lesions but evaluation is limited due to motion artifact.  Tiny focus of restricted diffusion in the left frontal lobe without definite enhancement could represent acute/early subacute infarct  5/8/2024: Neuro working group recommended close MRI Brain surveillance for the 2 small brain mets to see how it responds to treatment, Dr. Paulo Alcala will trend it  8/2/2024 CT CAP w/c: AK!  Interval decrease in size of mediastinal and hilar lymph nodes. Left hilar lymph node measures 1.3 x 1.1 cm compared to 2.3 x 2.8 cm previously. Subcarinal lymph node measures 1.7 x 1 cm compared to 4.3 x 2.5 cm  Significant interval decrease in size of right upper lobe mass with lymphangitic carcinomatosis and extension into the right middle lobe with residual wedge-shaped airspace opacity now measuring 4.6 x 2.6 cm   Interval decrease in size of left adrenal lesion measuring 1.2 x 1.2 cm compared to 2.1 x 1.8 cm previously  10/25/2024 CT CAP w/c: Worsening lymphangitic tumor progression compared to prior. New/increased small right pleural effusion. Resolving nodular thickening medial limb left adrenal gland. No new metastatic disease in the abdomen or pelvis.   11/11/2024 thoracic tumor board determine there is likely no disease progression and to hold off on Ramucirumab + Docetaxel    Discussion of decision making    I personally reviewed the following lab results, the image studies, pathology, other  specialty/physicians consult notes and recommendations, and outside medical records. I had a lengthy discussion with the patient and shared the work-up findings. We discussed the diagnosis and management plan as below. I spent 42 minutes reviewing the records (labs, clinician notes, outside records, medical history, ordering medicine/tests/procedures, monitoring of anti-neoplastic toxicities, interpreting the imaging/labs previously done) and coordination of care as well as direct time with the patient today, of which greater than 50% of the time was spent in counseling and coordination of care with the patient/family.      Plan/Labs  Restaging CT Chest to be ordered next visit for mid December  Cont 1mg/kg steroid taper as pt is feeling better on it  MRI Brain restaging as per Rad Onc  Rad Onc f/u for surveillance of 2 small brain mets  F/u palliative care   Consider Pepcid or Gas-x for dyspepsia; pt will trial  Most recent imaging [10/25/2024] indicates likely pneumonitis and not likely POD, tumor board recommended resuming Alimta, holding Keytruda, and treating with steroids          Follow Up: 3 weeks    All questions were answered to the patient's satisfaction during this encounter. The patient knows the contact information for our office and knows to reach out for any relevant concerns related to this encounter. They are to call for any temperature 100.4 or higher, new symptoms including but not restricted to shaking chills, decreased appetite, nausea, vomiting, diarrhea, increased fatigue, shortness of breath or chest pain, confusion, and not feeling the strength to come to the clinic. For all other listed problems and medical diagnosis in their chart - they are managed by PCP and/or other specialists, which the patient acknowledges. Thank you very much for your consultation and making us a part of this patient's care. We are continuing to follow closely with you. Please do not hesitate to reach out to me  with any additional questions or concerns.    Maryana Sandoval MD  Hematology & Medical Oncology Staff Physician             Disclaimer: This document was prepared using [employer] technology. If a word or phrase is confusing, or does not make sense, this is likely due to recognition error which was not discovered during this clinician's review. If you believe an error has occurred, please contact me through [facility] service line for [email or web address]?cation.      ONCOLOGY HISTORY OF PRESENT ILLNESS        Oncology History   Adenocarcinoma of overlapping sites of right lung (HCC)   4/23/2024 Biopsy    Final Diagnosis   A. Lymph Node, Left retroperitoneal, Biopsy:  - Metastatic adenocarcinoma of lung primary.         5/2/2024 Initial Diagnosis    Adenocarcinoma of overlapping sites of right lung (HCC)     5/2/2024 -  Cancer Staged    Staging form: Lung, AJCC 8th Edition  - Clinical: Stage IV (cN3, cM1) - Signed by Maryana Sandoval MD on 5/2/2024 5/21/2024 - 10/15/2024 Chemotherapy    cyanocobalamin, 1,000 mcg, Intramuscular, Once, 4 of 5 cycles  Administration: 1,000 mcg (5/14/2024), 1,000 mcg (7/2/2024), 1,000 mcg (9/3/2024), 1,000 mcg (10/15/2024)  alteplase (CATHFLO), 2 mg, Intracatheter, Every 1 Minute as needed, 8 of 10 cycles  fosaprepitant (EMEND) IVPB, 150 mg, Intravenous, Once, 6 of 6 cycles  Administration: 150 mg (5/21/2024), 150 mg (7/2/2024), 150 mg (7/23/2024), 150 mg (9/3/2024), 150 mg (6/11/2024), 150 mg (8/13/2024)  CARBOplatin (PARAPLATIN) IVPB (GOG AUC DOSING), 672 mg, Intravenous, Once, 6 of 6 cycles  Administration: 672 mg (5/21/2024), 692.5 mg (7/2/2024), 659 mg (7/23/2024), 641 mg (9/3/2024), 692.5 mg (6/11/2024), 647 mg (8/13/2024)  pemetrexed (ALIMTA) chemo infusion, 1,020 mg, Intravenous, Once, 8 of 10 cycles  Administration: 1,000 mg (5/21/2024), 1,000 mg (7/2/2024), 1,000 mg (7/23/2024), 1,000 mg (9/3/2024), 1,000 mg (6/11/2024), 1,000 mg (8/13/2024), 1,000 mg (9/24/2024), 1,000  mg (10/15/2024)  pembrolizumab (KEYTRUDA) IVPB, 200 mg, Intravenous, Once, 8 of 10 cycles  Administration: 200 mg (5/21/2024), 200 mg (7/2/2024), 200 mg (7/23/2024), 200 mg (9/3/2024), 200 mg (6/11/2024), 200 mg (8/13/2024), 200 mg (9/24/2024), 200 mg (10/15/2024)     7/3/2024 - 7/3/2024 Radiation    Treatments:  Course: C1 SRS    Plan ID Energy Fractions Dose per Fraction (cGy) Dose Correction (cGy) Total Dose Delivered (cGy) Elapsed Days   [id number] 1 / 1 2,000 0 2,000 0      Treatment Dates:  7/3/2024 - 7/3/2024.      11/19/2024 -  Chemotherapy    cyanocobalamin, 1,000 mcg, Intramuscular, Once, 0 of 3 cycles  alteplase (CATHFLO), 2 mg, Intracatheter, Every 1 Minute as needed, 0 of 6 cycles  pemetrexed (ALIMTA) chemo infusion, 500 mg/m2 = 1,040 mg, Intravenous, Once, 0 of 6 cycles     11/26/2024 - 11/26/2024 Chemotherapy    alteplase (CATHFLO), 2 mg, Intracatheter, Every 1 Minute as needed, 0 of 6 cycles  ramucirumab (CYRAMZA) IVPB, 10 mg/kg = 975 mg, Intravenous, Once, 0 of 6 cycles  DOCEtaxel (TAXOTERE) chemo infusion, 75 mg/m2 = 156 mg, Intravenous, Once, 0 of 6 cycles     Primary malignant neoplasm of right lung metastatic to other site (HCC)   6/4/2024 Initial Diagnosis    Primary malignant neoplasm of right lung metastatic to other site (HCC)           SUBJECTIVE  (INTERVAL HISTORY)      Clotting History None   Bleeding History None   Cancer History Lung Adeno   Family Cancer History None   H/O Blood/Plt Transfusion None   Tobacco/etoh/drug abuse 1.5 PPD x 40 years, quit 4/2024, no etoh abuse or rec drug use       Cancer Screening history C-scope 2014, due for cologuard (ordered)   Occupation  in the past, now          Some gas, reflux getting better on famotidine.     Non-productive cough which is improved, ROBERTSON getting better.    Prednisone taper dosing was started and helping with his symptoms.  Other wise doing well.  Tolerating diet well.   Vision is ok. (Still has  Kaleidescope vision which had for years - prior to cancer diagnosis)    Did well with the last chemo.     I have reviewed the relevant past medical, surgical, social and family history. I have also reviewed allergies and medications for this patient.    Review of Systems  Baseline weight: 200-205 lbs  Today's weight: 215 lbs    Denies F/C, N/V, CP, LH, HA, rash, itching, gen weakness, unilateral weakness, diplopia, melena, hematuria, hematochezia, falls, diarrhea, or constipation       A 10-point review of system was performed, pertinent positive and negative were detailed as above. Otherwise, the 10-point review of system was negative.      Past Medical History:   Diagnosis Date    Cancer (HCC)     Diabetes mellitus (HCC)     High cholesterol     History of blood clots     Hypertension     Leucocytosis 2024    Lung cancer (HCC)     Malignant neoplasm of overlapping sites of right lung (HCC) 2024    Pneumonia     Pulmonary embolism (HCC)        Past Surgical History:   Procedure Laterality Date    IR BIOPSY LYMPH NODE  2024    IR PORT PLACEMENT  2024    KNEE SURGERY      MANDIBLE FRACTURE SURGERY  1985    PATELLA FRACTURE SURGERY      RECTAL SURGERY         Family History   Problem Relation Age of Onset    No Known Problems Father     No Known Problems Mother        Social History     Socioeconomic History    Marital status: Single     Spouse name: Not on file    Number of children: Not on file    Years of education: Not on file    Highest education level: Not on file   Occupational History    Not on file   Tobacco Use    Smoking status: Former     Current packs/day: 0.00     Average packs/day: 1.5 packs/day for 35.0 years (52.5 ttl pk-yrs)     Types: Cigarettes     Quit date: 4/15/2024     Years since quittin.5     Passive exposure: Past    Smokeless tobacco: Never   Vaping Use    Vaping status: Never Used   Substance and Sexual Activity    Alcohol use: Not Currently    Drug use:  Never    Sexual activity: Yes     Partners: Female     Birth control/protection: Post-menopausal, None   Other Topics Concern    Not on file   Social History Narrative    Not on file     Social Determinants of Health     Financial Resource Strain: Not on file   Food Insecurity: Not on file   Transportation Needs: Not on file   Physical Activity: Not on file   Stress: Not on file   Social Connections: Not on file   Intimate Partner Violence: Not on file   Housing Stability: Not on file       No Known Allergies    Current Outpatient Medications   Medication Sig Dispense Refill    albuterol (ProAir HFA) 90 mcg/act inhaler Inhale 2 puffs every 6 (six) hours as needed for wheezing 8.5 g 2    amitriptyline (ELAVIL) 100 mg tablet 200 mg daily at bedtime      apixaban (Eliquis) 5 mg Take 1 tablet (5 mg total) by mouth 2 (two) times a day 60 tablet 5    atorvastatin (LIPITOR) 10 mg tablet Take 1 tablet (10 mg total) by mouth daily 90 tablet 3    benzonatate (TESSALON) 200 MG capsule Take 1 capsule (200 mg total) by mouth 3 (three) times a day as needed for cough 20 capsule 0    famotidine (PEPCID) 40 MG tablet Take 40 mg by mouth daily as needed for heartburn or indigestion Taking as needed      folic acid (FOLVITE) 1 mg tablet TAKE 1 TABLET BY MOUTH EVERY DAY 90 tablet 2    glimepiride (AMARYL) 2 mg tablet Take 1 tablet (2 mg total) by mouth daily with breakfast 90 tablet 1    Glucagon, rDNA, (Glucagon Emergency) 1 MG KIT Inject 1 mg as directed once as needed (hypoglycemia) for up to 1 dose (Patient not taking: Reported on 7/3/2024) 2 kit 0    Lancets (OneTouch Delica Plus [id number]) MISC Use 1 Units 4 (four) times a day 400 each 2    levothyroxine (Synthroid) 25 mcg tablet Take 1 tablet (25 mcg total) by mouth daily 90 tablet 2    losartan (COZAAR) 25 mg tablet Take 25 mg by mouth daily      metFORMIN (GLUCOPHAGE) 1000 MG tablet Take 1,000 mg by mouth 2 (two) times a day      metFORMIN (GLUCOPHAGE) 500 mg tablet Take  1,000 mg by mouth 2 (two) times a day with meals (Patient not taking: Reported on 10/9/2024)      ondansetron (ZOFRAN-ODT) 8 mg disintegrating tablet Take 1 tablet (8 mg total) by mouth every 8 (eight) hours as needed for vomiting or nausea 30 tablet 0    OneTouch Verio test strip Use 1 each 4 (four) times a day 400 each 2    predniSONE 20 mg tablet Take 5 tablets (100 mg total) by mouth daily for 7 days, THEN 4 tablets (80 mg total) daily for 7 days, THEN 3 tablets (60 mg total) daily for 7 days, THEN 2 tablets (40 mg total) daily for 7 days, THEN 1 tablet (20 mg total) daily for 7 days. 105 tablet 0    tiotropium-olodaterol (Stiolto Respimat) 2.5-2.5 MCG/ACT inhaler Inhale 2 puffs daily 4 g 3     No current facility-administered medications for this visit.       (Not in a hospital admission)      Objective:     24 Hour Vitals Assessment:     There were no vitals filed for this visit.          Weight at last visit: 223 lbs  Weight today: 215 lbs    PHYSICIAN EXAM:    General: Appearance: alert, cooperative, no distress.  HEENT: Normocephalic, atraumatic. No scleral icterus. conjunctivae clear. EOMI.  Chest: No tenderness to palpation. No open wound noted.  Lungs: Clear to auscultation bilaterally, Respirations unlabored.  Cardiac: Regular rate, +S1and S2  Abdomen: Soft, non-tender, non-distended. Bowel sounds are normal.  Extremities:  No edema, cyanosis, clubbing.  Skin: Skin color, turgor are normal. No rashes.  Lymphatics: no palpable supra-cervical, axillary, or inguinal adenopathy  Neurologic: Awake, Alert, and oriented, no gross focal deficits noted b/l.       DATA REVIEW:    Pathology Result:    Final Diagnosis   Date Value Ref Range Status   04/23/2024   Final    A. Lymph Node, Left retroperitoneal, Biopsy:  - Metastatic adenocarcinoma of lung primary.     Comment: Specimen (block A2) is being sent to [id number] for MI Profile Testing. Upon completion of testing, joiz report will be sent directly  to the requesting provider, as well as posted in the Media Tab of EPIC for this patient by the Pathology Department.          Image Results:   Image result are reviewed and documented in Hematology/Oncology history    CT chest abdomen pelvis w contrast  Narrative: CT CHEST, ABDOMEN AND PELVIS WITH IV CONTRAST    INDICATION: C34.91: Malignant neoplasm of unspecified part of right bronchus or lung.    COMPARISON: CT chest from 10/4/2024 and CT chest abdomen pelvis from August 2, 2024    TECHNIQUE: CT examination of the chest, abdomen and pelvis was performed. Multiplanar 2D reformatted images were created from the source data.    This examination, like all CT scans performed in the [facility], was performed utilizing techniques to minimize radiation dose exposure, including the use of iterative reconstruction and automated exposure control. Radiation dose length   product (DLP) for this visit: 1096 mGy-cm    IV Contrast: 100 mL of iohexol (OMNIPAQUE)  Enteric Contrast: Not administered.    FINDINGS:    CHEST    LUNGS: Primary lesion right middle lobe measures approximately 4.0 x 2.8 cm, stable allowing for differences in technique and slice selection. Significant progression of lymphangitic tumor compared to August 2, 2024 and October 4, 2024, with worsening   diffuse interlobular septal thickening throughout the right lung, peribronchovascular nodularity.    PLEURA: Small right pleural effusion, new/increased compared to 10/4/2024.    HEART/GREAT VESSELS: Heavy atherosclerotic coronary artery calcification. No thoracic aortic aneurysm.    MEDIASTINUM AND JEREMIAH: Stable shotty prevascular lymph nodes. No new adenopathy.    CHEST WALL AND LOWER NECK: Unremarkable.    ABDOMEN    LIVER/BILIARY TREE: Hepatic steatosis.    GALLBLADDER: Contracted.    SPLEEN: Unremarkable.    PANCREAS: Unremarkable.    ADRENAL GLANDS: Mild nodular thickening of the medial limb of the left adrenal gland, decreased since August  "2, 2024 without measurable disease. Normal right adrenal gland.    KIDNEYS/URETERS: Left lower pole renal calculus. No hydronephrosis or perinephric stranding.    STOMACH AND BOWEL: Unremarkable.    APPENDIX: No findings to suggest appendicitis.    ABDOMINOPELVIC CAVITY: No ascites. No pneumoperitoneum. No lymphadenopathy.    VESSELS: Atherosclerosis without abdominal aortic aneurysm.    PELVIS    REPRODUCTIVE ORGANS: Enlarged prostate.    URINARY BLADDER: Normal.    ABDOMINAL WALL/INGUINAL REGIONS: Small bilateral fat-containing inguinal hernias.    BONES: No acute fracture or suspicious osseous lesion.  Impression: Worsening lymphangitic tumor progression compared to prior from 10/4/2024 and August 2, 2024.    New/increased small right pleural effusion, probably malignant.    Resolving nodular thickening medial limb left adrenal gland. No new metastatic disease in the abdomen or pelvis.    Nonobstructing left nephrolithiasis.    The study was marked in EPIC for significant notification.    Workstation performed: [id number]      LABS:  Lab data are reviewed and documented in HemOnc history.       Lab Results   Component Value Date    HGB 10.2 (L) 11/01/2024    HCT 32.5 (L) 11/01/2024    MCV 87 11/01/2024     11/01/2024    WBC 5.35 11/01/2024    NRBC 0 11/01/2024     Lab Results   Component Value Date    K 4.1 11/01/2024     11/01/2024    CO2 26 11/01/2024    BUN 17 11/01/2024    CREATININE 0.92 11/01/2024    GLUF 100 (H) 06/28/2024    CALCIUM 9.2 11/01/2024    AST 17 11/01/2024    ALT 24 11/01/2024    ALKPHOS 73 11/01/2024    EGFR 93 11/01/2024       Lab Results   Component Value Date    IRON 28 (L) 04/19/2024    TIBC 212 (L) 04/19/2024    FERRITIN 63 04/19/2024       No results found for: \"[id number]\"    No results for input(s): \"WBC\", \"CREAT\", \"PLT\" in the last 72 hours.    By:  Maryana Sandoval MD, 11/11/2024, 2:02 PM                                  "

## 2024-11-11 NOTE — PROGRESS NOTES
Chart reviewed in preparation of Thoracic Tumor Conference presentation by Dr. Sandoval on 11/11/24.  He was diagnosed with Stage IV NSCLC ~ adenocarcinoma he started on carbo, Alimta and Keytruda in May.  On 7/3/24 he underwent SRS to a small right frontal metastasis to a dose of 2000cGy in 1 fraction.10/25/24 CT chest abdomen pelvis showed worsening lymphangitic tumor progression compared to prior from 10/4/2024 and August 2, 2024. New/increased small right pleural effusion, probably malignant. Resolving nodular thickening medial limb left adrenal gland. No new metastatic disease in the abdomen or pelvis. He completed cycle 6 of Carbo in September. Alimta and Keytruda have been discontinued with consideration of starting ramucirumab plus docetaxel.

## 2024-11-11 NOTE — PROGRESS NOTES
THORACIC ONCOLOGY MULTIDISCIPLINARY CASE REVIEW    DATE:  11/11/2024    PRESENTING DOCTOR:  Dr. Sandoval    DIAGNOSIS:  NSCLC~ Adenocarcinoma  STAGING: Stage IV    Papo Gibbs is a 54 y.o. male who was presented at the Thoracic Oncology Multidisciplinary Tumor Conference today. He was diagnosed with Stage IV NSCLC ~ adenocarcinoma in May 2024.  On 7/3/24 he underwent SRS to a small right frontal metastasis to a dose of 2000cGy in 1 fraction.  He completed 6 cycles of carbo on 09/03/24 and 8 cycles of Alimta/Keytruda on 10/15/24.  CT chest abdomen pelvis on 10/25/24 showed worsening lymphangitic tumor progression compared to prior from 10/4/2024 and August 2, 2024. New/increased small right pleural effusion, probably malignant. Resolving nodular thickening medial limb left adrenal gland. No new metastatic disease in the abdomen or pelvis.       PHYSICIAN RECOMMENDED PLAN:  - Treat as pneumonitis (steroids).   - Continue Alimta  - Repeat imaging in 6 weeks      Imaging reviewed:   10/25/24 CT chest abdomen pelvis w contrast  10/04/24 CTA chest pe study  08/02/24 CT chest abdomen pelvis w contrast  04/19/24 CTA ed chest pe study      Pathology reviewed: No     PFT's reviewed: No    Future imaging: CT chest abdomen pelvis    Referrals: No    Clinical Trials Reviewed:  No  Clinical Trial Eligibility:     Team agreed to plan.  NCCN guidelines were readily available for review at this discussion    The final treatment plan will be left to the discretion of the patient and the treating physician.     DISCLAIMERS:  TO THE TREATING PHYSICIAN:  This conference is a meeting of clinicians from various specialty areas who evaluate and discuss patients for whom a multidisciplinary treatment approach is being considered. Please note that the above opinion was a consensus of the conference attendees and is intended only to assist in quality care of the discussed patient.  The responsibility for follow up on the input given  during the conference, along with any final decisions regarding plan of care, is that of the patient and the patient's provider. Accordingly, appointments have only been recommended based on this information and have NOT been scheduled unless otherwise noted.      TO THE PATIENT:  This summary is a brief record of major aspects of your cancer treatment. You may choose to share a copy with any of your doctors or nurses. However, this is not a detailed or comprehensive record of your care.

## 2024-11-12 ENCOUNTER — TELEPHONE (OUTPATIENT)
Dept: INFUSION CENTER | Facility: CLINIC | Age: 54
End: 2024-11-12

## 2024-11-12 NOTE — TELEPHONE ENCOUNTER
Called and spoke with patient - patient aware B12 injection needed prior to tx date. Discussed with patient, he confirms B12 injection on 11/19 at 0830, lab appt 11/22 at 1500, ONC treatment 11/26 at 1330.

## 2024-11-12 NOTE — PROGRESS NOTES
Hematology/Oncology Outpatient Office Note    Date of Service: 2024    [facility] [place]  [address]  [place] [address]  [phone]    Reason for Consultation:   Chief Complaint   Patient presents with    Follow-up       Cancer Stage at diagnosis: IV    Referral Physician: No ref. provider found    Primary Care Physician:  Amelie Bacon MD     Nickname: Jessica    Spouse: Mabel  (RN at [employer] in Cardiology)    Original ECO    Today's ECO    Goals and Barriers:  Current Goal: Minimize effects of disease burden, extend life.   Barriers to accomplishing this: SOB    Patient's Capacity to Self Care:  Patient is able to self care    ASSESSMENT & PLAN      Diagnosis ICD-10-CM Associated Orders   1. Adenocarcinoma of overlapping sites of right lung (HCC)  C34.81 CT chest w contrast            This is a 54 y.o. c PMHx notable for DM, HLP, blood clots, HTN, remote nicotine abuse, being seen in consultation for metastatic lung adeno       Discussion of decision making  Oncology history updated, accordingly, during this visit  Goals of care/patient communication  I discussed with the patient the clinical course leading up to their cancer diagnosis. I reviewed relevant office notes, imaging reports and pathology result as well.  I told the patient that this is a case of incurable disease and what this means. We discussed that the goal of anti-cancer therapy is to provide best quality of life, extend overall survival, and progression free survival as shown in clinical trials. We also discussed that there might be a point when the cancer will no longer respond to this anti-neoplastic therapy. As a result, we also discussed the role of the palliative care team being introduced early in the treatment course. We will be making this referral  I explained the risks/benefits of the proposed cancer therapy: Carbo-Alimta-Keytruda and after discussion including  understanding risks of possible life-threatening complications and therapy-related malignancy development, informed consent for blood products and treatment has been signed and obtained.  4/24/2024 [facility] NGS: TMB high 10 muts/Mb, TP53, KEAP1 mutated, STK11 mutated  TNM/Staging At Diagnosis  Cancer Staging   Adenocarcinoma of overlapping sites of right lung (HCC)  Staging form: Lung, AJCC 8th Edition  - Clinical: Stage IV (cN3, cM1) - Signed by Maryana Sandoval MD on 5/2/2024  Disease Features/Tumor Markers/Genetics  Tumor Marker: n/a  Notable Path Features: 4/23/2024 Lymph Node, Left retroperitoneal, Biopsy: Metastatic adenocarcinoma of lung primary  Treatment: s/p Carbo-Alimta-Keytruda, holding Ramucirumab + Docetaxel for now and doing Alimta alone  Other Supportive care: Zofran, EMLA  Treatment Team Members  Surgeon  Rad Onc  Palliative: Dr. Estrella  Labs  Diagnostics  4/19/2024 CT Chest PE: Acute moderate clot burden multifocal bilateral peripheral pulmonary embolism, mild R heart strain. Findings suggestive of multifocal pneumonia. Pathologically enlarged mediastinal, hilar, para-aortic, retrocrural, and upper abdominal lymph nodes. Nonspecific 1.9 cm left adrenal nodule  4/20/2024 CT Abd/pelv w/wo c: There is a 2.1 x 1.8 x 2.6 cm left adrenal nodule. Abdominal and thoracic lymphadenopathy  5/4/2024 MRI Brain w/wo c: 4 mm metastasis in the posterior right frontal lobe without significant edema. No other definite enhancing lesions but evaluation is limited due to motion artifact.  Tiny focus of restricted diffusion in the left frontal lobe without definite enhancement could represent acute/early subacute infarct  5/8/2024: Neuro working group recommended close MRI Brain surveillance for the 2 small brain mets to see how it responds to treatment, Dr. Paulo Alcala will trend it  8/2/2024 CT CAP w/c: IA!  Interval decrease in size of mediastinal and hilar lymph nodes. Left hilar lymph node measures 1.3 x 1.1 cm  compared to 2.3 x 2.8 cm previously. Subcarinal lymph node measures 1.7 x 1 cm compared to 4.3 x 2.5 cm  Significant interval decrease in size of right upper lobe mass with lymphangitic carcinomatosis and extension into the right middle lobe with residual wedge-shaped airspace opacity now measuring 4.6 x 2.6 cm   Interval decrease in size of left adrenal lesion measuring 1.2 x 1.2 cm compared to 2.1 x 1.8 cm previously  10/25/2024 CT CAP w/c: not clear if POD. Worsening lymphangitic tumor progression compared to prior. New/increased small right pleural effusion. Resolving nodular thickening medial limb left adrenal gland. No new metastatic disease in the abdomen or pelvis.   Resolving nodular thickening medial limb left adrenal gland. No new metastatic disease in the abdomen or pelvis  Primary lesion right middle lobe measures approximately 4.0 x 2.8 cm, stable allowing for differences in technique and slice selection. Significant progression of lymphangitic tumor compared to August 2, 2024 and October 4, 2024, with worsening   diffuse interlobular septal thickening throughout the right lung, peribronchovascular nodularity  11/11/2024 thoracic tumor board determine there is likely no disease progression and to hold off on Ramucirumab + Docetaxel. Continue Alimta    Discussion of decision making    I personally reviewed the following lab results, the image studies, pathology, other specialty/physicians consult notes and recommendations, and outside medical records. I had a lengthy discussion with the patient and shared the work-up findings. We discussed the diagnosis and management plan as below. I spent 44 minutes reviewing the records (labs, clinician notes, outside records, medical history, ordering medicine/tests/procedures, monitoring of anti-neoplastic toxicities, interpreting the imaging/labs previously done) and coordination of care as well as direct time with the patient today, of which greater than 50% of  the time was spent in counseling and coordination of care with the patient/family.      Plan/Labs  Restaging CT Chest ordered for 12/11/2024  Cont 1mg/kg steroid taper as pt is feeling better on it for a taper to end in 2 weeks (by mid December)  Acyclovir being ordered 800 mg 5 times daily for 7 to 10 days for Shingles  MRI Brain restaging as per Rad Onc  Rad Onc f/u for surveillance of 2 small brain mets  Cont Alimta 500 mg/m2 q3 weeks  F/u palliative care       Follow Up: 4 weeks    All questions were answered to the patient's satisfaction during this encounter. The patient knows the contact information for our office and knows to reach out for any relevant concerns related to this encounter. They are to call for any temperature 100.4 or higher, new symptoms including but not restricted to shaking chills, decreased appetite, nausea, vomiting, diarrhea, increased fatigue, shortness of breath or chest pain, confusion, and not feeling the strength to come to the clinic. For all other listed problems and medical diagnosis in their chart - they are managed by PCP and/or other specialists, which the patient acknowledges. Thank you very much for your consultation and making us a part of this patient's care. We are continuing to follow closely with you. Please do not hesitate to reach out to me with any additional questions or concerns.    Maryana Sandoval MD  Hematology & Medical Oncology Staff Physician             Disclaimer: This document was prepared using [employer] Direct technology. If a word or phrase is confusing, or does not make sense, this is likely due to recognition error which was not discovered during this clinician's review. If you believe an error has occurred, please contact me through [facility] service line for [email or web address].      ONCOLOGY HISTORY OF PRESENT ILLNESS        Oncology History   Adenocarcinoma of overlapping sites of right lung (HCC)   4/23/2024 Biopsy    Final Diagnosis   A. Lymph  Node, Left retroperitoneal, Biopsy:  - Metastatic adenocarcinoma of lung primary.         5/2/2024 Initial Diagnosis    Adenocarcinoma of overlapping sites of right lung (HCC)     5/2/2024 -  Cancer Staged    Staging form: Lung, AJCC 8th Edition  - Clinical: Stage IV (cN3, cM1) - Signed by Maryana Sandoval MD on 5/2/2024 5/21/2024 - 10/15/2024 Chemotherapy    cyanocobalamin, 1,000 mcg, Intramuscular, Once, 4 of 5 cycles  Administration: 1,000 mcg (5/14/2024), 1,000 mcg (7/2/2024), 1,000 mcg (9/3/2024), 1,000 mcg (10/15/2024)  alteplase (CATHFLO), 2 mg, Intracatheter, Every 1 Minute as needed, 8 of 10 cycles  fosaprepitant (EMEND) IVPB, 150 mg, Intravenous, Once, 6 of 6 cycles  Administration: 150 mg (5/21/2024), 150 mg (7/2/2024), 150 mg (7/23/2024), 150 mg (9/3/2024), 150 mg (6/11/2024), 150 mg (8/13/2024)  CARBOplatin (PARAPLATIN) IVPB (GOG AUC DOSING), 672 mg, Intravenous, Once, 6 of 6 cycles  Administration: 672 mg (5/21/2024), 692.5 mg (7/2/2024), 659 mg (7/23/2024), 641 mg (9/3/2024), 692.5 mg (6/11/2024), 647 mg (8/13/2024)  pemetrexed (ALIMTA) chemo infusion, 1,020 mg, Intravenous, Once, 8 of 10 cycles  Administration: 1,000 mg (5/21/2024), 1,000 mg (7/2/2024), 1,000 mg (7/23/2024), 1,000 mg (9/3/2024), 1,000 mg (6/11/2024), 1,000 mg (8/13/2024), 1,000 mg (9/24/2024), 1,000 mg (10/15/2024)  pembrolizumab (KEYTRUDA) IVPB, 200 mg, Intravenous, Once, 8 of 10 cycles  Administration: 200 mg (5/21/2024), 200 mg (7/2/2024), 200 mg (7/23/2024), 200 mg (9/3/2024), 200 mg (6/11/2024), 200 mg (8/13/2024), 200 mg (9/24/2024), 200 mg (10/15/2024)     7/3/2024 - 7/3/2024 Radiation    Treatments:  Course: C1 SRS    Plan ID Energy Fractions Dose per Fraction (cGy) Dose Correction (cGy) Total Dose Delivered (cGy) Elapsed Days   [id number] [id number]-FFF 1 / 1 2,000 0 2,000 0      Treatment Dates:  7/3/2024 - 7/3/2024.      11/26/2024 -  Chemotherapy    cyanocobalamin, 1,000 mcg, Intramuscular, Once, 1 of 3  cycles  Administration: 1,000 mcg (11/19/2024)  alteplase (CATHFLO), 2 mg, Intracatheter, Every 1 Minute as needed, 0 of 6 cycles  pemetrexed (ALIMTA) chemo infusion, 500 mg/m2 = 1,040 mg, Intravenous, Once, 0 of 6 cycles     11/26/2024 - 11/26/2024 Chemotherapy    alteplase (CATHFLO), 2 mg, Intracatheter, Every 1 Minute as needed, 0 of 6 cycles  ramucirumab (CYRAMZA) IVPB, 10 mg/kg = 975 mg, Intravenous, Once, 0 of 6 cycles  DOCEtaxel (TAXOTERE) chemo infusion, 75 mg/m2 = 156 mg, Intravenous, Once, 0 of 6 cycles     Primary malignant neoplasm of right lung metastatic to other site (HCC)   6/4/2024 Initial Diagnosis    Primary malignant neoplasm of right lung metastatic to other site (HCC)       Imaging on [10/25/2024] indicates likely pneumonitis and not likely POD, tumor board recommended resuming Alimta, holding Keytruda  Treating as potential irAE pneumonitis: Pred 100 mg with a taper of 20 mg over 6 weeks    SUBJECTIVE  (INTERVAL HISTORY)      Clotting History None   Bleeding History None   Cancer History Lung Adeno   Family Cancer History None   H/O Blood/Plt Transfusion None   Tobacco/etoh/drug abuse 1.5 PPD x 40 years, quit 4/2024, no etoh abuse or rec drug use       Cancer Screening history C-scope 2014, due for cologuard (ordered)   Occupation  in the past, now        Down to 3 pred pills now.    Gas, reflux improved on famotidine.     Had a shingles outbreak over his L posterior thigh.    Non-productive cough is ongoing. ROBERTSON ongoing and not any better.      L posterior thigh rash noted 2 days ago    Tolerating diet well.   Vision is ok. (Still has Kaleidescope vision which had for years - prior to cancer diagnosis)    I have reviewed the relevant past medical, surgical, social and family history. I have also reviewed allergies and medications for this patient.    Review of Systems  Baseline weight: 200-205 lbs  Today's weight: 212 lbs    Denies F/C, N/V, CP, LH, HA, rash,  itching, gen weakness, unilateral weakness, diplopia, melena, hematuria, hematochezia, falls, diarrhea, or constipation       A 10-point review of system was performed, pertinent positive and negative were detailed as above. Otherwise, the 10-point review of system was negative.      Past Medical History:   Diagnosis Date    Cancer (HCC)     Diabetes mellitus (HCC)     High cholesterol     History of blood clots     Hypertension     Leucocytosis 2024    Lung cancer (HCC)     Malignant neoplasm of overlapping sites of right lung (HCC) 2024    Pneumonia     Pulmonary embolism (HCC)        Past Surgical History:   Procedure Laterality Date    IR BIOPSY LYMPH NODE  2024    IR PORT PLACEMENT  2024    KNEE SURGERY      MANDIBLE FRACTURE SURGERY  1985    PATELLA FRACTURE SURGERY      RECTAL SURGERY         Family History   Problem Relation Age of Onset    No Known Problems Father     No Known Problems Mother        Social History     Socioeconomic History    Marital status: Single     Spouse name: Not on file    Number of children: Not on file    Years of education: Not on file    Highest education level: Not on file   Occupational History    Not on file   Tobacco Use    Smoking status: Former     Current packs/day: 0.00     Average packs/day: 1.5 packs/day for 35.0 years (52.5 ttl pk-yrs)     Types: Cigarettes     Quit date: 4/15/2024     Years since quittin.6     Passive exposure: Past    Smokeless tobacco: Never   Vaping Use    Vaping status: Never Used   Substance and Sexual Activity    Alcohol use: Not Currently    Drug use: Never    Sexual activity: Yes     Partners: Female     Birth control/protection: Post-menopausal, None   Other Topics Concern    Not on file   Social History Narrative    Not on file     Social Drivers of Health     Financial Resource Strain: Not on file   Food Insecurity: Not on file   Transportation Needs: Not on file   Physical Activity: Not on file   Stress:  Not on file   Social Connections: Not on file   Intimate Partner Violence: Not on file   Housing Stability: Not on file       No Known Allergies    Current Outpatient Medications   Medication Sig Dispense Refill    albuterol (ProAir HFA) 90 mcg/act inhaler Inhale 2 puffs every 6 (six) hours as needed for wheezing 8.5 g 2    amitriptyline (ELAVIL) 100 mg tablet 200 mg daily at bedtime      apixaban (Eliquis) 5 mg Take 1 tablet (5 mg total) by mouth 2 (two) times a day 60 tablet 5    atorvastatin (LIPITOR) 10 mg tablet Take 1 tablet (10 mg total) by mouth daily 90 tablet 3    benzonatate (TESSALON) 200 MG capsule Take 1 capsule (200 mg total) by mouth 3 (three) times a day as needed for cough 20 capsule 0    famotidine (PEPCID) 40 MG tablet Take 40 mg by mouth daily as needed for heartburn or indigestion Taking as needed      folic acid (FOLVITE) 1 mg tablet TAKE 1 TABLET BY MOUTH EVERY DAY 90 tablet 2    glimepiride (AMARYL) 2 mg tablet Take 1 tablet (2 mg total) by mouth daily with breakfast 90 tablet 1    Lancets (OneTouch Delica Plus [id number]) MISC Use 1 Units 4 (four) times a day 400 each 2    levothyroxine (Synthroid) 25 mcg tablet Take 1 tablet (25 mcg total) by mouth daily 90 tablet 2    losartan (COZAAR) 25 mg tablet Take 25 mg by mouth daily      metFORMIN (GLUCOPHAGE) 1000 MG tablet Take 1,000 mg by mouth 2 (two) times a day      ondansetron (ZOFRAN-ODT) 8 mg disintegrating tablet Take 1 tablet (8 mg total) by mouth every 8 (eight) hours as needed for vomiting or nausea 30 tablet 0    OneTouch Verio test strip Use 1 each 4 (four) times a day 400 each 2    predniSONE 20 mg tablet Take 5 tablets (100 mg total) by mouth daily for 7 days, THEN 4 tablets (80 mg total) daily for 7 days, THEN 3 tablets (60 mg total) daily for 7 days, THEN 2 tablets (40 mg total) daily for 7 days, THEN 1 tablet (20 mg total) daily for 7 days. 105 tablet 0    tiotropium-olodaterol (Stiolto Respimat) 2.5-2.5 MCG/ACT inhaler Inhale 2  puffs daily 4 g 3    Glucagon, rDNA, (Glucagon Emergency) 1 MG KIT Inject 1 mg as directed once as needed (hypoglycemia) for up to 1 dose (Patient not taking: Reported on 7/3/2024) 2 kit 0    metFORMIN (GLUCOPHAGE) 500 mg tablet Take 1,000 mg by mouth 2 (two) times a day with meals (Patient not taking: Reported on 10/9/2024)       No current facility-administered medications for this visit.       (Not in a hospital admission)      Objective:     24 Hour Vitals Assessment:     Vitals:    11/22/24 0950   BP: 146/78   Pulse: 104   Resp: 18   Temp: (!) 96.4 °F (35.8 °C)   SpO2: 98%       PHYSICIAN EXAM:    General: Appearance: alert, cooperative, no distress.  HEENT: Normocephalic, atraumatic. No scleral icterus. conjunctivae clear. EOMI.  Chest: No tenderness to palpation. No open wound noted.  Lungs: Clear to auscultation bilaterally, Respirations unlabored.  Cardiac: Tachycardia, +S1and S2  Abdomen: Soft, non-tender, non-distended. Bowel sounds are normal.  Extremities:  No edema, cyanosis, clubbing.  Skin: Skin color, turgor are normal. L Posterior thigh fluid vesicles noted, several with fluid restricted within a dermatome  Lymphatics: no palpable supra-cervical, axillary, or inguinal adenopathy  Neurologic: Awake, Alert, and oriented, no gross focal deficits noted b/l.       DATA REVIEW:    Pathology Result:    Final Diagnosis   Date Value Ref Range Status   04/23/2024   Final    A. Lymph Node, Left retroperitoneal, Biopsy:  - Metastatic adenocarcinoma of lung primary.     Comment: Specimen (block A2) is being sent to [facility] for MI Profile Testing. Upon completion of testing, Deed report will be sent directly to the requesting provider, as well as posted in the Media Tab of EPIC for this patient by the Pathology Department.          Image Results:   Image result are reviewed and documented in Hematology/Oncology history    CT chest abdomen pelvis w contrast  Narrative: CT CHEST, ABDOMEN AND PELVIS  WITH IV CONTRAST    INDICATION: C34.91: Malignant neoplasm of unspecified part of right bronchus or lung.    COMPARISON: CT chest from 10/4/2024 and CT chest abdomen pelvis from August 2, 2024    TECHNIQUE: CT examination of the chest, abdomen and pelvis was performed. Multiplanar 2D reformatted images were created from the source data.    This examination, like all CT scans performed in the [facility], was performed utilizing techniques to minimize radiation dose exposure, including the use of iterative reconstruction and automated exposure control. Radiation dose length   product (DLP) for this visit: 1096 mGy-cm    IV Contrast: 100 mL of iohexol (OMNIPAQUE)  Enteric Contrast: Not administered.    FINDINGS:    CHEST    LUNGS: Primary lesion right middle lobe measures approximately 4.0 x 2.8 cm, stable allowing for differences in technique and slice selection. Significant progression of lymphangitic tumor compared to August 2, 2024 and October 4, 2024, with worsening   diffuse interlobular septal thickening throughout the right lung, peribronchovascular nodularity.    PLEURA: Small right pleural effusion, new/increased compared to 10/4/2024.    HEART/GREAT VESSELS: Heavy atherosclerotic coronary artery calcification. No thoracic aortic aneurysm.    MEDIASTINUM AND JEREMIAH: Stable shotty prevascular lymph nodes. No new adenopathy.    CHEST WALL AND LOWER NECK: Unremarkable.    ABDOMEN    LIVER/BILIARY TREE: Hepatic steatosis.    GALLBLADDER: Contracted.    SPLEEN: Unremarkable.    PANCREAS: Unremarkable.    ADRENAL GLANDS: Mild nodular thickening of the medial limb of the left adrenal gland, decreased since August 2, 2024 without measurable disease. Normal right adrenal gland.    KIDNEYS/URETERS: Left lower pole renal calculus. No hydronephrosis or perinephric stranding.    STOMACH AND BOWEL: Unremarkable.    APPENDIX: No findings to suggest appendicitis.    ABDOMINOPELVIC CAVITY: No ascites. No  "pneumoperitoneum. No lymphadenopathy.    VESSELS: Atherosclerosis without abdominal aortic aneurysm.    PELVIS    REPRODUCTIVE ORGANS: Enlarged prostate.    URINARY BLADDER: Normal.    ABDOMINAL WALL/INGUINAL REGIONS: Small bilateral fat-containing inguinal hernias.    BONES: No acute fracture or suspicious osseous lesion.  Impression: Worsening lymphangitic tumor progression compared to prior from 10/4/2024 and August 2, 2024.    New/increased small right pleural effusion, probably malignant.    Resolving nodular thickening medial limb left adrenal gland. No new metastatic disease in the abdomen or pelvis.    Nonobstructing left nephrolithiasis.    The study was marked in EPIC for significant notification.    Workstation performed: [id number]      LABS:  Lab data are reviewed and documented in HemOnc history.       Lab Results   Component Value Date    HGB 10.2 (L) 11/01/2024    HCT 32.5 (L) 11/01/2024    MCV 87 11/01/2024     11/01/2024    WBC 5.35 11/01/2024    NRBC 0 11/01/2024     Lab Results   Component Value Date    K 4.1 11/01/2024     11/01/2024    CO2 26 11/01/2024    BUN 17 11/01/2024    CREATININE 0.92 11/01/2024    GLUF 100 (H) 06/28/2024    CALCIUM 9.2 11/01/2024    AST 17 11/01/2024    ALT 24 11/01/2024    ALKPHOS 73 11/01/2024    EGFR 93 11/01/2024       Lab Results   Component Value Date    IRON 28 (L) 04/19/2024    TIBC 212 (L) 04/19/2024    FERRITIN 63 04/19/2024       No results found for: \"[id number]\"    No results for input(s): \"WBC\", \"CREAT\", \"PLT\" in the last 72 hours.    By:  Maryana Sandoval MD, 11/22/2024, 9:57 AM                                  "

## 2024-11-19 ENCOUNTER — HOSPITAL ENCOUNTER (OUTPATIENT)
Dept: INFUSION CENTER | Facility: CLINIC | Age: 54
Discharge: HOME | End: 2024-11-19
Payer: COMMERCIAL

## 2024-11-19 DIAGNOSIS — C34.81 ADENOCARCINOMA OF OVERLAPPING SITES OF RIGHT LUNG (HCC): Primary | ICD-10-CM

## 2024-11-19 PROCEDURE — 96372 THER/PROPH/DIAG INJ SC/IM: CPT

## 2024-11-19 RX ORDER — CYANOCOBALAMIN 1000 UG/ML
1000 INJECTION, SOLUTION INTRAMUSCULAR; SUBCUTANEOUS ONCE
Status: COMPLETED | OUTPATIENT
Start: 2024-11-19 | End: 2024-11-19

## 2024-11-19 RX ADMIN — CYANOCOBALAMIN 1000 MCG: 1000 INJECTION, SOLUTION INTRAMUSCULAR at 09:18

## 2024-11-19 NOTE — PROGRESS NOTES
Patient is here for B 12 and offers no complaints. B 12 given in left arm and tolerated it well. Next appointment confirmed 11/22/24 at 1500 at [place]. Patient declined avs

## 2024-11-20 ENCOUNTER — OFFICE VISIT (OUTPATIENT)
Dept: PALLIATIVE MEDICINE | Facility: CLINIC | Age: 54
End: 2024-11-20

## 2024-11-20 VITALS
OXYGEN SATURATION: 93 % | TEMPERATURE: 97.9 F | HEIGHT: 67 IN | WEIGHT: 209 LBS | DIASTOLIC BLOOD PRESSURE: 78 MMHG | SYSTOLIC BLOOD PRESSURE: 130 MMHG | HEART RATE: 90 BPM | BODY MASS INDEX: 32.8 KG/M2

## 2024-11-20 DIAGNOSIS — C34.81 ADENOCARCINOMA OF OVERLAPPING SITES OF RIGHT LUNG (HCC): Primary | ICD-10-CM

## 2024-11-20 DIAGNOSIS — R05.3 CHRONIC COUGH: ICD-10-CM

## 2024-11-20 DIAGNOSIS — Z51.5 PALLIATIVE CARE ENCOUNTER: ICD-10-CM

## 2024-11-20 DIAGNOSIS — M54.6 RIGHT-SIDED THORACIC BACK PAIN, UNSPECIFIED CHRONICITY: ICD-10-CM

## 2024-11-20 PROBLEM — R05.9 COUGH: Status: ACTIVE | Noted: 2024-11-20

## 2024-11-20 PROBLEM — G89.29 CHRONIC RIGHT-SIDED THORACIC BACK PAIN: Status: RESOLVED | Noted: 2024-11-20 | Resolved: 2024-11-20

## 2024-11-20 PROBLEM — G89.29 CHRONIC RIGHT-SIDED THORACIC BACK PAIN: Status: ACTIVE | Noted: 2024-11-20

## 2024-11-20 NOTE — ASSESSMENT & PLAN NOTE
Has tried Visjessican perles with little improvement.    Patient advised to trial OTC anti-tussive such as Robitussin to see if this helps.  No new fevers or chills or signs of infection at this time.

## 2024-11-20 NOTE — ASSESSMENT & PLAN NOTE
Psychosocial   Supportive listening provided  Normalized experience of patient/family  Provided anxiety containment     Referrals Placed / Medical Equipment Ordered  -None today    Follow-Up Recommendations  -Follow-up with PCP and current medical specialists  -Follow-up with palliative care: 4 weeks or sooner

## 2024-11-20 NOTE — PROGRESS NOTES
Name: Papo Gibbs      : 1970      MRN: [id number]  Encounter Provider: Rikki Estrella DO  Encounter Date: 2024   Encounter department: [facility]  :  Assessment & Plan  Adenocarcinoma of overlapping sites of right lung (HCC)  Following Dr. Sandoval of Medical Oncology.  Alimta and Keytruda held at this time due to potential pneumonitis with current treatment as such with steroid taper.     Following Dr. Alcala of Rad Onc for ongoing observation of brain mets.  -Repeat MRI scheduled for 2024.        Right-sided thoracic back pain, unspecified chronicity  Ongoing for the past few weeks.  Patient currently on Steroid taper from Oncology due to potential pneumonitis.    Patient feels some improvement with steroid initiation along with using Tylenol 325mg PRN which has helped.    Pain typically occurs with coughing or taking deep breaths. States pain can be sharp and lasts for a few seconds and goes away. Can also be associated with movement (stretching, leaning over to the right side causing discomfort to the right posterior thoracic region).    Discussed options including topical lidocaine and menthol for which he states it does not help. We discussed opioid escalation and at this time, he wishes to hold off as the pain is not intolerable and has responded to Tylenol.    We discussed using Tylenol at 1,000 mg q8 hours PRN to try and see if this provides more relief for mild pain/aches.    Patient knows to call my office with questions or concerns.      ER Precautions  Watch for red flag symptoms including, but not limited to fevers, chills, chest pain, shortness of breath, intractable nausea/vomiting/diarrhea, or acute intractable pain (especially if pain is new or has changed).    Medication safety issues - Do not drive under the influence of narcotics (including opioids), watch for adverse effects including confusion / altered mental status / respiratory depression (slowed  breathing), keep medications stored in a safe/locked environment, do not use alcohol while opioids or other narcotics are in your system. Do not travel with more than the minimum number of tablets or capsules required for the trip.         Palliative care encounter  Psychosocial   Supportive listening provided  Normalized experience of patient/family  Provided anxiety containment     Referrals Placed / Medical Equipment Ordered  -None today    Follow-Up Recommendations  -Follow-up with PCP and current medical specialists  -Follow-up with palliative care: 4 weeks or sooner           Chronic cough  Has tried Tesslon perles with little improvement.    Patient advised to trial OTC anti-tussive such as Robitussin to see if this helps.  No new fevers or chills or signs of infection at this time.             PDMP Review: I have reviewed the patient's controlled substance dispensing history in the Prescription Drug Monitoring Program in compliance with the [facility] regulations before prescribing any controlled substances.    History of Present Illness   Papo Gibbs is a 54 y.o. male who presents for follow-up.    Palliative Diagnosis - Adenocarcinoma of the right lung  Following Dr. Sandoval for Medical Oncology.  Dr. Alcala - of Radiation Oncology    Patient seen today without acute distress. No acute discomfort but does have pain that is accompanied by coughing or deep breaths. He does have to catch his breath and rest when he is ambulating distances as he has some dyspnea with exertion but recovers without issue.    Patient has been taking 325 mg of Tylenol PRN daily which has helped for his right sided thoracic discomfort.  He is also on a steroid taper with Prednisone for which he is at 60 mg daily with plans for continued taper.     He is eating well without issues. No bowel or bladder concerns today.    He is awaiting for news at his follow-up appointment with Dr. Sandoval in the coming days.    He is otherwise without  other concerns or questions at this time.    He remains well supported by his wife and family.    Patient knows to call my office with questions or concerns.      Medical History Reviewed by provider this encounter:     .  Past Medical History   Past Medical History:   Diagnosis Date    Cancer (HCC) 04,25,2024    Diabetes mellitus (HCC)     High cholesterol     History of blood clots     Hypertension     Leucocytosis 04/20/2024    Lung cancer (HCC)     Malignant neoplasm of overlapping sites of right lung (HCC) 06/04/2024    Pneumonia     Pulmonary embolism (HCC)      Past Surgical History:   Procedure Laterality Date    IR BIOPSY LYMPH NODE  4/23/2024    IR PORT PLACEMENT  5/20/2024    KNEE SURGERY      MANDIBLE FRACTURE SURGERY  1985    PATELLA FRACTURE SURGERY      RECTAL SURGERY       Family History   Problem Relation Age of Onset    No Known Problems Father     No Known Problems Mother       reports that he quit smoking about 7 months ago. His smoking use included cigarettes. He has a 52.5 pack-year smoking history. He has been exposed to tobacco smoke. He has never used smokeless tobacco. He reports that he does not currently use alcohol. He reports that he does not use drugs.  Current Outpatient Medications on File Prior to Visit   Medication Sig Dispense Refill    albuterol (ProAir HFA) 90 mcg/act inhaler Inhale 2 puffs every 6 (six) hours as needed for wheezing 8.5 g 2    amitriptyline (ELAVIL) 100 mg tablet 200 mg daily at bedtime      apixaban (Eliquis) 5 mg Take 1 tablet (5 mg total) by mouth 2 (two) times a day 60 tablet 5    atorvastatin (LIPITOR) 10 mg tablet Take 1 tablet (10 mg total) by mouth daily 90 tablet 3    benzonatate (TESSALON) 200 MG capsule Take 1 capsule (200 mg total) by mouth 3 (three) times a day as needed for cough 20 capsule 0    famotidine (PEPCID) 40 MG tablet Take 40 mg by mouth daily as needed for heartburn or indigestion Taking as needed      folic acid (FOLVITE) 1 mg tablet  TAKE 1 TABLET BY MOUTH EVERY DAY 90 tablet 2    glimepiride (AMARYL) 2 mg tablet Take 1 tablet (2 mg total) by mouth daily with breakfast 90 tablet 1    Lancets (OneTouch Delica Plus [id number]) MISC Use 1 Units 4 (four) times a day 400 each 2    levothyroxine (Synthroid) 25 mcg tablet Take 1 tablet (25 mcg total) by mouth daily 90 tablet 2    losartan (COZAAR) 25 mg tablet Take 25 mg by mouth daily      metFORMIN (GLUCOPHAGE) 1000 MG tablet Take 1,000 mg by mouth 2 (two) times a day      ondansetron (ZOFRAN-ODT) 8 mg disintegrating tablet Take 1 tablet (8 mg total) by mouth every 8 (eight) hours as needed for vomiting or nausea 30 tablet 0    OneTouch Verio test strip Use 1 each 4 (four) times a day 400 each 2    predniSONE 20 mg tablet Take 5 tablets (100 mg total) by mouth daily for 7 days, THEN 4 tablets (80 mg total) daily for 7 days, THEN 3 tablets (60 mg total) daily for 7 days, THEN 2 tablets (40 mg total) daily for 7 days, THEN 1 tablet (20 mg total) daily for 7 days. 105 tablet 0    tiotropium-olodaterol (Stiolto Respimat) 2.5-2.5 MCG/ACT inhaler Inhale 2 puffs daily 4 g 3    Glucagon, rDNA, (Glucagon Emergency) 1 MG KIT Inject 1 mg as directed once as needed (hypoglycemia) for up to 1 dose (Patient not taking: Reported on 7/3/2024) 2 kit 0    metFORMIN (GLUCOPHAGE) 500 mg tablet Take 1,000 mg by mouth 2 (two) times a day with meals (Patient not taking: Reported on 10/9/2024)       Current Facility-Administered Medications on File Prior to Visit   Medication Dose Route Frequency Provider Last Rate Last Admin    [COMPLETED] cyanocobalamin injection 1,000 mcg  1,000 mcg Intramuscular Once Maryana Sandoval MD   1,000 mcg at 11/19/24 0918   No Known Allergies   Current Outpatient Medications on File Prior to Visit   Medication Sig Dispense Refill    albuterol (ProAir HFA) 90 mcg/act inhaler Inhale 2 puffs every 6 (six) hours as needed for wheezing 8.5 g 2    amitriptyline (ELAVIL) 100 mg tablet 200 mg daily  at bedtime      apixaban (Eliquis) 5 mg Take 1 tablet (5 mg total) by mouth 2 (two) times a day 60 tablet 5    atorvastatin (LIPITOR) 10 mg tablet Take 1 tablet (10 mg total) by mouth daily 90 tablet 3    benzonatate (TESSALON) 200 MG capsule Take 1 capsule (200 mg total) by mouth 3 (three) times a day as needed for cough 20 capsule 0    famotidine (PEPCID) 40 MG tablet Take 40 mg by mouth daily as needed for heartburn or indigestion Taking as needed      folic acid (FOLVITE) 1 mg tablet TAKE 1 TABLET BY MOUTH EVERY DAY 90 tablet 2    glimepiride (AMARYL) 2 mg tablet Take 1 tablet (2 mg total) by mouth daily with breakfast 90 tablet 1    Lancets (OneTouch Delica Plus [id number]) MISC Use 1 Units 4 (four) times a day 400 each 2    levothyroxine (Synthroid) 25 mcg tablet Take 1 tablet (25 mcg total) by mouth daily 90 tablet 2    losartan (COZAAR) 25 mg tablet Take 25 mg by mouth daily      metFORMIN (GLUCOPHAGE) 1000 MG tablet Take 1,000 mg by mouth 2 (two) times a day      ondansetron (ZOFRAN-ODT) 8 mg disintegrating tablet Take 1 tablet (8 mg total) by mouth every 8 (eight) hours as needed for vomiting or nausea 30 tablet 0    OneTouch Verio test strip Use 1 each 4 (four) times a day 400 each 2    predniSONE 20 mg tablet Take 5 tablets (100 mg total) by mouth daily for 7 days, THEN 4 tablets (80 mg total) daily for 7 days, THEN 3 tablets (60 mg total) daily for 7 days, THEN 2 tablets (40 mg total) daily for 7 days, THEN 1 tablet (20 mg total) daily for 7 days. 105 tablet 0    tiotropium-olodaterol (Stiolto Respimat) 2.5-2.5 MCG/ACT inhaler Inhale 2 puffs daily 4 g 3    Glucagon, rDNA, (Glucagon Emergency) 1 MG KIT Inject 1 mg as directed once as needed (hypoglycemia) for up to 1 dose (Patient not taking: Reported on 7/3/2024) 2 kit 0    metFORMIN (GLUCOPHAGE) 500 mg tablet Take 1,000 mg by mouth 2 (two) times a day with meals (Patient not taking: Reported on 10/9/2024)       Current Facility-Administered Medications  "on File Prior to Visit   Medication Dose Route Frequency Provider Last Rate Last Admin    [COMPLETED] cyanocobalamin injection 1,000 mcg  1,000 mcg Intramuscular Once Maryana Sandoval MD   1,000 mcg at 24 0918      Social History     Tobacco Use    Smoking status: Former     Current packs/day: 0.00     Average packs/day: 1.5 packs/day for 35.0 years (52.5 ttl pk-yrs)     Types: Cigarettes     Quit date: 4/15/2024     Years since quittin.6     Passive exposure: Past    Smokeless tobacco: Never   Vaping Use    Vaping status: Never Used   Substance and Sexual Activity    Alcohol use: Not Currently    Drug use: Never    Sexual activity: Yes     Partners: Female     Birth control/protection: Post-menopausal, None        Objective   /78 (BP Location: Left arm, Patient Position: Sitting, Cuff Size: Standard)   Pulse 90 Comment: left radial pulse recheck  Temp 97.9 °F (36.6 °C) (Temporal)   Ht 5' 7\" (1.702 m)   Wt 94.8 kg (209 lb)   SpO2 93%   BMI 32.73 kg/m²     Physical Exam  Vitals reviewed.   Constitutional:       General: He is not in acute distress.     Appearance: Normal appearance. He is not ill-appearing, toxic-appearing or diaphoretic.   HENT:      Head: Normocephalic and atraumatic.      Nose: Nose normal.      Mouth/Throat:      Mouth: Mucous membranes are moist.      Comments: Some hoarseness noted to voice  Eyes:      General:         Right eye: No discharge.         Left eye: No discharge.   Cardiovascular:      Rate and Rhythm: Normal rate.      Comments: Left radial pulse recheck of 90 bpm.  Pulmonary:      Effort: Pulmonary effort is normal. No respiratory distress.      Breath sounds: No wheezing.   Abdominal:      General: Abdomen is flat. There is no distension.   Musculoskeletal:         General: No swelling.   Skin:     General: Skin is warm and dry.      Coloration: Skin is not jaundiced or pale.   Neurological:      General: No focal deficit present.      Mental Status: He is " alert. Mental status is at baseline.   Psychiatric:         Mood and Affect: Mood normal.         Behavior: Behavior normal.         Thought Content: Thought content normal.         Judgment: Judgment normal.         Recent labs:  Lab Results   Component Value Date/Time    SODIUM 140 11/01/2024 02:53 PM    K 4.1 11/01/2024 02:53 PM    BUN 17 11/01/2024 02:53 PM    CREATININE 0.92 11/01/2024 02:53 PM    GLUC 155 (H) 11/01/2024 02:53 PM    CALCIUM 9.2 11/01/2024 02:53 PM    AST 17 11/01/2024 02:53 PM    ALT 24 11/01/2024 02:53 PM    ALB 3.6 11/01/2024 02:53 PM    TP 6.6 11/01/2024 02:53 PM    EGFR 93 11/01/2024 02:53 PM     Lab Results   Component Value Date/Time    HGB 10.2 (L) 11/01/2024 02:53 PM    WBC 5.35 11/01/2024 02:53 PM     11/01/2024 02:53 PM    INR 1.13 04/19/2024 08:39 PM    PTT 46 (H) 04/23/2024 03:56 AM     Lab Results   Component Value Date/Time    QBK2WYJYQMCG 0.366 (L) 11/01/2024 02:53 PM       Recent Imaging:  Procedure: CT chest abdomen pelvis w contrast  Result Date: 10/30/2024  Narrative: CT CHEST, ABDOMEN AND PELVIS WITH IV CONTRAST INDICATION: C34.91: Malignant neoplasm of unspecified part of right bronchus or lung. COMPARISON: CT chest from 10/4/2024 and CT chest abdomen pelvis from August 2, 2024 TECHNIQUE: CT examination of the chest, abdomen and pelvis was performed. Multiplanar 2D reformatted images were created from the source data. This examination, like all CT scans performed in the [facility], was performed utilizing techniques to minimize radiation dose exposure, including the use of iterative reconstruction and automated exposure control. Radiation dose length product (DLP) for this visit: 1096 mGy-cm IV Contrast: 100 mL of iohexol (OMNIPAQUE) Enteric Contrast: Not administered. FINDINGS: CHEST LUNGS: Primary lesion right middle lobe measures approximately 4.0 x 2.8 cm, stable allowing for differences in technique and slice selection. Significant progression  of lymphangitic tumor compared to August 2, 2024 and October 4, 2024, with worsening diffuse interlobular septal thickening throughout the right lung, peribronchovascular nodularity. PLEURA: Small right pleural effusion, new/increased compared to 10/4/2024. HEART/GREAT VESSELS: Heavy atherosclerotic coronary artery calcification. No thoracic aortic aneurysm. MEDIASTINUM AND JEREMIAH: Stable shotty prevascular lymph nodes. No new adenopathy. CHEST WALL AND LOWER NECK: Unremarkable. ABDOMEN LIVER/BILIARY TREE: Hepatic steatosis. GALLBLADDER: Contracted. SPLEEN: Unremarkable. PANCREAS: Unremarkable. ADRENAL GLANDS: Mild nodular thickening of the medial limb of the left adrenal gland, decreased since August 2, 2024 without measurable disease. Normal right adrenal gland. KIDNEYS/URETERS: Left lower pole renal calculus. No hydronephrosis or perinephric stranding. STOMACH AND BOWEL: Unremarkable. APPENDIX: No findings to suggest appendicitis. ABDOMINOPELVIC CAVITY: No ascites. No pneumoperitoneum. No lymphadenopathy. VESSELS: Atherosclerosis without abdominal aortic aneurysm. PELVIS REPRODUCTIVE ORGANS: Enlarged prostate. URINARY BLADDER: Normal. ABDOMINAL WALL/INGUINAL REGIONS: Small bilateral fat-containing inguinal hernias. BONES: No acute fracture or suspicious osseous lesion.     Impression: Worsening lymphangitic tumor progression compared to prior from 10/4/2024 and August 2, 2024. New/increased small right pleural effusion, probably malignant. Resolving nodular thickening medial limb left adrenal gland. No new metastatic disease in the abdomen or pelvis. Nonobstructing left nephrolithiasis. The study was marked in EPIC for significant notification. Workstation performed: [id number]     Procedure: POCT FeNO  Impression: Feno 16,  No indication of airway inflammation    Procedure: CTA chest pe study  Result Date: 10/4/2024  Narrative: CTA - CHEST WITH IV CONTRAST - PULMONARY ANGIOGRAM INDICATION:   C34.81: Malignant  "neoplasm of overlapping sites of right bronchus and lung I82.412: Acute embolism and thrombosis of left femoral vein I26.09: Other pulmonary embolism with acute cor pulmonale R06.09: Other forms of dyspnea. \"Acute shortness of breath with exertion.\" Per my review of the medical record, metastatic adenocarcinoma of the right lung diagnosed by left retroperitoneal lymph node biopsy on 4/23/2024. Treated with chemotherapy and radiation to C1. COMPARISON: Chest CT 8/2/2024 and 4/19/2024, CXR 4/22/2024. TECHNIQUE: CT angiogram timed for optimal opacification of the pulmonary arteries.  Axial, sagittal, and coronal 2D reformats created from source data.  Coronal 3D MIP postprocessing on the acquisition scanner. Radiation dose length product (DLP):  378.66 mGy-cm .  Radiation dose exposure minimized using iterative reconstruction and automated exposure control. IV Contrast:  50 mL of iohexol (OMNIPAQUE) FINDINGS: PULMONARY ARTERIES: Tiny barely conspicuous subsegmental pulmonary embolus in the right lower lobe (301/119-127. No additional acute pulmonary emboli. LUNGS: Stable 4.5 cm tumor in the right middle lobe abutting the minor fissure, possibly extending to the right upper lobe. Mild progression of thickening of the bronchovascular bundles and diffuse septal thickening in the right lung due to lymphangitic spread of tumor. Patchy new consolidation in the right lower lobe. Mild emphysema. AIRWAYS: No significant filling defects. PLEURA:  Unremarkable. HEART/GREAT VESSELS: Normal heart size. RV/LV diameter ratio less than 1 with nothing to indicate right heart strain. Moderate coronary artery calcification indicating atherosclerotic heart disease. MEDIASTINUM AND JEREMIAH: Right port in right atrium. Stable prevascular node at the upper limit of normal in size at 9 mm (301/49). CHEST WALL AND LOWER NECK: Unremarkable. UPPER ABDOMEN: Benign calcified hepatic granuloma OSSEOUS STRUCTURES:  Normal for age.     Impression: Tiny " subsegmental embolus in the right lower lobe. Given its small size, it is of doubtful clinical significance. Patchy new consolidation in the right lower lobe, infectious or inflammatory. Stable tumor in the right midlung with progression of lymphangitic spread throughout the right lung. I notified Dr. DAREN ELLISON on 10/4/2024 12:58 PM by [facility] secure chat and he responded immediately Workstation performed: [id number]     Procedure: MRI Brain BT w wo Contrast  Result Date: 9/13/2024  Narrative: MRI BRAIN WITH AND WITHOUT CONTRAST INDICATION: C79.31: Secondary malignant neoplasm of brain.   Additional history from [facility]: Brain metastasis s/p SRS. monitoring., Metastasis to brain (HCC) COMPARISON: MRI brain 6/20/2024 TECHNIQUE: Multiplanar, multisequence imaging of the brain and sella was performed before and after gadolinium administration. IV Contrast:  10 mL of Gadobutrol injection (SINGLE-DOSE) IMAGE QUALITY:   Diagnostic. FINDINGS: High right frontal lobe/precentral gyrus lesion, decreased in size, currently measuring 0.3 cm (previous 0.5 cm) (series 13: 22). There is no new pathologic area of intra-axial enhancement. Perfusion: No evidence of abnormal hyperperfusion Diffusion: No diffusion abnormality to suggest hypercellularity. OTHER FINDINGS: Chronic right caudate head lacunar infarct. Mild chronic microangiopathic ischemic changes. VENTRICLES:  Normal for the patient's age. SELLA AND PITUITARY GLAND:  Normal. ORBITS:  Normal. PARANASAL SINUSES:  Normal. VASCULATURE:  Evaluation of the major intracranial vasculature demonstrates appropriate flow voids. CALVARIUM AND SKULL BASE:  Normal. EXTRACRANIAL SOFT TISSUES:  Normal.     Impression: Previous high right frontal lobe/precentral gyrus lesion, decreased in size status post SRS compared to 6/20/2024. There is no new pathologic area of intra-axial enhancement. Mild chronic microangiopathic ischemic changes. Workstation performed: [id number]     Procedure: CT  chest abdomen pelvis w contrast  Result Date: 8/5/2024  Narrative: CT CHEST, ABDOMEN AND PELVIS WITH IV CONTRAST INDICATION:   Malignant neoplasm of overlapping sites of right bronchus and lung. . COMPARISON: CT abdomen and pelvis dated 4/20/2024. CT chest dated 4/19/2024. TECHNIQUE: CT examination of the chest, abdomen and pelvis was performed. Multiplanar 2D reformatted images were created from the source data. This examination, like all CT scans performed in the [facility], was performed utilizing techniques to minimize radiation dose exposure, including the use of iterative reconstruction and automated exposure control. Radiation dose length product (DLP) for this visit: IV Contrast: Enteric Contrast: Enteric contrast was not administered. FINDINGS: CHEST LUNGS: Significant interval decrease in size of right upper lobe mass with lymphangitic carcinomatosis and extension into the right middle lobe with residual wedge-shaped airspace opacity now measuring 4.6 x 2.6 cm image 118 series 4. Mild residual interlobular septal wall thickening is noted in the right lower lobe. There is no tracheal or endobronchial lesion. PLEURA:  Unremarkable. HEART/GREAT VESSELS: Heavy atherosclerotic coronary artery calcification is noted.  Heart is otherwise unremarkable.  No thoracic aortic aneurysm. MEDIASTINUM AND JEREMIAH: Interval decrease in size of mediastinal and hilar lymph nodes. Left hilar lymph node measures 1.3 x 1.1 cm compared to 2.3 x 2.8 cm previously. Subcarinal lymph node measures 1.7 x 1 cm compared to 4.3 x 2.5 cm previously. 2.. CHEST WALL AND LOWER NECK:  Unremarkable. Right anterior chest wall Port-A-Cath ABDOMEN LIVER/BILIARY TREE: Hepatomegaly measuring 20 cm with mild steatosis. No suspicious hepatic lesions. GALLBLADDER:  No calcified gallstones. No pericholecystic inflammatory change. SPLEEN:  Unremarkable. PANCREAS:  Unremarkable. ADRENAL GLANDS: Interval decrease in size of left adrenal  lesion measuring 1.2 x 1.2 cm compared to 2.1 x 1.8 cm previously image 109 series 2. Normal size right adrenal gland. KIDNEYS/URETERS: 1.5 cm nonobstructing left renal stone.. No hydronephrosis. STOMACH AND BOWEL:  There is colonic diverticulosis without evidence of acute diverticulitis. APPENDIX:  No findings to suggest appendicitis. ABDOMINOPELVIC CAVITY:  No ascites.  No pneumoperitoneum.  No lymphadenopathy. VESSELS:  Atherosclerotic changes are present.  No evidence of aneurysm. PELVIS REPRODUCTIVE ORGANS:  The prostate is enlarged. URINARY BLADDER: Mild bladder wall thickening. ABDOMINAL WALL/INGUINAL REGIONS:  There is a small fat-containing umbilical hernia. Small fat-containing inguinal hernias OSSEOUS STRUCTURES:  No acute fracture or destructive osseous lesion.  Spinal degenerative changes are noted.     Impression: 1. Treatment response. Interval decrease in size of mediastinal and hilar lymphadenopathy as well as interval decrease in size of infiltrating mass in the right upper lobe and involving portions of the right middle lobe with lymphangitic carcinomatosis. Interval decrease in size of metastatic lesion to the left adrenal gland. No new areas of thoracic or intra-abdominal lymphadenopathy. No osseous metastatic disease. 2. Nonobstructing left renal stone measuring 1.5 cm 3. Hepatomegaly with steatosis. No suspicious hepatic lesions. Electronically signed: 08/05/2024 10:38 PM Fer Lawrence MD      Administrative Statements   I have spent a total time of  31 minutes in caring for this patient on the day of the visit/encounter including Risks and benefits of tx options, Instructions for management, Patient and family education, Importance of tx compliance, Risk factor reductions, Impressions, Counseling / Coordination of care, Documenting in the medical record, Reviewing / ordering tests, medicine, procedures  , and Obtaining or reviewing history  . Topics discussed with the patient / family include  symptom assessment and management, medication review, medication adjustment, psychosocial support, supportive listening, and anticipatory guidance.

## 2024-11-20 NOTE — ASSESSMENT & PLAN NOTE
Following Dr. Sandoval of Medical Oncology.  Alimta and Keytruda held at this time due to potential pneumonitis with current treatment as such with steroid taper.     Following Dr. Alcala of Rad Onc for ongoing observation of brain mets.  -Repeat MRI scheduled for 12/13/2024.

## 2024-11-20 NOTE — ASSESSMENT & PLAN NOTE
Ongoing for the past few weeks.  Patient currently on Steroid taper from Oncology due to potential pneumonitis.    Patient feels some improvement with steroid initiation along with using Tylenol 325mg PRN which has helped.    Pain typically occurs with coughing or taking deep breaths. States pain can be sharp and lasts for a few seconds and goes away. Can also be associated with movement (stretching, leaning over to the right side causing discomfort to the right posterior thoracic region).    Discussed options including topical lidocaine and menthol for which he states it does not help. We discussed opioid escalation and at this time, he wishes to hold off as the pain is not intolerable and has responded to Tylenol.    We discussed using Tylenol at 1,000 mg q8 hours PRN to try and see if this provides more relief for mild pain/aches.    Patient knows to call my office with questions or concerns.      ER Precautions  Watch for red flag symptoms including, but not limited to fevers, chills, chest pain, shortness of breath, intractable nausea/vomiting/diarrhea, or acute intractable pain (especially if pain is new or has changed).    Medication safety issues - Do not drive under the influence of narcotics (including opioids), watch for adverse effects including confusion / altered mental status / respiratory depression (slowed breathing), keep medications stored in a safe/locked environment, do not use alcohol while opioids or other narcotics are in your system. Do not travel with more than the minimum number of tablets or capsules required for the trip.

## 2024-11-22 ENCOUNTER — HOSPITAL ENCOUNTER (OUTPATIENT)
Dept: INFUSION CENTER | Facility: CLINIC | Age: 54
End: 2024-11-22
Payer: COMMERCIAL

## 2024-11-22 ENCOUNTER — OFFICE VISIT (OUTPATIENT)
Dept: HEMATOLOGY ONCOLOGY | Facility: CLINIC | Age: 54
End: 2024-11-22
Payer: COMMERCIAL

## 2024-11-22 VITALS
HEIGHT: 67 IN | TEMPERATURE: 96.4 F | BODY MASS INDEX: 33.27 KG/M2 | HEART RATE: 104 BPM | RESPIRATION RATE: 18 BRPM | WEIGHT: 212 LBS | SYSTOLIC BLOOD PRESSURE: 146 MMHG | OXYGEN SATURATION: 98 % | DIASTOLIC BLOOD PRESSURE: 78 MMHG

## 2024-11-22 DIAGNOSIS — C34.81 ADENOCARCINOMA OF OVERLAPPING SITES OF RIGHT LUNG (HCC): Primary | ICD-10-CM

## 2024-11-22 DIAGNOSIS — Z95.828 PORT-A-CATH IN PLACE: Primary | ICD-10-CM

## 2024-11-22 DIAGNOSIS — C34.81 ADENOCARCINOMA OF OVERLAPPING SITES OF RIGHT LUNG (HCC): ICD-10-CM

## 2024-11-22 DIAGNOSIS — B02.9 HERPES ZOSTER WITHOUT COMPLICATION: ICD-10-CM

## 2024-11-22 LAB
ALBUMIN SERPL BCG-MCNC: 4 G/DL (ref 3.5–5)
ALP SERPL-CCNC: 78 U/L (ref 34–104)
ALT SERPL W P-5'-P-CCNC: 26 U/L (ref 7–52)
ANION GAP SERPL CALCULATED.3IONS-SCNC: 11 MMOL/L (ref 4–13)
AST SERPL W P-5'-P-CCNC: 14 U/L (ref 13–39)
BASOPHILS # BLD AUTO: 0.03 THOUSANDS/ÂΜL (ref 0–0.1)
BASOPHILS NFR BLD AUTO: 0 % (ref 0–1)
BILIRUB SERPL-MCNC: 0.48 MG/DL (ref 0.2–1)
BUN SERPL-MCNC: 24 MG/DL (ref 5–25)
CALCIUM SERPL-MCNC: 9.2 MG/DL (ref 8.4–10.2)
CHLORIDE SERPL-SCNC: 102 MMOL/L (ref 96–108)
CO2 SERPL-SCNC: 24 MMOL/L (ref 21–32)
CREAT SERPL-MCNC: 0.82 MG/DL (ref 0.6–1.3)
EOSINOPHIL # BLD AUTO: 0.01 THOUSAND/ÂΜL (ref 0–0.61)
EOSINOPHIL NFR BLD AUTO: 0 % (ref 0–6)
ERYTHROCYTE [DISTWIDTH] IN BLOOD BY AUTOMATED COUNT: 16.1 % (ref 11.6–15.1)
GFR SERPL CREATININE-BSD FRML MDRD: 99 ML/MIN/1.73SQ M
GLUCOSE SERPL-MCNC: 190 MG/DL (ref 65–140)
HCT VFR BLD AUTO: 38.5 % (ref 36.5–49.3)
HGB BLD-MCNC: 12.2 G/DL (ref 12–17)
IMM GRANULOCYTES # BLD AUTO: 0.05 THOUSAND/UL (ref 0–0.2)
IMM GRANULOCYTES NFR BLD AUTO: 1 % (ref 0–2)
LYMPHOCYTES # BLD AUTO: 1.67 THOUSANDS/ÂΜL (ref 0.6–4.47)
LYMPHOCYTES NFR BLD AUTO: 18 % (ref 14–44)
MCH RBC QN AUTO: 27.1 PG (ref 26.8–34.3)
MCHC RBC AUTO-ENTMCNC: 31.7 G/DL (ref 31.4–37.4)
MCV RBC AUTO: 86 FL (ref 82–98)
MONOCYTES # BLD AUTO: 0.56 THOUSAND/ÂΜL (ref 0.17–1.22)
MONOCYTES NFR BLD AUTO: 6 % (ref 4–12)
NEUTROPHILS # BLD AUTO: 7.1 THOUSANDS/ÂΜL (ref 1.85–7.62)
NEUTS SEG NFR BLD AUTO: 75 % (ref 43–75)
NRBC BLD AUTO-RTO: 0 /100 WBCS
PLATELET # BLD AUTO: 134 THOUSANDS/UL (ref 149–390)
PMV BLD AUTO: 9.7 FL (ref 8.9–12.7)
POTASSIUM SERPL-SCNC: 3.7 MMOL/L (ref 3.5–5.3)
PROT SERPL-MCNC: 7 G/DL (ref 6.4–8.4)
RBC # BLD AUTO: 4.5 MILLION/UL (ref 3.88–5.62)
SODIUM SERPL-SCNC: 137 MMOL/L (ref 135–147)
WBC # BLD AUTO: 9.42 THOUSAND/UL (ref 4.31–10.16)

## 2024-11-22 PROCEDURE — 85025 COMPLETE CBC W/AUTO DIFF WBC: CPT | Performed by: INTERNAL MEDICINE

## 2024-11-22 PROCEDURE — 99215 OFFICE O/P EST HI 40 MIN: CPT | Performed by: INTERNAL MEDICINE

## 2024-11-22 PROCEDURE — 80053 COMPREHEN METABOLIC PANEL: CPT | Performed by: INTERNAL MEDICINE

## 2024-11-22 RX ORDER — ACYCLOVIR 400 MG/1
800 TABLET ORAL
Qty: 100 TABLET | Refills: 0 | Status: SHIPPED | OUTPATIENT
Start: 2024-11-22 | End: 2024-12-02

## 2024-11-22 NOTE — PROGRESS NOTES
Pt to clinic for lab draw from port. Pt tolerated without complications. Next appointment Nov 26 at 1:30

## 2024-11-26 ENCOUNTER — HOSPITAL ENCOUNTER (OUTPATIENT)
Dept: INFUSION CENTER | Facility: CLINIC | Age: 54
Discharge: HOME | End: 2024-11-26
Payer: COMMERCIAL

## 2024-11-26 VITALS
WEIGHT: 207 LBS | TEMPERATURE: 96.8 F | HEART RATE: 120 BPM | OXYGEN SATURATION: 94 % | DIASTOLIC BLOOD PRESSURE: 80 MMHG | BODY MASS INDEX: 32.49 KG/M2 | RESPIRATION RATE: 16 BRPM | HEIGHT: 67 IN | SYSTOLIC BLOOD PRESSURE: 155 MMHG

## 2024-11-26 DIAGNOSIS — C34.81 ADENOCARCINOMA OF OVERLAPPING SITES OF RIGHT LUNG (HCC): Primary | ICD-10-CM

## 2024-11-26 PROCEDURE — 96409 CHEMO IV PUSH SNGL DRUG: CPT

## 2024-11-26 PROCEDURE — 96367 TX/PROPH/DG ADDL SEQ IV INF: CPT

## 2024-11-26 RX ORDER — SODIUM CHLORIDE 9 MG/ML
20 INJECTION, SOLUTION INTRAVENOUS ONCE
Status: COMPLETED | OUTPATIENT
Start: 2024-11-26 | End: 2024-11-26

## 2024-11-26 RX ADMIN — PEMETREXED DISODIUM 1000 MG: 500 INJECTION, POWDER, LYOPHILIZED, FOR SOLUTION INTRAVENOUS at 14:12

## 2024-11-26 RX ADMIN — DEXAMETHASONE SODIUM PHOSPHATE: 10 INJECTION, SOLUTION INTRAMUSCULAR; INTRAVENOUS at 13:46

## 2024-11-26 RX ADMIN — SODIUM CHLORIDE 20 ML/HR: 0.9 INJECTION, SOLUTION INTRAVENOUS at 13:40

## 2024-12-02 ENCOUNTER — TELEPHONE (OUTPATIENT)
Dept: NUTRITION | Facility: CLINIC | Age: 54
End: 2024-12-02

## 2024-12-02 ENCOUNTER — PATIENT OUTREACH (OUTPATIENT)
Dept: HEMATOLOGY ONCOLOGY | Facility: CLINIC | Age: 54
End: 2024-12-02

## 2024-12-02 NOTE — PROGRESS NOTES
ASSESSMENT      Are you having any side effects from your treatment?  -no    Are you eating and drinking properly?  -yes - down 10 pound - will send a message to Leida to address     Are you having any pain?  -no     Have needs changed for a palliative care referral?  -Pt established       Do you know when your upcoming appointments are?  -yes     Do you have a good support system?  -yes     Are you interested in any support groups?  - no     Are there any changes in barriers to care?  -no     Do you have any questions or concerns regarding your treatment plan?  -Not at this time. Jessica was very grateful for my call.

## 2024-12-02 NOTE — TELEPHONE ENCOUNTER
Received in basket message from patient navigator Brittni KOVACS On 12/2/24 that Papo has lost about 10#.     Reached out to Papo today to touch base. He states that this weight loss has been gradual, and not all at once. He is not concerned with the weight loss as he states he's still eating well and has a good appetite. Offered to schedule a nutrition appointment with him and discussed benefits to meeting with oncology RD during treatment, but he politely declined at this time. Provided RD contact information and encouraged him to reach out as needed or if he'd like an appt in the near future.

## 2024-12-03 DIAGNOSIS — E11.00 TYPE 2 DIABETES MELLITUS WITH HYPEROSMOLARITY WITHOUT COMA, WITHOUT LONG-TERM CURRENT USE OF INSULIN (HCC): ICD-10-CM

## 2024-12-04 RX ORDER — GLIMEPIRIDE 2 MG/1
2 TABLET ORAL
Qty: 90 TABLET | Refills: 1 | Status: SHIPPED | OUTPATIENT
Start: 2024-12-04

## 2024-12-05 ENCOUNTER — TELEPHONE (OUTPATIENT)
Age: 54
End: 2024-12-05

## 2024-12-05 DIAGNOSIS — R06.09 DOE (DYSPNEA ON EXERTION): ICD-10-CM

## 2024-12-05 DIAGNOSIS — C34.81 ADENOCARCINOMA OF OVERLAPPING SITES OF RIGHT LUNG (HCC): Primary | ICD-10-CM

## 2024-12-05 DIAGNOSIS — J98.4 PNEUMONITIS: ICD-10-CM

## 2024-12-05 DIAGNOSIS — R05.9 COUGH, UNSPECIFIED TYPE: ICD-10-CM

## 2024-12-05 NOTE — TELEPHONE ENCOUNTER
Referral placed per Dr. Sandoval. Message sent to Pulmonary team for assistance. Patient notified.

## 2024-12-05 NOTE — TELEPHONE ENCOUNTER
Call received from patient. He finished his steroids prescribed by Dr Sandoval on Tuesday and stated his symptoms are still present. He is still having shortness of breath with activity and walking, pain with deep breaths, and a cough that is still present. Denies any fevers, lightheadedness or dizziness. Also denies any fevers. States hys symptoms did not get any better on the steroids. Questioning what he should do regarding this.

## 2024-12-08 ENCOUNTER — APPOINTMENT (INPATIENT)
Dept: CT IMAGING | Facility: HOSPITAL | Age: 54
DRG: 181 | End: 2024-12-08
Payer: COMMERCIAL

## 2024-12-08 ENCOUNTER — HOSPITAL ENCOUNTER (INPATIENT)
Facility: HOSPITAL | Age: 54
LOS: 4 days | Discharge: HOME | DRG: 181 | End: 2024-12-12
Attending: EMERGENCY MEDICINE | Admitting: INTERNAL MEDICINE
Payer: COMMERCIAL

## 2024-12-08 ENCOUNTER — APPOINTMENT (EMERGENCY)
Dept: RADIOLOGY | Facility: HOSPITAL | Age: 54
DRG: 181 | End: 2024-12-08
Payer: COMMERCIAL

## 2024-12-08 DIAGNOSIS — R06.00 DYSPNEA: ICD-10-CM

## 2024-12-08 DIAGNOSIS — J90 PLEURAL EFFUSION ON RIGHT: Primary | ICD-10-CM

## 2024-12-08 DIAGNOSIS — R73.9 HYPERGLYCEMIA: ICD-10-CM

## 2024-12-08 DIAGNOSIS — J90 PLEURAL EFFUSION: ICD-10-CM

## 2024-12-08 DIAGNOSIS — E87.1 HYPONATREMIA: ICD-10-CM

## 2024-12-08 DIAGNOSIS — R79.89 ELEVATED TROPONIN: ICD-10-CM

## 2024-12-08 LAB
2HR DELTA HS TROPONIN: -23 NG/L
4HR DELTA HS TROPONIN: -148 NG/L
ALBUMIN SERPL BCG-MCNC: 3.5 G/DL (ref 3.5–5)
ALP SERPL-CCNC: 72 U/L (ref 34–104)
ALT SERPL W P-5'-P-CCNC: 27 U/L (ref 7–52)
ANION GAP SERPL CALCULATED.3IONS-SCNC: 6 MMOL/L (ref 4–13)
APTT PPP: 30 SECONDS (ref 23–34)
AST SERPL W P-5'-P-CCNC: 23 U/L (ref 13–39)
ATRIAL RATE: 123 BPM
BACTERIA UR QL AUTO: ABNORMAL /HPF
BASOPHILS # BLD AUTO: 0 THOUSANDS/ÂΜL (ref 0–0.1)
BASOPHILS NFR BLD AUTO: 0 % (ref 0–1)
BILIRUB SERPL-MCNC: 0.52 MG/DL (ref 0.2–1)
BILIRUB UR QL STRIP: NEGATIVE
BNP SERPL-MCNC: 65 PG/ML (ref 0–100)
BUN SERPL-MCNC: 10 MG/DL (ref 5–25)
CALCIUM SERPL-MCNC: 8.9 MG/DL (ref 8.4–10.2)
CARDIAC TROPONIN I PNL SERPL HS: 1399 NG/L (ref ?–50)
CARDIAC TROPONIN I PNL SERPL HS: 1524 NG/L (ref ?–50)
CARDIAC TROPONIN I PNL SERPL HS: 1547 NG/L (ref ?–50)
CHLORIDE SERPL-SCNC: 99 MMOL/L (ref 96–108)
CLARITY UR: CLEAR
CO2 SERPL-SCNC: 28 MMOL/L (ref 21–32)
COLOR UR: YELLOW
CREAT SERPL-MCNC: 0.66 MG/DL (ref 0.6–1.3)
EOSINOPHIL # BLD AUTO: 0.03 THOUSAND/ÂΜL (ref 0–0.61)
EOSINOPHIL NFR BLD AUTO: 1 % (ref 0–6)
ERYTHROCYTE [DISTWIDTH] IN BLOOD BY AUTOMATED COUNT: 15.9 % (ref 11.6–15.1)
FLUAV AG UPPER RESP QL IA.RAPID: NEGATIVE
FLUBV AG UPPER RESP QL IA.RAPID: NEGATIVE
GFR SERPL CREATININE-BSD FRML MDRD: 108 ML/MIN/1.73SQ M
GLUCOSE SERPL-MCNC: 231 MG/DL (ref 65–140)
GLUCOSE SERPL-MCNC: 240 MG/DL (ref 65–140)
GLUCOSE SERPL-MCNC: 259 MG/DL (ref 65–140)
GLUCOSE UR STRIP-MCNC: ABNORMAL MG/DL
HCT VFR BLD AUTO: 31.9 % (ref 36.5–49.3)
HGB BLD-MCNC: 10.5 G/DL (ref 12–17)
HGB UR QL STRIP.AUTO: ABNORMAL
IMM GRANULOCYTES # BLD AUTO: 0.03 THOUSAND/UL (ref 0–0.2)
IMM GRANULOCYTES NFR BLD AUTO: 1 % (ref 0–2)
INR PPP: 1.2 (ref 0.85–1.19)
KETONES UR STRIP-MCNC: ABNORMAL MG/DL
LACTATE SERPL-SCNC: 1 MMOL/L (ref 0.5–2)
LEUKOCYTE ESTERASE UR QL STRIP: NEGATIVE
LYMPHOCYTES # BLD AUTO: 0.84 THOUSANDS/ÂΜL (ref 0.6–4.47)
LYMPHOCYTES NFR BLD AUTO: 13 % (ref 14–44)
MCH RBC QN AUTO: 27.6 PG (ref 26.8–34.3)
MCHC RBC AUTO-ENTMCNC: 32.9 G/DL (ref 31.4–37.4)
MCV RBC AUTO: 84 FL (ref 82–98)
MONOCYTES # BLD AUTO: 0.49 THOUSAND/ÂΜL (ref 0.17–1.22)
MONOCYTES NFR BLD AUTO: 8 % (ref 4–12)
MUCOUS THREADS UR QL AUTO: ABNORMAL
NEUTROPHILS # BLD AUTO: 5.03 THOUSANDS/ÂΜL (ref 1.85–7.62)
NEUTS SEG NFR BLD AUTO: 77 % (ref 43–75)
NITRITE UR QL STRIP: NEGATIVE
NON-SQ EPI CELLS URNS QL MICRO: ABNORMAL /HPF
NRBC BLD AUTO-RTO: 0 /100 WBCS
P AXIS: 52 DEGREES
PH UR STRIP.AUTO: 6 [PH]
PLATELET # BLD AUTO: 132 THOUSANDS/UL (ref 149–390)
PMV BLD AUTO: 10.8 FL (ref 8.9–12.7)
POTASSIUM SERPL-SCNC: 3.5 MMOL/L (ref 3.5–5.3)
PR INTERVAL: 148 MS
PROCALCITONIN SERPL-MCNC: 0.17 NG/ML
PROT SERPL-MCNC: 6.6 G/DL (ref 6.4–8.4)
PROT UR STRIP-MCNC: ABNORMAL MG/DL
PROTHROMBIN TIME: 16 SECONDS (ref 12.3–15)
QRS AXIS: 83 DEGREES
QRSD INTERVAL: 84 MS
QT INTERVAL: 314 MS
QTC INTERVAL: 449 MS
RBC # BLD AUTO: 3.81 MILLION/UL (ref 3.88–5.62)
RBC #/AREA URNS AUTO: ABNORMAL /HPF
SARS-COV+SARS-COV-2 AG RESP QL IA.RAPID: NEGATIVE
SODIUM SERPL-SCNC: 133 MMOL/L (ref 135–147)
SP GR UR STRIP.AUTO: >=1.05 (ref 1–1.03)
T WAVE AXIS: 54 DEGREES
UROBILINOGEN UR STRIP-ACNC: <2 MG/DL
VENTRICULAR RATE: 123 BPM
WBC # BLD AUTO: 6.42 THOUSAND/UL (ref 4.31–10.16)
WBC #/AREA URNS AUTO: ABNORMAL /HPF

## 2024-12-08 PROCEDURE — 99222 1ST HOSP IP/OBS MODERATE 55: CPT | Performed by: INTERNAL MEDICINE

## 2024-12-08 PROCEDURE — 85610 PROTHROMBIN TIME: CPT

## 2024-12-08 PROCEDURE — 71045 X-RAY EXAM CHEST 1 VIEW: CPT

## 2024-12-08 PROCEDURE — 85730 THROMBOPLASTIN TIME PARTIAL: CPT

## 2024-12-08 PROCEDURE — 87804 INFLUENZA ASSAY W/OPTIC: CPT | Performed by: EMERGENCY MEDICINE

## 2024-12-08 PROCEDURE — 93005 ELECTROCARDIOGRAM TRACING: CPT

## 2024-12-08 PROCEDURE — 84484 ASSAY OF TROPONIN QUANT: CPT | Performed by: EMERGENCY MEDICINE

## 2024-12-08 PROCEDURE — 99285 EMERGENCY DEPT VISIT HI MDM: CPT

## 2024-12-08 PROCEDURE — 82948 REAGENT STRIP/BLOOD GLUCOSE: CPT

## 2024-12-08 PROCEDURE — 36415 COLL VENOUS BLD VENIPUNCTURE: CPT

## 2024-12-08 PROCEDURE — 85025 COMPLETE CBC W/AUTO DIFF WBC: CPT

## 2024-12-08 PROCEDURE — 80053 COMPREHEN METABOLIC PANEL: CPT

## 2024-12-08 PROCEDURE — 99285 EMERGENCY DEPT VISIT HI MDM: CPT | Performed by: EMERGENCY MEDICINE

## 2024-12-08 PROCEDURE — 71275 CT ANGIOGRAPHY CHEST: CPT

## 2024-12-08 PROCEDURE — 93010 ELECTROCARDIOGRAM REPORT: CPT | Performed by: INTERNAL MEDICINE

## 2024-12-08 PROCEDURE — 81001 URINALYSIS AUTO W/SCOPE: CPT

## 2024-12-08 PROCEDURE — 83880 ASSAY OF NATRIURETIC PEPTIDE: CPT | Performed by: EMERGENCY MEDICINE

## 2024-12-08 PROCEDURE — 83605 ASSAY OF LACTIC ACID: CPT

## 2024-12-08 PROCEDURE — 87811 SARS-COV-2 COVID19 W/OPTIC: CPT | Performed by: EMERGENCY MEDICINE

## 2024-12-08 PROCEDURE — 84145 PROCALCITONIN (PCT): CPT

## 2024-12-08 PROCEDURE — 87040 BLOOD CULTURE FOR BACTERIA: CPT

## 2024-12-08 RX ORDER — LEVOTHYROXINE SODIUM 25 UG/1
25 TABLET ORAL
Status: DISCONTINUED | OUTPATIENT
Start: 2024-12-09 | End: 2024-12-12 | Stop reason: HOSPADM

## 2024-12-08 RX ORDER — ALBUTEROL SULFATE 90 UG/1
2 INHALANT RESPIRATORY (INHALATION) EVERY 6 HOURS PRN
Status: DISCONTINUED | OUTPATIENT
Start: 2024-12-08 | End: 2024-12-12 | Stop reason: HOSPADM

## 2024-12-08 RX ORDER — IPRATROPIUM BROMIDE AND ALBUTEROL SULFATE 2.5; .5 MG/3ML; MG/3ML
3 SOLUTION RESPIRATORY (INHALATION) EVERY 6 HOURS PRN
Status: ACTIVE | OUTPATIENT
Start: 2024-12-08 | End: 2024-12-10

## 2024-12-08 RX ORDER — LOSARTAN POTASSIUM 25 MG/1
25 TABLET ORAL DAILY
Status: DISCONTINUED | OUTPATIENT
Start: 2024-12-09 | End: 2024-12-12 | Stop reason: HOSPADM

## 2024-12-08 RX ORDER — OXYCODONE HYDROCHLORIDE 5 MG/1
5 TABLET ORAL EVERY 4 HOURS PRN
Refills: 0 | Status: DISCONTINUED | OUTPATIENT
Start: 2024-12-08 | End: 2024-12-12 | Stop reason: HOSPADM

## 2024-12-08 RX ORDER — PANTOPRAZOLE SODIUM 40 MG/10ML
40 INJECTION, POWDER, LYOPHILIZED, FOR SOLUTION INTRAVENOUS EVERY 12 HOURS SCHEDULED
Status: DISCONTINUED | OUTPATIENT
Start: 2024-12-08 | End: 2024-12-09

## 2024-12-08 RX ORDER — HYDROMORPHONE HCL IN WATER/PF 6 MG/30 ML
0.2 PATIENT CONTROLLED ANALGESIA SYRINGE INTRAVENOUS ONCE
Status: COMPLETED | OUTPATIENT
Start: 2024-12-08 | End: 2024-12-08

## 2024-12-08 RX ORDER — FOLIC ACID 1 MG/1
1000 TABLET ORAL DAILY
Status: DISCONTINUED | OUTPATIENT
Start: 2024-12-09 | End: 2024-12-12 | Stop reason: HOSPADM

## 2024-12-08 RX ORDER — OXYCODONE HYDROCHLORIDE 5 MG/1
5 TABLET ORAL EVERY 4 HOURS PRN
Refills: 0 | Status: DISCONTINUED | OUTPATIENT
Start: 2024-12-08 | End: 2024-12-08

## 2024-12-08 RX ORDER — ACETAMINOPHEN 325 MG/1
975 TABLET ORAL EVERY 8 HOURS PRN
Status: DISCONTINUED | OUTPATIENT
Start: 2024-12-08 | End: 2024-12-12 | Stop reason: HOSPADM

## 2024-12-08 RX ORDER — INSULIN LISPRO 100 [IU]/ML
1-5 INJECTION, SOLUTION INTRAVENOUS; SUBCUTANEOUS
Status: DISCONTINUED | OUTPATIENT
Start: 2024-12-08 | End: 2024-12-09

## 2024-12-08 RX ORDER — FUROSEMIDE 10 MG/ML
20 INJECTION INTRAMUSCULAR; INTRAVENOUS DAILY
Status: DISCONTINUED | OUTPATIENT
Start: 2024-12-08 | End: 2024-12-11

## 2024-12-08 RX ORDER — ATORVASTATIN CALCIUM 10 MG/1
10 TABLET, FILM COATED ORAL DAILY
Status: DISCONTINUED | OUTPATIENT
Start: 2024-12-08 | End: 2024-12-12 | Stop reason: HOSPADM

## 2024-12-08 RX ORDER — MAGNESIUM SULFATE HEPTAHYDRATE 40 MG/ML
2 INJECTION, SOLUTION INTRAVENOUS ONCE
Status: COMPLETED | OUTPATIENT
Start: 2024-12-08 | End: 2024-12-08

## 2024-12-08 RX ORDER — IPRATROPIUM BROMIDE AND ALBUTEROL SULFATE 2.5; .5 MG/3ML; MG/3ML
3 SOLUTION RESPIRATORY (INHALATION)
Status: DISCONTINUED | OUTPATIENT
Start: 2024-12-08 | End: 2024-12-08

## 2024-12-08 RX ADMIN — PANTOPRAZOLE SODIUM 40 MG: 40 INJECTION, POWDER, FOR SOLUTION INTRAVENOUS at 15:43

## 2024-12-08 RX ADMIN — OLODATEROL RESPIMAT INHALATION SPRAY 2 PUFF: 2.5 SPRAY, METERED RESPIRATORY (INHALATION) at 15:52

## 2024-12-08 RX ADMIN — INSULIN LISPRO 2 UNITS: 100 INJECTION, SOLUTION INTRAVENOUS; SUBCUTANEOUS at 18:19

## 2024-12-08 RX ADMIN — UMECLIDINIUM 1 PUFF: 62.5 AEROSOL, POWDER ORAL at 15:51

## 2024-12-08 RX ADMIN — IOHEXOL 60 ML: 350 INJECTION, SOLUTION INTRAVENOUS at 14:22

## 2024-12-08 RX ADMIN — APIXABAN 5 MG: 5 TABLET, FILM COATED ORAL at 18:23

## 2024-12-08 RX ADMIN — FUROSEMIDE 20 MG: 10 INJECTION, SOLUTION INTRAMUSCULAR; INTRAVENOUS at 18:24

## 2024-12-08 RX ADMIN — HYDROMORPHONE HYDROCHLORIDE 0.2 MG: 0.2 INJECTION, SOLUTION INTRAMUSCULAR; INTRAVENOUS; SUBCUTANEOUS at 18:26

## 2024-12-08 RX ADMIN — MAGNESIUM SULFATE HEPTAHYDRATE 2 G: 40 INJECTION, SOLUTION INTRAVENOUS at 15:45

## 2024-12-08 NOTE — ED PROVIDER NOTES
Time reflects when diagnosis was documented in both MDM as applicable and the Disposition within this note       Time User Action Codes Description Comment    12/8/2024  1:25 PM Laurie Odell [J90] Pleural effusion on right     12/8/2024  1:25 PM Laurie Odell [R06.00] Dyspnea     12/8/2024  1:26 PM Laurie Odell [R79.89] Elevated troponin     12/8/2024  1:26 PM Laurie Odell [E87.1] Hyponatremia     12/8/2024  1:26 PM Laurie Odell [R73.9] Hyperglycemia           ED Disposition       ED Disposition   Admit    Condition   Stable    Date/Time   Sun Dec 8, 2024  1:25 PM    Comment   Case was discussed with Dr. Houston and the patient's admission status was agreed to be Admission Status: inpatient status to the service of Dr. Kahn .               Assessment & Plan       Medical Decision Making  54-year-old male with past medical history of stage IV lung cancer, PE/DVT, malignant pleural effusion presenting for shortness of breath.    Differential diagnoses include but not limited to: Malignant pleural effusion, pneumonia, sepsis, PE/DVT, viral upper respiratory illness, COPD    Initial workup to include sepsis panel, EKG, chest x-ray    CBC without evidence of leukocytosis but significant for mild anemia compared to previous.  CMP significant for hyperglycemia likely in the setting of recent steroid use.  Lactic acid within normal limits.  Chest x-ray concerning for large right-sided effusion.  Initial troponin 1547.  BNP within normal limits and no evidence of peripheral edema on exam.  Previous echo reviewed from 04/24 which reveals ejection fraction of approximately 60%.  Low suspicion for CHF exacerbation at this time. Lab work more consistent with pleural effusion causing shortness of breath than infection.   Discussed case with Bobby who agreed to admission for further evaluation of shortness of breath and pleural effusion.        Amount and/or Complexity of Data Reviewed  Labs: ordered. Decision-making details  documented in ED Course.  Radiology: ordered and independent interpretation performed. Decision-making details documented in ED Course.    Risk  Decision regarding hospitalization.        ED Course as of 12/08/24 1609   Sun Dec 08, 2024   1128 XR chest portable  Worsening hazy opacity diffusely throughout right lung per my interpretation.  Concern for infection versus effusion   1144 CBC and differential(!)  White blood cell count within normal limits.  Evidence of mild anemia from previous of 12.2 which was taken 2 weeks ago   1207 Comprehensive metabolic panel(!)  Hyperglycemia present likely secondary to recent steroid use   1207 Lactic acid  Within normal limits   1207 FLU/COVID Rapid Antigen (30 min. TAT) - Preferred screening test in ED  Negative viral panel   1212 Procalcitonin: 0.17   1225 B-Type Natriuretic Peptide(BNP)  Within normal limits   1248 Patient reevaluated in ED and continues to feel well.  Notified of chest x-ray findings concerning for infection versus pleural effusion and need for possible admission for further evaluation.  Patient understands and is agreeable to admission   1326 hs TnI 0hr(!): 1,547   1335 Patient admitted to [facility] for further evaluation of shortness of breath       Medications   albuterol (PROVENTIL HFA,VENTOLIN HFA) inhaler 2 puff (0 puffs Inhalation Hold 12/8/24 1550)   amitriptyline (ELAVIL) tablet 200 mg (has no administration in time range)   apixaban (ELIQUIS) tablet 5 mg (has no administration in time range)   atorvastatin (LIPITOR) tablet 10 mg (0 mg Oral Hold 12/8/24 1549)   folic acid (FOLVITE) tablet 1,000 mcg (has no administration in time range)   levothyroxine tablet 25 mcg (has no administration in time range)   losartan (COZAAR) tablet 25 mg (has no administration in time range)   umeclidinium 62.5 mcg/actuation inhaler AEPB 1 puff (1 puff Inhalation Given 12/8/24 1551)     And   olodaterol HCl (STRIVERDI RESPIMAT) inhaler 2 puff (2 puffs Inhalation Given  12/8/24 1552)   acetaminophen (TYLENOL) tablet 975 mg (has no administration in time range)   insulin lispro (HumALOG/ADMELOG) 100 units/mL subcutaneous injection 1-5 Units (has no administration in time range)   oxyCODONE (ROXICODONE) split tablet 2.5 mg (has no administration in time range)     Or   oxyCODONE (ROXICODONE) IR tablet 5 mg (has no administration in time range)   pantoprazole (PROTONIX) injection 40 mg (40 mg Intravenous Given 12/8/24 1543)   magnesium sulfate 2 g/50 mL IVPB (premix) 2 g (2 g Intravenous New Bag 12/8/24 1545)   iohexol (OMNIPAQUE) 350 MG/ML injection (MULTI-DOSE) 60 mL (60 mL Intravenous Given 12/8/24 1422)       ED Risk Strat Scores   HEART Risk Score      Flowsheet Row Most Recent Value   Heart Score Risk Calculator    History 0 Filed at: 12/08/2024 1608   ECG 1 Filed at: 12/08/2024 1608   Age 1 Filed at: 12/08/2024 1608   Risk Factors 1 Filed at: 12/08/2024 1608   Troponin 2 Filed at: 12/08/2024 1608   HEART Score 5 Filed at: 12/08/2024 1608                           PERC Rule for PE      Flowsheet Row Most Recent Value   PERC Rule for PE    Age >=50 1 Filed at: 12/08/2024 1608   HR >=100 1 Filed at: 12/08/2024 1608   O2 Sat on room air < 95% 1 Filed at: 12/08/2024 1608   History of PE or DVT 1 Filed at: 12/08/2024 1608   Recent trauma or surgery 0 Filed at: 12/08/2024 1608   Hemoptysis 0 Filed at: 12/08/2024 1608   Exogenous estrogen 0 Filed at: 12/08/2024 1608   Unilateral leg swelling 0 Filed at: 12/08/2024 1608   PERC Rule for PE Results 4 Filed at: 12/08/2024 1608                Wells' Criteria for PE      Flowsheet Row Most Recent Value   Wells' Criteria for PE    Clinical signs and symptoms of DVT 0 Filed at: 12/08/2024 1608   PE is primary diagnosis or equally likely 3 Filed at: 12/08/2024 1608   HR >100 1.5 Filed at: 12/08/2024 1608   Immobilization at least 3 days or Surgery in the previous 4 weeks 0 Filed at: 12/08/2024 1608   Previous, objectively diagnosed PE or DVT  1.5 Filed at: 2024 1608   Hemoptysis 0 Filed at: 2024 1608   Malignancy with treatment within 6 months or palliative 1 Filed at: 2024 1608   Wells' Criteria Total 7 Filed at: 2024 1608                        History of Present Illness       Chief Complaint   Patient presents with    Shortness of Breath     Pt reports worsening SOB over last month, hx lung CA, also reports severe diarrhea, elevated sugars, dark stools, nose bleeds, +eliquis, right sided back pain       Past Medical History:   Diagnosis Date    Cancer (HCC)     Diabetes mellitus (HCC)     High cholesterol     History of blood clots     Hypertension     Leucocytosis 2024    Lung cancer (HCC)     Malignant neoplasm of overlapping sites of right lung (HCC) 2024    Pneumonia     Pulmonary embolism (HCC)       Past Surgical History:   Procedure Laterality Date    IR BIOPSY LYMPH NODE  2024    IR PORT PLACEMENT  2024    KNEE SURGERY      MANDIBLE FRACTURE SURGERY  1985    PATELLA FRACTURE SURGERY      RECTAL SURGERY        Family History   Problem Relation Age of Onset    No Known Problems Father     No Known Problems Mother       Social History     Tobacco Use    Smoking status: Former     Current packs/day: 0.00     Average packs/day: 1.5 packs/day for 35.0 years (52.5 ttl pk-yrs)     Types: Cigarettes     Quit date: 4/15/2024     Years since quittin.6     Passive exposure: Past    Smokeless tobacco: Never   Vaping Use    Vaping status: Never Used   Substance Use Topics    Alcohol use: Not Currently    Drug use: Never      E-Cigarette/Vaping    E-Cigarette Use Never User       E-Cigarette/Vaping Substances    Nicotine No     THC No     CBD No     Flavoring No     Other No     Unknown No       I have reviewed and agree with the history as documented.     54-year-old male with past medical history of stage IV lung cancer, PE/DVT, malignant pleural effusion presenting for shortness of breath.   Patient states that symptoms began approximately 2 weeks ago with worsening shortness of breath during exertion.  Patient is currently undergoing chemotherapy for lung cancer.  Admits to previous PE and reports compliance with daily Eliquis since diagnosis.  CT chest from October 2024 significant for small subsegmental PE in right lower lung base. Imaging thought to be noncontributory to shortness of breath at that time.  Denies any previous history of asthma/COPD and does not take any daily medications for the same.  Denies any recent fevers or chills at home without any known sick contacts.  Patient also admits to 1-1/2 weeks of dark, watery diarrhea and blood-tinged urine. At present no chest pain associated with shortness of breath.  Patient denies any previous history of ACS, cardiac arrhythmia. Denies any current abdominal pain, nausea, vomiting.          Review of Systems   Respiratory:  Positive for shortness of breath.    All other systems reviewed and are negative.          Objective       ED Triage Vitals   Temperature Pulse Blood Pressure Respirations SpO2 Patient Position - Orthostatic VS   12/08/24 1101 12/08/24 1052 12/08/24 1052 12/08/24 1052 12/08/24 1052 12/08/24 1052   99.2 °F (37.3 °C) (!) 132 124/83 22 90 % Sitting      Temp Source Heart Rate Source BP Location FiO2 (%) Pain Score    12/08/24 1101 12/08/24 1052 12/08/24 1315 -- --    Oral Monitor Right arm        Vitals      Date and Time Temp Pulse SpO2 Resp BP Pain Score FACES Pain Rating User   12/08/24 1530 -- 126 -- -- 131/72 -- -- LO   12/08/24 1500 -- 124 -- -- 138/81 -- -- LO   12/08/24 1430 -- 123 -- -- 150/80 -- -- LO   12/08/24 1400 -- 128 96 % 24 144/82 -- -- LO   12/08/24 1330 -- 129 95 % 24 147/84 -- -- LO   12/08/24 1315 -- 128 95 % 24 141/84 -- -- LO   12/08/24 1101 99.2 °F (37.3 °C) -- -- -- -- -- -- LO   12/08/24 1056 -- -- 95 % -- -- -- -- AB   12/08/24 1052 -- 132 90 % 22 124/83 -- -- AB            Physical Exam  Vitals  reviewed: Low-grade temperature of 99 upon arrival to ED.   Constitutional:       General: He is not in acute distress.     Appearance: Normal appearance. He is not ill-appearing.   Eyes:      Extraocular Movements: Extraocular movements intact.      Pupils: Pupils are equal, round, and reactive to light.   Cardiovascular:      Rate and Rhythm: Regular rhythm. Tachycardia present.      Pulses: Normal pulses.      Heart sounds: Normal heart sounds. No murmur heard.     No friction rub. No gallop.   Pulmonary:      Effort: Pulmonary effort is normal. Tachypnea present. No accessory muscle usage or respiratory distress.      Breath sounds: No stridor. Examination of the right-upper field reveals decreased breath sounds. Examination of the right-middle field reveals decreased breath sounds. Examination of the right-lower field reveals decreased breath sounds. Decreased breath sounds present. No wheezing, rhonchi or rales.   Abdominal:      General: Abdomen is flat. Bowel sounds are normal.      Palpations: Abdomen is soft.      Tenderness: There is no abdominal tenderness. There is no right CVA tenderness, left CVA tenderness, guarding or rebound.   Musculoskeletal:      Right lower leg: No tenderness. No edema.      Left lower leg: No tenderness. No edema.   Skin:     General: Skin is warm and dry.      Capillary Refill: Capillary refill takes less than 2 seconds.      Findings: No rash.   Neurological:      General: No focal deficit present.      Mental Status: He is alert and oriented to person, place, and time. Mental status is at baseline.      Sensory: No sensory deficit.         Results Reviewed       Procedure Component Value Units Date/Time    HS Troponin I 4hr [[id number]] Collected: 12/08/24 1556    Lab Status: In process Specimen: Blood from Line, Venous Updated: 12/08/24 1559    HS Troponin I 2hr [[id number]]  (Abnormal) Collected: 12/08/24 1336    Lab Status: Final result Specimen: Blood from Line, Venous  Updated: 12/08/24 1406     hs TnI 2hr 1,524 ng/L      Delta 2hr hsTnI -23 ng/L     HS Troponin 0hr (reflex protocol) [[id number]]  (Abnormal) Collected: 12/08/24 1118    Lab Status: Final result Specimen: Blood from Arm, Right Updated: 12/08/24 1316     hs TnI 0hr 1,547 ng/L     B-Type Natriuretic Peptide(BNP) [[id number]]  (Normal) Collected: 12/08/24 1118    Lab Status: Final result Specimen: Blood from Arm, Right Updated: 12/08/24 1219     BNP 65 pg/mL     Procalcitonin [[id number]]  (Normal) Collected: 12/08/24 1118    Lab Status: Final result Specimen: Blood from Arm, Right Updated: 12/08/24 1211     Procalcitonin 0.17 ng/ml     FLU/COVID Rapid Antigen (30 min. TAT) - Preferred screening test in ED [[id number]]  (Normal) Collected: 12/08/24 1129    Lab Status: Final result Specimen: Nares from Nose Updated: 12/08/24 1202     SARS COV Rapid Antigen Negative     Influenza A Rapid Antigen Negative     Influenza B Rapid Antigen Negative    Narrative:      This test has been performed using the [employer] [place] 2 FLU+SARS Antigen test under the Emergency Use Authorization (EUA). This test has been validated by the  and verified by the performing laboratory. The [place] uses lateral flow immunofluorescent sandwich assay to detect SARS-COV, Influenza A and Influenza B Antigen.     The [employer] [place] 2 SARS Antigen test does not differentiate between SARS-CoV and SARS-CoV-2.     Negative results are presumptive and may be confirmed with a molecular assay, if necessary, for patient management. Negative results do not rule out SARS-CoV-2 or influenza infection and should not be used as the sole basis for treatment or patient management decisions. A negative test result may occur if the level of antigen in a sample is below the limit of detection of this test.     Positive results are indicative of the presence of viral antigens, but do not rule out bacterial infection or co-infection with other viruses.     All test  results should be used as an adjunct to clinical observations and other information available to the provider.    FOR PEDIATRIC PATIENTS - copy/paste COVID Guidelines URL to browser: [email or web address]    Protime-INR [[id number]]  (Abnormal) Collected: 12/08/24 1118    Lab Status: Final result Specimen: Blood from Arm, Right Updated: 12/08/24 1200     Protime 16.0 seconds      INR 1.20    Narrative:      INR Therapeutic Range    Indication                                             INR Range      Atrial Fibrillation                                               2.0-3.0  Hypercoagulable State                                    2.0.2.3  Left Ventricular Asist Device                            2.0-3.0  Mechanical Heart Valve                                  -    Aortic(with afib, MI, embolism, HF, LA enlargement,    and/or coagulopathy)                                     2.0-3.0 (2.5-3.5)     Mitral                                                             2.5-3.5  Prosthetic/Bioprosthetic Heart Valve               2.0-3.0  Venous thromboembolism (VTE: VT, PE        2.0-3.0    APTT [[id number]]  (Normal) Collected: 12/08/24 1118    Lab Status: Final result Specimen: Blood from Arm, Right Updated: 12/08/24 1200     PTT 30 seconds     Comprehensive metabolic panel [[id number]]  (Abnormal) Collected: 12/08/24 1118    Lab Status: Final result Specimen: Blood from Arm, Right Updated: 12/08/24 1200     Sodium 133 mmol/L      Potassium 3.5 mmol/L      Chloride 99 mmol/L      CO2 28 mmol/L      ANION GAP 6 mmol/L      BUN 10 mg/dL      Creatinine 0.66 mg/dL      Glucose 240 mg/dL      Calcium 8.9 mg/dL      AST 23 U/L      ALT 27 U/L      Alkaline Phosphatase 72 U/L      Total Protein 6.6 g/dL      Albumin 3.5 g/dL      Total Bilirubin 0.52 mg/dL      eGFR 108 ml/min/1.73sq m     Narrative:      National Kidney Disease Foundation guidelines for Chronic Kidney Disease  (CKD):     Stage 1 with normal or high GFR (GFR > 90 mL/min/1.73 square meters)    Stage 2 Mild CKD (GFR = 60-89 mL/min/1.73 square meters)    Stage 3A Moderate CKD (GFR = 45-59 mL/min/1.73 square meters)    Stage 3B Moderate CKD (GFR = 30-44 mL/min/1.73 square meters)    Stage 4 Severe CKD (GFR = 15-29 mL/min/1.73 square meters)    Stage 5 End Stage CKD (GFR <15 mL/min/1.73 square meters)  Note: GFR calculation is accurate only with a steady state creatinine    Lactic acid [[id number]]  (Normal) Collected: 12/08/24 1118    Lab Status: Final result Specimen: Blood from Arm, Right Updated: 12/08/24 1159     LACTIC ACID 1.0 mmol/L     Narrative:      Result may be elevated if tourniquet was used during collection.    CBC and differential [[id number]]  (Abnormal) Collected: 12/08/24 1118    Lab Status: Final result Specimen: Blood from Arm, Right Updated: 12/08/24 1142     WBC 6.42 Thousand/uL      RBC 3.81 Million/uL      Hemoglobin 10.5 g/dL      Hematocrit 31.9 %      MCV 84 fL      MCH 27.6 pg      MCHC 32.9 g/dL      RDW 15.9 %      MPV 10.8 fL      Platelets 132 Thousands/uL      nRBC 0 /100 WBCs      Segmented % 77 %      Immature Grans % 1 %      Lymphocytes % 13 %      Monocytes % 8 %      Eosinophils Relative 1 %      Basophils Relative 0 %      Absolute Neutrophils 5.03 Thousands/µL      Absolute Immature Grans 0.03 Thousand/uL      Absolute Lymphocytes 0.84 Thousands/µL      Absolute Monocytes 0.49 Thousand/µL      Eosinophils Absolute 0.03 Thousand/µL      Basophils Absolute 0.00 Thousands/µL     Blood culture #1 [[id number]] Collected: 12/08/24 1129    Lab Status: In process Specimen: Blood from Arm, Left Updated: 12/08/24 1141    Blood culture #2 [[id number]] Collected: 12/08/24 1118    Lab Status: In process Specimen: Blood from Arm, Right Updated: 12/08/24 1136    UA w Reflex to Microscopic w Reflex to Culture [[id number]]     Lab Status: No result Specimen: Urine             CTA chest pe study   Final  Interpretation by Genaro Luevano MD (12/08 5423)      Since October 2024 there is 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      Increasing partially loculated moderate right pleural effusion.      No pulmonary embolism.      New interstitial edema in the left lung that is probably related to volume overload. Developing left lung carcinomatosis is also possible. Attention to this finding on follow-up imaging advised      Possible small pneumonia within the superior segment of the left lower lobe.      Slightly increasing mediastinal lymphadenopathy that is probably metastatic.      Enlarging subcentimeter gastrohepatic ligament lymph nodes that are probably metastatic.      Workstation performed: [id number]         XR chest portable   ED Interpretation by Eamon Her DO (12/08 5530)   Worsening hazy opacity diffusely throughout right lung per my interpretation.  Concern for infection versus effusion      Final Interpretation by Misael Wren MD (12/08 4594)      Moderate to large right effusion with underlying parenchymal process. No mediastinal shift.            Workstation performed: [id number]         IR IN-Patient Thoracentesis    (Results Pending)       ECG 12 Lead Documentation Only    Date/Time: 12/8/2024 11:17 AM    Performed by: Eamon Her DO  Authorized by: Eamon Her DO    ECG reviewed by me, the ED Provider: yes    Patient location:  ED  Previous ECG:     Previous ECG:  Compared to current    Similarity:  Changes noted  Interpretation:     Interpretation: abnormal    Rate:     ECG rate assessment: tachycardic    Rhythm:     Rhythm: sinus tachycardia    Ectopy:     Ectopy: none    QRS:     QRS axis:  Normal    QRS intervals:  Normal  Conduction:     Conduction: normal    ST segments:     ST segments:  Normal  T waves:     T waves: normal        ED Medication and Procedure Management   Prior to Admission Medications   Prescriptions Last Dose Informant Patient Reported?  Taking?   Glucagon, rDNA, (Glucagon Emergency) 1 MG KIT  Self No No   Sig: Inject 1 mg as directed once as needed (hypoglycemia) for up to 1 dose   Patient not taking: Reported on 7/3/2024   Lancets (OneTouch Delica Plus [id number]) MISC  Self No No   Sig: Use 1 Units 4 (four) times a day   OneTouch Verio test strip  Self No No   Sig: Use 1 each 4 (four) times a day   acyclovir (ZOVIRAX) 400 MG tablet   No No   Sig: Take 2 tablets (800 mg total) by mouth 5 (five) times a day for 10 days   albuterol (ProAir HFA) 90 mcg/act inhaler  Self No No   Sig: Inhale 2 puffs every 6 (six) hours as needed for wheezing   amitriptyline (ELAVIL) 100 mg tablet  Self Yes No   Si mg daily at bedtime   apixaban (Eliquis) 5 mg  Self No No   Sig: Take 1 tablet (5 mg total) by mouth 2 (two) times a day   atorvastatin (LIPITOR) 10 mg tablet  Self No No   Sig: Take 1 tablet (10 mg total) by mouth daily   benzonatate (TESSALON) 200 MG capsule  Self No No   Sig: Take 1 capsule (200 mg total) by mouth 3 (three) times a day as needed for cough   famotidine (PEPCID) 40 MG tablet  Self Yes No   Sig: Take 40 mg by mouth daily as needed for heartburn or indigestion Taking as needed   folic acid (FOLVITE) 1 mg tablet  Self No No   Sig: TAKE 1 TABLET BY MOUTH EVERY DAY   glimepiride (AMARYL) 2 mg tablet   No No   Sig: TAKE 1 TABLET BY MOUTH DAILY WITH BREAKFAST   levothyroxine (Synthroid) 25 mcg tablet  Self No No   Sig: Take 1 tablet (25 mcg total) by mouth daily   losartan (COZAAR) 25 mg tablet  Self Yes No   Sig: Take 25 mg by mouth daily   metFORMIN (GLUCOPHAGE) 1000 MG tablet  Self Yes No   Sig: Take 1,000 mg by mouth 2 (two) times a day   metFORMIN (GLUCOPHAGE) 500 mg tablet  Self Yes No   Sig: Take 1,000 mg by mouth 2 (two) times a day with meals   Patient not taking: Reported on 10/9/2024   ondansetron (ZOFRAN-ODT) 8 mg disintegrating tablet  Self No No   Sig: Take 1 tablet (8 mg total) by mouth every 8 (eight) hours as needed for  vomiting or nausea   tiotropium-olodaterol (Stiolto Respimat) 2.5-2.5 MCG/ACT inhaler  Self No No   Sig: Inhale 2 puffs daily      Facility-Administered Medications: None     Patient's Medications   Discharge Prescriptions    No medications on file     No discharge procedures on file.  ED SEPSIS DOCUMENTATION   Time reflects when diagnosis was documented in both MDM as applicable and the Disposition within this note       Time User Action Codes Description Comment    12/8/2024  1:25 PM Laurie Odell [J90] Pleural effusion on right     12/8/2024  1:25 PM Laurie Odell [R06.00] Dyspnea     12/8/2024  1:26 PM Laurie Odell [R79.89] Elevated troponin     12/8/2024  1:26 PM Laurie Odell [E87.1] Hyponatremia     12/8/2024  1:26 PM Laurie Odell [R73.9] Hyperglycemia                  Eamon Her, DO  12/08/24 1601

## 2024-12-08 NOTE — ASSESSMENT & PLAN NOTE
Patient normally requires 2 to 3 L nasal cannula currently on baseline however with severely worsening dyspnea over the past several days acutely in the setting of chronic dyspnea for the past 1 to 2 months  Reports he has tried steroids, antibiotics provided by his outpatient providers, nothing is made a difference for him so far and he has worsened over the past week  We will send for CTA PE study to rule out pulmonary embolism in the setting his malignancy although he is compliant with Eliquis, also due to concern for worsening of his malignancy and significantly large pleural effusion on the right  Lower suspicion for pneumonia will monitor off antibiotics for now

## 2024-12-08 NOTE — ED ATTENDING ATTESTATION
12/8/2024  I, Laurie Odell MD, saw and evaluated the patient. I have discussed the patient with the resident/non-physician practitioner and agree with the resident's/non-physician practitioner's findings, Plan of Care, and MDM as documented in the resident's/non-physician practitioner's note, except where noted. All available labs and Radiology studies were reviewed.  I was present for key portions of any procedure(s) performed by the resident/non-physician practitioner and I was immediately available to provide assistance.       At this point I agree with the current assessment done in the Emergency Department.  I have conducted an independent evaluation of this patient a history and physical is as follows:    ED Course  ED Course as of 12/08/24 1608   Sun Dec 08, 2024   1120 54 year old gentleman presenting to the ED with shortness of breath.  Hx Lung CA on chemotherapy, PE, DVT on elliquis.  Worsening exertional dyspnea over the past 2 weeks.  Waxing and waning presentation.  Low grade fevers at home.  (-) abdominal pain, nausea, vomiting  (+) watery black diarrhea  (+) red discoloration of urine  Has been on steroids  Normally 3 L home O2   1128 VS (+) tachycardia, SpO2 90%   1140 Physical exam is remarkable for a tachypneic gentleman with tachycardia, diminished breath sounds across entire right lung.  No other specific findings.    Clinical presentation puts patient at risk for the following differential diagnoses:    Malignant effusion, pneumonia, PE/DVT, ACS, anemia, sepsis.  Lab work and imaging was ordered.   1144 WBC: 6.42   1205 XR chest portable  Large right-sided pleural effusion noted.   1325 hs TnI 0hr(!): 1,547   1328 Case discussed with general medicine for admission.   1430 ECG 12 lead  No STEMI   1606 Lab work was remarkable for anemia, hyperglycemia, sodium 133, elevated troponin.  Elected not to defer antibiotics at this time as procalcitonin was 0.15.  On reassessment, patient is tachypneic and  tachycardic, however appears comfortable and stable.         Critical Care Time  Procedures

## 2024-12-08 NOTE — H&P
H&P - Hospitalist   Name: Papo Gibbs 54 y.o. male I MRN: [id number]  Unit/Bed#: ED-20 I Date of Admission: 12/8/2024   Date of Service: 12/8/2024 I Hospital Day: 0     Assessment & Plan  Acute respiratory failure with hypoxia (HCC)  Patient normally requires 2 to 3 L nasal cannula currently on baseline however with severely worsening dyspnea over the past several days acutely in the setting of chronic dyspnea for the past 1 to 2 months  Reports he has tried steroids, antibiotics provided by his outpatient providers, nothing is made a difference for him so far and he has worsened over the past week  We will send for CTA PE study to rule out pulmonary embolism in the setting his malignancy although he is compliant with Eliquis, also due to concern for worsening of his malignancy and significantly large pleural effusion on the right  Lower suspicion for pneumonia will monitor off antibiotics for now    HTN (hypertension)  Will continue patient's home losartan 25 mg daily  Adenocarcinoma of overlapping sites of right lung (HCC)  Patient follows with Dr. Ramachandran from heme-onc as an outpatient, last chemotherapy 1 week ago patient  Keytruda held due to potential pneumonitis patient recently completed steroid taper  History of pulmonary embolism  Patient with history of PE on Eliquis, does note intermittent nosebleeds but not very significant and resolves on its own, also about 1 week of dark appearing stool, will need to monitor for melena during this admission, okay to continue Eliquis for now      VTE Pharmacologic Prophylaxis:   High Risk (Score >/= 5) - Pharmacological DVT Prophylaxis Ordered: apixaban (Eliquis). Sequential Compression Devices Ordered.  Code Status: Level 1 - Full Code   Discussion with family: Updated  (wife) at bedside.    Anticipated Length of Stay: Patient will be admitted on an inpatient basis with an anticipated length of stay of greater than 2 midnights secondary to resp  failure.    History of Present Illness   Chief Complaint: jase Gibbs is a 54 y.o. male with a PMH of lung cancer diabetes, hypertension pulmonary embolism on eliquis who presents with worsening shortness of breath.  Patient was diagnosed with lung cancer this year and started chemotherapy and Keytruda.  Recently he was taken off Keytruda and started on prednisone burst as well as a trial of Levaquin due to concern for pneumonitis.  Patient reports the antibiotic course of 7 days and prednisone has not made any significant difference for him in fact he feels his shortness of breath has been worsening over the past week.  At this point he cannot perform his activities of daily living without significant dyspnea.  During my evaluation he appears tachypneic and is tachycardic with diminished breath sounds on the right side.  X-ray shows a large right-sided pleural effusion.  Patient will be admitted for worsening dyspnea, will need thoracentesis as well as likely pulmonary evaluation.      Review of Systems   Constitutional:  Positive for fatigue. Negative for chills and fever.   HENT:  Negative for ear pain and sore throat.    Eyes:  Negative for pain and visual disturbance.   Respiratory:  Positive for cough and shortness of breath. Negative for wheezing.    Cardiovascular:  Negative for chest pain, palpitations and leg swelling.   Gastrointestinal:  Negative for abdominal distention, abdominal pain, diarrhea, nausea and vomiting.   Genitourinary:  Negative for dysuria and hematuria.   Musculoskeletal:  Negative for arthralgias and back pain.   Skin:  Negative for color change and rash.   Neurological:  Positive for weakness. Negative for seizures and syncope.   All other systems reviewed and are negative.      Historical Information   Past Medical History:   Diagnosis Date    Cancer (HCC) 04,25,2024    Diabetes mellitus (HCC)     High cholesterol     History of blood clots     Hypertension     Leucocytosis  2024    Lung cancer (HCC)     Malignant neoplasm of overlapping sites of right lung (HCC) 2024    Pneumonia     Pulmonary embolism (HCC)      Past Surgical History:   Procedure Laterality Date    IR BIOPSY LYMPH NODE  2024    IR PORT PLACEMENT  2024    KNEE SURGERY      MANDIBLE FRACTURE SURGERY  1985    PATELLA FRACTURE SURGERY      RECTAL SURGERY       Social History     Tobacco Use    Smoking status: Former     Current packs/day: 0.00     Average packs/day: 1.5 packs/day for 35.0 years (52.5 ttl pk-yrs)     Types: Cigarettes     Quit date: 4/15/2024     Years since quittin.6     Passive exposure: Past    Smokeless tobacco: Never   Vaping Use    Vaping status: Never Used   Substance and Sexual Activity    Alcohol use: Not Currently    Drug use: Never    Sexual activity: Yes     Partners: Female     Birth control/protection: Post-menopausal, None     E-Cigarette/Vaping    E-Cigarette Use Never User      E-Cigarette/Vaping Substances    Nicotine No     THC No     CBD No     Flavoring No     Other No     Unknown No      Family History   Problem Relation Age of Onset    No Known Problems Father     No Known Problems Mother      Social History:  Marital Status: Single   Occupation:   Patient Pre-hospital Living Situation: Home  Patient Pre-hospital Level of Mobility: walks  Patient Pre-hospital Diet Restrictions:     Meds/Allergies   I have reviewed home medications with patient personally.  Prior to Admission medications    Medication Sig Start Date End Date Taking? Authorizing Provider   acyclovir (ZOVIRAX) 400 MG tablet Take 2 tablets (800 mg total) by mouth 5 (five) times a day for 10 days 24  Maryana Sandoval MD   albuterol (ProAir HFA) 90 mcg/act inhaler Inhale 2 puffs every 6 (six) hours as needed for wheezing 10/21/24   DIANNE Marroquin   amitriptyline (ELAVIL) 100 mg tablet 200 mg daily at bedtime 3/9/22   Historical Provider, MD   apixaban (Eliquis) 5 mg Take 1  tablet (5 mg total) by mouth 2 (two) times a day 8/5/24   Amelie Bacon MD   atorvastatin (LIPITOR) 10 mg tablet Take 1 tablet (10 mg total) by mouth daily 5/1/24   Amelie Bacon MD   benzonatate (TESSALON) 200 MG capsule Take 1 capsule (200 mg total) by mouth 3 (three) times a day as needed for cough 6/20/24   Julita Velasco MD   famotidine (PEPCID) 40 MG tablet Take 40 mg by mouth daily as needed for heartburn or indigestion Taking as needed 4/2/24   Historical Provider, MD   folic acid (FOLVITE) 1 mg tablet TAKE 1 TABLET BY MOUTH EVERY DAY 11/1/24   Maryana Sandoval MD   glimepiride (AMARYL) 2 mg tablet TAKE 1 TABLET BY MOUTH DAILY WITH BREAKFAST 12/4/24   Amelie Bacon MD   Glucagon, rDNA, (Glucagon Emergency) 1 MG KIT Inject 1 mg as directed once as needed (hypoglycemia) for up to 1 dose  Patient not taking: Reported on 7/3/2024 6/4/24   Amelie Bacon MD   Lancets (OneTouch Delica Plus [id number]) MISC Use 1 Units 4 (four) times a day 9/24/24   Amelie Bacon MD   levothyroxine (Synthroid) 25 mcg tablet Take 1 tablet (25 mcg total) by mouth daily 5/18/24 2/12/25  Maryana Sandoval MD   losartan (COZAAR) 25 mg tablet Take 25 mg by mouth daily 3/9/22   Historical Provider, MD   metFORMIN (GLUCOPHAGE) 1000 MG tablet Take 1,000 mg by mouth 2 (two) times a day 6/17/24   Historical Provider, MD   metFORMIN (GLUCOPHAGE) 500 mg tablet Take 1,000 mg by mouth 2 (two) times a day with meals  Patient not taking: Reported on 10/9/2024 3/10/22   Historical Provider, MD   ondansetron (ZOFRAN-ODT) 8 mg disintegrating tablet Take 1 tablet (8 mg total) by mouth every 8 (eight) hours as needed for vomiting or nausea 8/14/24   Rikki Estrella,    OneTouch Verio test strip Use 1 each 4 (four) times a day 9/24/24   Amelie Bacon MD   tiotropium-olodaterol (Stiolto Respimat) 2.5-2.5 MCG/ACT inhaler Inhale 2 puffs daily 6/20/24   DIANNE Marroquin     No Known Allergies    Objective :  Temp:  [99.2 °F (37.3 °C)] 99.2 °F (37.3  °C)  HR:  [123-132] 126  BP: (124-150)/(72-84) 131/72  Resp:  [22-24] 24  SpO2:  [90 %-96 %] 96 %  O2 Device: Nasal cannula  Nasal Cannula O2 Flow Rate (L/min):  [2 L/min] 2 L/min    Physical Exam  Vitals and nursing note reviewed.   Constitutional:       General: He is not in acute distress.     Appearance: He is well-developed. He is not toxic-appearing or diaphoretic.   HENT:      Head: Normocephalic and atraumatic.   Eyes:      General: No scleral icterus.     Conjunctiva/sclera: Conjunctivae normal.   Cardiovascular:      Rate and Rhythm: Normal rate and regular rhythm.      Heart sounds: No murmur heard.     No friction rub. No gallop.   Pulmonary:      Effort: Pulmonary effort is normal. No respiratory distress.      Breath sounds: Normal breath sounds. No stridor. No wheezing, rhonchi or rales.      Comments: 2l nc   Tachypnea  Decreaed breath sopunds, rales  Chest:      Chest wall: No tenderness.   Abdominal:      General: There is no distension.      Palpations: Abdomen is soft.      Tenderness: There is no abdominal tenderness. There is no guarding or rebound.      Hernia: No hernia is present.   Musculoskeletal:         General: No swelling or tenderness.      Cervical back: Neck supple.   Skin:     General: Skin is warm and dry.      Capillary Refill: Capillary refill takes less than 2 seconds.   Neurological:      Mental Status: He is alert and oriented to person, place, and time.   Psychiatric:         Mood and Affect: Mood normal.          Lines/Drains:      Central Line:  Goal for removal: N/A - Chronic PICC             Lab Results: I have reviewed the following results:  Results from last 7 days   Lab Units 12/08/24  1118   WBC Thousand/uL 6.42   HEMOGLOBIN g/dL 10.5*   HEMATOCRIT % 31.9*   PLATELETS Thousands/uL 132*   SEGS PCT % 77*   LYMPHO PCT % 13*   MONO PCT % 8   EOS PCT % 1     Results from last 7 days   Lab Units 12/08/24  1118   SODIUM mmol/L 133*   POTASSIUM mmol/L 3.5   CHLORIDE mmol/L  99   CO2 mmol/L 28   BUN mg/dL 10   CREATININE mg/dL 0.66   ANION GAP mmol/L 6   CALCIUM mg/dL 8.9   ALBUMIN g/dL 3.5   TOTAL BILIRUBIN mg/dL 0.52   ALK PHOS U/L 72   ALT U/L 27   AST U/L 23   GLUCOSE RANDOM mg/dL 240*     Results from last 7 days   Lab Units 12/08/24  1118   INR  1.20*     Results from last 7 days   Lab Units 12/08/24  1816   POC GLUCOSE mg/dl 231*     Lab Results   Component Value Date    HGBA1C 6.7 (H) 06/28/2024    HGBA1C 7.1 (H) 04/20/2024     Results from last 7 days   Lab Units 12/08/24  1118   LACTIC ACID mmol/L 1.0   PROCALCITONIN ng/ml 0.17       Imaging Results Review: I reviewed radiology reports from this admission including: chest xray.  Other Study Results Review: EKG was reviewed.     Administrative Statements       ** Please Note: This note has been constructed using a voice recognition system. **

## 2024-12-08 NOTE — PROGRESS NOTES
Telemedicine consent    Patient: Papo Gibbs  Provider: Maryana Sandoval MD  Provider located at [address] [place] [address] [facility] [address] [id number]    The patient was identified by name and date of birth. Papo Gibbs was informed that this is a telemedicine visit and that the visit is being conducted through the Epic Embedded platform. He agrees to proceed..  My office door was closed. No one else was in the room.  He acknowledged consent and understanding of privacy and security of the video platform. The patient has agreed to participate and understands they can discontinue the visit at any time.    Patient is aware this is a billable service.                                Hematology/Oncology Outpatient Office Note    Date of Service: 2024    [facility] [address] [address] [phone]    Reason for Consultation:   No chief complaint on file.      Cancer Stage at diagnosis: IV    Referral Physician: No ref. provider found    Primary Care Physician:  Amelie Bacon MD     Nickname: Jessica    Spouse: Mabel  (RN at [employer] in Cardiology)    Original ECO    Today's ECO    Goals and Barriers:  Current Goal: Minimize effects of disease burden, extend life.   Barriers to accomplishing this: SOB    Patient's Capacity to Self Care:  Patient is able to self care    ASSESSMENT & PLAN      Diagnosis ICD-10-CM Associated Orders   1. Adenocarcinoma of overlapping sites of right lung (HCC)  C34.81             This is a 54 y.o. c PMHx notable for DM, HLP, blood clots, HTN, remote nicotine abuse, being seen in consultation for metastatic lung adeno       Discussion of decision making  Oncology history updated, accordingly, during this visit  Goals of care/patient communication  I discussed with the patient the clinical course leading up to their cancer diagnosis. I reviewed relevant office notes,  imaging reports and pathology result as well.  I told the patient that this is a case of incurable disease and what this means. We discussed that the goal of anti-cancer therapy is to provide best quality of life, extend overall survival, and progression free survival as shown in clinical trials. We also discussed that there might be a point when the cancer will no longer respond to this anti-neoplastic therapy. As a result, we also discussed the role of the palliative care team being introduced early in the treatment course. We will be making this referral  I explained the risks/benefits of the proposed cancer therapy: Carbo-Alimta-Keytruda and after discussion including understanding risks of possible life-threatening complications and therapy-related malignancy development, informed consent for blood products and treatment has been signed and obtained.  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 Conclusive Evidence of Malignancy. Metastatic adenocarcinoma compatible with pulmonary origin,   Treatment: s/p Carbo-Alimta-Keytruda, holding Ramucirumab + Docetaxel for now and doing Alimta alone  Other Supportive care: Zofran, EMLA  Treatment Team Members  Surgeon  Rad Onc  Palliative: Dr. Estrella  Labs  Diagnostics  4/19/2024 CT Chest PE: Acute moderate clot burden multifocal bilateral peripheral pulmonary embolism, mild R heart strain. Findings suggestive of multifocal pneumonia. Pathologically enlarged mediastinal, hilar,  para-aortic, retrocrural, and upper abdominal lymph nodes. Nonspecific 1.9 cm left adrenal nodule  4/20/2024 CT Abd/pelv w/wo c: There is a 2.1 x 1.8 x 2.6 cm left adrenal nodule. Abdominal and thoracic lymphadenopathy  5/4/2024 MRI Brain w/wo c: 4 mm metastasis in the posterior right frontal lobe without significant edema. No other definite enhancing lesions but evaluation is limited due to motion artifact.  Tiny focus of restricted diffusion in the left frontal lobe without definite enhancement could represent acute/early subacute infarct  5/8/2024: Neuro working group recommended close MRI Brain surveillance for the 2 small brain mets to see how it responds to treatment, Dr. Paulo Alcala will trend it  8/2/2024 CT CAP w/c: [facility]!  Interval decrease in size of mediastinal and hilar lymph nodes. Left hilar lymph node measures 1.3 x 1.1 cm compared to 2.3 x 2.8 cm previously. Subcarinal lymph node measures 1.7 x 1 cm compared to 4.3 x 2.5 cm  Significant interval decrease in size of right upper lobe mass with lymphangitic carcinomatosis and extension into the right middle lobe with residual wedge-shaped airspace opacity now measuring 4.6 x 2.6 cm   Interval decrease in size of left adrenal lesion measuring 1.2 x 1.2 cm compared to 2.1 x 1.8 cm previously  10/25/2024 CT CAP w/c: not clear if POD. Worsening lymphangitic tumor progression compared to prior. New/increased small right pleural effusion. Resolving nodular thickening medial limb left adrenal gland. No new metastatic disease in the abdomen or pelvis.   Resolving nodular thickening medial limb left adrenal gland. No new metastatic disease in the abdomen or pelvis  Primary lesion right middle lobe measures approximately 4.0 x 2.8 cm, stable allowing for differences in technique and slice selection. Significant progression of lymphangitic tumor compared to August 2, 2024 and October 4, 2024, with worsening   diffuse interlobular septal thickening throughout the  right lung, peribronchovascular nodularity  11/11/2024 thoracic tumor board determine there is likely no disease progression and to hold off on Ramucirumab + Docetaxel. Continue Alimta  12/8/2024 CT Chest PE: Since October 2024 there is 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 Increasing partially loculated moderate right pleural effusion. No pulmonary embolism. New interstitial edema in the left lung that is probably related to volume overload. Developing left lung carcinomatosis is also possible. Attention to this finding on follow-up imaging advised. Possible small pneumonia within the superior segment of the left lower lobe. There are enlarged subcarinal lymph nodes measuring up to 1.2 cm that have increased in size   12/13/2024 MRI Brain w/wo c: Status post treatment of right frontal brain metastasis. No metastases currently    Discussion of decision making    I personally reviewed the following lab results, the image studies, pathology, other specialty/physicians consult notes and recommendations, and outside medical records. I had a lengthy discussion with the patient and shared the work-up findings. We discussed the diagnosis and management plan as below. I spent 45 minutes reviewing the records (labs, clinician notes, outside records, medical history, ordering medicine/tests/procedures, monitoring of anti-neoplastic toxicities, interpreting the imaging/labs previously done) and coordination of care as well as direct time with the patient today, of which greater than 50% of the time was spent in counseling and coordination of care with the patient/family.      Plan/Labs  Restaging CT Chest ordered in 10 weeks  Acyclovir being ordered 800 mg 5 times daily for 7 to 10 days for Shingles  MRI Brain restaging as per Rad Onc  Rad Onc f/u for surveillance of 2 small brain mets  Cont Alimta 500 mg/m2 q3 weeks, concern for furthering lung tumor worsening and have discussed switching to  docetaxel/Ramucirumab and will await tumor board discussion Monday.   F/u palliative care       Follow Up: q3 weeks scheduled thru 3/20/2025    All questions were answered to the patient's satisfaction during this encounter. The patient knows the contact information for our office and knows to reach out for any relevant concerns related to this encounter. They are to call for any temperature 100.4 or higher, new symptoms including but not restricted to shaking chills, decreased appetite, nausea, vomiting, diarrhea, increased fatigue, shortness of breath or chest pain, confusion, and not feeling the strength to come to the clinic. For all other listed problems and medical diagnosis in their chart - they are managed by PCP and/or other specialists, which the patient acknowledges. Thank you very much for your consultation and making us a part of this patient's care. We are continuing to follow closely with you. Please do not hesitate to reach out to me with any additional questions or concerns.    Maryana Sandoval MD  Hematology & Medical Oncology Staff Physician             Disclaimer: This document was prepared using [employer] Direct technology. If a word or phrase is confusing, or does not make sense, this is likely due to recognition error which was not discovered during this clinician's review. If you believe an error has occurred, please contact me through [facility] service line for juliana?cation.      ONCOLOGY HISTORY OF PRESENT ILLNESS        Oncology History Overview Note   with multifocal bilateral peripheral pulmonary embolism. Biopsy 4/23/24 left retroperitoneal lymph node revealed metastatic adenocarcinoma of lung primary. MRI brain 5/4/24 revealed 4 mm metastasis in the posterior right frontal lobe. On 7/3/24 he underwent SRS to a small right frontal metastasis to a dose of 2000cGy in 1 fraction. The pt was last seen in radiation on 09/18/24. The pt returns today for follow up.    09/24/24 - Hem Onc,  [facility]  Pt to have CT next month  Consider Pepcid / Gas-x for dyspepsia      10/04/24 - CTA chest pe study  IMPRESSION:  Tiny subsegmental embolus in the right lower lobe. Given its small size, it is of doubtful clinical significance.  Patchy new consolidation in the right lower lobe, infectious or inflammatory.  Stable tumor in the right midlung with progression of lymphangitic spread throughout the right lung.      10/25/24 - CT chest abdomen pelvis w contrast  IMPRESSION:  Worsening lymphangitic tumor progression compared to prior from 10/4/2024 and August 2, 2024.  New/increased small right pleural effusion, probably malignant.  Resolving nodular thickening medial limb left adrenal gland. No new metastatic disease in the abdomen or pelvis.  Nonobstructing left nephrolithiasis.  The study was marked in EPIC for significant notification.    10/21/24 - Pulmonary, [facility]  Pt remains on Keytruda and Alimta - tolerating well  On 2L supplemental oxygen    11/04/24 - Hem [facility]  Most recent imaging shows progression  Pt now on Ramucirumab and Docetaxel after disease progression  Will be discussed at tumor board     11/11/24 - Tumor board  PHYSICIAN RECOMMENDED PLAN:  - Treat as pneumonitis (steroids).   - Continue Alimta  - Repeat imaging in 6 weeks    11/22/24 - [facility]  Steroid taper  to end in 2 weeks (mid December)  Acyclovir being ordered for shingles  Continue with Alimta 500mg/m2 every 3 weeks      12/13/24 - MRI Brain BT w wo contrast  IMPRESSION:  1. Status post treatment of right frontal brain metastasis. No metastases currently.  Continued clinical and imaging followup advised.  2. No acute infarction, intracranial image or mass.  3. 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      Upcoming:       Adenocarcinoma of overlapping sites of right lung (HCC)   4/23/2024 Biopsy    Final Diagnosis   A. Lymph Node, Left retroperitoneal, Biopsy:  - Metastatic  adenocarcinoma of lung primary.         5/2/2024 Initial Diagnosis    Adenocarcinoma of overlapping sites of right lung (HCC)     5/2/2024 -  Cancer Staged    Staging form: Lung, AJCC 8th Edition  - Clinical: Stage IV (cN3, cM1) - Signed by Maryana Sandoval MD on 5/2/2024 5/21/2024 - 10/15/2024 Chemotherapy    cyanocobalamin, 1,000 mcg, Intramuscular, Once, 4 of 5 cycles  Administration: 1,000 mcg (5/14/2024), 1,000 mcg (7/2/2024), 1,000 mcg (9/3/2024), 1,000 mcg (10/15/2024)  alteplase (CATHFLO), 2 mg, Intracatheter, Every 1 Minute as needed, 8 of 10 cycles  fosaprepitant (EMEND) IVPB, 150 mg, Intravenous, Once, 6 of 6 cycles  Administration: 150 mg (5/21/2024), 150 mg (7/2/2024), 150 mg (7/23/2024), 150 mg (9/3/2024), 150 mg (6/11/2024), 150 mg (8/13/2024)  CARBOplatin (PARAPLATIN) IVPB (GOG AUC DOSING), 672 mg, Intravenous, Once, 6 of 6 cycles  Administration: 672 mg (5/21/2024), 692.5 mg (7/2/2024), 659 mg (7/23/2024), 641 mg (9/3/2024), 692.5 mg (6/11/2024), 647 mg (8/13/2024)  pemetrexed (ALIMTA) chemo infusion, 1,020 mg, Intravenous, Once, 8 of 10 cycles  Administration: 1,000 mg (5/21/2024), 1,000 mg (7/2/2024), 1,000 mg (7/23/2024), 1,000 mg (9/3/2024), 1,000 mg (6/11/2024), 1,000 mg (8/13/2024), 1,000 mg (9/24/2024), 1,000 mg (10/15/2024)  pembrolizumab (KEYTRUDA) IVPB, 200 mg, Intravenous, Once, 8 of 10 cycles  Administration: 200 mg (5/21/2024), 200 mg (7/2/2024), 200 mg (7/23/2024), 200 mg (9/3/2024), 200 mg (6/11/2024), 200 mg (8/13/2024), 200 mg (9/24/2024), 200 mg (10/15/2024)     7/3/2024 - 7/3/2024 Radiation    Treatments:  Course: C1 SRS    Plan ID Energy Fractions Dose per Fraction (cGy) Dose Correction (cGy) Total Dose Delivered (cGy) Elapsed Days   SRSRFrontN 6X-FFF 1 / 1 2,000 0 2,000 0      Treatment Dates:  7/3/2024 - 7/3/2024.      11/26/2024 -  Chemotherapy    cyanocobalamin, 1,000 mcg, Intramuscular, Once, 1 of 3 cycles  Administration: 1,000 mcg (11/19/2024)  alteplase (CATHFLO),  2 mg, Intracatheter, Every 1 Minute as needed, 2 of 6 cycles  pemetrexed (ALIMTA) chemo infusion, 1,040 mg, Intravenous, Once, 2 of 6 cycles  Administration: 1,000 mg (11/26/2024)     11/26/2024 - 11/26/2024 Chemotherapy    alteplase (CATHFLO), 2 mg, Intracatheter, Every 1 Minute as needed, 0 of 6 cycles  ramucirumab (CYRAMZA) IVPB, 10 mg/kg = 975 mg, Intravenous, Once, 0 of 6 cycles  DOCEtaxel (TAXOTERE) chemo infusion, 75 mg/m2 = 156 mg, Intravenous, Once, 0 of 6 cycles     Primary malignant neoplasm of right lung metastatic to other site (HCC)   6/4/2024 Initial Diagnosis    Primary malignant neoplasm of right lung metastatic to other site (HCC)     7/3/2024 - 7/3/2024 Radiation    Treatments:  Course: C1 SRS    Plan ID Energy Fractions Dose per Fraction (cGy) Dose Correction (cGy) Total Dose Delivered (cGy) Elapsed Days   SRSRFrontN 6X-FFF 1 / 1 2,000 0 2,000 0      Treatment Dates:  7/3/2024 - 7/3/2024.        Imaging on [10/25/2024] indicates likely pneumonitis and not likely POD, tumor board recommended resuming Alimta, holding Keytruda  Treating as potential irAE pneumonitis: Pred 100 mg with a taper of 20 mg over 6 weeks    SUBJECTIVE  (INTERVAL HISTORY)      Clotting History None   Bleeding History None   Cancer History Lung Adeno   Family Cancer History None   H/O Blood/Plt Transfusion None   Tobacco/etoh/drug abuse 1.5 PPD x 40 years, quit 4/2024, no etoh abuse or rec drug use       Cancer Screening history C-scope 2014, due for [employer] (ordered)   Occupation  in the past, now          Shinguicho has cleared up.   Walks 5 steps and feels ROBERTSON>    Non-productive cough is ongoing with R chest symptoms.    Tolerating diet well.   Vision is ok. (Still has Kaleidescope vision which had for years - prior to cancer diagnosis)    I have reviewed the relevant past medical, surgical, social and family history. I have also reviewed allergies and medications for this patient.    Review  of Systems  Baseline weight: 200-205 lbs  Today's weight: 212 lbs    Denies F/C, N/V, CP, LH, HA, rash, itching, gen weakness, unilateral weakness, diplopia, melena, hematuria, hematochezia, falls, diarrhea, or constipation       A 10-point review of system was performed, pertinent positive and negative were detailed as above. Otherwise, the 10-point review of system was negative.      Past Medical History:   Diagnosis Date    Cancer (HCC)     Diabetes mellitus (HCC)     High cholesterol     History of blood clots     Hypertension     Leucocytosis 2024    Lung cancer (HCC)     Malignant neoplasm of overlapping sites of right lung (HCC) 2024    Pneumonia     Pulmonary embolism (HCC)        Past Surgical History:   Procedure Laterality Date    IR BIOPSY LYMPH NODE  2024    IR PORT PLACEMENT  2024    IR THORACENTESIS  2024    KNEE SURGERY      MANDIBLE FRACTURE SURGERY  1985    PATELLA FRACTURE SURGERY      RECTAL SURGERY         Family History   Problem Relation Age of Onset    No Known Problems Father     No Known Problems Mother        Social History     Socioeconomic History    Marital status: Single     Spouse name: Not on file    Number of children: Not on file    Years of education: Not on file    Highest education level: Not on file   Occupational History    Not on file   Tobacco Use    Smoking status: Former     Current packs/day: 0.00     Average packs/day: 1.5 packs/day for 35.0 years (52.5 ttl pk-yrs)     Types: Cigarettes     Quit date: 4/15/2024     Years since quittin.6     Passive exposure: Past    Smokeless tobacco: Never   Vaping Use    Vaping status: Never Used   Substance and Sexual Activity    Alcohol use: Not Currently    Drug use: Never    Sexual activity: Yes     Partners: Female     Birth control/protection: Post-menopausal, None   Other Topics Concern    Not on file   Social History Narrative    Not on file     Social Drivers of Health     Financial  Resource Strain: Not on file   Food Insecurity: No Food Insecurity (2024)    Nursing - Inadequate Food Risk Classification     Worried About Running Out of Food in the Last Year: Not on file     Ran Out of Food in the Last Year: Not on file     Ran Out of Food in the Last Year: 1   Transportation Needs: No Transportation Needs (2024)    Nursing - Transportation Risk Classification     Lack of Transportation: Not on file     Lack of Transportation: 2   Physical Activity: Not on file   Stress: Not on file   Social Connections: Not on file   Intimate Partner Violence: Unknown (2024)    Nursing IPS     Feels Physically and Emotionally Safe: Not on file     Physically Hurt by Someone: Not on file     Humiliated or Emotionally Abused by Someone: Not on file     Physically Hurt by Someone: 2     Hurt or Threatened by Someone: 2   Housing Stability: Unknown (2024)    Nursing: Inadequate Housing Risk Classification     Has Housing: Not on file     Worried About Losing Housing: Not on file     Unable to Get Utilities: Not on file     Unable to Pay for Housing in the Last Year: 2     Has Housin       No Known Allergies    Current Outpatient Medications   Medication Sig Dispense Refill    acetaminophen (TYLENOL) 325 mg tablet Take 3 tablets (975 mg total) by mouth every 8 (eight) hours as needed for mild pain, headaches or fever      albuterol (ProAir HFA) 90 mcg/act inhaler Inhale 2 puffs every 6 (six) hours as needed for wheezing 8.5 g 2    amitriptyline (ELAVIL) 100 mg tablet 200 mg daily at bedtime      apixaban (Eliquis) 5 mg Take 1 tablet (5 mg total) by mouth 2 (two) times a day 60 tablet 5    atorvastatin (LIPITOR) 10 mg tablet Take 1 tablet (10 mg total) by mouth daily 90 tablet 3    benzonatate (TESSALON) 200 MG capsule Take 1 capsule (200 mg total) by mouth 3 (three) times a day as needed for cough 20 capsule 0    famotidine (PEPCID) 40 MG tablet Take 40 mg by mouth daily as needed for  heartburn or indigestion Taking as needed      folic acid (FOLVITE) 1 mg tablet TAKE 1 TABLET BY MOUTH EVERY DAY 90 tablet 2    glimepiride (AMARYL) 2 mg tablet TAKE 1 TABLET BY MOUTH DAILY WITH BREAKFAST 90 tablet 1    Glucagon, rDNA, (Glucagon Emergency) 1 MG KIT Inject 1 mg as directed once as needed (hypoglycemia) for up to 1 dose (Patient not taking: Reported on 12/18/2024) 2 kit 0    Lancets (OneTouch Delica Plus [id number]) MISC Use 1 Units 4 (four) times a day 400 each 2    levothyroxine 25 mcg tablet TAKE 1 TABLET BY MOUTH EVERY DAY 90 tablet 2    losartan (COZAAR) 25 mg tablet Take 25 mg by mouth daily      meclizine (ANTIVERT) 25 mg tablet Take 1 tablet (25 mg total) by mouth every 8 (eight) hours as needed for dizziness (Patient not taking: Reported on 12/18/2024) 30 tablet 0    metFORMIN (GLUCOPHAGE) 1000 MG tablet Take 1,000 mg by mouth 2 (two) times a day      naloxone (NARCAN) 4 mg/0.1 mL nasal spray Administer 1 spray into a nostril. If no response after 2-3 minutes, give another dose in the other nostril using a new spray. For use in emergencies for opioid / pain medication reversal for accidental ingestion, respiratory depression, sedation or concerns for overdose. 1 each 1    ondansetron (ZOFRAN-ODT) 8 mg disintegrating tablet Take 1 tablet (8 mg total) by mouth every 8 (eight) hours as needed for vomiting or nausea 30 tablet 0    OneTouch Verio test strip Use 1 each 4 (four) times a day 400 each 2    oxyCODONE (Roxicodone) 5 immediate release tablet Take 0.5 tablets (2.5 mg total) by mouth every 4 (four) hours as needed for moderate pain If pain is severe, may instead take 1 tablet (5mg) every 4 hours as needed Max Daily Amount: 15 mg 60 tablet 0    senna (SENOKOT) 8.6 mg Take 1 tablet (8.6 mg total) by mouth daily at bedtime as needed for constipation 30 tablet 1    tiotropium-olodaterol (Stiolto Respimat) 2.5-2.5 MCG/ACT inhaler Inhale 2 puffs daily 4 g 3     No current facility-administered  medications for this visit.       (Not in a hospital admission)      Objective:     24 Hour Vitals Assessment:     There were no vitals filed for this visit.    Below not updated as this was a telemed visit    PHYSICIAN EXAM:    General: Appearance: alert, cooperative, no distress.  HEENT: Normocephalic, atraumatic. No scleral icterus. conjunctivae clear. EOMI.  Chest: No tenderness to palpation. No open wound noted.  Lungs: Clear to auscultation bilaterally, Respirations unlabored.  Cardiac: Tachycardia, +S1and S2  Abdomen: Soft, non-tender, non-distended. Bowel sounds are normal.  Extremities:  No edema, cyanosis, clubbing.  Skin: Skin color, turgor are normal. L Posterior thigh fluid vesicles noted, several with fluid restricted within a dermatome*  Lymphatics: no palpable supra-cervical, axillary, or inguinal adenopathy  Neurologic: Awake, Alert, and oriented, no gross focal deficits noted b/l.       DATA REVIEW:    Pathology Result:    Final Diagnosis   Date Value Ref Range Status   12/09/2024   Final    A-B. Thoracentesis, Right: (ThinPrep and cell block preparations)  Conclusive Evidence of Malignancy  Metastatic adenocarcinoma compatible with pulmonary origin, see comment.     Satisfactory for evaluation.    Comment: The patient has a history of pulmonary adenocarcinoma with metastasis; the current findings are compatible with the patient's known carcinoma.     Intradepartmental consultation is in agreement (IK).  Dr. Sandoval is notified of the findings by Dr. Chauhan via [employer] Secure Chat on 12/12/24.    B-1 can be used for ancillary testing if clinically indicated.        04/23/2024   Final    A. Lymph Node, Left retroperitoneal, Biopsy:  - Metastatic adenocarcinoma of lung primary.     Comment: Specimen (block A2) is being sent to [facility] for MI Profile Testing. Upon completion of testing, [employer] report will be sent directly to the requesting provider, as well as posted in the Media Tab of EPIC  for this patient by the Pathology Department.          Image Results:   Image result are reviewed and documented in Hematology/Oncology history    MRI Brain BT w wo Contrast  Narrative: MRI BRAIN WITH AND WITHOUT CONTRAST    INDICATION: C79.31: Secondary malignant neoplasm of brain.  Tumor Type: History of stage IV non-small cell lung cancer with left frontal lobe metastasis. Follow-up evaluation.  Surgical History: Not available  Radiation History: 7/3/2024 status post SRS  Relevant Medications: Not applicable    COMPARISON: 9/12/2024; 6/20/2024    TECHNIQUE:  Multiplanar, multisequence imaging of the brain and sella was performed before and after gadolinium administration.    IV Contrast:  10 mL of Gadobutrol injection (SINGLE-DOSE)    IMAGE QUALITY:   Diagnostic.    FINDINGS:    BRAIN TUMOR TREATMENT BED:  Small 5 mm focus of juxtacortical enhancement in the right frontal lobe in the region of the precentral gyrus has resolved. Currently there is no pathologic enhancement.    Perfusion:  No areas of increased parenchymal cerebral blood flow.    Diffusion:  No diffusion abnormality to suggest hypercellularity.    OTHER FINDINGS:  A few subtle subcortical white matter lesions predominantly in the frontal lobes are stable.    No acute intracranial hemorrhage. No extra-axial fluid collection. Cerebellar tonsils normally positioned.    VENTRICLES:  Normal for the patient's age.    SELLA AND PITUITARY GLAND:  Normal.    ORBITS:  Normal.    PARANASAL SINUSES: Mild left maxillary sinus mucosal thickening.    VASCULATURE:  Evaluation of the major intracranial vasculature demonstrates appropriate flow voids.    CALVARIUM AND SKULL BASE:  Normal.    EXTRACRANIAL SOFT TISSUES:  Normal.  Impression: 1. Status post treatment of right frontal brain metastasis. No metastases currently.  Continued clinical and imaging followup advised.  2. No acute infarction, intracranial image or mass.  3. 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    Workstation performed: [id number]      LABS:  Lab data are reviewed and documented in Hem[facility]c history.       Lab Results   Component Value Date    HGB 9.2 (L) 12/13/2024    HCT 28.4 (L) 12/13/2024    MCV 84 12/13/2024     12/13/2024    WBC 6.21 12/13/2024    NRBC 0 12/13/2024     Lab Results   Component Value Date    K 3.9 12/13/2024    CL 93 (L) 12/13/2024    CO2 31 12/13/2024    BUN 16 12/13/2024    CREATININE 0.64 12/13/2024    GLUF 100 (H) 06/28/2024    CALCIUM 8.4 12/13/2024    CORRECTEDCA 9.4 12/13/2024    AST 13 12/13/2024    ALT 16 12/13/2024    ALKPHOS 67 12/13/2024    EGFR 110 12/13/2024       Lab Results   Component Value Date    IRON 28 (L) 04/19/2024    TIBC 212 (L) 04/19/2024    FERRITIN 63 04/19/2024       No results found for: \"CLOSYDGE54\"    No results for input(s): \"WBC\", \"CREAT\", \"PLT\" in the last 72 hours.      By:  Maryana Sandoval MD, 12/18/2024, 10:00 AM                                  "

## 2024-12-08 NOTE — ASSESSMENT & PLAN NOTE
Patient follows with Dr. Ramachandran from heme-onc as an outpatient, last chemotherapy 1 week ago patient  Keytruda held due to potential pneumonitis patient recently completed steroid taper

## 2024-12-08 NOTE — ASSESSMENT & PLAN NOTE
Patient with history of PE on Eliquis, does note intermittent nosebleeds but not very significant and resolves on its own, also about 1 week of dark appearing stool, will need to monitor for melena during this admission, okay to continue Eliquis for now

## 2024-12-09 ENCOUNTER — APPOINTMENT (INPATIENT)
Dept: RADIOLOGY | Facility: HOSPITAL | Age: 54
DRG: 181 | End: 2024-12-09
Payer: COMMERCIAL

## 2024-12-09 ENCOUNTER — APPOINTMENT (INPATIENT)
Dept: NON INVASIVE DIAGNOSTICS | Facility: HOSPITAL | Age: 54
DRG: 181 | End: 2024-12-09
Payer: COMMERCIAL

## 2024-12-09 ENCOUNTER — APPOINTMENT (INPATIENT)
Dept: RADIOLOGY | Facility: HOSPITAL | Age: 54
DRG: 181 | End: 2024-12-09
Attending: INTERNAL MEDICINE
Payer: COMMERCIAL

## 2024-12-09 PROBLEM — R00.0 TACHYCARDIA: Status: ACTIVE | Noted: 2024-12-09

## 2024-12-09 PROBLEM — J90 PLEURAL EFFUSION: Status: ACTIVE | Noted: 2024-12-09

## 2024-12-09 LAB
ALBUMIN SERPL BCG-MCNC: 3.1 G/DL (ref 3.5–5)
ALP SERPL-CCNC: 61 U/L (ref 34–104)
ALT SERPL W P-5'-P-CCNC: 24 U/L (ref 7–52)
ANION GAP SERPL CALCULATED.3IONS-SCNC: 6 MMOL/L (ref 4–13)
AORTIC ROOT: 3 CM
AORTIC VALVE MEAN VELOCITY: 10.7 M/S
APICAL FOUR CHAMBER EJECTION FRACTION: 57 %
APPEARANCE FLD: NORMAL
ASCENDING AORTA: 3.4 CM
AST SERPL W P-5'-P-CCNC: 17 U/L (ref 13–39)
AV AREA BY CONTINUOUS VTI: 2.2 CM2
AV AREA PEAK VELOCITY: 2.3 CM2
AV LVOT MEAN GRADIENT: 2 MMHG
AV LVOT PEAK GRADIENT: 4 MMHG
AV MEAN GRADIENT: 6 MMHG
AV PEAK GRADIENT: 11 MMHG
AV VALVE AREA: 2.15 CM2
AV VELOCITY RATIO: 0.59
BASOPHILS # BLD AUTO: 0.01 THOUSANDS/ÂΜL (ref 0–0.1)
BASOPHILS NFR BLD AUTO: 0 % (ref 0–1)
BILIRUB SERPL-MCNC: 0.43 MG/DL (ref 0.2–1)
BSA FOR ECHO PROCEDURE: 2.01 M2
BUN SERPL-MCNC: 12 MG/DL (ref 5–25)
CALCIUM ALBUM COR SERPL-MCNC: 9.2 MG/DL (ref 8.3–10.1)
CALCIUM SERPL-MCNC: 8.5 MG/DL (ref 8.4–10.2)
CHLORIDE SERPL-SCNC: 99 MMOL/L (ref 96–108)
CHOLEST SERPL-MCNC: 136 MG/DL (ref ?–200)
CO2 SERPL-SCNC: 31 MMOL/L (ref 21–32)
COLOR FLD: NORMAL
CREAT SERPL-MCNC: 0.67 MG/DL (ref 0.6–1.3)
DOP CALC AO PEAK VEL: 1.67 M/S
DOP CALC AO VTI: 27.49 CM
DOP CALC LVOT AREA: 3.8 CM2
DOP CALC LVOT CARDIAC INDEX: 3.16 L/MIN/M2
DOP CALC LVOT CARDIAC OUTPUT: 6.38 L/MIN
DOP CALC LVOT DIAMETER: 2.2 CM
DOP CALC LVOT PEAK VEL VTI: 15.59 CM
DOP CALC LVOT PEAK VEL: 0.99 M/S
DOP CALC LVOT STROKE INDEX: 30.2 ML/M2
DOP CALC LVOT STROKE VOLUME: 59.23
EOSINOPHIL # BLD AUTO: 0.02 THOUSAND/ÂΜL (ref 0–0.61)
EOSINOPHIL NFR BLD AUTO: 0 % (ref 0–6)
ERYTHROCYTE [DISTWIDTH] IN BLOOD BY AUTOMATED COUNT: 15.9 % (ref 11.6–15.1)
EST. AVERAGE GLUCOSE BLD GHB EST-MCNC: 186 MG/DL
FRACTIONAL SHORTENING: 31 (ref 28–44)
GFR SERPL CREATININE-BSD FRML MDRD: 108 ML/MIN/1.73SQ M
GLUCOSE FLD-MCNC: 248 MG/DL
GLUCOSE SERPL-MCNC: 210 MG/DL (ref 65–140)
GLUCOSE SERPL-MCNC: 210 MG/DL (ref 65–140)
GLUCOSE SERPL-MCNC: 280 MG/DL (ref 65–140)
GLUCOSE SERPL-MCNC: 281 MG/DL (ref 65–140)
GLUCOSE SERPL-MCNC: 291 MG/DL (ref 65–140)
HBA1C MFR BLD: 8.1 %
HCT VFR BLD AUTO: 29.7 % (ref 36.5–49.3)
HDLC SERPL-MCNC: 47 MG/DL
HGB BLD-MCNC: 9.5 G/DL (ref 12–17)
IMM GRANULOCYTES # BLD AUTO: 0.05 THOUSAND/UL (ref 0–0.2)
IMM GRANULOCYTES NFR BLD AUTO: 1 % (ref 0–2)
INR PPP: 1.35 (ref 0.85–1.19)
INTERVENTRICULAR SEPTUM IN DIASTOLE (PARASTERNAL SHORT AXIS VIEW): 1.2 CM
INTERVENTRICULAR SEPTUM: 1.2 CM (ref 0.6–1.1)
LAAS-AP2: 14 CM2
LAAS-AP4: 14.2 CM2
LDH FLD L TO P-CCNC: 562 U/L
LDLC SERPL CALC-MCNC: 71 MG/DL (ref 0–100)
LEFT ATRIUM SIZE: 3.7 CM
LEFT ATRIUM VOLUME (MOD BIPLANE): 36 ML
LEFT ATRIUM VOLUME INDEX (MOD BIPLANE): 17.8 ML/M2
LEFT INTERNAL DIMENSION IN SYSTOLE: 2.7 CM (ref 2.1–4)
LEFT VENTRICULAR INTERNAL DIMENSION IN DIASTOLE: 3.9 CM (ref 3.5–6)
LEFT VENTRICULAR POSTERIOR WALL IN END DIASTOLE: 1.2 CM
LEFT VENTRICULAR STROKE VOLUME: 40 ML
LVSV (TEICH): 40 ML
LYMPHOCYTES # BLD AUTO: 0.85 THOUSANDS/ÂΜL (ref 0.6–4.47)
LYMPHOCYTES NFR BLD AUTO: 15 % (ref 14–44)
MCH RBC QN AUTO: 27.1 PG (ref 26.8–34.3)
MCHC RBC AUTO-ENTMCNC: 32 G/DL (ref 31.4–37.4)
MCV RBC AUTO: 85 FL (ref 82–98)
MONOCYTES # BLD AUTO: 0.59 THOUSAND/ÂΜL (ref 0.17–1.22)
MONOCYTES NFR BLD AUTO: 10 % (ref 4–12)
MV E'TISSUE VEL-SEP: 10 CM/S
NEUTROPHILS # BLD AUTO: 4.2 THOUSANDS/ÂΜL (ref 1.85–7.62)
NEUTS SEG NFR BLD AUTO: 74 % (ref 43–75)
NRBC BLD AUTO-RTO: 0 /100 WBCS
PH BODY FLUID: 7.6
PLATELET # BLD AUTO: 122 THOUSANDS/UL (ref 149–390)
PMV BLD AUTO: 10.2 FL (ref 8.9–12.7)
POTASSIUM SERPL-SCNC: 3.5 MMOL/L (ref 3.5–5.3)
PROT FLD-MCNC: 3.4 G/DL
PROT SERPL-MCNC: 6 G/DL (ref 6.4–8.4)
PROTHROMBIN TIME: 17.4 SECONDS (ref 12.3–15)
RBC # BLD AUTO: 3.5 MILLION/UL (ref 3.88–5.62)
RIGHT ATRIUM AREA SYSTOLE A4C: 10.4 CM2
RIGHT VENTRICLE ID DIMENSION: 3.7 CM
SITE: NORMAL
SL CV LEFT ATRIUM LENGTH A2C: 4.8 CM
SL CV LV EF: 60
SL CV PED ECHO LEFT VENTRICLE DIASTOLIC VOLUME (MOD BIPLANE) 2D: 66 ML
SL CV PED ECHO LEFT VENTRICLE SYSTOLIC VOLUME (MOD BIPLANE) 2D: 26 ML
SODIUM SERPL-SCNC: 136 MMOL/L (ref 135–147)
TOTAL NUCLEATED CELL COUNT: 805 /UL
TRICUSPID ANNULAR PLANE SYSTOLIC EXCURSION: 2.5 CM
TRIGL SERPL-MCNC: 91 MG/DL (ref ?–150)
WBC # BLD AUTO: 5.72 THOUSAND/UL (ref 4.31–10.16)

## 2024-12-09 PROCEDURE — 88112 CYTOPATH CELL ENHANCE TECH: CPT | Performed by: STUDENT IN AN ORGANIZED HEALTH CARE EDUCATION/TRAINING PROGRAM

## 2024-12-09 PROCEDURE — 83986 ASSAY PH BODY FLUID NOS: CPT | Performed by: INTERNAL MEDICINE

## 2024-12-09 PROCEDURE — 32555 ASPIRATE PLEURA W/ IMAGING: CPT | Performed by: STUDENT IN AN ORGANIZED HEALTH CARE EDUCATION/TRAINING PROGRAM

## 2024-12-09 PROCEDURE — 99223 1ST HOSP IP/OBS HIGH 75: CPT | Performed by: STUDENT IN AN ORGANIZED HEALTH CARE EDUCATION/TRAINING PROGRAM

## 2024-12-09 PROCEDURE — 84157 ASSAY OF PROTEIN OTHER: CPT | Performed by: INTERNAL MEDICINE

## 2024-12-09 PROCEDURE — 88341 IMHCHEM/IMCYTCHM EA ADD ANTB: CPT | Performed by: STUDENT IN AN ORGANIZED HEALTH CARE EDUCATION/TRAINING PROGRAM

## 2024-12-09 PROCEDURE — 87205 SMEAR GRAM STAIN: CPT | Performed by: INTERNAL MEDICINE

## 2024-12-09 PROCEDURE — 87070 CULTURE OTHR SPECIMN AEROBIC: CPT | Performed by: INTERNAL MEDICINE

## 2024-12-09 PROCEDURE — 82948 REAGENT STRIP/BLOOD GLUCOSE: CPT

## 2024-12-09 PROCEDURE — 83036 HEMOGLOBIN GLYCOSYLATED A1C: CPT | Performed by: INTERNAL MEDICINE

## 2024-12-09 PROCEDURE — 85025 COMPLETE CBC W/AUTO DIFF WBC: CPT | Performed by: INTERNAL MEDICINE

## 2024-12-09 PROCEDURE — 88305 TISSUE EXAM BY PATHOLOGIST: CPT | Performed by: STUDENT IN AN ORGANIZED HEALTH CARE EDUCATION/TRAINING PROGRAM

## 2024-12-09 PROCEDURE — 82945 GLUCOSE OTHER FLUID: CPT | Performed by: INTERNAL MEDICINE

## 2024-12-09 PROCEDURE — 80061 LIPID PANEL: CPT | Performed by: NURSE PRACTITIONER

## 2024-12-09 PROCEDURE — 93306 TTE W/DOPPLER COMPLETE: CPT | Performed by: INTERNAL MEDICINE

## 2024-12-09 PROCEDURE — 80053 COMPREHEN METABOLIC PANEL: CPT | Performed by: INTERNAL MEDICINE

## 2024-12-09 PROCEDURE — 85610 PROTHROMBIN TIME: CPT | Performed by: INTERNAL MEDICINE

## 2024-12-09 PROCEDURE — 99222 1ST HOSP IP/OBS MODERATE 55: CPT | Performed by: INTERNAL MEDICINE

## 2024-12-09 PROCEDURE — 83615 LACTATE (LD) (LDH) ENZYME: CPT | Performed by: INTERNAL MEDICINE

## 2024-12-09 PROCEDURE — 89051 BODY FLUID CELL COUNT: CPT | Performed by: INTERNAL MEDICINE

## 2024-12-09 PROCEDURE — 88342 IMHCHEM/IMCYTCHM 1ST ANTB: CPT | Performed by: STUDENT IN AN ORGANIZED HEALTH CARE EDUCATION/TRAINING PROGRAM

## 2024-12-09 PROCEDURE — 93306 TTE W/DOPPLER COMPLETE: CPT

## 2024-12-09 PROCEDURE — 71046 X-RAY EXAM CHEST 2 VIEWS: CPT

## 2024-12-09 PROCEDURE — 32555 ASPIRATE PLEURA W/ IMAGING: CPT

## 2024-12-09 PROCEDURE — 99232 SBSQ HOSP IP/OBS MODERATE 35: CPT | Performed by: PHYSICIAN ASSISTANT

## 2024-12-09 RX ORDER — INSULIN LISPRO 100 [IU]/ML
1-5 INJECTION, SOLUTION INTRAVENOUS; SUBCUTANEOUS
Status: DISCONTINUED | OUTPATIENT
Start: 2024-12-09 | End: 2024-12-12 | Stop reason: HOSPADM

## 2024-12-09 RX ORDER — PANTOPRAZOLE SODIUM 40 MG/1
40 TABLET, DELAYED RELEASE ORAL
Status: DISCONTINUED | OUTPATIENT
Start: 2024-12-09 | End: 2024-12-12 | Stop reason: HOSPADM

## 2024-12-09 RX ORDER — LIDOCAINE WITH 8.4% SOD BICARB 0.9%(10ML)
SYRINGE (ML) INJECTION AS NEEDED
Status: COMPLETED | OUTPATIENT
Start: 2024-12-09 | End: 2024-12-09

## 2024-12-09 RX ADMIN — INSULIN LISPRO 2 UNITS: 100 INJECTION, SOLUTION INTRAVENOUS; SUBCUTANEOUS at 21:20

## 2024-12-09 RX ADMIN — FUROSEMIDE 20 MG: 10 INJECTION, SOLUTION INTRAMUSCULAR; INTRAVENOUS at 08:00

## 2024-12-09 RX ADMIN — LEVOTHYROXINE SODIUM 25 MCG: 25 TABLET ORAL at 05:36

## 2024-12-09 RX ADMIN — INSULIN LISPRO 1 UNITS: 100 INJECTION, SOLUTION INTRAVENOUS; SUBCUTANEOUS at 08:00

## 2024-12-09 RX ADMIN — Medication 10 ML: at 11:59

## 2024-12-09 RX ADMIN — FOLIC ACID 1000 MCG: 1 TABLET ORAL at 08:00

## 2024-12-09 RX ADMIN — UMECLIDINIUM 1 PUFF: 62.5 AEROSOL, POWDER ORAL at 08:01

## 2024-12-09 RX ADMIN — LOSARTAN POTASSIUM 25 MG: 25 TABLET, FILM COATED ORAL at 08:00

## 2024-12-09 RX ADMIN — PANTOPRAZOLE SODIUM 40 MG: 40 INJECTION, POWDER, FOR SOLUTION INTRAVENOUS at 05:38

## 2024-12-09 RX ADMIN — APIXABAN 5 MG: 5 TABLET, FILM COATED ORAL at 08:00

## 2024-12-09 RX ADMIN — INSULIN LISPRO 2 UNITS: 100 INJECTION, SOLUTION INTRAVENOUS; SUBCUTANEOUS at 16:37

## 2024-12-09 RX ADMIN — INSULIN LISPRO 2 UNITS: 100 INJECTION, SOLUTION INTRAVENOUS; SUBCUTANEOUS at 11:07

## 2024-12-09 RX ADMIN — ATORVASTATIN CALCIUM 10 MG: 10 TABLET, FILM COATED ORAL at 21:06

## 2024-12-09 RX ADMIN — AMITRIPTYLINE HYDROCHLORIDE 200 MG: 25 TABLET, FILM COATED ORAL at 00:52

## 2024-12-09 RX ADMIN — APIXABAN 5 MG: 5 TABLET, FILM COATED ORAL at 17:22

## 2024-12-09 RX ADMIN — AMITRIPTYLINE HYDROCHLORIDE 200 MG: 25 TABLET, FILM COATED ORAL at 21:06

## 2024-12-09 RX ADMIN — OLODATEROL RESPIMAT INHALATION SPRAY 2 PUFF: 2.5 SPRAY, METERED RESPIRATORY (INHALATION) at 08:01

## 2024-12-09 RX ADMIN — PANTOPRAZOLE SODIUM 40 MG: 40 TABLET, DELAYED RELEASE ORAL at 16:37

## 2024-12-09 RX ADMIN — OXYCODONE HYDROCHLORIDE 5 MG: 5 TABLET ORAL at 14:01

## 2024-12-09 NOTE — PLAN OF CARE
Problem: RESPIRATORY - ADULT  Goal: Achieves optimal ventilation and oxygenation  Description: INTERVENTIONS:  - Assess for changes in respiratory status  - Assess for changes in mentation and behavior  - Position to facilitate oxygenation and minimize respiratory effort  - Oxygen administered by appropriate delivery if ordered  - Initiate smoking cessation education as indicated  - Encourage broncho-pulmonary hygiene including cough, deep breathe, Incentive Spirometry  - Assess the need for suctioning and aspirate as needed  - Assess and instruct to report SOB or any respiratory difficulty  - Respiratory Therapy support as indicated  Outcome: Progressing     Problem: PAIN - ADULT  Goal: Verbalizes/displays adequate comfort level or baseline comfort level  Description: Interventions:  - Encourage patient to monitor pain and request assistance  - Assess pain using appropriate pain scale  - Administer analgesics based on type and severity of pain and evaluate response  - Implement non-pharmacological measures as appropriate and evaluate response  - Consider cultural and social influences on pain and pain management  - Notify physician/advanced practitioner if interventions unsuccessful or patient reports new pain  Outcome: Progressing     Problem: INFECTION - ADULT  Goal: Absence or prevention of progression during hospitalization  Description: INTERVENTIONS:  - Assess and monitor for signs and symptoms of infection  - Monitor lab/diagnostic results  - Monitor all insertion sites, i.e. indwelling lines, tubes, and drains  - Monitor endotracheal if appropriate and nasal secretions for changes in amount and color  - [place] appropriate cooling/warming therapies per order  - Administer medications as ordered  - Instruct and encourage patient and family to use good hand hygiene technique  - Identify and instruct in appropriate isolation precautions for identified infection/condition  Outcome: Progressing  Goal:  Absence of fever/infection during neutropenic period  Description: INTERVENTIONS:  - Monitor WBC    Outcome: Progressing     Problem: SAFETY ADULT  Goal: Patient will remain free of falls  Description: INTERVENTIONS:  - Educate patient/family on patient safety including physical limitations  - Instruct patient to call for assistance with activity   - Consult OT/PT to assist with strengthening/mobility   - Keep Call bell within reach  - Keep bed low and locked with side rails adjusted as appropriate  - Keep care items and personal belongings within reach  - Initiate and maintain comfort rounds  - Make Fall Risk Sign visible to staff  - Apply yellow socks and bracelet for high fall risk patients  - Consider moving patient to room near nurses station  Outcome: Progressing  Goal: Maintain or return to baseline ADL function  Description: INTERVENTIONS:  -  Assess patient's ability to carry out ADLs; assess patient's baseline for ADL function and identify physical deficits which impact ability to perform ADLs (bathing, care of mouth/teeth, toileting, grooming, dressing, etc.)  - Assess/evaluate cause of self-care deficits   - Assess range of motion  - Assess patient's mobility; develop plan if impaired  - Assess patient's need for assistive devices and provide as appropriate  - Encourage maximum independence but intervene and supervise when necessary  - Involve family in performance of ADLs  - Assess for home care needs following discharge   - Consider OT consult to assist with ADL evaluation and planning for discharge  - Provide patient education as appropriate  Outcome: Progressing  Goal: Maintains/Returns to pre admission functional level  Description: INTERVENTIONS:  - Perform AM-PAC 6 Click Basic Mobility/ Daily Activity assessment daily.  - Set and communicate daily mobility goal to care team and patient/family/caregiver.   - Collaborate with rehabilitation services on mobility goals if consulted  - Out of bed for  toileting  - Record patient progress and toleration of activity level   Outcome: Progressing     Problem: DISCHARGE PLANNING  Goal: Discharge to home or other facility with appropriate resources  Description: INTERVENTIONS:  - Identify barriers to discharge w/patient and caregiver  - Arrange for needed discharge resources and transportation as appropriate  - Identify discharge learning needs (meds, wound care, etc.)  - Arrange for interpretive services to assist at discharge as needed  - Refer to Case Management Department for coordinating discharge planning if the patient needs post-hospital services based on physician/advanced practitioner order or complex needs related to functional status, cognitive ability, or social support system  Outcome: Progressing     Problem: Knowledge Deficit  Goal: Patient/family/caregiver demonstrates understanding of disease process, treatment plan, medications, and discharge instructions  Description: Complete learning assessment and assess knowledge base.  Interventions:  - Provide teaching at level of understanding  - Provide teaching via preferred learning methods  Outcome: Progressing

## 2024-12-09 NOTE — CONSULTS
Consultation - Pulmonology   Name: Papo Gibbs 54 y.o. male I MRN: [id number]  Unit/Bed#: S -01 I Date of Admission: 12/8/2024   Date of Service: 12/9/2024 I Hospital Day: 1       Inpatient consult to Pulmonology     Date/Time  12/9/2024 2:20 PM     Performed by  Watson Esteban MD   Authorized by  Stefan Kahn DO           Physician Requesting Evaluation: Rashel Oconnor DO   Reason for Evaluation / Principal Problem: pleural effusion     Assessment & Plan  Acute respiratory failure with hypoxia (HCC)  Multifactorial in setting of lung cancer, large right-sided pleural effusion with significant atelectatic lung on right.  Patient does have supplemental oxygen at home that he wears at night  CT PA on admission negative for PE but did show large right effusion with atelectatic lung and concern for pulmonary edema versus lung carcinomatosis   -S/p IR thoracentesis 12/9/2024 with 1500 mL removed, fluid studies pending  -Continue NC with goal sats 88-92%  Pleural effusion  -Concern for malignant effusion given known cancer  -S/p IR thoracentesis  -Had persistent dyspnea and discomfort postthoracentesis so CXR ordered to evaluate  Adenocarcinoma of overlapping sites of right lung (HCC)  Follows with Dr. Ramachandran, last treatment was about 2 weeks ago with pemetrexed (Keytruda on hold)  History of pulmonary embolism  Continue Eliquis, CTPA without acute PE      History of Present Illness   Papo Gibbs is a 54 y.o. male who presents with worsening shortness of breath.  He has a known history of lung cancer for which he is on chemotherapy with pemetrexed.  He is also had some chest discomfort with stretching of the right side or moving his arm over his head.  He denies any fevers or chills at home.  He denies any sputum production but does have a little bit of a cough.  He denies any hemoptysis.  Reviewed CT scans with patient in room and he has a large right-sided effusion with significant atelectasis of the  right lung and his left lung is showing signs of either edema versus carcinomatosis.  Hopeful for improvement in symptoms with drainage of fluid though there is possibility that the lung may be trapped and this may be more complicated problem.  At this time he has no infectious symptoms to suggest pneumonia so we will hold off on antibiotics.  Explained that we will follow results of fluid studies to help determine best next steps.      Review of Systems   Constitutional:  Positive for fatigue. Negative for chills and fever.   HENT:  Negative for congestion and sore throat.    Respiratory:  Positive for cough, chest tightness and shortness of breath. Negative for wheezing.    Cardiovascular:  Positive for chest pain. Negative for palpitations and leg swelling.   Gastrointestinal:  Negative for abdominal pain, diarrhea and vomiting.   Musculoskeletal:  Positive for back pain. Negative for arthralgias and myalgias.   Skin:  Negative for color change and rash.   Hematological:  Negative for adenopathy. Bruises/bleeds easily.       Historical Information   I have reviewed the patient's PMH, PSH, Social History, Family History, Meds, and Allergies  Tobacco History: Former smoker with 52-pack-year history  Occupational History: Noncontributory  Family History:non-contributory    Objective :  Temp:  [98.3 °F (36.8 °C)-100.1 °F (37.8 °C)] 98.5 °F (36.9 °C)  HR:  [119-135] 135  BP: (121-155)/(71-94) 124/81  Resp:  [18-20] 19  SpO2:  [93 %-97 %] 94 %  O2 Device: Nasal cannula  Nasal Cannula O2 Flow Rate (L/min):  [2 L/min] 2 L/min    Physical Exam  Vitals reviewed.   Constitutional:       General: He is not in acute distress.     Appearance: He is not ill-appearing or toxic-appearing.   Cardiovascular:      Rate and Rhythm: Regular rhythm. Tachycardia present.   Pulmonary:      Breath sounds: Examination of the right-upper field reveals decreased breath sounds. Examination of the right-middle field reveals decreased breath  sounds. Examination of the right-lower field reveals decreased breath sounds. Decreased breath sounds present. No wheezing, rhonchi or rales.   Musculoskeletal:      Right lower leg: No edema.      Left lower leg: No edema.   Skin:     General: Skin is warm and dry.      Findings: No rash.   Neurological:      General: No focal deficit present.      Mental Status: He is alert and oriented to person, place, and time.           Lab Results: I have reviewed the following results:  .     12/09/24  0614   WBC 5.72   HGB 9.5*   HCT 29.7*   *   SODIUM 136   K 3.5   CL 99   CO2 31   BUN 12   CREATININE 0.67   GLUC 210*   AST 17   ALT 24   ALB 3.1*   TBILI 0.43   ALKPHOS 61   INR 1.35*     ABG: No new results in last 24 hours.    Imaging Results Review: I personally reviewed the following image studies in PACS and associated radiology reports: CT chest. My interpretation of the radiology images/reports is: Reviewed and interpreted as above.  Other Study Results Review: Other studies reviewed include: TTE personally reviewed with no signs of RV strain  PFT Results Reviewed: Spirometry from 5/2024 showed FEV1 of 41% with restrictive pattern    VTE Prophylaxis: VTE covered by:  apixaban, Oral, 5 mg at 12/09/24 0800       Watson Esteban M.D.  Pulmonary & Critical Care Fellow, PGY-IV

## 2024-12-09 NOTE — ASSESSMENT & PLAN NOTE
HR elevated in the 120s-130s during hospital stay   Patient denies chest pain or palpitations, notes he is always somewhat tachycardic but typically appears to be in the 100s  Does have associated elevated troponin, will consult cardiology for evaluation  Echocardiogram without wall motion abnormalities or valvular abnormalities  Continue telemetry monitoring  Treat pain  Follow up blood cultures for completeness

## 2024-12-09 NOTE — ASSESSMENT & PLAN NOTE
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

## 2024-12-09 NOTE — ASSESSMENT & PLAN NOTE
-Concern for malignant effusion given known cancer  -S/p IR thoracentesis  -Had persistent dyspnea and discomfort postthoracentesis so CXR ordered to evaluate

## 2024-12-09 NOTE — DISCHARGE INSTRUCTIONS
Thoracentesis   WHAT YOU NEED TO KNOW:   A thoracentesis is a procedure to remove extra fluid or air from between your lungs and your inner chest wall. Air or fluid buildup may make it hard for you to breathe. A thoracentesis allows your lungs to expand fully so you can breathe more easily.  DISCHARGE INSTRUCTIONS:   Medicines:   Pain medicine:  You may be given a prescription medicine to decrease pain. Do not wait until the pain is severe before you take your medicine.    Antibiotics:  This medicine helps fight or prevent an infection.    Take your medicine as directed.  Call your healthcare provider if you think your medicine is not helping or if you have side effects. Tell him if you are allergic to any medicine. Keep a list of the medicines, vitamins, and herbs you take. Include the amounts, and when and why you take them. Bring the list or the pill bottles to follow-up visits. Carry your medicine list with you in case of an emergency.  Follow up with your healthcare provider as directed:  Write down your questions so you remember to ask them during your visits.   Rest:  Rest when you feel it is needed. Slowly start to do more each day. Return to your daily activities as directed.   Do not smoke:  If you smoke, it is never too late to quit. Ask for information about how to stop smoking if you need help.  Contact your healthcare provider if:   You have a fever.    Your puncture site is red, warm, swollen, or draining pus.    You have questions or concerns about your procedure, medicine, or care.    If you have any questions regarding, call the IR department @ [phone].     Seek care immediately or call 911 if:   Blood soaks through your bandage.    There is blood in your spit.

## 2024-12-09 NOTE — ASSESSMENT & PLAN NOTE
Follows with Dr. Ramachandran, last treatment was about 2 weeks ago with pemetrexed (Keytruda on hold)

## 2024-12-09 NOTE — UTILIZATION REVIEW
Initial Clinical Review    Admission: Date/Time/Statement:   Admission Orders (From admission, onward)       Ordered        12/08/24 1328  INPATIENT ADMISSION  Once                          Orders Placed This Encounter   Procedures    INPATIENT ADMISSION     Standing Status:   Standing     Number of Occurrences:   1     Level of Care:   Med Surg [16]     Estimated length of stay:   More than 2 Midnights     Certification:   I certify that inpatient services are medically necessary for this patient for a duration of greater than two midnights. See H&P and MD Progress Notes for additional information about the patient's course of treatment.     ED Arrival Information       Expected   -    Arrival   12/8/2024 10:40    Acuity   Emergent              Means of arrival   Walk-In    Escorted by   Spouse    Service   Hospitalist    Admission type   Emergency              Arrival complaint   sob, cancer pt             Chief Complaint   Patient presents with    Shortness of Breath     Pt reports worsening SOB over last month, hx lung CA, also reports severe diarrhea, elevated sugars, dark stools, nose bleeds, +eliquis, right sided back pain       Initial Presentation: 54 y.o. male  with hx lung cancer on chemotherapy and Keytruda ,home O2 @ 2-3 L,  DM, HTN ,  PE  on eliquis who presents with worsening shortness of breath. Recently he was taken off Keytruda and started on prednisone burst as well as a trial of Levaquin due to concern for pneumonitis.  Patient reports the antibiotic course of 7 days and prednisone has not made any significant difference for him in fact he feels his shortness of breath has been worsening over the past week. Reports compliance w/ Eliquis.  At this point he cannot perform his activities of daily living without significant dyspnea .Pt also reports about 1 week of dark appearing stool , watery . Does note intermittent nosebleeds but  On exam, pt tachypneic and is tachycardic with diminished breath  sounds on the right side., rales . RA sat 90 %, placed on   O2 @ 2 L  with sat 95 %   . CXR  shows a large right-sided pleural effusion.Labs - elevated troponin, Hgb 10.5 from 12.2 2 weeks ago  Pt admitted as Inpatient  with Acute respiratory failure with hypoxia . PLan- obtain CTA PE study. Monitor off abx .F/U blood cx .  Continue Eliquis, monitor for melena , bleeding . IV Lasix daily . Labs in am . Pulmonology consult .   Anticipated Length of Stay/Certification Statement: Patient will be admitted on an inpatient basis with an anticipated length of stay of greater than 2 midnights secondary to resp failure.     Date: 12/9    Day 2:     Pt continues on IV lasix 20 mg daily . I/O - 725 ml since admission . Diminished breath sounds .Continues with SOB w/o improvement after thoracentesis today .   O2 sat 93 % this am . Telemetry- . Cardiology consult placed. AM labs .      IR   12/9/24 @ 1215    Thoracentesis- R sided under US guidance w/ local sedation    1.5 L kayla pleural fluid. Specimens to lab.      Pulmonology consult - CT PA on admission negative for PE but did show large right effusion with atelectatic lung and concern for pulmonary edema versus lung carcinomatosis given known cancer.  . S/p IR thoracentesis 12/9/2024 with 1500 mL removed, fluid studies pending  . Pt had persistent dyspnea and discomfort , post thoracentesis so CXR ordered to evaluate.  Decreased breath sounds R side.  Continue NC with goal sats 88-92%  . Continue Eliquis       ED Treatment-Medication Administration from 12/08/2024 1040 to 12/08/2024 1904         Date/Time Order Dose Route Action     12/08/2024 1823 apixaban (ELIQUIS) tablet 5 mg 5 mg Oral Given     12/08/2024 1551 umeclidinium 62.5 mcg/actuation inhaler AEPB 1 puff 1 puff Inhalation Given     12/08/2024 1552 olodaterol HCl (STRIVERDI RESPIMAT) inhaler 2 puff 2 puff Inhalation Given     12/08/2024 1819 insulin lispro (HumALOG/ADMELOG) 100 units/mL subcutaneous injection  1-5 Units 2 Units Subcutaneous Given     12/08/2024 1543 pantoprazole (PROTONIX) injection 40 mg 40 mg Intravenous Given     12/08/2024 1422 iohexol (OMNIPAQUE) 350 MG/ML injection (MULTI-DOSE) 60 mL 60 mL Intravenous Given     12/08/2024 1545 magnesium sulfate 2 g/50 mL IVPB (premix) 2 g 2 g Intravenous New Bag     12/08/2024 1824 furosemide (LASIX) injection 20 mg 20 mg Intravenous Given     12/08/2024 1826 HYDROmorphone HCl (DILAUDID) injection 0.2 mg 0.2 mg Intravenous Given            Scheduled Medications:  amitriptyline, 200 mg, Oral, HS  apixaban, 5 mg, Oral, BID  atorvastatin, 10 mg, Oral, Daily  folic acid, 1,000 mcg, Oral, Daily  furosemide, 20 mg, Intravenous, Daily  insulin lispro, 1-5 Units, Subcutaneous, TID AC  levothyroxine, 25 mcg, Oral, Early Morning  losartan, 25 mg, Oral, Daily  umeclidinium, 1 puff, Inhalation, Daily   And  olodaterol HCl, 2 puff, Inhalation, Daily  pantoprazole, 40 mg, Oral, BID AC    pantoprazole (PROTONIX) injection 40 mg  Dose: 40 mg  Freq: Every 12 hours scheduled Route: IV  Start: 12/08/24 1400 End: 12/09/24 0905  Continuous IV Infusions:     PRN Meds:  acetaminophen, 975 mg, Oral, Q8H PRN  albuterol, 2 puff, Inhalation, Q6H PRN  ipratropium-albuterol, 3 mL, Nebulization, Q6H PRN  oxyCODONE, 2.5 mg, Oral, Q4H PRN   Or  oxyCODONE, 5 mg, Oral, Q4H PRN x1 12/9       ED Triage Vitals   Temperature Pulse Respirations Blood Pressure SpO2 Pain Score   12/08/24 1101 12/08/24 1052 12/08/24 1052 12/08/24 1052 12/08/24 1052 12/08/24 2015   99.2 °F (37.3 °C) (!) 132 22 124/83 90 % No Pain     Weight (last 2 days)       Date/Time Weight    12/09/24 07:04:53 89.8 (198)    12/09/24 0600 90.1 (198.6)            Vital Signs (last 3 days)       Date/Time Temp Pulse Resp BP MAP (mmHg) SpO2 Calculated FIO2 (%) - Nasal Cannula Nasal Cannula O2 Flow Rate (L/min) O2 Device Patient Position - Orthostatic VS Pain    12/09/24 0900 -- -- -- -- -- -- -- -- -- -- No Pain    12/09/24 07:04:53 98.3 °F  (36.8 °C) 119 18 139/93 108 93 % -- -- -- -- --    12/08/24 22:46:58 99 °F (37.2 °C) 125 18 126/86 99 95 % -- -- -- -- --    12/08/24 2015 -- -- -- -- -- -- 28 2 L/min -- -- No Pain    12/08/24 19:19:13 100.1 °F (37.8 °C) 133 18 155/94 114 95 % -- -- -- -- --    12/08/24 1842 -- 126 20 155/89 116 96 % 28 2 L/min Nasal cannula Sitting --    12/08/24 1530 -- 126 -- 131/72 94 -- -- -- -- -- --    12/08/24 1500 -- 124 -- 138/81 104 -- -- -- -- -- --    12/08/24 1430 -- 123 -- 150/80 107 -- -- -- -- -- --    12/08/24 1400 -- 128 24 144/82 105 96 % 28 2 L/min Nasal cannula Sitting --    12/08/24 1330 -- 129 24 147/84 109 95 % 28 2 L/min Nasal cannula Sitting --    12/08/24 1315 -- 128 24 141/84 103 95 % 28 2 L/min Nasal cannula Sitting --    12/08/24 1101 99.2 °F (37.3 °C) -- -- -- -- -- -- -- -- -- --    12/08/24 1056 -- -- -- -- -- 95 % 28 2 L/min Nasal cannula -- --    12/08/24 1052 -- 132 22 124/83 98 90 % -- -- None (Room air) Sitting --              Pertinent Labs/Diagnostic Test Results:   Radiology:  CTA chest pe study   Final Interpretation by Genaro Luevano MD (12/08 4786)      Since October 2024 there is 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      Increasing partially loculated moderate right pleural effusion.      No pulmonary embolism.      New interstitial edema in the left lung that is probably related to volume overload. Developing left lung carcinomatosis is also possible. Attention to this finding on follow-up imaging advised      Possible small pneumonia within the superior segment of the left lower lobe.      Slightly increasing mediastinal lymphadenopathy that is probably metastatic.      Enlarging subcentimeter gastrohepatic ligament lymph nodes that are probably metastatic.      Workstation performed: [id number]         XR chest portable   ED Interpretation by Eamon Her DO (12/08 1128)   Worsening hazy opacity diffusely throughout right lung per my interpretation.   Concern for infection versus effusion      Final Interpretation by Misael Wren MD (12/08 1401)      Moderate to large right effusion with underlying parenchymal process. No mediastinal shift.            Workstation performed: [id number]         IR IN-Patient Thoracentesis    (Results Pending)     Cardiology:  Echo complete w/ contrast if indicated   Final Result by Clair Fajardo MD (12/09 0923)        Left Ventricle: Left ventricular cavity size is normal. Wall thickness    is mildly increased. There is mild concentric hypertrophy. The left    ventricular ejection fraction is 60%. Systolic function is normal. Wall    motion is normal. Diastolic function is normal.     Aortic Valve: There is mild regurgitation. There is aortic valve    sclerosis.     Mitral Valve: There is mild annular calcification.         12/8 ECG- Sinus tachycardia           Results from last 7 days   Lab Units 12/09/24  0614 12/08/24  1118   WBC Thousand/uL 5.72 6.42   HEMOGLOBIN g/dL 9.5* 10.5*   HEMATOCRIT % 29.7* 31.9*   PLATELETS Thousands/uL 122* 132*   TOTAL NEUT ABS Thousands/µL 4.20 5.03         Results from last 7 days   Lab Units 12/09/24  0614 12/08/24  1118   SODIUM mmol/L 136 133*   POTASSIUM mmol/L 3.5 3.5   CHLORIDE mmol/L 99 99   CO2 mmol/L 31 28   ANION GAP mmol/L 6 6   BUN mg/dL 12 10   CREATININE mg/dL 0.67 0.66   EGFR ml/min/1.73sq m 108 108   CALCIUM mg/dL 8.5 8.9     Results from last 7 days   Lab Units 12/09/24  0614 12/08/24  1118   AST U/L 17 23   ALT U/L 24 27   ALK PHOS U/L 61 72   TOTAL PROTEIN g/dL 6.0* 6.6   ALBUMIN g/dL 3.1* 3.5   TOTAL BILIRUBIN mg/dL 0.43 0.52     Results from last 7 days   Lab Units 12/09/24  0703 12/08/24  2059 12/08/24  1816   POC GLUCOSE mg/dl 210* 259* 231*     Results from last 7 days   Lab Units 12/09/24  0614 12/08/24  1118   GLUCOSE RANDOM mg/dL 210* 240*               Results from last 7 days   Lab Units 12/08/24  1556 12/08/24  1336 12/08/24  1118   HS TNI 0HR ng/L  --    --  1,547*   HS TNI 2HR ng/L  --  1,524*  --    HSTNI D2 ng/L  --  -23  --    HS TNI 4HR ng/L 1,399*  --   --    HSTNI D4 ng/L -148  --   --          Results from last 7 days   Lab Units 12/09/24  0614 12/08/24  1118   PROTIME seconds 17.4* 16.0*   INR  1.35* 1.20*   PTT seconds  --  30         Results from last 7 days   Lab Units 12/08/24  1118   PROCALCITONIN ng/ml 0.17     Results from last 7 days   Lab Units 12/08/24  1118   LACTIC ACID mmol/L 1.0             Results from last 7 days   Lab Units 12/08/24  1118   BNP pg/mL 65               Results from last 7 days   Lab Units 12/08/24  1817   CLARITY UA  Clear   COLOR UA  Yellow   SPEC GRAV UA  >=1.050*   PH UA  6.0   GLUCOSE UA mg/dl 500 (1/2%)*   KETONES UA mg/dl 40 (2+)*   BLOOD UA  Large*   PROTEIN UA mg/dl 50 (1+)*   NITRITE UA  Negative   BILIRUBIN UA  Negative   UROBILINOGEN UA (BE) mg/dl <2.0   LEUKOCYTES UA  Negative   WBC UA /hpf 4-10*   RBC UA /hpf Innumerable*   BACTERIA UA /hpf None Seen   EPITHELIAL CELLS WET PREP /hpf None Seen   MUCUS THREADS  Moderate*               Results from last 7 days   Lab Units 12/08/24  1129 12/08/24  1118   BLOOD CULTURE  Received in Microbiology Lab. Culture in Progress. Received in Microbiology Lab. Culture in Progress.                   Past Medical History:   Diagnosis Date    Cancer (HCC) 04,25,2024    Diabetes mellitus (HCC)     High cholesterol     History of blood clots     Hypertension     Leucocytosis 04/20/2024    Lung cancer (HCC)     Malignant neoplasm of overlapping sites of right lung (HCC) 06/04/2024    Pneumonia     Pulmonary embolism (HCC)      Present on Admission:   Acute respiratory failure with hypoxia (HCC)   Adenocarcinoma of overlapping sites of right lung (HCC)   HTN (hypertension)   History of pulmonary embolism      Admitting Diagnosis: Hyponatremia [E87.1]  Dyspnea [R06.00]  Hyperglycemia [R73.9]  Elevated troponin [R79.89]  Pleural effusion on right [J90]  Age/Sex: 54 y.o. male    Network  Utilization Review Department  ATTENTION: Please call with any questions or concerns to [phone] and carefully listen to the prompts so that you are directed to the right person. All voicemails are confidential.   For Discharge needs, contact Care Management DC Support Team at [phone] opt. [phone]  Send all requests for admission clinical reviews, approved or denied determinations and any other requests to dedicated fax number below belonging to the campus where the patient is receiving treatment. List of dedicated fax numbers for the Facilities:  FACILITY NAME UR FAX NUMBER   ADMISSION DENIALS (Administrative/Medical Necessity) [phone]   DISCHARGE SUPPORT TEAM (NETWORK) [phone]   PARENT CHILD HEALTH (Maternity/NICU/Pediatrics) [phone]   [facility] [phone]   [facility] [phone]   [facility] [phone]   [facility] [phone]   [facility] [phone]   [facility] [phone]   [facility] [phone]   [facility] [phone]   [facility] [phone]   [facility] [phone]   [facility] [phone]   [facility] [phone]

## 2024-12-09 NOTE — PROGRESS NOTES
Progress Note - Hospitalist   Name: Papo Gibbs 54 y.o. male I MRN: [id number]  Unit/Bed#: S -01 I Date of Admission: 12/8/2024   Date of Service: 12/9/2024 I Hospital Day: 1    Assessment & Plan  Acute respiratory failure with hypoxia (HCC)  Patient normally requires 2 to 3 L nasal cannula currently on baseline however with severely worsening dyspnea over the past several days acutely in the setting of chronic dyspnea for the past 1 to 2 months  Completed course of steroids and antibiotics outpatient without any significant improvement  CT chest showing progression of lymphangitic carcinomatosis of right lung, increased loculated moderate right pleural effusion, no PE, new interstitial edema of left lung, with possible small pneumonia in superior segment of left lower lobe, increasing mediastinal lymphadenopathy  IR thoracentesis today for 1.5 L, follow-up cytology  Patient reports no significant improvement after thoracentesis, repeat chest x-ray today  Appreciate pulmonology recommendations  HTN (hypertension)  Will continue patient's home losartan 25 mg daily  Adenocarcinoma of overlapping sites of right lung (HCC)  Patient follows with Dr. Ramachandran from heme-onc as an outpatient, last chemotherapy 1 week ago patient  Keytruda held due to potential pneumonitis patient recently completed steroid taper  History of pulmonary embolism  History of pulmonary embolism on Eliquis, continue  Pleural effusion  Follow up cytology from thoracentesis   Tachycardia  HR elevated in the 120s-130s during hospital stay   Patient denies chest pain or palpitations, notes he is always somewhat tachycardic but typically appears to be in the 100s  Does have associated elevated troponin, will consult cardiology for evaluation  Echocardiogram without wall motion abnormalities or valvular abnormalities  Continue telemetry monitoring  Treat pain  Follow up blood cultures for completeness    VTE Pharmacologic Prophylaxis:   High  Risk (Score >/= 5) - Pharmacological DVT Prophylaxis Ordered: apixaban (Eliquis). Sequential Compression Devices Ordered.    Mobility:   Basic Mobility Inpatient Raw Score: 24  JH-HLM Goal: 8: Walk 250 feet or more  JH-HLM Achieved: 8: Walk 250 feet ot more  JH-HLM Goal achieved. Continue to encourage appropriate mobility.    Patient Centered Rounds: I performed bedside rounds with nursing staff today.   Discussions with Specialists or Other Care Team Provider: pulmonology     Education and Discussions with Family / Patient: Updated  (significant other) at bedside.    Current Length of Stay: 1 day(s)  Current Patient Status: Inpatient   Certification Statement: The patient will continue to require additional inpatient hospital stay due to respiratory failure pending pulm eval and treatment   Discharge Plan: Anticipate discharge in 48-72 hrs to home.    Code Status: Level 1 - Full Code    Subjective   Patient seen and examined at bedside. Notes he continues to have shortness of breath. Denies any noticeable improvement after thoracentesis today. Does note back pain in the area of the thora but otherwise denies any pain. Does report he is typically tachycardic but denies chest pain or shortness of breath.     Objective :  Temp:  [98.3 °F (36.8 °C)-100.1 °F (37.8 °C)] 98.5 °F (36.9 °C)  HR:  [119-135] 135  BP: (121-155)/(71-94) 124/81  Resp:  [18-20] 19  SpO2:  [93 %-97 %] 94 %  O2 Device: Nasal cannula  Nasal Cannula O2 Flow Rate (L/min):  [2 L/min] 2 L/min    Body mass index is 31.01 kg/m².     Input and Output Summary (last 24 hours):     Intake/Output Summary (Last 24 hours) at 12/9/2024 1435  Last data filed at 12/9/2024 1211  Gross per 24 hour   Intake 700 ml   Output 1425 ml   Net -725 ml       Physical Exam  Vitals reviewed.   Constitutional:       General: He is not in acute distress.  HENT:      Head: Normocephalic and atraumatic.   Eyes:      General: No scleral icterus.     Conjunctiva/sclera:  Conjunctivae normal.   Cardiovascular:      Rate and Rhythm: Regular rhythm. Tachycardia present.      Heart sounds: No murmur heard.  Pulmonary:      Effort: Pulmonary effort is normal. No respiratory distress.      Breath sounds: No wheezing.   Abdominal:      General: Bowel sounds are normal. There is no distension.      Palpations: Abdomen is soft.      Tenderness: There is no abdominal tenderness.   Musculoskeletal:      Cervical back: Neck supple.      Right lower leg: No edema.      Left lower leg: No edema.   Skin:     General: Skin is warm and dry.   Neurological:      Mental Status: He is alert and oriented to person, place, and time.   Psychiatric:         Mood and Affect: Mood normal.         Behavior: Behavior normal.           Lines/Drains:      Central Line:  Goal for removal:            Telemetry:  Telemetry Orders (From admission, onward)               24 Hour Telemetry Monitoring  Continuous x 24 Hours (Telem)        Question:  Reason for 24 Hour Telemetry  Answer:  Arrhythmias requiring acute medical intervention / PPM or ICD malfunction                     Telemetry Reviewed: Sinus Tachycardia  Indication for Continued Telemetry Use: Arrthymias requiring medical therapy               Lab Results: I have reviewed the following results:   Results from last 7 days   Lab Units 12/09/24  0614   WBC Thousand/uL 5.72   HEMOGLOBIN g/dL 9.5*   HEMATOCRIT % 29.7*   PLATELETS Thousands/uL 122*   SEGS PCT % 74   LYMPHO PCT % 15   MONO PCT % 10   EOS PCT % 0     Results from last 7 days   Lab Units 12/09/24  0614   SODIUM mmol/L 136   POTASSIUM mmol/L 3.5   CHLORIDE mmol/L 99   CO2 mmol/L 31   BUN mg/dL 12   CREATININE mg/dL 0.67   ANION GAP mmol/L 6   CALCIUM mg/dL 8.5   ALBUMIN g/dL 3.1*   TOTAL BILIRUBIN mg/dL 0.43   ALK PHOS U/L 61   ALT U/L 24   AST U/L 17   GLUCOSE RANDOM mg/dL 210*     Results from last 7 days   Lab Units 12/09/24  0614   INR  1.35*     Results from last 7 days   Lab Units  12/09/24  1045 12/09/24  0703 12/08/24  2059 12/08/24  1816   POC GLUCOSE mg/dl 280* 210* 259* 231*         Results from last 7 days   Lab Units 12/08/24  1118   LACTIC ACID mmol/L 1.0   PROCALCITONIN ng/ml 0.17       Recent Cultures (last 7 days):   Results from last 7 days   Lab Units 12/08/24  1129 12/08/24  1118   BLOOD CULTURE  Received in Microbiology Lab. Culture in Progress. Received in Microbiology Lab. Culture in Progress.             Last 24 Hours Medication List:     Current Facility-Administered Medications:     acetaminophen (TYLENOL) tablet 975 mg, Q8H PRN    albuterol (PROVENTIL HFA,VENTOLIN HFA) inhaler 2 puff, Q6H PRN    amitriptyline (ELAVIL) tablet 200 mg, HS    apixaban (ELIQUIS) tablet 5 mg, BID    atorvastatin (LIPITOR) tablet 10 mg, Daily    folic acid (FOLVITE) tablet 1,000 mcg, Daily    furosemide (LASIX) injection 20 mg, Daily    insulin lispro (HumALOG/ADMELOG) 100 units/mL subcutaneous injection 1-5 Units, TID AC **AND** Fingerstick Glucose (POCT), TID AC    ipratropium-albuterol (DUO-NEB) 0.5-2.5 mg/3 mL inhalation solution 3 mL, Q6H PRN    levothyroxine tablet 25 mcg, Early Morning    losartan (COZAAR) tablet 25 mg, Daily    umeclidinium 62.5 mcg/actuation inhaler AEPB 1 puff, Daily **AND** olodaterol HCl (STRIVERDI RESPIMAT) inhaler 2 puff, Daily    oxyCODONE (ROXICODONE) split tablet 2.5 mg, Q4H PRN **OR** oxyCODONE (ROXICODONE) IR tablet 5 mg, Q4H PRN    pantoprazole (PROTONIX) EC tablet 40 mg, BID AC    Administrative Statements   Today, Patient Was Seen By: Mary Kay Aaron PA-C      **Please Note: This note may have been constructed using a voice recognition system.**

## 2024-12-09 NOTE — ASSESSMENT & PLAN NOTE
Multifactorial in setting of lung cancer, large right-sided pleural effusion with significant atelectatic lung on right.  Patient does have supplemental oxygen at home that he wears at night  CT PA on admission negative for PE but did show large right effusion with atelectatic lung and concern for pulmonary edema versus lung carcinomatosis   -S/p IR thoracentesis 12/9/2024 with 1500 mL removed, fluid studies pending  -Continue NC with goal sats 88-92%

## 2024-12-09 NOTE — BRIEF OP NOTE (RAD/CATH)
IR THORACENTESIS Procedure Note    PATIENT NAME: Papo Gibsb  : 1970  MRN: [id number]    Pre-op Diagnosis:   1. Pleural effusion on right    2. Dyspnea    3. Elevated troponin    4. Hyponatremia    5. Hyperglycemia      Post-op Diagnosis:   1. Pleural effusion on right    2. Dyspnea    3. Elevated troponin    4. Hyponatremia    5. Hyperglycemia        Surgeon:   rBian De Oliveira MD    Assistants:   Kulwinder Ba MD    Estimated Blood Loss: 0 cc    Findings: Successful Right sided thoracentesis under US guidance.     Specimens: 1.5 L kayla pleural fluid.     Complications:  None Immediate.     Anesthesia: local    Kulwinder Ba MD     Date: 2024  Time: 12:15 PM

## 2024-12-09 NOTE — PROGRESS NOTES
The pantoprazole has / have been converted to Oral per [facility] IV-to-PO Auto-Conversion Protocol for Adults as approved by the Pharmacy and Therapeutics Committee. The patient met all eligible criteria:  1) Age = 18 years old   2) Received at least one dose of the IV form   3) Receiving at least one other scheduled oral/enteral medication   4) Tolerating an oral/enteral diet   and did not have any exclusions:   1) Critical care patient   2) Active GI bleed (IF assessing H2RAs or PPIs)   3) Continuous tube feeding (IF assessing cipro, doxycycline, levofloxacin, minocycline, rifampin, or voriconazole)   4) Receiving PO vancomycin (IF assessing metronidazole)   5) Persistent nausea and/or vomiting   6) Ileus or gastrointestinal obstruction   7) Heidi/nasogastric tube set for continuous suction   8) Specific order not to automatically convert to PO (in the order's comments or if discussed in the most recent Infectious Disease or primary team's progress notes).

## 2024-12-09 NOTE — CONSULTS
Cardiology   Papo Gibbs 54 y.o. male MRN: [id number]  Unit/Bed#: S -01 Encounter: [id number]      Reason for Consult / Principal Problem; sinus tachycardia, elevated troponins.    Physician Requesting Consult:  Rashel Oconnor DO    Outpatient Cardiologist: None    Assessment  1. Sinus tachycardia, likely reactive in the context of below acute/chronic medical issues.   -No chest pain or palpitations  -ECG on admission 12/8; sinus tachycardia rate 123 bpm.  -Baseline ECGs x 2 in April 2024 demonstrated NSR/sinus tachycardia heart rates upper 90s to 110  -Telemetry review, sinus tachycardia, heart rates currently in the upper 120s.  -Not previously or currently on AV mehnaz blocking agents.  -Electrolytes stable on BMP.  -TSH level 0.36, free T4 1.0  2. Elevated troponin level.   -No complaints of CP.  -TTE 12/9/2024; LVEF 60%, wall motion normal, diastolic function normal, mild AI.  -ECGs reviewed, no significant ST-T wave abnormalities.  -HS troponin levels elevated but relatively flat [id number]  3. Coronary artery calcifications  -CTA chest PE study 12/8/2024; large amount of coronary calcification in the left anterior descending, left circumflex and right coronary arteries   -No prior ischemic testing for review.  -No previously on aspirin.  -On atorvastatin 10 mg daily  -Not previously on BB.  4. Right sided pleural effusion, concern for malignant effusion.  -S/p IR guided thoracentesis procedure yielding 1.5L kayla pleural fluid.  5. Lung CA.  -Follows with heme-onc as an outpatient.  -Currently receiving chemotherapy (pemtrexed) last treatment was about 2 weeks ago  -CTA chest PE study; no PE, since 10/2020 for progression of lymphangitic carcinomatosis of the right lung increasing partially loculated moderate right-sided pleural effusion, new interstitial edema in the left lung possibly related to volume overload versus development of left lung carcinomatosis, possible small PNA within the  superior segment of the left lower lobe slightly increasing mediastinal lymphadenopathy that is probably metastatic enlarging subcentimeter gastric-hepatic ligament lymph nodes that are probably metastatic.  6. Dyslipidemia  -No recent lipid profile for review.  -On atorvastatin 10 mg daily.  7. Hypertension  -BP last recorded 131/82.  -Outpatient BP regimen; losartan 25 mg daily  -Inpatient BP regimen; losartan 25 mg daily.  8. DM type II  -HgbA1c 6.7.  9. History of PE  -On apixaban 5 mg twice daily.    Plan  -Pt still feeling mildly SOB even after undergoing thoracentesis procedure today. Repeat Cxray reviewed - pending final read but appears to still have a right sided pleural effusion - will await further recs from pulmonary/IR team. I suspect the underlying etiology for his sinus tachycardia is likely stemming from his acute pulmonary issues as well as his active cancer state. He otherwise does not appear volume overloaded on exam.  TTE reviewed, EF preserved no wall motion abnormalities, and normal IVC.  On CT imaging he was found to have coronary artery calcifications in the LAD left circumflex and RCA however it is unclear if he is truly symptomatic from this underlying disease.  Prior to admission he states he was quite active (cutting grass over the summer,climibing flights of steps  and going on walks) without any overt cardiac symptomatology; slight dyspnea but tolerable.  No significant anginal symptoms prior to or during admission.  Troponin levels elevated but relatively flat likely more consistent with a nonischemic myocardial injury in the context of his acute/chronic medical issues as listed above.  ECGs have been reviewed, maintaining ST with heart rates in the 120s, no overt ST T wave abnormalities to suggest ischemia, and TTE imaging today with preserved EF and no regional wall motion abnormalities.  No evidence for acute coronary syndrome at this time.  Once he is more stabilized from a  respiratory perspective would consider proceeding with a stress test to determine if he is truly ischemic from his underlying coronary disease.    -BP well-controlled, continue losartan 25 mg daily.  -Will check an updated lipid profile.  Continue atorvastatin   -Continue to monitor on telemetry.      HPI: Papo Gibbs 54 y.o. year old male with a medical history of hypertension, dyslipidemia, DM type II, lung CA on chemotherapy, history of PE.  Former cigarette smoker quit in April 2024, denies excessive alcohol or recreational/illicit drug use.  He did not previously follow with a cardiology provider prior to admission.  He presents to the [facility] as of 12/8/2024 with complaints of progressive SOB/ROBERTSON.    Of note the patient was diagnosed with a lung CA earlier this year and has been undergoing chemotherapy as an outpatient.  Per report he had previously been on Keytruda, however is now on pemetrexed; last treatment about 2 weeks ago.  He was also recently on a course of prednisone as well as oral antibiotics given concern for possible pneumonitis.  Despite these therapies he did not experience any significant improvement in his degree of SOB/ROBERTSON and decided to come into the ED for further evaluation.  CT imaging of the chest without evidence of acute PE but was found to have a large right-sided pleural effusion with atelectatic lung.  There was also evidence of a new interstitial edema in the left lung possibly related to volume overload versus development of left lung carcinomatosis also possible small PNA within the superior segment of the left lower lobe. Today he underwent IR guided thoracentesis with improvement in his symptomatology.  Cardiology was consulted today to further evaluate persistent sinus tachycardia which has been ongoing since his admission to the hospital.  Initial 12-lead ECG on arrival demonstrated sinus tachycardia, heart rate at that time was 123 bpm and currently remain in the  120-130 range.    Family History:   Family History   Problem Relation Age of Onset    No Known Problems Father     No Known Problems Mother      Historical Information   Past Medical History:   Diagnosis Date    Cancer (HCC)     Diabetes mellitus (HCC)     High cholesterol     History of blood clots     Hypertension     Leucocytosis 2024    Lung cancer (HCC)     Malignant neoplasm of overlapping sites of right lung (HCC) 2024    Pneumonia     Pulmonary embolism (HCC)      Past Surgical History:   Procedure Laterality Date    IR BIOPSY LYMPH NODE  2024    IR PORT PLACEMENT  2024    KNEE SURGERY      MANDIBLE FRACTURE SURGERY  1985    PATELLA FRACTURE SURGERY      RECTAL SURGERY       Social History   Social History     Substance and Sexual Activity   Alcohol Use Not Currently     Social History     Substance and Sexual Activity   Drug Use Never     Social History     Tobacco Use   Smoking Status Former    Current packs/day: 0.00    Average packs/day: 1.5 packs/day for 35.0 years (52.5 ttl pk-yrs)    Types: Cigarettes    Quit date: 4/15/2024    Years since quittin.6    Passive exposure: Past   Smokeless Tobacco Never     Family History:   Family History   Problem Relation Age of Onset    No Known Problems Father     No Known Problems Mother        Review of Systems:  Review of Systems   Constitutional:  Negative for chills, fatigue and fever.   Respiratory:  Negative for cough, chest tightness and shortness of breath.    Cardiovascular:  Negative for chest pain, palpitations and leg swelling.   Gastrointestinal:  Negative for abdominal pain.   Neurological:  Negative for dizziness, light-headedness and headaches.   All other systems reviewed and are negative.          Scheduled Meds:  Current Facility-Administered Medications   Medication Dose Route Frequency Provider Last Rate    acetaminophen  975 mg Oral Q8H PRN Stefan Kahn,       albuterol  2 puff Inhalation Q6H PRN Stefan  Banai, DO      amitriptyline  200 mg Oral HS Stefan Banai, DO      apixaban  5 mg Oral BID Stefan Banai, DO      atorvastatin  10 mg Oral Daily Stefan Banai, DO      folic acid  1,000 mcg Oral Daily Stefan Banai, DO      furosemide  20 mg Intravenous Daily Stefan Banai, DO      insulin lispro  1-5 Units Subcutaneous TID AC Stefan Banai, DO      ipratropium-albuterol  3 mL Nebulization Q6H PRN Mary Kay Aaron PA-C      levothyroxine  25 mcg Oral Early Morning Stefan Banai, DO      losartan  25 mg Oral Daily Stefan Banai, DO      umeclidinium  1 puff Inhalation Daily Stefan Banai, DO      And    olodaterol HCl  2 puff Inhalation Daily Stefan Banai, DO      oxyCODONE  2.5 mg Oral Q4H PRN Stefan Banai, DO      Or    oxyCODONE  5 mg Oral Q4H PRN Stefan Banai, DO      pantoprazole  40 mg Oral BID AC Rashel Oconnor,        Continuous Infusions:   PRN Meds:.  acetaminophen    albuterol    ipratropium-albuterol    oxyCODONE **OR** oxyCODONE  all current active meds have been reviewed and current meds:   Current Facility-Administered Medications:     acetaminophen (TYLENOL) tablet 975 mg, Q8H PRN    albuterol (PROVENTIL HFA,VENTOLIN HFA) inhaler 2 puff, Q6H PRN    amitriptyline (ELAVIL) tablet 200 mg, HS    apixaban (ELIQUIS) tablet 5 mg, BID    atorvastatin (LIPITOR) tablet 10 mg, Daily    folic acid (FOLVITE) tablet 1,000 mcg, Daily    furosemide (LASIX) injection 20 mg, Daily    insulin lispro (HumALOG/ADMELOG) 100 units/mL subcutaneous injection 1-5 Units, TID AC **AND** Fingerstick Glucose (POCT), TID AC    ipratropium-albuterol (DUO-NEB) 0.5-2.5 mg/3 mL inhalation solution 3 mL, Q6H PRN    levothyroxine tablet 25 mcg, Early Morning    losartan (COZAAR) tablet 25 mg, Daily    umeclidinium 62.5 mcg/actuation inhaler AEPB 1 puff, Daily **AND** olodaterol HCl (STRIVERDI RESPIMAT) inhaler 2 puff, Daily    oxyCODONE (ROXICODONE) split tablet 2.5 mg, Q4H PRN **OR** oxyCODONE (ROXICODONE) IR tablet 5 mg, Q4H PRN     "pantoprazole (PROTONIX) EC tablet 40 mg, BID AC    No Known Allergies    Objective   Vitals: Blood pressure 131/82, pulse (!) 129, temperature 98.5 °F (36.9 °C), resp. rate 18, height 5' 7\" (1.702 m), weight 89.8 kg (198 lb), SpO2 96%., Body mass index is 31.01 kg/m².,   Orthostatic Blood Pressures      Flowsheet Row Most Recent Value   Blood Pressure 131/82 filed at 12/09/2024 1531   Patient Position - Orthostatic VS Sitting filed at 12/08/2024 1842              Intake/Output Summary (Last 24 hours) at 12/9/2024 1538  Last data filed at 12/9/2024 1437  Gross per 24 hour   Intake 700 ml   Output 1675 ml   Net -975 ml       Invasive Devices       Central Venous Catheter Line  Duration             Port A Cath 05/20/24 Right Chest 203 days              Peripheral Intravenous Line  Duration             Peripheral IV 12/08/24 Right Antecubital 1 day                    Physical Exam:  Physical Exam  Vitals and nursing note reviewed.   Constitutional:       General: He is not in acute distress.     Appearance: He is not diaphoretic.   HENT:      Head: Normocephalic and atraumatic.   Eyes:      General: No scleral icterus.  Cardiovascular:      Rate and Rhythm: Regular rhythm. Tachycardia present.      Pulses: Normal pulses.      Heart sounds: Normal heart sounds.   Pulmonary:      Effort: Pulmonary effort is normal.      Breath sounds: Normal breath sounds. No wheezing or rales.   Abdominal:      Palpations: Abdomen is soft.      Tenderness: There is no abdominal tenderness.   Musculoskeletal:      Right lower leg: No edema.      Left lower leg: No edema.   Skin:     General: Skin is warm and dry.      Capillary Refill: Capillary refill takes less than 2 seconds.   Neurological:      General: No focal deficit present.      Mental Status: He is alert and oriented to person, place, and time.   Psychiatric:         Mood and Affect: Mood normal.       Lab Results:   Recent Results (from the past 24 hours)   HS Troponin I 4hr " "   Collection Time: 12/08/24  3:56 PM   Result Value Ref Range    hs TnI 4hr 1,399 (H) \"Refer to ACS Flowchart\"- see link ng/L    Delta 4hr hsTnI -148 <20 ng/L   Fingerstick Glucose (POCT)    Collection Time: 12/08/24  6:16 PM   Result Value Ref Range    POC Glucose 231 (H) 65 - 140 mg/dl   UA w Reflex to Microscopic w Reflex to Culture    Collection Time: 12/08/24  6:17 PM    Specimen: Urine, Clean Catch   Result Value Ref Range    Color, UA Yellow     Clarity, UA Clear     Specific Gravity, UA >=1.050 (H) 1.003 - 1.030    pH, UA 6.0 4.5, 5.0, 5.5, 6.0, 6.5, 7.0, 7.5, 8.0    Leukocytes, UA Negative Negative    Nitrite, UA Negative Negative    Protein, UA 50 (1+) (A) Negative mg/dl    Glucose,  (1/2%) (A) Negative mg/dl    Ketones, UA 40 (2+) (A) Negative mg/dl    Urobilinogen, UA <2.0 <2.0 mg/dl mg/dl    Bilirubin, UA Negative Negative    Occult Blood, UA Large (A) Negative   Urine Microscopic    Collection Time: 12/08/24  6:17 PM   Result Value Ref Range    RBC, UA Innumerable (A) None Seen, 1-2 /hpf    WBC, UA 4-10 (A) None Seen, 1-2 /hpf    Epithelial Cells None Seen None Seen, Occasional /hpf    Bacteria, UA None Seen None Seen, Occasional /hpf    MUCUS THREADS Moderate (A) None Seen   Fingerstick Glucose (POCT)    Collection Time: 12/08/24  8:59 PM   Result Value Ref Range    POC Glucose 259 (H) 65 - 140 mg/dl   Comprehensive metabolic panel    Collection Time: 12/09/24  6:14 AM   Result Value Ref Range    Sodium 136 135 - 147 mmol/L    Potassium 3.5 3.5 - 5.3 mmol/L    Chloride 99 96 - 108 mmol/L    CO2 31 21 - 32 mmol/L    ANION GAP 6 4 - 13 mmol/L    BUN 12 5 - 25 mg/dL    Creatinine 0.67 0.60 - 1.30 mg/dL    Glucose 210 (H) 65 - 140 mg/dL    Calcium 8.5 8.4 - 10.2 mg/dL    Corrected Calcium 9.2 8.3 - 10.1 mg/dL    AST 17 13 - 39 U/L    ALT 24 7 - 52 U/L    Alkaline Phosphatase 61 34 - 104 U/L    Total Protein 6.0 (L) 6.4 - 8.4 g/dL    Albumin 3.1 (L) 3.5 - 5.0 g/dL    Total Bilirubin 0.43 0.20 - 1.00 " mg/dL    eGFR 108 ml/min/1.73sq m   CBC and differential    Collection Time: 12/09/24  6:14 AM   Result Value Ref Range    WBC 5.72 4.31 - 10.16 Thousand/uL    RBC 3.50 (L) 3.88 - 5.62 Million/uL    Hemoglobin 9.5 (L) 12.0 - 17.0 g/dL    Hematocrit 29.7 (L) 36.5 - 49.3 %    MCV 85 82 - 98 fL    MCH 27.1 26.8 - 34.3 pg    MCHC 32.0 31.4 - 37.4 g/dL    RDW 15.9 (H) 11.6 - 15.1 %    MPV 10.2 8.9 - 12.7 fL    Platelets 122 (L) 149 - 390 Thousands/uL    nRBC 0 /100 WBCs    Segmented % 74 43 - 75 %    Immature Grans % 1 0 - 2 %    Lymphocytes % 15 14 - 44 %    Monocytes % 10 4 - 12 %    Eosinophils Relative 0 0 - 6 %    Basophils Relative 0 0 - 1 %    Absolute Neutrophils 4.20 1.85 - 7.62 Thousands/µL    Absolute Immature Grans 0.05 0.00 - 0.20 Thousand/uL    Absolute Lymphocytes 0.85 0.60 - 4.47 Thousands/µL    Absolute Monocytes 0.59 0.17 - 1.22 Thousand/µL    Eosinophils Absolute 0.02 0.00 - 0.61 Thousand/µL    Basophils Absolute 0.01 0.00 - 0.10 Thousands/µL   Protime-INR    Collection Time: 12/09/24  6:14 AM   Result Value Ref Range    Protime 17.4 (H) 12.3 - 15.0 seconds    INR 1.35 (H) 0.85 - 1.19   Fingerstick Glucose (POCT)    Collection Time: 12/09/24  7:03 AM   Result Value Ref Range    POC Glucose 210 (H) 65 - 140 mg/dl   Echo complete w/ contrast if indicated    Collection Time: 12/09/24  7:40 AM   Result Value Ref Range    BSA 2.01 m2    A4C EF 57 %    LVOT stroke volume 59.23     LVOT stroke volume index 30.20 ml/m2    LVOT Cardiac Output 6.38 l/min    LVOT Cardiac Index 3.16 l/min/m2    LVIDd 3.90 cm    LVIDS 2.70 cm    IVSd 1.20 cm    LVPWd 1.20 cm    LVOT diameter 2.2 cm    LVOT peak VTI 15.59 cm    FS 31 28 - 44    MV E' Tissue Velocity Septal 10 cm/s    LA Volume Index (BP) 17.8 mL/m2    AV LVOT peak gradient 4 mmHg    LVOT peak carmella 0.99 m/s    RVID d 3.7 cm    Tricuspid annular plane systolic excursion 2.50 cm    LA size 3.7 cm    LA length (A2C) 4.80 cm    LA volume (BP) 36 mL    RAA A4C 10.4 cm2     Aortic valve peak velocity 1.67 m/s    Ao VTI 27.49 cm    AV mean gradient 6 mmHg    LVOT mn grad 2.0 mmHg    AV peak gradient 11 mmHg    AV area by cont VTI 2.2 cm2    AV area peak carmella 2.3 cm2    Ao root 3.00 cm    Asc Ao 3.4 cm    Aortic valve mean velocity 10.70 m/s    Left ventricular stroke volume (2D) 40.00 mL    IVS 1.2 cm    LEFT VENTRICLE SYSTOLIC VOLUME (MOD BIPLANE) 2D 26 mL    LV DIASTOLIC VOLUME (MOD BIPLANE) 2D 66 mL    Left Atrium Area-systolic Four Chamber 14.2 cm2    Left Atrium Area-systolic Apical Two Chamber 14 cm2    LVSV, 2D 40 mL    LVOT area 3.80 cm2    DVI 0.59     AV valve area 2.15 cm2    LV EF 60    Fingerstick Glucose (POCT)    Collection Time: 12/09/24 10:45 AM   Result Value Ref Range    POC Glucose 280 (H) 65 - 140 mg/dl   Body fluid white cell count with differential    Collection Time: 12/09/24 12:14 PM   Result Value Ref Range    Site Pleural Fluid (Right)     Color, Fluid Straw Clear, Colorless,Yellow    Clarity, Fluid Slightly Cloudy Clear    WBC, Fluid      TOTAL NUCLEATED CELL COUNT 805 /ul   Glucose, body fluid    Collection Time: 12/09/24 12:14 PM   Result Value Ref Range    Glucose, Fluid 248 mg/dL   LD (LDH), Body Fluid    Collection Time: 12/09/24 12:14 PM   Result Value Ref Range    LD, Fluid 562 U/L   pH, body fluid    Collection Time: 12/09/24 12:14 PM   Result Value Ref Range    PH BODY FLUID 7.6    Total Protein, body fluid    Collection Time: 12/09/24 12:14 PM   Result Value Ref Range    Protein, Fluid 3.4 g/dL         * Please Note: Fluency DirectDictation voice to text software may have been used in the creation of this document. **

## 2024-12-09 NOTE — SEDATION DOCUMENTATION
1500 mL dark kayla fluid drained from right pleural space.  Specimens to lab.  Pt tolerated well.  Returned to room and report given.

## 2024-12-09 NOTE — H&P (VIEW-ONLY)
Consultation - Pulmonology   Name: Papo Gibbs 54 y.o. male I MRN: [id number]  Unit/Bed#: S -01 I Date of Admission: 12/8/2024   Date of Service: 12/9/2024 I Hospital Day: 1       Inpatient consult to Pulmonology     Date/Time  12/9/2024 2:20 PM     Performed by  Watson Esteban MD   Authorized by  Stefan Kahn DO           Physician Requesting Evaluation: Rashel Oconnor DO   Reason for Evaluation / Principal Problem: pleural effusion     Assessment & Plan  Acute respiratory failure with hypoxia (HCC)  Multifactorial in setting of lung cancer, large right-sided pleural effusion with significant atelectatic lung on right.  Patient does have supplemental oxygen at home that he wears at night  CT PA on admission negative for PE but did show large right effusion with atelectatic lung and concern for pulmonary edema versus lung carcinomatosis   -S/p IR thoracentesis 12/9/2024 with 1500 mL removed, fluid studies pending  -Continue NC with goal sats 88-92%  Pleural effusion  -Concern for malignant effusion given known cancer  -S/p IR thoracentesis  -Had persistent dyspnea and discomfort postthoracentesis so CXR ordered to evaluate  Adenocarcinoma of overlapping sites of right lung (HCC)  Follows with Dr. Ramachandran, last treatment was about 2 weeks ago with pemetrexed (Keytruda on hold)  History of pulmonary embolism  Continue Eliquis, CTPA without acute PE      History of Present Illness   Papo Gibbs is a 54 y.o. male who presents with worsening shortness of breath.  He has a known history of lung cancer for which he is on chemotherapy with pemetrexed.  He is also had some chest discomfort with stretching of the right side or moving his arm over his head.  He denies any fevers or chills at home.  He denies any sputum production but does have a little bit of a cough.  He denies any hemoptysis.  Reviewed CT scans with patient in room and he has a large right-sided effusion with significant atelectasis of the  right lung and his left lung is showing signs of either edema versus carcinomatosis.  Hopeful for improvement in symptoms with drainage of fluid though there is possibility that the lung may be trapped and this may be more complicated problem.  At this time he has no infectious symptoms to suggest pneumonia so we will hold off on antibiotics.  Explained that we will follow results of fluid studies to help determine best next steps.      Review of Systems   Constitutional:  Positive for fatigue. Negative for chills and fever.   HENT:  Negative for congestion and sore throat.    Respiratory:  Positive for cough, chest tightness and shortness of breath. Negative for wheezing.    Cardiovascular:  Positive for chest pain. Negative for palpitations and leg swelling.   Gastrointestinal:  Negative for abdominal pain, diarrhea and vomiting.   Musculoskeletal:  Positive for back pain. Negative for arthralgias and myalgias.   Skin:  Negative for color change and rash.   Hematological:  Negative for adenopathy. Bruises/bleeds easily.       Historical Information   I have reviewed the patient's PMH, PSH, Social History, Family History, Meds, and Allergies  Tobacco History: Former smoker with 52-pack-year history  Occupational History: Noncontributory  Family History:non-contributory    Objective :  Temp:  [98.3 °F (36.8 °C)-100.1 °F (37.8 °C)] 98.5 °F (36.9 °C)  HR:  [119-135] 135  BP: (121-155)/(71-94) 124/81  Resp:  [18-20] 19  SpO2:  [93 %-97 %] 94 %  O2 Device: Nasal cannula  Nasal Cannula O2 Flow Rate (L/min):  [2 L/min] 2 L/min    Physical Exam  Vitals reviewed.   Constitutional:       General: He is not in acute distress.     Appearance: He is not ill-appearing or toxic-appearing.   Cardiovascular:      Rate and Rhythm: Regular rhythm. Tachycardia present.   Pulmonary:      Breath sounds: Examination of the right-upper field reveals decreased breath sounds. Examination of the right-middle field reveals decreased breath  sounds. Examination of the right-lower field reveals decreased breath sounds. Decreased breath sounds present. No wheezing, rhonchi or rales.   Musculoskeletal:      Right lower leg: No edema.      Left lower leg: No edema.   Skin:     General: Skin is warm and dry.      Findings: No rash.   Neurological:      General: No focal deficit present.      Mental Status: He is alert and oriented to person, place, and time.           Lab Results: I have reviewed the following results:  .     12/09/24  0614   WBC 5.72   HGB 9.5*   HCT 29.7*   *   SODIUM 136   K 3.5   CL 99   CO2 31   BUN 12   CREATININE 0.67   GLUC 210*   AST 17   ALT 24   ALB 3.1*   TBILI 0.43   ALKPHOS 61   INR 1.35*     ABG: No new results in last 24 hours.    Imaging Results Review: I personally reviewed the following image studies in PACS and associated radiology reports: CT chest. My interpretation of the radiology images/reports is: Reviewed and interpreted as above.  Other Study Results Review: Other studies reviewed include: TTE personally reviewed with no signs of RV strain  PFT Results Reviewed: Spirometry from 5/2024 showed FEV1 of 41% with restrictive pattern    VTE Prophylaxis: VTE covered by:  apixaban, Oral, 5 mg at 12/09/24 0800       Watson Esteban M.D.  Pulmonary & Critical Care Fellow, PGY-IV

## 2024-12-10 ENCOUNTER — APPOINTMENT (INPATIENT)
Dept: RADIOLOGY | Facility: HOSPITAL | Age: 54
DRG: 181 | End: 2024-12-10
Payer: COMMERCIAL

## 2024-12-10 PROBLEM — R06.02 SHORTNESS OF BREATH: Status: ACTIVE | Noted: 2024-04-23

## 2024-12-10 LAB
ANION GAP SERPL CALCULATED.3IONS-SCNC: 7 MMOL/L (ref 4–13)
BUN SERPL-MCNC: 12 MG/DL (ref 5–25)
CALCIUM SERPL-MCNC: 8.4 MG/DL (ref 8.4–10.2)
CARDIAC TROPONIN I PNL SERPL HS: 449 NG/L (ref 8–18)
CHLORIDE SERPL-SCNC: 98 MMOL/L (ref 96–108)
CO2 SERPL-SCNC: 29 MMOL/L (ref 21–32)
CREAT SERPL-MCNC: 0.66 MG/DL (ref 0.6–1.3)
ERYTHROCYTE [DISTWIDTH] IN BLOOD BY AUTOMATED COUNT: 15.9 % (ref 11.6–15.1)
GFR SERPL CREATININE-BSD FRML MDRD: 108 ML/MIN/1.73SQ M
GLUCOSE SERPL-MCNC: 235 MG/DL (ref 65–140)
GLUCOSE SERPL-MCNC: 243 MG/DL (ref 65–140)
GLUCOSE SERPL-MCNC: 282 MG/DL (ref 65–140)
GLUCOSE SERPL-MCNC: 287 MG/DL (ref 65–140)
GLUCOSE SERPL-MCNC: 327 MG/DL (ref 65–140)
HCT VFR BLD AUTO: 30.4 % (ref 36.5–49.3)
HGB BLD-MCNC: 9.9 G/DL (ref 12–17)
MCH RBC QN AUTO: 27 PG (ref 26.8–34.3)
MCHC RBC AUTO-ENTMCNC: 32.6 G/DL (ref 31.4–37.4)
MCV RBC AUTO: 83 FL (ref 82–98)
PLATELET # BLD AUTO: 148 THOUSANDS/UL (ref 149–390)
PMV BLD AUTO: 10.2 FL (ref 8.9–12.7)
POTASSIUM SERPL-SCNC: 3.6 MMOL/L (ref 3.5–5.3)
RBC # BLD AUTO: 3.66 MILLION/UL (ref 3.88–5.62)
SODIUM SERPL-SCNC: 134 MMOL/L (ref 135–147)
WBC # BLD AUTO: 5.57 THOUSAND/UL (ref 4.31–10.16)

## 2024-12-10 PROCEDURE — 80048 BASIC METABOLIC PNL TOTAL CA: CPT | Performed by: PHYSICIAN ASSISTANT

## 2024-12-10 PROCEDURE — 71045 X-RAY EXAM CHEST 1 VIEW: CPT

## 2024-12-10 PROCEDURE — 99232 SBSQ HOSP IP/OBS MODERATE 35: CPT | Performed by: STUDENT IN AN ORGANIZED HEALTH CARE EDUCATION/TRAINING PROGRAM

## 2024-12-10 PROCEDURE — 32554 ASPIRATE PLEURA W/O IMAGING: CPT | Performed by: STUDENT IN AN ORGANIZED HEALTH CARE EDUCATION/TRAINING PROGRAM

## 2024-12-10 PROCEDURE — 82948 REAGENT STRIP/BLOOD GLUCOSE: CPT

## 2024-12-10 PROCEDURE — 0W993ZZ DRAINAGE OF RIGHT PLEURAL CAVITY, PERCUTANEOUS APPROACH: ICD-10-PCS | Performed by: STUDENT IN AN ORGANIZED HEALTH CARE EDUCATION/TRAINING PROGRAM

## 2024-12-10 PROCEDURE — 84484 ASSAY OF TROPONIN QUANT: CPT | Performed by: PHYSICIAN ASSISTANT

## 2024-12-10 PROCEDURE — 99232 SBSQ HOSP IP/OBS MODERATE 35: CPT | Performed by: PHYSICIAN ASSISTANT

## 2024-12-10 PROCEDURE — 99232 SBSQ HOSP IP/OBS MODERATE 35: CPT | Performed by: INTERNAL MEDICINE

## 2024-12-10 PROCEDURE — NC001 PR NO CHARGE: Performed by: STUDENT IN AN ORGANIZED HEALTH CARE EDUCATION/TRAINING PROGRAM

## 2024-12-10 PROCEDURE — 85027 COMPLETE CBC AUTOMATED: CPT | Performed by: PHYSICIAN ASSISTANT

## 2024-12-10 RX ORDER — INSULIN GLARGINE 100 [IU]/ML
15 INJECTION, SOLUTION SUBCUTANEOUS
Status: DISCONTINUED | OUTPATIENT
Start: 2024-12-10 | End: 2024-12-11

## 2024-12-10 RX ADMIN — PANTOPRAZOLE SODIUM 40 MG: 40 TABLET, DELAYED RELEASE ORAL at 17:20

## 2024-12-10 RX ADMIN — APIXABAN 5 MG: 5 TABLET, FILM COATED ORAL at 17:20

## 2024-12-10 RX ADMIN — FOLIC ACID 1000 MCG: 1 TABLET ORAL at 08:04

## 2024-12-10 RX ADMIN — OXYCODONE HYDROCHLORIDE 5 MG: 5 TABLET ORAL at 21:54

## 2024-12-10 RX ADMIN — INSULIN GLARGINE 15 UNITS: 100 INJECTION, SOLUTION SUBCUTANEOUS at 21:33

## 2024-12-10 RX ADMIN — APIXABAN 5 MG: 5 TABLET, FILM COATED ORAL at 08:04

## 2024-12-10 RX ADMIN — INSULIN LISPRO 2 UNITS: 100 INJECTION, SOLUTION INTRAVENOUS; SUBCUTANEOUS at 11:33

## 2024-12-10 RX ADMIN — OLODATEROL RESPIMAT INHALATION SPRAY 2 PUFF: 2.5 SPRAY, METERED RESPIRATORY (INHALATION) at 08:04

## 2024-12-10 RX ADMIN — Medication 2.5 MG: at 08:13

## 2024-12-10 RX ADMIN — ATORVASTATIN CALCIUM 10 MG: 10 TABLET, FILM COATED ORAL at 21:33

## 2024-12-10 RX ADMIN — LOSARTAN POTASSIUM 25 MG: 25 TABLET, FILM COATED ORAL at 08:04

## 2024-12-10 RX ADMIN — INSULIN LISPRO 2 UNITS: 100 INJECTION, SOLUTION INTRAVENOUS; SUBCUTANEOUS at 17:19

## 2024-12-10 RX ADMIN — FUROSEMIDE 20 MG: 10 INJECTION, SOLUTION INTRAMUSCULAR; INTRAVENOUS at 08:04

## 2024-12-10 RX ADMIN — OXYCODONE HYDROCHLORIDE 5 MG: 5 TABLET ORAL at 16:29

## 2024-12-10 RX ADMIN — LEVOTHYROXINE SODIUM 25 MCG: 25 TABLET ORAL at 05:25

## 2024-12-10 RX ADMIN — AMITRIPTYLINE HYDROCHLORIDE 200 MG: 25 TABLET, FILM COATED ORAL at 21:33

## 2024-12-10 RX ADMIN — PANTOPRAZOLE SODIUM 40 MG: 40 TABLET, DELAYED RELEASE ORAL at 05:25

## 2024-12-10 RX ADMIN — INSULIN LISPRO 2 UNITS: 100 INJECTION, SOLUTION INTRAVENOUS; SUBCUTANEOUS at 08:04

## 2024-12-10 RX ADMIN — UMECLIDINIUM 1 PUFF: 62.5 AEROSOL, POWDER ORAL at 08:04

## 2024-12-10 RX ADMIN — INSULIN LISPRO 3 UNITS: 100 INJECTION, SOLUTION INTRAVENOUS; SUBCUTANEOUS at 21:34

## 2024-12-10 NOTE — PLAN OF CARE
Problem: RESPIRATORY - ADULT  Goal: Achieves optimal ventilation and oxygenation  Description: INTERVENTIONS:  - Assess for changes in respiratory status  - Assess for changes in mentation and behavior  - Position to facilitate oxygenation and minimize respiratory effort  - Oxygen administered by appropriate delivery if ordered  - Initiate smoking cessation education as indicated  - Encourage broncho-pulmonary hygiene including cough, deep breathe, Incentive Spirometry  - Assess the need for suctioning and aspirate as needed  - Assess and instruct to report SOB or any respiratory difficulty  - Respiratory Therapy support as indicated  Outcome: Progressing     Problem: PAIN - ADULT  Goal: Verbalizes/displays adequate comfort level or baseline comfort level  Description: Interventions:  - Encourage patient to monitor pain and request assistance  - Assess pain using appropriate pain scale  - Administer analgesics based on type and severity of pain and evaluate response  - Implement non-pharmacological measures as appropriate and evaluate response  - Consider cultural and social influences on pain and pain management  - Notify physician/advanced practitioner if interventions unsuccessful or patient reports new pain  Outcome: Progressing     Problem: INFECTION - ADULT  Goal: Absence or prevention of progression during hospitalization  Description: INTERVENTIONS:  - Assess and monitor for signs and symptoms of infection  - Monitor lab/diagnostic results  - Monitor all insertion sites, i.e. indwelling lines, tubes, and drains  - Monitor endotracheal if appropriate and nasal secretions for changes in amount and color  - [place] appropriate cooling/warming therapies per order  - Administer medications as ordered  - Instruct and encourage patient and family to use good hand hygiene technique  - Identify and instruct in appropriate isolation precautions for identified infection/condition  Outcome: Progressing  Goal:  Absence of fever/infection during neutropenic period  Description: INTERVENTIONS:  - Monitor WBC    Outcome: Progressing     Problem: SAFETY ADULT  Goal: Patient will remain free of falls  Description: INTERVENTIONS:  - Educate patient/family on patient safety including physical limitations  - Instruct patient to call for assistance with activity   - Consult OT/PT to assist with strengthening/mobility   - Keep Call bell within reach  - Keep bed low and locked with side rails adjusted as appropriate  - Keep care items and personal belongings within reach  - Initiate and maintain comfort rounds  - Make Fall Risk Sign visible to staff  - Offer Toileting every 2 Hours, in advance of need  - Initiate/Maintain bed alarm  - Obtain necessary fall risk management equipment  - Apply yellow socks and bracelet for high fall risk patients  - Consider moving patient to room near nurses station  Outcome: Progressing  Goal: Maintain or return to baseline ADL function  Description: INTERVENTIONS:  -  Assess patient's ability to carry out ADLs; assess patient's baseline for ADL function and identify physical deficits which impact ability to perform ADLs (bathing, care of mouth/teeth, toileting, grooming, dressing, etc.)  - Assess/evaluate cause of self-care deficits   - Assess range of motion  - Assess patient's mobility; develop plan if impaired  - Assess patient's need for assistive devices and provide as appropriate  - Encourage maximum independence but intervene and supervise when necessary  - Involve family in performance of ADLs  - Assess for home care needs following discharge   - Consider OT consult to assist with ADL evaluation and planning for discharge  - Provide patient education as appropriate  Outcome: Progressing  Goal: Maintains/Returns to pre admission functional level  Description: INTERVENTIONS:  - Perform AM-PAC 6 Click Basic Mobility/ Daily Activity assessment daily.  - Set and communicate daily mobility goal to  care team and patient/family/caregiver.   - Collaborate with rehabilitation services on mobility goals if consulted  - Perform Range of Motion 2 times a day.  - Reposition patient every 2 hours.  - Dangle patient 2 times a day  - Stand patient 3 times a day  - Ambulate patient 3 times a day  - Out of bed to chair 3 times a day   - Out of bed for meals 3 times a day  - Out of bed for toileting  - Record patient progress and toleration of activity level   Outcome: Progressing     Problem: DISCHARGE PLANNING  Goal: Discharge to home or other facility with appropriate resources  Description: INTERVENTIONS:  - Identify barriers to discharge w/patient and caregiver  - Arrange for needed discharge resources and transportation as appropriate  - Identify discharge learning needs (meds, wound care, etc.)  - Arrange for interpretive services to assist at discharge as needed  - Refer to Case Management Department for coordinating discharge planning if the patient needs post-hospital services based on physician/advanced practitioner order or complex needs related to functional status, cognitive ability, or social support system  Outcome: Progressing     Problem: Knowledge Deficit  Goal: Patient/family/caregiver demonstrates understanding of disease process, treatment plan, medications, and discharge instructions  Description: Complete learning assessment and assess knowledge base.  Interventions:  - Provide teaching at level of understanding  - Provide teaching via preferred learning methods  Outcome: Progressing

## 2024-12-10 NOTE — ASSESSMENT & PLAN NOTE
Multifactorial in setting of lung cancer, large right-sided pleural effusion with significant atelectatic lung on right.  Patient does have supplemental oxygen at home that he wears at night  CT PA on admission negative for PE but did show large right effusion with atelectatic lung and concern for pulmonary edema versus lung carcinomatosis   -S/p IR thoracentesis 12/9/2024 with 1500 mL removed, fluid studies with exudative effusion, no bacteria seen on Gram stain, cell differential pending, cytology pending  -CXR 12/9 postthoracentesis without complete reexpansion of lung, CXR 12/10 showed reaccumulation of fluid  -Given improvement in symptoms though still having significant pleural fluid on x-ray do not expect that repeat drainage would provide much benefit at this time we will hold off on pleural catheter placement, will await final fluid studies and catheter can be discussed as a palliative option in the future  -Continue NC with goal sats 88-92%

## 2024-12-10 NOTE — PLAN OF CARE
Problem: RESPIRATORY - ADULT  Goal: Achieves optimal ventilation and oxygenation  Description: INTERVENTIONS:  - Assess for changes in respiratory status  - Assess for changes in mentation and behavior  - Position to facilitate oxygenation and minimize respiratory effort  - Oxygen administered by appropriate delivery if ordered  - Initiate smoking cessation education as indicated  - Encourage broncho-pulmonary hygiene including cough, deep breathe, Incentive Spirometry  - Assess the need for suctioning and aspirate as needed  - Assess and instruct to report SOB or any respiratory difficulty  - Respiratory Therapy support as indicated  Outcome: Progressing     Problem: PAIN - ADULT  Goal: Verbalizes/displays adequate comfort level or baseline comfort level  Description: Interventions:  - Encourage patient to monitor pain and request assistance  - Assess pain using appropriate pain scale  - Administer analgesics based on type and severity of pain and evaluate response  - Implement non-pharmacological measures as appropriate and evaluate response  - Consider cultural and social influences on pain and pain management  - Notify physician/advanced practitioner if interventions unsuccessful or patient reports new pain  Outcome: Progressing     Problem: INFECTION - ADULT  Goal: Absence or prevention of progression during hospitalization  Description: INTERVENTIONS:  - Assess and monitor for signs and symptoms of infection  - Monitor lab/diagnostic results  - Monitor all insertion sites, i.e. indwelling lines, tubes, and drains  - Monitor endotracheal if appropriate and nasal secretions for changes in amount and color  - [place] appropriate cooling/warming therapies per order  - Administer medications as ordered  - Instruct and encourage patient and family to use good hand hygiene technique  - Identify and instruct in appropriate isolation precautions for identified infection/condition  Outcome: Progressing  Goal:  Absence of fever/infection during neutropenic period  Description: INTERVENTIONS:  - Monitor WBC    Outcome: Progressing     Problem: SAFETY ADULT  Goal: Patient will remain free of falls  Description: INTERVENTIONS:  - Educate patient/family on patient safety including physical limitations  - Instruct patient to call for assistance with activity   - Consult OT/PT to assist with strengthening/mobility   - Keep Call bell within reach  - Keep bed low and locked with side rails adjusted as appropriate  - Keep care items and personal belongings within reach  - Initiate and maintain comfort rounds  - Make Fall Risk Sign visible to staff  - Apply yellow socks and bracelet for high fall risk patients  - Consider moving patient to room near nurses station  Outcome: Progressing  Goal: Maintain or return to baseline ADL function  Description: INTERVENTIONS:  -  Assess patient's ability to carry out ADLs; assess patient's baseline for ADL function and identify physical deficits which impact ability to perform ADLs (bathing, care of mouth/teeth, toileting, grooming, dressing, etc.)  - Assess/evaluate cause of self-care deficits   - Assess range of motion  - Assess patient's mobility; develop plan if impaired  - Assess patient's need for assistive devices and provide as appropriate  - Encourage maximum independence but intervene and supervise when necessary  - Involve family in performance of ADLs  - Assess for home care needs following discharge   - Consider OT consult to assist with ADL evaluation and planning for discharge  - Provide patient education as appropriate  Outcome: Progressing  Goal: Maintains/Returns to pre admission functional level  Description: INTERVENTIONS:  - Perform AM-PAC 6 Click Basic Mobility/ Daily Activity assessment daily.  - Set and communicate daily mobility goal to care team and patient/family/caregiver.   - Collaborate with rehabilitation services on mobility goals if consulted  - Out of bed for  toileting  - Record patient progress and toleration of activity level   Outcome: Progressing     Problem: DISCHARGE PLANNING  Goal: Discharge to home or other facility with appropriate resources  Description: INTERVENTIONS:  - Identify barriers to discharge w/patient and caregiver  - Arrange for needed discharge resources and transportation as appropriate  - Identify discharge learning needs (meds, wound care, etc.)  - Arrange for interpretive services to assist at discharge as needed  - Refer to Case Management Department for coordinating discharge planning if the patient needs post-hospital services based on physician/advanced practitioner order or complex needs related to functional status, cognitive ability, or social support system  Outcome: Progressing     Problem: Knowledge Deficit  Goal: Patient/family/caregiver demonstrates understanding of disease process, treatment plan, medications, and discharge instructions  Description: Complete learning assessment and assess knowledge base.  Interventions:  - Provide teaching at level of understanding  - Provide teaching via preferred learning methods  Outcome: Progressing

## 2024-12-10 NOTE — PROCEDURES
Thoracentesis (Bedside)    Date/Time: 12/10/2024 4:41 PM    Performed by: Watson Esteban MD  Authorized by: Watson Esteban MD    Patient location:  Bedside  Consent:     Consent obtained:  Verbal    Consent given by:  Patient and spouse    Risks discussed:  Bleeding, incomplete drainage, nerve damage, pneumothorax, infection and pain    Alternatives discussed:  Alternative treatment and observation  Universal protocol:     Patient identity confirmed:  Verbally with patient  Indications:     Procedure Purpose: therapeutic      Indications: pleural effusion    Anesthesia (see MAR for exact dosages):     Anesthesia method:  Local infiltration    Local anesthetic:  Lidocaine 1% w/o epi  Procedure details:     Standard thoracentesis cath kit used: Yes      Patient position:  Sitting    Laterality:  Right    Intercostal space:  8th    Number of attempts:  1    Drainage color:  Brown    Drainage characteristics:  Serosanguinous    Fluid removed amount:  1200 mL  Post-procedure details:     Patient tolerance of procedure:  Tolerated well, no immediate complications      Watson Esteban M.D.  Pulmonary & Critical Care Fellow, PGY-IV

## 2024-12-10 NOTE — PROGRESS NOTES
Progress Note - Pulmonology   Name: Papo Gibbs 54 y.o. male I MRN: [id number]  Unit/Bed#: S -01 I Date of Admission: 12/8/2024   Date of Service: 12/10/2024 I Hospital Day: 2     Assessment & Plan  Acute respiratory failure with hypoxia (HCC)  Multifactorial in setting of lung cancer, large right-sided pleural effusion with significant atelectatic lung on right.  Patient does have supplemental oxygen at home that he wears at night  CT PA on admission negative for PE but did show large right effusion with atelectatic lung and concern for pulmonary edema versus lung carcinomatosis   -S/p IR thoracentesis 12/9/2024 with 1500 mL removed, fluid studies with exudative effusion, no bacteria seen on Gram stain, cell differential pending, cytology pending  -CXR 12/9 postthoracentesis without complete reexpansion of lung, CXR 12/10 showed reaccumulation of fluid  -Given improvement in symptoms though still having significant pleural fluid on x-ray do not expect that repeat drainage would provide much benefit at this time we will hold off on pleural catheter placement, will await final fluid studies and catheter can be discussed as a palliative option in the future  -Continue NC with goal sats 88-92%  Pleural effusion  -Concern for malignant effusion given known cancer  -S/p IR thoracentesis  -Had persistent dyspnea and discomfort postthoracentesis so CXR ordered to evaluate  Adenocarcinoma of overlapping sites of right lung (HCC)  Follows with Dr. Ramachandran, last treatment was about 2 weeks ago with pemetrexed (Keytruda on hold)  History of pulmonary embolism  Continue Eliquis, CTPA without acute PE  Tachycardia      24 Hour Events : No acute events overnight  Subjective : He is feeling little bit better this morning and denies as much pulling sensation in his chest.  He was able to sleep last night and feels some relief from this thoracentesis.  Discussed results of CXR yesterday that his lung do not currently  reexpand.  Also reviewed CXR today which showed reaccumulation of pleural fluid.  Pleural fluid studies consistent with exudative effusion but less likely active infection more consistent with an inflammatory process.  Awaiting cytology to determine if malignant effusion but concern is high.    Objective :  Temp:  [98 °F (36.7 °C)-100 °F (37.8 °C)] 98 °F (36.7 °C)  HR:  [122-135] 125  BP: (102-146)/(62-89) 127/77  Resp:  [16-19] 18  SpO2:  [88 %-99 %] 99 %  O2 Device: None (Room air)    Physical Exam  Vitals reviewed.   Constitutional:       General: He is not in acute distress.     Appearance: He is not ill-appearing or toxic-appearing.   Cardiovascular:      Rate and Rhythm: Normal rate and regular rhythm.   Pulmonary:      Effort: Pulmonary effort is normal. No tachypnea.      Breath sounds: Examination of the right-upper field reveals decreased breath sounds. Examination of the right-middle field reveals decreased breath sounds. Examination of the right-lower field reveals decreased breath sounds. Decreased breath sounds present. No wheezing, rhonchi or rales.   Neurological:      Mental Status: He is alert.         Lab Results: I have reviewed the following results:   .     12/10/24  0539   WBC 5.57   HGB 9.9*   HCT 30.4*   *   SODIUM 134*   K 3.6   CL 98   CO2 29   BUN 12   CREATININE 0.66   GLUC 243*     ABG: No new results in last 24 hours.    Imaging Results Review: I personally reviewed the following image studies in PACS and associated radiology reports: chest xray. My interpretation of the radiology images/reports is: Showed persistent right-sided effusion without complete lung expansion postthoracentesis with follow-up on CXR this morning showed reaccumulation of pleural fluid.  Other Study Results Review: Other studies reviewed include: Fluid fluid studies as noted above    Watson Esteban M.D.  Pulmonary & Critical Care Fellow, PGY-IV

## 2024-12-10 NOTE — PROGRESS NOTES
General Cardiology   Progress Note -  [facility]   Papo Gibbs 54 y.o. male MRN: [id number]    Unit/Bed#: S -01 Encounter: [id number]    Assessment  1. Sinus tachycardia, likely reactive in the context of below acute/chronic medical issues.   -No chest pain or palpitations  -ECG on admission 12/8; sinus tachycardia rate 123 bpm.  -Baseline ECGs x 2 in April 2024 demonstrated NSR/sinus tachycardia heart rates upper 90s to 110s  -Telemetry review, sinus tachycardia, heart rates currently in the upper 120s-130s  -Not previously or currently on AV mehnaz blocking agents.  -Electrolytes stable on BMP.  -TSH level 0.36, free T4 1.0  2. Elevated troponin level.  Likely represents a nonischemic myocardial injury.  -No complaints of CP.  -TTE 12/9/2024; LVEF 60%, wall motion normal, diastolic function normal, mild AI.  -ECGs reviewed, no significant ST-T wave abnormalities.  -HS troponin levels elevated but relatively flat [phone]  3. Coronary artery calcifications  -CTA chest PE study 12/8/2024; large amount of coronary calcification in the left anterior descending, left circumflex and right coronary arteries   -No prior ischemic testing for review.  -No previously on aspirin.  -On atorvastatin 10 mg daily  -Not previously on BB.  4. Right sided pleural effusion, concern for malignant effusion.  -Pulmonary/IR service following.  -S/p IR guided thoracentesis procedure yielding 1.5L kayla pleural fluid.  -Postthoracentesis chest x-ray PA lateral -increased right effusion and right lower lobe opacity.  No pneumothorax seen.  5. Lung CA.  -Follows with heme-onc as an outpatient.  -Currently receiving chemotherapy (pemtrexed) last treatment was about 2 weeks ago  -CTA chest PE study; no PE, since 10/2020 for progression of lymphangitic carcinomatosis of the right lung increasing partially loculated moderate right-sided pleural effusion, new interstitial edema in the left lung possibly related to volume overload  versus development of left lung carcinomatosis, possible small PNA within the superior segment of the left lower lobe slightly increasing mediastinal lymphadenopathy that is probably metastatic enlarging subcentimeter gastric-hepatic ligament lymph nodes that are probably metastatic.  6. Dyslipidemia  -Lipid profile 12/9/2024; cholesterol 136, triglyceride 91, HDL 47 and LDL calculated 71.  -On atorvastatin 10 mg daily.  7. Hypertension  -Average /76 last recorded 127/77, .  -Outpatient BP regimen; losartan 25 mg daily  -Inpatient BP regimen; losartan 25 mg daily.  8. DM type II  -HgbA1c 6.7.  9. History of PE  -On apixaban 5 mg twice daily.     Plan  -Pt denies any current cardiac symptoms.  Feels mildly short of breath at rest and dyspneic with exertion.  He remains on 2 L of supplemental O2. Repeat/post thoracentesis chest x-ray/PA and lateral demonstrates reaccumulation of right-sided pleural effusion.  Further management of this per the pulmonary/IR service.  -Telemetry reviewed, maintaining sinus tachycardia with heart rates in the 120-130 range. No indication for BB/AVNB agents at this time.   -BP well-controlled, continue losartan 25 mg daily.  -Continue atorvastatin, increase to 20 mg daily, LDL goal <55 (w/DM)  -Continue apixiban 5 mg BID  -Will consider eventual, likely outpatient stress test for further ischemic eval / risk stratification - timing TBD but likely once improved from a pulmonary perspective.  -Can DC telemetry.     Subjective  Review of Systems   Constitutional: Negative for chills, fever and malaise/fatigue.   Eyes:  Negative for visual disturbance.   Cardiovascular:  Negative for chest pain, dyspnea on exertion, leg swelling, orthopnea and palpitations.   Respiratory:  Negative for cough and shortness of breath.    Gastrointestinal:  Negative for abdominal pain.   Neurological:  Negative for dizziness, headaches and light-headedness.       Objective:   Physical Exam  Vitals  "and nursing note reviewed.   Constitutional:       General: He is not in acute distress.     Appearance: He is not diaphoretic.   HENT:      Head: Normocephalic and atraumatic.   Eyes:      General: No scleral icterus.  Cardiovascular:      Rate and Rhythm: Regular rhythm. Tachycardia present.      Pulses: Normal pulses.      Heart sounds: Normal heart sounds.   Pulmonary:      Effort: Pulmonary effort is normal.      Breath sounds: Normal breath sounds. No wheezing or rales.   Abdominal:      Palpations: Abdomen is soft.   Musculoskeletal:      Right lower leg: No edema.      Left lower leg: No edema.   Skin:     General: Skin is warm and dry.      Capillary Refill: Capillary refill takes less than 2 seconds.   Neurological:      General: No focal deficit present.      Mental Status: He is alert and oriented to person, place, and time.   Psychiatric:         Mood and Affect: Mood normal.         Vitals: Blood pressure 127/77, pulse (!) 125, temperature 98 °F (36.7 °C), resp. rate 18, height 5' 7\" (1.702 m), weight 87.8 kg (193 lb 9 oz), SpO2 99%.,     Body mass index is 30.32 kg/m².,   Systolic (24hrs), Av , Min:102 , Max:146     Diastolic (24hrs), Av, Min:62, Max:89      Intake/Output Summary (Last 24 hours) at 12/10/2024 0941  Last data filed at 12/10/2024 0912  Gross per 24 hour   Intake 220 ml   Output 2000 ml   Net -1780 ml     Weight (last 2 days)       Date/Time Weight    12/10/24 0600 87.8 (193.56)    24 07:04:53 89.8 (198)    24 0600 90.1 (198.6)            LABORATORY RESULTS      CBC with diff:   Results from last 7 days   Lab Units 12/10/24  0539 24  0614 24  1118   WBC Thousand/uL 5.57 5.72 6.42   HEMOGLOBIN g/dL 9.9* 9.5* 10.5*   HEMATOCRIT % 30.4* 29.7* 31.9*   MCV fL 83 85 84   PLATELETS Thousands/uL 148* 122* 132*   RBC Million/uL 3.66* 3.50* 3.81*   MCH pg 27.0 27.1 27.6   MCHC g/dL 32.6 32.0 32.9   RDW % 15.9* 15.9* 15.9*   MPV fL 10.2 10.2 10.8   NRBC AUTO /100 " "WBCs  --  0 0       CMP:  Results from last 7 days   Lab Units 12/10/24  0539 12/09/24  0614 12/08/24  1118   POTASSIUM mmol/L 3.6 3.5 3.5   CHLORIDE mmol/L 98 99 99   CO2 mmol/L 29 31 28   BUN mg/dL 12 12 10   CREATININE mg/dL 0.66 0.67 0.66   CALCIUM mg/dL 8.4 8.5 8.9   AST U/L  --  17 23   ALT U/L  --  24 27   ALK PHOS U/L  --  61 72   EGFR ml/min/1.73sq m 108 108 108       BMP:  Results from last 7 days   Lab Units 12/10/24  0539 12/09/24  0614 12/08/24  1118   POTASSIUM mmol/L 3.6 3.5 3.5   CHLORIDE mmol/L 98 99 99   CO2 mmol/L 29 31 28   BUN mg/dL 12 12 10   CREATININE mg/dL 0.66 0.67 0.66   CALCIUM mg/dL 8.4 8.5 8.9       No results found for: \"NTBNP\"           Results from last 7 days   Lab Units 12/09/24  0614   HEMOGLOBIN A1C % 8.1*             Results from last 7 days   Lab Units 12/09/24  0614 12/08/24  1118   INR  1.35* 1.20*       Lipid Profile:   No results found for: \"CHOL\"  Lab Results   Component Value Date    HDL 47 12/09/2024     Lab Results   Component Value Date    LDLCALC 71 12/09/2024     Lab Results   Component Value Date    TRIG 91 12/09/2024       Cardiac testing:   No results found for this or any previous visit.    No results found for this or any previous visit.    No results found for this or any previous visit.    No valid procedures specified.  No results found for this or any previous visit.      Meds/Allergies   all current active meds have been reviewed and current meds:   Current Facility-Administered Medications:     acetaminophen (TYLENOL) tablet 975 mg, Q8H PRN    albuterol (PROVENTIL HFA,VENTOLIN HFA) inhaler 2 puff, Q6H PRN    amitriptyline (ELAVIL) tablet 200 mg, HS    apixaban (ELIQUIS) tablet 5 mg, BID    atorvastatin (LIPITOR) tablet 10 mg, Daily    folic acid (FOLVITE) tablet 1,000 mcg, Daily    furosemide (LASIX) injection 20 mg, Daily    insulin lispro (HumALOG/ADMELOG) 100 units/mL subcutaneous injection 1-5 Units, 4x Daily (AC & HS) **AND** Fingerstick Glucose " (POCT), 4x Daily AC and at bedtime    ipratropium-albuterol (DUO-NEB) 0.5-2.5 mg/3 mL inhalation solution 3 mL, Q6H PRN    levothyroxine tablet 25 mcg, Early Morning    losartan (COZAAR) tablet 25 mg, Daily    umeclidinium 62.5 mcg/actuation inhaler AEPB 1 puff, Daily **AND** olodaterol HCl (STRIVERDI RESPIMAT) inhaler 2 puff, Daily    oxyCODONE (ROXICODONE) split tablet 2.5 mg, Q4H PRN **OR** oxyCODONE (ROXICODONE) IR tablet 5 mg, Q4H PRN    pantoprazole (PROTONIX) EC tablet 40 mg, BID AC         Counseling / Coordination of Care  Total floor / unit time spent today 20 minutes.  Greater than 50% of total time was spent with the patient and / or family counseling and / or coordination of care.      ** Please Note: Dragon 360 Dictation voice to text software may have been used in the creation of this document. **

## 2024-12-10 NOTE — ASSESSMENT & PLAN NOTE
Patient normally requires 2 to 3 L nasal cannula currently on baseline however with severely worsening dyspnea over the past several days acutely in the setting of chronic dyspnea for the past 1 to 2 months  SpO2 has remained stable on home oxygen during hospital stay, acute respiratory failure ruled out  Completed course of steroids and antibiotics outpatient without any significant improvement  CT chest showing progression of lymphangitic carcinomatosis of right lung, increased loculated moderate right pleural effusion, no PE, new interstitial edema of left lung, with possible small pneumonia in superior segment of left lower lobe, increasing mediastinal lymphadenopathy  He does continue to complain of significant dyspnea on exertion  Status post IR thoracentesis for 1.5 L yesterday, follow-up final cytology  Repeat chest x-ray following the procedure with reaccumulation of fluid, awaiting cytology to determine next steps  Appreciate pulmonology recommendations

## 2024-12-10 NOTE — ASSESSMENT & PLAN NOTE
HR elevated in the 120s-130s during hospital stay, likely reactive from acute on chronic medical problems  Patient denies chest pain or palpitations, notes he is always somewhat tachycardic but typically appears to be in the 100s  Does have associated elevated troponin, seen in consult cardiology  Echocardiogram without wall motion abnormalities or valvular abnormalities  Continue telemetry monitoring  Continue to treat pain  Blood cultures negative, no evidence of additional infection, continue monitoring off antibiotics  Does have evidence of coronary artery calcifications, considering eventual outpatient stress test once improved from pulmonary perspective

## 2024-12-11 ENCOUNTER — APPOINTMENT (INPATIENT)
Dept: GASTROENTEROLOGY | Facility: HOSPITAL | Age: 54
DRG: 181 | End: 2024-12-11
Attending: STUDENT IN AN ORGANIZED HEALTH CARE EDUCATION/TRAINING PROGRAM
Payer: COMMERCIAL

## 2024-12-11 PROBLEM — E11.9 DM2 (DIABETES MELLITUS, TYPE 2) (HCC): Status: ACTIVE | Noted: 2024-12-11

## 2024-12-11 PROBLEM — R06.89 RESPIRATORY INSUFFICIENCY: Status: ACTIVE | Noted: 2024-04-23

## 2024-12-11 LAB
ANION GAP SERPL CALCULATED.3IONS-SCNC: 8 MMOL/L (ref 4–13)
BASOPHILS # BLD AUTO: 0.01 THOUSANDS/ÂΜL (ref 0–0.1)
BASOPHILS NFR BLD AUTO: 0 % (ref 0–1)
BUN SERPL-MCNC: 19 MG/DL (ref 5–25)
CALCIUM SERPL-MCNC: 8.9 MG/DL (ref 8.4–10.2)
CHLORIDE SERPL-SCNC: 90 MMOL/L (ref 96–108)
CO2 SERPL-SCNC: 32 MMOL/L (ref 21–32)
CREAT SERPL-MCNC: 0.9 MG/DL (ref 0.6–1.3)
EOSINOPHIL # BLD AUTO: 0.02 THOUSAND/ÂΜL (ref 0–0.61)
EOSINOPHIL NFR BLD AUTO: 0 % (ref 0–6)
ERYTHROCYTE [DISTWIDTH] IN BLOOD BY AUTOMATED COUNT: 15.8 % (ref 11.6–15.1)
GFR SERPL CREATININE-BSD FRML MDRD: 95 ML/MIN/1.73SQ M
GLUCOSE SERPL-MCNC: 249 MG/DL (ref 65–140)
GLUCOSE SERPL-MCNC: 253 MG/DL (ref 65–140)
GLUCOSE SERPL-MCNC: 261 MG/DL (ref 65–140)
GLUCOSE SERPL-MCNC: 262 MG/DL (ref 65–140)
GLUCOSE SERPL-MCNC: 291 MG/DL (ref 65–140)
GLUCOSE SERPL-MCNC: 301 MG/DL (ref 65–140)
HCT VFR BLD AUTO: 35.1 % (ref 36.5–49.3)
HGB BLD-MCNC: 11.5 G/DL (ref 12–17)
IMM GRANULOCYTES # BLD AUTO: 0.11 THOUSAND/UL (ref 0–0.2)
IMM GRANULOCYTES NFR BLD AUTO: 1 % (ref 0–2)
LYMPHOCYTES # BLD AUTO: 1.44 THOUSANDS/ÂΜL (ref 0.6–4.47)
LYMPHOCYTES NFR BLD AUTO: 18 % (ref 14–44)
MAGNESIUM SERPL-MCNC: 1.9 MG/DL (ref 1.9–2.7)
MCH RBC QN AUTO: 27.3 PG (ref 26.8–34.3)
MCHC RBC AUTO-ENTMCNC: 32.8 G/DL (ref 31.4–37.4)
MCV RBC AUTO: 83 FL (ref 82–98)
MONOCYTES # BLD AUTO: 0.65 THOUSAND/ÂΜL (ref 0.17–1.22)
MONOCYTES NFR BLD AUTO: 8 % (ref 4–12)
NEUTROPHILS # BLD AUTO: 5.65 THOUSANDS/ÂΜL (ref 1.85–7.62)
NEUTS SEG NFR BLD AUTO: 73 % (ref 43–75)
NRBC BLD AUTO-RTO: 0 /100 WBCS
PLATELET # BLD AUTO: 202 THOUSANDS/UL (ref 149–390)
PMV BLD AUTO: 10.1 FL (ref 8.9–12.7)
POTASSIUM SERPL-SCNC: 3.8 MMOL/L (ref 3.5–5.3)
RBC # BLD AUTO: 4.21 MILLION/UL (ref 3.88–5.62)
SODIUM SERPL-SCNC: 130 MMOL/L (ref 135–147)
WBC # BLD AUTO: 7.88 THOUSAND/UL (ref 4.31–10.16)

## 2024-12-11 PROCEDURE — 80048 BASIC METABOLIC PNL TOTAL CA: CPT | Performed by: PHYSICIAN ASSISTANT

## 2024-12-11 PROCEDURE — 99232 SBSQ HOSP IP/OBS MODERATE 35: CPT | Performed by: STUDENT IN AN ORGANIZED HEALTH CARE EDUCATION/TRAINING PROGRAM

## 2024-12-11 PROCEDURE — 99232 SBSQ HOSP IP/OBS MODERATE 35: CPT | Performed by: PHYSICIAN ASSISTANT

## 2024-12-11 PROCEDURE — 82948 REAGENT STRIP/BLOOD GLUCOSE: CPT

## 2024-12-11 PROCEDURE — 85025 COMPLETE CBC W/AUTO DIFF WBC: CPT | Performed by: PHYSICIAN ASSISTANT

## 2024-12-11 PROCEDURE — 83735 ASSAY OF MAGNESIUM: CPT | Performed by: PHYSICIAN ASSISTANT

## 2024-12-11 PROCEDURE — 93005 ELECTROCARDIOGRAM TRACING: CPT

## 2024-12-11 RX ORDER — LIDOCAINE HYDROCHLORIDE 10 MG/ML
30 INJECTION, SOLUTION EPIDURAL; INFILTRATION; INTRACAUDAL; PERINEURAL ONCE
Status: COMPLETED | OUTPATIENT
Start: 2024-12-11 | End: 2024-12-12

## 2024-12-11 RX ORDER — INSULIN LISPRO 100 [IU]/ML
5 INJECTION, SOLUTION INTRAVENOUS; SUBCUTANEOUS
Status: DISCONTINUED | OUTPATIENT
Start: 2024-12-11 | End: 2024-12-12 | Stop reason: HOSPADM

## 2024-12-11 RX ORDER — ONDANSETRON 4 MG/1
4 TABLET, ORALLY DISINTEGRATING ORAL ONCE AS NEEDED
Status: DISCONTINUED | OUTPATIENT
Start: 2024-12-11 | End: 2024-12-12 | Stop reason: HOSPADM

## 2024-12-11 RX ORDER — ONDANSETRON 2 MG/ML
4 INJECTION INTRAMUSCULAR; INTRAVENOUS EVERY 4 HOURS PRN
Status: DISCONTINUED | OUTPATIENT
Start: 2024-12-11 | End: 2024-12-12 | Stop reason: HOSPADM

## 2024-12-11 RX ORDER — INSULIN GLARGINE 100 [IU]/ML
18 INJECTION, SOLUTION SUBCUTANEOUS
Status: DISCONTINUED | OUTPATIENT
Start: 2024-12-11 | End: 2024-12-12 | Stop reason: HOSPADM

## 2024-12-11 RX ADMIN — APIXABAN 5 MG: 5 TABLET, FILM COATED ORAL at 17:36

## 2024-12-11 RX ADMIN — INSULIN LISPRO 2 UNITS: 100 INJECTION, SOLUTION INTRAVENOUS; SUBCUTANEOUS at 22:14

## 2024-12-11 RX ADMIN — PANTOPRAZOLE SODIUM 40 MG: 40 TABLET, DELAYED RELEASE ORAL at 06:09

## 2024-12-11 RX ADMIN — INSULIN GLARGINE 18 UNITS: 100 INJECTION, SOLUTION SUBCUTANEOUS at 22:15

## 2024-12-11 RX ADMIN — FOLIC ACID 1000 MCG: 1 TABLET ORAL at 08:58

## 2024-12-11 RX ADMIN — APIXABAN 5 MG: 5 TABLET, FILM COATED ORAL at 08:58

## 2024-12-11 RX ADMIN — LEVOTHYROXINE SODIUM 25 MCG: 25 TABLET ORAL at 06:09

## 2024-12-11 RX ADMIN — FUROSEMIDE 20 MG: 10 INJECTION, SOLUTION INTRAMUSCULAR; INTRAVENOUS at 08:58

## 2024-12-11 RX ADMIN — INSULIN LISPRO 3 UNITS: 100 INJECTION, SOLUTION INTRAVENOUS; SUBCUTANEOUS at 15:06

## 2024-12-11 RX ADMIN — LOSARTAN POTASSIUM 25 MG: 25 TABLET, FILM COATED ORAL at 08:58

## 2024-12-11 RX ADMIN — UMECLIDINIUM 1 PUFF: 62.5 AEROSOL, POWDER ORAL at 08:58

## 2024-12-11 RX ADMIN — INSULIN LISPRO 2 UNITS: 100 INJECTION, SOLUTION INTRAVENOUS; SUBCUTANEOUS at 08:59

## 2024-12-11 RX ADMIN — AMITRIPTYLINE HYDROCHLORIDE 200 MG: 25 TABLET, FILM COATED ORAL at 22:14

## 2024-12-11 RX ADMIN — OXYCODONE HYDROCHLORIDE 5 MG: 5 TABLET ORAL at 20:24

## 2024-12-11 RX ADMIN — INSULIN LISPRO 2 UNITS: 100 INJECTION, SOLUTION INTRAVENOUS; SUBCUTANEOUS at 13:09

## 2024-12-11 RX ADMIN — OLODATEROL RESPIMAT INHALATION SPRAY 2 PUFF: 2.5 SPRAY, METERED RESPIRATORY (INHALATION) at 08:58

## 2024-12-11 RX ADMIN — ATORVASTATIN CALCIUM 10 MG: 10 TABLET, FILM COATED ORAL at 20:24

## 2024-12-11 RX ADMIN — INSULIN LISPRO 5 UNITS: 100 INJECTION, SOLUTION INTRAVENOUS; SUBCUTANEOUS at 17:36

## 2024-12-11 RX ADMIN — PANTOPRAZOLE SODIUM 40 MG: 40 TABLET, DELAYED RELEASE ORAL at 15:06

## 2024-12-11 RX ADMIN — INSULIN LISPRO 5 UNITS: 100 INJECTION, SOLUTION INTRAVENOUS; SUBCUTANEOUS at 13:09

## 2024-12-11 NOTE — PROGRESS NOTES
Progress Note - Pulmonology   Name: Papo Gibbs 54 y.o. male I MRN: [id number]  Unit/Bed#: S -01 I Date of Admission: 12/8/2024   Date of Service: 12/11/2024 I Hospital Day: 3     Assessment & Plan  Shortness of breath  Multifactorial in setting of lung cancer, large right-sided pleural effusion with significant atelectatic lung on right.  Patient does have supplemental oxygen at home that he wears at night  CT PA on admission negative for PE but did show large right effusion with atelectatic lung and concern for pulmonary edema versus lung carcinomatosis   -S/p IR thoracentesis 12/9/2024 with 1500 mL removed, fluid studies with exudative effusion, no bacteria seen on Gram stain, cell differential pending, cytology pending  -CXR 12/9 postthoracentesis without complete reexpansion of lung, CXR 12/10 showed reaccumulation of fluid with concern for worsening cancer burden   -Suspect his hypoxia is more related to his cancer progression more than effusion, recommend reevaluating for home oxygen needs prior to discharge  Pleural effusion  -Concern for malignant effusion given known cancer  -S/p IR thoracentesis, repeat thoracentesis for completion of drainage 12/10 with 1200 mL removed  -Symptomatically improved with pleural space drainage but still short of breath with exertion  -Will re-POCUS today to determine rate of reaccumulation but likely will benefit from indwelling pleural catheter in the future to manage effusion as a palliative measure  Adenocarcinoma of overlapping sites of right lung (HCC)  Follows with Dr. Ramachandran, last treatment was about 2 weeks ago with pemetrexed (Keytruda on hold)  History of pulmonary embolism  Continue Eliquis, CTPA without acute PE    24 Hour Events : low grade fever overnight   Subjective : No acute events overnight.  He did have a little bit of discomfort immediately following the thoracentesis but this improved.  He is still short of breath when walking and does not  notice too much change in that regard.  However, he does feel like it is much easier to take a deep breath and feels like he is able to rest more comfortably after having the fluid drained.  Discussed that his hypoxia and dyspnea on exertion may be more related to the overall cancer burden and further drainage may not improve this aspect.  However, given that he is feeling it is much easier to take a deep breath now he could still get some palliative benefit from a pleural catheter placement.  Discussed that we will come back in the ultrasound to evaluate the pleural space today to determine rate of accumulation.    He did have a low-grade fever overnight but is feeling well this morning.  He denies any worsening cough or sputum production.  He denies any chest pain or tightness today.    Objective :  Temp:  [98.7 °F (37.1 °C)-100.6 °F (38.1 °C)] 98.7 °F (37.1 °C)  HR:  [125-140] 127  BP: (114-132)/(68-85) 123/72  Resp:  [18] 18  SpO2:  [92 %-94 %] 92 %  O2 Device: None (Room air)  Nasal Cannula O2 Flow Rate (L/min):  [2 L/min] 2 L/min    Physical Exam  Vitals reviewed.   Constitutional:       General: He is not in acute distress.     Appearance: He is not toxic-appearing.   Cardiovascular:      Rate and Rhythm: Regular rhythm. Tachycardia present.      Heart sounds: No murmur heard.  Pulmonary:      Effort: Pulmonary effort is normal. No tachypnea, accessory muscle usage or respiratory distress.      Breath sounds: Examination of the right-lower field reveals decreased breath sounds. Decreased breath sounds present. No wheezing or rhonchi.   Neurological:      Mental Status: He is alert.         Lab Results: I have reviewed the following results:   No new results in last 24 hours.  ABG: No new results in last 24 hours.      Other Study Results Review: Other studies reviewed include: Pleural fluid cytology pending blood culture remains no growth to date    Watson Esteban M.D.  Pulmonary & Critical Care Fellow, PGY-IV

## 2024-12-11 NOTE — ASSESSMENT & PLAN NOTE
Patient follows with Dr. Ramachandran from heme-onc as an outpatient, last chemotherapy 1 week ago  Keytruda held due to potential pneumonitis patient recently completed steroid taper

## 2024-12-11 NOTE — ASSESSMENT & PLAN NOTE
HR elevated in the 120s-high 130s during hospital stay, likely reactive from acute on chronic medical problems.  Patient reports he is entirely asymptomatic and states he has had this issue his entire life  Does have associated elevated troponin, seen in consult by cardiology  Echocardiogram without wall motion abnormalities or valvular abnormalities  Does have evidence of coronary artery calcifications, considering eventual outpatient stress test once improved from pulmonary perspective

## 2024-12-11 NOTE — ASSESSMENT & PLAN NOTE
Multifactorial in setting of lung cancer, large right-sided pleural effusion with significant atelectatic lung on right.  Patient does have supplemental oxygen at home that he wears at night  CT PA on admission negative for PE but did show large right effusion with atelectatic lung and concern for pulmonary edema versus lung carcinomatosis   -S/p IR thoracentesis 12/9/2024 with 1500 mL removed, fluid studies with exudative effusion, no bacteria seen on Gram stain, cell differential pending, cytology pending  -CXR 12/9 postthoracentesis without complete reexpansion of lung, CXR 12/10 showed reaccumulation of fluid with concern for worsening cancer burden   -Suspect his hypoxia is more related to his cancer progression more than effusion, recommend reevaluating for home oxygen needs prior to discharge

## 2024-12-11 NOTE — ASSESSMENT & PLAN NOTE
Lab Results   Component Value Date    HGBA1C 8.1 (H) 12/09/2024     Recent Labs     12/10/24  1600 12/10/24  2101 12/11/24  0711 12/11/24  1129   POCGLU 287* 327* 262* 291*     Blood Sugar Average: Last 72 hrs:  (P) [id number]  Blood sugars not at goal.  Need to optimize basal bolus regimen  Hold oral hypoglycemic agents

## 2024-12-11 NOTE — ASSESSMENT & PLAN NOTE
Patient normally requires 2 to 3 L nasal cannula, but presented with severely worsening dyspnea for several days prior to stay --acute on chronic   SpO2 has remained stable on home oxygen during hospital stay, acute respiratory failure ruled out  Completed course of steroids and antibiotics outpatient without any significant improvement  CT chest showing progression of lymphangitic carcinomatosis of right lung, increased loculated moderate right pleural effusion, no PE, new interstitial edema of left lung, with possible small pneumonia in superior segment of left lower lobe, increasing mediastinal lymphadenopathy  Status post IR thoracentesis for 1.5 L, follow-up final cytology/cultures  Repeat chest x-ray following the procedure with persistent effusion  Intermittent low-grade fevers noted.  No antibiotics recommended at this time  Appreciate pulmonology recommendations  Would not continue IV Lasix at this time

## 2024-12-11 NOTE — PLAN OF CARE
Problem: RESPIRATORY - ADULT  Goal: Achieves optimal ventilation and oxygenation  Description: INTERVENTIONS:  - Assess for changes in respiratory status  - Assess for changes in mentation and behavior  - Position to facilitate oxygenation and minimize respiratory effort  - Oxygen administered by appropriate delivery if ordered  - Initiate smoking cessation education as indicated  - Encourage broncho-pulmonary hygiene including cough, deep breathe, Incentive Spirometry  - Assess the need for suctioning and aspirate as needed  - Assess and instruct to report SOB or any respiratory difficulty  - Respiratory Therapy support as indicated  Outcome: Progressing     Problem: PAIN - ADULT  Goal: Verbalizes/displays adequate comfort level or baseline comfort level  Description: Interventions:  - Encourage patient to monitor pain and request assistance  - Assess pain using appropriate pain scale  - Administer analgesics based on type and severity of pain and evaluate response  - Implement non-pharmacological measures as appropriate and evaluate response  - Consider cultural and social influences on pain and pain management  - Notify physician/advanced practitioner if interventions unsuccessful or patient reports new pain  Outcome: Progressing     Problem: INFECTION - ADULT  Goal: Absence or prevention of progression during hospitalization  Description: INTERVENTIONS:  - Assess and monitor for signs and symptoms of infection  - Monitor lab/diagnostic results  - Monitor all insertion sites, i.e. indwelling lines, tubes, and drains  - Monitor endotracheal if appropriate and nasal secretions for changes in amount and color  - [place] appropriate cooling/warming therapies per order  - Administer medications as ordered  - Instruct and encourage patient and family to use good hand hygiene technique  - Identify and instruct in appropriate isolation precautions for identified infection/condition  Outcome: Progressing  Goal:  Absence of fever/infection during neutropenic period  Description: INTERVENTIONS:  - Monitor WBC    Outcome: Progressing     Problem: SAFETY ADULT  Goal: Patient will remain free of falls  Description: INTERVENTIONS:  - Educate patient/family on patient safety including physical limitations  - Instruct patient to call for assistance with activity   - Consult OT/PT to assist with strengthening/mobility   - Keep Call bell within reach  - Keep bed low and locked with side rails adjusted as appropriate  - Keep care items and personal belongings within reach  - Initiate and maintain comfort rounds  - Make Fall Risk Sign visible to staff  - Offer Toileting every  Hours, in advance of need  - Initiate/Maintain alarm  - Obtain necessary fall risk management equipment:   - Apply yellow socks and bracelet for high fall risk patients  - Consider moving patient to room near nurses station  Outcome: Progressing  Goal: Maintain or return to baseline ADL function  Description: INTERVENTIONS:  -  Assess patient's ability to carry out ADLs; assess patient's baseline for ADL function and identify physical deficits which impact ability to perform ADLs (bathing, care of mouth/teeth, toileting, grooming, dressing, etc.)  - Assess/evaluate cause of self-care deficits   - Assess range of motion  - Assess patient's mobility; develop plan if impaired  - Assess patient's need for assistive devices and provide as appropriate  - Encourage maximum independence but intervene and supervise when necessary  - Involve family in performance of ADLs  - Assess for home care needs following discharge   - Consider OT consult to assist with ADL evaluation and planning for discharge  - Provide patient education as appropriate  Outcome: Progressing  Goal: Maintains/Returns to pre admission functional level  Description: INTERVENTIONS:  - Perform AM-PAC 6 Click Basic Mobility/ Daily Activity assessment daily.  - Set and communicate daily mobility goal to  care team and patient/family/caregiver.   - Collaborate with rehabilitation services on mobility goals if consulted  - Perform Range of Motion  times a day.  - Reposition patient every  hours.  - Dangle patient  times a day  - Stand patient  times a day  - Ambulate patient  times a day  - Out of bed to chair  times a day   - Out of bed for meals times a day  - Out of bed for toileting  - Record patient progress and toleration of activity level   Outcome: Progressing     Problem: DISCHARGE PLANNING  Goal: Discharge to home or other facility with appropriate resources  Description: INTERVENTIONS:  - Identify barriers to discharge w/patient and caregiver  - Arrange for needed discharge resources and transportation as appropriate  - Identify discharge learning needs (meds, wound care, etc.)  - Arrange for interpretive services to assist at discharge as needed  - Refer to Case Management Department for coordinating discharge planning if the patient needs post-hospital services based on physician/advanced practitioner order or complex needs related to functional status, cognitive ability, or social support system  Outcome: Progressing     Problem: Knowledge Deficit  Goal: Patient/family/caregiver demonstrates understanding of disease process, treatment plan, medications, and discharge instructions  Description: Complete learning assessment and assess knowledge base.  Interventions:  - Provide teaching at level of understanding  - Provide teaching via preferred learning methods  Outcome: Progressing     Problem: Nutrition/Hydration-ADULT  Goal: Nutrient/Hydration intake appropriate for improving, restoring or maintaining nutritional needs  Description: Monitor and assess patient's nutrition/hydration status for malnutrition. Collaborate with interdisciplinary team and initiate plan and interventions as ordered.  Monitor patient's weight and dietary intake as ordered or per policy. Utilize nutrition screening tool and  intervene as necessary. Determine patient's food preferences and provide high-protein, high-caloric foods as appropriate.     INTERVENTIONS:  - Monitor oral intake, urinary output, labs, and treatment plans  - Assess nutrition and hydration status and recommend course of action  - Evaluate amount of meals eaten  - Assist patient with eating if necessary   - Allow adequate time for meals  - Recommend/ encourage appropriate diets, oral nutritional supplements, and vitamin/mineral supplements  - Order, calculate, and assess calorie counts as needed  - Recommend, monitor, and adjust tube feedings and TPN/PPN based on assessed needs  - Assess need for intravenous fluids  - Provide specific nutrition/hydration education as appropriate  - Include patient/family/caregiver in decisions related to nutrition  Outcome: Progressing

## 2024-12-11 NOTE — PROGRESS NOTES
Progress Note - Hospitalist   Name: Papo Gibbs 54 y.o. male I MRN: [id number]  Unit/Bed#: S -01 I Date of Admission: 12/8/2024   Date of Service: 12/11/2024 I Hospital Day: 3    Assessment & Plan  Respiratory insufficiency  Patient normally requires 2 to 3 L nasal cannula, but presented with severely worsening dyspnea for several days prior to stay --acute on chronic   SpO2 has remained stable on home oxygen during hospital stay, acute respiratory failure ruled out  Completed course of steroids and antibiotics outpatient without any significant improvement  CT chest showing progression of lymphangitic carcinomatosis of right lung, increased loculated moderate right pleural effusion, no PE, new interstitial edema of left lung, with possible small pneumonia in superior segment of left lower lobe, increasing mediastinal lymphadenopathy  Status post IR thoracentesis for 1.5 L, follow-up final cytology/cultures  Repeat chest x-ray following the procedure with persistent effusion  Intermittent low-grade fevers noted.  No antibiotics recommended at this time  Appreciate pulmonology recommendations  Would not continue IV Lasix at this time  Adenocarcinoma of overlapping sites of right lung (HCC)  Patient follows with Dr. Ramachandran from heme-onc as an outpatient, last chemotherapy 1 week ago  Keytruda held due to potential pneumonitis patient recently completed steroid taper  History of pulmonary embolism  History of pulmonary embolism on Eliquis, continue  Pleural effusion  Follow up cytology from thoracentesis as noted above  Tachycardia  HR elevated in the 120s-high 130s during hospital stay, likely reactive from acute on chronic medical problems.  Patient reports he is entirely asymptomatic and states he has had this issue his entire life  Does have associated elevated troponin, seen in consult by cardiology  Echocardiogram without wall motion abnormalities or valvular abnormalities  Does have evidence of  coronary artery calcifications, considering eventual outpatient stress test once improved from pulmonary perspective  DM2 (diabetes mellitus, type 2) ([facility])  Lab Results   Component Value Date    HGBA1C 8.1 (H) 12/09/2024     Recent Labs     12/10/24  1600 12/10/24  2101 12/11/24  0711 12/11/24  1129   POCGLU 287* 327* 262* 291*     Blood Sugar Average: Last 72 hrs:  (P) [id number]  Blood sugars not at goal.  Need to optimize basal bolus regimen  Hold oral hypoglycemic agents    VTE Pharmacologic Prophylaxis:   eliquis    Mobility:   Basic Mobility Inpatient Raw Score: 24  JH-HLM Goal: 8: Walk 250 feet or more  JH-HLM Achieved: 8: Walk 250 feet ot more  JH-HLM Goal achieved. Continue to encourage appropriate mobility.    Patient Centered Rounds: I performed bedside rounds with nursing staff today.   Discussions with Specialists or Other Care Team Provider: Spoke with pulmonology and case management    Education and Discussions with Family / Patient: Updated  (wife) at bedside.    Current Length of Stay: 3 day(s)  Current Patient Status: Inpatient   Certification Statement: The patient will continue to require additional inpatient hospital stay due to pending results from thoracentesis  Discharge Plan: Anticipate discharge in 24-48 hrs to home.    Code Status: Level 1 - Full Code    Subjective   Patient reports he still gets really winded when he exerts himself even minimally.  No chest pain or palpitations.  Getting up out of bed and walking in the room is much as he can.  Eating and drinking and reports good appetite.  No issues with bowel or bladder.  Notes that he keeps getting fevers intermittently    Objective :  Temp:  [98.7 °F (37.1 °C)-100.6 °F (38.1 °C)] 98.7 °F (37.1 °C)  HR:  [125-140] 127  BP: (114-132)/(68-85) 123/72  Resp:  [18] 18  SpO2:  [92 %-94 %] 92 %  O2 Device: None (Room air)  Nasal Cannula O2 Flow Rate (L/min):  [2 L/min-2.5 L/min] 2.5 L/min    Body mass index is 30.04  kg/m².     Input and Output Summary (last 24 hours):     Intake/Output Summary (Last 24 hours) at 12/11/2024 1227  Last data filed at 12/11/2024 1146  Gross per 24 hour   Intake 600 ml   Output 1815 ml   Net -1215 ml       Physical Exam  Vitals reviewed.   Constitutional:       General: He is not in acute distress.     Appearance: Normal appearance. He is not ill-appearing, toxic-appearing or diaphoretic.   Eyes:      General: No scleral icterus.        Right eye: No discharge.         Left eye: No discharge.      Conjunctiva/sclera: Conjunctivae normal.   Cardiovascular:      Rate and Rhythm: Regular rhythm. Tachycardia present.      Heart sounds: No murmur heard.  Pulmonary:      Effort: No respiratory distress.      Breath sounds: No stridor. No wheezing, rhonchi or rales.      Comments: Decreased breath sounds right lung.  No dyspnea or tachypnea during conversation.  Nasal cannula oxygen in place  Abdominal:      General: There is no distension.      Palpations: Abdomen is soft.      Tenderness: There is no abdominal tenderness. There is no guarding.   Musculoskeletal:         General: No swelling, tenderness, deformity or signs of injury.      Right lower leg: No edema.      Left lower leg: No edema.   Skin:     General: Skin is warm and dry.      Coloration: Skin is not jaundiced or pale.      Findings: No bruising, erythema, lesion or rash.   Neurological:      General: No focal deficit present.      Mental Status: He is alert. Mental status is at baseline.      Comments: Awake alert interactive   Psychiatric:         Mood and Affect: Mood normal.         Thought Content: Thought content normal.       Lines/Drains:    Central Line:  Goal for removal: Port accessed. Will de-access as appropriate.               Lab Results: I have reviewed the following results:   Results from last 7 days   Lab Units 12/10/24  0539 12/09/24  0614   WBC Thousand/uL 5.57 5.72   HEMOGLOBIN g/dL 9.9* 9.5*   HEMATOCRIT % 30.4*  29.7*   PLATELETS Thousands/uL 148* 122*   SEGS PCT %  --  74   LYMPHO PCT %  --  15   MONO PCT %  --  10   EOS PCT %  --  0     Results from last 7 days   Lab Units 12/10/24  0539 12/09/24  0614   SODIUM mmol/L 134* 136   POTASSIUM mmol/L 3.6 3.5   CHLORIDE mmol/L 98 99   CO2 mmol/L 29 31   BUN mg/dL 12 12   CREATININE mg/dL 0.66 0.67   ANION GAP mmol/L 7 6   CALCIUM mg/dL 8.4 8.5   ALBUMIN g/dL  --  3.1*   TOTAL BILIRUBIN mg/dL  --  0.43   ALK PHOS U/L  --  61   ALT U/L  --  24   AST U/L  --  17   GLUCOSE RANDOM mg/dL 243* 210*     Results from last 7 days   Lab Units 12/09/24  0614   INR  1.35*     Results from last 7 days   Lab Units 12/11/24  1129 12/11/24  0711 12/10/24  2101 12/10/24  1600 12/10/24  1105 12/10/24  0713 12/09/24  2051 12/09/24  1632 12/09/24  1045 12/09/24  0703 12/08/24  2059 12/08/24  1816   POC GLUCOSE mg/dl 291* 262* 327* 287* 282* 235* 281* 291* 280* 210* 259* 231*     Results from last 7 days   Lab Units 12/09/24  0614   HEMOGLOBIN A1C % 8.1*     Results from last 7 days   Lab Units 12/08/24  1118   LACTIC ACID mmol/L 1.0   PROCALCITONIN ng/ml 0.17       Recent Cultures (last 7 days):   Results from last 7 days   Lab Units 12/09/24  1214 12/08/24  1129 12/08/24  1118   BLOOD CULTURE   --  No Growth at 48 hrs. No Growth at 48 hrs.   GRAM STAIN RESULT  1+ Mononuclear Cells  No bacteria seen  --   --    BODY FLUID CULTURE, STERILE  No growth  --   --          Last 24 Hours Medication List:     Current Facility-Administered Medications:     acetaminophen (TYLENOL) tablet 975 mg, Q8H PRN    albuterol (PROVENTIL HFA,VENTOLIN HFA) inhaler 2 puff, Q6H PRN    amitriptyline (ELAVIL) tablet 200 mg, HS    apixaban (ELIQUIS) tablet 5 mg, BID    atorvastatin (LIPITOR) tablet 10 mg, Daily    folic acid (FOLVITE) tablet 1,000 mcg, Daily    furosemide (LASIX) injection 20 mg, Daily    insulin glargine (LANTUS) subcutaneous injection 15 Units 0.15 mL, HS    insulin lispro (HumALOG/ADMELOG) 100 units/mL  subcutaneous injection 1-5 Units, 4x Daily (AC & HS) **AND** Fingerstick Glucose (POCT), 4x Daily AC and at bedtime    levothyroxine tablet 25 mcg, Early Morning    losartan (COZAAR) tablet 25 mg, Daily    umeclidinium 62.5 mcg/actuation inhaler AEPB 1 puff, Daily **AND** olodaterol HCl (STRIVERDI RESPIMAT) inhaler 2 puff, Daily    oxyCODONE (ROXICODONE) split tablet 2.5 mg, Q4H PRN **OR** oxyCODONE (ROXICODONE) IR tablet 5 mg, Q4H PRN    pantoprazole (PROTONIX) EC tablet 40 mg, BID AC    Administrative Statements   Today, Patient Was Seen By: Lore Chambers PA-C      **Please Note: This note may have been constructed using a voice recognition system.**

## 2024-12-11 NOTE — ASSESSMENT & PLAN NOTE
-Concern for malignant effusion given known cancer  -S/p IR thoracentesis, repeat thoracentesis for completion of drainage 12/10 with 1200 mL removed  -Symptomatically improved with pleural space drainage but still short of breath with exertion  -Will re-POCUS today to determine rate of reaccumulation but likely will benefit from indwelling pleural catheter in the future to manage effusion as a palliative measure

## 2024-12-12 ENCOUNTER — APPOINTMENT (INPATIENT)
Dept: RADIOLOGY | Facility: HOSPITAL | Age: 54
DRG: 181 | End: 2024-12-12
Payer: COMMERCIAL

## 2024-12-12 VITALS
DIASTOLIC BLOOD PRESSURE: 67 MMHG | WEIGHT: 193.34 LBS | HEIGHT: 67 IN | HEART RATE: 127 BPM | RESPIRATION RATE: 16 BRPM | SYSTOLIC BLOOD PRESSURE: 121 MMHG | OXYGEN SATURATION: 94 % | BODY MASS INDEX: 30.35 KG/M2 | TEMPERATURE: 97.9 F

## 2024-12-12 LAB
ANION GAP SERPL CALCULATED.3IONS-SCNC: 6 MMOL/L (ref 4–13)
BACTERIA SPEC BFLD CULT: NO GROWTH
BASOPHILS # BLD AUTO: 0.01 THOUSANDS/ÂΜL (ref 0–0.1)
BASOPHILS NFR BLD AUTO: 0 % (ref 0–1)
BUN SERPL-MCNC: 16 MG/DL (ref 5–25)
CALCIUM SERPL-MCNC: 8.4 MG/DL (ref 8.4–10.2)
CHLORIDE SERPL-SCNC: 93 MMOL/L (ref 96–108)
CO2 SERPL-SCNC: 32 MMOL/L (ref 21–32)
CREAT SERPL-MCNC: 0.71 MG/DL (ref 0.6–1.3)
EOSINOPHIL # BLD AUTO: 0.02 THOUSAND/ÂΜL (ref 0–0.61)
EOSINOPHIL NFR BLD AUTO: 0 % (ref 0–6)
ERYTHROCYTE [DISTWIDTH] IN BLOOD BY AUTOMATED COUNT: 15.7 % (ref 11.6–15.1)
GFR SERPL CREATININE-BSD FRML MDRD: 105 ML/MIN/1.73SQ M
GLUCOSE SERPL-MCNC: 244 MG/DL (ref 65–140)
GLUCOSE SERPL-MCNC: 271 MG/DL (ref 65–140)
GLUCOSE SERPL-MCNC: 383 MG/DL (ref 65–140)
GRAM STN SPEC: NORMAL
GRAM STN SPEC: NORMAL
HCT VFR BLD AUTO: 31.2 % (ref 36.5–49.3)
HGB BLD-MCNC: 10.5 G/DL (ref 12–17)
IMM GRANULOCYTES # BLD AUTO: 0.1 THOUSAND/UL (ref 0–0.2)
IMM GRANULOCYTES NFR BLD AUTO: 2 % (ref 0–2)
LYMPHOCYTES # BLD AUTO: 0.97 THOUSANDS/ÂΜL (ref 0.6–4.47)
LYMPHOCYTES NFR BLD AUTO: 17 % (ref 14–44)
MCH RBC QN AUTO: 27.8 PG (ref 26.8–34.3)
MCHC RBC AUTO-ENTMCNC: 33.7 G/DL (ref 31.4–37.4)
MCV RBC AUTO: 83 FL (ref 82–98)
MONOCYTES # BLD AUTO: 0.65 THOUSAND/ÂΜL (ref 0.17–1.22)
MONOCYTES NFR BLD AUTO: 11 % (ref 4–12)
NEUTROPHILS # BLD AUTO: 4.1 THOUSANDS/ÂΜL (ref 1.85–7.62)
NEUTS SEG NFR BLD AUTO: 70 % (ref 43–75)
NRBC BLD AUTO-RTO: 0 /100 WBCS
PLATELET # BLD AUTO: 145 THOUSANDS/UL (ref 149–390)
PMV BLD AUTO: 9.9 FL (ref 8.9–12.7)
POTASSIUM SERPL-SCNC: 3.5 MMOL/L (ref 3.5–5.3)
RBC # BLD AUTO: 3.78 MILLION/UL (ref 3.88–5.62)
SODIUM SERPL-SCNC: 131 MMOL/L (ref 135–147)
WBC # BLD AUTO: 5.85 THOUSAND/UL (ref 4.31–10.16)

## 2024-12-12 PROCEDURE — 99239 HOSP IP/OBS DSCHRG MGMT >30: CPT | Performed by: PHYSICIAN ASSISTANT

## 2024-12-12 PROCEDURE — 71045 X-RAY EXAM CHEST 1 VIEW: CPT

## 2024-12-12 PROCEDURE — 32550 INSERT PLEURAL CATH: CPT | Performed by: STUDENT IN AN ORGANIZED HEALTH CARE EDUCATION/TRAINING PROGRAM

## 2024-12-12 PROCEDURE — 88341 IMHCHEM/IMCYTCHM EA ADD ANTB: CPT | Performed by: STUDENT IN AN ORGANIZED HEALTH CARE EDUCATION/TRAINING PROGRAM

## 2024-12-12 PROCEDURE — 0W9930Z DRAINAGE OF RIGHT PLEURAL CAVITY WITH DRAINAGE DEVICE, PERCUTANEOUS APPROACH: ICD-10-PCS | Performed by: STUDENT IN AN ORGANIZED HEALTH CARE EDUCATION/TRAINING PROGRAM

## 2024-12-12 PROCEDURE — 80048 BASIC METABOLIC PNL TOTAL CA: CPT | Performed by: PHYSICIAN ASSISTANT

## 2024-12-12 PROCEDURE — 88305 TISSUE EXAM BY PATHOLOGIST: CPT | Performed by: STUDENT IN AN ORGANIZED HEALTH CARE EDUCATION/TRAINING PROGRAM

## 2024-12-12 PROCEDURE — 75989 ABSCESS DRAINAGE UNDER X-RAY: CPT | Performed by: STUDENT IN AN ORGANIZED HEALTH CARE EDUCATION/TRAINING PROGRAM

## 2024-12-12 PROCEDURE — 82948 REAGENT STRIP/BLOOD GLUCOSE: CPT

## 2024-12-12 PROCEDURE — 99232 SBSQ HOSP IP/OBS MODERATE 35: CPT | Performed by: STUDENT IN AN ORGANIZED HEALTH CARE EDUCATION/TRAINING PROGRAM

## 2024-12-12 PROCEDURE — 85025 COMPLETE CBC W/AUTO DIFF WBC: CPT | Performed by: PHYSICIAN ASSISTANT

## 2024-12-12 PROCEDURE — 88342 IMHCHEM/IMCYTCHM 1ST ANTB: CPT | Performed by: STUDENT IN AN ORGANIZED HEALTH CARE EDUCATION/TRAINING PROGRAM

## 2024-12-12 PROCEDURE — NC001 PR NO CHARGE: Performed by: STUDENT IN AN ORGANIZED HEALTH CARE EDUCATION/TRAINING PROGRAM

## 2024-12-12 PROCEDURE — 88112 CYTOPATH CELL ENHANCE TECH: CPT | Performed by: STUDENT IN AN ORGANIZED HEALTH CARE EDUCATION/TRAINING PROGRAM

## 2024-12-12 RX ORDER — MECLIZINE HYDROCHLORIDE 25 MG/1
25 TABLET ORAL EVERY 8 HOURS PRN
Qty: 30 TABLET | Refills: 0 | Status: SHIPPED | OUTPATIENT
Start: 2024-12-12

## 2024-12-12 RX ORDER — ACETAMINOPHEN 325 MG/1
975 TABLET ORAL EVERY 8 HOURS PRN
Start: 2024-12-12

## 2024-12-12 RX ADMIN — LOSARTAN POTASSIUM 25 MG: 25 TABLET, FILM COATED ORAL at 09:26

## 2024-12-12 RX ADMIN — INSULIN LISPRO 5 UNITS: 100 INJECTION, SOLUTION INTRAVENOUS; SUBCUTANEOUS at 12:36

## 2024-12-12 RX ADMIN — INSULIN LISPRO 2 UNITS: 100 INJECTION, SOLUTION INTRAVENOUS; SUBCUTANEOUS at 09:27

## 2024-12-12 RX ADMIN — MORPHINE SULFATE 2 MG: 2 INJECTION, SOLUTION INTRAMUSCULAR; INTRAVENOUS at 08:27

## 2024-12-12 RX ADMIN — INSULIN LISPRO 5 UNITS: 100 INJECTION, SOLUTION INTRAVENOUS; SUBCUTANEOUS at 09:27

## 2024-12-12 RX ADMIN — APIXABAN 5 MG: 5 TABLET, FILM COATED ORAL at 09:26

## 2024-12-12 RX ADMIN — LEVOTHYROXINE SODIUM 25 MCG: 25 TABLET ORAL at 06:21

## 2024-12-12 RX ADMIN — INSULIN LISPRO 4 UNITS: 100 INJECTION, SOLUTION INTRAVENOUS; SUBCUTANEOUS at 12:35

## 2024-12-12 RX ADMIN — PANTOPRAZOLE SODIUM 40 MG: 40 TABLET, DELAYED RELEASE ORAL at 06:21

## 2024-12-12 RX ADMIN — LIDOCAINE HYDROCHLORIDE 30 ML: 10 INJECTION, SOLUTION EPIDURAL; INFILTRATION; INTRACAUDAL; PERINEURAL at 09:33

## 2024-12-12 RX ADMIN — OLODATEROL RESPIMAT INHALATION SPRAY 2 PUFF: 2.5 SPRAY, METERED RESPIRATORY (INHALATION) at 09:28

## 2024-12-12 RX ADMIN — FOLIC ACID 1000 MCG: 1 TABLET ORAL at 09:27

## 2024-12-12 RX ADMIN — UMECLIDINIUM 1 PUFF: 62.5 AEROSOL, POWDER ORAL at 09:28

## 2024-12-12 NOTE — ASSESSMENT & PLAN NOTE
Appreciate pulmonology input.  Following thoracentesis and reaccumulation, asept catheter was placed as per above

## 2024-12-12 NOTE — ASSESSMENT & PLAN NOTE
Lab Results   Component Value Date    HGBA1C 8.1 (H) 12/09/2024     Recent Labs     12/11/24  1605 12/11/24  2052 12/12/24  0733 12/12/24  1129   POCGLU 261* 253* 271* 383*     Blood Sugar Average: Last 72 hrs:  (P) 281  Blood sugars been significantly elevated during this admission patient was requiring basal bolus insulin regimen.  He is not on steroids and reports blood sugars to be under good control at home  Oral hypoglycemic agents and diabetic diet

## 2024-12-12 NOTE — CASE MANAGEMENT
Case Management Discharge Planning Note    Patient name Papo Gibbs  Location S /S -01 MRN [id number]  : 1970 Date 2024       Current Admission Date: 2024  Current Admission Diagnosis:Respiratory insufficiency   Patient Active Problem List    Diagnosis Date Noted Date Diagnosed    DM2 (diabetes mellitus, type 2) (HCC) 2024     Pleural effusion 2024     Tachycardia 2024     Right-sided thoracic back pain 2024     Cough 2024     Acid reflux 10/10/2024     Nephrolithiasis 2024     Fatty liver 2024     Hepatomegaly 2024     Chronic anticoagulation 2024     Chemotherapy-induced nausea 2024     Port-A-Cath in place 2024     Adrenal nodule (HCC) 2024     Acute deep vein thrombosis (DVT) of popliteal vein of left lower extremity (HCC) 2024     Acute deep vein thrombosis (DVT) of left femoral vein (HCC) 2024     Primary malignant neoplasm of right lung metastatic to other site (HCC) 2024     Acquired left ventricular hypertrophy 2024     Palliative care encounter 2024     History of pulmonary embolism 2024     Chronic hypoxemic respiratory failure (HCC) 2024     Restrictive lung disease 2024     Adenocarcinoma of overlapping sites of right lung (HCC) 2024     Respiratory insufficiency 2024     Tobacco dependence 2024     SOB (shortness of breath) 2024     Chest pain 2024     Acute pulmonary embolism with acute cor pulmonale, unspecified pulmonary embolism type (HCC) 2024     Abnormal CT of the chest 2024     HTN (hypertension) 2024     Encounter to discuss test results 2022     Gross hematuria 2022       LOS (days): 4  Geometric Mean LOS (GMLOS) (days): 3.5  Days to GMLOS:-0.5     OBJECTIVE:  Risk of Unplanned Readmission Score: 17.92         Current admission status: Inpatient   Preferred Pharmacy:  "  [id number] [place] - [address]  [address] [facility] [place] [address]  Phone: [phone] Fax: [phone]    Primary Care Provider: Amelie Bacon MD    Primary Insurance: [facility]  Secondary Insurance:     DISCHARGE DETAILS:    Discharge planning discussed with:: Patient and Tomasa Hampton (Spouse)  Freedom of Choice: Yes  Comments - Freedom of Choice: Discussed HHC  CM contacted family/caregiver?: Yes  Were Treatment Team discharge recommendations reviewed with patient/caregiver?: Yes  Did patient/caregiver verbalize understanding of patient care needs?: Yes  Were patient/caregiver advised of the risks associated with not following Treatment Team discharge recommendations?: Yes    Contacts  Patient Contacts: Tomasa Hampton (Spouse)  Relationship to Patient:: Family  Contact Method: In Person  Reason/Outcome: Discharge Planning, Emergency Contact, Continuity of Care    Requested Home Health Care         Is the patient interested in HHC at discharge?: No    DME Referral Provided  Referral made for DME?: No    Other Referral/Resources/Interventions Provided:  Interventions: HHC         Treatment Team Recommendation: Home with Home Health Care  Discharge Destination Plan:: Home  Transport at Discharge : Family                                         CM was notified this AM that patient had an asept catheter placed by pulmonary team.    In a chat with [facility] and pulmonary the CM was notified by the pulmonary team that in regards to the Asept cath kit order form \"We did the paperwork. My  will handle it tomorrow since she is out today.\"    CM requested primary nurse to provide patient with 3 drainage kits to go home with.    CM met with patient and spouse at bedside to discuss VNA at [place].  Spouse reported that she is a nurse and is comfortable with managing the drain on her own without VNA.  CM had one of the nurses meet with patient and spouse at bedside to discuss the " process of changing the drain since it was just placed today it was covered and sealed so she could not visibly see it.    Patient and spouse are aware that the outpatient pulmonary team will be taking care of getting the supplies ordered and that these will then get delivered to patient's home.    No further CM DC needs were discussed or identified at this time.    CM reviewed the availability of treatment team to discuss questions or concerns patient and/or family may have regarding  understanding medications and recognizing signs and symptoms once discharged.  CM also encouraged patient to follow up with all recommended appointments after discharge. Patient advised of importance for patient and family to participate in managing patient's medical well being.

## 2024-12-12 NOTE — DISCHARGE SUMMARY
Discharge Summary - Hospitalist   Name: Papo Gibbs 54 y.o. male I MRN: [id number]  Unit/Bed#: S -01 I Date of Admission: 12/8/2024   Date of Service: 12/12/2024 I Hospital Day: 4     Assessment & Plan  Respiratory insufficiency  Patient normally requires 2 to 3 L nasal cannula, but presented with severely worsening dyspnea for several days prior to stay --acute on chronic   SpO2 has remained stable on home oxygen during hospital stay, acute respiratory failure ruled out  Completed course of steroids and antibiotics outpatient without any significant improvement  CT chest showing progression of lymphangitic carcinomatosis of right lung, increased loculated moderate right pleural effusion, no PE, new interstitial edema of left lung, with possible small pneumonia in superior segment of left lower lobe, increasing mediastinal lymphadenopathy  Status post IR thoracentesis for 1.5 L, follow-up final cytology/cultures  Repeat chest x-ray following the procedure with persistent effusion-- today pulm placed pleural cath  Intermittent low-grade fevers noted.  No antibiotics recommended at this time  Appreciate pulmonology recommendations  Stopped IV lasix which was started earlier in the stay  Pleural effusion  Appreciate pulmonology input.  Following thoracentesis and reaccumulation, asept catheter was placed as per above  Adenocarcinoma of overlapping sites of right lung (HCC)  Patient follows with Dr. Ramachandran from heme-onc as an outpatient, last chemotherapy 1 week ago  Keytruda held due to potential pneumonitis patient recently completed steroid taper  History of pulmonary embolism  History of pulmonary embolism on Eliquis, continue  Tachycardia  HR elevated in the 120s-high 130s during hospital stay, likely reactive from acute on chronic medical problems.  Patient reports he is entirely asymptomatic and states he has had this issue his entire life.  Reviewed with cardiology again today.  They recommended  following up on his thyroid levels.  Beta-blocker is not contraindicated if he were to become symptomatic but at this time given borderline low blood pressures and lack of symptoms, reasonable to just continue conservatively observing  Also noted have associated elevated troponin, seen in consult by cardiology  Echocardiogram without wall motion abnormalities or valvular abnormalities  Does have evidence of coronary artery calcifications, considering eventual outpatient stress test once improved from pulmonary perspective  DM2 (diabetes mellitus, type 2) ([facility])  Lab Results   Component Value Date    HGBA1C 8.1 (H) 12/09/2024     Recent Labs     12/11/24  1605 12/11/24  2052 12/12/24  0733 12/12/24  1129   POCGLU 261* 253* 271* 383*     Blood Sugar Average: Last 72 hrs:  (P) 281  Blood sugars been significantly elevated during this admission patient was requiring basal bolus insulin regimen.  He is not on steroids and reports blood sugars to be under good control at home  Oral hypoglycemic agents and diabetic diet     Medical Problems       Resolved Problems  Date Reviewed: 12/12/2024   None       Discharging Physician / Practitioner: Lore Chambers PA-C  PCP: Amelie Bacon MD  Admission Date:   Admission Orders (From admission, onward)       Ordered        12/08/24 1328  INPATIENT ADMISSION  Once                          Discharge Date: 12/12/24    Consultations During Hospital Stay:  Pulmonology  Cardiology    Procedures Performed:   Placement of pleural catheter  IR thoracentesis    Significant Findings / Test Results:   CTA chest Since October 2024 there is 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Increasing partially loculated moderate right pleural effusion No pulmonary embolism. New interstitial edema in the left lung that is probably related to volume overload. Developing left lung carcinomatosis is also possible. Attention to this finding on follow-up imaging advised. Possible small  pneumonia within the superior segment of the left lower lobe.Slightly increasing mediastinal lymphadenopathy that is probably metastatic. Enlarging subcentimeter gastrohepatic ligament lymph nodes that are probably metastatic.    Incidental Findings:   Chronic tachycardia     Test Results Pending at Discharge (will require follow up):   none     Outpatient Tests Requested:  glucose    Complications:  none    Reason for Admission: shortness of breath    Hospital Course:   Papo Gibbs is a 54 y.o. male patient with underlying history of lung cancer, PE, and diabetes, who originally presented to the hospital on 12/8/2024 due to worsening shortness of breath.  He was diagnosed with lung cancer earlier this year and started on chemotherapy with Keytruda which was recently stopped for concern of pneumonitis.  Patient was placed on prednisone and Levaquin but had no significant improvement in his sense of being short of breath.  On chest x-ray had evidence of large right-sided pleural effusion.  He underwent thoracentesis by IR. Cytology from pleural effusion confirmed neoplastic cells.  Pulmonology noted rapid reaccumulation of fluid following the thoracentesis and recommended placement of a pleural catheter which was performed today.  Patient's wife will receive training and will ultimately manage the drain at home as she is nurse.    Also during this hospitalization patient was noted to have significant hyperglycemia.  He seemed basal bolus insulin regimen but is being transitioned back to oral agents on discharge.  In addition he had troponin elevation and sinus tachycardia prompting cardiology consult. Patient was asymptomatic and did not require any additional intervention at this time    Please see above list of diagnoses and related plan for additional information.     Condition at Discharge: guarded    Discharge Day Visit / Exam:   Subjective: Patient had transient nausea and sense of being disoriented/sense of  "motion yesterday.  He states that this happens occasionally to him and usually goes away with an oral Zofran at home.  At this time he feels fine with none of those symptoms occurring currently.  He does not feel fluid overloaded.  He denies any chest pain, pressure, racing or palpitations.  States that he is still somewhat short of breath but otherwise feels okay and ready to go home  Vitals: Blood Pressure: 121/67 (12/12/24 1131)  Pulse: (!) 127 (12/12/24 1131)  Temperature: 97.9 °F (36.6 °C) (12/12/24 1131)  Temp Source: Oral (12/08/24 1101)  Respirations: 16 (12/12/24 1131)  Height: 5' 7\" (170.2 cm) (12/09/24 0704)  Weight - Scale: 87.7 kg (193 lb 5.5 oz) (12/12/24 0600)  SpO2: 94 % (12/12/24 1131)  Physical Exam  Vitals reviewed.   Constitutional:       General: He is not in acute distress.     Appearance: Normal appearance. He is not ill-appearing, toxic-appearing or diaphoretic.      Comments: Nontoxic-appearing patient seen sitting up comfortably in bed   HENT:      Nose: No congestion or rhinorrhea.   Eyes:      General: No scleral icterus.        Right eye: No discharge.         Left eye: No discharge.      Conjunctiva/sclera: Conjunctivae normal.   Cardiovascular:      Rate and Rhythm: Regular rhythm. Tachycardia present.      Heart sounds: No murmur heard.  Pulmonary:      Effort: No respiratory distress.      Breath sounds: No stridor. No wheezing or rhonchi.      Comments: NC O2 in place.  No cough, wheezing, dyspnea, tachypnea.  Decreased breath sounds right lung  Abdominal:      General: There is no distension.      Tenderness: There is no guarding.   Musculoskeletal:         General: No deformity.      Right lower leg: No edema.      Left lower leg: No edema.   Skin:     General: Skin is warm and dry.      Coloration: Skin is not jaundiced or pale.      Findings: No bruising, erythema, lesion or rash.   Neurological:      General: No focal deficit present.      Mental Status: He is alert. Mental " status is at baseline.      Comments: Awake alert interactive, no confusion   Psychiatric:         Mood and Affect: Mood normal.          Discussion with Family: Updated  (wife) at bedside.    Discharge instructions/Information to patient and family:   See after visit summary for information provided to patient and family.      Provisions for Follow-Up Care:  See after visit summary for information related to follow-up care and any pertinent home health orders.      Mobility at time of Discharge:   Basic Mobility Inpatient Raw Score: 24  JH-HLM Goal: 8: Walk 250 feet or more  JH-HLM Achieved: 8: Walk 250 feet ot more  HLM Goal achieved. Continue to encourage appropriate mobility.     Disposition:   Home with VNA Services (Reminder: Complete face to face encounter)    Planned Readmission: none    Discharge Medications:  See after visit summary for reconciled discharge medications provided to patient and/or family.      Administrative Statements   Discharge Statement:  I have spent a total time of 40 minutes in caring for this patient on the day of the visit/encounter.  Case d/w RN, case mgmt and pulm.    **Please Note: This note may have been constructed using a voice recognition system**

## 2024-12-12 NOTE — ASSESSMENT & PLAN NOTE
-Concern for malignant effusion given known cancer  -S/p IR thoracentesis, repeat thoracentesis for completion of drainage 12/10 with 1200 mL removed  -Symptomatically improved with pleural space drainage but still short of breath with exertion  -s/p pleural catheter placement 12/12/24 with Dr. Portillo, continue daily drainage, will follow up in office for suture removal in 7 days

## 2024-12-12 NOTE — ASSESSMENT & PLAN NOTE
HR elevated in the 120s-high 130s during hospital stay, likely reactive from acute on chronic medical problems.  Patient reports he is entirely asymptomatic and states he has had this issue his entire life.  Reviewed with cardiology again today.  They recommended following up on his thyroid levels.  Beta-blocker is not contraindicated if he were to become symptomatic but at this time given borderline low blood pressures and lack of symptoms, reasonable to just continue conservatively observing  Also noted have associated elevated troponin, seen in consult by cardiology  Echocardiogram without wall motion abnormalities or valvular abnormalities  Does have evidence of coronary artery calcifications, considering eventual outpatient stress test once improved from pulmonary perspective

## 2024-12-12 NOTE — INTERVAL H&P NOTE
H&P reviewed. After examining the patient I find no changes in the patients condition since the H&P had been written.    Vitals:    12/12/24 0734   BP: 123/77   Pulse: (!) 126   Resp: 15   Temp: 97.7 °F (36.5 °C)   SpO2: 95%     Patient signed consent  High risk due to eliquis use, risks and benefits discussed of proceeding on eliquis given recent PE and persistent tachycardia  Proceed with indwelling pleural catheter    Jennifer Portillo MD  Pulmonary and Critical Care Medicine

## 2024-12-12 NOTE — PLAN OF CARE
Problem: RESPIRATORY - ADULT  Goal: Achieves optimal ventilation and oxygenation  Description: INTERVENTIONS:  - Assess for changes in respiratory status  - Assess for changes in mentation and behavior  - Position to facilitate oxygenation and minimize respiratory effort  - Oxygen administered by appropriate delivery if ordered  - Initiate smoking cessation education as indicated  - Encourage broncho-pulmonary hygiene including cough, deep breathe, Incentive Spirometry  - Assess the need for suctioning and aspirate as needed  - Assess and instruct to report SOB or any respiratory difficulty  - Respiratory Therapy support as indicated  Outcome: Progressing     Problem: PAIN - ADULT  Goal: Verbalizes/displays adequate comfort level or baseline comfort level  Description: Interventions:  - Encourage patient to monitor pain and request assistance  - Assess pain using appropriate pain scale  - Administer analgesics based on type and severity of pain and evaluate response  - Implement non-pharmacological measures as appropriate and evaluate response  - Consider cultural and social influences on pain and pain management  - Notify physician/advanced practitioner if interventions unsuccessful or patient reports new pain  Outcome: Progressing     Problem: INFECTION - ADULT  Goal: Absence or prevention of progression during hospitalization  Description: INTERVENTIONS:  - Assess and monitor for signs and symptoms of infection  - Monitor lab/diagnostic results  - Monitor all insertion sites, i.e. indwelling lines, tubes, and drains  - Monitor endotracheal if appropriate and nasal secretions for changes in amount and color  - [place] appropriate cooling/warming therapies per order  - Administer medications as ordered  - Instruct and encourage patient and family to use good hand hygiene technique  - Identify and instruct in appropriate isolation precautions for identified infection/condition  Outcome: Progressing  Goal:  Absence of fever/infection during neutropenic period  Description: INTERVENTIONS:  - Monitor WBC    Outcome: Progressing     Problem: SAFETY ADULT  Goal: Patient will remain free of falls  Description: INTERVENTIONS:  - Educate patient/family on patient safety including physical limitations  - Instruct patient to call for assistance with activity   - Consult OT/PT to assist with strengthening/mobility   - Keep Call bell within reach  - Keep bed low and locked with side rails adjusted as appropriate  - Keep care items and personal belongings within reach  - Initiate and maintain comfort rounds  - Make Fall Risk Sign visible to staff  - Apply yellow socks and bracelet for high fall risk patients  - Consider moving patient to room near nurses station  Outcome: Progressing  Goal: Maintain or return to baseline ADL function  Description: INTERVENTIONS:  -  Assess patient's ability to carry out ADLs; assess patient's baseline for ADL function and identify physical deficits which impact ability to perform ADLs (bathing, care of mouth/teeth, toileting, grooming, dressing, etc.)  - Assess/evaluate cause of self-care deficits   - Assess range of motion  - Assess patient's mobility; develop plan if impaired  - Assess patient's need for assistive devices and provide as appropriate  - Encourage maximum independence but intervene and supervise when necessary  - Involve family in performance of ADLs  - Assess for home care needs following discharge   - Consider OT consult to assist with ADL evaluation and planning for discharge  - Provide patient education as appropriate  Outcome: Progressing  Goal: Maintains/Returns to pre admission functional level  Description: INTERVENTIONS:  - Perform AM-PAC 6 Click Basic Mobility/ Daily Activity assessment daily.  - Set and communicate daily mobility goal to care team and patient/family/caregiver.   - Out of bed for toileting  - Record patient progress and toleration of activity level    Outcome: Progressing     Problem: DISCHARGE PLANNING  Goal: Discharge to home or other facility with appropriate resources  Description: INTERVENTIONS:  - Identify barriers to discharge w/patient and caregiver  - Arrange for needed discharge resources and transportation as appropriate  - Identify discharge learning needs (meds, wound care, etc.)  - Arrange for interpretive services to assist at discharge as needed  - Refer to Case Management Department for coordinating discharge planning if the patient needs post-hospital services based on physician/advanced practitioner order or complex needs related to functional status, cognitive ability, or social support system  Outcome: Progressing     Problem: Knowledge Deficit  Goal: Patient/family/caregiver demonstrates understanding of disease process, treatment plan, medications, and discharge instructions  Description: Complete learning assessment and assess knowledge base.  Interventions:  - Provide teaching at level of understanding  - Provide teaching via preferred learning methods  Outcome: Progressing     Problem: Nutrition/Hydration-ADULT  Goal: Nutrient/Hydration intake appropriate for improving, restoring or maintaining nutritional needs  Description: Monitor and assess patient's nutrition/hydration status for malnutrition. Collaborate with interdisciplinary team and initiate plan and interventions as ordered.  Monitor patient's weight and dietary intake as ordered or per policy. Utilize nutrition screening tool and intervene as necessary. Determine patient's food preferences and provide high-protein, high-caloric foods as appropriate.     INTERVENTIONS:  - Monitor oral intake, urinary output, labs, and treatment plans  - Assess nutrition and hydration status and recommend course of action  - Evaluate amount of meals eaten  - Assist patient with eating if necessary   - Allow adequate time for meals  - Recommend/ encourage appropriate diets, oral nutritional  supplements, and vitamin/mineral supplements  - Order, calculate, and assess calorie counts as needed  - Recommend, monitor, and adjust tube feedings and TPN/PPN based on assessed needs  - Assess need for intravenous fluids  - Provide specific nutrition/hydration education as appropriate  - Include patient/family/caregiver in decisions related to nutrition  Outcome: Progressing

## 2024-12-12 NOTE — DISCHARGE INSTR - AVS FIRST PAGE
Dear Papo Gibbs,     It was our pleasure to care for you here at [facility].  It is our hope that we were always able to exceed the expected standards for your care during your stay.  You were hospitalized due to worsening shortness of breath, pleural effusion.  You were cared for on the 4th floor by Lore Chambers PA-C under the service of Ethel Spear MD with the [facility] who covers for your primary care physician (PCP), Amelie Bacon MD, while you were hospitalized.  If you have any questions or concerns related to this hospitalization, you may contact us at .  For follow up as well as any medication refills, we recommend that you follow up with your primary care physician.  A registered nurse will reach out to you by phone within a few days after your discharge to answer any additional questions that you may have after going home.  However, at this time we provide for you here, the most important instructions / recommendations at discharge:     Notable Medication Adjustments -   Resume meds  Testing Required after Discharge -   TSH/T4 in 1-2 weeks  Monitor blood sugars carefully  ** Please contact your PCP to request testing orders for any of the testing recommended here **  Important follow up information -   Family doctor  Lung doctor  Other Instructions -   Pleural catheter management  Please review this entire after visit summary as additional general instructions including medication list, appointments, activity, diet, any pertinent wound care, and other additional recommendations from your care team that may be provided for you.      Sincerely,     Lore Chambers PA-C

## 2024-12-12 NOTE — ASSESSMENT & PLAN NOTE
Patient normally requires 2 to 3 L nasal cannula, but presented with severely worsening dyspnea for several days prior to stay --acute on chronic   SpO2 has remained stable on home oxygen during hospital stay, acute respiratory failure ruled out  Completed course of steroids and antibiotics outpatient without any significant improvement  CT chest showing progression of lymphangitic carcinomatosis of right lung, increased loculated moderate right pleural effusion, no PE, new interstitial edema of left lung, with possible small pneumonia in superior segment of left lower lobe, increasing mediastinal lymphadenopathy  Status post IR thoracentesis for 1.5 L, follow-up final cytology/cultures  Repeat chest x-ray following the procedure with persistent effusion-- today pulm placed pleural cath  Intermittent low-grade fevers noted.  No antibiotics recommended at this time  Appreciate pulmonology recommendations  Stopped IV lasix which was started earlier in the stay

## 2024-12-12 NOTE — PROCEDURES
Pleural catheter insertion    Date/Time: 12/12/2024 10:24 AM    Performed by: Watson Esteban MD  Authorized by: Jennifer Portillo MD    Patient Location: bedside    Universal Protocol:  Consent obtained: written  Consent given by: patient  Patient states understanding of procedure being performed: yes    Patient's understanding of procedure matches consent: yes    Imaging studies available: yes    Ultrasound guidance: yes    Ultrasound image availability: images available in PACS  Sterile ultrasound techniques: sterile gel and probe covers were used in ultrasound-guided pleural catheter insertion  Patient identity confirmed: verbally with patient  Time out: Immediately prior to the procedure a time out was called      Procedure Details:     PRE OPERATIVE DIAGNOSIS:    Malignant Pleural Effusion    LOCATION:   Right Hemithorax    Consent: Informed consent was obtained. Risks of the procedure were discussed including, but not limited to: Infection, Bleeding, Pain, Pneumothorax and Injury to surrounding structures. Images reviewed prior to the procedure.  Final Time Out was performed.    Right hemithorax evaluated with bedside ultrasound, fluid pocked identified and appropriate for TPC insertion.    ASA: 3    MALLAMPATI SCORE:  I    Analgesia: Intradermal Lidocaine 25 cc. Was other anesthesia used? No.    PROCEDURE:  The patient was placed on supine lateral decubitus position.  Using US guidance, pleural fluid pocket was successfully identified.  Site marked on skin.    Site was prepped and draped in sterile fashion.  SQ tissue infiltrated with 1% lidocaine with epi.  The finder needle was advanced over rib - 8th, posterior axillary line.  Pleural fluid obtained successfully.  Finder needle was removed. The introducer needle was then advanced in similar location to finder and flash of pleural fluid obtained. Wire was easily advanced and introducer needle was removed. A 1cm skin incision at wire entry site.  An additional 1cm  incision was made approximately 4cm anterior, interior to the wire insertion site. Using a trochar the catheter was tunneled under the skin from anterior to posterior incision sites.  The cuff was seated approximately 1cm beyond insertion site.  The pleural insertion tract was then serially dilated, and the dilatator/breakaway sheath was inserted.  The catheter was then inserted thru the breakaway sheath, as the sheath was removed.  The catheter was fully seated under the skin.  Using tunneled pleural catheter bottles 400 cc was drained.  Catheter was sutured in place - 1 suture(s) at wire insertion site and 1 at catheter insertion site.  Sterile dressing was then placed.    Post procedure Chest X-ray was reviewed and showed Adequate catheter placement, without any apparent complications.    COMPLICATIONS:  None    ESTIMATED BLOOD LOSS:  Minimal    RECOMMENDATIONS:   Drainage is recommended daily until output is less than 100cc for three consecutive drainage attempts.  Suture removal will be arranged in 7 days.       Watson Esteban M.D.  Pulmonary & Critical Care Fellow, PGY-IV

## 2024-12-12 NOTE — PROGRESS NOTES
Progress Note - Pulmonology   Name: Papo Gibbs 54 y.o. male I MRN: [id number]  Unit/Bed#: S -01 I Date of Admission: 12/8/2024   Date of Service: 12/12/2024 I Hospital Day: 4     Assessment & Plan  Respiratory insufficiency  Multifactorial in setting of lung cancer, large right-sided pleural effusion with significant atelectatic lung on right.  Patient does have supplemental oxygen at home that he wears at night  CT PA on admission negative for PE but did show large right effusion with atelectatic lung and concern for pulmonary edema versus lung carcinomatosis   -S/p IR thoracentesis 12/9/2024 with 1500 mL removed, fluid studies with exudative effusion, no bacteria seen on Gram stain, cell differential pending, cytology pending  -CXR 12/9 postthoracentesis without complete reexpansion of lung, CXR 12/10 showed reaccumulation of fluid with concern for worsening cancer burden   -Suspect his hypoxia is more related to his cancer progression more than effusion, recommend reevaluating for home oxygen needs prior to discharge  Pleural effusion  -Concern for malignant effusion given known cancer  -S/p IR thoracentesis, repeat thoracentesis for completion of drainage 12/10 with 1200 mL removed  -Symptomatically improved with pleural space drainage but still short of breath with exertion  -s/p pleural catheter placement 12/12/24 with Dr. Portillo, continue daily drainage, will follow up in office for suture removal in 7 days  Adenocarcinoma of overlapping sites of right lung (HCC)  Follows with Dr. Ramachandran, last treatment was about 2 weeks ago with pemetrexed (Keytruda on hold)  History of pulmonary embolism  Continue Eliquis, CTPA without acute PE    24 Hour Events : no acute events overnight  Subjective : He is doing okay this morning. He is still getting short of breath with exertion but does feel like it is easier to take deep breaths. POCUS today showed adequate pocket of fluid for catheter placement. He denies  any new fevers, chills, chest pain.     Pleural catheter placed without complication. Discussed discharge recommendations with primary team. Will follow up in office.     Objective :  Temp:  [97.7 °F (36.5 °C)-100.1 °F (37.8 °C)] 97.9 °F (36.6 °C)  HR:  [118-131] 127  BP: (104-123)/(63-77) 121/67  Resp:  [15-18] 16  SpO2:  [89 %-95 %] 94 %  O2 Device: Nasal cannula  Nasal Cannula O2 Flow Rate (L/min):  [2 L/min] 2 L/min    Physical Exam  Vitals reviewed.   Constitutional:       General: He is not in acute distress.     Appearance: He is not toxic-appearing.   Cardiovascular:      Rate and Rhythm: Regular rhythm. Tachycardia present.   Pulmonary:      Effort: Pulmonary effort is normal. No tachypnea or accessory muscle usage.      Breath sounds: Examination of the right-lower field reveals decreased breath sounds. Decreased breath sounds present. No wheezing or rhonchi.   Chest:      Chest wall: No tenderness.   Musculoskeletal:      Right lower leg: No edema.      Left lower leg: No edema.   Neurological:      Mental Status: He is alert.           Lab Results: I have reviewed the following results:   .     12/11/24 2018 12/12/24  0620   WBC 7.88 5.85   HGB 11.5* 10.5*   HCT 35.1* 31.2*    145*   SODIUM 130* 131*   K 3.8 3.5   CL 90* 93*   CO2 32 32   BUN 19 16   CREATININE 0.90 0.71   GLUC 249* 244*   MG 1.9  --      ABG: No new results in last 24 hours.    Imaging Results Review: I personally reviewed the following image studies in PACS and associated radiology reports: chest xray. My interpretation of the radiology images/reports is: no PTX post catheter placement.    Watson Esteban M.D.  Pulmonary & Critical Care Fellow, PGY-IV

## 2024-12-13 ENCOUNTER — HOSPITAL ENCOUNTER (OUTPATIENT)
Dept: INFUSION CENTER | Facility: CLINIC | Age: 54
End: 2024-12-13
Payer: COMMERCIAL

## 2024-12-13 ENCOUNTER — HOSPITAL ENCOUNTER (OUTPATIENT)
Dept: MRI IMAGING | Facility: HOSPITAL | Age: 54
Discharge: HOME | End: 2024-12-13
Attending: STUDENT IN AN ORGANIZED HEALTH CARE EDUCATION/TRAINING PROGRAM
Payer: COMMERCIAL

## 2024-12-13 DIAGNOSIS — Z95.828 PORT-A-CATH IN PLACE: Primary | ICD-10-CM

## 2024-12-13 DIAGNOSIS — C79.31 METASTASIS TO BRAIN (HCC): ICD-10-CM

## 2024-12-13 DIAGNOSIS — C34.81 ADENOCARCINOMA OF OVERLAPPING SITES OF RIGHT LUNG (HCC): ICD-10-CM

## 2024-12-13 LAB
ALBUMIN SERPL BCG-MCNC: 2.7 G/DL (ref 3.5–5)
ALP SERPL-CCNC: 67 U/L (ref 34–104)
ALT SERPL W P-5'-P-CCNC: 16 U/L (ref 7–52)
ANION GAP SERPL CALCULATED.3IONS-SCNC: 7 MMOL/L (ref 4–13)
AST SERPL W P-5'-P-CCNC: 13 U/L (ref 13–39)
BACTERIA BLD CULT: NORMAL
BACTERIA BLD CULT: NORMAL
BASOPHILS # BLD AUTO: 0.02 THOUSANDS/ÂΜL (ref 0–0.1)
BASOPHILS NFR BLD AUTO: 0 % (ref 0–1)
BILIRUB SERPL-MCNC: 0.44 MG/DL (ref 0.2–1)
BUN SERPL-MCNC: 16 MG/DL (ref 5–25)
CALCIUM ALBUM COR SERPL-MCNC: 9.4 MG/DL (ref 8.3–10.1)
CALCIUM SERPL-MCNC: 8.4 MG/DL (ref 8.4–10.2)
CHLORIDE SERPL-SCNC: 93 MMOL/L (ref 96–108)
CO2 SERPL-SCNC: 31 MMOL/L (ref 21–32)
CREAT SERPL-MCNC: 0.64 MG/DL (ref 0.6–1.3)
EOSINOPHIL # BLD AUTO: 0.01 THOUSAND/ÂΜL (ref 0–0.61)
EOSINOPHIL NFR BLD AUTO: 0 % (ref 0–6)
ERYTHROCYTE [DISTWIDTH] IN BLOOD BY AUTOMATED COUNT: 15.7 % (ref 11.6–15.1)
GFR SERPL CREATININE-BSD FRML MDRD: 110 ML/MIN/1.73SQ M
GLUCOSE SERPL-MCNC: 295 MG/DL (ref 65–140)
HCT VFR BLD AUTO: 28.4 % (ref 36.5–49.3)
HGB BLD-MCNC: 9.2 G/DL (ref 12–17)
IMM GRANULOCYTES # BLD AUTO: 0.08 THOUSAND/UL (ref 0–0.2)
IMM GRANULOCYTES NFR BLD AUTO: 1 % (ref 0–2)
LYMPHOCYTES # BLD AUTO: 0.7 THOUSANDS/ÂΜL (ref 0.6–4.47)
LYMPHOCYTES NFR BLD AUTO: 11 % (ref 14–44)
MCH RBC QN AUTO: 27.1 PG (ref 26.8–34.3)
MCHC RBC AUTO-ENTMCNC: 32.4 G/DL (ref 31.4–37.4)
MCV RBC AUTO: 84 FL (ref 82–98)
MONOCYTES # BLD AUTO: 0.5 THOUSAND/ÂΜL (ref 0.17–1.22)
MONOCYTES NFR BLD AUTO: 8 % (ref 4–12)
NEUTROPHILS # BLD AUTO: 4.9 THOUSANDS/ÂΜL (ref 1.85–7.62)
NEUTS SEG NFR BLD AUTO: 80 % (ref 43–75)
NRBC BLD AUTO-RTO: 0 /100 WBCS
PLATELET # BLD AUTO: 171 THOUSANDS/UL (ref 149–390)
PMV BLD AUTO: 10.5 FL (ref 8.9–12.7)
POTASSIUM SERPL-SCNC: 3.9 MMOL/L (ref 3.5–5.3)
PROT SERPL-MCNC: 5.8 G/DL (ref 6.4–8.4)
RBC # BLD AUTO: 3.39 MILLION/UL (ref 3.88–5.62)
SODIUM SERPL-SCNC: 131 MMOL/L (ref 135–147)
WBC # BLD AUTO: 6.21 THOUSAND/UL (ref 4.31–10.16)

## 2024-12-13 PROCEDURE — 80053 COMPREHEN METABOLIC PANEL: CPT | Performed by: INTERNAL MEDICINE

## 2024-12-13 PROCEDURE — 70553 MRI BRAIN STEM W/O & W/DYE: CPT

## 2024-12-13 PROCEDURE — 85025 COMPLETE CBC W/AUTO DIFF WBC: CPT | Performed by: INTERNAL MEDICINE

## 2024-12-13 PROCEDURE — A9585 GADOBUTROL INJECTION: HCPCS | Performed by: STUDENT IN AN ORGANIZED HEALTH CARE EDUCATION/TRAINING PROGRAM

## 2024-12-13 RX ORDER — GADOBUTROL 604.72 MG/ML
10 INJECTION INTRAVENOUS
Status: COMPLETED | OUTPATIENT
Start: 2024-12-13 | End: 2024-12-13

## 2024-12-13 RX ADMIN — GADOBUTROL 10 ML: 604.72 INJECTION INTRAVENOUS at 12:39

## 2024-12-13 NOTE — NURSING NOTE
Patient arrived for MRI. Short of breath on arrival. Patient had taken O2 off in the radiology waiting room and desatted to 84% on RA. Patient placed back on portable O2 2L nasal cannula and recovered to 96%. Heart rate elevated at 135 bpm. Patient states his HR has been between 120-130 bpm. Port accessed for MRI. Blood return noted and flushed per protocol. Chlorhexidine dressing placed. Patient monitored during MRI. SpO2 97-98% on 2 L nasal cannula and -128 bpm. Patient felt okay following the test. Full set of VS obtained. /59, , RR 20 and SpO2 96% on 2 L nasal cannula. Patient scheduled in infusion for blood work. Spoke to Seema prior to patient's MRI to make aware port will be left accessed after his test for blood work and per Seema patient's appointment could be moved up to 1:30. Patient transported to the infusion center in a wheelchair with patient's spouse. Seema made aware of vitals and will make infusion RN aware.

## 2024-12-13 NOTE — UTILIZATION REVIEW
NOTIFICATION OF ADMISSION DISCHARGE   This is a Notification of Discharge from [facility]. Please be advised that this patient has been discharge from our facility. Below you will find the admission and discharge date and time including the patient’s disposition.   UTILIZATION REVIEW CONTACT:  Chiquis Mead  Utilization   Network Utilization Review Department  Phone: [phone] x carefully listen to the prompts. All voicemails are confidential.  Email: [email or web address]     ADMISSION INFORMATION  PRESENTATION DATE: 12/8/2024 10:46 AM  OBERVATION ADMISSION DATE: N/A  INPATIENT ADMISSION DATE: 12/8/24  1:28 PM   DISCHARGE DATE: 12/12/2024  1:20 PM   DISPOSITION:Home/Self Care    Network Utilization Review Department  ATTENTION: Please call with any questions or concerns to [phone] and carefully listen to the prompts so that you are directed to the right person. All voicemails are confidential.   For Discharge needs, contact Care Management DC Support Team at [phone] opt. 2  Send all requests for admission clinical reviews, approved or denied determinations and any other requests to dedicated fax number below belonging to the campus where the patient is receiving treatment. List of dedicated fax numbers for the Facilities:  FACILITY NAME UR FAX NUMBER   ADMISSION DENIALS (Administrative/Medical Necessity) [phone]   DISCHARGE SUPPORT TEAM ([facility]) [phone]   PARENT CHILD HEALTH (Maternity/NICU/Pediatrics) [phone]   [facility] [phone]   [facility] [phone]   [facility] [phone]   [facility] [phone]   [facility] [phone]   [facility] [phone]   [facility] [phone]   [facility] [phone]    [facility] [phone]   [facility] [phone]   [facility] [phone]   [facility] [phone]

## 2024-12-13 NOTE — PROGRESS NOTES
Pt here for central labs, he was brought up accessed from MRI for labs, pt states he feels better after recent discharge from hospital, offers no complaints, labs drawn and port de accessed, pt scheduled for treatment on 12/17 at 2, declined AVS

## 2024-12-14 LAB
HISTIOCYTES NFR FLD: 26 %
LYMPHOCYTES NFR BLD AUTO: 28 %
MALIGNANT CELLS NFR FLD MANUAL: 32 %
NEUTS SEG NFR BLD AUTO: 14 %
PATH REV: YES
TOTAL CELLS COUNTED SPEC: 100

## 2024-12-15 LAB
ATRIAL RATE: 134 BPM
PR INTERVAL: 140 MS
QRS AXIS: 149 DEGREES
QRSD INTERVAL: 84 MS
QT INTERVAL: 286 MS
QTC INTERVAL: 427 MS
T WAVE AXIS: 158 DEGREES
VENTRICULAR RATE: 134 BPM

## 2024-12-15 PROCEDURE — 93010 ELECTROCARDIOGRAM REPORT: CPT | Performed by: INTERNAL MEDICINE

## 2024-12-16 ENCOUNTER — TELEPHONE (OUTPATIENT)
Dept: INFUSION CENTER | Facility: CLINIC | Age: 54
End: 2024-12-16

## 2024-12-16 ENCOUNTER — TRANSITIONAL CARE MANAGEMENT (OUTPATIENT)
Dept: FAMILY MEDICINE CLINIC | Facility: CLINIC | Age: 54
End: 2024-12-16

## 2024-12-16 ENCOUNTER — TELEPHONE (OUTPATIENT)
Dept: HEMATOLOGY ONCOLOGY | Facility: CLINIC | Age: 54
End: 2024-12-16

## 2024-12-16 ENCOUNTER — TELEPHONE (OUTPATIENT)
Age: 54
End: 2024-12-16

## 2024-12-16 DIAGNOSIS — C34.81 ADENOCARCINOMA OF OVERLAPPING SITES OF RIGHT LUNG (HCC): Primary | ICD-10-CM

## 2024-12-16 NOTE — TELEPHONE ENCOUNTER
TIMEOUT    Received call from Carly Willard to cancel patients apt on 12/17/24, pts treatment will be held x 1week. Carly will send this to the scheduling pool.

## 2024-12-16 NOTE — TELEPHONE ENCOUNTER
Incoming call by Jessica's spouse:    Requesting update regarding Pleurx drain cannister. Was informed that the order was placed and should be delivered today but has not heard anything yet.     Call back to: [phone]

## 2024-12-16 NOTE — TELEPHONE ENCOUNTER
Please defer patient treatment from 12/17 by one week. Plan updated.  Call out to [facility] infusion spoke with ISAIAH Mcdonald    Attempt to call patient x 3 call busy. My chart message sent to patient.

## 2024-12-17 ENCOUNTER — HOSPITAL ENCOUNTER (OUTPATIENT)
Dept: INFUSION CENTER | Facility: CLINIC | Age: 54
Discharge: HOME | End: 2024-12-17

## 2024-12-17 DIAGNOSIS — E03.9 HYPOTHYROIDISM, UNSPECIFIED TYPE: ICD-10-CM

## 2024-12-17 RX ORDER — LEVOTHYROXINE SODIUM 25 UG/1
25 TABLET ORAL DAILY
Qty: 90 TABLET | Refills: 2 | Status: SHIPPED | OUTPATIENT
Start: 2024-12-17

## 2024-12-17 NOTE — TELEPHONE ENCOUNTER
Spoke with patient and went over scheduled dates and times. Patient Is aware and understanding of his updated appointments. Marita- Patient is unable to come in for treatment Tuesday 12/24 as dated for cycle 2, I was able to schedule him Monday 12/23. Can you please change order date on just cycle 2 to reflect schedule appointment.

## 2024-12-18 ENCOUNTER — OFFICE VISIT (OUTPATIENT)
Dept: RADIATION ONCOLOGY | Facility: HOSPITAL | Age: 54
End: 2024-12-18
Attending: STUDENT IN AN ORGANIZED HEALTH CARE EDUCATION/TRAINING PROGRAM
Payer: COMMERCIAL

## 2024-12-18 ENCOUNTER — DOCUMENTATION (OUTPATIENT)
Dept: HEMATOLOGY ONCOLOGY | Facility: CLINIC | Age: 54
End: 2024-12-18

## 2024-12-18 ENCOUNTER — TELEMEDICINE (OUTPATIENT)
Dept: HEMATOLOGY ONCOLOGY | Facility: CLINIC | Age: 54
End: 2024-12-18
Payer: COMMERCIAL

## 2024-12-18 ENCOUNTER — OFFICE VISIT (OUTPATIENT)
Dept: PALLIATIVE MEDICINE | Facility: CLINIC | Age: 54
End: 2024-12-18
Payer: COMMERCIAL

## 2024-12-18 VITALS
HEIGHT: 67 IN | OXYGEN SATURATION: 95 % | DIASTOLIC BLOOD PRESSURE: 68 MMHG | TEMPERATURE: 97.4 F | WEIGHT: 202 LBS | BODY MASS INDEX: 31.71 KG/M2 | SYSTOLIC BLOOD PRESSURE: 110 MMHG | HEART RATE: 127 BPM

## 2024-12-18 VITALS
RESPIRATION RATE: 14 BRPM | SYSTOLIC BLOOD PRESSURE: 105 MMHG | TEMPERATURE: 99.5 F | HEART RATE: 124 BPM | OXYGEN SATURATION: 96 % | DIASTOLIC BLOOD PRESSURE: 60 MMHG

## 2024-12-18 DIAGNOSIS — C34.81 ADENOCARCINOMA OF OVERLAPPING SITES OF RIGHT LUNG (HCC): ICD-10-CM

## 2024-12-18 DIAGNOSIS — M54.6 RIGHT-SIDED THORACIC BACK PAIN, UNSPECIFIED CHRONICITY: Primary | ICD-10-CM

## 2024-12-18 DIAGNOSIS — C34.81 ADENOCARCINOMA OF OVERLAPPING SITES OF RIGHT LUNG (HCC): Primary | ICD-10-CM

## 2024-12-18 DIAGNOSIS — Z86.711 HISTORY OF PULMONARY EMBOLISM: ICD-10-CM

## 2024-12-18 DIAGNOSIS — C79.31 METASTASIS TO BRAIN (HCC): Primary | ICD-10-CM

## 2024-12-18 DIAGNOSIS — Z51.5 PALLIATIVE CARE PATIENT: ICD-10-CM

## 2024-12-18 PROCEDURE — 99214 OFFICE O/P EST MOD 30 MIN: CPT | Performed by: STUDENT IN AN ORGANIZED HEALTH CARE EDUCATION/TRAINING PROGRAM

## 2024-12-18 PROCEDURE — 99211 OFF/OP EST MAY X REQ PHY/QHP: CPT | Performed by: STUDENT IN AN ORGANIZED HEALTH CARE EDUCATION/TRAINING PROGRAM

## 2024-12-18 PROCEDURE — 99212 OFFICE O/P EST SF 10 MIN: CPT | Performed by: STUDENT IN AN ORGANIZED HEALTH CARE EDUCATION/TRAINING PROGRAM

## 2024-12-18 PROCEDURE — 99215 OFFICE O/P EST HI 40 MIN: CPT | Performed by: INTERNAL MEDICINE

## 2024-12-18 PROCEDURE — G0463 HOSPITAL OUTPT CLINIC VISIT: HCPCS | Performed by: STUDENT IN AN ORGANIZED HEALTH CARE EDUCATION/TRAINING PROGRAM

## 2024-12-18 RX ORDER — OXYCODONE HYDROCHLORIDE 5 MG/1
2.5 TABLET ORAL EVERY 4 HOURS PRN
Qty: 60 TABLET | Refills: 0 | Status: SHIPPED | OUTPATIENT
Start: 2024-12-18

## 2024-12-18 RX ORDER — SENNOSIDES 8.6 MG
8.6 TABLET ORAL
Qty: 30 TABLET | Refills: 1 | Status: SHIPPED | OUTPATIENT
Start: 2024-12-18

## 2024-12-18 NOTE — ASSESSMENT & PLAN NOTE
Orders:    oxyCODONE (Roxicodone) 5 immediate release tablet; Take 0.5 tablets (2.5 mg total) by mouth every 4 (four) hours as needed for moderate pain If pain is severe, may instead take 1 tablet (5mg) every 4 hours as needed Max Daily Amount: 15 mg    naloxone (NARCAN) 4 mg/0.1 mL nasal spray; Administer 1 spray into a nostril. If no response after 2-3 minutes, give another dose in the other nostril using a new spray. For use in emergencies for opioid / pain medication reversal for accidental ingestion, respiratory depression, sedation or concerns for overdose.    senna (SENOKOT) 8.6 mg; Take 1 tablet (8.6 mg total) by mouth daily at bedtime as needed for constipation

## 2024-12-18 NOTE — ASSESSMENT & PLAN NOTE
Patient undergoing systemic therapy with Dr. Sandoval.  Recently with possible progression of disease vs pneumonitis causing respiratory symptoms. Will continue to follow with medical oncology.

## 2024-12-18 NOTE — PROGRESS NOTES
Papo Gibbs 1970 is a 54 y.o. male with multifocal bilateral peripheral pulmonary embolism. Biopsy 4/23/24 left retroperitoneal lymph node revealed metastatic adenocarcinoma of lung primary. MRI brain 5/4/24 revealed 4 mm metastasis in the posterior right frontal lobe. On 7/3/24 he underwent SRS to a small right frontal metastasis to a dose of 2000cGy in 1 fraction. The pt was last seen in radiation on 09/18/24. The pt returns today for follow up.     09/24/24 - [facility]  Pt to have CT next month  Consider Pepcid / Gas-x for dyspepsia        10/04/24 - CTA chest pe study  IMPRESSION:  Tiny subsegmental embolus in the right lower lobe. Given its small size, it is of doubtful clinical significance.  Patchy new consolidation in the right lower lobe, infectious or inflammatory.  Stable tumor in the right midlung with progression of lymphangitic spread throughout the right lung.        10/25/24 - CT chest abdomen pelvis w contrast  IMPRESSION:  Worsening lymphangitic tumor progression compared to prior from 10/4/2024 and August 2, 2024.  New/increased small right pleural effusion, probably malignant.  Resolving nodular thickening medial limb left adrenal gland. No new metastatic disease in the abdomen or pelvis.  Nonobstructing left nephrolithiasis.  The study was marked in EPIC for significant notification.     10/21/24 - Pulmonary, [place]  Pt remains on Keytruda and Alimta - tolerating well  On 2L supplemental oxygen     11/04/24 - [facility]ime [facility]  Most recent imaging shows progression  Pt now on Ramucirumab and Docetaxel after disease progression  Will be discussed at tumor board      11/11/24 - Tumor board  PHYSICIAN RECOMMENDED PLAN:  - Treat as pneumonitis (steroids).   - Continue Alimta  - Repeat imaging in 6 weeks     11/22/24 - [facility] Ly  Steroid taper  to end in 2 weeks (mid December)  Acyclovir being ordered for shingles  Continue with Alimta 500mg/m2 every 3 weeks        12/13/24 -  MRI Brain BT w wo contrast  IMPRESSION:  1. Status post treatment of right frontal brain metastasis. No metastases currently.  Continued clinical and imaging followup advised.  2. No acute infarction, intracranial image or mass.  3. 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        Upcoming:    Follow up visit     Oncology History Overview Note   with multifocal bilateral peripheral pulmonary embolism. Biopsy 4/23/24 left retroperitoneal lymph node revealed metastatic adenocarcinoma of lung primary. MRI brain 5/4/24 revealed 4 mm metastasis in the posterior right frontal lobe. On 7/3/24 he underwent SRS to a small right frontal metastasis to a dose of 2000cGy in 1 fraction. The pt was last seen in radiation on 09/18/24. The pt returns today for follow up.    09/24/24 - Jaime Ly  Pt to have CT next month  Consider Pepcid / Gas-x for dyspepsia      10/04/24 - CTA chest pe study  IMPRESSION:  Tiny subsegmental embolus in the right lower lobe. Given its small size, it is of doubtful clinical significance.  Patchy new consolidation in the right lower lobe, infectious or inflammatory.  Stable tumor in the right midlung with progression of lymphangitic spread throughout the right lung.      10/25/24 - CT chest abdomen pelvis w contrast  IMPRESSION:  Worsening lymphangitic tumor progression compared to prior from 10/4/2024 and August 2, 2024.  New/increased small right pleural effusion, probably malignant.  Resolving nodular thickening medial limb left adrenal gland. No new metastatic disease in the abdomen or pelvis.  Nonobstructing left nephrolithiasis.  The study was marked in EPIC for significant notification.    10/21/24 - Pulmonary, [place]  Pt remains on Keytruda and Alimta - tolerating well  On 2L supplemental oxygen    11/04/24 - Hem [facility]  Most recent imaging shows progression  Pt now on Ramucirumab and Docetaxel after disease progression  Will be discussed at  tumor board     11/11/24 - Tumor board  PHYSICIAN RECOMMENDED PLAN:  - Treat as pneumonitis (steroids).   - Continue Alimta  - Repeat imaging in 6 weeks    11/22/24 - [facility]ime Ly  Steroid taper  to end in 2 weeks (mid December)  Acyclovir being ordered for shingles  Continue with Alimta 500mg/m2 every 3 weeks      12/13/24 - MRI Brain BT w wo contrast  IMPRESSION:  1. Status post treatment of right frontal brain metastasis. No metastases currently.  Continued clinical and imaging followup advised.  2. No acute infarction, intracranial image or mass.  3. 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      Upcoming:       Adenocarcinoma of overlapping sites of right lung (HCC)   4/23/2024 Biopsy    Final Diagnosis   A. Lymph Node, Left retroperitoneal, Biopsy:  - Metastatic adenocarcinoma of lung primary.         5/2/2024 Initial Diagnosis    Adenocarcinoma of overlapping sites of right lung (HCC)     5/2/2024 -  Cancer Staged    Staging form: Lung, AJCC 8th Edition  - Clinical: Stage IV (cN3, cM1) - Signed by Maryana Sandoval MD on 5/2/2024 5/21/2024 - 10/15/2024 Chemotherapy    cyanocobalamin, 1,000 mcg, Intramuscular, Once, 4 of 5 cycles  Administration: 1,000 mcg (5/14/2024), 1,000 mcg (7/2/2024), 1,000 mcg (9/3/2024), 1,000 mcg (10/15/2024)  alteplase (CATHFLO), 2 mg, Intracatheter, Every 1 Minute as needed, 8 of 10 cycles  fosaprepitant (EMEND) IVPB, 150 mg, Intravenous, Once, 6 of 6 cycles  Administration: 150 mg (5/21/2024), 150 mg (7/2/2024), 150 mg (7/23/2024), 150 mg (9/3/2024), 150 mg (6/11/2024), 150 mg (8/13/2024)  CARBOplatin (PARAPLATIN) IVPB (GOG AUC DOSING), 672 mg, Intravenous, Once, 6 of 6 cycles  Administration: 672 mg (5/21/2024), 692.5 mg (7/2/2024), 659 mg (7/23/2024), 641 mg (9/3/2024), 692.5 mg (6/11/2024), 647 mg (8/13/2024)  pemetrexed (ALIMTA) chemo infusion, 1,020 mg, Intravenous, Once, 8 of 10 cycles  Administration: 1,000 mg (5/21/2024), 1,000  mg (7/2/2024), 1,000 mg (7/23/2024), 1,000 mg (9/3/2024), 1,000 mg (6/11/2024), 1,000 mg (8/13/2024), 1,000 mg (9/24/2024), 1,000 mg (10/15/2024)  pembrolizumab (KEYTRUDA) IVPB, 200 mg, Intravenous, Once, 8 of 10 cycles  Administration: 200 mg (5/21/2024), 200 mg (7/2/2024), 200 mg (7/23/2024), 200 mg (9/3/2024), 200 mg (6/11/2024), 200 mg (8/13/2024), 200 mg (9/24/2024), 200 mg (10/15/2024)     7/3/2024 - 7/3/2024 Radiation    Treatments:  Course: C1 SRS    Plan ID Energy Fractions Dose per Fraction (cGy) Dose Correction (cGy) Total Dose Delivered (cGy) Elapsed Days   SRSRFrontN 6X-FFF 1 / 1 2,000 0 2,000 0      Treatment Dates:  7/3/2024 - 7/3/2024.      11/26/2024 -  Chemotherapy    cyanocobalamin, 1,000 mcg, Intramuscular, Once, 1 of 3 cycles  Administration: 1,000 mcg (11/19/2024)  alteplase (CATHFLO), 2 mg, Intracatheter, Every 1 Minute as needed, 2 of 6 cycles  pemetrexed (ALIMTA) chemo infusion, 1,040 mg, Intravenous, Once, 2 of 6 cycles  Administration: 1,000 mg (11/26/2024)     11/26/2024 - 11/26/2024 Chemotherapy    alteplase (CATHFLO), 2 mg, Intracatheter, Every 1 Minute as needed, 0 of 6 cycles  ramucirumab (CYRAMZA) IVPB, 10 mg/kg = 975 mg, Intravenous, Once, 0 of 6 cycles  DOCEtaxel (TAXOTERE) chemo infusion, 75 mg/m2 = 156 mg, Intravenous, Once, 0 of 6 cycles     Primary malignant neoplasm of right lung metastatic to other site (HCC)   6/4/2024 Initial Diagnosis    Primary malignant neoplasm of right lung metastatic to other site (HCC)     7/3/2024 - 7/3/2024 Radiation    Treatments:  Course: C1 SRS    Plan ID Energy Fractions Dose per Fraction (cGy) Dose Correction (cGy) Total Dose Delivered (cGy) Elapsed Days   SRSRFrontN 6X-FFF 1 / 1 2,000 0 2,000 0      Treatment Dates:  7/3/2024 - 7/3/2024.          Review of Systems:  Review of Systems   Constitutional:  Positive for fatigue.        Chemo held this week. Recently hospitalized for respiratory distress. Chemo resumes next week.    Respiratory:   Positive for cough and shortness of breath.         02 3 liters   Cardiovascular:  Positive for chest pain (rib pain).   Musculoskeletal:  Positive for gait problem (related to breathing).   Hematological: Negative.    Psychiatric/Behavioral: Negative.         Clinical Trial: no    IPSS Questionnaire (AUA-7):      Teaching    Health Maintenance   Topic Date Due    Hepatitis C Screening  Never done    Pneumococcal Vaccine: Pediatrics (0 to 5 Years) and At-Risk Patients (6 to 64 Years) (1 of 2 - PCV) Never done    DM Eye Exam  Never done    HIV Screening  Never done    BMI: Followup Plan  Never done    Zoster Vaccine (1 of 2) Never done    Hepatitis A Vaccine (1 of 2 - Risk 2-dose series) Never done    COVID-19 Vaccine (3 - Pfizer risk series) 05/20/2021    Influenza Vaccine (1) Never done    Diabetic Foot Exam  04/30/2025    Annual Physical  06/04/2025    HEMOGLOBIN A1C  06/09/2025    Depression Screening  09/18/2025    BMI: Adult  12/12/2025    Colorectal Cancer Screening  06/08/2027    DTaP,Tdap,and Td Vaccines (2 - Td or Tdap) 04/03/2034    RSV Vaccine Age 60+ Years (1 - 1-dose 75+ series) 04/13/2044    RSV Vaccine age 0-20 Months  Aged Out    HIB Vaccine  Aged Out    IPV Vaccine  Aged Out    Meningococcal ACWY Vaccine  Aged Out    HPV Vaccine  Aged Out     Patient Active Problem List   Diagnosis    Encounter to discuss test results    Gross hematuria    Acute pulmonary embolism with acute cor pulmonale, unspecified pulmonary embolism type (HCC)    Abnormal CT of the chest    HTN (hypertension)    Tobacco dependence    SOB (shortness of breath)    Chest pain    Respiratory insufficiency    Adenocarcinoma of overlapping sites of right lung (HCC)    Chronic hypoxemic respiratory failure (HCC)    Restrictive lung disease    Palliative care encounter    History of pulmonary embolism    Adrenal nodule (HCC)    Acute deep vein thrombosis (DVT) of popliteal vein of left lower extremity (HCC)    Acute deep vein thrombosis  (DVT) of left femoral vein (HCC)    Primary malignant neoplasm of right lung metastatic to other site (HCC)    Acquired left ventricular hypertrophy    Port-A-Cath in place    Chemotherapy-induced nausea    Nephrolithiasis    Fatty liver    Hepatomegaly    Chronic anticoagulation    Acid reflux    Right-sided thoracic back pain    Cough    Pleural effusion    Tachycardia    DM2 (diabetes mellitus, type 2) (HCC)     Past Medical History:   Diagnosis Date    Cancer (HCC) ,,    Diabetes mellitus (HCC)     High cholesterol     History of blood clots     Hypertension     Leucocytosis 2024    Lung cancer (HCC)     Malignant neoplasm of overlapping sites of right lung (HCC) 2024    Pneumonia     Pulmonary embolism (HCC)      Past Surgical History:   Procedure Laterality Date    IR BIOPSY LYMPH NODE  2024    IR PORT PLACEMENT  2024    IR THORACENTESIS  2024    KNEE SURGERY      MANDIBLE FRACTURE SURGERY  1985    PATELLA FRACTURE SURGERY      RECTAL SURGERY       Family History   Problem Relation Age of Onset    No Known Problems Father     No Known Problems Mother      Social History     Socioeconomic History    Marital status: Single     Spouse name: Not on file    Number of children: Not on file    Years of education: Not on file    Highest education level: Not on file   Occupational History    Not on file   Tobacco Use    Smoking status: Former     Current packs/day: 0.00     Average packs/day: 1.5 packs/day for 35.0 years (52.5 ttl pk-yrs)     Types: Cigarettes     Quit date: 4/15/2024     Years since quittin.6     Passive exposure: Past    Smokeless tobacco: Never   Vaping Use    Vaping status: Never Used   Substance and Sexual Activity    Alcohol use: Not Currently    Drug use: Never    Sexual activity: Yes     Partners: Female     Birth control/protection: Post-menopausal, None   Other Topics Concern    Not on file   Social History Narrative    Not on file     Social Drivers of  Health     Financial Resource Strain: Not on file   Food Insecurity: No Food Insecurity (2024)    Nursing - Inadequate Food Risk Classification     Worried About Running Out of Food in the Last Year: Not on file     Ran Out of Food in the Last Year: Not on file     Ran Out of Food in the Last Year: 1   Transportation Needs: No Transportation Needs (2024)    Nursing - Transportation Risk Classification     Lack of Transportation: Not on file     Lack of Transportation: 2   Physical Activity: Not on file   Stress: Not on file   Social Connections: Not on file   Intimate Partner Violence: Unknown (2024)    Nursing IPS     Feels Physically and Emotionally Safe: Not on file     Physically Hurt by Someone: Not on file     Humiliated or Emotionally Abused by Someone: Not on file     Physically Hurt by Someone: 2     Hurt or Threatened by Someone: 2   Housing Stability: Unknown (2024)    Nursing: Inadequate Housing Risk Classification     Has Housing: Not on file     Worried About Losing Housing: Not on file     Unable to Get Utilities: Not on file     Unable to Pay for Housing in the Last Year: 2     Has Housin       Current Outpatient Medications:     acetaminophen (TYLENOL) 325 mg tablet, Take 3 tablets (975 mg total) by mouth every 8 (eight) hours as needed for mild pain, headaches or fever, Disp: , Rfl:     albuterol (ProAir HFA) 90 mcg/act inhaler, Inhale 2 puffs every 6 (six) hours as needed for wheezing, Disp: 8.5 g, Rfl: 2    amitriptyline (ELAVIL) 100 mg tablet, 200 mg daily at bedtime, Disp: , Rfl:     apixaban (Eliquis) 5 mg, Take 1 tablet (5 mg total) by mouth 2 (two) times a day, Disp: 60 tablet, Rfl: 5    atorvastatin (LIPITOR) 10 mg tablet, Take 1 tablet (10 mg total) by mouth daily, Disp: 90 tablet, Rfl: 3    benzonatate (TESSALON) 200 MG capsule, Take 1 capsule (200 mg total) by mouth 3 (three) times a day as needed for cough, Disp: 20 capsule, Rfl: 0    famotidine (PEPCID) 40 MG  tablet, Take 40 mg by mouth daily as needed for heartburn or indigestion Taking as needed, Disp: , Rfl:     folic acid (FOLVITE) 1 mg tablet, TAKE 1 TABLET BY MOUTH EVERY DAY, Disp: 90 tablet, Rfl: 2    glimepiride (AMARYL) 2 mg tablet, TAKE 1 TABLET BY MOUTH DAILY WITH BREAKFAST, Disp: 90 tablet, Rfl: 1    Lancets (OneTouch Delica Plus [id number]) MISC, Use 1 Units 4 (four) times a day, Disp: 400 each, Rfl: 2    levothyroxine 25 mcg tablet, TAKE 1 TABLET BY MOUTH EVERY DAY, Disp: 90 tablet, Rfl: 2    losartan (COZAAR) 25 mg tablet, Take 25 mg by mouth daily, Disp: , Rfl:     metFORMIN (GLUCOPHAGE) 1000 MG tablet, Take 1,000 mg by mouth 2 (two) times a day, Disp: , Rfl:     naloxone (NARCAN) 4 mg/0.1 mL nasal spray, Administer 1 spray into a nostril. If no response after 2-3 minutes, give another dose in the other nostril using a new spray. For use in emergencies for opioid / pain medication reversal for accidental ingestion, respiratory depression, sedation or concerns for overdose., Disp: 1 each, Rfl: 1    ondansetron (ZOFRAN-ODT) 8 mg disintegrating tablet, Take 1 tablet (8 mg total) by mouth every 8 (eight) hours as needed for vomiting or nausea, Disp: 30 tablet, Rfl: 0    OneTouch Verio test strip, Use 1 each 4 (four) times a day, Disp: 400 each, Rfl: 2    oxyCODONE (Roxicodone) 5 immediate release tablet, Take 0.5 tablets (2.5 mg total) by mouth every 4 (four) hours as needed for moderate pain If pain is severe, may instead take 1 tablet (5mg) every 4 hours as needed Max Daily Amount: 15 mg, Disp: 60 tablet, Rfl: 0    senna (SENOKOT) 8.6 mg, Take 1 tablet (8.6 mg total) by mouth daily at bedtime as needed for constipation, Disp: 30 tablet, Rfl: 1    tiotropium-olodaterol (Stiolto Respimat) 2.5-2.5 MCG/ACT inhaler, Inhale 2 puffs daily, Disp: 4 g, Rfl: 3    Glucagon, rDNA, (Glucagon Emergency) 1 MG KIT, Inject 1 mg as directed once as needed (hypoglycemia) for up to 1 dose (Patient not taking: Reported on  12/18/2024), Disp: 2 kit, Rfl: 0    meclizine (ANTIVERT) 25 mg tablet, Take 1 tablet (25 mg total) by mouth every 8 (eight) hours as needed for dizziness (Patient not taking: Reported on 12/18/2024), Disp: 30 tablet, Rfl: 0  No Known Allergies  Vitals:    12/18/24 0844   BP: 105/60   Pulse: (!) 124   Resp: 14   Temp: 99.5 °F (37.5 °C)   SpO2: 96%      Pain Score:   3

## 2024-12-18 NOTE — PROGRESS NOTES
Follow-up Visit   Name: Papo Gibbs      : 1970      MRN: [id number]  Encounter Provider: Sergey Alcala MD  Encounter Date: 2024   Encounter department: [facility] RADI[facility]COL[facility]  :  Assessment & Plan  Metastasis to brain (HCC)  Mr. Papo Gibbs is a 54 year old man with Stage IV NSCLC with a small 5mm right frontal metastasis, enlarging on interval MRI despite systemic therapy. On 7/3/24 he underwent SRS to a small right frontal metastasis to a dose of 2000cGy in 1 fraction. He returns today for follow-up.     We reviewed his repeat MRI brain in detail comparison multiple prior imaging studies.  On my review this shows near resolution of his treated right frontal metastasis.  I recommended repeat MRI brain in 4 months with return to clinic thereafter.    Orders:    MRI Brain BT w wo Contrast; Future    Adenocarcinoma of overlapping sites of right lung (HCC)  Patient undergoing systemic therapy with Dr. Sandoval.  Recently with possible progression of disease vs pneumonitis causing respiratory symptoms. Will continue to follow with medical oncology.              History of Present Illness   Chief Complaint   Patient presents with    Follow-up   Pertinent Medical History   The patient was last seen in clinic on 2024.    24 - Hem OncJaime  Pt to have CT next month  Consider Pepcid / Gas-x for dyspepsia      10/04/24 - CTA chest pe study  IMPRESSION:  Tiny subsegmental embolus in the right lower lobe. Given its small size, it is of doubtful clinical significance.  Patchy new consolidation in the right lower lobe, infectious or inflammatory.  Stable tumor in the right midlung with progression of lymphangitic spread throughout the right lung.     10/25/24 - CT chest abdomen pelvis w contrast  IMPRESSION:  Worsening lymphangitic tumor progression compared to prior from 10/4/2024 and 2024.  New/increased small right pleural effusion, probably  malignant.  Resolving nodular thickening medial limb left adrenal gland. No new metastatic disease in the abdomen or pelvis.  Nonobstructing left nephrolithiasis.  The study was marked in EPIC for significant notification.     10/21/24 - Pulmonary, [place]  Pt remains on Keytruda and Alimta - tolerating well  On 2L supplemental oxygen     11/04/24 - Jaime Ly  Most recent imaging shows progression  Pt now on Ramucirumab and Docetaxel after disease progression  Will be discussed at tumor board      11/11/24 - Tumor board  PHYSICIAN RECOMMENDED PLAN:  - Treat as pneumonitis (steroids).   - Continue Alimta  - Repeat imaging in 6 weeks     11/22/24 - Jaime Ly  Steroid taper  to end in 2 weeks (mid December)  Acyclovir being ordered for shingles  Continue with Alimta 500mg/m2 every 3 weeks        12/13/24 - MRI Brain BT w wo contrast  IMPRESSION:  1. Status post treatment of right frontal brain metastasis. No metastases currently.  Continued clinical and imaging followup advised.  2. No acute infarction, intracranial image or mass.  3. 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       Oncology History   Oncology History Overview Note   with multifocal bilateral peripheral pulmonary embolism. Biopsy 4/23/24 left retroperitoneal lymph node revealed metastatic adenocarcinoma of lung primary. MRI brain 5/4/24 revealed 4 mm metastasis in the posterior right frontal lobe. On 7/3/24 he underwent SRS to a small right frontal metastasis to a dose of 2000cGy in 1 fraction. The pt was last seen in radiation on 09/18/24. The pt returns today for follow up.    09/24/24 - Jaime Ly  Pt to have CT next month  Consider Pepcid / Gas-x for dyspepsia      10/04/24 - CTA chest pe study  IMPRESSION:  Tiny subsegmental embolus in the right lower lobe. Given its small size, it is of doubtful clinical significance.  Patchy new consolidation in the right lower lobe, infectious or  inflammatory.  Stable tumor in the right midlung with progression of lymphangitic spread throughout the right lung.      10/25/24 - CT chest abdomen pelvis w contrast  IMPRESSION:  Worsening lymphangitic tumor progression compared to prior from 10/4/2024 and August 2, 2024.  New/increased small right pleural effusion, probably malignant.  Resolving nodular thickening medial limb left adrenal gland. No new metastatic disease in the abdomen or pelvis.  Nonobstructing left nephrolithiasis.  The study was marked in EPIC for significant notification.    10/21/24 - Pulmonary, [place]  Pt remains on Keytruda and Alimta - tolerating well  On 2L supplemental oxygen    11/04/24 - Jaime Ly  Most recent imaging shows progression  Pt now on Ramucirumab and Docetaxel after disease progression  Will be discussed at tumor board     11/11/24 - Tumor board  PHYSICIAN RECOMMENDED PLAN:  - Treat as pneumonitis (steroids).   - Continue Alimta  - Repeat imaging in 6 weeks    11/22/24 - Jaime Ly  Steroid taper  to end in 2 weeks (mid December)  Acyclovir being ordered for shingles  Continue with Alimta 500mg/m2 every 3 weeks      12/13/24 - MRI Brain BT w wo contrast  IMPRESSION:  1. Status post treatment of right frontal brain metastasis. No metastases currently.  Continued clinical and imaging followup advised.  2. No acute infarction, intracranial image or mass.  3. 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      Upcoming:       Adenocarcinoma of overlapping sites of right lung (HCC)   4/23/2024 Biopsy    Final Diagnosis   A. Lymph Node, Left retroperitoneal, Biopsy:  - Metastatic adenocarcinoma of lung primary.         5/2/2024 Initial Diagnosis    Adenocarcinoma of overlapping sites of right lung (HCC)     5/2/2024 -  Cancer Staged    Staging form: Lung, AJCC 8th Edition  - Clinical: Stage IV (cN3, cM1) - Signed by Maryana Sandoval MD on 5/2/2024 5/21/2024 - 10/15/2024  Chemotherapy    cyanocobalamin, 1,000 mcg, Intramuscular, Once, 4 of 5 cycles  Administration: 1,000 mcg (5/14/2024), 1,000 mcg (7/2/2024), 1,000 mcg (9/3/2024), 1,000 mcg (10/15/2024)  alteplase (CATHFLO), 2 mg, Intracatheter, Every 1 Minute as needed, 8 of 10 cycles  fosaprepitant (EMEND) IVPB, 150 mg, Intravenous, Once, 6 of 6 cycles  Administration: 150 mg (5/21/2024), 150 mg (7/2/2024), 150 mg (7/23/2024), 150 mg (9/3/2024), 150 mg (6/11/2024), 150 mg (8/13/2024)  CARBOplatin (PARAPLATIN) IVPB (GO AUC DOSING), 672 mg, Intravenous, Once, 6 of 6 cycles  Administration: 672 mg (5/21/2024), 692.5 mg (7/2/2024), 659 mg (7/23/2024), 641 mg (9/3/2024), 692.5 mg (6/11/2024), 647 mg (8/13/2024)  pemetrexed (ALIMTA) chemo infusion, 1,020 mg, Intravenous, Once, 8 of 10 cycles  Administration: 1,000 mg (5/21/2024), 1,000 mg (7/2/2024), 1,000 mg (7/23/2024), 1,000 mg (9/3/2024), 1,000 mg (6/11/2024), 1,000 mg (8/13/2024), 1,000 mg (9/24/2024), 1,000 mg (10/15/2024)  pembrolizumab (KEYTRUDA) IVPB, 200 mg, Intravenous, Once, 8 of 10 cycles  Administration: 200 mg (5/21/2024), 200 mg (7/2/2024), 200 mg (7/23/2024), 200 mg (9/3/2024), 200 mg (6/11/2024), 200 mg (8/13/2024), 200 mg (9/24/2024), 200 mg (10/15/2024)     7/3/2024 - 7/3/2024 Radiation    Treatments:  Course: C1 SRS    Plan ID Energy Fractions Dose per Fraction (cGy) Dose Correction (cGy) Total Dose Delivered (cGy) Elapsed Days   SRSRFrontN 6X-FFF 1 / 1 2,000 0 2,000 0      Treatment Dates:  7/3/2024 - 7/3/2024.      11/26/2024 -  Chemotherapy    cyanocobalamin, 1,000 mcg, Intramuscular, Once, 1 of 3 cycles  Administration: 1,000 mcg (11/19/2024)  alteplase (CATHFLO), 2 mg, Intracatheter, Every 1 Minute as needed, 2 of 6 cycles  pemetrexed (ALIMTA) chemo infusion, 1,040 mg, Intravenous, Once, 2 of 6 cycles  Administration: 1,000 mg (11/26/2024)     11/26/2024 - 11/26/2024 Chemotherapy    alteplase (CATHFLO), 2 mg, Intracatheter, Every 1 Minute as needed, 0 of 6  "cycles  ramucirumab (CYRAMZA) IVPB, 10 mg/kg = 975 mg, Intravenous, Once, 0 of 6 cycles  DOCEtaxel (TAXOTERE) chemo infusion, 75 mg/m2 = 156 mg, Intravenous, Once, 0 of 6 cycles     Primary malignant neoplasm of right lung metastatic to other site (HCC)   6/4/2024 Initial Diagnosis    Primary malignant neoplasm of right lung metastatic to other site (HCC)     7/3/2024 - 7/3/2024 Radiation    Treatments:  Course: C1 SRS    Plan ID Energy Fractions Dose per Fraction (cGy) Dose Correction (cGy) Total Dose Delivered (cGy) Elapsed Days   SRSRFrontN 6X-FFF 1 / 1 2,000 0 2,000 0      Treatment Dates:  7/3/2024 - 7/3/2024.         Review of Systems Refer to nursing note.          Objective   /60   Pulse (!) 124   Temp 99.5 °F (37.5 °C)   Resp 14   SpO2 96%     Pain Screening:  Pain Score:   3  ECOG ECOG Performance Status: 2 - Ambulatory and capable of all selfcare but unable to carry out any work activities.  Up and about more than 50% of waking hours  Physical Exam   Increased work of breathing on nasal cannula.  No apparent distress.  Extremities warm well-perfused.  Alert and well-oriented.  No overt neurologic deficits noted.    Administrative Statements   I have spent a total time of 16 minutes in caring for this patient on the day of the visit/encounter including Diagnostic results, Counseling / Coordination of care, Documenting in the medical record, and Reviewing / ordering tests, medicine, procedures  .   Portions of the record may have been created with voice recognition software.  Occasional wrong word or \"sound a like\" substitutions may have occurred due to the inherent limitations of voice recognition software.  Read the chart carefully and recognize, using context, where substitutions have occurred.  "

## 2024-12-18 NOTE — ASSESSMENT & PLAN NOTE
Tolerated Oxycodone 2.5mg and 5mg while hospitalized in his recent admission from 12/8/2024 to 12/12/2024.    Patient with continued pain to the right side of his thoracic region that remains from his hospital stay.    Plan: start Oxycodone IR for cancer related pain   1) OxyIR 2.5mg q4 hours PRN for moderate pain  -or OxyIR 5mg q4 hours PRN for severe pain    2) Bowel regimen to avoid OIC  -Senna provided - 1 tablet qHS PRN to avoid constipation    3) encouraged adequate PO intake with fluids and nutrition    Medication safety issues - Do not drive under the influence of narcotics (including opioids), watch for adverse effects including confusion / altered mental status / respiratory depression (slowed breathing), keep medications stored in a safe/locked environment, do not use alcohol while opioids or other narcotics are in your system. Do not travel with more than the minimum number of tablets or capsules required for the trip.    ER Precautions  Watch for red flag symptoms including, but not limited to fevers, chills, chest pain, shortness of breath, intractable nausea/vomiting/diarrhea, or acute intractable pain (especially if pain is new or has changed).      Orders:    oxyCODONE (Roxicodone) 5 immediate release tablet; Take 0.5 tablets (2.5 mg total) by mouth every 4 (four) hours as needed for moderate pain If pain is severe, may instead take 1 tablet (5mg) every 4 hours as needed Max Daily Amount: 15 mg    naloxone (NARCAN) 4 mg/0.1 mL nasal spray; Administer 1 spray into a nostril. If no response after 2-3 minutes, give another dose in the other nostril using a new spray. For use in emergencies for opioid / pain medication reversal for accidental ingestion, respiratory depression, sedation or concerns for overdose.    senna (SENOKOT) 8.6 mg; Take 1 tablet (8.6 mg total) by mouth daily at bedtime as needed for constipation

## 2024-12-18 NOTE — ASSESSMENT & PLAN NOTE
Mr. Papo Gibbs is a 54 year old man with Stage IV NSCLC with a small 5mm right frontal metastasis, enlarging on interval MRI despite systemic therapy. On 7/3/24 he underwent SRS to a small right frontal metastasis to a dose of 2000cGy in 1 fraction. He returns today for follow-up.     We reviewed his repeat MRI brain in detail comparison multiple prior imaging studies.  On my review this shows near resolution of his treated right frontal metastasis.  I recommended repeat MRI brain in 4 months with return to clinic thereafter.    Orders:    MRI Brain BT w wo Contrast; Future

## 2024-12-18 NOTE — PROGRESS NOTES
In-basket message received from Dr. Sandoval to add patient to the thoracic [facility] on 12/23/2024. Chart reviewed and prep completed.

## 2024-12-18 NOTE — ASSESSMENT & PLAN NOTE
Following Medical Oncology - Dr. Sandoval.  Patient also to have follow-up with Pulmonology given recent hospitalization requiring pleural catheter and home supplemental O2 (chronically on 2L).      Orders:    oxyCODONE (Roxicodone) 5 immediate release tablet; Take 0.5 tablets (2.5 mg total) by mouth every 4 (four) hours as needed for moderate pain If pain is severe, may instead take 1 tablet (5mg) every 4 hours as needed Max Daily Amount: 15 mg    naloxone (NARCAN) 4 mg/0.1 mL nasal spray; Administer 1 spray into a nostril. If no response after 2-3 minutes, give another dose in the other nostril using a new spray. For use in emergencies for opioid / pain medication reversal for accidental ingestion, respiratory depression, sedation or concerns for overdose.    senna (SENOKOT) 8.6 mg; Take 1 tablet (8.6 mg total) by mouth daily at bedtime as needed for constipation

## 2024-12-19 ENCOUNTER — OFFICE VISIT (OUTPATIENT)
Dept: PULMONOLOGY | Facility: CLINIC | Age: 54
End: 2024-12-19
Payer: COMMERCIAL

## 2024-12-19 VITALS
HEART RATE: 122 BPM | SYSTOLIC BLOOD PRESSURE: 142 MMHG | BODY MASS INDEX: 31.64 KG/M2 | OXYGEN SATURATION: 93 % | WEIGHT: 202 LBS | DIASTOLIC BLOOD PRESSURE: 72 MMHG

## 2024-12-19 DIAGNOSIS — I26.09 ACUTE PULMONARY EMBOLISM WITH ACUTE COR PULMONALE, UNSPECIFIED PULMONARY EMBOLISM TYPE (HCC): ICD-10-CM

## 2024-12-19 DIAGNOSIS — J90 PLEURAL EFFUSION: Primary | ICD-10-CM

## 2024-12-19 DIAGNOSIS — J96.11 CHRONIC HYPOXEMIC RESPIRATORY FAILURE (HCC): ICD-10-CM

## 2024-12-19 DIAGNOSIS — J98.4 RESTRICTIVE LUNG DISEASE: ICD-10-CM

## 2024-12-19 PROCEDURE — 99213 OFFICE O/P EST LOW 20 MIN: CPT | Performed by: NURSE PRACTITIONER

## 2024-12-19 RX ORDER — SODIUM CHLORIDE 9 MG/ML
20 INJECTION, SOLUTION INTRAVENOUS ONCE
Status: CANCELLED | OUTPATIENT
Start: 2024-12-23

## 2024-12-19 NOTE — PROGRESS NOTES
Pulmonary Follow-Up Note   Papo Gibbs 54 y.o. male MRN: [id number]  12/19/2024      Assessment/Plan:    Problem List Items Addressed This Visit       Acute pulmonary embolism with acute cor pulmonale, unspecified pulmonary embolism type (HCC)    Remains on Eliquis         Chronic hypoxemic respiratory failure (HCC)    Patient presents to the office on 2 L NC saturation stable  Sinew supplemental O2 2 to 3 L         Restrictive lung disease    Does not appear in exacerbation  Home medication regime Stiolto and albuterol as needed  Continue current medication regime         Pleural effusion - Primary    Pleural catheter placed at 12/12/2024 by Dr. Portillo  Patient presents to the office today for suture removal.  Insertion site clean dry and intact, no sign of infection  Sutures removed, patient tolerated well    Drainage noted from catheter  12/13 375 mL  12/14 200 mL  12/15 150 mL  12/17 150 mL              No follow-ups on file.    All of Jessica's questions were answered prior to leaving the office today. He will follow-up in 3 months or sooner should the need arise. He is aware to call our office with any further questions or concerns.    History of Present Illness   Reason for Visit: follow up  Chief Complaint: suture removal  HPI: Papo Gibbs is a 54 y.o. male who presents to the office today for pleural catheter suture removal.  Patient had pleural catheter placed 12/12 by Dr. Portillo, tolerated well.  Chest x-ray s/p catheter placement reviewed showed no significant interval change no pneumothorax.  Sutures removed without any overt issues.  No signs or symptoms of infection at insertion site.  Patient denies any pain or worsening shortness of breath.  Home health nurse present with him during assessment.  Documented drainage from pleural catheter  12/13 375 mL  12/14 200 mL  12/15 150 mL  12/17 150 mL    Continues to follow-up with oncology Dr. Sandoval for lung CA.   Remains on Eliquis for history of PE.  He is  on 2 L supplemental O2 continuously and at night, continues to monitor SpO2 at home and maintain saturation greater than 90%  He remains on Stiolto and albuterol for restrictive lung disease.  Will follow-up in 4 months    Review of Systems   Constitutional:  Negative for chills and fever.   Respiratory:  Positive for cough, shortness of breath and wheezing. Negative for chest tightness.        Historical Information   Past Medical History:   Diagnosis Date    Cancer (HCC)     Diabetes mellitus (HCC)     High cholesterol     History of blood clots     Hypertension     Leucocytosis 2024    Lung cancer (HCC)     Malignant neoplasm of overlapping sites of right lung (HCC) 2024    Pneumonia     Pulmonary embolism (HCC)      Past Surgical History:   Procedure Laterality Date    IR BIOPSY LYMPH NODE  2024    IR PORT PLACEMENT  2024    IR THORACENTESIS  2024    KNEE SURGERY      MANDIBLE FRACTURE SURGERY  1985    PATELLA FRACTURE SURGERY      RECTAL SURGERY       Family History   Problem Relation Age of Onset    No Known Problems Father     No Known Problems Mother      Social History   Social History     Substance and Sexual Activity   Alcohol Use Not Currently     Social History     Substance and Sexual Activity   Drug Use Never     Social History     Tobacco Use   Smoking Status Former    Current packs/day: 0.00    Average packs/day: 1.5 packs/day for 35.0 years (52.5 ttl pk-yrs)    Types: Cigarettes    Quit date: 4/15/2024    Years since quittin.6    Passive exposure: Past   Smokeless Tobacco Never     E-Cigarette/Vaping    E-Cigarette Use Never User      E-Cigarette/Vaping Substances    Nicotine No     THC No     CBD No     Flavoring No     Other No     Unknown No        Meds/Allergies     Current Outpatient Medications:     acetaminophen (TYLENOL) 325 mg tablet, Take 3 tablets (975 mg total) by mouth every 8 (eight) hours as needed for mild pain, headaches or fever, Disp: ,  Rfl:     albuterol (ProAir HFA) 90 mcg/act inhaler, Inhale 2 puffs every 6 (six) hours as needed for wheezing, Disp: 8.5 g, Rfl: 2    apixaban (Eliquis) 5 mg, Take 1 tablet (5 mg total) by mouth 2 (two) times a day, Disp: 60 tablet, Rfl: 5    atorvastatin (LIPITOR) 10 mg tablet, Take 1 tablet (10 mg total) by mouth daily, Disp: 90 tablet, Rfl: 3    famotidine (PEPCID) 40 MG tablet, Take 40 mg by mouth daily as needed for heartburn or indigestion Taking as needed, Disp: , Rfl:     folic acid (FOLVITE) 1 mg tablet, TAKE 1 TABLET BY MOUTH EVERY DAY, Disp: 90 tablet, Rfl: 2    glimepiride (AMARYL) 2 mg tablet, TAKE 1 TABLET BY MOUTH DAILY WITH BREAKFAST, Disp: 90 tablet, Rfl: 1    Lancets (OneTouch Delica Plus [id number]) MISC, Use 1 Units 4 (four) times a day, Disp: 400 each, Rfl: 2    levothyroxine 25 mcg tablet, TAKE 1 TABLET BY MOUTH EVERY DAY, Disp: 90 tablet, Rfl: 2    losartan (COZAAR) 25 mg tablet, Take 25 mg by mouth daily, Disp: , Rfl:     metFORMIN (GLUCOPHAGE) 1000 MG tablet, Take 1,000 mg by mouth 2 (two) times a day, Disp: , Rfl:     naloxone (NARCAN) 4 mg/0.1 mL nasal spray, Administer 1 spray into a nostril. If no response after 2-3 minutes, give another dose in the other nostril using a new spray. For use in emergencies for opioid / pain medication reversal for accidental ingestion, respiratory depression, sedation or concerns for overdose., Disp: 1 each, Rfl: 1    ondansetron (ZOFRAN-ODT) 8 mg disintegrating tablet, Take 1 tablet (8 mg total) by mouth every 8 (eight) hours as needed for vomiting or nausea, Disp: 30 tablet, Rfl: 0    OneTouch Verio test strip, Use 1 each 4 (four) times a day, Disp: 400 each, Rfl: 2    oxyCODONE (Roxicodone) 5 immediate release tablet, Take 0.5 tablets (2.5 mg total) by mouth every 4 (four) hours as needed for moderate pain If pain is severe, may instead take 1 tablet (5mg) every 4 hours as needed Max Daily Amount: 15 mg, Disp: 60 tablet, Rfl: 0    senna (SENOKOT) 8.6 mg,  Take 1 tablet (8.6 mg total) by mouth daily at bedtime as needed for constipation, Disp: 30 tablet, Rfl: 1    amitriptyline (ELAVIL) 100 mg tablet, 200 mg daily at bedtime, Disp: , Rfl:     benzonatate (TESSALON) 200 MG capsule, Take 1 capsule (200 mg total) by mouth 3 (three) times a day as needed for cough, Disp: 20 capsule, Rfl: 0    Glucagon, rDNA, (Glucagon Emergency) 1 MG KIT, Inject 1 mg as directed once as needed (hypoglycemia) for up to 1 dose (Patient not taking: Reported on 12/18/2024), Disp: 2 kit, Rfl: 0    meclizine (ANTIVERT) 25 mg tablet, Take 1 tablet (25 mg total) by mouth every 8 (eight) hours as needed for dizziness (Patient not taking: Reported on 12/19/2024), Disp: 30 tablet, Rfl: 0    tiotropium-olodaterol (Stiolto Respimat) 2.5-2.5 MCG/ACT inhaler, Inhale 2 puffs daily, Disp: 4 g, Rfl: 3  No Known Allergies    Vitals: Blood pressure 142/72, pulse (!) 122, weight 91.6 kg (202 lb), SpO2 93%. Body mass index is 31.64 kg/m². Oxygen Therapy  SpO2: 93 %2 L NC    Physical Exam:  Physical Exam  Vitals reviewed.   Constitutional:       General: He is not in acute distress.     Appearance: He is obese. He is not ill-appearing.   HENT:      Head: Normocephalic and atraumatic.      Right Ear: External ear normal.      Left Ear: External ear normal.      Nose: Nose normal.      Mouth/Throat:      Mouth: Mucous membranes are moist.      Pharynx: Oropharynx is clear.   Eyes:      Extraocular Movements: Extraocular movements intact.      Pupils: Pupils are equal, round, and reactive to light.   Cardiovascular:      Rate and Rhythm: Normal rate and regular rhythm.      Pulses: Normal pulses.      Heart sounds: Normal heart sounds.   Abdominal:      General: Bowel sounds are normal.   Musculoskeletal:         General: Normal range of motion.      Cervical back: Normal range of motion and neck supple.      Right lower leg: No edema.      Left lower leg: No edema.   Skin:     General: Skin is warm and dry.  "  Neurological:      Mental Status: He is alert and oriented to person, place, and time.   Psychiatric:         Mood and Affect: Mood normal.         Behavior: Behavior normal.         Thought Content: Thought content normal.         Judgment: Judgment normal.         Imaging and other studies: Results Review Statement: I reviewed radiology reports from this admission including: chest xray.   Chest x-ray 12/12/2024 s/p pleural catheter placement showed no significant interval change no pneumothorax    Pulmonary Results (PFTs, PSG): Results Review Statement: I reviewed radiology reports from this admission including: POC spirometry.   POC spirometry 05/07/2024 shows restrictive lung disease with FEV1 of 41%    DIANNE Marroquin  [facility]    Portions of the record may have been created with voice recognition software.  Occasional wrong word or \"sound a like\" substitutions may have occurred due to the inherent limitations of voice recognition software.  Read the chart carefully and recognize, using context, where substitutions have occurred or contact the dictating provider.  "

## 2024-12-19 NOTE — ASSESSMENT & PLAN NOTE
Pleural catheter placed at 12/12/2024 by Dr. Portillo  Patient presents to the office today for suture removal.  Insertion site clean dry and intact, no sign of infection  Sutures removed, patient tolerated well    Drainage noted from catheter  12/13 375 mL  12/14 200 mL  12/15 150 mL  12/17 150 mL

## 2024-12-19 NOTE — PATIENT INSTRUCTIONS
It was a pleasure speaking with you today.    As discussed:  -Continue your medications as prescribed.  -Continue with a bowel regimen to avoid constipation.    Follow-up in: 4 weeks     Please do not hesitate to call me sooner should you have any questions/concerns or needs.    Medication safety issues - Do not drive under the influence of narcotics (including opioids), watch for adverse effects including confusion / altered mental status / respiratory depression (slowed breathing), keep medications stored in a safe/locked environment, do not use alcohol while opioids or other narcotics are in your system. Do not travel with more than the minimum number of tablets or capsules required for the trip.    ER Precautions  Watch for red flag symptoms including, but not limited to fevers, chills, chest pain, shortness of breath, intractable nausea/vomiting/diarrhea, or acute intractable pain (especially if pain is new or has changed).

## 2024-12-19 NOTE — H&P (VIEW-ONLY)
Pulmonary Follow-Up Note   Papo Gibbs 54 y.o. male MRN: [id number]  12/19/2024      Assessment/Plan:    Problem List Items Addressed This Visit       Acute pulmonary embolism with acute cor pulmonale, unspecified pulmonary embolism type (HCC)    Remains on Eliquis         Chronic hypoxemic respiratory failure (HCC)    Patient presents to the office on 2 L NC saturation stable  Sinew supplemental O2 2 to 3 L         Restrictive lung disease    Does not appear in exacerbation  Home medication regime Stiolto and albuterol as needed  Continue current medication regime         Pleural effusion - Primary    Pleural catheter placed at 12/12/2024 by Dr. Portillo  Patient presents to the office today for suture removal.  Insertion site clean dry and intact, no sign of infection  Sutures removed, patient tolerated well    Drainage noted from catheter  12/13 375 mL  12/14 200 mL  12/15 150 mL  12/17 150 mL              No follow-ups on file.    All of Jessica's questions were answered prior to leaving the office today. He will follow-up in 3 months or sooner should the need arise. He is aware to call our office with any further questions or concerns.    History of Present Illness   Reason for Visit: follow up  Chief Complaint: suture removal  HPI: Papo Gibbs is a 54 y.o. male who presents to the office today for pleural catheter suture removal.  Patient had pleural catheter placed 12/12 by Dr. Portillo, tolerated well.  Chest x-ray s/p catheter placement reviewed showed no significant interval change no pneumothorax.  Sutures removed without any overt issues.  No signs or symptoms of infection at insertion site.  Patient denies any pain or worsening shortness of breath.  Home health nurse present with him during assessment.  Documented drainage from pleural catheter  12/13 375 mL  12/14 200 mL  12/15 150 mL  12/17 150 mL    Continues to follow-up with oncology Dr. Sandoval for lung CA.   Remains on Eliquis for history of PE.  He is  on 2 L supplemental O2 continuously and at night, continues to monitor SpO2 at home and maintain saturation greater than 90%  He remains on Stiolto and albuterol for restrictive lung disease.  Will follow-up in 4 months    Review of Systems   Constitutional:  Negative for chills and fever.   Respiratory:  Positive for cough, shortness of breath and wheezing. Negative for chest tightness.        Historical Information   Past Medical History:   Diagnosis Date    Cancer (HCC)     Diabetes mellitus (HCC)     High cholesterol     History of blood clots     Hypertension     Leucocytosis 2024    Lung cancer (HCC)     Malignant neoplasm of overlapping sites of right lung (HCC) 2024    Pneumonia     Pulmonary embolism (HCC)      Past Surgical History:   Procedure Laterality Date    IR BIOPSY LYMPH NODE  2024    IR PORT PLACEMENT  2024    IR THORACENTESIS  2024    KNEE SURGERY      MANDIBLE FRACTURE SURGERY  1985    PATELLA FRACTURE SURGERY      RECTAL SURGERY       Family History   Problem Relation Age of Onset    No Known Problems Father     No Known Problems Mother      Social History   Social History     Substance and Sexual Activity   Alcohol Use Not Currently     Social History     Substance and Sexual Activity   Drug Use Never     Social History     Tobacco Use   Smoking Status Former    Current packs/day: 0.00    Average packs/day: 1.5 packs/day for 35.0 years (52.5 ttl pk-yrs)    Types: Cigarettes    Quit date: 4/15/2024    Years since quittin.6    Passive exposure: Past   Smokeless Tobacco Never     E-Cigarette/Vaping    E-Cigarette Use Never User      E-Cigarette/Vaping Substances    Nicotine No     THC No     CBD No     Flavoring No     Other No     Unknown No        Meds/Allergies     Current Outpatient Medications:     acetaminophen (TYLENOL) 325 mg tablet, Take 3 tablets (975 mg total) by mouth every 8 (eight) hours as needed for mild pain, headaches or fever, Disp: ,  Rfl:     albuterol (ProAir HFA) 90 mcg/act inhaler, Inhale 2 puffs every 6 (six) hours as needed for wheezing, Disp: 8.5 g, Rfl: 2    apixaban (Eliquis) 5 mg, Take 1 tablet (5 mg total) by mouth 2 (two) times a day, Disp: 60 tablet, Rfl: 5    atorvastatin (LIPITOR) 10 mg tablet, Take 1 tablet (10 mg total) by mouth daily, Disp: 90 tablet, Rfl: 3    famotidine (PEPCID) 40 MG tablet, Take 40 mg by mouth daily as needed for heartburn or indigestion Taking as needed, Disp: , Rfl:     folic acid (FOLVITE) 1 mg tablet, TAKE 1 TABLET BY MOUTH EVERY DAY, Disp: 90 tablet, Rfl: 2    glimepiride (AMARYL) 2 mg tablet, TAKE 1 TABLET BY MOUTH DAILY WITH BREAKFAST, Disp: 90 tablet, Rfl: 1    Lancets (OneTouch Delica Plus [id number]) MISC, Use 1 Units 4 (four) times a day, Disp: 400 each, Rfl: 2    levothyroxine 25 mcg tablet, TAKE 1 TABLET BY MOUTH EVERY DAY, Disp: 90 tablet, Rfl: 2    losartan (COZAAR) 25 mg tablet, Take 25 mg by mouth daily, Disp: , Rfl:     metFORMIN (GLUCOPHAGE) 1000 MG tablet, Take 1,000 mg by mouth 2 (two) times a day, Disp: , Rfl:     naloxone (NARCAN) 4 mg/0.1 mL nasal spray, Administer 1 spray into a nostril. If no response after 2-3 minutes, give another dose in the other nostril using a new spray. For use in emergencies for opioid / pain medication reversal for accidental ingestion, respiratory depression, sedation or concerns for overdose., Disp: 1 each, Rfl: 1    ondansetron (ZOFRAN-ODT) 8 mg disintegrating tablet, Take 1 tablet (8 mg total) by mouth every 8 (eight) hours as needed for vomiting or nausea, Disp: 30 tablet, Rfl: 0    OneTouch Verio test strip, Use 1 each 4 (four) times a day, Disp: 400 each, Rfl: 2    oxyCODONE (Roxicodone) 5 immediate release tablet, Take 0.5 tablets (2.5 mg total) by mouth every 4 (four) hours as needed for moderate pain If pain is severe, may instead take 1 tablet (5mg) every 4 hours as needed Max Daily Amount: 15 mg, Disp: 60 tablet, Rfl: 0    senna (SENOKOT) 8.6 mg,  Take 1 tablet (8.6 mg total) by mouth daily at bedtime as needed for constipation, Disp: 30 tablet, Rfl: 1    amitriptyline (ELAVIL) 100 mg tablet, 200 mg daily at bedtime, Disp: , Rfl:     benzonatate (TESSALON) 200 MG capsule, Take 1 capsule (200 mg total) by mouth 3 (three) times a day as needed for cough, Disp: 20 capsule, Rfl: 0    Glucagon, rDNA, (Glucagon Emergency) 1 MG KIT, Inject 1 mg as directed once as needed (hypoglycemia) for up to 1 dose (Patient not taking: Reported on 12/18/2024), Disp: 2 kit, Rfl: 0    meclizine (ANTIVERT) 25 mg tablet, Take 1 tablet (25 mg total) by mouth every 8 (eight) hours as needed for dizziness (Patient not taking: Reported on 12/19/2024), Disp: 30 tablet, Rfl: 0    tiotropium-olodaterol (Stiolto Respimat) 2.5-2.5 MCG/ACT inhaler, Inhale 2 puffs daily, Disp: 4 g, Rfl: 3  No Known Allergies    Vitals: Blood pressure 142/72, pulse (!) 122, weight 91.6 kg (202 lb), SpO2 93%. Body mass index is 31.64 kg/m². Oxygen Therapy  SpO2: 93 %2 L NC    Physical Exam:  Physical Exam  Vitals reviewed.   Constitutional:       General: He is not in acute distress.     Appearance: He is obese. He is not ill-appearing.   HENT:      Head: Normocephalic and atraumatic.      Right Ear: External ear normal.      Left Ear: External ear normal.      Nose: Nose normal.      Mouth/Throat:      Mouth: Mucous membranes are moist.      Pharynx: Oropharynx is clear.   Eyes:      Extraocular Movements: Extraocular movements intact.      Pupils: Pupils are equal, round, and reactive to light.   Cardiovascular:      Rate and Rhythm: Normal rate and regular rhythm.      Pulses: Normal pulses.      Heart sounds: Normal heart sounds.   Abdominal:      General: Bowel sounds are normal.   Musculoskeletal:         General: Normal range of motion.      Cervical back: Normal range of motion and neck supple.      Right lower leg: No edema.      Left lower leg: No edema.   Skin:     General: Skin is warm and dry.  "  Neurological:      Mental Status: He is alert and oriented to person, place, and time.   Psychiatric:         Mood and Affect: Mood normal.         Behavior: Behavior normal.         Thought Content: Thought content normal.         Judgment: Judgment normal.         Imaging and other studies: Results Review Statement: I reviewed radiology reports from this admission including: chest xray.   Chest x-ray 12/12/2024 s/p pleural catheter placement showed no significant interval change no pneumothorax    Pulmonary Results (PFTs, PSG): Results Review Statement: I reviewed radiology reports from this admission including: POC spirometry.   POC spirometry 05/07/2024 shows restrictive lung disease with FEV1 of 41%    DIANNE Marroquin  [facility]    Portions of the record may have been created with voice recognition software.  Occasional wrong word or \"sound a like\" substitutions may have occurred due to the inherent limitations of voice recognition software.  Read the chart carefully and recognize, using context, where substitutions have occurred or contact the dictating provider.  "

## 2024-12-19 NOTE — ASSESSMENT & PLAN NOTE
Does not appear in exacerbation  Home medication regime Stiolto and albuterol as needed  Continue current medication regime

## 2024-12-20 ENCOUNTER — HOSPITAL ENCOUNTER (OUTPATIENT)
Dept: INFUSION CENTER | Facility: CLINIC | Age: 54
End: 2024-12-20
Payer: COMMERCIAL

## 2024-12-20 ENCOUNTER — DOCUMENTATION (OUTPATIENT)
Dept: HEMATOLOGY ONCOLOGY | Facility: CLINIC | Age: 54
End: 2024-12-20

## 2024-12-20 DIAGNOSIS — C34.81 ADENOCARCINOMA OF OVERLAPPING SITES OF RIGHT LUNG (HCC): ICD-10-CM

## 2024-12-20 DIAGNOSIS — Z95.828 PORT-A-CATH IN PLACE: Primary | ICD-10-CM

## 2024-12-20 PROBLEM — R05.9 COUGH: Status: RESOLVED | Noted: 2024-11-20 | Resolved: 2024-12-20

## 2024-12-20 LAB
ALBUMIN SERPL BCG-MCNC: 3 G/DL (ref 3.5–5)
ALP SERPL-CCNC: 67 U/L (ref 34–104)
ALT SERPL W P-5'-P-CCNC: 20 U/L (ref 7–52)
ANION GAP SERPL CALCULATED.3IONS-SCNC: 8 MMOL/L (ref 4–13)
AST SERPL W P-5'-P-CCNC: 15 U/L (ref 13–39)
BASOPHILS # BLD AUTO: 0.03 THOUSANDS/ΜL (ref 0–0.1)
BASOPHILS NFR BLD AUTO: 0 % (ref 0–1)
BILIRUB SERPL-MCNC: 0.29 MG/DL (ref 0.2–1)
BUN SERPL-MCNC: 15 MG/DL (ref 5–25)
CALCIUM ALBUM COR SERPL-MCNC: 9.4 MG/DL (ref 8.3–10.1)
CALCIUM SERPL-MCNC: 8.6 MG/DL (ref 8.4–10.2)
CHLORIDE SERPL-SCNC: 102 MMOL/L (ref 96–108)
CO2 SERPL-SCNC: 28 MMOL/L (ref 21–32)
CREAT SERPL-MCNC: 0.7 MG/DL (ref 0.6–1.3)
EOSINOPHIL # BLD AUTO: 0.02 THOUSAND/ΜL (ref 0–0.61)
EOSINOPHIL NFR BLD AUTO: 0 % (ref 0–6)
ERYTHROCYTE [DISTWIDTH] IN BLOOD BY AUTOMATED COUNT: 16.2 % (ref 11.6–15.1)
GFR SERPL CREATININE-BSD FRML MDRD: 106 ML/MIN/1.73SQ M
GLUCOSE SERPL-MCNC: 144 MG/DL (ref 65–140)
HCT VFR BLD AUTO: 27.6 % (ref 36.5–49.3)
HGB BLD-MCNC: 8.8 G/DL (ref 12–17)
IMM GRANULOCYTES # BLD AUTO: 0.1 THOUSAND/UL (ref 0–0.2)
IMM GRANULOCYTES NFR BLD AUTO: 1 % (ref 0–2)
LYMPHOCYTES # BLD AUTO: 1.42 THOUSANDS/ΜL (ref 0.6–4.47)
LYMPHOCYTES NFR BLD AUTO: 19 % (ref 14–44)
MCH RBC QN AUTO: 27.6 PG (ref 26.8–34.3)
MCHC RBC AUTO-ENTMCNC: 31.9 G/DL (ref 31.4–37.4)
MCV RBC AUTO: 87 FL (ref 82–98)
MONOCYTES # BLD AUTO: 0.84 THOUSAND/ΜL (ref 0.17–1.22)
MONOCYTES NFR BLD AUTO: 11 % (ref 4–12)
NEUTROPHILS # BLD AUTO: 4.95 THOUSANDS/ΜL (ref 1.85–7.62)
NEUTS SEG NFR BLD AUTO: 69 % (ref 43–75)
NRBC BLD AUTO-RTO: 0 /100 WBCS
PLATELET # BLD AUTO: 249 THOUSANDS/UL (ref 149–390)
PMV BLD AUTO: 9.5 FL (ref 8.9–12.7)
POTASSIUM SERPL-SCNC: 4 MMOL/L (ref 3.5–5.3)
PROT SERPL-MCNC: 6.5 G/DL (ref 6.4–8.4)
RBC # BLD AUTO: 3.19 MILLION/UL (ref 3.88–5.62)
SODIUM SERPL-SCNC: 138 MMOL/L (ref 135–147)
WBC # BLD AUTO: 7.36 THOUSAND/UL (ref 4.31–10.16)

## 2024-12-20 PROCEDURE — 85025 COMPLETE CBC W/AUTO DIFF WBC: CPT | Performed by: INTERNAL MEDICINE

## 2024-12-20 PROCEDURE — 80053 COMPREHEN METABOLIC PANEL: CPT | Performed by: INTERNAL MEDICINE

## 2024-12-20 NOTE — PROGRESS NOTES
Pt to clinic for lab draw from port. Pt tolerated without complications. Next appointment Dec 23 at 9:00

## 2024-12-20 NOTE — PROGRESS NOTES
Chart reviewed in preparation of Thoracic Tumor Conference presentation by Dr. Rea on 12/23/24.  He was diagnosed with Stage IV NSCLC ~ adenocarcinoma in May 2024.  On 7/3/24 he underwent SRS to a small right frontal metastasis to a dose of 2000cGy in 1 fraction.  He completed 6 cycles of carbo on 09/03/24 and 8 cycles of Alimta/Keytruda on 10/15/24.He was presented at the Thoracic Oncology Multidisciplinary Case Review on 11/11/24 with recommendations continue Alimta, steroids and repeat imaging in 6 weeks. 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 Increasing partially loculated moderate right pleural effusion. New interstitial edema in the left lung that is probably related to volume overload.  Developing left lung carcinomatosis is also possible.  Possible small pneumonia within the superior segment of the left lower lobe. Slightly increasing mediastinal lymphadenopathy that is probably metastatic. Enlarging subcentimeter gastrohepatic ligament lymph nodes that are probably metastatic.

## 2024-12-23 ENCOUNTER — DOCUMENTATION (OUTPATIENT)
Dept: HEMATOLOGY ONCOLOGY | Facility: CLINIC | Age: 54
End: 2024-12-23

## 2024-12-23 ENCOUNTER — HOSPITAL ENCOUNTER (OUTPATIENT)
Dept: INFUSION CENTER | Facility: CLINIC | Age: 54
Discharge: HOME | End: 2024-12-23
Payer: COMMERCIAL

## 2024-12-23 ENCOUNTER — TELEPHONE (OUTPATIENT)
Dept: PULMONOLOGY | Facility: CLINIC | Age: 54
End: 2024-12-23

## 2024-12-23 ENCOUNTER — PREP FOR PROCEDURE (OUTPATIENT)
Dept: PULMONOLOGY | Facility: CLINIC | Age: 54
End: 2024-12-23

## 2024-12-23 VITALS
DIASTOLIC BLOOD PRESSURE: 76 MMHG | HEART RATE: 125 BPM | OXYGEN SATURATION: 95 % | WEIGHT: 198 LBS | HEIGHT: 67 IN | BODY MASS INDEX: 31.08 KG/M2 | SYSTOLIC BLOOD PRESSURE: 132 MMHG | TEMPERATURE: 97 F

## 2024-12-23 DIAGNOSIS — C34.91 PRIMARY MALIGNANT NEOPLASM OF RIGHT LUNG METASTATIC TO OTHER SITE (HCC): Primary | ICD-10-CM

## 2024-12-23 DIAGNOSIS — C34.81 ADENOCARCINOMA OF OVERLAPPING SITES OF RIGHT LUNG (HCC): Primary | ICD-10-CM

## 2024-12-23 DIAGNOSIS — J90 PLEURAL EFFUSION: Primary | ICD-10-CM

## 2024-12-23 DIAGNOSIS — R06.09 DOE (DYSPNEA ON EXERTION): Primary | ICD-10-CM

## 2024-12-23 DIAGNOSIS — R05.9 COUGH, UNSPECIFIED TYPE: ICD-10-CM

## 2024-12-23 RX ORDER — SODIUM CHLORIDE 9 MG/ML
20 INJECTION, SOLUTION INTRAVENOUS ONCE
Status: COMPLETED | OUTPATIENT
Start: 2024-12-23 | End: 2024-12-23

## 2024-12-23 RX ADMIN — DEXAMETHASONE SODIUM PHOSPHATE: 10 INJECTION, SOLUTION INTRAMUSCULAR; INTRAVENOUS at 09:24

## 2024-12-23 RX ADMIN — SODIUM CHLORIDE 20 ML/HR: 0.9 INJECTION, SOLUTION INTRAVENOUS at 09:21

## 2024-12-23 RX ADMIN — PEMETREXED DISODIUM 1000 MG: 500 INJECTION, POWDER, LYOPHILIZED, FOR SOLUTION INTRAVENOUS at 10:00

## 2024-12-23 NOTE — PROGRESS NOTES
Orders placed for STAT pulmonary referral.    Phoned patient.  Aware pulmonary will be reaching out to schedule referral, bronchoscopy and thoracentesis.  No additional questions at this time

## 2024-12-23 NOTE — PROGRESS NOTES
Patient tolerated treatment well with no adverse reactions. Declined AVS. Confirmed next appointment at the [facility] for 01/10/2024 @ [id number]. Patient wheeled out of care area by his significant other.

## 2024-12-23 NOTE — PROGRESS NOTES
THORACIC ONCOLOGY MULTIDISCIPLINARY CASE REVIEW    DATE:  12/23/2024    PRESENTING DOCTOR:  Dr. Sandoval     DIAGNOSIS:  NSCLC ~ Adenocarcinoma  STAGING: Stage IV     Papo Gibbs is a 54 y.o. male who was presented at the Thoracic Oncology Multidisciplinary Tumor Conference today.  He was diagnosed with Stage IV NSCLC ~ adenocarcinoma in May 2024.  On 7/3/24 he underwent SRS to a small right frontal metastasis to a dose of 2000cGy in 1 fraction.  He completed 6 cycles of carbo on 09/03/24 and 8 cycles of Alimta/Keytruda on 10/15/24.He was presented at the Thoracic Oncology Multidisciplinary Case Review on 11/11/24 with recommendations continue Alimta, steroids and repeat imaging in 6 weeks. 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 Increasing partially loculated moderate right pleural effusion. New interstitial edema in the left lung that is probably related to volume overload.  Developing left lung carcinomatosis is also possible.  Possible small pneumonia within the superior segment of the left lower lobe. Slightly increasing mediastinal lymphadenopathy that is probably metastatic. Enlarging subcentimeter gastrohepatic ligament lymph nodes that are probably metastatic.    PHYSICIAN RECOMMENDED PLAN:  - Referral to Pulmonary for urgent bronchoscopy and thoracentesis to confirm recurrence.  - If pneumonitis ruled out, refer to radiation oncology.        Imaging reviewed:   12/08/24 CTA chest pe study  10/25/24 CT chest abdomen pelvis w contrast   04/19/24 CTA ed chest pe study     Pathology reviewed: No    PFT's reviewed: No    Future imaging: No    Referrals: Pulmonary     Clinical Trials Reviewed:  No  Clinical Trial Eligibility:     Team agreed to plan.  NCCN guidelines were readily available for review at this discussion    The final treatment plan will be left to the discretion of the patient and the treating physician.     DISCLAIMERS:  TO THE TREATING  PHYSICIAN:  This conference is a meeting of clinicians from various specialty areas who evaluate and discuss patients for whom a multidisciplinary treatment approach is being considered. Please note that the above opinion was a consensus of the conference attendees and is intended only to assist in quality care of the discussed patient.  The responsibility for follow up on the input given during the conference, along with any final decisions regarding plan of care, is that of the patient and the patient's provider. Accordingly, appointments have only been recommended based on this information and have NOT been scheduled unless otherwise noted.      TO THE PATIENT:  This summary is a brief record of major aspects of your cancer treatment. You may choose to share a copy with any of your doctors or nurses. However, this is not a detailed or comprehensive record of your care.

## 2024-12-23 NOTE — TELEPHONE ENCOUNTER
Dr. BUCHANAN will be performing a BRONCHOSCOPY WITH THORACENTESIS on Papo Gibbs on 12/26/2024     LOCATION: B    ANTICOAGULATION INSTRUCTIONS: ELIQUIS HOLD 2DAYS    OTHER MEDICATION INSTRUCTIONS: NONE    LABS: (CBC/PT/PTT in last 90 days): CBC 12/20   (If no, labs ordered / to be done at least one day prior to procedure)    LAST OFFICE NOTE/H&P within 30 days of procedure: 12/19/2024    INSTRUCTIONS GIVEN: Spoke with patient advised of procedure date and location. Patient aware he is to be NPO after midnight the night prior and will need a . Patient advised he will receive a call the day before with time of arrival.    Thank You   Ana Coronel

## 2024-12-23 NOTE — PROGRESS NOTES
Patient presents tot  [facility]fusion [facility] for the treatment of Alimta. Labs reviewed form 12/20/2024 and within parameters for treatment. Patient offers no concerns at this time. He is resting comfortably in the chair, call bell within reach.

## 2024-12-25 DIAGNOSIS — C34.81 ADENOCARCINOMA OF OVERLAPPING SITES OF RIGHT LUNG (HCC): ICD-10-CM

## 2024-12-25 DIAGNOSIS — R11.2 CHEMOTHERAPY INDUCED NAUSEA AND VOMITING: ICD-10-CM

## 2024-12-25 DIAGNOSIS — R05.9 COUGH, UNSPECIFIED TYPE: ICD-10-CM

## 2024-12-25 DIAGNOSIS — T45.1X5A CHEMOTHERAPY INDUCED NAUSEA AND VOMITING: ICD-10-CM

## 2024-12-26 ENCOUNTER — HOSPITAL ENCOUNTER (OUTPATIENT)
Dept: RADIOLOGY | Facility: HOSPITAL | Age: 54
Discharge: HOME | End: 2024-12-26
Payer: COMMERCIAL

## 2024-12-26 ENCOUNTER — ANESTHESIA EVENT (OUTPATIENT)
Dept: GASTROENTEROLOGY | Facility: HOSPITAL | Age: 54
End: 2024-12-26
Payer: COMMERCIAL

## 2024-12-26 ENCOUNTER — HOSPITAL ENCOUNTER (OUTPATIENT)
Dept: RADIOLOGY | Facility: HOSPITAL | Age: 54
Discharge: HOME | End: 2024-12-26
Attending: STUDENT IN AN ORGANIZED HEALTH CARE EDUCATION/TRAINING PROGRAM
Payer: COMMERCIAL

## 2024-12-26 ENCOUNTER — HOSPITAL ENCOUNTER (OUTPATIENT)
Dept: GASTROENTEROLOGY | Facility: HOSPITAL | Age: 54
End: 2024-12-26
Attending: STUDENT IN AN ORGANIZED HEALTH CARE EDUCATION/TRAINING PROGRAM
Payer: COMMERCIAL

## 2024-12-26 ENCOUNTER — ANESTHESIA (OUTPATIENT)
Dept: GASTROENTEROLOGY | Facility: HOSPITAL | Age: 54
End: 2024-12-26
Payer: COMMERCIAL

## 2024-12-26 VITALS
HEART RATE: 128 BPM | DIASTOLIC BLOOD PRESSURE: 67 MMHG | TEMPERATURE: 97.5 F | RESPIRATION RATE: 22 BRPM | SYSTOLIC BLOOD PRESSURE: 127 MMHG | OXYGEN SATURATION: 97 %

## 2024-12-26 DIAGNOSIS — R91.8 MASS OF RIGHT LUNG: Primary | ICD-10-CM

## 2024-12-26 DIAGNOSIS — C34.81 ADENOCARCINOMA OF OVERLAPPING SITES OF RIGHT LUNG (HCC): Primary | ICD-10-CM

## 2024-12-26 DIAGNOSIS — J90 PLEURAL EFFUSION: ICD-10-CM

## 2024-12-26 LAB — GLUCOSE SERPL-MCNC: 223 MG/DL (ref 65–140)

## 2024-12-26 PROCEDURE — 71045 X-RAY EXAM CHEST 1 VIEW: CPT

## 2024-12-26 PROCEDURE — 87070 CULTURE OTHR SPECIMN AEROBIC: CPT | Performed by: STUDENT IN AN ORGANIZED HEALTH CARE EDUCATION/TRAINING PROGRAM

## 2024-12-26 PROCEDURE — 87116 MYCOBACTERIA CULTURE: CPT | Performed by: STUDENT IN AN ORGANIZED HEALTH CARE EDUCATION/TRAINING PROGRAM

## 2024-12-26 PROCEDURE — 89051 BODY FLUID CELL COUNT: CPT | Performed by: STUDENT IN AN ORGANIZED HEALTH CARE EDUCATION/TRAINING PROGRAM

## 2024-12-26 PROCEDURE — 88341 IMHCHEM/IMCYTCHM EA ADD ANTB: CPT | Performed by: PATHOLOGY

## 2024-12-26 PROCEDURE — 88112 CYTOPATH CELL ENHANCE TECH: CPT | Performed by: STUDENT IN AN ORGANIZED HEALTH CARE EDUCATION/TRAINING PROGRAM

## 2024-12-26 PROCEDURE — 87205 SMEAR GRAM STAIN: CPT | Performed by: STUDENT IN AN ORGANIZED HEALTH CARE EDUCATION/TRAINING PROGRAM

## 2024-12-26 PROCEDURE — 87252 VIRUS INOCULATION TISSUE: CPT | Performed by: STUDENT IN AN ORGANIZED HEALTH CARE EDUCATION/TRAINING PROGRAM

## 2024-12-26 PROCEDURE — 87206 SMEAR FLUORESCENT/ACID STAI: CPT | Performed by: STUDENT IN AN ORGANIZED HEALTH CARE EDUCATION/TRAINING PROGRAM

## 2024-12-26 PROCEDURE — 82948 REAGENT STRIP/BLOOD GLUCOSE: CPT

## 2024-12-26 PROCEDURE — 88305 TISSUE EXAM BY PATHOLOGIST: CPT | Performed by: PATHOLOGY

## 2024-12-26 PROCEDURE — 87798 DETECT AGENT NOS DNA AMP: CPT | Performed by: STUDENT IN AN ORGANIZED HEALTH CARE EDUCATION/TRAINING PROGRAM

## 2024-12-26 PROCEDURE — 88342 IMHCHEM/IMCYTCHM 1ST ANTB: CPT | Performed by: PATHOLOGY

## 2024-12-26 PROCEDURE — 31628 BRONCHOSCOPY/LUNG BX EACH: CPT | Performed by: STUDENT IN AN ORGANIZED HEALTH CARE EDUCATION/TRAINING PROGRAM

## 2024-12-26 PROCEDURE — 32550 INSERT PLEURAL CATH: CPT | Performed by: STUDENT IN AN ORGANIZED HEALTH CARE EDUCATION/TRAINING PROGRAM

## 2024-12-26 PROCEDURE — 88108 CYTOPATH CONCENTRATE TECH: CPT | Performed by: STUDENT IN AN ORGANIZED HEALTH CARE EDUCATION/TRAINING PROGRAM

## 2024-12-26 PROCEDURE — 31624 DX BRONCHOSCOPE/LAVAGE: CPT | Performed by: STUDENT IN AN ORGANIZED HEALTH CARE EDUCATION/TRAINING PROGRAM

## 2024-12-26 PROCEDURE — 88305 TISSUE EXAM BY PATHOLOGIST: CPT | Performed by: STUDENT IN AN ORGANIZED HEALTH CARE EDUCATION/TRAINING PROGRAM

## 2024-12-26 PROCEDURE — 88313 SPECIAL STAINS GROUP 2: CPT | Performed by: PATHOLOGY

## 2024-12-26 PROCEDURE — 31623 DX BRONCHOSCOPE/BRUSH: CPT | Performed by: STUDENT IN AN ORGANIZED HEALTH CARE EDUCATION/TRAINING PROGRAM

## 2024-12-26 RX ORDER — PROPOFOL 10 MG/ML
INJECTION, EMULSION INTRAVENOUS CONTINUOUS PRN
Status: DISCONTINUED | OUTPATIENT
Start: 2024-12-26 | End: 2024-12-26

## 2024-12-26 RX ORDER — ONDANSETRON 8 MG/1
8 TABLET, ORALLY DISINTEGRATING ORAL EVERY 8 HOURS PRN
Qty: 30 TABLET | Refills: 0 | Status: SHIPPED | OUTPATIENT
Start: 2024-12-26

## 2024-12-26 RX ORDER — ALBUTEROL SULFATE 90 UG/1
INHALANT RESPIRATORY (INHALATION) AS NEEDED
Status: DISCONTINUED | OUTPATIENT
Start: 2024-12-26 | End: 2024-12-26

## 2024-12-26 RX ORDER — PROPOFOL 10 MG/ML
INJECTION, EMULSION INTRAVENOUS AS NEEDED
Status: DISCONTINUED | OUTPATIENT
Start: 2024-12-26 | End: 2024-12-26

## 2024-12-26 RX ORDER — ONDANSETRON 2 MG/ML
4 INJECTION INTRAMUSCULAR; INTRAVENOUS ONCE AS NEEDED
Status: ACTIVE | OUTPATIENT
Start: 2024-12-26

## 2024-12-26 RX ORDER — LIDOCAINE HYDROCHLORIDE 10 MG/ML
INJECTION, SOLUTION EPIDURAL; INFILTRATION; INTRACAUDAL; PERINEURAL AS NEEDED
Status: DISCONTINUED | OUTPATIENT
Start: 2024-12-26 | End: 2024-12-26

## 2024-12-26 RX ORDER — BENZONATATE 200 MG/1
200 CAPSULE ORAL 3 TIMES DAILY PRN
Qty: 20 CAPSULE | Refills: 0 | Status: SHIPPED | OUTPATIENT
Start: 2024-12-26

## 2024-12-26 RX ORDER — FENTANYL CITRATE/PF 50 MCG/ML
25 SYRINGE (ML) INJECTION
Status: ACTIVE | OUTPATIENT
Start: 2024-12-26

## 2024-12-26 RX ORDER — DEXAMETHASONE SODIUM PHOSPHATE 10 MG/ML
INJECTION, SOLUTION INTRAMUSCULAR; INTRAVENOUS AS NEEDED
Status: DISCONTINUED | OUTPATIENT
Start: 2024-12-26 | End: 2024-12-26

## 2024-12-26 RX ORDER — SUCCINYLCHOLINE/SOD CL,ISO/PF 100 MG/5ML
SYRINGE (ML) INTRAVENOUS AS NEEDED
Status: DISCONTINUED | OUTPATIENT
Start: 2024-12-26 | End: 2024-12-26

## 2024-12-26 RX ORDER — ONDANSETRON 2 MG/ML
INJECTION INTRAMUSCULAR; INTRAVENOUS AS NEEDED
Status: DISCONTINUED | OUTPATIENT
Start: 2024-12-26 | End: 2024-12-26

## 2024-12-26 RX ORDER — ROCURONIUM BROMIDE 10 MG/ML
INJECTION, SOLUTION INTRAVENOUS AS NEEDED
Status: DISCONTINUED | OUTPATIENT
Start: 2024-12-26 | End: 2024-12-26

## 2024-12-26 RX ORDER — SODIUM CHLORIDE 9 MG/ML
20 INJECTION, SOLUTION INTRAVENOUS ONCE
OUTPATIENT
Start: 2024-12-31

## 2024-12-26 RX ORDER — FENTANYL CITRATE 50 UG/ML
INJECTION, SOLUTION INTRAMUSCULAR; INTRAVENOUS AS NEEDED
Status: DISCONTINUED | OUTPATIENT
Start: 2024-12-26 | End: 2024-12-26

## 2024-12-26 RX ORDER — METOCLOPRAMIDE HYDROCHLORIDE 5 MG/ML
10 INJECTION INTRAMUSCULAR; INTRAVENOUS ONCE AS NEEDED
Status: ACTIVE | OUTPATIENT
Start: 2024-12-26

## 2024-12-26 RX ORDER — SODIUM CHLORIDE 9 MG/ML
INJECTION, SOLUTION INTRAVENOUS CONTINUOUS PRN
Status: DISCONTINUED | OUTPATIENT
Start: 2024-12-26 | End: 2024-12-26

## 2024-12-26 RX ADMIN — PHENYLEPHRINE HYDROCHLORIDE 40 MCG/MIN: 10 INJECTION INTRAVENOUS at 15:25

## 2024-12-26 RX ADMIN — ROCURONIUM BROMIDE 10 MG: 10 INJECTION, SOLUTION INTRAVENOUS at 15:47

## 2024-12-26 RX ADMIN — FENTANYL CITRATE 100 MCG: 50 INJECTION INTRAMUSCULAR; INTRAVENOUS at 15:12

## 2024-12-26 RX ADMIN — DEXAMETHASONE SODIUM PHOSPHATE 10 MG: 10 INJECTION, SOLUTION INTRAMUSCULAR; INTRAVENOUS at 16:07

## 2024-12-26 RX ADMIN — PROPOFOL 150 MG: 10 INJECTION, EMULSION INTRAVENOUS at 15:12

## 2024-12-26 RX ADMIN — PROPOFOL 150 MCG/KG/MIN: 10 INJECTION, EMULSION INTRAVENOUS at 15:14

## 2024-12-26 RX ADMIN — Medication 100 MG: at 15:12

## 2024-12-26 RX ADMIN — SODIUM CHLORIDE: 0.9 INJECTION, SOLUTION INTRAVENOUS at 15:12

## 2024-12-26 RX ADMIN — ONDANSETRON 4 MG: 2 INJECTION INTRAMUSCULAR; INTRAVENOUS at 15:51

## 2024-12-26 RX ADMIN — LIDOCAINE HYDROCHLORIDE 50 MG: 10 INJECTION, SOLUTION EPIDURAL; INFILTRATION; INTRACAUDAL; PERINEURAL at 15:12

## 2024-12-26 RX ADMIN — ALBUTEROL SULFATE 2 PUFF: 90 AEROSOL, METERED RESPIRATORY (INHALATION) at 15:58

## 2024-12-26 RX ADMIN — ROCURONIUM BROMIDE 30 MG: 10 INJECTION, SOLUTION INTRAVENOUS at 15:25

## 2024-12-26 RX ADMIN — SUGAMMADEX 200 MG: 100 INJECTION, SOLUTION INTRAVENOUS at 16:07

## 2024-12-26 RX ADMIN — SODIUM CHLORIDE: 0.9 INJECTION, SOLUTION INTRAVENOUS at 15:51

## 2024-12-26 NOTE — ANESTHESIA PREPROCEDURE EVALUATION
Procedure:  BRONCHOSCOPY  THORACENTESIS    Diagnosed with lung ca in 4/2024 c/b pleural effusion s/p pleural catheter placement as well as lung collapse presenting for above procedure   On 3L NC O2 at home, denies any SOB and fever but does endorse some chronic cough   Per patient, he has fast heart rate at baseline     Relevant Problems   CARDIO   (+) Acute deep vein thrombosis (DVT) of left femoral vein (HCC)   (+) Acute deep vein thrombosis (DVT) of popliteal vein of left lower extremity (HCC)   (+) Acute pulmonary embolism with acute cor pulmonale, unspecified pulmonary embolism type (HCC)   (+) Chest pain   (+) HTN (hypertension)      ENDO   (+) DM2 (diabetes mellitus, type 2) (HCC)      GI/HEPATIC   (+) Acid reflux   (+) Fatty liver   (+) Hepatomegaly      /RENAL   (+) Nephrolithiasis      MUSCULOSKELETAL   (+) Right-sided thoracic back pain      PULMONARY   (+) Chronic hypoxemic respiratory failure (HCC)   (+) Pleural effusion   (+) SOB (shortness of breath)        Physical Exam    Airway    Mallampati score: III  TM Distance: >3 FB  Neck ROM: full     Dental   No notable dental hx     Cardiovascular  Rhythm: regular, Rate: abnormal    Pulmonary   Decreased breath sounds    Other Findings      Anesthesia Plan  ASA Score- 3     Anesthesia Type- general with ASA Monitors.         Additional Monitors:     Airway Plan: LMA.           Plan Factors-Exercise tolerance (METS): >4 METS.    Chart reviewed. EKG reviewed.  Existing labs reviewed. Patient summary reviewed.    Patient is not a current smoker.              Induction- intravenous.    Postoperative Plan-         Informed Consent- Anesthetic plan and risks discussed with patient and sibling.  I personally reviewed this patient with the CRNA. Discussed and agreed on the Anesthesia Plan with the CRNA..

## 2024-12-26 NOTE — ANESTHESIA POSTPROCEDURE EVALUATION
Post-Op Assessment Note    CV Status:  Stable    Pain management: adequate       Mental Status:  Sleepy   Hydration Status:  Stable   PONV Controlled:  None   Airway Patency:  Patent     Post Op Vitals Reviewed: Yes    No anethesia notable event occurred.    Staff: Anesthesiologist       Last Filed PACU Vitals:  Vitals Value Taken Time   Temp 97.5 °F (36.4 °C) 12/26/24 1628   Pulse 133 12/26/24 1628   /58 12/26/24 1628   Resp 20 12/26/24 1628   SpO2 95 % 12/26/24 1628       Modified Angela:  Activity: 2 (12/26/2024  4:29 PM)  Respiration: 2 (12/26/2024  4:29 PM)  Circulation: 2 (12/26/2024  4:29 PM)  Consciousness: 1 (12/26/2024  4:29 PM)  Oxygen Saturation: 1 (12/26/2024  4:29 PM)  Modified Angela Score: 8 (12/26/2024  4:29 PM)

## 2024-12-26 NOTE — INTERVAL H&P NOTE
H&P reviewed. After examining the patient I find no changes in the patients condition since the H&P had been written.    Vitals:    12/26/24 1241   BP: 95/54   Pulse: (!) 130   Resp: 18   Temp: 98.1 °F (36.7 °C)   SpO2: 98%       Patient seen and examined  Patient signed consent  Case was discussed at tumor board, plan for bronchoscopy with BAL, fluoroscopy, brushing  We will also investigate the right pleural space.  I did place a indwelling pleural catheter several weeks ago and evaluate if this is still in a good pocket    Jennifer Portillo MD  Pulmonary and Critical Care Medicine

## 2024-12-27 ENCOUNTER — HOSPITAL ENCOUNTER (OUTPATIENT)
Dept: INFUSION CENTER | Facility: CLINIC | Age: 54
End: 2024-12-27
Payer: COMMERCIAL

## 2024-12-27 ENCOUNTER — TELEPHONE (OUTPATIENT)
Dept: HEMATOLOGY ONCOLOGY | Facility: CLINIC | Age: 54
End: 2024-12-27

## 2024-12-27 DIAGNOSIS — C34.81 ADENOCARCINOMA OF OVERLAPPING SITES OF RIGHT LUNG (HCC): Primary | ICD-10-CM

## 2024-12-27 DIAGNOSIS — C34.81 ADENOCARCINOMA OF OVERLAPPING SITES OF RIGHT LUNG (HCC): ICD-10-CM

## 2024-12-27 DIAGNOSIS — Z95.828 PORT-A-CATH IN PLACE: Primary | ICD-10-CM

## 2024-12-27 DIAGNOSIS — D64.81 ANTINEOPLASTIC CHEMOTHERAPY INDUCED ANEMIA: Primary | ICD-10-CM

## 2024-12-27 DIAGNOSIS — T45.1X5A ANTINEOPLASTIC CHEMOTHERAPY INDUCED ANEMIA: Primary | ICD-10-CM

## 2024-12-27 LAB
ALBUMIN SERPL BCG-MCNC: 3.1 G/DL (ref 3.5–5)
ALP SERPL-CCNC: 73 U/L (ref 34–104)
ALT SERPL W P-5'-P-CCNC: 18 U/L (ref 7–52)
ANION GAP SERPL CALCULATED.3IONS-SCNC: 9 MMOL/L (ref 4–13)
ANISOCYTOSIS BLD QL SMEAR: PRESENT
AST SERPL W P-5'-P-CCNC: 11 U/L (ref 13–39)
BASOPHILS # BLD MANUAL: 0 THOUSAND/UL (ref 0–0.1)
BASOPHILS NFR MAR MANUAL: 0 % (ref 0–1)
BILIRUB SERPL-MCNC: 0.4 MG/DL (ref 0.2–1)
BUN SERPL-MCNC: 16 MG/DL (ref 5–25)
CALCIUM ALBUM COR SERPL-MCNC: 9.4 MG/DL (ref 8.3–10.1)
CALCIUM SERPL-MCNC: 8.7 MG/DL (ref 8.4–10.2)
CHLORIDE SERPL-SCNC: 100 MMOL/L (ref 96–108)
CO2 SERPL-SCNC: 27 MMOL/L (ref 21–32)
CREAT SERPL-MCNC: 0.57 MG/DL (ref 0.6–1.3)
EOSINOPHIL # BLD MANUAL: 0 THOUSAND/UL (ref 0–0.4)
EOSINOPHIL NFR BLD MANUAL: 0 % (ref 0–6)
ERYTHROCYTE [DISTWIDTH] IN BLOOD BY AUTOMATED COUNT: 15.8 % (ref 11.6–15.1)
GFR SERPL CREATININE-BSD FRML MDRD: 115 ML/MIN/1.73SQ M
GLUCOSE SERPL-MCNC: 210 MG/DL (ref 65–140)
HCT VFR BLD AUTO: 24.7 % (ref 36.5–49.3)
HGB BLD-MCNC: 7.7 G/DL (ref 12–17)
LYMPHOCYTES # BLD AUTO: 0.84 THOUSAND/UL (ref 0.6–4.47)
LYMPHOCYTES # BLD AUTO: 7 % (ref 14–44)
LYMPHOCYTES NFR BLD AUTO: 34 %
MACROPHAGES NFR FLD: 66 %
MCH RBC QN AUTO: 26.4 PG (ref 26.8–34.3)
MCHC RBC AUTO-ENTMCNC: 31.2 G/DL (ref 31.4–37.4)
MCV RBC AUTO: 85 FL (ref 82–98)
MONOCYTES # BLD AUTO: 0 THOUSAND/UL (ref 0–1.22)
MONOCYTES NFR BLD: 0 % (ref 4–12)
NEUTROPHILS # BLD MANUAL: 11.19 THOUSAND/UL (ref 1.85–7.62)
NEUTS SEG NFR BLD AUTO: 93 % (ref 43–75)
OVALOCYTES BLD QL SMEAR: PRESENT
PATH REV: YES
PLATELET # BLD AUTO: 201 THOUSANDS/UL (ref 149–390)
PLATELET BLD QL SMEAR: ADEQUATE
PMV BLD AUTO: 9.3 FL (ref 8.9–12.7)
POIKILOCYTOSIS BLD QL SMEAR: PRESENT
POTASSIUM SERPL-SCNC: 4.3 MMOL/L (ref 3.5–5.3)
PROT SERPL-MCNC: 6.6 G/DL (ref 6.4–8.4)
RBC # BLD AUTO: 2.92 MILLION/UL (ref 3.88–5.62)
RBC MORPH BLD: PRESENT
RHODAMINE-AURAMINE STN SPEC: NORMAL
RHODAMINE-AURAMINE STN SPEC: NORMAL
SODIUM SERPL-SCNC: 136 MMOL/L (ref 135–147)
TOTAL CELLS COUNTED SPEC: 100
WBC # BLD AUTO: 12.03 THOUSAND/UL (ref 4.31–10.16)

## 2024-12-27 PROCEDURE — 80053 COMPREHEN METABOLIC PANEL: CPT | Performed by: INTERNAL MEDICINE

## 2024-12-27 PROCEDURE — 85027 COMPLETE CBC AUTOMATED: CPT | Performed by: INTERNAL MEDICINE

## 2024-12-27 PROCEDURE — 88108 CYTOPATH CONCENTRATE TECH: CPT | Performed by: STUDENT IN AN ORGANIZED HEALTH CARE EDUCATION/TRAINING PROGRAM

## 2024-12-27 PROCEDURE — 85007 BL SMEAR W/DIFF WBC COUNT: CPT | Performed by: INTERNAL MEDICINE

## 2024-12-27 RX ORDER — SODIUM CHLORIDE 9 MG/ML
20 INJECTION, SOLUTION INTRAVENOUS ONCE
OUTPATIENT
Start: 2024-12-31

## 2024-12-27 NOTE — PROGRESS NOTES
Pulmonary    I performed a bronchoscopy today, I noted his left lung was normal.  His right lung had several abnormalities.  The right upper lobe was extrinsically compressed as well as the right lower lobe.  I did a BAL, forceps, brushing of the right upper lobe and the right lower lobe.    Postprocedure I obtained a chest x-ray and he is diffuse whiteout on the right side.  I did a bedside ultrasound post bronchoscopy to evaluate for any pleural effusion, there was no obvious/large effusions.  He does have a right-sided indwelling catheter that is draining well    I am concerned that he has worsening malignancy as the family states that he has difficulty walking more than 5-10 steps.  I will order a CT chest without contrast.    Plan  Status post bronchoscopy  Patient has indwelling pleural catheter that is draining well  No thoracentesis indicated  CT chest without contrast ordered, Kamille can you help with this?    Jennifer Portillo MD  Pulmonary and Critical Care Medicine

## 2024-12-27 NOTE — PROGRESS NOTES
Patient here for lab work. Port flushed, blood return noted, labs drawn per order. Patient unable to provide urine sample. Spoke with Marita COLON to see if this can be collected at infusion appointment. Patient to go over he weekend to lab to provide urine sample. Patient declined AVS. Next appointment reviewed with patient for Tuesday 12/31 1030.

## 2024-12-27 NOTE — PROGRESS NOTES
Dr. Sandoval recommended patient to receive 1 unit prbc either on Monday 12/30 or Tuesday 12/31. Per ISAIAH Willis at [facility] patient can receive on Tuesday if patient comes in at 0830 am.     Patient scheduled for port labs on Monday 12/30 at 230p per ISAIAH Willis at [facility].    Call out to patient and left vm for patient with all information above.    My chart message sent on 12/27

## 2024-12-27 NOTE — TELEPHONE ENCOUNTER
----- Message from Maryana Sandoval MD sent at 12/26/2024  8:04 PM EST -----  I spoke to the patient and consented him for Docetaxel and Ramucirumab change of therapy. Can we get this scheduled asap next week, Carly? Plan is signed

## 2024-12-28 LAB
BACTERIA BRONCH AEROBE CULT: NO GROWTH
BACTERIA BRONCH AEROBE CULT: NO GROWTH
BACTERIA BRONCH AEROBE CULT: NORMAL
GRAM STN SPEC: NORMAL
PNEUMOCYSTIS PCR: NEGATIVE
PNEUMOCYSTIS PCR: NEGATIVE

## 2024-12-30 ENCOUNTER — HOSPITAL ENCOUNTER (OUTPATIENT)
Dept: INFUSION CENTER | Facility: CLINIC | Age: 54
Discharge: HOME | End: 2024-12-30
Payer: COMMERCIAL

## 2024-12-30 DIAGNOSIS — Z95.828 PORT-A-CATH IN PLACE: Primary | ICD-10-CM

## 2024-12-30 DIAGNOSIS — D64.81 ANTINEOPLASTIC CHEMOTHERAPY INDUCED ANEMIA: ICD-10-CM

## 2024-12-30 DIAGNOSIS — T45.1X5A ANTINEOPLASTIC CHEMOTHERAPY INDUCED ANEMIA: ICD-10-CM

## 2024-12-30 DIAGNOSIS — C34.81 ADENOCARCINOMA OF OVERLAPPING SITES OF RIGHT LUNG (HCC): ICD-10-CM

## 2024-12-30 LAB
ABO GROUP BLD: NORMAL
BLD GP AB SCN SERPL QL: NEGATIVE
RH BLD: POSITIVE
SPECIMEN EXPIRATION DATE: NORMAL

## 2024-12-30 PROCEDURE — 88112 CYTOPATH CELL ENHANCE TECH: CPT | Performed by: STUDENT IN AN ORGANIZED HEALTH CARE EDUCATION/TRAINING PROGRAM

## 2024-12-30 PROCEDURE — 86901 BLOOD TYPING SEROLOGIC RH(D): CPT | Performed by: INTERNAL MEDICINE

## 2024-12-30 PROCEDURE — 86850 RBC ANTIBODY SCREEN: CPT | Performed by: INTERNAL MEDICINE

## 2024-12-30 PROCEDURE — 88305 TISSUE EXAM BY PATHOLOGIST: CPT | Performed by: STUDENT IN AN ORGANIZED HEALTH CARE EDUCATION/TRAINING PROGRAM

## 2024-12-30 PROCEDURE — 86900 BLOOD TYPING SEROLOGIC ABO: CPT | Performed by: INTERNAL MEDICINE

## 2024-12-30 RX ORDER — SODIUM CHLORIDE 9 MG/ML
20 INJECTION, SOLUTION INTRAVENOUS ONCE
Status: CANCELLED | OUTPATIENT
Start: 2024-12-30

## 2024-12-30 NOTE — PROGRESS NOTES
Patient here for type and screen and urine, he is doing ok today, no changes or c/o to report.  He tolerated lab draw without incident, was unable to provide urine at this time, labeled specimen cup given for patient to return tomorrow when he comes for transfusion.

## 2024-12-30 NOTE — PATIENT INSTRUCTIONS
December 2024 Sunday Monday Tuesday Wednesday Thursday Friday Saturday   1     2     3     4     5     6     7                8    Admission  10:46 AM   Stefan Kahn DO   [facility] 4th Floor Med Surg Unit   (Discharge: 12/12/2024)    X-Ray General  11:15 AM   (15 min.)   AN XR PORT 3   [facility] Radiology    CTA CHEST PE STUDY   2:10 PM   (30 min.)   AN CT ED   [facility] CAT Scan 9    ECHO COMPLETE   7:05 AM   (60 min.)   AN ECHO PORT 1   [facility] Cardiology    IR THORACENTESIS  10:30 AM   (45 min.)   AN IR 1   [facility] Interventional Radiology    X-Ray General   2:45 PM   (15 min.)   AN XR 1   [facility] Radiology 10    X-Ray General   8:55 AM   (15 min.)   AN XR PORT 3   [facility] Radiology 11    PLEURAL CATHETER INSERTION     (30 min.)   AN GI ROAD SHOW ROOM   [facility] Endoscopy 12    X-Ray General   9:35 AM   (15 min.)   AN XR PORT 3   [facility] Radiology    Office Visit  11:05 AM   (20 min.)   Jennifer Portillo MD   [facility] Pulmonary [facility] 13    MRI Brain  11:45 AM   (45 min.)   AN MRI 1   [facility] MRI    Catheter Maintenance   1:30 PM   (60 min.)   INF FAST TRACK 2   [facility] Infusion Center 14                15     16     17     18    OFFICE VISIT   7:45 AM   (30 min.)   Rikki Estrella DO   [facility] [place]    OFFICE VISIT   8:30 AM   (30 min.)   Sergey Alcala MD   [facility] Radiation Oncology    VIRTUAL VISIT   9:45 AM   (15 min.)   Maryana Sandoval MD   [facility] Hematology [facility] [place] 19    FOLLOW UP  10:15 AM   (30 min.)   DIANNE Marroquin   [facility] Pulmonary [facility] 20    Catheter Maintenance   3:00 PM   (60 min.)   INF FAST TRACK 2   [facility] Infusion [facility] 21                22      23    Oncology Treatment   9:00 AM   (120 min.)   AN INF CHAIR 3   [facility] 24     25     26    X-Ray General   1:15 PM   (15 min.)   BE GI YANE 1   [facility] Fluoroscopy    X-Ray General   4:50 PM   (15 min.)   BE XR PORT 2   [facility] Radiology    Bronchoscopy     (75 min.)   BE GI ROOM 04   [facility] Endoscopy 27    Catheter Maintenance   3:00 PM   (60 min.)   AN INF FAST TRACK 1   [facility] 28                29     30    Catheter Maintenance   2:30 PM   (60 min.)   INF FAST TRACK 2   [facility] 31    CT CHEST THORAX WO CON   6:45 AM   (30 min.)   AN CT 1   [facility] CAT Scan    Oncology Treatment   8:30 AM   (390 min.)   AN INF CHAIR 8   [facility]                         Cycle 1, Day 1  + Add'l treatments              Treatment Details         12/31/2024 - Cycle 1, Day 1 + Additional treatments      Supportive Care: ONCBCN PROVIDER COMMUNICATION3      Chemotherapy: ONCBCN PROVIDER [id number], RAMUCIRUMAB IVPB, ONCBCN PROVIDER COMMUNICATION2, DOCETAXEL INFUSION        January 2025 Sunday Monday Tuesday Wednesday Thursday Friday Saturday                  1     2    VIRTUAL VISIT   7:45 AM   (30 min.)   Rikki Estrella DO   [facility] [place] 3     4                5     6     7     8     9     10     11                12     13     14     15     16     17    Catheter Maintenance   3:30 PM   (60 min.)   AN INF FAST TRACK 1   [facility] 18                19     20     21    Oncology Treatment   1:30 PM   (250 min.)   AN INF CHAIR 4   [facility] 22    OFFICE VISIT   9:45 AM   (15 min.)   Maryana Sandoval MD   [facility] [place] 23     24     25         Cycle 2, Day 1       26      27     28     29     30     31                            Treatment Details         1/21/2025 - Cycle 2, Day 1      Supportive Care: ONCBCN PROVIDER COMMUNICATION3      Chemotherapy: ONCBCN PROVIDER [id number], RAMUCIRUMAB IVPB, ONCBCN PROVIDER COMMUNICATION2, DOCETAXEL INFUSION

## 2024-12-31 ENCOUNTER — HOSPITAL ENCOUNTER (OUTPATIENT)
Dept: INFUSION CENTER | Facility: CLINIC | Age: 54
Discharge: HOME | End: 2024-12-31
Payer: COMMERCIAL

## 2024-12-31 ENCOUNTER — HOSPITAL ENCOUNTER (OUTPATIENT)
Dept: CT IMAGING | Facility: HOSPITAL | Age: 54
Discharge: HOME | End: 2024-12-31
Payer: COMMERCIAL

## 2024-12-31 VITALS
DIASTOLIC BLOOD PRESSURE: 76 MMHG | OXYGEN SATURATION: 96 % | WEIGHT: 197 LBS | TEMPERATURE: 97.6 F | BODY MASS INDEX: 30.92 KG/M2 | RESPIRATION RATE: 18 BRPM | HEIGHT: 67 IN | SYSTOLIC BLOOD PRESSURE: 126 MMHG | HEART RATE: 123 BPM

## 2024-12-31 DIAGNOSIS — J98.4 RESTRICTIVE LUNG DISEASE: ICD-10-CM

## 2024-12-31 DIAGNOSIS — D64.81 ANTINEOPLASTIC CHEMOTHERAPY INDUCED ANEMIA: Primary | ICD-10-CM

## 2024-12-31 DIAGNOSIS — R06.09 DOE (DYSPNEA ON EXERTION): ICD-10-CM

## 2024-12-31 DIAGNOSIS — C34.81 ADENOCARCINOMA OF OVERLAPPING SITES OF RIGHT LUNG (HCC): ICD-10-CM

## 2024-12-31 DIAGNOSIS — T45.1X5A ANTINEOPLASTIC CHEMOTHERAPY INDUCED ANEMIA: Primary | ICD-10-CM

## 2024-12-31 LAB
BACTERIA UR QL AUTO: ABNORMAL /HPF
BILIRUB UR QL STRIP: NEGATIVE
CAOX CRY URNS QL MICRO: ABNORMAL /HPF
CLARITY UR: ABNORMAL
COLOR UR: YELLOW
GLUCOSE UR STRIP-MCNC: ABNORMAL MG/DL
HGB UR QL STRIP.AUTO: ABNORMAL
HYALINE CASTS #/AREA URNS LPF: ABNORMAL /LPF
KETONES UR STRIP-MCNC: NEGATIVE MG/DL
LEUKOCYTE ESTERASE UR QL STRIP: NEGATIVE
MUCOUS THREADS UR QL AUTO: ABNORMAL
MYCOBACTERIUM SPEC CULT: NORMAL
MYCOBACTERIUM SPEC CULT: NORMAL
NITRITE UR QL STRIP: NEGATIVE
NON-SQ EPI CELLS URNS QL MICRO: ABNORMAL /HPF
PH UR STRIP.AUTO: 5.5 [PH]
PROT UR STRIP-MCNC: ABNORMAL MG/DL
RBC #/AREA URNS AUTO: ABNORMAL /HPF
RHODAMINE-AURAMINE STN SPEC: NORMAL
RHODAMINE-AURAMINE STN SPEC: NORMAL
SP GR UR STRIP.AUTO: 1.02 (ref 1–1.03)
UROBILINOGEN UR STRIP-ACNC: <2 MG/DL
WBC #/AREA URNS AUTO: ABNORMAL /HPF

## 2024-12-31 PROCEDURE — 88305 TISSUE EXAM BY PATHOLOGIST: CPT | Performed by: PATHOLOGY

## 2024-12-31 PROCEDURE — 71250 CT THORAX DX C-: CPT

## 2024-12-31 PROCEDURE — 88313 SPECIAL STAINS GROUP 2: CPT | Performed by: PATHOLOGY

## 2024-12-31 PROCEDURE — 88342 IMHCHEM/IMCYTCHM 1ST ANTB: CPT | Performed by: PATHOLOGY

## 2024-12-31 PROCEDURE — P9016 RBC LEUKOCYTES REDUCED: HCPCS

## 2024-12-31 PROCEDURE — 88341 IMHCHEM/IMCYTCHM EA ADD ANTB: CPT | Performed by: PATHOLOGY

## 2024-12-31 PROCEDURE — 86920 COMPATIBILITY TEST SPIN: CPT

## 2024-12-31 PROCEDURE — 81001 URINALYSIS AUTO W/SCOPE: CPT | Performed by: INTERNAL MEDICINE

## 2024-12-31 RX ORDER — SODIUM CHLORIDE 9 MG/ML
20 INJECTION, SOLUTION INTRAVENOUS ONCE
Status: COMPLETED | OUTPATIENT
Start: 2024-12-31 | End: 2024-12-31

## 2024-12-31 RX ORDER — ALBUTEROL SULFATE 90 UG/1
INHALANT RESPIRATORY (INHALATION)
Qty: 8.5 G | Refills: 5 | Status: SHIPPED | OUTPATIENT
Start: 2024-12-31

## 2024-12-31 RX ADMIN — SODIUM CHLORIDE 900 MG: 0.9 INJECTION, SOLUTION INTRAVENOUS at 12:15

## 2024-12-31 RX ADMIN — SODIUM CHLORIDE 20 ML/HR: 0.9 INJECTION, SOLUTION INTRAVENOUS at 08:51

## 2024-12-31 RX ADMIN — DOCETAXEL 150.8 MG: 20 INJECTION, SOLUTION, CONCENTRATE INTRAVENOUS at 13:22

## 2024-12-31 RX ADMIN — SODIUM CHLORIDE 20 ML/HR: 0.9 INJECTION, SOLUTION INTRAVENOUS at 08:45

## 2024-12-31 RX ADMIN — DIPHENHYDRAMINE HYDROCHLORIDE 50 MG: 50 INJECTION, SOLUTION INTRAMUSCULAR; INTRAVENOUS at 11:32

## 2024-12-31 RX ADMIN — DEXAMETHASONE SODIUM PHOSPHATE: 10 INJECTION, SOLUTION INTRAMUSCULAR; INTRAVENOUS at 11:11

## 2024-12-31 NOTE — PROGRESS NOTES
Pt at [facility] for Blood transfusion and chemotherapy. Labs reviewed from 12/27, HGB 7.7 and meets parameters for 1 unit of PRBC. Port flushed smoothly andf good blood return noted. Pt tolerated blood transfusion and chemotherapy well with no complaints. Next appt scheduled for 1/17 at [address] [place] [address] for tx labs. AVS declined.

## 2025-01-01 ENCOUNTER — APPOINTMENT (EMERGENCY)
Dept: CT IMAGING | Facility: HOSPITAL | Age: 55
End: 2025-01-01
Payer: COMMERCIAL

## 2025-01-01 ENCOUNTER — DOCUMENTATION (OUTPATIENT)
Dept: HEMATOLOGY ONCOLOGY | Facility: CLINIC | Age: 55
End: 2025-01-01

## 2025-01-01 ENCOUNTER — APPOINTMENT (EMERGENCY)
Dept: RADIOLOGY | Facility: HOSPITAL | Age: 55
End: 2025-01-01
Payer: COMMERCIAL

## 2025-01-01 ENCOUNTER — HOSPITAL ENCOUNTER (INPATIENT)
Facility: HOSPITAL | Age: 55
LOS: 4 days | End: 2025-02-19
Attending: EMERGENCY MEDICINE | Admitting: INTERNAL MEDICINE
Payer: COMMERCIAL

## 2025-01-01 VITALS
TEMPERATURE: 98 F | RESPIRATION RATE: 16 BRPM | DIASTOLIC BLOOD PRESSURE: 99 MMHG | HEART RATE: 138 BPM | SYSTOLIC BLOOD PRESSURE: 158 MMHG | OXYGEN SATURATION: 90 %

## 2025-01-01 DIAGNOSIS — R06.00 DYSPNEA: Primary | ICD-10-CM

## 2025-01-01 DIAGNOSIS — J18.9 POSTOBSTRUCTIVE PNEUMONIA: ICD-10-CM

## 2025-01-01 DIAGNOSIS — C34.81 ADENOCARCINOMA OF OVERLAPPING SITES OF RIGHT LUNG (HCC): ICD-10-CM

## 2025-01-01 DIAGNOSIS — Z51.5 PALLIATIVE CARE BY SPECIALIST: ICD-10-CM

## 2025-01-01 DIAGNOSIS — R09.02 HYPOXEMIA: ICD-10-CM

## 2025-01-01 DIAGNOSIS — J96.11 CHRONIC HYPOXEMIC RESPIRATORY FAILURE (HCC): ICD-10-CM

## 2025-01-01 LAB
2HR DELTA HS TROPONIN: 1 NG/L
4HR DELTA HS TROPONIN: 1 NG/L
ABO GROUP BLD BPU: NORMAL
ALBUMIN SERPL BCG-MCNC: 3.6 G/DL (ref 3.5–5)
ALBUMIN SERPL BCG-MCNC: 3.6 G/DL (ref 3.5–5)
ALBUMIN SERPL BCG-MCNC: 3.8 G/DL (ref 3.5–5)
ALP SERPL-CCNC: 59 U/L (ref 34–104)
ALP SERPL-CCNC: 60 U/L (ref 34–104)
ALP SERPL-CCNC: 74 U/L (ref 34–104)
ALT SERPL W P-5'-P-CCNC: 9 U/L (ref 7–52)
ANION GAP SERPL CALCULATED.3IONS-SCNC: 10 MMOL/L (ref 4–13)
ANION GAP SERPL CALCULATED.3IONS-SCNC: 7 MMOL/L (ref 4–13)
ANION GAP SERPL CALCULATED.3IONS-SCNC: 7 MMOL/L (ref 4–13)
ANION GAP SERPL CALCULATED.3IONS-SCNC: 8 MMOL/L (ref 4–13)
ANISOCYTOSIS BLD QL SMEAR: PRESENT
ANISOCYTOSIS BLD QL SMEAR: PRESENT
AST SERPL W P-5'-P-CCNC: 11 U/L (ref 13–39)
AST SERPL W P-5'-P-CCNC: 11 U/L (ref 13–39)
AST SERPL W P-5'-P-CCNC: 13 U/L (ref 13–39)
ATRIAL RATE: 135 BPM
BASE EX.OXY STD BLDV CALC-SCNC: 80.1 % (ref 60–80)
BASE EX.OXY STD BLDV CALC-SCNC: 86.5 % (ref 60–80)
BASE EX.OXY STD BLDV CALC-SCNC: 93.8 % (ref 60–80)
BASE EXCESS BLDA CALC-SCNC: 3 MMOL/L (ref -2–3)
BASE EXCESS BLDV CALC-SCNC: 2.5 MMOL/L
BASE EXCESS BLDV CALC-SCNC: 2.8 MMOL/L
BASE EXCESS BLDV CALC-SCNC: 6.2 MMOL/L
BASOPHILS # BLD AUTO: 0.05 THOUSANDS/ΜL (ref 0–0.1)
BASOPHILS # BLD MANUAL: 0 THOUSAND/UL (ref 0–0.1)
BASOPHILS # BLD MANUAL: 0 THOUSAND/UL (ref 0–0.1)
BASOPHILS NFR BLD AUTO: 1 % (ref 0–1)
BASOPHILS NFR MAR MANUAL: 0 % (ref 0–1)
BASOPHILS NFR MAR MANUAL: 0 % (ref 0–1)
BILIRUB SERPL-MCNC: 0.29 MG/DL (ref 0.2–1)
BILIRUB SERPL-MCNC: 0.35 MG/DL (ref 0.2–1)
BILIRUB SERPL-MCNC: 0.55 MG/DL (ref 0.2–1)
BPU ID: NORMAL
BUN SERPL-MCNC: 34 MG/DL (ref 5–25)
BUN SERPL-MCNC: 36 MG/DL (ref 5–25)
BUN SERPL-MCNC: 40 MG/DL (ref 5–25)
BUN SERPL-MCNC: 43 MG/DL (ref 5–25)
CA-I BLD-SCNC: 1.34 MMOL/L (ref 1.12–1.32)
CALCIUM SERPL-MCNC: 10.1 MG/DL (ref 8.4–10.2)
CALCIUM SERPL-MCNC: 10.1 MG/DL (ref 8.4–10.2)
CALCIUM SERPL-MCNC: 10.2 MG/DL (ref 8.4–10.2)
CALCIUM SERPL-MCNC: 9.7 MG/DL (ref 8.4–10.2)
CARDIAC TROPONIN I PNL SERPL HS: 24 NG/L (ref ?–50)
CARDIAC TROPONIN I PNL SERPL HS: 25 NG/L (ref ?–50)
CARDIAC TROPONIN I PNL SERPL HS: 25 NG/L (ref ?–50)
CHLORIDE SERPL-SCNC: 93 MMOL/L (ref 96–108)
CHLORIDE SERPL-SCNC: 96 MMOL/L (ref 96–108)
CHLORIDE SERPL-SCNC: 97 MMOL/L (ref 96–108)
CHLORIDE SERPL-SCNC: 97 MMOL/L (ref 96–108)
CO2 SERPL-SCNC: 32 MMOL/L (ref 21–32)
CO2 SERPL-SCNC: 33 MMOL/L (ref 21–32)
CO2 SERPL-SCNC: 34 MMOL/L (ref 21–32)
CO2 SERPL-SCNC: 34 MMOL/L (ref 21–32)
CREAT SERPL-MCNC: 0.74 MG/DL (ref 0.6–1.3)
CREAT SERPL-MCNC: 0.79 MG/DL (ref 0.6–1.3)
CREAT SERPL-MCNC: 0.95 MG/DL (ref 0.6–1.3)
CREAT SERPL-MCNC: 1.01 MG/DL (ref 0.6–1.3)
CROSSMATCH: NORMAL
EOSINOPHIL # BLD AUTO: 0 THOUSAND/ΜL (ref 0–0.61)
EOSINOPHIL # BLD MANUAL: 0 THOUSAND/UL (ref 0–0.4)
EOSINOPHIL # BLD MANUAL: 0 THOUSAND/UL (ref 0–0.4)
EOSINOPHIL NFR BLD AUTO: 0 % (ref 0–6)
EOSINOPHIL NFR BLD MANUAL: 0 % (ref 0–6)
EOSINOPHIL NFR BLD MANUAL: 0 % (ref 0–6)
ERYTHROCYTE [DISTWIDTH] IN BLOOD BY AUTOMATED COUNT: 16.8 % (ref 11.6–15.1)
ERYTHROCYTE [DISTWIDTH] IN BLOOD BY AUTOMATED COUNT: 17.1 % (ref 11.6–15.1)
ERYTHROCYTE [DISTWIDTH] IN BLOOD BY AUTOMATED COUNT: 17.1 % (ref 11.6–15.1)
ERYTHROCYTE [DISTWIDTH] IN BLOOD BY AUTOMATED COUNT: 17.2 % (ref 11.6–15.1)
FIO2 GAS DIL.REBREATH: 75 L
FLUAV RNA RESP QL NAA+PROBE: NEGATIVE
FLUBV RNA RESP QL NAA+PROBE: NEGATIVE
GFR SERPL CREATININE-BSD FRML MDRD: 101 ML/MIN/1.73SQ M
GFR SERPL CREATININE-BSD FRML MDRD: 103 ML/MIN/1.73SQ M
GFR SERPL CREATININE-BSD FRML MDRD: 83 ML/MIN/1.73SQ M
GFR SERPL CREATININE-BSD FRML MDRD: 89 ML/MIN/1.73SQ M
GIANT PLATELETS BLD QL SMEAR: PRESENT
GLUCOSE SERPL-MCNC: 129 MG/DL (ref 65–140)
GLUCOSE SERPL-MCNC: 134 MG/DL (ref 65–140)
GLUCOSE SERPL-MCNC: 140 MG/DL (ref 65–140)
GLUCOSE SERPL-MCNC: 166 MG/DL (ref 65–140)
GLUCOSE SERPL-MCNC: 169 MG/DL (ref 65–140)
GLUCOSE SERPL-MCNC: 178 MG/DL (ref 65–140)
GLUCOSE SERPL-MCNC: 178 MG/DL (ref 65–140)
GLUCOSE SERPL-MCNC: 179 MG/DL (ref 65–140)
GLUCOSE SERPL-MCNC: 183 MG/DL (ref 65–140)
GLUCOSE SERPL-MCNC: 188 MG/DL (ref 65–140)
GLUCOSE SERPL-MCNC: 191 MG/DL (ref 65–140)
GLUCOSE SERPL-MCNC: 195 MG/DL (ref 65–140)
GLUCOSE SERPL-MCNC: 200 MG/DL (ref 65–140)
GLUCOSE SERPL-MCNC: 206 MG/DL (ref 65–140)
GLUCOSE SERPL-MCNC: 208 MG/DL (ref 65–140)
GLUCOSE SERPL-MCNC: 210 MG/DL (ref 65–140)
GLUCOSE SERPL-MCNC: 223 MG/DL (ref 65–140)
HCO3 BLDA-SCNC: 31 MMOL/L (ref 24–30)
HCO3 BLDV-SCNC: 29 MMOL/L (ref 24–30)
HCO3 BLDV-SCNC: 29.1 MMOL/L (ref 24–30)
HCO3 BLDV-SCNC: 34.1 MMOL/L (ref 24–30)
HCT VFR BLD AUTO: 29.2 % (ref 36.5–49.3)
HCT VFR BLD AUTO: 30.8 % (ref 36.5–49.3)
HCT VFR BLD AUTO: 31.2 % (ref 36.5–49.3)
HCT VFR BLD AUTO: 34.7 % (ref 36.5–49.3)
HCT VFR BLD CALC: 30 % (ref 36.5–49.3)
HGB BLD-MCNC: 10.4 G/DL (ref 12–17)
HGB BLD-MCNC: 8.8 G/DL (ref 12–17)
HGB BLD-MCNC: 9.2 G/DL (ref 12–17)
HGB BLD-MCNC: 9.2 G/DL (ref 12–17)
HGB BLDA-MCNC: 10.2 G/DL (ref 12–17)
IMM GRANULOCYTES # BLD AUTO: 0.03 THOUSAND/UL (ref 0–0.2)
IMM GRANULOCYTES NFR BLD AUTO: 0 % (ref 0–2)
LACTATE SERPL-SCNC: 1.1 MMOL/L (ref 0.5–2)
LG PLATELETS BLD QL SMEAR: PRESENT
LYMPHOCYTES # BLD AUTO: 0.22 THOUSAND/UL (ref 0.6–4.47)
LYMPHOCYTES # BLD AUTO: 0.24 THOUSAND/UL (ref 0.6–4.47)
LYMPHOCYTES # BLD AUTO: 0.98 THOUSANDS/ΜL (ref 0.6–4.47)
LYMPHOCYTES # BLD AUTO: 3 % (ref 14–44)
LYMPHOCYTES # BLD AUTO: 3 % (ref 14–44)
LYMPHOCYTES NFR BLD AUTO: 9 % (ref 14–44)
MAGNESIUM SERPL-MCNC: 1.9 MG/DL (ref 1.9–2.7)
MAGNESIUM SERPL-MCNC: 2 MG/DL (ref 1.9–2.7)
MCH RBC QN AUTO: 24.9 PG (ref 26.8–34.3)
MCH RBC QN AUTO: 25.2 PG (ref 26.8–34.3)
MCH RBC QN AUTO: 25.4 PG (ref 26.8–34.3)
MCH RBC QN AUTO: 25.7 PG (ref 26.8–34.3)
MCHC RBC AUTO-ENTMCNC: 29.5 G/DL (ref 31.4–37.4)
MCHC RBC AUTO-ENTMCNC: 29.9 G/DL (ref 31.4–37.4)
MCHC RBC AUTO-ENTMCNC: 30 G/DL (ref 31.4–37.4)
MCHC RBC AUTO-ENTMCNC: 30.1 G/DL (ref 31.4–37.4)
MCV RBC AUTO: 84 FL (ref 82–98)
MCV RBC AUTO: 84 FL (ref 82–98)
MCV RBC AUTO: 85 FL (ref 82–98)
MCV RBC AUTO: 85 FL (ref 82–98)
MONOCYTES # BLD AUTO: 0.07 THOUSAND/UL (ref 0–1.22)
MONOCYTES # BLD AUTO: 0.08 THOUSAND/UL (ref 0–1.22)
MONOCYTES # BLD AUTO: 0.67 THOUSAND/ΜL (ref 0.17–1.22)
MONOCYTES NFR BLD AUTO: 6 % (ref 4–12)
MONOCYTES NFR BLD: 1 % (ref 4–12)
MONOCYTES NFR BLD: 1 % (ref 4–12)
NEUTROPHILS # BLD AUTO: 9.05 THOUSANDS/ΜL (ref 1.85–7.62)
NEUTROPHILS # BLD MANUAL: 6.89 THOUSAND/UL (ref 1.85–7.62)
NEUTROPHILS # BLD MANUAL: 7.7 THOUSAND/UL (ref 1.85–7.62)
NEUTS BAND NFR BLD MANUAL: 1 % (ref 0–8)
NEUTS SEG NFR BLD AUTO: 84 % (ref 43–75)
NEUTS SEG NFR BLD AUTO: 95 % (ref 43–75)
NEUTS SEG NFR BLD AUTO: 96 % (ref 43–75)
NRBC BLD AUTO-RTO: 0 /100 WBCS
O2 CT BLDV-SCNC: 11.1 ML/DL
O2 CT BLDV-SCNC: 12.2 ML/DL
O2 CT BLDV-SCNC: 12.8 ML/DL
OVALOCYTES BLD QL SMEAR: PRESENT
P AXIS: 43 DEGREES
PCO2 BLD: 33 MMOL/L (ref 21–32)
PCO2 BLD: 65.5 MM HG (ref 42–50)
PCO2 BLDV: 52.7 MM HG (ref 42–50)
PCO2 BLDV: 55.6 MM HG (ref 42–50)
PCO2 BLDV: 70.5 MM HG (ref 42–50)
PH BLD: 7.28 [PH] (ref 7.3–7.4)
PH BLDV: 7.3 [PH] (ref 7.3–7.4)
PH BLDV: 7.34 [PH] (ref 7.3–7.4)
PH BLDV: 7.36 [PH] (ref 7.3–7.4)
PHOSPHATE SERPL-MCNC: 5.5 MG/DL (ref 2.7–4.5)
PLATELET # BLD AUTO: 194 THOUSANDS/UL (ref 149–390)
PLATELET # BLD AUTO: 208 THOUSANDS/UL (ref 149–390)
PLATELET # BLD AUTO: 228 THOUSANDS/UL (ref 149–390)
PLATELET # BLD AUTO: 239 THOUSANDS/UL (ref 149–390)
PLATELET BLD QL SMEAR: ADEQUATE
PLATELET BLD QL SMEAR: ADEQUATE
PMV BLD AUTO: 10 FL (ref 8.9–12.7)
PMV BLD AUTO: 9.7 FL (ref 8.9–12.7)
PO2 BLD: 32 MM HG (ref 35–45)
PO2 BLDV: 105.1 MM HG (ref 35–45)
PO2 BLDV: 49 MM HG (ref 35–45)
PO2 BLDV: 58.2 MM HG (ref 35–45)
POIKILOCYTOSIS BLD QL SMEAR: PRESENT
POLYCHROMASIA BLD QL SMEAR: PRESENT
POTASSIUM BLD-SCNC: 5.8 MMOL/L (ref 3.5–5.3)
POTASSIUM SERPL-SCNC: 4.6 MMOL/L (ref 3.5–5.3)
POTASSIUM SERPL-SCNC: 4.8 MMOL/L (ref 3.5–5.3)
POTASSIUM SERPL-SCNC: 5.3 MMOL/L (ref 3.5–5.3)
POTASSIUM SERPL-SCNC: 5.5 MMOL/L (ref 3.5–5.3)
POTASSIUM SERPL-SCNC: 5.9 MMOL/L (ref 3.5–5.3)
PR INTERVAL: 132 MS
PROCALCITONIN SERPL-MCNC: 0.15 NG/ML
PROCALCITONIN SERPL-MCNC: 0.2 NG/ML
PROT SERPL-MCNC: 6.9 G/DL (ref 6.4–8.4)
PROT SERPL-MCNC: 6.9 G/DL (ref 6.4–8.4)
PROT SERPL-MCNC: 7.4 G/DL (ref 6.4–8.4)
QRS AXIS: 84 DEGREES
QRSD INTERVAL: 86 MS
QT INTERVAL: 286 MS
QTC INTERVAL: 429 MS
RBC # BLD AUTO: 3.42 MILLION/UL (ref 3.88–5.62)
RBC # BLD AUTO: 3.65 MILLION/UL (ref 3.88–5.62)
RBC # BLD AUTO: 3.7 MILLION/UL (ref 3.88–5.62)
RBC # BLD AUTO: 4.1 MILLION/UL (ref 3.88–5.62)
RBC MORPH BLD: PRESENT
RBC MORPH BLD: PRESENT
RSV RNA RESP QL NAA+PROBE: NEGATIVE
SAO2 % BLD FROM PO2: 52 % (ref 60–85)
SARS-COV-2 RNA RESP QL NAA+PROBE: NEGATIVE
SODIUM BLD-SCNC: 136 MMOL/L (ref 136–145)
SODIUM SERPL-SCNC: 136 MMOL/L (ref 135–147)
SODIUM SERPL-SCNC: 137 MMOL/L (ref 135–147)
SODIUM SERPL-SCNC: 137 MMOL/L (ref 135–147)
SODIUM SERPL-SCNC: 138 MMOL/L (ref 135–147)
SPECIMEN SOURCE: ABNORMAL
T WAVE AXIS: -63 DEGREES
UNIT DISPENSE STATUS: NORMAL
UNIT PRODUCT CODE: NORMAL
UNIT PRODUCT VOLUME: 350 ML
UNIT RH: NORMAL
VENTRICULAR RATE: 135 BPM
WBC # BLD AUTO: 10.78 THOUSAND/UL (ref 4.31–10.16)
WBC # BLD AUTO: 7.18 THOUSAND/UL (ref 4.31–10.16)
WBC # BLD AUTO: 8.02 THOUSAND/UL (ref 4.31–10.16)
WBC # BLD AUTO: 9.86 THOUSAND/UL (ref 4.31–10.16)

## 2025-01-01 PROCEDURE — 84132 ASSAY OF SERUM POTASSIUM: CPT

## 2025-01-01 PROCEDURE — 82948 REAGENT STRIP/BLOOD GLUCOSE: CPT

## 2025-01-01 PROCEDURE — 80048 BASIC METABOLIC PNL TOTAL CA: CPT

## 2025-01-01 PROCEDURE — 94760 N-INVAS EAR/PLS OXIMETRY 1: CPT

## 2025-01-01 PROCEDURE — 94002 VENT MGMT INPAT INIT DAY: CPT

## 2025-01-01 PROCEDURE — 85007 BL SMEAR W/DIFF WBC COUNT: CPT

## 2025-01-01 PROCEDURE — 94640 AIRWAY INHALATION TREATMENT: CPT

## 2025-01-01 PROCEDURE — 96366 THER/PROPH/DIAG IV INF ADDON: CPT

## 2025-01-01 PROCEDURE — 80053 COMPREHEN METABOLIC PANEL: CPT

## 2025-01-01 PROCEDURE — 0241U HB NFCT DS VIR RESP RNA 4 TRGT: CPT | Performed by: EMERGENCY MEDICINE

## 2025-01-01 PROCEDURE — 99232 SBSQ HOSP IP/OBS MODERATE 35: CPT | Performed by: INTERNAL MEDICINE

## 2025-01-01 PROCEDURE — 84145 PROCALCITONIN (PCT): CPT

## 2025-01-01 PROCEDURE — 99285 EMERGENCY DEPT VISIT HI MDM: CPT

## 2025-01-01 PROCEDURE — 96365 THER/PROPH/DIAG IV INF INIT: CPT

## 2025-01-01 PROCEDURE — 99233 SBSQ HOSP IP/OBS HIGH 50: CPT | Performed by: INTERNAL MEDICINE

## 2025-01-01 PROCEDURE — 99239 HOSP IP/OBS DSCHRG MGMT >30: CPT | Performed by: HOSPITALIST

## 2025-01-01 PROCEDURE — 85027 COMPLETE CBC AUTOMATED: CPT

## 2025-01-01 PROCEDURE — 93010 ELECTROCARDIOGRAM REPORT: CPT | Performed by: INTERNAL MEDICINE

## 2025-01-01 PROCEDURE — 84484 ASSAY OF TROPONIN QUANT: CPT

## 2025-01-01 PROCEDURE — 71046 X-RAY EXAM CHEST 2 VIEWS: CPT

## 2025-01-01 PROCEDURE — 99233 SBSQ HOSP IP/OBS HIGH 50: CPT

## 2025-01-01 PROCEDURE — 83735 ASSAY OF MAGNESIUM: CPT

## 2025-01-01 PROCEDURE — 82330 ASSAY OF CALCIUM: CPT

## 2025-01-01 PROCEDURE — 93005 ELECTROCARDIOGRAM TRACING: CPT

## 2025-01-01 PROCEDURE — 82805 BLOOD GASES W/O2 SATURATION: CPT

## 2025-01-01 PROCEDURE — 99223 1ST HOSP IP/OBS HIGH 75: CPT | Performed by: INTERNAL MEDICINE

## 2025-01-01 PROCEDURE — 85014 HEMATOCRIT: CPT

## 2025-01-01 PROCEDURE — 99418 PROLNG IP/OBS E/M EA 15 MIN: CPT

## 2025-01-01 PROCEDURE — 99223 1ST HOSP IP/OBS HIGH 75: CPT | Performed by: STUDENT IN AN ORGANIZED HEALTH CARE EDUCATION/TRAINING PROGRAM

## 2025-01-01 PROCEDURE — 85025 COMPLETE CBC W/AUTO DIFF WBC: CPT

## 2025-01-01 PROCEDURE — 87040 BLOOD CULTURE FOR BACTERIA: CPT | Performed by: EMERGENCY MEDICINE

## 2025-01-01 PROCEDURE — 36415 COLL VENOUS BLD VENIPUNCTURE: CPT

## 2025-01-01 PROCEDURE — 82803 BLOOD GASES ANY COMBINATION: CPT

## 2025-01-01 PROCEDURE — 82947 ASSAY GLUCOSE BLOOD QUANT: CPT

## 2025-01-01 PROCEDURE — 83605 ASSAY OF LACTIC ACID: CPT | Performed by: EMERGENCY MEDICINE

## 2025-01-01 PROCEDURE — 96367 TX/PROPH/DG ADDL SEQ IV INF: CPT

## 2025-01-01 PROCEDURE — 94003 VENT MGMT INPAT SUBQ DAY: CPT

## 2025-01-01 PROCEDURE — 71275 CT ANGIOGRAPHY CHEST: CPT

## 2025-01-01 PROCEDURE — 99232 SBSQ HOSP IP/OBS MODERATE 35: CPT | Performed by: HOSPITALIST

## 2025-01-01 PROCEDURE — 84100 ASSAY OF PHOSPHORUS: CPT

## 2025-01-01 PROCEDURE — 99291 CRITICAL CARE FIRST HOUR: CPT | Performed by: EMERGENCY MEDICINE

## 2025-01-01 PROCEDURE — 84295 ASSAY OF SERUM SODIUM: CPT

## 2025-01-01 RX ORDER — OXYCODONE HCL 5 MG/5 ML
2.5 SOLUTION, ORAL ORAL EVERY 4 HOURS PRN
Refills: 0 | Status: DISCONTINUED | OUTPATIENT
Start: 2025-01-01 | End: 2025-01-01

## 2025-01-01 RX ORDER — HYDROMORPHONE HCL/PF 1 MG/ML
1 SYRINGE (ML) INJECTION
Status: DISCONTINUED | OUTPATIENT
Start: 2025-01-01 | End: 2025-01-01 | Stop reason: HOSPADM

## 2025-01-01 RX ORDER — FOLIC ACID 1 MG/1
1000 TABLET ORAL DAILY
Status: DISCONTINUED | OUTPATIENT
Start: 2025-01-01 | End: 2025-01-01

## 2025-01-01 RX ORDER — DOCUSATE SODIUM 100 MG/1
100 CAPSULE, LIQUID FILLED ORAL 2 TIMES DAILY
Status: DISCONTINUED | OUTPATIENT
Start: 2025-01-01 | End: 2025-01-01

## 2025-01-01 RX ORDER — POLYETHYLENE GLYCOL 3350 17 G/17G
17 POWDER, FOR SOLUTION ORAL DAILY
Status: DISCONTINUED | OUTPATIENT
Start: 2025-01-01 | End: 2025-01-01

## 2025-01-01 RX ORDER — DIAZEPAM 10 MG/2ML
5 INJECTION, SOLUTION INTRAMUSCULAR; INTRAVENOUS 2 TIMES DAILY
Status: DISCONTINUED | OUTPATIENT
Start: 2025-01-01 | End: 2025-01-01

## 2025-01-01 RX ORDER — DIPHENHYDRAMINE HYDROCHLORIDE 50 MG/ML
25 INJECTION INTRAMUSCULAR; INTRAVENOUS ONCE
Status: COMPLETED | OUTPATIENT
Start: 2025-01-01 | End: 2025-01-01

## 2025-01-01 RX ORDER — LORAZEPAM 2 MG/ML
0.5 INJECTION INTRAMUSCULAR
Status: DISCONTINUED | OUTPATIENT
Start: 2025-01-01 | End: 2025-01-01 | Stop reason: HOSPADM

## 2025-01-01 RX ORDER — QUETIAPINE FUMARATE 25 MG/1
25 TABLET, FILM COATED ORAL
Status: DISCONTINUED | OUTPATIENT
Start: 2025-01-01 | End: 2025-01-01 | Stop reason: HOSPADM

## 2025-01-01 RX ORDER — SODIUM CHLORIDE, SODIUM GLUCONATE, SODIUM ACETATE, POTASSIUM CHLORIDE, MAGNESIUM CHLORIDE, SODIUM PHOSPHATE, DIBASIC, AND POTASSIUM PHOSPHATE .53; .5; .37; .037; .03; .012; .00082 G/100ML; G/100ML; G/100ML; G/100ML; G/100ML; G/100ML; G/100ML
250 INJECTION, SOLUTION INTRAVENOUS ONCE
Status: COMPLETED | OUTPATIENT
Start: 2025-01-01 | End: 2025-01-01

## 2025-01-01 RX ORDER — ACETAMINOPHEN 325 MG/1
975 TABLET ORAL EVERY 8 HOURS SCHEDULED
Status: DISCONTINUED | OUTPATIENT
Start: 2025-01-01 | End: 2025-01-01

## 2025-01-01 RX ORDER — FAMOTIDINE 20 MG/1
40 TABLET, FILM COATED ORAL DAILY PRN
Status: DISCONTINUED | OUTPATIENT
Start: 2025-01-01 | End: 2025-01-01 | Stop reason: HOSPADM

## 2025-01-01 RX ORDER — FUROSEMIDE 10 MG/ML
40 INJECTION INTRAMUSCULAR; INTRAVENOUS ONCE
Status: COMPLETED | OUTPATIENT
Start: 2025-01-01 | End: 2025-01-01

## 2025-01-01 RX ORDER — LEVOTHYROXINE SODIUM 25 UG/1
25 TABLET ORAL
Status: DISCONTINUED | OUTPATIENT
Start: 2025-01-01 | End: 2025-01-01 | Stop reason: HOSPADM

## 2025-01-01 RX ORDER — HYDROMORPHONE HCL/PF 1 MG/ML
0.8 SYRINGE (ML) INJECTION
Status: DISCONTINUED | OUTPATIENT
Start: 2025-01-01 | End: 2025-01-01 | Stop reason: HOSPADM

## 2025-01-01 RX ORDER — LORAZEPAM 0.5 MG/1
0.5 TABLET ORAL
Status: DISCONTINUED | OUTPATIENT
Start: 2025-01-01 | End: 2025-01-01

## 2025-01-01 RX ORDER — DIAZEPAM 5 MG/1
5 TABLET ORAL 2 TIMES DAILY
Status: DISCONTINUED | OUTPATIENT
Start: 2025-01-01 | End: 2025-01-01

## 2025-01-01 RX ORDER — METRONIDAZOLE 500 MG/100ML
500 INJECTION, SOLUTION INTRAVENOUS EVERY 8 HOURS
Status: DISCONTINUED | OUTPATIENT
Start: 2025-01-01 | End: 2025-01-01

## 2025-01-01 RX ORDER — ACETAMINOPHEN 10 MG/ML
1000 INJECTION, SOLUTION INTRAVENOUS EVERY 8 HOURS
Status: DISCONTINUED | OUTPATIENT
Start: 2025-01-01 | End: 2025-01-01

## 2025-01-01 RX ORDER — OXYCODONE HYDROCHLORIDE 10 MG/1
10 TABLET ORAL ONCE
Refills: 0 | Status: COMPLETED | OUTPATIENT
Start: 2025-01-01 | End: 2025-01-01

## 2025-01-01 RX ORDER — LEVALBUTEROL INHALATION SOLUTION 1.25 MG/3ML
1.25 SOLUTION RESPIRATORY (INHALATION)
Status: DISCONTINUED | OUTPATIENT
Start: 2025-01-01 | End: 2025-01-01

## 2025-01-01 RX ORDER — ALBUMIN (HUMAN) 12.5 G/50ML
12.5 SOLUTION INTRAVENOUS ONCE
Status: COMPLETED | OUTPATIENT
Start: 2025-01-01 | End: 2025-01-01

## 2025-01-01 RX ORDER — ATORVASTATIN CALCIUM 10 MG/1
10 TABLET, FILM COATED ORAL DAILY
Status: DISCONTINUED | OUTPATIENT
Start: 2025-01-01 | End: 2025-01-01

## 2025-01-01 RX ORDER — INSULIN LISPRO 100 [IU]/ML
1-6 INJECTION, SOLUTION INTRAVENOUS; SUBCUTANEOUS
Status: DISCONTINUED | OUTPATIENT
Start: 2025-01-01 | End: 2025-01-01

## 2025-01-01 RX ORDER — LORAZEPAM 2 MG/ML
0.5 INJECTION INTRAMUSCULAR ONCE
Status: COMPLETED | OUTPATIENT
Start: 2025-01-01 | End: 2025-01-01

## 2025-01-01 RX ORDER — BENZONATATE 100 MG/1
200 CAPSULE ORAL 3 TIMES DAILY PRN
Status: DISCONTINUED | OUTPATIENT
Start: 2025-01-01 | End: 2025-01-01 | Stop reason: HOSPADM

## 2025-01-01 RX ORDER — INSULIN LISPRO 100 [IU]/ML
4-20 INJECTION, SOLUTION INTRAVENOUS; SUBCUTANEOUS
Status: DISCONTINUED | OUTPATIENT
Start: 2025-01-01 | End: 2025-01-01 | Stop reason: HOSPADM

## 2025-01-01 RX ORDER — BISACODYL 10 MG
10 SUPPOSITORY, RECTAL RECTAL ONCE
Status: DISCONTINUED | OUTPATIENT
Start: 2025-01-01 | End: 2025-01-01

## 2025-01-01 RX ORDER — ALBUTEROL SULFATE 90 UG/1
2 INHALANT RESPIRATORY (INHALATION) EVERY 4 HOURS PRN
Status: DISCONTINUED | OUTPATIENT
Start: 2025-01-01 | End: 2025-01-01 | Stop reason: HOSPADM

## 2025-01-01 RX ORDER — CALCIUM GLUCONATE 20 MG/ML
1 INJECTION, SOLUTION INTRAVENOUS ONCE
Status: COMPLETED | OUTPATIENT
Start: 2025-01-01 | End: 2025-01-01

## 2025-01-01 RX ORDER — OXYCODONE HCL 5 MG/5 ML
5 SOLUTION, ORAL ORAL EVERY 4 HOURS PRN
Refills: 0 | Status: DISCONTINUED | OUTPATIENT
Start: 2025-01-01 | End: 2025-01-01

## 2025-01-01 RX ORDER — ACETAMINOPHEN 325 MG/1
975 TABLET ORAL EVERY 8 HOURS PRN
Status: DISCONTINUED | OUTPATIENT
Start: 2025-01-01 | End: 2025-01-01

## 2025-01-01 RX ORDER — HYDROMORPHONE HCL/PF 1 MG/ML
0.5 SYRINGE (ML) INJECTION
Status: DISCONTINUED | OUTPATIENT
Start: 2025-01-01 | End: 2025-01-01

## 2025-01-01 RX ORDER — METHYLPREDNISOLONE SODIUM SUCCINATE 40 MG/ML
40 INJECTION, POWDER, LYOPHILIZED, FOR SOLUTION INTRAMUSCULAR; INTRAVENOUS EVERY 8 HOURS SCHEDULED
Status: DISCONTINUED | OUTPATIENT
Start: 2025-01-01 | End: 2025-01-01

## 2025-01-01 RX ORDER — ECHINACEA PURPUREA EXTRACT 125 MG
1 TABLET ORAL
Status: DISCONTINUED | OUTPATIENT
Start: 2025-01-01 | End: 2025-01-01 | Stop reason: HOSPADM

## 2025-01-01 RX ORDER — ONDANSETRON 2 MG/ML
4 INJECTION INTRAMUSCULAR; INTRAVENOUS EVERY 4 HOURS PRN
Status: DISCONTINUED | OUTPATIENT
Start: 2025-01-01 | End: 2025-01-01 | Stop reason: HOSPADM

## 2025-01-01 RX ORDER — METOPROLOL TARTRATE 25 MG/1
25 TABLET, FILM COATED ORAL EVERY 12 HOURS SCHEDULED
Status: DISCONTINUED | OUTPATIENT
Start: 2025-01-01 | End: 2025-01-01 | Stop reason: HOSPADM

## 2025-01-01 RX ORDER — INSULIN GLARGINE 100 [IU]/ML
8 INJECTION, SOLUTION SUBCUTANEOUS
Status: DISCONTINUED | OUTPATIENT
Start: 2025-01-01 | End: 2025-01-01 | Stop reason: HOSPADM

## 2025-01-01 RX ORDER — AMOXICILLIN 250 MG
1 CAPSULE ORAL
Status: DISCONTINUED | OUTPATIENT
Start: 2025-01-01 | End: 2025-01-01 | Stop reason: HOSPADM

## 2025-01-01 RX ORDER — BISACODYL 10 MG
10 SUPPOSITORY, RECTAL RECTAL DAILY PRN
Status: DISCONTINUED | OUTPATIENT
Start: 2025-01-01 | End: 2025-01-01 | Stop reason: HOSPADM

## 2025-01-01 RX ORDER — LANOLIN ALCOHOL/MO/W.PET/CERES
400 CREAM (GRAM) TOPICAL DAILY
Status: DISCONTINUED | OUTPATIENT
Start: 2025-01-01 | End: 2025-01-01

## 2025-01-01 RX ORDER — ONDANSETRON 2 MG/ML
4 INJECTION INTRAMUSCULAR; INTRAVENOUS ONCE
Status: COMPLETED | OUTPATIENT
Start: 2025-01-01 | End: 2025-01-01

## 2025-01-01 RX ORDER — OXYCODONE HYDROCHLORIDE 10 MG/1
10 TABLET ORAL EVERY 4 HOURS PRN
Refills: 0 | Status: DISCONTINUED | OUTPATIENT
Start: 2025-01-01 | End: 2025-01-01

## 2025-01-01 RX ORDER — OXYCODONE HYDROCHLORIDE 5 MG/1
5 TABLET ORAL EVERY 4 HOURS PRN
Refills: 0 | Status: DISCONTINUED | OUTPATIENT
Start: 2025-01-01 | End: 2025-01-01

## 2025-01-01 RX ORDER — LOSARTAN POTASSIUM 25 MG/1
25 TABLET ORAL DAILY
Status: DISCONTINUED | OUTPATIENT
Start: 2025-01-01 | End: 2025-01-01 | Stop reason: HOSPADM

## 2025-01-01 RX ADMIN — APIXABAN 5 MG: 5 TABLET, FILM COATED ORAL at 17:40

## 2025-01-01 RX ADMIN — APIXABAN 5 MG: 5 TABLET, FILM COATED ORAL at 13:22

## 2025-01-01 RX ADMIN — CEFTRIAXONE 1000 MG: 10 INJECTION, POWDER, FOR SOLUTION INTRAVENOUS at 19:47

## 2025-01-01 RX ADMIN — AMITRIPTYLINE HYDROCHLORIDE 200 MG: 25 TABLET, FILM COATED ORAL at 23:08

## 2025-01-01 RX ADMIN — HYDROMORPHONE HYDROCHLORIDE 0.5 MG: 1 INJECTION, SOLUTION INTRAMUSCULAR; INTRAVENOUS; SUBCUTANEOUS at 10:28

## 2025-01-01 RX ADMIN — ACETAMINOPHEN 975 MG: 325 TABLET, FILM COATED ORAL at 23:08

## 2025-01-01 RX ADMIN — MORPHINE SULFATE 2 MG: 2 INJECTION, SOLUTION INTRAMUSCULAR; INTRAVENOUS at 20:39

## 2025-01-01 RX ADMIN — FUROSEMIDE 40 MG: 10 INJECTION, SOLUTION INTRAMUSCULAR; INTRAVENOUS at 15:26

## 2025-01-01 RX ADMIN — HYDROMORPHONE HYDROCHLORIDE 1 MG: 1 INJECTION, SOLUTION INTRAMUSCULAR; INTRAVENOUS; SUBCUTANEOUS at 19:38

## 2025-01-01 RX ADMIN — QUETIAPINE 25 MG: 25 TABLET ORAL at 21:14

## 2025-01-01 RX ADMIN — CEFTRIAXONE 1000 MG: 10 INJECTION, POWDER, FOR SOLUTION INTRAVENOUS at 18:23

## 2025-01-01 RX ADMIN — DIAZEPAM 5 MG: 5 TABLET ORAL at 09:02

## 2025-01-01 RX ADMIN — CEFTRIAXONE 1000 MG: 10 INJECTION, POWDER, FOR SOLUTION INTRAVENOUS at 18:12

## 2025-01-01 RX ADMIN — LEVALBUTEROL HYDROCHLORIDE 1.25 MG: 1.25 SOLUTION RESPIRATORY (INHALATION) at 20:23

## 2025-01-01 RX ADMIN — METHYLPREDNISOLONE SODIUM SUCCINATE 40 MG: 40 INJECTION, POWDER, FOR SOLUTION INTRAMUSCULAR; INTRAVENOUS at 21:09

## 2025-01-01 RX ADMIN — METHYLPREDNISOLONE SODIUM SUCCINATE 40 MG: 40 INJECTION, POWDER, FOR SOLUTION INTRAMUSCULAR; INTRAVENOUS at 05:02

## 2025-01-01 RX ADMIN — IPRATROPIUM BROMIDE 0.5 MG: 0.5 SOLUTION RESPIRATORY (INHALATION) at 20:22

## 2025-01-01 RX ADMIN — IPRATROPIUM BROMIDE 0.5 MG: 0.5 SOLUTION RESPIRATORY (INHALATION) at 07:16

## 2025-01-01 RX ADMIN — INSULIN LISPRO 2 UNITS: 100 INJECTION, SOLUTION INTRAVENOUS; SUBCUTANEOUS at 23:10

## 2025-01-01 RX ADMIN — IPRATROPIUM BROMIDE 0.5 MG: 0.5 SOLUTION RESPIRATORY (INHALATION) at 07:13

## 2025-01-01 RX ADMIN — APIXABAN 5 MG: 5 TABLET, FILM COATED ORAL at 23:11

## 2025-01-01 RX ADMIN — HYDROMORPHONE HYDROCHLORIDE 0.8 MG: 1 INJECTION, SOLUTION INTRAMUSCULAR; INTRAVENOUS; SUBCUTANEOUS at 05:53

## 2025-01-01 RX ADMIN — QUETIAPINE 25 MG: 25 TABLET ORAL at 21:09

## 2025-01-01 RX ADMIN — CALCIUM GLUCONATE 1 G: 20 INJECTION, SOLUTION INTRAVENOUS at 09:30

## 2025-01-01 RX ADMIN — ALBUTEROL SULFATE 2 PUFF: 90 AEROSOL, METERED RESPIRATORY (INHALATION) at 12:28

## 2025-01-01 RX ADMIN — HYDROMORPHONE HYDROCHLORIDE 1 MG: 1 INJECTION, SOLUTION INTRAMUSCULAR; INTRAVENOUS; SUBCUTANEOUS at 16:15

## 2025-01-01 RX ADMIN — LORAZEPAM 0.5 MG: 2 INJECTION INTRAMUSCULAR; INTRAVENOUS at 21:25

## 2025-01-01 RX ADMIN — ACETAMINOPHEN 975 MG: 325 TABLET, FILM COATED ORAL at 14:08

## 2025-01-01 RX ADMIN — IOHEXOL 60 ML: 350 INJECTION, SOLUTION INTRAVENOUS at 14:46

## 2025-01-01 RX ADMIN — OXYCODONE HYDROCHLORIDE 10 MG: 10 TABLET ORAL at 23:14

## 2025-01-01 RX ADMIN — LEVALBUTEROL HYDROCHLORIDE 1.25 MG: 1.25 SOLUTION RESPIRATORY (INHALATION) at 07:16

## 2025-01-01 RX ADMIN — OXYCODONE HYDROCHLORIDE 10 MG: 10 TABLET ORAL at 03:03

## 2025-01-01 RX ADMIN — METRONIDAZOLE 500 MG: 500 INJECTION, SOLUTION INTRAVENOUS at 08:08

## 2025-01-01 RX ADMIN — VANCOMYCIN HYDROCHLORIDE 2000 MG: 5 INJECTION, POWDER, LYOPHILIZED, FOR SOLUTION INTRAVENOUS at 15:09

## 2025-01-01 RX ADMIN — LORAZEPAM 0.5 MG: 2 INJECTION INTRAMUSCULAR; INTRAVENOUS at 04:07

## 2025-01-01 RX ADMIN — LEVALBUTEROL HYDROCHLORIDE 1.25 MG: 1.25 SOLUTION RESPIRATORY (INHALATION) at 07:13

## 2025-01-01 RX ADMIN — LORAZEPAM 0.5 MG: 2 INJECTION INTRAMUSCULAR; INTRAVENOUS at 23:19

## 2025-01-01 RX ADMIN — HYDROMORPHONE HYDROCHLORIDE 1 MG: 1 INJECTION, SOLUTION INTRAMUSCULAR; INTRAVENOUS; SUBCUTANEOUS at 08:06

## 2025-01-01 RX ADMIN — DIAZEPAM 5 MG: 5 TABLET ORAL at 20:03

## 2025-01-01 RX ADMIN — METOPROLOL TARTRATE 25 MG: 25 TABLET, FILM COATED ORAL at 23:08

## 2025-01-01 RX ADMIN — DIPHENHYDRAMINE HYDROCHLORIDE 25 MG: 50 INJECTION, SOLUTION INTRAMUSCULAR; INTRAVENOUS at 05:53

## 2025-01-01 RX ADMIN — OXYCODONE HYDROCHLORIDE 5 MG: 5 SOLUTION ORAL at 23:58

## 2025-01-01 RX ADMIN — METOPROLOL TARTRATE 25 MG: 25 TABLET, FILM COATED ORAL at 21:14

## 2025-01-01 RX ADMIN — INSULIN LISPRO 1 UNITS: 100 INJECTION, SOLUTION INTRAVENOUS; SUBCUTANEOUS at 09:06

## 2025-01-01 RX ADMIN — INSULIN LISPRO 1 UNITS: 100 INJECTION, SOLUTION INTRAVENOUS; SUBCUTANEOUS at 18:24

## 2025-01-01 RX ADMIN — LORAZEPAM 0.5 MG: 2 INJECTION INTRAMUSCULAR; INTRAVENOUS at 16:15

## 2025-01-01 RX ADMIN — HYDROMORPHONE HYDROCHLORIDE 1 MG: 1 INJECTION, SOLUTION INTRAMUSCULAR; INTRAVENOUS; SUBCUTANEOUS at 23:19

## 2025-01-01 RX ADMIN — DOCUSATE SODIUM 100 MG: 100 CAPSULE, LIQUID FILLED ORAL at 09:00

## 2025-01-01 RX ADMIN — DIAZEPAM 5 MG: 10 INJECTION, SOLUTION INTRAMUSCULAR; INTRAVENOUS at 08:36

## 2025-01-01 RX ADMIN — INSULIN LISPRO 2 UNITS: 100 INJECTION, SOLUTION INTRAVENOUS; SUBCUTANEOUS at 12:46

## 2025-01-01 RX ADMIN — INSULIN LISPRO 8 UNITS: 100 INJECTION, SOLUTION INTRAVENOUS; SUBCUTANEOUS at 08:15

## 2025-01-01 RX ADMIN — Medication 3 MG: at 21:09

## 2025-01-01 RX ADMIN — LORAZEPAM 0.5 MG: 2 INJECTION INTRAMUSCULAR; INTRAVENOUS at 19:37

## 2025-01-01 RX ADMIN — ACETAMINOPHEN 1000 MG: 10 INJECTION INTRAVENOUS at 06:33

## 2025-01-01 RX ADMIN — MORPHINE SULFATE 2 MG: 2 INJECTION, SOLUTION INTRAMUSCULAR; INTRAVENOUS at 14:09

## 2025-01-01 RX ADMIN — SENNOSIDES AND DOCUSATE SODIUM 1 TABLET: 50; 8.6 TABLET ORAL at 23:08

## 2025-01-01 RX ADMIN — METRONIDAZOLE 500 MG: 500 INJECTION, SOLUTION INTRAVENOUS at 23:09

## 2025-01-01 RX ADMIN — METRONIDAZOLE 500 MG: 500 INJECTION, SOLUTION INTRAVENOUS at 07:43

## 2025-01-01 RX ADMIN — APIXABAN 5 MG: 5 TABLET, FILM COATED ORAL at 20:03

## 2025-01-01 RX ADMIN — HYDROMORPHONE HYDROCHLORIDE 0.8 MG: 1 INJECTION, SOLUTION INTRAMUSCULAR; INTRAVENOUS; SUBCUTANEOUS at 09:04

## 2025-01-01 RX ADMIN — IPRATROPIUM BROMIDE 0.5 MG: 0.5 SOLUTION RESPIRATORY (INHALATION) at 13:53

## 2025-01-01 RX ADMIN — OXYCODONE HYDROCHLORIDE 5 MG: 5 SOLUTION ORAL at 10:44

## 2025-01-01 RX ADMIN — PIPERACILLIN AND TAZOBACTAM 4.5 G: 36; 4.5 INJECTION, POWDER, LYOPHILIZED, FOR SOLUTION INTRAVENOUS at 14:19

## 2025-01-01 RX ADMIN — SENNOSIDES AND DOCUSATE SODIUM 1 TABLET: 50; 8.6 TABLET ORAL at 21:09

## 2025-01-01 RX ADMIN — AMITRIPTYLINE HYDROCHLORIDE 200 MG: 25 TABLET, FILM COATED ORAL at 23:03

## 2025-01-01 RX ADMIN — DIAZEPAM 5 MG: 5 TABLET ORAL at 18:23

## 2025-01-01 RX ADMIN — MORPHINE SULFATE 2 MG: 2 INJECTION, SOLUTION INTRAMUSCULAR; INTRAVENOUS at 05:02

## 2025-01-01 RX ADMIN — METRONIDAZOLE 500 MG: 500 INJECTION, SOLUTION INTRAVENOUS at 12:23

## 2025-01-01 RX ADMIN — Medication 400 MG: at 09:02

## 2025-01-01 RX ADMIN — SODIUM CHLORIDE, SODIUM LACTATE, POTASSIUM CHLORIDE, AND CALCIUM CHLORIDE 2000 ML: .6; .31; .03; .02 INJECTION, SOLUTION INTRAVENOUS at 14:00

## 2025-01-01 RX ADMIN — OLODATEROL RESPIMAT INHALATION SPRAY 2 PUFF: 2.5 SPRAY, METERED RESPIRATORY (INHALATION) at 10:17

## 2025-01-01 RX ADMIN — HYDROMORPHONE HYDROCHLORIDE 0.8 MG: 1 INJECTION, SOLUTION INTRAMUSCULAR; INTRAVENOUS; SUBCUTANEOUS at 18:05

## 2025-01-01 RX ADMIN — IPRATROPIUM BROMIDE 0.5 MG: 0.5 SOLUTION RESPIRATORY (INHALATION) at 20:21

## 2025-01-01 RX ADMIN — ALBUMIN (HUMAN) 12.5 G: 0.25 INJECTION, SOLUTION INTRAVENOUS at 04:50

## 2025-01-01 RX ADMIN — OXYCODONE HYDROCHLORIDE 10 MG: 10 TABLET ORAL at 21:13

## 2025-01-01 RX ADMIN — METOPROLOL TARTRATE 25 MG: 25 TABLET, FILM COATED ORAL at 09:05

## 2025-01-01 RX ADMIN — AMITRIPTYLINE HYDROCHLORIDE 200 MG: 25 TABLET, FILM COATED ORAL at 21:09

## 2025-01-01 RX ADMIN — MORPHINE SULFATE 2 MG: 2 INJECTION, SOLUTION INTRAMUSCULAR; INTRAVENOUS at 08:56

## 2025-01-01 RX ADMIN — HYDROMORPHONE HYDROCHLORIDE 0.8 MG: 1 INJECTION, SOLUTION INTRAMUSCULAR; INTRAVENOUS; SUBCUTANEOUS at 10:26

## 2025-01-01 RX ADMIN — SODIUM CHLORIDE, SODIUM GLUCONATE, SODIUM ACETATE, POTASSIUM CHLORIDE, MAGNESIUM CHLORIDE, SODIUM PHOSPHATE, DIBASIC, AND POTASSIUM PHOSPHATE 250 ML: .53; .5; .37; .037; .03; .012; .00082 INJECTION, SOLUTION INTRAVENOUS at 04:51

## 2025-01-01 RX ADMIN — DOCUSATE SODIUM 100 MG: 100 CAPSULE, LIQUID FILLED ORAL at 17:40

## 2025-01-01 RX ADMIN — LORAZEPAM 0.5 MG: 2 INJECTION INTRAMUSCULAR; INTRAVENOUS at 14:54

## 2025-01-01 RX ADMIN — LEVALBUTEROL HYDROCHLORIDE 1.25 MG: 1.25 SOLUTION RESPIRATORY (INHALATION) at 20:20

## 2025-01-01 RX ADMIN — SENNOSIDES AND DOCUSATE SODIUM 1 TABLET: 50; 8.6 TABLET ORAL at 21:14

## 2025-01-01 RX ADMIN — LEVALBUTEROL HYDROCHLORIDE 1.25 MG: 1.25 SOLUTION RESPIRATORY (INHALATION) at 13:53

## 2025-01-01 RX ADMIN — METHYLPREDNISOLONE SODIUM SUCCINATE 40 MG: 40 INJECTION, POWDER, FOR SOLUTION INTRAMUSCULAR; INTRAVENOUS at 07:42

## 2025-01-01 RX ADMIN — METHYLPREDNISOLONE SODIUM SUCCINATE 40 MG: 40 INJECTION, POWDER, FOR SOLUTION INTRAMUSCULAR; INTRAVENOUS at 06:12

## 2025-01-01 RX ADMIN — LORAZEPAM 0.5 MG: 2 INJECTION INTRAMUSCULAR; INTRAVENOUS at 06:33

## 2025-01-01 RX ADMIN — METHYLPREDNISOLONE SODIUM SUCCINATE 40 MG: 40 INJECTION, POWDER, FOR SOLUTION INTRAMUSCULAR; INTRAVENOUS at 14:08

## 2025-01-01 RX ADMIN — OXYCODONE HYDROCHLORIDE 10 MG: 10 TABLET ORAL at 17:15

## 2025-01-01 RX ADMIN — DIAZEPAM 5 MG: 5 TABLET ORAL at 17:40

## 2025-01-01 RX ADMIN — ACETAMINOPHEN 975 MG: 325 TABLET, FILM COATED ORAL at 21:08

## 2025-01-01 RX ADMIN — POLYETHYLENE GLYCOL 3350 17 G: 17 POWDER, FOR SOLUTION ORAL at 20:03

## 2025-01-01 RX ADMIN — METRONIDAZOLE 500 MG: 500 INJECTION, SOLUTION INTRAVENOUS at 23:58

## 2025-01-01 RX ADMIN — MORPHINE SULFATE 2 MG: 2 INJECTION, SOLUTION INTRAMUSCULAR; INTRAVENOUS at 13:55

## 2025-01-01 RX ADMIN — OXYCODONE HYDROCHLORIDE 10 MG: 10 TABLET ORAL at 13:59

## 2025-01-01 RX ADMIN — UMECLIDINIUM 1 PUFF: 62.5 AEROSOL, POWDER ORAL at 10:17

## 2025-01-01 RX ADMIN — METRONIDAZOLE 500 MG: 500 INJECTION, SOLUTION INTRAVENOUS at 15:22

## 2025-01-01 RX ADMIN — INSULIN LISPRO 4 UNITS: 100 INJECTION, SOLUTION INTRAVENOUS; SUBCUTANEOUS at 17:41

## 2025-01-01 RX ADMIN — METHYLPREDNISOLONE SODIUM SUCCINATE 40 MG: 40 INJECTION, POWDER, FOR SOLUTION INTRAMUSCULAR; INTRAVENOUS at 21:13

## 2025-01-01 RX ADMIN — MORPHINE SULFATE 2 MG: 2 INJECTION, SOLUTION INTRAMUSCULAR; INTRAVENOUS at 19:34

## 2025-01-01 RX ADMIN — APIXABAN 5 MG: 5 TABLET, FILM COATED ORAL at 09:00

## 2025-01-01 RX ADMIN — MORPHINE SULFATE 2 MG: 2 INJECTION, SOLUTION INTRAMUSCULAR; INTRAVENOUS at 18:10

## 2025-01-01 RX ADMIN — METOPROLOL TARTRATE 25 MG: 25 TABLET, FILM COATED ORAL at 21:09

## 2025-01-01 RX ADMIN — HYDROMORPHONE HYDROCHLORIDE 0.5 MG: 1 INJECTION, SOLUTION INTRAMUSCULAR; INTRAVENOUS; SUBCUTANEOUS at 11:20

## 2025-01-01 RX ADMIN — ONDANSETRON 4 MG: 2 INJECTION INTRAMUSCULAR; INTRAVENOUS at 08:08

## 2025-01-01 RX ADMIN — LORAZEPAM 0.5 MG: 2 INJECTION INTRAMUSCULAR; INTRAVENOUS at 08:06

## 2025-01-01 RX ADMIN — METHYLPREDNISOLONE SODIUM SUCCINATE 40 MG: 40 INJECTION, POWDER, FOR SOLUTION INTRAMUSCULAR; INTRAVENOUS at 14:38

## 2025-01-01 RX ADMIN — HYDROMORPHONE HYDROCHLORIDE 1 MG: 1 INJECTION, SOLUTION INTRAMUSCULAR; INTRAVENOUS; SUBCUTANEOUS at 04:07

## 2025-01-01 RX ADMIN — FOLIC ACID 1000 MCG: 1 TABLET ORAL at 09:00

## 2025-01-01 RX ADMIN — INSULIN GLARGINE 8 UNITS: 100 INJECTION, SOLUTION SUBCUTANEOUS at 09:47

## 2025-01-01 RX ADMIN — QUETIAPINE 25 MG: 25 TABLET ORAL at 23:08

## 2025-01-01 RX ADMIN — HYDROMORPHONE HYDROCHLORIDE 0.5 MG: 1 INJECTION, SOLUTION INTRAMUSCULAR; INTRAVENOUS; SUBCUTANEOUS at 14:54

## 2025-01-01 RX ADMIN — LORAZEPAM 0.5 MG: 2 INJECTION INTRAMUSCULAR; INTRAVENOUS at 12:44

## 2025-01-01 RX ADMIN — METHYLPREDNISOLONE SODIUM SUCCINATE 40 MG: 40 INJECTION, POWDER, FOR SOLUTION INTRAMUSCULAR; INTRAVENOUS at 23:08

## 2025-01-01 RX ADMIN — ATORVASTATIN CALCIUM 10 MG: 10 TABLET, FILM COATED ORAL at 09:01

## 2025-01-01 RX ADMIN — OXYCODONE HYDROCHLORIDE 5 MG: 5 SOLUTION ORAL at 15:20

## 2025-01-02 ENCOUNTER — TELEPHONE (OUTPATIENT)
Dept: PALLIATIVE MEDICINE | Facility: CLINIC | Age: 55
End: 2025-01-02

## 2025-01-02 ENCOUNTER — TELEMEDICINE (OUTPATIENT)
Dept: PALLIATIVE MEDICINE | Facility: CLINIC | Age: 55
End: 2025-01-02
Payer: COMMERCIAL

## 2025-01-02 DIAGNOSIS — C34.81 ADENOCARCINOMA OF OVERLAPPING SITES OF RIGHT LUNG (HCC): Primary | ICD-10-CM

## 2025-01-02 DIAGNOSIS — M54.6 RIGHT-SIDED THORACIC BACK PAIN, UNSPECIFIED CHRONICITY: ICD-10-CM

## 2025-01-02 DIAGNOSIS — C79.31 METASTASIS TO BRAIN (HCC): ICD-10-CM

## 2025-01-02 DIAGNOSIS — G89.3 CANCER RELATED PAIN: ICD-10-CM

## 2025-01-02 DIAGNOSIS — J90 PLEURAL EFFUSION: Primary | ICD-10-CM

## 2025-01-02 DIAGNOSIS — Z51.5 PALLIATIVE CARE ENCOUNTER: ICD-10-CM

## 2025-01-02 PROCEDURE — 99213 OFFICE O/P EST LOW 20 MIN: CPT | Performed by: STUDENT IN AN ORGANIZED HEALTH CARE EDUCATION/TRAINING PROGRAM

## 2025-01-02 NOTE — ASSESSMENT & PLAN NOTE
S/p SRS to the right frontal metastases.  Followed Dr. Sergey Alcala of Radiation Oncology  -plans for repeat MRI and ongoing monitoring.

## 2025-01-02 NOTE — ASSESSMENT & PLAN NOTE
Right thoracic sided pain  Pain varies by day from 4 out of 10 to 8-9 out of 10 depending on his activity level.    Pain remains controlled with current regimen. Patient only needing to take on average one OxyIR 5mg tablet a day. There are days when he feels he does not need to take it.    Plan:  1) Oxy IR 5 mg q4 hours PRN for cancer related pain  2) Bowel regimen to continue to avoid OIC   -patient denies issues with bowel movements today

## 2025-01-02 NOTE — ASSESSMENT & PLAN NOTE
Psychosocial   Supportive listening provided  Normalized experience of patient/family  Provided anxiety containment     Referrals Placed / Medical Equipment Ordered  -None today    Follow-Up Recommendations  -Follow-up with PCP and current medical specialists  -Follow-up with palliative care: 4 weeks

## 2025-01-02 NOTE — PROGRESS NOTES
Outpatient Virtual Visit - Palliative and Supportive Care  Papo Gibbs 54 y.o. male [id number]    Intake and Identification:    Reason(s) for virtual visit: televideo, follow-up, symptom management, and psychosocial support. The patient is unable to visit the clinic safely due to medical condition.    Encounter provider Rikki Estrella DO, located at:  [facility] [place]  [address]  [place] [address]    Recent Visits  Date Type Provider Dept   01/02/25 Telephone Elsa Hall Pg Palliative [facility] [place]   01/02/25 Telemedicine Rikki Estrella DO  Palliative Care [place]   Showing recent visits within past 7 days and meeting all other requirements  Future Appointments  No visits were found meeting these conditions.  Showing future appointments within next 150 days and meeting all other requirements       Patient agrees to participate in a virtual check in via telephone or video visit instead of presenting to the office to address urgent/immediate medical needs, or to respect quarantine and public health guidelines. Patient is aware this is a billable service.     After connecting through televideo, the patient was identified by name and date of birth. Papo Gibbs was informed that this was a telemedicine visit and that the visit is being conducted through the Epic Embedded platform. He agrees to proceed. My office door was closed. No one else was in the room. He acknowledged consent and understanding of privacy and security of the video platform. I informed the patient that I have reviewed his record in EPIC and presented the opportunity for him to ask any questions regarding the visit today. The patient has agreed to participate and understands they can discontinue the visit at any time.     ASSESSMENT & PLAN:    Video assessment of patient: Jessica Gibbs seen in no acute physical or emotional distress; NCAT; EOMI; normal respiratory effort on supplemental O2 via NC,  speaking comfortably in complete sentences; alert, oriented, answering questions appropriately; following commands; no focal deficits; normal mood, normal insight, normal judgment    1. Adenocarcinoma of overlapping sites of right lung (HCC)  Assessment & Plan:  Following Medical Oncology with Dr. Sandoval  Recently completed first cycle of Ramucirumab + Docetaxel with next treatment on 1/21/2025.  Patient 3 days post treatment and is doing well without acute symptomatic complaints other than one episode of nausea which was effectively managed with Zofran.    Patient is tolerating PO intake without issues in terms of appetite.  No issues with vomiting, constipation or diarrhea at this time.  Overall stable. Uses 3L of Oxygen continuously.    Patient follows with Pulmonology as well.  Pending results of CT Thorax from 12/31/2024.       2. Palliative care encounter  Assessment & Plan:  Psychosocial   Supportive listening provided  Normalized experience of patient/family  Provided anxiety containment     Referrals Placed / Medical Equipment Ordered  -None today    Follow-Up Recommendations  -Follow-up with PCP and current medical specialists  -Follow-up with palliative care: 4 weeks         3. Cancer related pain  Assessment & Plan:  Right thoracic sided pain  Pain varies by day from 4 out of 10 to 8-9 out of 10 depending on his activity level.    Pain remains controlled with current regimen. Patient only needing to take on average one OxyIR 5mg tablet a day. There are days when he feels he does not need to take it.    Plan:  1) Oxy IR 5 mg q4 hours PRN for cancer related pain  2) Bowel regimen to continue to avoid OIC   -patient denies issues with bowel movements today       4. Right-sided thoracic back pain, unspecified chronicity  Assessment & Plan:         5. Metastasis to brain (HCC)  Assessment & Plan:  S/p SRS to the right frontal metastases.  Followed Dr. Sergey Alcala of Radiation Oncology  -plans for repeat  MRI and ongoing monitoring.         Medication safety issues - Do not drive under the influence of narcotics (including opioids), watch for adverse effects including confusion / altered mental status / respiratory depression (slowed breathing), keep medications stored in a safe/locked environment, do not use alcohol while opioids or other narcotics are in your system. Do not travel with more than the minimum number of tablets or capsules required for the trip.    ER Precautions  Watch for red flag symptoms including, but not limited to fevers, chills, chest pain, shortness of breath, intractable nausea/vomiting/diarrhea, or acute intractable pain (especially if pain is new or has changed).    Decisional apparatus: Patient is competent on my exam today. If competence is lost, patient's substitute decision maker would default to spouse by [place] Act 169.   Advance Directive / Living Will / POLST: On file         PDMP Review: I have reviewed the patient's controlled substance dispensing history in the Prescription Drug Monitoring Program in compliance with the [facility] regulations before prescribing any controlled substances.        Requested Prescriptions      No prescriptions requested or ordered in this encounter       There are no discontinued medications.    Representatives have queried the patient's controlled substance dispensing history in the Prescription Drug Monitoring Program in compliance with regulations before I have prescribed any controlled substances. The prescription history is consistent with prescribed therapy and our practice policies.    25 minutes were spent in this video telecommunications visit, with greater than 50% of the time spent face-to-face with patient in counseling or coordination of care including discussions of obtaining/reviewing history, symptom assessment and management, medication review / adjustment, psychosocial support, reviewing / ordering tests, medicines, imaging, procedures,  supportive listening, anticipatory guidance, patient/family education, instructions for management, importance of adhering to treatment plan, risks/benefits of treatment(s), risk factor reduction, and documenting in the medical record. All of the patient's questions were answered during this discussion.    He is invited to continue to follow with us. If there are questions or concerns, he knows to contact us through our clinic/answering service 24 hours a day, seven days a week.    SUBJECTIVE:  Chief Complaint   Patient presents with    Pain    Cancer Pain        HPI    Papo Gibbs is a 54 y.o. male w/ PMHx of lung adenocarcinoma with metastatic disease to brain.  Current treatment with Ramucirumab and docetaxel.  Following Dr. Sandoval of Medical Oncology.  No new issues at this time. Pain is well controlled with Oxycodone without unwanted adverse effects.  No issues with bowels that are new. Does have some nausea post systemic treatment but is managed by Zofran effectively.  PDMP reviewed and shows no concerns.     The following portions of the medical history were reviewed: past medical history, surgical history, problem list, medication list, family history, and social history.    Current Outpatient Medications:     acetaminophen (TYLENOL) 325 mg tablet, Take 3 tablets (975 mg total) by mouth every 8 (eight) hours as needed for mild pain, headaches or fever, Disp: , Rfl:     albuterol (PROVENTIL HFA,VENTOLIN HFA) 90 mcg/act inhaler, INHALE 2 PUFFS EVERY 6 HOURS AS NEEDED FOR WHEEZING, Disp: 8.5 g, Rfl: 5    amitriptyline (ELAVIL) 100 mg tablet, 200 mg daily at bedtime, Disp: , Rfl:     apixaban (Eliquis) 5 mg, Take 1 tablet (5 mg total) by mouth 2 (two) times a day, Disp: 60 tablet, Rfl: 5    atorvastatin (LIPITOR) 10 mg tablet, Take 1 tablet (10 mg total) by mouth daily, Disp: 90 tablet, Rfl: 3    benzonatate (TESSALON) 200 MG capsule, Take 1 capsule (200 mg total) by mouth 3 (three) times a day as needed for  cough, Disp: 20 capsule, Rfl: 0    famotidine (PEPCID) 40 MG tablet, Take 40 mg by mouth daily as needed for heartburn or indigestion Taking as needed, Disp: , Rfl:     folic acid (FOLVITE) 1 mg tablet, TAKE 1 TABLET BY MOUTH EVERY DAY, Disp: 90 tablet, Rfl: 2    glimepiride (AMARYL) 2 mg tablet, TAKE 1 TABLET BY MOUTH DAILY WITH BREAKFAST, Disp: 90 tablet, Rfl: 1    Glucagon, rDNA, (Glucagon Emergency) 1 MG KIT, Inject 1 mg as directed once as needed (hypoglycemia) for up to 1 dose (Patient not taking: Reported on 12/18/2024), Disp: 2 kit, Rfl: 0    Lancets (OneTouch Delica Plus [id number]) MISC, Use 1 Units 4 (four) times a day, Disp: 400 each, Rfl: 2    levothyroxine 25 mcg tablet, TAKE 1 TABLET BY MOUTH EVERY DAY, Disp: 90 tablet, Rfl: 2    losartan (COZAAR) 25 mg tablet, Take 25 mg by mouth daily, Disp: , Rfl:     meclizine (ANTIVERT) 25 mg tablet, Take 1 tablet (25 mg total) by mouth every 8 (eight) hours as needed for dizziness (Patient not taking: Reported on 12/19/2024), Disp: 30 tablet, Rfl: 0    metFORMIN (GLUCOPHAGE) 1000 MG tablet, Take 1,000 mg by mouth 2 (two) times a day, Disp: , Rfl:     naloxone (NARCAN) 4 mg/0.1 mL nasal spray, Administer 1 spray into a nostril. If no response after 2-3 minutes, give another dose in the other nostril using a new spray. For use in emergencies for opioid / pain medication reversal for accidental ingestion, respiratory depression, sedation or concerns for overdose., Disp: 1 each, Rfl: 1    ondansetron (ZOFRAN-ODT) 8 mg disintegrating tablet, Take 1 tablet (8 mg total) by mouth every 8 (eight) hours as needed for vomiting or nausea, Disp: 30 tablet, Rfl: 0    OneTouch Verio test strip, Use 1 each 4 (four) times a day, Disp: 400 each, Rfl: 2    oxyCODONE (Roxicodone) 5 immediate release tablet, Take 0.5 tablets (2.5 mg total) by mouth every 4 (four) hours as needed for moderate pain If pain is severe, may instead take 1 tablet (5mg) every 4 hours as needed Max Daily  "Amount: 15 mg, Disp: 60 tablet, Rfl: 0    senna (SENOKOT) 8.6 mg, Take 1 tablet (8.6 mg total) by mouth daily at bedtime as needed for constipation, Disp: 30 tablet, Rfl: 1    tiotropium-olodaterol (Stiolto Respimat) 2.5-2.5 MCG/ACT inhaler, Inhale 2 puffs daily, Disp: 4 g, Rfl: 3  No current facility-administered medications for this visit.    Review of Systems   Constitutional:  Negative for chills and fever.   HENT:  Negative for trouble swallowing.    Respiratory:  Negative for shortness of breath.    Cardiovascular:  Negative for chest pain.   Gastrointestinal:  Negative for abdominal pain, constipation, diarrhea, nausea and vomiting.   All other systems reviewed and are negative.      Rikki Estrella DO  [facility] Palliative and Supportive [place] [phone]    Portions of this document may have been created using dictation software and as such some \"sound alike\" terms may have been generated by the system. Do not hesitate to contact me with any questions or clarifications.    VIRTUAL VISIT DISCLAIMER    Papo Gibbs acknowledges that he has consented to an online visit or consultation. He understands that the online visit is based solely on information provided by him, and that, in the absence of a face-to-face physical evaluation by the physician, the diagnosis he receives is both limited and provisional in terms of accuracy and completeness. This is not intended to replace a full medical face-to-face evaluation by the physician. Papo Hilarioendez understands and accepts these terms.    "

## 2025-01-02 NOTE — PATIENT INSTRUCTIONS
It was a pleasure speaking with you today.    As discussed:  -Continue your medications as prescribed.  -Continue with a bowel regimen to avoid constipation.    Follow-up in: 4 weeks for recheck after 2nd cycle of chemotherapy to monitor for symptoms    Please do not hesitate to call me sooner should you have any questions/concerns or needs.    Medication safety issues - Do not drive under the influence of narcotics (including opioids), watch for adverse effects including confusion / altered mental status / respiratory depression (slowed breathing), keep medications stored in a safe/locked environment, do not use alcohol while opioids or other narcotics are in your system. Do not travel with more than the minimum number of tablets or capsules required for the trip.    ER Precautions  Watch for red flag symptoms including, but not limited to fevers, chills, chest pain, shortness of breath, intractable nausea/vomiting/diarrhea, or acute intractable pain (especially if pain is new or has changed).

## 2025-01-02 NOTE — ASSESSMENT & PLAN NOTE
Following Medical Oncology with Dr. Sandoval  Recently completed first cycle of Ramucirumab + Docetaxel with next treatment on 1/21/2025.  Patient 3 days post treatment and is doing well without acute symptomatic complaints other than one episode of nausea which was effectively managed with Zofran.    Patient is tolerating PO intake without issues in terms of appetite.  No issues with vomiting, constipation or diarrhea at this time.  Overall stable. Uses 3L of Oxygen continuously.    Patient follows with Pulmonology as well.  Pending results of CT Thorax from 12/31/2024.

## 2025-01-03 ENCOUNTER — TELEPHONE (OUTPATIENT)
Age: 55
End: 2025-01-03

## 2025-01-03 NOTE — TELEPHONE ENCOUNTER
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 has to have diag. Code and current liter flow wife states that the 3L flow is not doing much for him and the flow may need to be adjusted FYI  Fax: [phone]

## 2025-01-04 ENCOUNTER — APPOINTMENT (EMERGENCY)
Dept: CT IMAGING | Facility: HOSPITAL | Age: 55
DRG: 180 | End: 2025-01-04
Payer: COMMERCIAL

## 2025-01-04 ENCOUNTER — APPOINTMENT (EMERGENCY)
Dept: RADIOLOGY | Facility: HOSPITAL | Age: 55
DRG: 180 | End: 2025-01-04
Payer: COMMERCIAL

## 2025-01-04 ENCOUNTER — HOSPITAL ENCOUNTER (INPATIENT)
Facility: HOSPITAL | Age: 55
LOS: 2 days | Discharge: HOME HEALTH | DRG: 180 | End: 2025-01-07
Attending: EMERGENCY MEDICINE | Admitting: INTERNAL MEDICINE
Payer: COMMERCIAL

## 2025-01-04 DIAGNOSIS — J96.21 ACUTE ON CHRONIC HYPOXIC RESPIRATORY FAILURE (HCC): ICD-10-CM

## 2025-01-04 DIAGNOSIS — A41.9 SEVERE SEPSIS (HCC): Primary | ICD-10-CM

## 2025-01-04 DIAGNOSIS — R79.89 ELEVATED TROPONIN: ICD-10-CM

## 2025-01-04 DIAGNOSIS — C80.1 ADENOCARCINOMA (HCC): ICD-10-CM

## 2025-01-04 DIAGNOSIS — R79.89 ELEVATED D-DIMER: ICD-10-CM

## 2025-01-04 DIAGNOSIS — R07.9 CHEST PAIN: ICD-10-CM

## 2025-01-04 DIAGNOSIS — R06.09 DOE (DYSPNEA ON EXERTION): ICD-10-CM

## 2025-01-04 DIAGNOSIS — R65.20 SEVERE SEPSIS (HCC): Primary | ICD-10-CM

## 2025-01-04 LAB
ALBUMIN SERPL BCG-MCNC: 3.2 G/DL (ref 3.5–5)
ALP SERPL-CCNC: 87 U/L (ref 34–104)
ALT SERPL W P-5'-P-CCNC: 25 U/L (ref 7–52)
ANION GAP SERPL CALCULATED.3IONS-SCNC: 10 MMOL/L (ref 4–13)
APTT PPP: 30 SECONDS (ref 23–34)
AST SERPL W P-5'-P-CCNC: 28 U/L (ref 13–39)
BASE EX.OXY STD BLDV CALC-SCNC: 93.8 % (ref 60–80)
BASE EXCESS BLDV CALC-SCNC: -2 MMOL/L
BASOPHILS # BLD AUTO: 0.03 THOUSANDS/ΜL (ref 0–0.1)
BASOPHILS NFR BLD AUTO: 1 % (ref 0–1)
BILIRUB SERPL-MCNC: 0.48 MG/DL (ref 0.2–1)
BNP SERPL-MCNC: 63 PG/ML (ref 0–100)
BUN SERPL-MCNC: 15 MG/DL (ref 5–25)
CALCIUM ALBUM COR SERPL-MCNC: 9.4 MG/DL (ref 8.3–10.1)
CALCIUM SERPL-MCNC: 8.8 MG/DL (ref 8.4–10.2)
CARDIAC TROPONIN I PNL SERPL HS: 51 NG/L (ref ?–50)
CHLORIDE SERPL-SCNC: 103 MMOL/L (ref 96–108)
CO2 SERPL-SCNC: 25 MMOL/L (ref 21–32)
CREAT SERPL-MCNC: 0.74 MG/DL (ref 0.6–1.3)
D DIMER PPP FEU-MCNC: 18.13 UG/ML FEU
EOSINOPHIL # BLD AUTO: 0.02 THOUSAND/ΜL (ref 0–0.61)
EOSINOPHIL NFR BLD AUTO: 1 % (ref 0–6)
ERYTHROCYTE [DISTWIDTH] IN BLOOD BY AUTOMATED COUNT: 16.1 % (ref 11.6–15.1)
FLUAV AG UPPER RESP QL IA.RAPID: NEGATIVE
FLUBV AG UPPER RESP QL IA.RAPID: NEGATIVE
GFR SERPL CREATININE-BSD FRML MDRD: 103 ML/MIN/1.73SQ M
GLUCOSE SERPL-MCNC: 204 MG/DL (ref 65–140)
HCO3 BLDV-SCNC: 21.1 MMOL/L (ref 24–30)
HCT VFR BLD AUTO: 23.7 % (ref 36.5–49.3)
HGB BLD-MCNC: 7.5 G/DL (ref 12–17)
IMM GRANULOCYTES # BLD AUTO: 0.05 THOUSAND/UL (ref 0–0.2)
IMM GRANULOCYTES NFR BLD AUTO: 1 % (ref 0–2)
INR PPP: 1.34 (ref 0.85–1.19)
LACTATE SERPL-SCNC: 3.6 MMOL/L (ref 0.5–2)
LYMPHOCYTES # BLD AUTO: 1.08 THOUSANDS/ΜL (ref 0.6–4.47)
LYMPHOCYTES NFR BLD AUTO: 27 % (ref 14–44)
MCH RBC QN AUTO: 27.1 PG (ref 26.8–34.3)
MCHC RBC AUTO-ENTMCNC: 31.6 G/DL (ref 31.4–37.4)
MCV RBC AUTO: 86 FL (ref 82–98)
MONOCYTES # BLD AUTO: 0.16 THOUSAND/ΜL (ref 0.17–1.22)
MONOCYTES NFR BLD AUTO: 4 % (ref 4–12)
NEUTROPHILS # BLD AUTO: 2.62 THOUSANDS/ΜL (ref 1.85–7.62)
NEUTS SEG NFR BLD AUTO: 66 % (ref 43–75)
NRBC BLD AUTO-RTO: 1 /100 WBCS
O2 CT BLDV-SCNC: 11 ML/DL
PCO2 BLDV: 29.2 MM HG (ref 42–50)
PH BLDV: 7.48 [PH] (ref 7.3–7.4)
PLATELET # BLD AUTO: 222 THOUSANDS/UL (ref 149–390)
PMV BLD AUTO: 11.3 FL (ref 8.9–12.7)
PO2 BLDV: 151.4 MM HG (ref 35–45)
POTASSIUM SERPL-SCNC: 4.4 MMOL/L (ref 3.5–5.3)
PROCALCITONIN SERPL-MCNC: 0.21 NG/ML
PROT SERPL-MCNC: 6.3 G/DL (ref 6.4–8.4)
PROTHROMBIN TIME: 17.3 SECONDS (ref 12.3–15)
RBC # BLD AUTO: 2.77 MILLION/UL (ref 3.88–5.62)
SARS-COV+SARS-COV-2 AG RESP QL IA.RAPID: NEGATIVE
SODIUM SERPL-SCNC: 138 MMOL/L (ref 135–147)
WBC # BLD AUTO: 3.96 THOUSAND/UL (ref 4.31–10.16)

## 2025-01-04 PROCEDURE — 84484 ASSAY OF TROPONIN QUANT: CPT

## 2025-01-04 PROCEDURE — 83880 ASSAY OF NATRIURETIC PEPTIDE: CPT

## 2025-01-04 PROCEDURE — 96361 HYDRATE IV INFUSION ADD-ON: CPT

## 2025-01-04 PROCEDURE — 87811 SARS-COV-2 COVID19 W/OPTIC: CPT

## 2025-01-04 PROCEDURE — 82805 BLOOD GASES W/O2 SATURATION: CPT

## 2025-01-04 PROCEDURE — 85610 PROTHROMBIN TIME: CPT

## 2025-01-04 PROCEDURE — 87040 BLOOD CULTURE FOR BACTERIA: CPT

## 2025-01-04 PROCEDURE — 71275 CT ANGIOGRAPHY CHEST: CPT

## 2025-01-04 PROCEDURE — 85730 THROMBOPLASTIN TIME PARTIAL: CPT

## 2025-01-04 PROCEDURE — 87804 INFLUENZA ASSAY W/OPTIC: CPT

## 2025-01-04 PROCEDURE — 83605 ASSAY OF LACTIC ACID: CPT

## 2025-01-04 PROCEDURE — 99285 EMERGENCY DEPT VISIT HI MDM: CPT

## 2025-01-04 PROCEDURE — 99291 CRITICAL CARE FIRST HOUR: CPT | Performed by: EMERGENCY MEDICINE

## 2025-01-04 PROCEDURE — 85025 COMPLETE CBC W/AUTO DIFF WBC: CPT

## 2025-01-04 PROCEDURE — 71045 X-RAY EXAM CHEST 1 VIEW: CPT

## 2025-01-04 PROCEDURE — 85379 FIBRIN DEGRADATION QUANT: CPT

## 2025-01-04 PROCEDURE — 36415 COLL VENOUS BLD VENIPUNCTURE: CPT

## 2025-01-04 PROCEDURE — 80053 COMPREHEN METABOLIC PANEL: CPT

## 2025-01-04 PROCEDURE — 94640 AIRWAY INHALATION TREATMENT: CPT

## 2025-01-04 PROCEDURE — 84145 PROCALCITONIN (PCT): CPT

## 2025-01-04 RX ORDER — SODIUM CHLORIDE 9 MG/ML
3 INJECTION INTRAVENOUS
Status: DISCONTINUED | OUTPATIENT
Start: 2025-01-04 | End: 2025-01-07 | Stop reason: HOSPADM

## 2025-01-04 RX ORDER — IPRATROPIUM BROMIDE AND ALBUTEROL SULFATE 2.5; .5 MG/3ML; MG/3ML
3 SOLUTION RESPIRATORY (INHALATION) ONCE
Status: COMPLETED | OUTPATIENT
Start: 2025-01-04 | End: 2025-01-04

## 2025-01-04 RX ADMIN — SODIUM CHLORIDE 750 ML: 0.9 INJECTION, SOLUTION INTRAVENOUS at 23:33

## 2025-01-04 RX ADMIN — SODIUM CHLORIDE 250 ML: 0.9 INJECTION, SOLUTION INTRAVENOUS at 22:56

## 2025-01-04 RX ADMIN — IOHEXOL 65 ML: 350 INJECTION, SOLUTION INTRAVENOUS at 23:43

## 2025-01-04 RX ADMIN — IPRATROPIUM BROMIDE AND ALBUTEROL SULFATE 3 ML: .5; 3 SOLUTION RESPIRATORY (INHALATION) at 23:27

## 2025-01-05 ENCOUNTER — APPOINTMENT (INPATIENT)
Dept: VASCULAR ULTRASOUND | Facility: HOSPITAL | Age: 55
DRG: 180 | End: 2025-01-05
Payer: COMMERCIAL

## 2025-01-05 ENCOUNTER — APPOINTMENT (INPATIENT)
Dept: ULTRASOUND IMAGING | Facility: HOSPITAL | Age: 55
DRG: 180 | End: 2025-01-05
Payer: COMMERCIAL

## 2025-01-05 PROBLEM — R60.0 PERIPHERAL EDEMA: Status: ACTIVE | Noted: 2025-01-05

## 2025-01-05 LAB
2HR DELTA HS TROPONIN: -4 NG/L
4HR DELTA HS TROPONIN: -3 NG/L
ABO GROUP BLD: NORMAL
ALBUMIN SERPL BCG-MCNC: 2.6 G/DL (ref 3.5–5)
ALP SERPL-CCNC: 75 U/L (ref 34–104)
ALT SERPL W P-5'-P-CCNC: 19 U/L (ref 7–52)
ANION GAP SERPL CALCULATED.3IONS-SCNC: 6 MMOL/L (ref 4–13)
AST SERPL W P-5'-P-CCNC: 20 U/L (ref 13–39)
BACTERIA UR QL AUTO: ABNORMAL /HPF
BASOPHILS # BLD AUTO: 0.01 THOUSANDS/ΜL (ref 0–0.1)
BASOPHILS NFR BLD AUTO: 0 % (ref 0–1)
BILIRUB SERPL-MCNC: 0.33 MG/DL (ref 0.2–1)
BILIRUB UR QL STRIP: NEGATIVE
BLD GP AB SCN SERPL QL: NEGATIVE
BUN SERPL-MCNC: 13 MG/DL (ref 5–25)
CALCIUM ALBUM COR SERPL-MCNC: 9 MG/DL (ref 8.3–10.1)
CALCIUM SERPL-MCNC: 7.9 MG/DL (ref 8.4–10.2)
CAOX CRY URNS QL MICRO: ABNORMAL /HPF
CARDIAC TROPONIN I PNL SERPL HS: 47 NG/L (ref ?–50)
CARDIAC TROPONIN I PNL SERPL HS: 48 NG/L (ref ?–50)
CHLORIDE SERPL-SCNC: 108 MMOL/L (ref 96–108)
CLARITY UR: CLEAR
CO2 SERPL-SCNC: 25 MMOL/L (ref 21–32)
COLOR UR: YELLOW
CREAT SERPL-MCNC: 0.64 MG/DL (ref 0.6–1.3)
EOSINOPHIL # BLD AUTO: 0.01 THOUSAND/ΜL (ref 0–0.61)
EOSINOPHIL NFR BLD AUTO: 0 % (ref 0–6)
ERYTHROCYTE [DISTWIDTH] IN BLOOD BY AUTOMATED COUNT: 16.3 % (ref 11.6–15.1)
FERRITIN SERPL-MCNC: 978 NG/ML (ref 24–336)
GFR SERPL CREATININE-BSD FRML MDRD: 110 ML/MIN/1.73SQ M
GLUCOSE SERPL-MCNC: 136 MG/DL (ref 65–140)
GLUCOSE SERPL-MCNC: 158 MG/DL (ref 65–140)
GLUCOSE SERPL-MCNC: 183 MG/DL (ref 65–140)
GLUCOSE SERPL-MCNC: 187 MG/DL (ref 65–140)
GLUCOSE SERPL-MCNC: 211 MG/DL (ref 65–140)
GLUCOSE UR STRIP-MCNC: ABNORMAL MG/DL
HCT VFR BLD AUTO: 21 % (ref 36.5–49.3)
HCT VFR BLD AUTO: 21.4 % (ref 36.5–49.3)
HCT VFR BLD AUTO: 24.2 % (ref 36.5–49.3)
HGB BLD-MCNC: 6.5 G/DL (ref 12–17)
HGB BLD-MCNC: 6.6 G/DL (ref 12–17)
HGB BLD-MCNC: 7.7 G/DL (ref 12–17)
HGB UR QL STRIP.AUTO: ABNORMAL
IMM GRANULOCYTES # BLD AUTO: 0.04 THOUSAND/UL (ref 0–0.2)
IMM GRANULOCYTES NFR BLD AUTO: 2 % (ref 0–2)
IRON SATN MFR SERPL: 18 % (ref 15–50)
IRON SERPL-MCNC: 45 UG/DL (ref 50–212)
KETONES UR STRIP-MCNC: NEGATIVE MG/DL
L PNEUMO1 AG UR QL IA.RAPID: NEGATIVE
LACTATE SERPL-SCNC: 1.1 MMOL/L (ref 0.5–2)
LEUKOCYTE ESTERASE UR QL STRIP: NEGATIVE
LYMPHOCYTES # BLD AUTO: 0.62 THOUSANDS/ΜL (ref 0.6–4.47)
LYMPHOCYTES NFR BLD AUTO: 27 % (ref 14–44)
MCH RBC QN AUTO: 26.7 PG (ref 26.8–34.3)
MCHC RBC AUTO-ENTMCNC: 30.8 G/DL (ref 31.4–37.4)
MCV RBC AUTO: 87 FL (ref 82–98)
MONOCYTES # BLD AUTO: 0.11 THOUSAND/ΜL (ref 0.17–1.22)
MONOCYTES NFR BLD AUTO: 5 % (ref 4–12)
MUCOUS THREADS UR QL AUTO: ABNORMAL
NEUTROPHILS # BLD AUTO: 1.49 THOUSANDS/ΜL (ref 1.85–7.62)
NEUTS SEG NFR BLD AUTO: 66 % (ref 43–75)
NITRITE UR QL STRIP: NEGATIVE
NON-SQ EPI CELLS URNS QL MICRO: ABNORMAL /HPF
NRBC BLD AUTO-RTO: 0 /100 WBCS
PH UR STRIP.AUTO: 6 [PH]
PLATELET # BLD AUTO: 162 THOUSANDS/UL (ref 149–390)
PMV BLD AUTO: 11.1 FL (ref 8.9–12.7)
POTASSIUM SERPL-SCNC: 4 MMOL/L (ref 3.5–5.3)
PROT SERPL-MCNC: 5.1 G/DL (ref 6.4–8.4)
PROT UR STRIP-MCNC: ABNORMAL MG/DL
RBC # BLD AUTO: 2.47 MILLION/UL (ref 3.88–5.62)
RBC #/AREA URNS AUTO: ABNORMAL /HPF
RH BLD: POSITIVE
S PNEUM AG UR QL: NEGATIVE
SODIUM SERPL-SCNC: 139 MMOL/L (ref 135–147)
SP GR UR STRIP.AUTO: >=1.05 (ref 1–1.03)
SPECIMEN EXPIRATION DATE: NORMAL
TIBC SERPL-MCNC: 254.8 UG/DL (ref 250–450)
TRANSFERRIN SERPL-MCNC: 182 MG/DL (ref 203–362)
UIBC SERPL-MCNC: 210 UG/DL (ref 155–355)
UROBILINOGEN UR STRIP-ACNC: <2 MG/DL
WBC # BLD AUTO: 2.28 THOUSAND/UL (ref 4.31–10.16)
WBC #/AREA URNS AUTO: ABNORMAL /HPF

## 2025-01-05 PROCEDURE — 84484 ASSAY OF TROPONIN QUANT: CPT

## 2025-01-05 PROCEDURE — 85025 COMPLETE CBC W/AUTO DIFF WBC: CPT

## 2025-01-05 PROCEDURE — 86850 RBC ANTIBODY SCREEN: CPT

## 2025-01-05 PROCEDURE — 87449 NOS EACH ORGANISM AG IA: CPT

## 2025-01-05 PROCEDURE — 82948 REAGENT STRIP/BLOOD GLUCOSE: CPT

## 2025-01-05 PROCEDURE — 36415 COLL VENOUS BLD VENIPUNCTURE: CPT

## 2025-01-05 PROCEDURE — 81001 URINALYSIS AUTO W/SCOPE: CPT

## 2025-01-05 PROCEDURE — P9016 RBC LEUKOCYTES REDUCED: HCPCS

## 2025-01-05 PROCEDURE — 86923 COMPATIBILITY TEST ELECTRIC: CPT

## 2025-01-05 PROCEDURE — 85014 HEMATOCRIT: CPT

## 2025-01-05 PROCEDURE — 83550 IRON BINDING TEST: CPT

## 2025-01-05 PROCEDURE — 80053 COMPREHEN METABOLIC PANEL: CPT

## 2025-01-05 PROCEDURE — 85018 HEMOGLOBIN: CPT

## 2025-01-05 PROCEDURE — 86901 BLOOD TYPING SEROLOGIC RH(D): CPT

## 2025-01-05 PROCEDURE — 93970 EXTREMITY STUDY: CPT | Performed by: SURGERY

## 2025-01-05 PROCEDURE — 99223 1ST HOSP IP/OBS HIGH 75: CPT | Performed by: INTERNAL MEDICINE

## 2025-01-05 PROCEDURE — 96361 HYDRATE IV INFUSION ADD-ON: CPT

## 2025-01-05 PROCEDURE — 93970 EXTREMITY STUDY: CPT

## 2025-01-05 PROCEDURE — 96365 THER/PROPH/DIAG IV INF INIT: CPT

## 2025-01-05 PROCEDURE — 83540 ASSAY OF IRON: CPT

## 2025-01-05 PROCEDURE — 30233N1 TRANSFUSION OF NONAUTOLOGOUS RED BLOOD CELLS INTO PERIPHERAL VEIN, PERCUTANEOUS APPROACH: ICD-10-PCS | Performed by: INTERNAL MEDICINE

## 2025-01-05 PROCEDURE — 87081 CULTURE SCREEN ONLY: CPT | Performed by: NURSE PRACTITIONER

## 2025-01-05 PROCEDURE — 86900 BLOOD TYPING SEROLOGIC ABO: CPT

## 2025-01-05 PROCEDURE — 82728 ASSAY OF FERRITIN: CPT

## 2025-01-05 PROCEDURE — 76775 US EXAM ABDO BACK WALL LIM: CPT

## 2025-01-05 PROCEDURE — 83605 ASSAY OF LACTIC ACID: CPT

## 2025-01-05 RX ORDER — INSULIN GLARGINE 100 [IU]/ML
3 INJECTION, SOLUTION SUBCUTANEOUS
Status: DISCONTINUED | OUTPATIENT
Start: 2025-01-05 | End: 2025-01-07 | Stop reason: HOSPADM

## 2025-01-05 RX ORDER — INSULIN LISPRO 100 [IU]/ML
2-12 INJECTION, SOLUTION INTRAVENOUS; SUBCUTANEOUS
Status: DISCONTINUED | OUTPATIENT
Start: 2025-01-05 | End: 2025-01-07 | Stop reason: HOSPADM

## 2025-01-05 RX ORDER — ACETAMINOPHEN 325 MG/1
975 TABLET ORAL EVERY 8 HOURS PRN
Status: DISCONTINUED | OUTPATIENT
Start: 2025-01-05 | End: 2025-01-07 | Stop reason: HOSPADM

## 2025-01-05 RX ORDER — SENNOSIDES 8.6 MG
8.6 TABLET ORAL
Status: DISCONTINUED | OUTPATIENT
Start: 2025-01-05 | End: 2025-01-07 | Stop reason: HOSPADM

## 2025-01-05 RX ORDER — LOSARTAN POTASSIUM 25 MG/1
25 TABLET ORAL DAILY
Status: DISCONTINUED | OUTPATIENT
Start: 2025-01-05 | End: 2025-01-07 | Stop reason: HOSPADM

## 2025-01-05 RX ORDER — ALBUTEROL SULFATE 90 UG/1
2 INHALANT RESPIRATORY (INHALATION) EVERY 4 HOURS PRN
Status: DISCONTINUED | OUTPATIENT
Start: 2025-01-05 | End: 2025-01-07 | Stop reason: HOSPADM

## 2025-01-05 RX ORDER — LEVOTHYROXINE SODIUM 25 UG/1
25 TABLET ORAL DAILY
Status: DISCONTINUED | OUTPATIENT
Start: 2025-01-05 | End: 2025-01-07 | Stop reason: HOSPADM

## 2025-01-05 RX ORDER — FAMOTIDINE 20 MG/1
40 TABLET, FILM COATED ORAL DAILY PRN
Status: DISCONTINUED | OUTPATIENT
Start: 2025-01-05 | End: 2025-01-07 | Stop reason: HOSPADM

## 2025-01-05 RX ORDER — ONDANSETRON 4 MG/1
8 TABLET, ORALLY DISINTEGRATING ORAL EVERY 8 HOURS PRN
Status: DISCONTINUED | OUTPATIENT
Start: 2025-01-05 | End: 2025-01-07 | Stop reason: HOSPADM

## 2025-01-05 RX ORDER — BENZONATATE 100 MG/1
200 CAPSULE ORAL 3 TIMES DAILY PRN
Status: DISCONTINUED | OUTPATIENT
Start: 2025-01-05 | End: 2025-01-07 | Stop reason: HOSPADM

## 2025-01-05 RX ORDER — GLIMEPIRIDE 2 MG/1
2 TABLET ORAL
Status: DISCONTINUED | OUTPATIENT
Start: 2025-01-05 | End: 2025-01-05

## 2025-01-05 RX ORDER — ATORVASTATIN CALCIUM 10 MG/1
10 TABLET, FILM COATED ORAL DAILY
Status: DISCONTINUED | OUTPATIENT
Start: 2025-01-05 | End: 2025-01-07 | Stop reason: HOSPADM

## 2025-01-05 RX ORDER — FOLIC ACID 1 MG/1
1000 TABLET ORAL DAILY
Status: DISCONTINUED | OUTPATIENT
Start: 2025-01-05 | End: 2025-01-07 | Stop reason: HOSPADM

## 2025-01-05 RX ORDER — ALPRAZOLAM 0.25 MG/1
0.25 TABLET ORAL EVERY 8 HOURS PRN
Status: DISCONTINUED | OUTPATIENT
Start: 2025-01-05 | End: 2025-01-07 | Stop reason: HOSPADM

## 2025-01-05 RX ADMIN — ALBUTEROL SULFATE 2 PUFF: 90 AEROSOL, METERED RESPIRATORY (INHALATION) at 04:02

## 2025-01-05 RX ADMIN — OLODATEROL RESPIMAT INHALATION SPRAY 2 PUFF: 2.5 SPRAY, METERED RESPIRATORY (INHALATION) at 09:05

## 2025-01-05 RX ADMIN — LOSARTAN POTASSIUM 25 MG: 25 TABLET, FILM COATED ORAL at 19:40

## 2025-01-05 RX ADMIN — APIXABAN 5 MG: 5 TABLET, FILM COATED ORAL at 16:58

## 2025-01-05 RX ADMIN — AMITRIPTYLINE HYDROCHLORIDE 200 MG: 25 TABLET, FILM COATED ORAL at 21:29

## 2025-01-05 RX ADMIN — INSULIN LISPRO 2 UNITS: 100 INJECTION, SOLUTION INTRAVENOUS; SUBCUTANEOUS at 09:02

## 2025-01-05 RX ADMIN — UMECLIDINIUM 1 PUFF: 62.5 AEROSOL, POWDER ORAL at 09:05

## 2025-01-05 RX ADMIN — CEFEPIME 2000 MG: 2 INJECTION, POWDER, FOR SOLUTION INTRAVENOUS at 00:25

## 2025-01-05 RX ADMIN — ATORVASTATIN CALCIUM 10 MG: 10 TABLET, FILM COATED ORAL at 19:40

## 2025-01-05 RX ADMIN — INSULIN GLARGINE 3 UNITS: 100 INJECTION, SOLUTION SUBCUTANEOUS at 09:01

## 2025-01-05 RX ADMIN — LEVOTHYROXINE SODIUM 25 MCG: 25 TABLET ORAL at 08:58

## 2025-01-05 RX ADMIN — INSULIN LISPRO 2 UNITS: 100 INJECTION, SOLUTION INTRAVENOUS; SUBCUTANEOUS at 17:28

## 2025-01-05 RX ADMIN — SODIUM CHLORIDE 2000 MG: 0.9 INJECTION, SOLUTION INTRAVENOUS at 01:04

## 2025-01-05 RX ADMIN — Medication 2.5 MG: at 19:40

## 2025-01-05 RX ADMIN — ONDANSETRON 8 MG: 4 TABLET, ORALLY DISINTEGRATING ORAL at 09:13

## 2025-01-05 RX ADMIN — FOLIC ACID 1000 MCG: 1 TABLET ORAL at 08:58

## 2025-01-05 RX ADMIN — APIXABAN 5 MG: 5 TABLET, FILM COATED ORAL at 11:36

## 2025-01-05 RX ADMIN — INSULIN LISPRO 2 UNITS: 100 INJECTION, SOLUTION INTRAVENOUS; SUBCUTANEOUS at 11:36

## 2025-01-05 NOTE — PLAN OF CARE
Problem: Potential for Falls  Goal: Patient will remain free of falls  Description: INTERVENTIONS:  - Educate patient/family on patient safety including physical limitations  - Instruct patient to call for assistance with activity   - Consult OT/PT to assist with strengthening/mobility   - Keep Call bell within reach  - Keep bed low and locked with side rails adjusted as appropriate  - Keep care items and personal belongings within reach  - Initiate and maintain comfort rounds  - Make Fall Risk Sign visible to staff  - Offer Toileting every 2   Hours, in advance of need  - Initiate/Maintain bed alarm  - Obtain necessary fall risk management equipment: walker  - Apply yellow socks and bracelet for high fall risk patients  - Consider moving patient to room near nurses station  Outcome: Progressing

## 2025-01-05 NOTE — SEPSIS NOTE
"  Sepsis Note   Papo Gibbs 54 y.o. male MRN: [id number]  Unit/Bed#: ED-09 Encounter: [id number]       Initial Sepsis Screening       Row Name 01/04/25 2330                Is the patient's history suggestive of a new or worsening infection? Yes (Proceed)  -BW        Suspected source of infection pneumonia  -BW        Indicate SIRS criteria Tachycardia > 90 bpm;Tachypnea > 20 resp per min  -BW        Are two or more of the above signs & symptoms of infection both present and new to the patient? Yes (Proceed)  -BW        Assess for evidence of organ dysfunction: Are any of the below criteria present within 6 hours of suspected infection and SIRS criteria that are NOT considered to be chronic conditions? Lactate > 2.0  -BW        Date of presentation of severe sepsis 01/04/25  -BW        Time of presentation of severe sepsis --        Sepsis Note: Click \"NEXT\" below (NOT \"close\") to generate sepsis note based on above information. YES (proceed by clicking \"NEXT\")  -BW                  User Key  (r) = Recorded By, (t) = Taken By, (c) = Cosigned By      Initials Name Provider Type    BW Hua Beckford DO Resident                    Default Flowsheet Data (Last 720 Hours)       Sepsis Reassess       Row Name 01/05/25 0148                   Repeat Volume Status and Tissue Perfusion Assessment Performed    Date of Reassessment: 01/05/25  -BW        Time of Reassessment: 0148  -BW        Sepsis Reassessment Note: Click \"NEXT\" below (NOT \"close\") to generate sepsis reassessment note. YES (proceed by clicking \"NEXT\")  -BW        Repeat Volume Status and Tissue Perfusion Assessment Performed --                  User Key  (r) = Recorded By, (t) = Taken By, (c) = Cosigned By      Initials Name Provider Type    LOBO Beckford DO Resident                    Body mass index is 30.85 kg/m².  Wt Readings from Last 1 Encounters:   12/31/24 89.4 kg (197 lb)     IBW (Ideal Body Weight): 66.1 kg    Ideal body " weight: 66.1 kg (145 lb 11.6 oz)  Adjusted ideal body weight: 75.4 kg (166 lb 3.8 oz)

## 2025-01-05 NOTE — ED ATTENDING ATTESTATION
1/4/2025  I, Duarte Martin MD, saw and evaluated the patient. I have discussed the patient with the resident/non-physician practitioner and agree with the resident's/non-physician practitioner's findings, Plan of Care, and MDM as documented in the resident's/non-physician practitioner's note, except where noted. All available labs and Radiology studies were reviewed.  I was present for key portions of any procedure(s) performed by the resident/non-physician practitioner and I was immediately available to provide assistance.       At this point I agree with the current assessment done in the Emergency Department.  I have conducted an independent evaluation of this patient a history and physical is as follows: Patient is a 54 year old male with worsening sob and dyspnea on exertion since yesterday with cough. No fever. No chest pain. No N/V. No travel. No smoking. Is on oxygen at home. Has stage 4 lung cancer. Patient is on eliquis for PE/DVT. Patient needed our urgent attention. [facility] seen [facility] [place] on 12/18/24 for right thoracic back pain. [facility] website checked on this patient and last Rx filled was on 12/18/24 for oxycodone for 30 day supply. NCAT. Moist mucous membranes. No scleral icterus. Tachycardia without murmur. Tachypnea. Diminished breath sounds bilaterally. No wheezing or rales or rhonci. Abdomen soft and nontender. Good bowel sounds. (+) LE edema. No rash noted. DDX including but not limited to: pneumonia, pleural effusion, CHF, SCAPE, PE, PTX, ACS, MI, asthma exacerbation, COPD exacerbation, COVID 19, influenza, anemia, metabolic abnormality, renal failure.  I reviewed EKG. Will check labs, CXR and give neb and more nasal cannula oxygen. Will need admission.     ED Course         Critical Care Time  Procedures

## 2025-01-05 NOTE — ASSESSMENT & PLAN NOTE
Currently utilizing oxycodone 2.5 mg q4 hours for pain.  5 mg q4 hours for sever pain.  Follows with Dr. Estrella of Palliative as OP.

## 2025-01-05 NOTE — ED NOTES
Patient alert/awake. Denies any needs at this time. Call bell within reach.      Becca Hanna RN  01/05/25 [id number]

## 2025-01-05 NOTE — SEPSIS NOTE
"  Sepsis Note   Papo Gibbs 54 y.o. male MRN: [id number]  Unit/Bed#: ED-09 Encounter: [id number]       Initial Sepsis Screening       Row Name 01/04/25 8630                Is the patient's history suggestive of a new or worsening infection? Yes (Proceed)  -BW        Suspected source of infection pneumonia  -BW        Indicate SIRS criteria Tachycardia > 90 bpm;Tachypnea > 20 resp per min  -BW        Are two or more of the above signs & symptoms of infection both present and new to the patient? Yes (Proceed)  -BW        Assess for evidence of organ dysfunction: Are any of the below criteria present within 6 hours of suspected infection and SIRS criteria that are NOT considered to be chronic conditions? Lactate > 2.0  -BW        Date of presentation of severe sepsis 01/04/25  -BW        Time of presentation of severe sepsis --        Sepsis Note: Click \"NEXT\" below (NOT \"close\") to generate sepsis note based on above information. YES (proceed by clicking \"NEXT\")  -BW                  User Key  (r) = Recorded By, (t) = Taken By, (c) = Cosigned By      Initials Name Provider Type    BW Hua Beckford DO Resident                        Body mass index is 30.85 kg/m².  Wt Readings from Last 1 Encounters:   12/31/24 89.4 kg (197 lb)     IBW (Ideal Body Weight): 66.1 kg    Ideal body weight: 66.1 kg (145 lb 11.6 oz)  Adjusted ideal body weight: 75.4 kg (166 lb 3.8 oz)    "

## 2025-01-05 NOTE — ASSESSMENT & PLAN NOTE
-In office spirometry 05/07/2024 shows restrictive lung disease FEV1 41%  -Home medication regime Stiolto 2.5-2.5 MCG/ACT 1 puff twice daily, albuterol 90 mcg/ACT 2 puffs every 4 hours as needed  -Does not appear in exacerbation

## 2025-01-05 NOTE — ED PROCEDURE NOTE
PROCEDURE  CriticalCare Time    Date/Time: 1/4/2025 11:00 PM    Performed by: Duarte Martin MD  Authorized by: Duarte Martin MD    Critical care provider statement:     Critical care time (minutes):  35    Critical care time was exclusive of:  Separately billable procedures and treating other patients and teaching time    Critical care was necessary to treat or prevent imminent or life-threatening deterioration of the following conditions:  Respiratory failure and sepsis (hypoxia)    Critical care was time spent personally by me on the following activities:  Obtaining history from patient or surrogate, development of treatment plan with patient or surrogate, evaluation of patient's response to treatment, examination of patient, ordering and performing treatments and interventions, ordering and review of laboratory studies, ordering and review of radiographic studies, re-evaluation of patient's condition and review of old charts    I assumed direction of critical care for this patient from another provider in my specialty: no         Duarte Martin MD  01/04/25 [id number]

## 2025-01-05 NOTE — ED PROVIDER NOTES
Time reflects when diagnosis was documented in both MDM as applicable and the Disposition within this note       Time User Action Codes Description Comment    1/5/2025  2:13 AM Hua Beckford Add [A41.9,  R65.20] Severe sepsis (HCC)     1/5/2025  2:14 AM Hua Beckford W Add [R07.9] Chest pain     1/5/2025  2:14 AM BeckfordHua beth W Add [R06.09] ROBERTSON (dyspnea on exertion)     1/5/2025  2:14 AM BeckfordHua beth W Add [J96.21] Acute on chronic hypoxic respiratory failure (HCC)     1/5/2025  2:14 AM BeckfordHua beth W Add [C80.1] Adenocarcinoma (HCC)     1/5/2025  2:14 AM BeckfordHua beth W Modify [C80.1] Adenocarcinoma (HCC) of lungs, stage iv with metastasis    1/5/2025  2:15 AM BeckfordHua beth W Add [R79.89] Elevated troponin     1/5/2025  2:15 AM Hua Beckford Add [R79.89] Elevated d-dimer           ED Disposition       ED Disposition   Admit    Condition   Stable    Date/Time   Sun Jan 5, 2025  2:15 AM    Comment   Case was discussed with SLIM and the patient's admission status was agreed to be Admission Status: inpatient status to the service of Dr. Le .               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 Presenting to the ED with worsening dyspnea from baseline, with BLE edema beginning yesterday and progressively worsening. Has had chronic cough which is worse as well. ROBERTSON, mild central and bilateral lateral chest wall pain from cough he reports. Fatigue as well. On Eliquis. Recent ramucirumab + Docetaxel chemotherapy.  Completed radiation therapy. Denies fevers, chills, focal weakness, rash.     Sepsis alert called. Covering with vanc/cefepime. Pt has improved breathing currently.   hs TnI 0hr(!): 51  Sun Jan 05, 2025  Upon reevaluation, pt has improving symptoms while at rest, no current pain.  Hemoglobin(!): 7.5  Similar to 9 days prior    Suspect pneumonia, heart failure picture at this point. Pt improved at rest, gentle fluids and will consider diuresis if worsening presentation.  CTA PE chest  IMPRESSION:  1. Increasing airspace consolidation in the right middle lobe, otherwise stable in the right upper and lower lobes.  2. Stable diffuse lymphangitic spread of tumor throughout the right lung and evidence of increasing lymphangitic spread of tumor in the left lung, compared to the CT from 12/8/2024.  3. No evidence of acute pulmonary embolus.  4. Stable small right loculated effusion.    D/W SLIM, pt admitted under Dr. Le for further evaluation.      Amount and/or Complexity of Data Reviewed  Labs: ordered. Decision-making details documented in ED Course.  Radiology: ordered and independent interpretation performed.    Risk  Prescription drug management.  Decision regarding hospitalization.        ED Course as of 01/05/25 0219   Sat Jan 04, 2025   2331 Sepsis alert called. Covering with vanc/cefepime. Pt has improved breathing currently.    2357 hs TnI 0hr(!): 51   Sun Jan 05, 2025   0008 Upon reevaluation, pt has improving symptoms while at rest, no current pain.   0039 Hemoglobin(!): 7.5  Similar to 9 days prior   0147 FINDINGS:     PULMONARY ARTERIAL TREE: Somewhat limited evaluation of distal branches in the right upper and lower lobes due to surrounding airspace consolidation. Respiratory motion artifact also limits evaluation of distal order branches in the left lower lobe.   Within these limitations, no evidence of filling defect to suggest an acute embolus.        LUNGS: Stable airspace consolidation in the right  upper lobe. Complete airspace consolidation throughout the right middle lobe. Airspace consolidation throughout the right lower lobe with only minimal aeration. Stable nodular interseptal thickening   throughout the right lung. Increasing nodular septal thickening in the left lung when compared to the 12/24 CT. Mild centrilobular emphysema. Occlusion of right middle lobe segmental bronchi.     PLEURA: Small right effusion, likely loculated. Right chest tube in the medial basal hemithorax.     HEART/GREAT VESSELS: Normal heart size. Stable right shift of the heart mediastinum due to volume loss in the right lung.     MEDIASTINUM AND JEREMIAH: Stable prevascular lymph nodes. Right hilar and subcarinal soft tissue may also represent stable lymphadenopathy, not well differentiated from adjacent airspace consolidation.     CHEST WALL AND LOWER NECK: Unremarkable.     VISUALIZED STRUCTURES IN THE UPPER ABDOMEN: Unremarkable.     OSSEOUS STRUCTURES: No acute fracture or destructive osseous lesion.     IMPRESSION:        1. Increasing airspace consolidation in the right middle lobe, otherwise stable in the right upper and lower lobes.  2. Stable diffuse lymphangitic spread of tumor throughout the right lung and evidence of increasing lymphangitic spread of tumor in the left lung, compared to the CT from 12/8/2024.  3. No evidence of acute pulmonary embolus.  4. Stable small right loculated effusion.     0216 D/W SLIM, pt admitted under Dr. Le for further evaluation.       Medications   sodium chloride (PF) 0.9 % injection 3 mL (has no administration in time range)   vancomycin (VANCOCIN) 2,000 mg in sodium chloride 0.9 % 500 mL IVPB (2,000 mg Intravenous New Bag 1/5/25 0104)   sodium chloride 0.9 % bolus 250 mL (0 mL Intravenous Stopped 1/5/25 0015)   ipratropium-albuterol (DUO-NEB) 0.5-2.5 mg/3 mL inhalation solution 3 mL (3 mL Nebulization Given 1/4/25 2327)   sodium chloride 0.9 % bolus 750 mL (750 mL Intravenous New  Bag 1/4/25 2333)   cefepime (MAXIPIME) 2 g/50 mL dextrose IVPB (0 mg Intravenous Stopped 1/5/25 0101)   iohexol (OMNIPAQUE) 350 MG/ML injection (MULTI-DOSE) 65 mL (65 mL Intravenous Given 1/4/25 2343)       ED Risk Strat Scores   HEART Risk Score      Flowsheet Row Most Recent Value   Heart Score Risk Calculator    History 1 Filed at: 01/05/2025 0014   ECG 1 Filed at: 01/05/2025 0014   Age 1 Filed at: 01/05/2025 0014   Risk Factors 2 Filed at: 01/05/2025 0014   Troponin 2 Filed at: 01/05/2025 0014   HEART Score 7 Filed at: 01/05/2025 0014          HEART Risk Score      Flowsheet Row Most Recent Value   Heart Score Risk Calculator    History 1 Filed at: 01/05/2025 0014   ECG 1 Filed at: 01/05/2025 0014   Age 1 Filed at: 01/05/2025 0014   Risk Factors 2 Filed at: 01/05/2025 0014   Troponin 2 Filed at: 01/05/2025 0014   HEART Score 7 Filed at: 01/05/2025 0014                            SBIRT 22yo+      Flowsheet Row Most Recent Value   Initial Alcohol Screen: US AUDIT-C     1. How often do you have a drink containing alcohol? 0 Filed at: 01/04/2025 2228   2. How many drinks containing alcohol do you have on a typical day you are drinking?  0 Filed at: 01/04/2025 2228   3a. Male UNDER 65: How often do you have five or more drinks on one occasion? 0 Filed at: 01/04/2025 2228   3b. FEMALE Any Age, or MALE 65+: How often do you have 4 or more drinks on one occassion? 0 Filed at: 01/04/2025 2228   Audit-C Score 0 Filed at: 01/04/2025 2228   CALE: How many times in the past year have you...    Used an illegal drug or used a prescription medication for non-medical reasons? Never Filed at: 01/04/2025 2228            Wells' Criteria for PE      Flowsheet Row Most Recent Value   Wells' Criteria for PE    Clinical signs and symptoms of DVT 0 Filed at: 01/05/2025 0014   PE is primary diagnosis or equally likely 0 Filed at: 01/05/2025 0014   HR >100 1.5 Filed at: 01/05/2025 0014   Immobilization at least 3 days or Surgery in the  previous 4 weeks 0 Filed at: 2025   Previous, objectively diagnosed PE or DVT 1.5 Filed at: 2025   Hemoptysis 0 Filed at: 2025   Malignancy with treatment within 6 months or palliative 1 Filed at: 2025   Wells' Criteria Total 4 Filed at: 2025                        History of Present Illness       Chief Complaint   Patient presents with    Shortness of Breath     Patient reports chronic SOB but worsening over the last day. Currently under treatment for right sided lung CA with pending CT chest completed  for concerned of further malignancy. Family reports b/l leg swelling, worse on the right.       Past Medical History:   Diagnosis Date    Cancer (HCC) ,    Diabetes mellitus (HCC)     High cholesterol     History of blood clots     Hypertension     Leucocytosis 2024    Lung cancer (HCC)     Malignant neoplasm of overlapping sites of right lung (HCC) 2024    Pneumonia     Pulmonary embolism (HCC)       Past Surgical History:   Procedure Laterality Date    IR BIOPSY LYMPH NODE  2024    IR PORT PLACEMENT  2024    IR THORACENTESIS  2024    KNEE SURGERY      MANDIBLE FRACTURE SURGERY  1985    PATELLA FRACTURE SURGERY      RECTAL SURGERY        Family History   Problem Relation Age of Onset    No Known Problems Father     No Known Problems Mother       Social History     Tobacco Use    Smoking status: Former     Current packs/day: 0.00     Average packs/day: 1.5 packs/day for 35.0 years (52.5 ttl pk-yrs)     Types: Cigarettes     Quit date: 4/15/2024     Years since quittin.7     Passive exposure: Past    Smokeless tobacco: Never   Vaping Use    Vaping status: Never Used   Substance Use Topics    Alcohol use: Not Currently    Drug use: Never      E-Cigarette/Vaping    E-Cigarette Use Never User       E-Cigarette/Vaping Substances    Nicotine No     THC No     CBD No     Flavoring No     Other No     Unknown No       I have  reviewed and agree with the history as documented.     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 Presenting to the ED with worsening dyspnea from baseline, with BLE edema beginning yesterday and progressively worsening. Has had chronic cough which is worse as well. ROBERTSON, mild central and bilateral lateral chest wall pain from cough he reports. Fatigue as well. On Eliquis. Recent ramucirumab + Docetaxel chemotherapy. Completed radiation therapy. Denies fevers, chills, focal weakness, rash.             Review of Systems   Constitutional:  Positive for fatigue.   Respiratory:  Positive for chest tightness and shortness of breath.    Cardiovascular:  Positive for chest pain and leg swelling.   All other systems reviewed and are negative.          Objective       ED Triage Vitals [01/04/25 2229]   Temperature Pulse Blood Pressure Respirations SpO2 Patient Position - Orthostatic VS   98.8 °F (37.1 °C) (!) 140 128/88 (!) 26 (S) (!) 88 % Sitting      Temp Source Heart Rate Source BP Location FiO2 (%) Pain Score    Oral Monitor Left arm -- --      Vitals      Date and Time Temp Pulse SpO2 Resp BP Pain Score FACES Pain Rating User   01/05/25 0030 -- 130 90 % -- 118/62 -- --    01/04/25 2330 -- 133 94 % -- 106/57 -- --    01/04/25 2300 -- 140 95 % 26 114/64 -- --    01/04/25 2245 -- 146 90 % -- 121/65 -- --    01/04/25 2229 98.8 °F (37.1 °C) 140  88 % on 2L - chronic 26 128/88 -- -- SB            Physical Exam  Vitals and nursing note reviewed.   Constitutional:       General: He is in acute distress.      Appearance: He is ill-appearing (fatigued appearing). He is not toxic-appearing or diaphoretic.      Interventions: He is not intubated.  HENT:      Head: Normocephalic and atraumatic.      Mouth/Throat:      Pharynx: Oropharynx is clear.    Eyes:      Extraocular Movements: Extraocular movements intact.      Pupils: Pupils are equal, round, and reactive to light.   Neck:      Vascular: No JVD.   Cardiovascular:      Rate and Rhythm: Normal rate and regular rhythm.   Pulmonary:      Effort: Tachypnea and respiratory distress present. No bradypnea or accessory muscle usage. He is not intubated.      Breath sounds: No stridor. Examination of the right-lower field reveals rales. Examination of the left-lower field reveals rales. Rales present. No decreased breath sounds, wheezing or rhonchi.   Chest:      Chest wall: Tenderness (bilateral lateral chest walls along ribs) present. No mass, deformity, crepitus or edema.   Abdominal:      Palpations: Abdomen is soft. There is no mass.      Tenderness: There is no abdominal tenderness. There is no guarding or rebound.   Musculoskeletal:         General: Normal range of motion.      Cervical back: Normal range of motion and neck supple.      Right lower leg: No tenderness. Edema present.      Left lower leg: No tenderness. Edema present.   Skin:     General: Skin is warm and dry.      Coloration: Skin is not cyanotic or pale.      Findings: No ecchymosis, erythema or rash.      Nails: There is no clubbing.   Neurological:      General: No focal deficit present.      Mental Status: He is alert and oriented to person, place, and time.      Cranial Nerves: No cranial nerve deficit.      Motor: No weakness.   Psychiatric:         Mood and Affect: Mood is anxious.         Behavior: Behavior normal. Behavior is not agitated.         Results Reviewed       Procedure Component Value Units Date/Time    HS Troponin I 2hr [[id number]]  (Normal) Collected: 01/05/25 0108    Lab Status: Final result Specimen: Blood from Arm, Left Updated: 01/05/25 0150     hs TnI 2hr 47 ng/L      Delta 2hr hsTnI -4 ng/L     Lactic acid 2 Hours [[id number]]  (Normal) Collected: 01/05/25 0108    Lab Status: Final result Specimen: Blood from  Arm, Left Updated: 01/05/25 0138     LACTIC ACID 1.1 mmol/L     Narrative:      Result may be elevated if tourniquet was used during collection.    Urine Microscopic [[id number]]  (Abnormal) Collected: 01/05/25 0023    Lab Status: Final result Specimen: Urine, Clean Catch Updated: 01/05/25 0039     RBC, UA Innumerable /hpf      WBC, UA 2-4 /hpf      Epithelial Cells Occasional /hpf      Bacteria, UA None Seen /hpf      MUCUS THREADS Innumerable     Ca Oxalate Zulema, UA Occasional /hpf     UA w Reflex to Microscopic w Reflex to Culture [[id number]]  (Abnormal) Collected: 01/05/25 0023    Lab Status: Final result Specimen: Urine, Clean Catch Updated: 01/05/25 0037     Color, UA Yellow     Clarity, UA Clear     Specific Gravity, UA >=1.050     pH, UA 6.0     Leukocytes, UA Negative     Nitrite, UA Negative     Protein, UA 70 (1+) mg/dl      Glucose, UA Trace mg/dl      Ketones, UA Negative mg/dl      Urobilinogen, UA <2.0 mg/dl      Bilirubin, UA Negative     Occult Blood, UA Large    HS Troponin I 4hr [[id number]]     Lab Status: No result Specimen: Blood     CBC and differential [[id number]]  (Abnormal) Collected: 01/04/25 2243    Lab Status: Final result Specimen: Blood from Arm, Left Updated: 01/04/25 2340     WBC 3.96 Thousand/uL      RBC 2.77 Million/uL      Hemoglobin 7.5 g/dL      Hematocrit 23.7 %      MCV 86 fL      MCH 27.1 pg      MCHC 31.6 g/dL      RDW 16.1 %      MPV 11.3 fL      Platelets 222 Thousands/uL      nRBC 1 /100 WBCs      Segmented % 66 %      Immature Grans % 1 %      Lymphocytes % 27 %      Monocytes % 4 %      Eosinophils Relative 1 %      Basophils Relative 1 %      Absolute Neutrophils 2.62 Thousands/µL      Absolute Immature Grans 0.05 Thousand/uL      Absolute Lymphocytes 1.08 Thousands/µL      Absolute Monocytes 0.16 Thousand/µL      Eosinophils Absolute 0.02 Thousand/µL      Basophils Absolute 0.03 Thousands/µL     Procalcitonin [[id number]]  (Normal) Collected: 01/04/25 2243    Lab Status:  Final result Specimen: Blood from Arm, Left Updated: 01/04/25 2325     Procalcitonin 0.21 ng/ml     HS Troponin 0hr (reflex protocol) [[id number]]  (Abnormal) Collected: 01/04/25 2243    Lab Status: Final result Specimen: Blood from Arm, Left Updated: 01/04/25 2322     hs TnI 0hr 51 ng/L     D-dimer, quantitative [[id number]]  (Abnormal) Collected: 01/04/25 2243    Lab Status: Final result Specimen: Blood from Arm, Left Updated: 01/04/25 2321     D-Dimer, Quant 18.13 ug/ml FEU     Narrative:      In the evaluation for possible pulmonary embolism, in the appropriate (Well's Score of 4 or less) patient, the age adjusted d-dimer cutoff for this patient can be calculated as:    Age x 0.01 (in ug/mL) for Age-adjusted D-dimer exclusion threshold for a patient over 50 years.    Protime-INR [[id number]]  (Abnormal) Collected: 01/04/25 2243    Lab Status: Final result Specimen: Blood from Arm, Left Updated: 01/04/25 2321     Protime 17.3 seconds      INR 1.34    Narrative:      INR Therapeutic Range    Indication                                             INR Range      Atrial Fibrillation                                               2.0-3.0  Hypercoagulable State                                    2.0.2.3  Left Ventricular Asist Device                            2.0-3.0  Mechanical Heart Valve                                  -    Aortic(with afib, MI, embolism, HF, LA enlargement,    and/or coagulopathy)                                     2.0-3.0 (2.5-3.5)     Mitral                                                             2.5-3.5  Prosthetic/Bioprosthetic Heart Valve               2.0-3.0  Venous thromboembolism (VTE: VT, PE        2.0-3.0    APTT [[id number]]  (Normal) Collected: 01/04/25 2243    Lab Status: Final result Specimen: Blood from Arm, Left Updated: 01/04/25 2321     PTT 30 seconds     B-Type Natriuretic Peptide(BNP) [[id number]]  (Normal) Collected: 01/04/25 2243    Lab Status: Final result Specimen: Blood  from Arm, Left Updated: 01/04/25 2321     BNP 63 pg/mL     Lactic acid [[id number]]  (Abnormal) Collected: 01/04/25 2243    Lab Status: Final result Specimen: Blood from Arm, Left Updated: 01/04/25 2320     LACTIC ACID 3.6 mmol/L     Narrative:      Result may be elevated if tourniquet was used during collection.    Comprehensive metabolic panel [[id number]]  (Abnormal) Collected: 01/04/25 2243    Lab Status: Final result Specimen: Blood from Arm, Left Updated: 01/04/25 2318     Sodium 138 mmol/L      Potassium 4.4 mmol/L      Chloride 103 mmol/L      CO2 25 mmol/L      ANION GAP 10 mmol/L      BUN 15 mg/dL      Creatinine 0.74 mg/dL      Glucose 204 mg/dL      Calcium 8.8 mg/dL      Corrected Calcium 9.4 mg/dL      AST 28 U/L      ALT 25 U/L      Alkaline Phosphatase 87 U/L      Total Protein 6.3 g/dL      Albumin 3.2 g/dL      Total Bilirubin 0.48 mg/dL      eGFR 103 ml/min/1.73sq m     Narrative:      National Kid[employer] guidelines for Chronic Kidney Disease (CKD):     Stage 1 with normal or high GFR (GFR > 90 mL/min/1.73 square meters)    Stage 2 Mild CKD (GFR = 60-89 mL/min/1.73 square meters)    Stage 3A Moderate CKD (GFR = 45-59 mL/min/1.73 square meters)    Stage 3B Moderate CKD (GFR = 30-44 mL/min/1.73 square meters)    Stage 4 Severe CKD (GFR = 15-29 mL/min/1.73 square meters)    Stage 5 End Stage CKD (GFR <15 mL/min/1.73 square meters)  Note: GFR calculation is accurate only with a steady state creatinine    FLU/COVID Rapid Antigen (30 min. TAT) - Preferred screening test in ED [[id number]]  (Normal) Collected: 01/04/25 2243    Lab Status: Final result Specimen: Nares from Nose Updated: 01/04/25 2318     SARS COV Rapid Antigen Negative     Influenza A Rapid Antigen Negative     Influenza B Rapid Antigen Negative    Narrative:      This test has been performed using the [employer] [facility] 2 FLU+SARS Antigen test under the Emergency Use Authorization (EUA). This test has been validated by the   and verified by the performing laboratory. The Rica uses lateral flow immunofluorescent sandwich assay to detect SARS-COV, Influenza A and Influenza B Antigen.     The [employer] [facility] 2 SARS Antigen test does not differentiate between SARS-CoV and SARS-CoV-2.     Negative results are presumptive and may be confirmed with a molecular assay, if necessary, for patient management. Negative results do not rule out SARS-CoV-2 or influenza infection and should not be used as the sole basis for treatment or patient management decisions. A negative test result may occur if the level of antigen in a sample is below the limit of detection of this test.     Positive results are indicative of the presence of viral antigens, but do not rule out bacterial infection or co-infection with other viruses.     All test results should be used as an adjunct to clinical observations and other information available to the provider.    FOR PEDIATRIC PATIENTS - copy/paste COVID Guidelines URL to browser: [email or web address]    Blood culture #2 [[id number]] Collected: 01/04/25 2250    Lab Status: In process Specimen: Blood from Arm, Right Updated: 01/04/25 2259    Blood culture #1 [[id number]] Collected: 01/04/25 2243    Lab Status: In process Specimen: Blood from Arm, Left Updated: 01/04/25 2259    Blood gas, venous [[id number]]  (Abnormal) Collected: 01/04/25 2248    Lab Status: Final result Specimen: Blood from Arm, Left Updated: 01/04/25 2259     pH, Charli 7.476     pCO2, Charli 29.2 mm Hg      pO2, Charli 151.4 mm Hg      HCO3, Charli 21.1 mmol/L      Base Excess, Charli -2.0 mmol/L      O2 Content, Charli 11.0 ml/dL      O2 HGB, VENOUS 93.8 %             CTA chest pe study   Final Interpretation by Christopher Vargas MD (01/05 0107)         1. Increasing airspace consolidation in the right middle lobe, otherwise stable in the right upper and lower lobes.   2. Stable diffuse lymphangitic  spread of tumor throughout the right lung and evidence of increasing lymphangitic spread of tumor in the left lung, compared to the CT from 12/8/2024.   3. No evidence of acute pulmonary embolus.   4. Stable small right loculated effusion.                  Workstation performed: [id number]         X-ray chest 1 view portable   ED Interpretation by Hua Beckford DO (01/05 0016)   Large opacity of right middle, right lower lobes improved from prior December study. Possible component of tumor burden, atelectasis.          ECG 12 Lead Documentation Only    Date/Time: 1/4/2025 10:33 PM    Performed by: Hua Beckford DO  Authorized by: Hua Beckford DO    Indications / Diagnosis:  Dyspnea  ECG reviewed by me, the ED Provider: yes    Patient location:  ED  Previous ECG:     Previous ECG:  Compared to current    Comparison ECG info:  Possible lead reversal on prior study - 12/11/24    Similarity:  Changes noted    Comparison to cardiac monitor: Yes    Interpretation:     Interpretation: abnormal    Rate:     ECG rate:  139    ECG rate assessment: tachycardic    Rhythm:     Rhythm: sinus tachycardia    Ectopy:     Ectopy: none    QRS:     QRS axis:  Right    QRS intervals:  Normal  Conduction:     Conduction: normal    ST segments:     ST segments:  Non-specific  T waves:     T waves: non-specific    Comments:      No STEMI.      ED Medication and Procedure Management   Prior to Admission Medications   Prescriptions Last Dose Informant Patient Reported? Taking?   Glucagon, rDNA, (Glucagon Emergency) 1 MG KIT  Self No No   Sig: Inject 1 mg as directed once as needed (hypoglycemia) for up to 1 dose   Patient not taking: Reported on 12/18/2024   Lancets (OneTouch Delica Plus Didnrf12N) MISC  Self No No   Sig: Use 1 Units 4 (four) times a day   OneTouch Verio test strip  Self No No   Sig: Use 1 each 4 (four) times a day   acetaminophen (TYLENOL) 325 mg tablet   No No   Sig: Take 3 tablets  (975 mg total) by mouth every 8 (eight) hours as needed for mild pain, headaches or fever   albuterol (PROVENTIL HFA,VENTOLIN HFA) 90 mcg/act inhaler   No No   Sig: INHALE 2 PUFFS EVERY 6 HOURS AS NEEDED FOR WHEEZING   amitriptyline (ELAVIL) 100 mg tablet  Self Yes No   Si mg daily at bedtime   apixaban (Eliquis) 5 mg  Self No No   Sig: Take 1 tablet (5 mg total) by mouth 2 (two) times a day   atorvastatin (LIPITOR) 10 mg tablet  Self No No   Sig: Take 1 tablet (10 mg total) by mouth daily   benzonatate (TESSALON) 200 MG capsule   No No   Sig: Take 1 capsule (200 mg total) by mouth 3 (three) times a day as needed for cough   famotidine (PEPCID) 40 MG tablet  Self Yes No   Sig: Take 40 mg by mouth daily as needed for heartburn or indigestion Taking as needed   folic acid (FOLVITE) 1 mg tablet  Self No No   Sig: TAKE 1 TABLET BY MOUTH EVERY DAY   glimepiride (AMARYL) 2 mg tablet   No No   Sig: TAKE 1 TABLET BY MOUTH DAILY WITH BREAKFAST   levothyroxine 25 mcg tablet   No No   Sig: TAKE 1 TABLET BY MOUTH EVERY DAY   losartan (COZAAR) 25 mg tablet  Self Yes No   Sig: Take 25 mg by mouth daily   meclizine (ANTIVERT) 25 mg tablet   No No   Sig: Take 1 tablet (25 mg total) by mouth every 8 (eight) hours as needed for dizziness   Patient not taking: Reported on 2024   metFORMIN (GLUCOPHAGE) 1000 MG tablet  Self Yes No   Sig: Take 1,000 mg by mouth 2 (two) times a day   naloxone (NARCAN) 4 mg/0.1 mL nasal spray   No No   Sig: Administer 1 spray into a nostril. If no response after 2-3 minutes, give another dose in the other nostril using a new spray. For use in emergencies for opioid / pain medication reversal for accidental ingestion, respiratory depression, sedation or concerns for overdose.   ondansetron (ZOFRAN-ODT) 8 mg disintegrating tablet   No No   Sig: Take 1 tablet (8 mg total) by mouth every 8 (eight) hours as needed for vomiting or nausea   oxyCODONE (Roxicodone) 5 immediate release tablet   No No    Sig: Take 0.5 tablets (2.5 mg total) by mouth every 4 (four) hours as needed for moderate pain If pain is severe, may instead take 1 tablet (5mg) every 4 hours as needed Max Daily Amount: 15 mg   senna (SENOKOT) 8.6 mg   No No   Sig: Take 1 tablet (8.6 mg total) by mouth daily at bedtime as needed for constipation   tiotropium-olodaterol (Stiolto Respimat) 2.5-2.5 MCG/ACT inhaler  Self No No   Sig: Inhale 2 puffs daily      Facility-Administered Medications: None     Patient's Medications   Discharge Prescriptions    No medications on file     No discharge procedures on file.  ED SEPSIS DOCUMENTATION   Time reflects when diagnosis was documented in both MDM as applicable and the Disposition within this note       Time User Action Codes Description Comment    1/5/2025  2:13 AM Hua Beckford Add [A41.9,  R65.20] Severe sepsis (HCC)     1/5/2025  2:14 AM Hua Beckford Add [R07.9] Chest pain     1/5/2025  2:14 AM Hua Beckford Add [R06.09] ROBERTSON (dyspnea on exertion)     1/5/2025  2:14 AM Hua Beckford Add [J96.21] Acute on chronic hypoxic respiratory failure (HCC)     1/5/2025  2:14 AM Hua Beckford Add [C80.1] Adenocarcinoma (HCC)     1/5/2025  2:14 AM Hua Beckford Modify [C80.1] Adenocarcinoma (HCC) of lungs, stage iv with metastasis    1/5/2025  2:15 AM Hua Beckford Add [R79.89] Elevated troponin     1/5/2025  2:15 AM Hua Beckford Add [R79.89] Elevated d-dimer            Initial Sepsis Screening       Row Name 01/04/25 7147                Is the patient's history suggestive of a new or worsening infection? Yes (Proceed)  -BW        Suspected source of infection pneumonia  -BW        Indicate SIRS criteria Tachycardia > 90 bpm;Tachypnea > 20 resp per min  -BW        Are two or more of the above signs & symptoms of infection both present and new to the patient? Yes (Proceed)  -BW        Assess for evidence of organ dysfunction: Are any of the below criteria  "present within 6 hours of suspected infection and SIRS criteria that are NOT considered to be chronic conditions? Lactate > 2.0  -BW        Date of presentation of severe sepsis 01/04/25  -        Time of presentation of severe sepsis --        Sepsis Note: Click \"NEXT\" below (NOT \"close\") to generate sepsis note based on above information. YES (proceed by clicking \"NEXT\")  -                  User Key  (r) = Recorded By, (t) = Taken By, (c) = Cosigned By      Initials Name Provider Type     Hua Beckford DO Resident                  Default Flowsheet Data (Last 720 Hours)       Sepsis Reassess       Row Name 01/05/25 0148                   Repeat Volume Status and Tissue Perfusion Assessment Performed    Date of Reassessment: 01/05/25  -        Time of Reassessment: 0148  -BW        Sepsis Reassessment Note: Click \"NEXT\" below (NOT \"close\") to generate sepsis reassessment note. YES (proceed by clicking \"NEXT\")  -        Repeat Volume Status and Tissue Perfusion Assessment Performed --                  User Key  (r) = Recorded By, (t) = Taken By, (c) = Cosigned By      Initials Name Provider Type     Hua Beckford DO Resident                     Hua Beckford,   01/05/25 0220    "

## 2025-01-05 NOTE — ASSESSMENT & PLAN NOTE
-CTA chest PE study 01/04/2025 shows increasing airspace consolidation in the right middle lobe.  Stable diffuse lymphangitic spread of tumor throughout the right lung and evidence of increasing lymphatic spread of tumor into left lung compared to previous CT 12/8/2024 no evidence of acute PE stable right loculated effusion  -WBC 3.96-2.28  -Procalcitonin negative- continue to trend  -Legionella antigen and strep pneumonia negative  -Will check MRSA  -Can continue cefepime and vancomycin  -Will check Pro-[place] in the a.m.  -Can consider de-escalating antibiotics tomorrow if clinically appropriate

## 2025-01-05 NOTE — ASSESSMENT & PLAN NOTE
-35+ pack year history of smoking, endorses quitting in April of this year  -Congratulated on continued cessation

## 2025-01-05 NOTE — H&P
H&P - Hospitalist   Name: Papo Gibbs 54 y.o. male I MRN: [id number]  Unit/Bed#: ED-09 I Date of Admission: 1/4/2025   Date of Service: 1/5/2025 I Hospital Day: 0     Assessment & Plan  Primary malignant neoplasm of right lung metastatic to other site (HCC)  Presentation:  Patient present with acute on chronic respiratory failure.  Likely secondary to progressing malignancy vs PNA.  Initial procal negative.  Met SIRS criteria based on leukopenia, tachycardia, and tachypnea.  VBG showed respiratory alkalosis 2/2 hyperventilation.  Patient uses 3L NC at baseline.  Currently requiring 4L.  He becomes tachypnic and SOB with sitting up.  Follow Heme/Onc, Rad/Onc and Palliative as OP.  Possible addition of docetaxel and ramucirumab per oncology.  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   Increasing partially loculated moderate right pleural effusion.   No pulmonary embolism.   New interstitial edema in the left lung that is probably related to volume overload.   Developing left lung carcinomatosis is also possible.   Possible small pneumonia within the superior segment of the left lower lobe.   There are enlarged subcarinal lymph nodes measuring up to 1.2 cm that have increased in size   MRI Brain w/wo 12/13/2024 :  Status post treatment of right frontal brain metastasis. No metastases currently.   CTA PE 1/5:  Increasing airspace consolidation in the right middle lobe, otherwise stable in the right upper and lower lobes.   Stable diffuse lymphangitic spread of tumor throughout the right lung and evidence of increasing lymphangitic spread of tumor in the left lung,     Lab Results   Component Value Date    SARSCOV2 Negative 04/20/2024    SARSCOVAG Negative 01/04/2025    INFLUA Negative 04/20/2024    RAPFLUA Negative 01/04/2025    INFLUB Negative 04/20/2024    RAPFLUB Negative 01/04/2025    RSV  Negative 04/20/2024       Recent Labs     01/04/25  2243 01/05/25  0402   WBC 3.96* 2.28*     Recent Labs     01/04/25  2243   PROCALCITONI 0.21        Differential Dx:  Progression of malignancy vs PNA.  Possible volume overload.    Assessment:  Progression of malignancy vs infectious process.  Less likely infection given no fever, no leukocytosis.  Had WBC 12 on previous check pre-admit.  Symptoms possibly exacerbated by restrictive lung disease.  Previous imaging showed concern for volume overload.  Consolidations in right lung may impede imaging interpretation.  Previous infectious work up was negative.    Plan:  Rule out PNA, repeat procal.  Trend WBC and fever curve.  Monitor daily weight  Titrate supplement oxygen for 02 > 90%  Chronic hypoxemic respiratory failure (HCC)  Uses 3L NC at baseline.  Currently on 4L.  See above.  Antineoplastic chemotherapy induced anemia  Hgb around 10 at baseline.  Has been downtrending since 12/12  Now with Hgb in the 7.5 range.  Likely secondary to antineoplastic agents.  No symptoms of lightheadedness, dizziness, or syncope.  Will obtain iron panel, TIBC, ferritin.  Cancer related pain  Currently utilizing oxycodone 2.5 mg q4 hours for pain.  5 mg q4 hours for sever pain.  Follows with Dr. Estrella of Palliative as OP.  Abnormal CT of the chest  Pleural catheter placed 12/12/24.  Previous pneumonitis on Keytruda.  CT 1/5 revealed:  Increasing airspace consolidation in the right middle lobe, otherwise stable in the right upper and lower lobes.   Stable diffuse lymphangitic spread of tumor throughout the right lung and evidence of increasing lymphangitic spread of tumor in the left lung, compared to the CT from 12/8/2024.   No evidence of acute pulmonary embolus.   Stable small right loculated effusion.   Patient is leukopenic, afebrile, and without B symptoms,  Will monitor off Abx.  Procal negative, trend tomorrow.  Will work up for PNA  DM2 (diabetes mellitus, type 2)  ([facility])  Lab Results   Component Value Date    HGBA1C 8.1 (H) 12/09/2024       Recent Labs     01/04/25  2243   GLUC 204*     Hold home regimen while inpatient and resume on discharge Metformin and Amaryl.  Diabetic diet  Insulin regimen  SSI  Goal -180 while admitted, adjusting insulin regimen as appropriate  Monitor for hypoglycemia and treat per protocol  Restrictive lung disease  Spirometry in May 2024 revealed FEV1 of 41%  Likely s/p radiation.  Peripheral edema  New lower extremity edema not noted on prior admissions.  Patient reports this began 2-3 days ago.  Dry weight ~200 lbs.  No appreciable JVD.  Echo 12/9/24  EF 60%  Aortic valve sclerosis  No other evidence of volume overload.  Based on available data, this is possibly related to lymphangitic spread.  Will check LE dopplers given history of PE and asymmetric findings.   History of pulmonary embolism  CTA 10/4/24  Tiny subsegmental embolus in the right lower lobe. Given its small size, it is of doubtful clinical significance.   Continue Eliquis 5 mg BID.  Tobacco dependence  Smoking history of 2 ppd for >20 years.  Quit smoking in April 2024 prior to cancer diagnosis.      VTE Pharmacologic Prophylaxis: VTE Score: 9 High Risk (Score >/= 5) - Pharmacological DVT Prophylaxis Ordered: apixaban (Eliquis). Sequential Compression Devices Ordered.  Code Status: Level 1 - Full Code   Discussion with family: Patient declined call to .     Anticipated Length of Stay: Patient will be admitted on an inpatient basis with an anticipated length of stay of greater than 2 midnights secondary to hypoxemia.    History of Present Illness   Chief Complaint: Shortness of breath    Papo Gibbs is a 54 y.o. male with a PMH of adenocarcinoma with previous mets to brain s/p radiation, DM2, PE on eliquis, who presents with hypoxia. Patient presents with acute worsening of respiratory status. He is having difficulty catching his breath after sitting up and with  long conversation. He noted that recent chemo regimen has changed. Was discharged on  for similar presentation. Presents with tachycardia and increased 02 requirement of 4L. Follows up with Palliative, Dunia Mcclain as OP.     Review of Systems   Constitutional:  Positive for activity change and fatigue.   Eyes: Negative.    Respiratory:  Positive for shortness of breath.    Gastrointestinal:  Negative for blood in stool and diarrhea.   Endocrine: Negative.    Genitourinary: Negative.    Musculoskeletal: Negative.    Neurological:  Negative for dizziness and headaches.   Psychiatric/Behavioral: Negative.         Historical Information   Past Medical History:   Diagnosis Date    Cancer (HCC) ,,    Diabetes mellitus (HCC)     High cholesterol     History of blood clots     Hypertension     Leucocytosis 2024    Lung cancer (HCC)     Malignant neoplasm of overlapping sites of right lung (HCC) 2024    Pneumonia     Pulmonary embolism (HCC)      Past Surgical History:   Procedure Laterality Date    IR BIOPSY LYMPH NODE  2024    IR PORT PLACEMENT  2024    IR THORACENTESIS  2024    KNEE SURGERY      MANDIBLE FRACTURE SURGERY  1985    PATELLA FRACTURE SURGERY      RECTAL SURGERY       Social History     Tobacco Use    Smoking status: Former     Current packs/day: 0.00     Average packs/day: 1.5 packs/day for 35.0 years (52.5 ttl pk-yrs)     Types: Cigarettes     Quit date: 4/15/2024     Years since quittin.7     Passive exposure: Past    Smokeless tobacco: Never   Vaping Use    Vaping status: Never Used   Substance and Sexual Activity    Alcohol use: Not Currently    Drug use: Never    Sexual activity: Yes     Partners: Female     Birth control/protection: Post-menopausal, None     E-Cigarette/Vaping    E-Cigarette Use Never User      E-Cigarette/Vaping Substances    Nicotine No     THC No     CBD No     Flavoring No     Other No     Unknown No        Social History:  Marital  Status: Single   Patient Pre-hospital Living Situation: Home  Patient Pre-hospital Level of Mobility: walks  Patient Pre-hospital Diet Restrictions: None    Meds/Allergies   I have reviewed home medications with patient personally.  Prior to Admission medications    Medication Sig Start Date End Date Taking? Authorizing Provider   acetaminophen (TYLENOL) 325 mg tablet Take 3 tablets (975 mg total) by mouth every 8 (eight) hours as needed for mild pain, headaches or fever 12/12/24  Yes Lore Chambers PA-C   albuterol (PROVENTIL HFA,VENTOLIN HFA) 90 mcg/act inhaler INHALE 2 PUFFS EVERY 6 HOURS AS NEEDED FOR WHEEZING 12/31/24  Yes DIANNE Marroquin   amitriptyline (ELAVIL) 100 mg tablet 200 mg daily at bedtime 3/9/22  Yes Historical Provider, MD   apixaban (Eliquis) 5 mg Take 1 tablet (5 mg total) by mouth 2 (two) times a day 8/5/24  Yes Amelie Bacon MD   atorvastatin (LIPITOR) 10 mg tablet Take 1 tablet (10 mg total) by mouth daily 5/1/24  Yes Amelie Bacon MD   benzonatate (TESSALON) 200 MG capsule Take 1 capsule (200 mg total) by mouth 3 (three) times a day as needed for cough 12/26/24  Yes Elodia Drummond DO   famotidine (PEPCID) 40 MG tablet Take 40 mg by mouth daily as needed for heartburn or indigestion Taking as needed 4/2/24  Yes Historical Provider, MD   folic acid (FOLVITE) 1 mg tablet TAKE 1 TABLET BY MOUTH EVERY DAY 11/1/24  Yes Maryana Sandoval MD   glimepiride (AMARYL) 2 mg tablet TAKE 1 TABLET BY MOUTH DAILY WITH BREAKFAST 12/4/24  Yes Amelie Bacon MD   Lancets (OneTouch Delica Plus [id number]) MISC Use 1 Units 4 (four) times a day 9/24/24  Yes Amelie Bacon MD   levothyroxine 25 mcg tablet TAKE 1 TABLET BY MOUTH EVERY DAY 12/17/24  Yes Maryana Sandoval MD   losartan (COZAAR) 25 mg tablet Take 25 mg by mouth daily 3/9/22  Yes Historical Provider, MD   metFORMIN (GLUCOPHAGE) 1000 MG tablet Take 1,000 mg by mouth 2 (two) times a day 6/17/24  Yes Historical Provider, MD   ondansetron  (ZOFRAN-ODT) 8 mg disintegrating tablet Take 1 tablet (8 mg total) by mouth every 8 (eight) hours as needed for vomiting or nausea 12/26/24  Yes Rikki Estrella DO   OneTouch Verio test strip Use 1 each 4 (four) times a day 9/24/24  Yes Amelie Bacon MD   oxyCODONE (Roxicodone) 5 immediate release tablet Take 0.5 tablets (2.5 mg total) by mouth every 4 (four) hours as needed for moderate pain If pain is severe, may instead take 1 tablet (5mg) every 4 hours as needed Max Daily Amount: 15 mg 12/18/24  Yes Rikki Estrella DO   tiotropium-olodaterol (Stiolto Respimat) 2.5-2.5 MCG/ACT inhaler Inhale 2 puffs daily 6/20/24  Yes DIANNE Marroquin   Glucagon, rDNA, (Glucagon Emergency) 1 MG KIT Inject 1 mg as directed once as needed (hypoglycemia) for up to 1 dose  Patient not taking: Reported on 12/18/2024 6/4/24   Amelie Bacon MD   meclizine (ANTIVERT) 25 mg tablet Take 1 tablet (25 mg total) by mouth every 8 (eight) hours as needed for dizziness  Patient not taking: Reported on 12/18/2024 12/12/24   Lore Chambers PA-C   naloxone (NARCAN) 4 mg/0.1 mL nasal spray Administer 1 spray into a nostril. If no response after 2-3 minutes, give another dose in the other nostril using a new spray. For use in emergencies for opioid / pain medication reversal for accidental ingestion, respiratory depression, sedation or concerns for overdose. 12/18/24 12/18/25  Rikki Estrella DO   senna (SENOKOT) 8.6 mg Take 1 tablet (8.6 mg total) by mouth daily at bedtime as needed for constipation 12/18/24   Rikki Estrella DO     No Known Allergies    Objective :  Temp:  [98.8 °F (37.1 °C)] 98.8 °F (37.1 °C)  HR:  [130-146] 130  BP: (106-128)/(57-88) 118/62  Resp:  [26] 26  SpO2:  [88 %-95 %] 90 %  O2 Device: Nasal cannula  Nasal Cannula O2 Flow Rate (L/min):  [4 L/min] 4 L/min    Physical Exam  Vitals and nursing note reviewed.   Constitutional:       General: He is not in acute distress.     Appearance: He is well-developed. He is obese. He is  ill-appearing.   HENT:      Head: Normocephalic and atraumatic.   Eyes:      Conjunctiva/sclera: Conjunctivae normal.   Cardiovascular:      Rate and Rhythm: Regular rhythm. Tachycardia present.      Heart sounds: No murmur heard.  Pulmonary:      Effort: Respiratory distress present.      Comments: No breath sounds on the right.  Abdominal:      Palpations: Abdomen is soft.      Tenderness: There is no abdominal tenderness.   Musculoskeletal:         General: No swelling.   Skin:     General: Skin is warm and dry.   Neurological:      General: No focal deficit present.      Mental Status: He is alert. Mental status is at baseline.   Psychiatric:         Mood and Affect: Mood normal.            Lab Results: I have reviewed the following results:  Results from last 7 days   Lab Units 01/04/25  2243   WBC Thousand/uL 3.96*   HEMOGLOBIN g/dL 7.5*   HEMATOCRIT % 23.7*   PLATELETS Thousands/uL 222   SEGS PCT % 66   LYMPHO PCT % 27   MONO PCT % 4   EOS PCT % 1     Results from last 7 days   Lab Units 01/04/25  2243   SODIUM mmol/L 138   POTASSIUM mmol/L 4.4   CHLORIDE mmol/L 103   CO2 mmol/L 25   BUN mg/dL 15   CREATININE mg/dL 0.74   ANION GAP mmol/L 10   CALCIUM mg/dL 8.8   ALBUMIN g/dL 3.2*   TOTAL BILIRUBIN mg/dL 0.48   ALK PHOS U/L 87   ALT U/L 25   AST U/L 28   GLUCOSE RANDOM mg/dL 204*     Results from last 7 days   Lab Units 01/04/25  2243   INR  1.34*         Lab Results   Component Value Date    HGBA1C 8.1 (H) 12/09/2024    HGBA1C 6.7 (H) 06/28/2024    HGBA1C 7.1 (H) 04/20/2024     Results from last 7 days   Lab Units 01/05/25  0108 01/04/25  2243   LACTIC ACID mmol/L 1.1 3.6*   PROCALCITONIN ng/ml  --  0.21             Administrative Statements       ** Please Note: This note has been constructed using a voice recognition system. **

## 2025-01-05 NOTE — CONSULTS
Consultation - Pulmonology   Name: Papo Gibbs 54 y.o. male I MRN: [id number]  Unit/Bed#: ED-09 I Date of Admission: 1/4/2025   Date of Service: 1/5/2025 I Hospital Day: 0   Inpatient consult to Pulmonology  Consult performed by: DIANNE Marroquin  Consult ordered by: Lexx Hernandez MD        Physician Requesting Evaluation: Therese Le MD   Reason for Evaluation / Principal Problem: Adenocarcinoma    Assessment & Plan  Primary malignant neoplasm of right lung metastatic to other site (HCC)  -Following with Dr. Sandoval  -S/p ASEPT catheter 12/12/2024  -Recently completed first cycle of Ramucirumab + Docetaxel with next treatment on 1/21/2025.  -CT shows progression or Right lung CA  -Will reach out to Pt's Oncologist- will need to follow-up outpatient  -Continue to drain ASEPT catheter as needed  Abnormal CT of the chest  -CTA chest PE study 01/04/2025 shows increasing airspace consolidation in the right middle lobe.  Stable diffuse lymphangitic spread of tumor throughout the right lung and evidence of increasing lymphatic spread of tumor into left lung compared to previous CT 12/8/2024 no evidence of acute PE stable right loculated effusion  -WBC 3.96-2.28  -Procalcitonin negative- continue to trend  -Legionella antigen and strep pneumonia negative  -Will check MRSA  -Can continue cefepime and vancomycin  -Will check Pro-[place] in the a.m.  -Can consider de-escalating antibiotics tomorrow if clinically appropriate  Tobacco dependence  -35+ pack year history of smoking, endorses quitting in April of this year  -Congratulated on continued cessation  Adenocarcinoma of overlapping sites of right lung (HCC)  -Following with Dr. Sandoval  -Recently completed first cycle of Ramucirumab + Docetaxel with next treatment on 1/21/2025.   Chronic hypoxemic respiratory failure (HCC)  -96% on 3 L, patient wears 3 L supplemental O2 at home continuously  -Continue to maintain saturation greater than 89%  -Pulmonary hygiene  encouraged: Deep breathing with cough, OOB as tolerated, incentive spirometry and flutter valve  -Appears at baseline, worsening shortness of breath likely secondary to anemia and progression of lung CA  Restrictive lung disease  -In office spirometry 05/07/2024 shows restrictive lung disease FEV1 41%  -Home medication regime Stiolto 2.5-2.5 MCG/ACT 1 puff twice daily, albuterol 90 mcg/ACT 2 puffs every 4 hours as needed  -Does not appear in exacerbation  History of pulmonary embolism  -On Eliquis for anticoagulation  Pulmonology service will follow.    History of Present Illness   Papo Gibbs is a 54 y.o. male with PMH of adenocarcinoma with previous mets to brain s/p radiation, history of PE on Eliquis, diabetes type 2 and restrictive lung disease who presents with worsening shortness of breath and hypoxia.  Patient complains he was home and having difficulty catching his breath.  Normally can ambulate to the bathroom without any significant shortness of breath but suddenly was gasping for air when he walked to the bathroom.  Hemoglobin found to be 7.5    Pulmonary was consulted for adenocarcinoma.  Patient was seen and examined at bedside.  Found resting comfortably in bed appears at his respiratory baseline.  Currently on 3 L continuous supplemental oxygen at home.  Denies any fever chills chest pain or hemoptysis.  Endorses acute change at home that his breathing.  Significant improvement endorsed after 1 unit of PRBCs.  Patient has ASEPT catheter.  He endorses draining every few days with VNA.  States he tried to drain 2 days ago and was only able to get about 25 cc out.  CAT scan reviewed no significant fluid noted.  Some progression of cancer appreciated    From a pulmonary standpoint patient follows outpatient with pulmonary for restrictive lung disease, chronic hypoxic respiratory failure and adenocarcinoma.  He was last seen in the office in December for removal of his stitches after ASEPT catheter  placed 12/19.  Patient denies any recent sick contacts.  He denies any environmental exposures.  He is following outpatient with oncology last seen by them January 2, 2024, and was overall stable    Review of Systems   Respiratory:  Positive for cough and shortness of breath.        Historical Information   I have reviewed the patient's PMH, PSH, Social History, Family History, Meds, and Allergies  Tobacco History: 35+ pack year history of smoking, endorses quitting in April of this year  Occupational History:   Family History:non-contributory    Objective :  Temp:  [97.8 °F (36.6 °C)-98.8 °F (37.1 °C)] 98.4 °F (36.9 °C)  HR:  [114-146] 114  BP: (106-146)/(57-88) 146/85  Resp:  [20-26] 20  SpO2:  [88 %-98 %] 96 %  O2 Device: Nasal cannula  Nasal Cannula O2 Flow Rate (L/min):  [3 L/min-4 L/min] 3 L/min    Physical Exam  Vitals reviewed.   Constitutional:       General: He is not in acute distress.     Appearance: He is not ill-appearing.   HENT:      Head: Normocephalic and atraumatic.      Right Ear: External ear normal.      Left Ear: External ear normal.      Nose: Nose normal.      Mouth/Throat:      Mouth: Mucous membranes are moist.      Pharynx: Oropharynx is clear.   Eyes:      Extraocular Movements: Extraocular movements intact.      Pupils: Pupils are equal, round, and reactive to light.   Cardiovascular:      Rate and Rhythm: Regular rhythm. Tachycardia present.      Pulses: Normal pulses.      Heart sounds: Normal heart sounds.   Pulmonary:      Effort: Pulmonary effort is normal.      Comments: Decreased breath sounds on the right side  Abdominal:      General: Bowel sounds are normal.   Musculoskeletal:         General: Normal range of motion.      Cervical back: Normal range of motion and neck supple.      Right lower leg: Edema present.      Left lower leg: Edema present.      Comments: +1 bilateral lower extremity edema   Neurological:      Mental Status: He is alert and oriented to  person, place, and time.   Psychiatric:         Mood and Affect: Mood normal.         Behavior: Behavior normal.         Thought Content: Thought content normal.         Judgment: Judgment normal.         Lab Results: I have reviewed the following results:  .     01/04/25  2243 01/05/25  0108 01/05/25  0402 01/05/25  0558 01/05/25  1310   WBC 3.96*  --  2.28*  --   --    HGB 7.5*  --  6.6*   < > 7.7*   HCT 23.7*  --  21.4*   < > 24.2*     --  162  --   --    SODIUM 138  --  139  --   --    K 4.4  --  4.0  --   --      --  108  --   --    CO2 25  --  25  --   --    BUN 15  --  13  --   --    CREATININE 0.74  --  0.64  --   --    GLUC 204*  --  211*  --   --    AST 28  --  20  --   --    ALT 25  --  19  --   --    ALB 3.2*  --  2.6*  --   --    TBILI 0.48  --  0.33  --   --    ALKPHOS 87  --  75  --   --    PTT 30  --   --   --   --    INR 1.34*  --   --   --   --    HSTNI0 51*  --   --   --   --    HSTNI2  --  47  --   --   --    BNP 63  --   --   --   --    LACTICACID 3.6* 1.1  --   --   --     < > = values in this interval not displayed.     ABG: No new results in last 24 hours.    Imaging Results Review: I reviewed radiology reports from this admission including: CT chest.  CTA chest 01/04/2025 shows  1. Increasing airspace consolidation in the right middle lobe, otherwise stable in the right upper and lower lobes.  2. Stable diffuse lymphangitic spread of tumor throughout the right lung and evidence of increasing lymphangitic spread of tumor in the left lung, compared to the CT from 12/8/2024.  3. No evidence of acute pulmonary embolus.  4. Stable small right loculated effusion.  Other Study Results Review: EKG was reviewed.   PFT Results Reviewed: In office spirometry 05/07/2024 shows restrictive lung disease FEV1 41%    VTE Prophylaxis: VTE covered by:  apixaban, Oral, 5 mg at 01/05/25 1137

## 2025-01-05 NOTE — ASSESSMENT & PLAN NOTE
-96% on 3 L, patient wears 3 L supplemental O2 at home continuously  -Continue to maintain saturation greater than 89%  -Pulmonary hygiene encouraged: Deep breathing with cough, OOB as tolerated, incentive spirometry and flutter valve  -Appears at baseline, worsening shortness of breath likely secondary to anemia and progression of lung CA

## 2025-01-05 NOTE — ASSESSMENT & PLAN NOTE
New lower extremity edema not noted on prior admissions.  Patient reports this began 2-3 days ago.  Dry weight ~200 lbs.  No appreciable JVD.  Echo 12/9/24  EF 60%  Aortic valve sclerosis  No other evidence of volume overload.  Based on available data, this is possibly related to lymphangitic spread.  Will check LE dopplers given history of PE and asymmetric findings.

## 2025-01-05 NOTE — ASSESSMENT & PLAN NOTE
Hgb around 10 at baseline.  Has been downtrending since 12/12  Now with Hgb in the 7.5 range.  Likely secondary to antineoplastic agents.  No symptoms of lightheadedness, dizziness, or syncope.  Will obtain iron panel, TIBC, ferritin.

## 2025-01-05 NOTE — ASSESSMENT & PLAN NOTE
Presentation:  Patient present with acute on chronic respiratory failure.  Likely secondary to progressing malignancy vs PNA.  Initial procal negative.  Met SIRS criteria based on leukopenia, tachycardia, and tachypnea.  VBG showed respiratory alkalosis 2/2 hyperventilation.  Patient uses 3L NC at baseline.  Currently requiring 4L.  He becomes tachypnic and SOB with sitting up.  Follow Heme/Onc, Rad/Onc and Palliative as OP.  Possible addition of docetaxel and ramucirumab per oncology.  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   Increasing partially loculated moderate right pleural effusion.   No pulmonary embolism.   New interstitial edema in the left lung that is probably related to volume overload.   Developing left lung carcinomatosis is also possible.   Possible small pneumonia within the superior segment of the left lower lobe.   There are enlarged subcarinal lymph nodes measuring up to 1.2 cm that have increased in size   MRI Brain w/wo 12/13/2024 :  Status post treatment of right frontal brain metastasis. No metastases currently.   CTA PE 1/5:  Increasing airspace consolidation in the right middle lobe, otherwise stable in the right upper and lower lobes.   Stable diffuse lymphangitic spread of tumor throughout the right lung and evidence of increasing lymphangitic spread of tumor in the left lung,     Lab Results   Component Value Date    SARSCOV2 Negative 04/20/2024    SARSCOVAG Negative 01/04/2025    INFLUA Negative 04/20/2024    RAPFLUA Negative 01/04/2025    INFLUB Negative 04/20/2024    RAPFLUB Negative 01/04/2025    RSV Negative 04/20/2024       Recent Labs     01/04/25  2243 01/05/25  0402   WBC 3.96* 2.28*     Recent Labs     01/04/25  2243   PROCALCITONI 0.21        Differential Dx:  Progression of malignancy vs PNA.  Possible volume overload.    Assessment:  Progression of  malignancy vs infectious process.  Less likely infection given no fever, no leukocytosis.  Had WBC 12 on previous check pre-admit.  Symptoms possibly exacerbated by restrictive lung disease.  Previous imaging showed concern for volume overload.  Consolidations in right lung may impede imaging interpretation.  Previous infectious work up was negative.    Plan:  Rule out PNA, repeat procal.  Trend WBC and fever curve.  Monitor daily weight  Titrate supplement oxygen for 02 > 90%

## 2025-01-05 NOTE — ASSESSMENT & PLAN NOTE
CTA 10/4/24  Tiny subsegmental embolus in the right lower lobe. Given its small size, it is of doubtful clinical significance.   Continue Eliquis 5 mg BID.

## 2025-01-05 NOTE — ASSESSMENT & PLAN NOTE
Lab Results   Component Value Date    HGBA1C 8.1 (H) 12/09/2024       Recent Labs     01/04/25  2243   GLUC 204*     Hold home regimen while inpatient and resume on discharge Metformin and Amaryl.  Diabetic diet  Insulin regimen  SSI  Goal -180 while admitted, adjusting insulin regimen as appropriate  Monitor for hypoglycemia and treat per protocol

## 2025-01-05 NOTE — ASSESSMENT & PLAN NOTE
Pleural catheter placed 12/12/24.  Previous pneumonitis on Keytruda.  CT 1/5 revealed:  Increasing airspace consolidation in the right middle lobe, otherwise stable in the right upper and lower lobes.   Stable diffuse lymphangitic spread of tumor throughout the right lung and evidence of increasing lymphangitic spread of tumor in the left lung, compared to the CT from 12/8/2024.   No evidence of acute pulmonary embolus.   Stable small right loculated effusion.   Patient is leukopenic, afebrile, and without B symptoms,  Will monitor off Abx.  Procal negative, trend tomorrow.  Will work up for PNA

## 2025-01-05 NOTE — ED NOTES
Pt has sudden episodes of SOB when he has to urinate. Apparently this happens at home and wife states he has a vagus response to urination. After about 1 minute he is WDL and can breathe and talk again. Pad placed in underwear for incontinence of urine during these episodes.      Lore Dunn RN  01/05/25 9425

## 2025-01-05 NOTE — ASSESSMENT & PLAN NOTE
-Following with Dr. Sandoval  -S/p ASEPT catheter 12/12/2024  -Recently completed first cycle of Ramucirumab + Docetaxel with next treatment on 1/21/2025.  -CT shows progression or Right lung CA  -Will reach out to Pt's Oncologist- will need to follow-up outpatient  -Continue to drain ASEPT catheter as needed

## 2025-01-05 NOTE — ASSESSMENT & PLAN NOTE
-Following with Dr. Sandoval  -Recently completed first cycle of Ramucirumab + Docetaxel with next treatment on 1/21/2025.

## 2025-01-05 NOTE — QUICK NOTE
Mr. Gibbs is a 54-year-old male with past medical history of metastatic lung cancer, PE on Eliquis, diabetes mellitus 2 and restrictive lung disease who presents with worsening dyspnea from baseline with bilateral lower extremity edema.  Patient was seen and examined this morning.  Reports that he has acute change in blood pressure, with shortness of breath whenever he attempts to urinate.  Patient's nurse reports that this occurred on 2 occasion today; however vitals are unchanged during these episodes.  Denies any pain or blood in urine.  Patient also endorses anxiety that occurs with these episodes.  Physical Exam  HENT:      Head: Normocephalic and atraumatic.      Mouth/Throat:      Mouth: Mucous membranes are moist.   Cardiovascular:      Rate and Rhythm: Regular rhythm. Tachycardia present.   Pulmonary:      Effort: Pulmonary effort is normal.      Comments: Decreased breath sounds on the right  Abdominal:      General: Abdomen is flat. Bowel sounds are normal.      Palpations: Abdomen is soft.   Musculoskeletal:      Right lower leg: Edema present.      Left lower leg: Edema present.   Skin:     General: Skin is warm.   Neurological:      Mental Status: He is alert and oriented to person, place, and time.   Psychiatric:         Mood and Affect: Mood normal.     Assessment and plan  Suspect progressive dyspnea may be related to anemia, versus metastatic lung cancer  Vasovagal urination-unknown etiology, could be nephrolithiasis versus anxiety        Plan  Status post 1 unit of PRBC, pending H/H  Transfuse for less than 7  bladder and kidney ultrasound  Urinary retention protocol

## 2025-01-06 ENCOUNTER — DOCUMENTATION (OUTPATIENT)
Dept: HEMATOLOGY ONCOLOGY | Facility: CLINIC | Age: 55
End: 2025-01-06

## 2025-01-06 ENCOUNTER — TELEPHONE (OUTPATIENT)
Age: 55
End: 2025-01-06

## 2025-01-06 DIAGNOSIS — J96.11 CHRONIC HYPOXEMIC RESPIRATORY FAILURE (HCC): Primary | ICD-10-CM

## 2025-01-06 PROBLEM — R26.2 AMBULATORY DYSFUNCTION: Status: ACTIVE | Noted: 2025-01-06

## 2025-01-06 LAB
ANION GAP SERPL CALCULATED.3IONS-SCNC: 7 MMOL/L (ref 4–13)
ANISOCYTOSIS BLD QL SMEAR: PRESENT
BASOPHILS # BLD MANUAL: 0 THOUSAND/UL (ref 0–0.1)
BASOPHILS NFR MAR MANUAL: 0 % (ref 0–1)
BUN SERPL-MCNC: 11 MG/DL (ref 5–25)
CALCIUM SERPL-MCNC: 8.4 MG/DL (ref 8.4–10.2)
CHLORIDE SERPL-SCNC: 105 MMOL/L (ref 96–108)
CO2 SERPL-SCNC: 27 MMOL/L (ref 21–32)
CREAT SERPL-MCNC: 0.55 MG/DL (ref 0.6–1.3)
EOSINOPHIL # BLD MANUAL: 0 THOUSAND/UL (ref 0–0.4)
EOSINOPHIL NFR BLD MANUAL: 0 % (ref 0–6)
ERYTHROCYTE [DISTWIDTH] IN BLOOD BY AUTOMATED COUNT: 17.4 % (ref 11.6–15.1)
GFR SERPL CREATININE-BSD FRML MDRD: 117 ML/MIN/1.73SQ M
GLUCOSE SERPL-MCNC: 136 MG/DL (ref 65–140)
GLUCOSE SERPL-MCNC: 139 MG/DL (ref 65–140)
GLUCOSE SERPL-MCNC: 178 MG/DL (ref 65–140)
GLUCOSE SERPL-MCNC: 238 MG/DL (ref 65–140)
HCT VFR BLD AUTO: 23.9 % (ref 36.5–49.3)
HGB BLD-MCNC: 7.7 G/DL (ref 12–17)
LYMPHOCYTES # BLD AUTO: 0.79 THOUSAND/UL (ref 0.6–4.47)
LYMPHOCYTES # BLD AUTO: 29 % (ref 14–44)
MAGNESIUM SERPL-MCNC: 1.7 MG/DL (ref 1.9–2.7)
MCH RBC QN AUTO: 28.2 PG (ref 26.8–34.3)
MCHC RBC AUTO-ENTMCNC: 32.2 G/DL (ref 31.4–37.4)
MCV RBC AUTO: 88 FL (ref 82–98)
METAMYELOCYTE ABSOLUTE CT: 0.03 THOUSAND/UL (ref 0–0.1)
METAMYELOCYTES NFR BLD MANUAL: 1 % (ref 0–1)
MONOCYTES # BLD AUTO: 0.19 THOUSAND/UL (ref 0–1.22)
MONOCYTES NFR BLD: 7 % (ref 4–12)
NEUTROPHILS # BLD MANUAL: 1.71 THOUSAND/UL (ref 1.85–7.62)
NEUTS BAND NFR BLD MANUAL: 4 % (ref 0–8)
NEUTS SEG NFR BLD AUTO: 59 % (ref 43–75)
NRBC BLD AUTO-RTO: 4 /100 WBC (ref 0–2)
OVALOCYTES BLD QL SMEAR: PRESENT
PHOSPHATE SERPL-MCNC: 2.7 MG/DL (ref 2.7–4.5)
PLATELET # BLD AUTO: 235 THOUSANDS/UL (ref 149–390)
PLATELET BLD QL SMEAR: ADEQUATE
PMV BLD AUTO: 10.5 FL (ref 8.9–12.7)
POIKILOCYTOSIS BLD QL SMEAR: PRESENT
POTASSIUM SERPL-SCNC: 3.6 MMOL/L (ref 3.5–5.3)
PROCALCITONIN SERPL-MCNC: 0.22 NG/ML
RBC # BLD AUTO: 2.73 MILLION/UL (ref 3.88–5.62)
RBC MORPH BLD: PRESENT
SODIUM SERPL-SCNC: 139 MMOL/L (ref 135–147)
WBC # BLD AUTO: 2.71 THOUSAND/UL (ref 4.31–10.16)

## 2025-01-06 PROCEDURE — 84145 PROCALCITONIN (PCT): CPT | Performed by: NURSE PRACTITIONER

## 2025-01-06 PROCEDURE — 80048 BASIC METABOLIC PNL TOTAL CA: CPT

## 2025-01-06 PROCEDURE — 84100 ASSAY OF PHOSPHORUS: CPT

## 2025-01-06 PROCEDURE — 85007 BL SMEAR W/DIFF WBC COUNT: CPT

## 2025-01-06 PROCEDURE — 83735 ASSAY OF MAGNESIUM: CPT

## 2025-01-06 PROCEDURE — 82948 REAGENT STRIP/BLOOD GLUCOSE: CPT

## 2025-01-06 PROCEDURE — 85027 COMPLETE CBC AUTOMATED: CPT

## 2025-01-06 PROCEDURE — 99232 SBSQ HOSP IP/OBS MODERATE 35: CPT | Performed by: INTERNAL MEDICINE

## 2025-01-06 RX ORDER — MAGNESIUM SULFATE HEPTAHYDRATE 40 MG/ML
2 INJECTION, SOLUTION INTRAVENOUS ONCE
Status: COMPLETED | OUTPATIENT
Start: 2025-01-06 | End: 2025-01-06

## 2025-01-06 RX ORDER — SODIUM CHLORIDE 9 MG/ML
20 INJECTION, SOLUTION INTRAVENOUS ONCE
OUTPATIENT
Start: 2025-01-21

## 2025-01-06 RX ORDER — POTASSIUM CHLORIDE 1500 MG/1
40 TABLET, EXTENDED RELEASE ORAL ONCE
Status: COMPLETED | OUTPATIENT
Start: 2025-01-06 | End: 2025-01-06

## 2025-01-06 RX ADMIN — ONDANSETRON 8 MG: 4 TABLET, ORALLY DISINTEGRATING ORAL at 13:08

## 2025-01-06 RX ADMIN — UMECLIDINIUM 1 PUFF: 62.5 AEROSOL, POWDER ORAL at 08:39

## 2025-01-06 RX ADMIN — APIXABAN 5 MG: 5 TABLET, FILM COATED ORAL at 08:38

## 2025-01-06 RX ADMIN — Medication 2.5 MG: at 13:08

## 2025-01-06 RX ADMIN — LEVOTHYROXINE SODIUM 25 MCG: 25 TABLET ORAL at 08:38

## 2025-01-06 RX ADMIN — INSULIN GLARGINE 3 UNITS: 100 INJECTION, SOLUTION SUBCUTANEOUS at 08:42

## 2025-01-06 RX ADMIN — BENZONATATE 200 MG: 100 CAPSULE ORAL at 13:08

## 2025-01-06 RX ADMIN — POTASSIUM CHLORIDE 40 MEQ: 1500 TABLET, EXTENDED RELEASE ORAL at 06:41

## 2025-01-06 RX ADMIN — ATORVASTATIN CALCIUM 10 MG: 10 TABLET, FILM COATED ORAL at 20:59

## 2025-01-06 RX ADMIN — INSULIN LISPRO 2 UNITS: 100 INJECTION, SOLUTION INTRAVENOUS; SUBCUTANEOUS at 18:09

## 2025-01-06 RX ADMIN — LOSARTAN POTASSIUM 25 MG: 25 TABLET, FILM COATED ORAL at 20:59

## 2025-01-06 RX ADMIN — OLODATEROL RESPIMAT INHALATION SPRAY 2 PUFF: 2.5 SPRAY, METERED RESPIRATORY (INHALATION) at 08:39

## 2025-01-06 RX ADMIN — AMITRIPTYLINE HYDROCHLORIDE 200 MG: 25 TABLET, FILM COATED ORAL at 20:59

## 2025-01-06 RX ADMIN — Medication 2.5 MG: at 18:28

## 2025-01-06 RX ADMIN — MAGNESIUM SULFATE IN WATER FOR 2 G: 40 INJECTION INTRAVENOUS at 06:41

## 2025-01-06 RX ADMIN — INSULIN LISPRO 6 UNITS: 100 INJECTION, SOLUTION INTRAVENOUS; SUBCUTANEOUS at 13:11

## 2025-01-06 RX ADMIN — FOLIC ACID 1000 MCG: 1 TABLET ORAL at 08:38

## 2025-01-06 RX ADMIN — APIXABAN 5 MG: 5 TABLET, FILM COATED ORAL at 18:09

## 2025-01-06 NOTE — UTILIZATION REVIEW
Initial Clinical Review    Admission: Date/Time/Statement:   Admission Orders (From admission, onward)       Ordered        01/05/25 0216  INPATIENT ADMISSION  Once                          Orders Placed This Encounter   Procedures    INPATIENT ADMISSION     Standing Status:   Standing     Number of Occurrences:   1     Level of Care:   Med Surg [16]     Estimated length of stay:   More than 2 Midnights     Certification:   I certify that inpatient services are medically necessary for this patient for a duration of greater than two midnights. See H&P and MD Progress Notes for additional information about the patient's course of treatment.     ED Arrival Information       Expected   -    Arrival   1/4/2025 22:23    Acuity   Emergent              Means of arrival   Walk-In    Escorted by   Spouse    Service   Hospitalist    Admission type   Emergency              Arrival complaint   SOB, on O2             Chief Complaint   Patient presents with    Shortness of Breath     Patient reports chronic SOB but worsening over the last day. Currently under treatment for right sided lung CA with pending CT chest completed 12/31 for concerned of further malignancy. Family reports b/l leg swelling, worse on the right.       Initial Presentation: 54 y.o. male PMH adenocarcinoma with previous mets to brain s/p radiation, DM2, PE on eliquis, chronic resp failure on 3 L NC,OP Palliative, RadOnc w pleural cath placed 12/12, [facility]  to ED as walk in presents with hypoxia, acute worsening  respiratory status.   Having difficulty catching his breath after sitting up and with long conversation w/ mild central and bilateral lateral chest wall pain from cough;   BLE edema beginning yesterday progressively worsening. Chronic cough worsening w fatigue Notes that recent chemo regimen has changed. DCd on 12/8 for similar presentation.     EXAM   Afebrile, tachycardia,  increased 02 requirement of 4L NC. Labs elevation TROP /D dimer, HGB 7.5,  leukopenia;   CT PE reveals Increasing airspace consolidation in the right middle lobe, otherwise stable in the right upper and lower lobes.   Stable diffuse lymphangitic spread of tumor throughout the right lung and evidence of increasing lymphangitic spread of tumor in the left lung   Given IV Vanco /cefepime, DUO Neb    Inpatient admission due to acute on chronic respiratory failure likely either progressing malignancy vs PNA, symptomatic anemia, cancer related pain  Follow WBC, fevers trend pro chhaya & cont wo further antibx; Supplemental O2 for POX goal > 90%, cont 4 L NC. Possible addition of docetaxel and ramucirumab per oncology. Obtain iron panel. TIBC, ferritin; currently utilizing oxycodone 2.5 mg q4 hours for pain. Obtain quintin LE dopplers, cont Eliquis. 1 unit of PRBC, pending H/H, Transfuse for less than 7. Consult Pulmonology  Etiology of the patient's shortness of breath is likely multifactorial. The least likely outcome would be related to volume overload. May consider diuresis empirically. The patient reports he drained his catheter without any significant output. Significantly anemic & transfused a unit. Reports some improvement with this but is have some persistent shortness of breath. Concern for progression of his cancer based on review of imaging. A message was sent to his primary oncologist. There is concern for lymphangitic spread.   Anticipated Length of Stay/Certification Statement: Patient will be admitted on an inpatient basis with an anticipated length of stay of greater than 2 midnights secondary to hypoxemia.     Date: 1/6/2025   Day 2:   does not report any worsening shortness of breath at this time. Denies fever/chills. Present symptoms are more likely due to progression of metastatic lung cancer rather than an infectious etiology.   Continue to observe off further antibiotics. ASEPT catheter in place, which can be drained, as necessary. Oxygen requirements are currently between 2 to 3  L via nasal cannula.   Presenting weakness with symptomatic anemia, hemoglobin  remained stable since yesterday afternoon at 7.7, from a prior demarco of 6.5 status post. S/P transfusion. Suspect etiology secondary to marrow suppression from recent chemotherapy, with concurrent leukopenia. Reports occasional anxiety during attempts at urination, however, bladder/kidney ultrasound negative for obstructive etiology. Hypomagnesemia noted status post repletion. Await PT/OT input.   Pulmonology  Primary malignant neoplasm of R lung;   Continue to drain ASEPT catheter as needed. Will consult pt Oncologist for OP management adjusting his chemotherapy when he is due for his 2nd chemotherapy   Suspect progression of his primary lung cancer disease. Recent bronchoscopy confirming tumor invasion on both right upper and right lower lobe. There is no clinical evidence of infection. Stop antibiotics     ED Treatment-Medication Administration from 01/04/2025 2223 to 01/05/2025 1633         Date/Time Order Dose Route Action     01/04/2025 2256 sodium chloride 0.9 % bolus 250 mL 250 mL Intravenous New Bag     01/04/2025 2333 sodium chloride 0.9 % bolus 250 mL -- Intravenous Restarted     01/04/2025 2327 ipratropium-albuterol (DUO-NEB) 0.5-2.5 mg/3 mL inhalation solution 3 mL 3 mL Nebulization Given     01/04/2025 2333 sodium chloride 0.9 % bolus 750 mL 750 mL Intravenous New Bag     01/05/2025 0025 cefepime (MAXIPIME) 2 g/50 mL dextrose IVPB 2,000 mg Intravenous New Bag     01/05/2025 0104 vancomycin (VANCOCIN) 2,000 mg in sodium chloride 0.9 % 500 mL IVPB 2,000 mg Intravenous New Bag     01/04/2025 2343 iohexol (OMNIPAQUE) 350 MG/ML injection (MULTI-DOSE) 65 mL 65 mL Intravenous Given     01/05/2025 0402 albuterol (PROVENTIL HFA,VENTOLIN HFA) inhaler 2 puff 2 puff Inhalation Given     01/05/2025 1136 apixaban (ELIQUIS) tablet 5 mg 5 mg Oral Given     01/05/2025 0858 folic acid (FOLVITE) tablet 1,000 mcg 1,000 mcg Oral Given      01/05/2025 0858 levothyroxine tablet 25 mcg 25 mcg Oral Given     01/05/2025 0913 ondansetron (ZOFRAN-ODT) dispersible tablet 8 mg 8 mg Oral Given     01/05/2025 0905 umeclidinium 62.5 mcg/actuation inhaler AEPB 1 puff 1 puff Inhalation Given     01/05/2025 0905 olodaterol HCl (STRIVERDI RESPIMAT) inhaler 2 puff 2 puff Inhalation Given     01/05/2025 0902 insulin lispro (HumALOG/ADMELOG) 100 units/mL subcutaneous injection 2-12 Units 2 Units Subcutaneous Given     01/05/2025 1136 insulin lispro (HumALOG/ADMELOG) 100 units/mL subcutaneous injection 2-12 Units 2 Units Subcutaneous Given     01/05/2025 0901 insulin glargine (LANTUS) subcutaneous injection 3 Units 0.03 mL 3 Units Subcutaneous Given            Scheduled Medications:  amitriptyline, 200 mg, Oral, HS  apixaban, 5 mg, Oral, BID  atorvastatin, 10 mg, Oral, Daily  folic acid, 1,000 mcg, Oral, Daily  insulin glargine, 3 Units, Subcutaneous, Daily With Breakfast  insulin lispro, 2-12 Units, Subcutaneous, TID AC  levothyroxine, 25 mcg, Oral, Daily  losartan, 25 mg, Oral, Daily  umeclidinium, 1 puff, Inhalation, Daily   And  olodaterol HCl, 2 puff, Inhalation, Daily      Continuous IV Infusions:     PRN Meds:  acetaminophen, 975 mg, Oral, Q8H PRN  albuterol, 2 puff, Inhalation, Q4H PRN  ALPRAZolam, 0.25 mg, Oral, Q8H PRN  benzonatate, 200 mg, Oral, TID PRN  famotidine, 40 mg, Oral, Daily PRN  ondansetron, 8 mg, Oral, Q8H PRN  oxyCODONE, 2.5 mg, Oral, Q4H PRN  senna, 8.6 mg, Oral, HS PRN  sodium chloride (PF), 3 mL, Intravenous, Q1H PRN      ED Triage Vitals   Temperature Pulse Respirations Blood Pressure SpO2 Pain Score   01/04/25 2229 01/04/25 2229 01/04/25 2229 01/04/25 2229 01/04/25 2229 01/05/25 0810   98.8 °F (37.1 °C) (!) 140 (!) 26 128/88 (S) (!) 88 % No Pain     Weight (last 2 days)       Date/Time Weight    01/05/25 1619 89.9 (198.19)    01/05/25 0559 89.2 (196.65)            Vital Signs (last 3 days)       Date/Time Temp Pulse Resp BP MAP (mmHg) SpO2  Calculated FIO2 (%) - Nasal Cannula O2 Flow Rate (L/min) Nasal Cannula O2 Flow Rate (L/min) O2 Device Patient Position - Orthostatic VS Mary Jane Coma Scale Score Pain    01/06/25 1308 -- -- -- -- -- -- -- -- -- -- -- -- 7    01/06/25 0705 -- 112 -- 138/72 98 93 % -- -- -- -- -- -- --    01/05/25 2218 98.7 °F (37.1 °C) 110 19 142/65 93 96 % 32 -- 3 L/min Nasal cannula Lying -- --    01/05/25 1940 -- 114 -- 132/64 92 92 % 32 -- 3 L/min -- -- -- 7    01/05/25 1915 -- -- -- -- -- 2 % -- 2 L/min -- Nasal cannula -- -- --    01/05/25 1701 -- -- -- -- -- -- -- -- -- -- -- 15 No Pain    01/05/25 1635 98.1 °F (36.7 °C) -- -- -- -- -- -- -- -- -- -- -- --    01/05/25 1619 -- 121 18 135/72 98 94 % 32 -- 3 L/min Nasal cannula Sitting -- No Pain    01/05/25 1300 -- 114 20 146/85 110 96 % 32 -- 3 L/min Nasal cannula -- -- No Pain    01/05/25 1215 -- 120 20 142/82 103 96 % 32 -- 3 L/min Nasal cannula -- -- --    01/05/25 1122 98.4 °F (36.9 °C) 115 20 131/77 -- -- -- -- -- -- -- -- --    01/05/25 1030 -- 120 -- 134/76 102 95 % -- -- -- -- -- -- --    01/05/25 0930 98.1 °F (36.7 °C) 120 22 145/70 -- 97 % 32 -- 3 L/min Nasal cannula -- -- --    01/05/25 0830 97.8 °F (36.6 °C) 114 22 138/75 -- 98 % 36 -- 4 L/min Nasal cannula -- -- --    01/05/25 0810 98 °F (36.7 °C) 116 22 134/67 95 96 % 36 -- 4 L/min Nasal cannula Sitting -- No Pain    01/05/25 0415 -- 124 -- 133/65 93 94 % -- -- -- -- -- -- --    01/05/25 0400 -- -- -- -- -- -- -- -- -- -- -- 15 --    01/05/25 0030 -- 130 -- 118/62 84 90 % -- -- -- -- -- -- --    01/04/25 2330 -- 133 -- 106/57 75 94 % -- -- -- -- -- -- --    01/04/25 2300 -- 140 26 114/64 82 95 % 36 -- 4 L/min Nasal cannula -- -- --    01/04/25 2245 -- 146 -- 121/65 86 90 % 36 -- 4 L/min Nasal cannula  -- 15 --    01/04/25 2229 98.8 °F (37.1 °C) 140 26 128/88 -- 88 %  -- -- -- None (Room air) Sitting -- --              Pertinent Labs/Diagnostic Test Results:   Radiology:  US kidney and bladder   Final Interpretation  by Desmond Marcum MD (01/05 0298)      1.  Unchanged 1.5 cm nonobstructing left renal lower pole calculus.   2.  No evidence of hydronephrosis.          VAS VENOUS DUPLEX - LOWER LIMB BILATERAL   Final Interpretation by Riana Phillip DO (01/05 1305)      CTA chest pe study   Final Interpretation by Christopher Vargas MD (01/05 0107)         1. Increasing airspace consolidation in the right middle lobe, otherwise stable in the right upper and lower lobes.   2. Stable diffuse lymphangitic spread of tumor throughout the right lung and evidence of increasing lymphangitic spread of tumor in the left lung, compared to the CT from 12/8/2024.   3. No evidence of acute pulmonary embolus.   4. Stable small right loculated effusion.         X-ray chest 1 view portable   ED Interpretation by Hua Beckford DO (01/05 0016)   Large opacity of right middle, right lower lobes improved from prior December study. Possible component of tumor burden, atelectasis.      Final Interpretation by Jono Friend MD (01/05 1226)      Persistent right lower lung consolidation and diffuse reticulonodular opacities. Aeration of the right upper lung has improved from 12/6/2024. Small right pleural effusion.           Cardiology:  No orders to display     GI:  No orders to display           Results from last 7 days   Lab Units 01/06/25  0506 01/05/25  1310 01/05/25  0558 01/05/25  0402 01/04/25  2243   WBC Thousand/uL 2.71*  --   --  2.28* 3.96*   HEMOGLOBIN g/dL 7.7* 7.7* 6.5* 6.6* 7.5*   HEMATOCRIT % 23.9* 24.2* 21.0* 21.4* 23.7*   PLATELETS Thousands/uL 235  --   --  162 222   TOTAL NEUT ABS Thousands/µL  --   --   --  1.49* 2.62   BANDS PCT % 4  --   --   --   --          Results from last 7 days   Lab Units 01/06/25  0506 01/05/25  0402 01/04/25  2243   SODIUM mmol/L 139 139 138   POTASSIUM mmol/L 3.6 4.0 4.4   CHLORIDE mmol/L 105 108 103   CO2 mmol/L 27 25 25   ANION GAP mmol/L 7 6 10   BUN mg/dL 11 13 15  "  CREATININE mg/dL 0.55* 0.64 0.74   EGFR ml/min/1.73sq m 117 110 103   CALCIUM mg/dL 8.4 7.9* 8.8   MAGNESIUM mg/dL 1.7*  --   --    PHOSPHORUS mg/dL 2.7  --   --      Results from last 7 days   Lab Units 01/05/25  0402 01/04/25  2243   AST U/L 20 28   ALT U/L 19 25   ALK PHOS U/L 75 87   TOTAL PROTEIN g/dL 5.1* 6.3*   ALBUMIN g/dL 2.6* 3.2*   TOTAL BILIRUBIN mg/dL 0.33 0.48     Results from last 7 days   Lab Units 01/06/25  1122 01/06/25  0710 01/05/25 2052 01/05/25  1648 01/05/25  1121 01/05/25  0822   POC GLUCOSE mg/dl 238* 139 136 158* 187* 183*     Results from last 7 days   Lab Units 01/06/25  0506 01/05/25  0402 01/04/25  2243   GLUCOSE RANDOM mg/dL 136 211* 204*             No results found for: \"BETA-HYDROXYBUTYRATE\"       Results from last 7 days   Lab Units 01/04/25 2248   PH SANTOS  7.476*   PCO2 SANTOS mm Hg 29.2*   PO2 SANTOS mm Hg 151.4*   HCO3 SANTOS mmol/L 21.1*   BASE EXC SANTOS mmol/L -2.0   O2 CONTENT SANTOS ml/dL 11.0   O2 HGB, VENOUS % 93.8*             Results from last 7 days   Lab Units 01/05/25  0402 01/05/25  0108 01/04/25 2243   HS TNI 0HR ng/L  --   --  51*   HS TNI 2HR ng/L  --  47  --    HSTNI D2 ng/L  --  -4  --    HS TNI 4HR ng/L 48  --   --    HSTNI D4 ng/L -3  --   --      Results from last 7 days   Lab Units 01/04/25 2243   D-DIMER QUANTITATIVE ug/ml FEU 18.13*     Results from last 7 days   Lab Units 01/04/25  2243   PROTIME seconds 17.3*   INR  1.34*   PTT seconds 30         Results from last 7 days   Lab Units 01/06/25  0506 01/04/25  2243   PROCALCITONIN ng/ml 0.22 0.21     Results from last 7 days   Lab Units 01/05/25  0108 01/04/25  2243   LACTIC ACID mmol/L 1.1 3.6*             Results from last 7 days   Lab Units 01/04/25  2243   BNP pg/mL 63     Results from last 7 days   Lab Units 01/05/25  0108   FERRITIN ng/mL 978*   IRON SATURATION % 18   IRON ug/dL 45*   TIBC ug/dL 254.8     Results from last 7 days   Lab Units 01/05/25  0108   TRANSFERRIN mg/dL 182*     Results from last 7 days "   Lab Units 01/06/25  0547 01/01/25  0547   UNIT PRODUCT CODE  [id number]  [id number]   UNIT NUMBER  [id number]  [id number]   UNITABO  O  O O   UNITRH  POS  POS NEG   CROSSMATCH  Compatible  Compatible Compatible   UNIT DISPENSE STATUS  Presumed Trans  Crossmatched Presumed Trans   UNIT PRODUCT VOL ml 350  300 350                         Results from last 7 days   Lab Units 01/05/25  0023 12/31/24  1019   CLARITY UA  Clear Turbid   COLOR UA  Yellow Yellow   SPEC GRAV UA  >=1.050* 1.024   PH UA  6.0 5.5   GLUCOSE UA mg/dl Trace* 500 (1/2%)*   KETONES UA mg/dl Negative Negative   BLOOD UA  Large* Large*   PROTEIN UA mg/dl 70 (1+)* 30 (1+)*   NITRITE UA  Negative Negative   BILIRUBIN UA  Negative Negative   UROBILINOGEN UA (BE) mg/dl <2.0 <2.0   LEUKOCYTES UA  Negative Negative   WBC UA /hpf 2-4* 2-4*   RBC UA /hpf Innumerable* Innumerable*   BACTERIA UA /hpf None Seen None Seen   EPITHELIAL CELLS WET PREP /hpf Occasional Occasional   MUCUS THREADS  Innumerable* Occasional*     Results from last 7 days   Lab Units 01/05/25  0453   STREP PNEUMONIAE ANTIGEN, URINE  Negative   LEGIONELLA URINARY ANTIGEN  Negative                             Results from last 7 days   Lab Units 01/04/25  2250 01/04/25  2243   BLOOD CULTURE  No Growth at 24 hrs. No Growth at 24 hrs.                   Past Medical History:   Diagnosis Date    Cancer (HCC) 04,25,2024    Diabetes mellitus (HCC)     High cholesterol     History of blood clots     Hypertension     Leucocytosis 04/20/2024    Lung cancer (HCC)     Malignant neoplasm of overlapping sites of right lung (HCC) 06/04/2024    Pneumonia     Pulmonary embolism (HCC)      Present on Admission:   Tobacco dependence   Adenocarcinoma of overlapping sites of right lung (HCC)   Chronic hypoxemic respiratory failure (HCC)   Restrictive lung disease   Primary malignant neoplasm of right lung metastatic to other site (HCC)   Cancer related pain   DM2 (diabetes  mellitus, type 2) (HCC)   History of pulmonary embolism   Antineoplastic chemotherapy induced anemia   Abnormal CT of the chest      Admitting Diagnosis: Chest pain [R07.9]  ROBERTSON (dyspnea on exertion) [R06.09]  Adenocarcinoma (HCC) [C80.1]  Elevated troponin [R79.89]  Elevated d-dimer [R79.89]  Severe sepsis (HCC) [A41.9, R65.20]  Acute on chronic hypoxic respiratory failure (HCC) [J96.21]  Age/Sex: 54 y.o. male    Network Utilization Review Department  ATTENTION: Please call with any questions or concerns to [phone] and carefully listen to the prompts so that you are directed to the right person. All voicemails are confidential.   For Discharge needs, contact Care Management DC Support Team at [phone] opt. 2  Send all requests for admission clinical reviews, approved or denied determinations and any other requests to dedicated fax number below belonging to the campus where the patient is receiving treatment. List of dedicated fax numbers for the Facilities:  FACILITY NAME UR FAX NUMBER   ADMISSION DENIALS (Administrative/Medical Necessity) [phone]   DISCHARGE SUPPORT TEAM (NETWORK) [phone]   PARENT CHILD HEALTH (Maternity/NICU/Pediatrics) [phone]   [facility] [phone]   [facility] [phone]   [facility] [phone]   [facility] [phone]   [facility] [phone]   [facility] [phone]   General acute hospital [phone]   [facility] [phone]   [facility] [phone]   [facility] [phone]   [facility] [phone]   [facility] [phone]

## 2025-01-06 NOTE — PROGRESS NOTES
Progress Note - Hospitalist   Name: Papo Gibbs 54 y.o. male I MRN: [id number]  Unit/Bed#: [facility] I Date of Admission: 1/4/2025   Date of Service: 1/6/2025 I Hospital Day: 1    Assessment & Plan  Primary malignant neoplasm of right lung metastatic to other site (HCC)  Presentation:  Patient present with acute on chronic respiratory failure.  Likely secondary to progressing malignancy vs PNA.  Initial procal negative.  Met SIRS criteria based on leukopenia, tachycardia, and tachypnea.  VBG showed respiratory alkalosis 2/2 hyperventilation.  Patient uses 3L NC at baseline.  Was on 4L, currently on 3L NC on 1/06.   He becomes tachypnic and SOB with sitting up.  Follow Heme/Onc, Rad/Onc and Palliative as OP.  Possible addition of docetaxel and ramucirumab per oncology.  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   Increasing partially loculated moderate right pleural effusion.   No pulmonary embolism.   New interstitial edema in the left lung that is probably related to volume overload.   Developing left lung carcinomatosis is also possible.   Possible small pneumonia within the superior segment of the left lower lobe.   There are enlarged subcarinal lymph nodes measuring up to 1.2 cm that have increased in size   MRI Brain w/wo 12/13/2024 :  Status post treatment of right frontal brain metastasis. No metastases currently.   CTA PE 1/5:  Increasing airspace consolidation in the right middle lobe, otherwise stable in the right upper and lower lobes.   Stable diffuse lymphangitic spread of tumor throughout the right lung and evidence of increasing lymphangitic spread of tumor in the left lung,     Lab Results   Component Value Date    SARSCOV2 Negative 04/20/2024    SARSCOVAG Negative 01/04/2025    INFLUA Negative 04/20/2024    RAPFLUA Negative 01/04/2025    INFLUB Negative 04/20/2024    RAPFLUB  Negative 01/04/2025    RSV Negative 04/20/2024       Recent Labs     01/04/25  2243 01/05/25  0402 01/06/25  0506   WBC 3.96* 2.28* 2.71*     Recent Labs     01/04/25  2243 01/06/25  0506   PROCALCITONI 0.21 0.22        Differential Dx:  Progression of malignancy vs PNA.  Less suspicious of volume overload.    Assessment:  Less likely infection given no fever, no leukocytosis.  Had WBC 12 on previous check pre-admit.  WBC 2.71 on 1/06 down from 2.28 on 1/05   Pro-calcitonin on 1/06 was 0.22  Symptoms possibly exacerbated by restrictive lung disease.  Previous imaging showed concern for volume overload.  Consolidations in right lung may impede imaging interpretation.  Previous infectious work up was negative.    Plan:  Trend WBC and fever curve.  Monitor daily weight  Titrate supplement oxygen for 02 > 90%  Chronic hypoxemic respiratory failure (HCC)  Uses 3L NC at baseline.  Was on 4 L. Currently on 3L, as of 1/06  See above.  Antineoplastic chemotherapy induced anemia  Hgb around 10 at baseline.  Has been downtrending since 12/12  Now with Hgb in the 7.7 range.  Hgb on 1/05 was 6.5 and required 1 unit of pRBCs  Likely secondary to antineoplastic agents.  No symptoms of lightheadedness, dizziness, or syncope.  Iron panel: 45, TIBC: 254, ferritin: 978, Iron sat: 18  Cancer related pain  Currently utilizing oxycodone 2.5 mg q4 hours for pain.  5 mg q4 hours for severe pain.  Follows with Dr. Estrella of Palliative as OP.  Urinary hesitancy and anxious feelings with urination, has been going on for months. Denies dysuria, flank plain, fever/chills.     Will continue to monitor symptoms.   Abnormal CT of the chest  Pleural catheter placed 12/12/24.  Previous pneumonitis on Keytruda.  CT 1/5 revealed:  Increasing airspace consolidation in the right middle lobe, otherwise stable in the right upper and lower lobes.   Stable diffuse lymphangitic spread of tumor throughout the right lung and evidence of increasing lymphangitic  spread of tumor in the left lung, compared to the CT from 12/8/2024.   No evidence of acute pulmonary embolus.   Stable small right loculated effusion.   DM2 (diabetes mellitus, type 2) (HCC)  Lab Results   Component Value Date    HGBA1C 8.1 (H) 12/09/2024     (P) 173.5  Recent Labs     01/04/25  2243 01/05/25  0402 01/05/25  0822 01/05/25  2052 01/06/25  0506 01/06/25  0710 01/06/25  1122   POCGLU  --   --    < > 136  --  139 238*   GLUC 204* 211*  --   --  136  --   --     < > = values in this interval not displayed.     Hold home regimen while inpatient and resume on discharge Metformin and Amaryl.  Diabetic diet  Insulin regimen  SSI  Goal -180 while admitted, adjusting insulin regimen as appropriate  Monitor for hypoglycemia and treat per protocol  Restrictive lung disease  Spirometry in May 2024 revealed FEV1 of 41%  Likely s/p radiation.  Peripheral edema  New lower extremity edema not noted on prior admissions.  Patient reports this began 2-3 days ago.  Dry weight ~200 lbs.  No appreciable JVD.  Echo 12/9/24  EF 60%  Aortic valve sclerosis  No other evidence of volume overload.  Based on available data, this is possibly related to lymphangitic spread.  VAS Venous Duplex Lower Limb Bilateral 1/05/2025: No evidence of acute or chronic deep vein thrombosis.    History of pulmonary embolism  CTA 10/4/24  Tiny subsegmental embolus in the right lower lobe. Given its small size, it is of doubtful clinical significance.   Continue Eliquis 5 mg BID.  Tobacco dependence  Smoking history of 2 ppd for >20 years.  Quit smoking in April 2024 prior to cancer diagnosis.  Adenocarcinoma of overlapping sites of right lung (HCC)  Received last chemo on Tuesday, 12/31/2024  Next chemo scheduled for Tuesday, 1/21/2025  PT/OT eval   Ambulatory dysfunction  Pt states he doesn't feel strong enough to get up and walk.   Still able to transfer self to commode without difficulties.     Plan:    PT/OT eval and treat     VTE  Pharmacologic Prophylaxis: VTE Score: 9 High Risk (Score >/= 5) - Pharmacological DVT Prophylaxis Ordered: apixaban (Eliquis). Sequential Compression Devices Ordered.    Mobility:   Basic Mobility Inpatient Raw Score: 16  JH-HLM Goal: 5: Stand one or more mins  JH-HLM Achieved: 6: Walk 10 steps or more  JH-HLM Goal achieved. Continue to encourage appropriate mobility.    Patient Centered Rounds: I performed bedside rounds with nursing staff today.   Discussions with Specialists or Other Care Team Provider: Pulmonary, Cardiology     Education and Discussions with Family / Patient: Patient declined call to .     Current Length of Stay: 1 day(s)  Current Patient Status: Inpatient   Certification Statement: The patient will continue to require additional inpatient hospital stay due to shortness of breath.  Discharge Plan: Anticipate discharge tomorrow to [facility].    Code Status: Level 1 - Full Code    Subjective   Mr. Gibbs was examined at beside in no acute distress. No acute overnight events where reported. Denies nausea/vomiting, constipation/diarrhea, fevers/chills, numbness/decreased sensation/weakness. Reports SOB/rib pain with coughing, which he reports is the same as previous. PT still endorses experiencing anxiety when urinating.     Objective :  Temp:  [98.1 °F (36.7 °C)-98.7 °F (37.1 °C)] 98.7 °F (37.1 °C)  HR:  [110-121] 112  BP: (132-142)/(64-72) 138/72  Resp:  [18-19] 19  SpO2:  [2 %-96 %] 93 %  O2 Device: Nasal cannula  Nasal Cannula O2 Flow Rate (L/min):  [3 L/min] 3 L/min    Body mass index is 31.04 kg/m².     Input and Output Summary (last 24 hours):     Intake/Output Summary (Last 24 hours) at 1/6/2025 1341  Last data filed at 1/6/2025 0914  Gross per 24 hour   Intake 300 ml   Output 400 ml   Net -100 ml       Physical Exam  HENT:      Head: Normocephalic and atraumatic.      Nose: Nose normal.      Mouth/Throat:      Mouth: Mucous membranes are moist.   Eyes:      Extraocular Movements:  Extraocular movements intact.   Cardiovascular:      Pulses: Normal pulses.      Heart sounds: Normal heart sounds. No murmur heard.     No friction rub.   Pulmonary:      Effort: Retractions present.      Breath sounds: Decreased air movement present. Examination of the right-middle field reveals decreased breath sounds and wheezing. Examination of the right-lower field reveals decreased breath sounds and wheezing. Decreased breath sounds and wheezing present.   Abdominal:      General: Abdomen is flat. Bowel sounds are normal. There is no distension.      Palpations: Abdomen is soft.      Tenderness: There is no abdominal tenderness.   Musculoskeletal:         General: No swelling or tenderness. Normal range of motion.      Cervical back: Normal range of motion.   Skin:     General: Skin is warm.      Capillary Refill: Capillary refill takes less than 2 seconds.   Neurological:      General: No focal deficit present.      Mental Status: He is alert and oriented to person, place, and time.   Psychiatric:         Mood and Affect: Mood normal.       Lines/Drains:      Central Line:  Goal for removal: N/A - Chronic PICC               Lab Results: I have reviewed the following results:   Results from last 7 days   Lab Units 01/06/25  0506 01/05/25  0558 01/05/25  0402   WBC Thousand/uL 2.71*  --  2.28*   HEMOGLOBIN g/dL 7.7*   < > 6.6*   HEMATOCRIT % 23.9*   < > 21.4*   PLATELETS Thousands/uL 235  --  162   BANDS PCT % 4  --   --    SEGS PCT %  --   --  66   LYMPHO PCT % 29  --  27   MONO PCT % 7  --  5   EOS PCT % 0  --  0    < > = values in this interval not displayed.     Results from last 7 days   Lab Units 01/06/25  0506 01/05/25  0402   SODIUM mmol/L 139 139   POTASSIUM mmol/L 3.6 4.0   CHLORIDE mmol/L 105 108   CO2 mmol/L 27 25   BUN mg/dL 11 13   CREATININE mg/dL 0.55* 0.64   ANION GAP mmol/L 7 6   CALCIUM mg/dL 8.4 7.9*   ALBUMIN g/dL  --  2.6*   TOTAL BILIRUBIN mg/dL  --  0.33   ALK PHOS U/L  --  75   ALT U/L   --  19   AST U/L  --  20   GLUCOSE RANDOM mg/dL 136 211*     Results from last 7 days   Lab Units 01/04/25  2243   INR  1.34*     Results from last 7 days   Lab Units 01/06/25  1122 01/06/25  0710 01/05/25  2052 01/05/25  1648 01/05/25  1121 01/05/25  0822   POC GLUCOSE mg/dl 238* 139 136 158* 187* 183*         Results from last 7 days   Lab Units 01/06/25  0506 01/05/25  0108 01/04/25  2243   LACTIC ACID mmol/L  --  1.1 3.6*   PROCALCITONIN ng/ml 0.22  --  0.21       Recent Cultures (last 7 days):   Results from last 7 days   Lab Units 01/05/25  0453 01/04/25  2250 01/04/25  2243   BLOOD CULTURE   --  No Growth at 24 hrs. No Growth at 24 hrs.   LEGIONELLA URINARY ANTIGEN  Negative  --   --        Imaging Results Review: I reviewed radiology reports from this admission including: CTA chest, CT chest, vas venous duplex, and US kidney and bladder.  Other Study Results Review: No additional pertinent studies reviewed.    Last 24 Hours Medication List:     Current Facility-Administered Medications:     acetaminophen (TYLENOL) tablet 975 mg, Q8H PRN    albuterol (PROVENTIL HFA,VENTOLIN HFA) inhaler 2 puff, Q4H PRN    ALPRAZolam (XANAX) tablet 0.25 mg, Q8H PRN    amitriptyline (ELAVIL) tablet 200 mg, HS    apixaban (ELIQUIS) tablet 5 mg, BID    atorvastatin (LIPITOR) tablet 10 mg, Daily    benzonatate (TESSALON PERLES) capsule 200 mg, TID PRN    famotidine (PEPCID) tablet 40 mg, Daily PRN    folic acid (FOLVITE) tablet 1,000 mcg, Daily    insulin glargine (LANTUS) subcutaneous injection 3 Units 0.03 mL, Daily With Breakfast    insulin lispro (HumALOG/ADMELOG) 100 units/mL subcutaneous injection 2-12 Units, TID AC **AND** Fingerstick Glucose (POCT), TID AC    levothyroxine tablet 25 mcg, Daily    losartan (COZAAR) tablet 25 mg, Daily    umeclidinium 62.5 mcg/actuation inhaler AEPB 1 puff, Daily **AND** olodaterol HCl (STRIVERDI RESPIMAT) inhaler 2 puff, Daily    ondansetron (ZOFRAN-ODT) dispersible tablet 8 mg, Q8H PRN     oxyCODONE (ROXICODONE) split tablet 2.5 mg, Q4H PRN    senna (SENOKOT) tablet 8.6 mg, HS PRN    Insert peripheral IV, Once **AND** sodium chloride (PF) 0.9 % injection 3 mL, Q1H PRN    Administrative Statements   Today, Patient Was Seen By: Renee Syed DO    **Please Note: This note may have been constructed using a voice recognition system.**

## 2025-01-06 NOTE — ASSESSMENT & PLAN NOTE
New lower extremity edema not noted on prior admissions.  Patient reports this began 2-3 days ago.  Dry weight ~200 lbs.  No appreciable JVD.  Echo 12/9/24  EF 60%  Aortic valve sclerosis  No other evidence of volume overload.  Based on available data, this is possibly related to lymphangitic spread.  VAS Venous Duplex Lower Limb Bilateral 1/05/2025: No evidence of acute or chronic deep vein thrombosis.

## 2025-01-06 NOTE — ASSESSMENT & PLAN NOTE
-Following with Dr. Sandoval  -S/p ASEPT catheter 12/12/2024  -Recently completed first cycle of Ramucirumab + Docetaxel with next treatment on 1/21/2025.  -CT shows progression or Right lung CA  -Will reach out to Pt's Oncologist- will need to follow-up outpatient  -Continue to drain ASEPT catheter every other day. I put orders in nursing communicatoin

## 2025-01-06 NOTE — ASSESSMENT & PLAN NOTE
Presentation:  Patient present with acute on chronic respiratory failure.  Likely secondary to progressing malignancy vs PNA.  Initial procal negative.  Met SIRS criteria based on leukopenia, tachycardia, and tachypnea.  VBG showed respiratory alkalosis 2/2 hyperventilation.  Patient uses 3L NC at baseline.  Was on 4L, currently on 3L NC on 1/06.   He becomes tachypnic and SOB with sitting up.  Follow Heme/Onc, Rad/Onc and Palliative as OP.  Possible addition of docetaxel and ramucirumab per oncology.  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   Increasing partially loculated moderate right pleural effusion.   No pulmonary embolism.   New interstitial edema in the left lung that is probably related to volume overload.   Developing left lung carcinomatosis is also possible.   Possible small pneumonia within the superior segment of the left lower lobe.   There are enlarged subcarinal lymph nodes measuring up to 1.2 cm that have increased in size   MRI Brain w/wo 12/13/2024 :  Status post treatment of right frontal brain metastasis. No metastases currently.   CTA PE 1/5:  Increasing airspace consolidation in the right middle lobe, otherwise stable in the right upper and lower lobes.   Stable diffuse lymphangitic spread of tumor throughout the right lung and evidence of increasing lymphangitic spread of tumor in the left lung,     Lab Results   Component Value Date    SARSCOV2 Negative 04/20/2024    SARSCOVAG Negative 01/04/2025    INFLUA Negative 04/20/2024    RAPFLUA Negative 01/04/2025    INFLUB Negative 04/20/2024    RAPFLUB Negative 01/04/2025    RSV Negative 04/20/2024       Recent Labs     01/04/25  2243 01/05/25  0402 01/06/25  0506   WBC 3.96* 2.28* 2.71*     Recent Labs     01/04/25  2243 01/06/25  0506   PROCALCITONI 0.21 0.22        Differential Dx:  Progression of malignancy vs PNA.  Less  suspicious of volume overload.    Assessment:  Less likely infection given no fever, no leukocytosis.  Had WBC 12 on previous check pre-admit.  WBC 2.71 on 1/06 down from 2.28 on 1/05   Pro-calcitonin on 1/06 was 0.22  Symptoms possibly exacerbated by restrictive lung disease.  Previous imaging showed concern for volume overload.  Consolidations in right lung may impede imaging interpretation.  Previous infectious work up was negative.    Plan:  Trend WBC and fever curve.  Monitor daily weight  Titrate supplement oxygen for 02 > 90%

## 2025-01-06 NOTE — ASSESSMENT & PLAN NOTE
Hgb around 10 at baseline.  Has been downtrending since 12/12  Now with Hgb in the 7.7 range.  Hgb on 1/05 was 6.5 and required 1 unit of pRBCs  Likely secondary to antineoplastic agents.  No symptoms of lightheadedness, dizziness, or syncope.  Iron panel: 45, TIBC: 254, ferritin: 978, Iron sat: 18

## 2025-01-06 NOTE — PROGRESS NOTES
Progress Note - Pulmonology   Name: Papo Gibbs 54 y.o. male I MRN: [id number]  Unit/Bed#: -01 I Date of Admission: 1/4/2025   Date of Service: 1/6/2025 I Hospital Day: 1      54 male with history of hypertension, remote PE, diabetes, chronic hypoxia on 2-3 L. Was diagnosed stage IV lung cancer in 4/24 with retroperitoneal, brain, spine metastasis as well as malignant pleural effusion.  Patient underwent chemo and immunotherapy with history of Keytruda related pneumonitis, radiotherapy for C-spine, presenting with disease progression with recently changed regimen of Ramucirumab + Docetaxel.  He was admitted on 1/5 with increased shortness of breath and 4 L of oxygen requirement.  Assessment & Plan  Primary malignant neoplasm of right lung metastatic to other site (HCC)  -Following with Dr. Sandoval  -S/p ASEPT catheter 12/12/2024  -Recently completed first cycle of Ramucirumab + Docetaxel with next treatment on 1/21/2025.  -CT shows progression or Right lung CA  -Will reach out to Pt's Oncologist- will need to follow-up outpatient  -Continue to drain ASEPT catheter every other day. I put orders in nursing communicatoin  Abnormal CT of the chest  -CTA chest PE study 01/04/2025 shows increasing airspace consolidation in the right middle lobe.  Stable diffuse lymphangitic spread of tumor throughout the right lung and evidence of increasing lymphatic spread of tumor into left lung compared to previous CT 12/8/2024 no evidence of acute PE stable right loculated effusion  -Procalcitonin negative- less likley to be infectiou etiology  -Legionella antigen and strep pneumonia negative  -I suspect this is all because of progression of his primary lung cancer disease.  He had a recent bronchoscopy confirming tumor invasion on both right upper and right lower lobe.  There is no clinical evidence of infection.  Therefore I suggest we can stop antibiotics for now.  I will suggest he continue follow-up with his primary  oncologist  Tobacco dependence  -35+ pack year history of smoking, endorses quitting in April of this year  -Congratulated on continued cessation  Adenocarcinoma of overlapping sites of right lung (HCC)  -Following with Dr. Sandoval  -Recently completed first cycle of Ramucirumab + Docetaxel with next treatment on 1/21/2025. We contacted his primary oncologist, the plan will be adjusting his chemotherapy when he is due for his 2nd chemotherapy  Chronic hypoxemic respiratory failure (HCC)  -96% on 3 L, patient wears 3 L supplemental O2 at home continuously  -Continue to maintain saturation greater than 89%  -Pulmonary hygiene encouraged: Deep breathing with cough, OOB as tolerated, incentive spirometry and flutter valve  -Appears at baseline, worsening shortness of breath likely secondary to anemia and progression of lung CA  Restrictive lung disease  -In office spirometry 05/07/2024 shows restrictive lung disease FEV1 41%  -Home medication regime Stiolto 2.5-2.5 MCG/ACT 1 puff twice daily, albuterol 90 mcg/ACT 2 puffs every 4 hours as needed  -Does not appear in exacerbation    24 Hour Events : No overnight event  Subjective : Seen and evaluated at bedside.  He is awake but still with dyspnea on exertion when ambulating that has been going on since 12/2024.  He is back to 3 L as his home oxygen requirement.    Objective :  Temp:  [97.8 °F (36.6 °C)-98.7 °F (37.1 °C)] 98.7 °F (37.1 °C)  HR:  [110-121] 112  BP: (131-146)/(64-85) 138/72  Resp:  [18-22] 19  SpO2:  [2 %-98 %] 93 %  O2 Device: Nasal cannula  Nasal Cannula O2 Flow Rate (L/min):  [3 L/min-4 L/min] 3 L/min    Physical Exam  Constitutional:       Appearance: He is ill-appearing.   Cardiovascular:      Rate and Rhythm: Regular rhythm. Tachycardia present.   Pulmonary:      Effort: Tachypnea present.      Breath sounds: Examination of the right-upper field reveals decreased breath sounds. Examination of the right-middle field reveals decreased breath sounds.  Examination of the right-lower field reveals decreased breath sounds. Decreased breath sounds present. No wheezing.   Abdominal:      Palpations: Abdomen is soft.   Musculoskeletal:         General: Normal range of motion.   Skin:     General: Skin is warm.      Capillary Refill: Capillary refill takes less than 2 seconds.   Neurological:      General: No focal deficit present.      Mental Status: He is alert and oriented to person, place, and time.           Lab Results: I have reviewed the following results:   .     01/06/25  0506   WBC 2.71*   HGB 7.7*   HCT 23.9*      SODIUM 139   K 3.6      CO2 27   BUN 11   CREATININE 0.55*   GLUC 136   MG 1.7*   PHOS 2.7     ABG: No new results in last 24 hours.    1/4/2024 CT:   1. Increasing airspace consolidation in the right middle lobe, otherwise stable in the right upper and lower lobes.  2. Stable diffuse lymphangitic spread of tumor throughout the right lung and evidence of increasing lymphangitic spread of tumor in the left lung, compared to the CT from 12/8/2024.  3. No evidence of acute pulmonary embolus.  4. Stable small right loculated effusion.

## 2025-01-06 NOTE — TELEPHONE ENCOUNTER
Left message in response to [place] message received for patient's spouse, Tomasa, to call back. [place] number provided [phone].

## 2025-01-06 NOTE — ASSESSMENT & PLAN NOTE
Lab Results   Component Value Date    HGBA1C 8.1 (H) 12/09/2024     (P) 173.5  Recent Labs     01/04/25  2243 01/05/25  0402 01/05/25  0822 01/05/25  2052 01/06/25  0506 01/06/25  0710 01/06/25  1122   POCGLU  --   --    < > 136  --  139 238*   GLUC 204* 211*  --   --  136  --   --     < > = values in this interval not displayed.     Hold home regimen while inpatient and resume on discharge Metformin and Amaryl.  Diabetic diet  Insulin regimen  SSI  Goal -180 while admitted, adjusting insulin regimen as appropriate  Monitor for hypoglycemia and treat per protocol

## 2025-01-06 NOTE — PROGRESS NOTES
Chart reviewed for scheduled outreach to Jessica  to reassess for any barriers to care and offer any supportive services that may be needed. Noted he is currently IP at this time. I will continue to follow and will outreach once he is D/C.

## 2025-01-06 NOTE — ASSESSMENT & PLAN NOTE
-Following with Dr. Sandoval  -Recently completed first cycle of Ramucirumab + Docetaxel with next treatment on 1/21/2025. We contacted his primary oncologist, the plan will be adjusting his chemotherapy when he is due for his 2nd chemotherapy

## 2025-01-06 NOTE — ASSESSMENT & PLAN NOTE
-CTA chest PE study 01/04/2025 shows increasing airspace consolidation in the right middle lobe.  Stable diffuse lymphangitic spread of tumor throughout the right lung and evidence of increasing lymphatic spread of tumor into left lung compared to previous CT 12/8/2024 no evidence of acute PE stable right loculated effusion  -Procalcitonin negative- less likley to be infectiou etiology  -Legionella antigen and strep pneumonia negative  -I suspect this is all because of progression of his primary lung cancer disease.  He had a recent bronchoscopy confirming tumor invasion on both right upper and right lower lobe.  There is no clinical evidence of infection.  Therefore I suggest we can stop antibiotics for now.  I will suggest he continue follow-up with his primary oncologist

## 2025-01-06 NOTE — ASSESSMENT & PLAN NOTE
Pt states he doesn't feel strong enough to get up and walk.   Still able to transfer self to commode without difficulties.     Plan:    PT/OT eval and treat

## 2025-01-06 NOTE — ASSESSMENT & PLAN NOTE
Pleural catheter placed 12/12/24.  Previous pneumonitis on Keytruda.  CT 1/5 revealed:  Increasing airspace consolidation in the right middle lobe, otherwise stable in the right upper and lower lobes.   Stable diffuse lymphangitic spread of tumor throughout the right lung and evidence of increasing lymphangitic spread of tumor in the left lung, compared to the CT from 12/8/2024.   No evidence of acute pulmonary embolus.   Stable small right loculated effusion.

## 2025-01-06 NOTE — TELEPHONE ENCOUNTER
Spoke with patient. Patient is currently present in the hospital, he went in through ED on Saturday. States he's having a lot of shortness of breath and the doctors said they would make Dr. Sandoval aware that he was there. Informed him that I would make him aware as well and if they need to consult Oncology while he's in the hospital the doctors there will do so. Requested call if he needs anything. He verbalized an understanding.

## 2025-01-06 NOTE — ASSESSMENT & PLAN NOTE
Received last chemo on Tuesday, 12/31/2024  Next chemo scheduled for Tuesday, 1/21/2025  PT/OT otmmy

## 2025-01-06 NOTE — ASSESSMENT & PLAN NOTE
Currently utilizing oxycodone 2.5 mg q4 hours for pain.  5 mg q4 hours for severe pain.  Follows with Dr. Estrella of Palliative as OP.  Urinary hesitancy and anxious feelings with urination, has been going on for months. Denies dysuria, flank plain, fever/chills.     Will continue to monitor symptoms.

## 2025-01-07 ENCOUNTER — TELEPHONE (OUTPATIENT)
Age: 55
End: 2025-01-07

## 2025-01-07 VITALS
DIASTOLIC BLOOD PRESSURE: 98 MMHG | RESPIRATION RATE: 18 BRPM | TEMPERATURE: 98.5 F | HEIGHT: 67 IN | BODY MASS INDEX: 30 KG/M2 | WEIGHT: 191.14 LBS | SYSTOLIC BLOOD PRESSURE: 165 MMHG | HEART RATE: 114 BPM | OXYGEN SATURATION: 93 %

## 2025-01-07 LAB
ANION GAP SERPL CALCULATED.3IONS-SCNC: 4 MMOL/L (ref 4–13)
BUN SERPL-MCNC: 14 MG/DL (ref 5–25)
CALCIUM SERPL-MCNC: 8.5 MG/DL (ref 8.4–10.2)
CHLORIDE SERPL-SCNC: 102 MMOL/L (ref 96–108)
CO2 SERPL-SCNC: 30 MMOL/L (ref 21–32)
CREAT SERPL-MCNC: 0.63 MG/DL (ref 0.6–1.3)
DME PARACHUTE DELIVERY DATE ACTUAL: NORMAL
DME PARACHUTE DELIVERY DATE EXPECTED: NORMAL
DME PARACHUTE DELIVERY DATE REQUESTED: NORMAL
DME PARACHUTE ITEM DESCRIPTION: NORMAL
DME PARACHUTE ORDER STATUS: NORMAL
DME PARACHUTE SUPPLIER NAME: NORMAL
DME PARACHUTE SUPPLIER PHONE: NORMAL
ERYTHROCYTE [DISTWIDTH] IN BLOOD BY AUTOMATED COUNT: 18.1 % (ref 11.6–15.1)
GFR SERPL CREATININE-BSD FRML MDRD: 110 ML/MIN/1.73SQ M
GLUCOSE SERPL-MCNC: 130 MG/DL (ref 65–140)
GLUCOSE SERPL-MCNC: 221 MG/DL (ref 65–140)
GLUCOSE SERPL-MCNC: 228 MG/DL (ref 65–140)
HCT VFR BLD AUTO: 24.9 % (ref 36.5–49.3)
HGB BLD-MCNC: 7.9 G/DL (ref 12–17)
MAGNESIUM SERPL-MCNC: 1.9 MG/DL (ref 1.9–2.7)
MCH RBC QN AUTO: 28 PG (ref 26.8–34.3)
MCHC RBC AUTO-ENTMCNC: 31.7 G/DL (ref 31.4–37.4)
MCV RBC AUTO: 88 FL (ref 82–98)
MRSA NOSE QL CULT: NORMAL
MYCOBACTERIUM SPEC CULT: NORMAL
MYCOBACTERIUM SPEC CULT: NORMAL
PLATELET # BLD AUTO: 273 THOUSANDS/UL (ref 149–390)
PMV BLD AUTO: 9.9 FL (ref 8.9–12.7)
POTASSIUM SERPL-SCNC: 4.2 MMOL/L (ref 3.5–5.3)
RBC # BLD AUTO: 2.82 MILLION/UL (ref 3.88–5.62)
RHODAMINE-AURAMINE STN SPEC: NORMAL
RHODAMINE-AURAMINE STN SPEC: NORMAL
SODIUM SERPL-SCNC: 136 MMOL/L (ref 135–147)
WBC # BLD AUTO: 2.41 THOUSAND/UL (ref 4.31–10.16)

## 2025-01-07 PROCEDURE — 85027 COMPLETE CBC AUTOMATED: CPT

## 2025-01-07 PROCEDURE — 83735 ASSAY OF MAGNESIUM: CPT

## 2025-01-07 PROCEDURE — 97163 PT EVAL HIGH COMPLEX 45 MIN: CPT

## 2025-01-07 PROCEDURE — 82948 REAGENT STRIP/BLOOD GLUCOSE: CPT

## 2025-01-07 PROCEDURE — 97167 OT EVAL HIGH COMPLEX 60 MIN: CPT

## 2025-01-07 PROCEDURE — 80048 BASIC METABOLIC PNL TOTAL CA: CPT

## 2025-01-07 PROCEDURE — 99239 HOSP IP/OBS DSCHRG MGMT >30: CPT | Performed by: INTERNAL MEDICINE

## 2025-01-07 RX ADMIN — FOLIC ACID 1000 MCG: 1 TABLET ORAL at 09:00

## 2025-01-07 RX ADMIN — INSULIN GLARGINE 3 UNITS: 100 INJECTION, SOLUTION SUBCUTANEOUS at 08:59

## 2025-01-07 RX ADMIN — LEVOTHYROXINE SODIUM 25 MCG: 25 TABLET ORAL at 09:00

## 2025-01-07 RX ADMIN — OLODATEROL RESPIMAT INHALATION SPRAY 2 PUFF: 2.5 SPRAY, METERED RESPIRATORY (INHALATION) at 09:02

## 2025-01-07 RX ADMIN — APIXABAN 5 MG: 5 TABLET, FILM COATED ORAL at 09:00

## 2025-01-07 RX ADMIN — UMECLIDINIUM 1 PUFF: 62.5 AEROSOL, POWDER ORAL at 09:02

## 2025-01-07 NOTE — TELEPHONE ENCOUNTER
Pt wife Tomasa calling regarding the order for the portable O2 tank that was supposed to be sent to [employer]. Currently they are working the hospital portable that only go to 2L and need the order and new tanks asap.

## 2025-01-07 NOTE — PROGRESS NOTES
Patient:    MRN:  [id number]    Bautista Request ID:  [id number]    Level of care reserved:  Home Health Agency    Partner Reserved:  [facility], [facility] [place] [address] [phone]    Clinical needs requested:    Geography searched:  [phone]    Start of Service:    Request sent:  9:43am EST on 1/7/2025 by Lore Gill    Partner reserved:  11:57am EST on 1/7/2025 by Lore Gill    Choice list shared:  11:56am EST on 1/7/2025 by Lore Gill   normal...

## 2025-01-07 NOTE — DISCHARGE INSTR - AVS FIRST PAGE
Dear Papo Gibbs,     It was our pleasure to care for you here at [facility].  It is our hope that we were always able to exceed the expected standards for your care during your stay.  You were hospitalized due to shortness of breath.  You were cared for on the 3rd floor by Renee Syed MD under the service of Shadi Simms MD with the [facility] who covers for your primary care physician (PCP), Amelie Bacon MD, while you were hospitalized.  If you have any questions or concerns related to this hospitalization, you may contact us at .  For follow up as well as any medication refills, we recommend that you follow up with your primary care physician.  A registered nurse will reach out to you by phone within a few days after your discharge to answer any additional questions that you may have after going home.  However, at this time we provide for you here, the most important instructions / recommendations at discharge:     Notable Medication Adjustments -   No medication changes were made.  Testing Required after Discharge -   Please check basic metabolic panel and magnesium in 1 week  ** Please contact your PCP to request testing orders for any of the testing recommended here **  Important follow up information -   Please follow-up with your primary care physician in 1 week.  Other Instructions -   It is recommended that you continue to have ASEPT catheter drained every 2 to 3 days.  If you have any difficulty breathing, worsening dyspnea on exertion, dyspnea on rest, chest pain, nausea, vomiting, change in mental status, please call your doctor or visit the emergency department.  Please review this entire after visit summary as additional general instructions including medication list, appointments, activity, diet, any pertinent wound care, and other additional recommendations from your care team that may be provided for  you.      Sincerely,     Renee Syed MD

## 2025-01-07 NOTE — PLAN OF CARE
Problem: PHYSICAL THERAPY ADULT  Goal: Performs mobility at highest level of function for planned discharge setting.  See evaluation for individualized goals.  Description: Treatment/Interventions: Functional transfer training, LE strengthening/ROM, Elevations, Therapeutic exercise, Endurance training, Patient/family training, Equipment eval/education, Bed mobility, Gait training, Continued evaluation, Spoke to nursing, Spoke to case management, Spoke to MD  Equipment Recommended: Walker       See flowsheet documentation for full assessment, interventions and recommendations.  Note: Prognosis: Fair  Problem List: Decreased strength, Decreased endurance, Impaired balance, Decreased mobility, Decreased safety awareness, Decreased skin integrity, Pain  Assessment: Pt is a 55 yo male admitted to [facility] 2* chest pain, ROBERTSON< primary malignant neoplasm of R lung metastasis,elevated troponin,severe sepsis. Pt lives with supportive wife in 2 SH, first floor setup available, 2 HILARY with HR available,reports no recent falls, reports being completely (I) with ADLs,IADLs and mobility without use of DME PTA. (+)drive and works fulltime mainly at home. Pt currently is not at functional mobility baseline, needs A for mobility with new use of DME (RW) for endurance and energy conservation,ataxic and unsteady gait and movement patterns,current use of 3 L NC O2,multiple lines,masimo monitoring. Pt demonstrates limited mobility and gait including dec endurance, dec balance, dec BLE strength, ataxic and unsteady gait and movement pattern,pain in R knee chronic in nature per pt and needs S for TT and GT with use of RW. Pt would cont to benefit from skilled inpt PT services to maximize functional independence and to dec caregiver burden upon beign DC from the hospital.  Barriers to Discharge: Inaccessible home environment (2 SH, (+)HILARY)     Rehab Resource Intensity Level, PT: III (Minimum Resource Intensity)    See flowsheet documentation  for full assessment.

## 2025-01-07 NOTE — ASSESSMENT & PLAN NOTE
Lab Results   Component Value Date    HGBA1C 8.1 (H) 12/09/2024     (P) [id number]  Recent Labs     01/04/25  2243 01/05/25  0402 01/05/25  0822 01/06/25  0506 01/06/25  0710 01/06/25  1650 01/07/25  0802 01/07/25  1316   POCGLU  --   --    < >  --    < > 178* 130 221*   GLUC 204* 211*  --  136  --   --   --   --     < > = values in this interval not displayed.     Resume on metformin and Amaryl.

## 2025-01-07 NOTE — ASSESSMENT & PLAN NOTE
Currently utilizing oxycodone 2.5 mg q4 hours for pain.  5 mg q4 hours for severe pain.  Follows with Dr. Estrella of Palliative as OP.  Urinary hesitancy and anxious feelings with urination, has been going on for months. Denies dysuria, flank plain, fever/chills.     Today on evaluation, patient states that he is feeling less anxious about urination with no hesitancy.  He explains he gives himself more time to get to the bathroom to alleviate anxiety about incontinence.

## 2025-01-07 NOTE — PLAN OF CARE
Problem: Potential for Falls  Goal: Patient will remain free of falls  Description: INTERVENTIONS:  - Educate patient/family on patient safety including physical limitations  - Instruct patient to call for assistance with activity   - Consult OT/PT to assist with strengthening/mobility   - Keep Call bell within reach  - Keep bed low and locked with side rails adjusted as appropriate  - Keep care items and personal belongings within reach  - Initiate and maintain comfort rounds  - Make Fall Risk Sign visible to staff  - Offer Toileting every 2 Hours, in advance of need  - Initiate/Maintain bed/chair alarm  - Obtain necessary fall risk management equipment  - Apply yellow socks and bracelet for high fall risk patients  - Consider moving patient to room near nurses station  Outcome: Progressing     Problem: RESPIRATORY - ADULT  Goal: Achieves optimal ventilation and oxygenation  Description: INTERVENTIONS:  - Assess for changes in respiratory status  - Assess for changes in mentation and behavior  - Position to facilitate oxygenation and minimize respiratory effort  - Oxygen administered by appropriate delivery if ordered  - Initiate smoking cessation education as indicated  - Encourage broncho-pulmonary hygiene including cough, deep breathe, Incentive Spirometry  - Assess the need for suctioning and aspirate as needed  - Assess and instruct to report SOB or any respiratory difficulty  - Respiratory Therapy support as indicated  Outcome: Progressing

## 2025-01-07 NOTE — ASSESSMENT & PLAN NOTE
Presentation:  Patient present with acute on chronic respiratory failure.  Likely secondary to progressing malignancy vs PNA.  Initial procal negative.  Met SIRS criteria based on leukopenia, tachycardia, and tachypnea.  VBG showed respiratory alkalosis 2/2 hyperventilation.  Patient uses 3L NC at baseline.  Was on 4L, currently on 3L NC on 1/06.   He becomes tachypnic and SOB with sitting up.  Follow Heme/Onc, Rad/Onc and Palliative as OP.  Possible addition of docetaxel and ramucirumab per oncology.  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   Increasing partially loculated moderate right pleural effusion.   No pulmonary embolism.   New interstitial edema in the left lung that is probably related to volume overload.   Developing left lung carcinomatosis is also possible.   Possible small pneumonia within the superior segment of the left lower lobe.   There are enlarged subcarinal lymph nodes measuring up to 1.2 cm that have increased in size   MRI Brain w/wo 12/13/2024 :  Status post treatment of right frontal brain metastasis. No metastases currently.   CTA PE 1/5:  Increasing airspace consolidation in the right middle lobe, otherwise stable in the right upper and lower lobes.   Stable diffuse lymphangitic spread of tumor throughout the right lung and evidence of increasing lymphangitic spread of tumor in the left lung,     Lab Results   Component Value Date    SARSCOV2 Negative 04/20/2024    SARSCOVAG Negative 01/04/2025    INFLUA Negative 04/20/2024    RAPFLUA Negative 01/04/2025    INFLUB Negative 04/20/2024    RAPFLUB Negative 01/04/2025    RSV Negative 04/20/2024       Recent Labs     01/04/25  2243 01/05/25  0402 01/06/25  0506   WBC 3.96* 2.28* 2.71*     Recent Labs     01/04/25  2243 01/06/25  0506   PROCALCITONI 0.21 0.22     Differential Dx:  Progression of malignancy vs PNA.  Less  suspicious of volume overload.    Assessment:  Less likely infection given no fever, no leukocytosis.  Had WBC 12 on previous check pre-admit.  WBC 2.71 on 1/06 down from 2.28 on 1/05   Pro-calcitonin on 1/06 was 0.22  Symptoms possibly exacerbated by restrictive lung disease.  Previous imaging showed concern for volume overload.  Consolidations in right lung may impede imaging interpretation.  Previous infectious work up was negative.    Plan:  Continue following outpatient pulmonology  Continue following heme-onc outpatient  Continue chemotherapy treatment plan  Per recommendation by pulm, discharge on a ASEPT cath

## 2025-01-07 NOTE — PLAN OF CARE
Problem: OCCUPATIONAL THERAPY ADULT  Goal: Performs self-care activities at highest level of function for planned discharge setting.  See evaluation for individualized goals.  Description: Treatment Interventions: ADL retraining, Functional transfer training, Endurance training, Patient/family training, Equipment evaluation/education, Compensatory technique education, Continued evaluation, Energy conservation, Activityengagement  Equipment Recommended: Shower/Tub chair with back ($)       See flowsheet documentation for full assessment, interventions and recommendations.   Note: Limitation: Decreased ADL status, Decreased endurance, Decreased self-care trans, Decreased high-level ADLs  Prognosis: Fair  Assessment: Pt is a 53 yo male who presented to [facility] with hypoxia, acute worsening of respiratory status, increased oxygen requirement, and tachycardia in which pt dx w/ primary malignant neoplasm of right lung metastatic to other site. Pt  has a past medical history of Cancer (HCC) (04,25,2024), Diabetes mellitus (HCC), High cholesterol, History of blood clots, Hypertension, Leucocytosis (04/20/2024), Lung cancer (HCC), Malignant neoplasm of overlapping sites of right lung (HCC) (06/04/2024), Pneumonia, and Pulmonary embolism (HCC). Pt with active OT orders in which OT consulted to assess pt's functional status and occupational performance to determine safe d/c needs. Pt lives with his wife in a 2 SH with 2 HILARY. PTA, pt was independent in ADLs/IADLs and uses no DME for functional mobility. (+) driving. Currently, pt performing bed mobility, functional transfers/mobility, and UB/LB ADLs all @ supervision level with increased time 2* SOB. Pt demonstrates the following limitations/impairments which impact the pt's ability to engage in valued occupations: SOB, balance, endurance/activity tolerance, standing tolerance, postural/trunk control, strength, safety awareness, and pain. From an OT standpoint,  recommend discharge w/ Level 3 resources once medically stable.   The patient's raw score on the AM-PAC Daily Activity Inpatient Short Form is 20. A raw score of greater than or equal to 19 suggests the patient may benefit from discharge to home. Please refer to the recommendation of the Occupational Therapist for safe discharge planning.  Pt would benefit from skilled OT services 1-2x/wk to address acute care needs and underlying performance skills to promote safety, decrease fall risk, and enhance occupational performance to return to PLOF. Goals to be met within the next 10-14 days.     Rehab Resource Intensity Level, OT: III (Minimum Resource Intensity) (Outpatient pulmonary rehab if appropriate per MD)

## 2025-01-07 NOTE — OCCUPATIONAL THERAPY NOTE
Occupational Therapy Evaluation     Patient Name: Papo Gibbs  Today's Date: 1/7/2025  Problem List  Principal Problem:    Primary malignant neoplasm of right lung metastatic to other site (HCC)  Active Problems:    Abnormal CT of the chest    Tobacco dependence    Adenocarcinoma of overlapping sites of right lung (HCC)    Chronic hypoxemic respiratory failure (HCC)    Restrictive lung disease    History of pulmonary embolism    DM2 (diabetes mellitus, type 2) (HCC)    Antineoplastic chemotherapy induced anemia    Cancer related pain    Peripheral edema    Ambulatory dysfunction    Past Medical History  Past Medical History:   Diagnosis Date    Cancer (HCC) 04,25,2024    Diabetes mellitus (HCC)     High cholesterol     History of blood clots     Hypertension     Leucocytosis 04/20/2024    Lung cancer (HCC)     Malignant neoplasm of overlapping sites of right lung (HCC) 06/04/2024    Pneumonia     Pulmonary embolism (HCC)      Past Surgical History  Past Surgical History:   Procedure Laterality Date    IR BIOPSY LYMPH NODE  4/23/2024    IR PORT PLACEMENT  5/20/2024    IR THORACENTESIS  12/9/2024    KNEE SURGERY      MANDIBLE FRACTURE SURGERY  1985    PATELLA FRACTURE SURGERY      RECTAL SURGERY             01/07/25 0842   OT Last Visit   OT Visit Date 01/07/25   Note Type   Note type Evaluation   Pain Assessment   Pain Assessment Tool 0-10   Pain Score 2   Pain Location/Orientation Location: Rib Cage   Effect of Pain on Daily Activities Pt reporting increased pain and discomfort in rib area 2* coughing   Hospital Pain Intervention(s) Repositioned;Ambulation/increased activity;Emotional support   Restrictions/Precautions   Weight Bearing Precautions Per Order No   Other Precautions Multiple lines;Telemetry;O2;Fall Risk;Pain   Home Living   Type of Home House  (2  with 2 HILARY)   Home Layout Two level;Bed/bath upstairs;1/2 bath on main level;Able to live on main level with bedroom/bathroom;Access   Bathroom  "Shower/Tub Walk-in shower   Bathroom Toilet Raised   Bathroom Accessibility Accessible   Home Equipment Other (Comment)  (No DME)   Additional Comments Pt reports living with his wife in a 2 SH with 2 HILARY with 1/2 bath on first floor and main bathroom/bedroom on 2nd floor with full flight of stairs; no DME PTA   Prior Function   Level of [place] Independent with ADLs;Independent with functional mobility;Independent with IADLS   Lives With Spouse   Receives Help From Family   IADLs Independent with driving;Independent with medication management;Independent with meal prep   Falls in the last 6 months 0   Vocational Full time employment   Comments (+) driving   Lifestyle   Autonomy PTA, pt was independent in ADLs/IADLs and uses no DME for functional mobility; (+) driving   Reciprocal Relationships Lives with his wife   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   Intrinsic Gratification Enjoys spending time with his grandchildren   Subjective   Subjective \"I know that even a little distance will tire me out.\"   ADL   Where Assessed Edge of bed   Eating Assistance 7  Independent   Grooming Assistance 7  Independent   UB Bathing Assistance 5  Supervision/Setup   LB Bathing Assistance 5  Supervision/Setup   UB Dressing Assistance 5  Supervision/Setup   LB Dressing Assistance 5  Supervision/Setup   Toileting Assistance  5  Supervision/Setup   Functional Assistance 5  Supervision/Setup   Bed Mobility   Supine to Sit Unable to assess   Sit to Supine Unable to assess   Additional Comments Upon arrival, pt found sitting EOB; @ end of session, pt left sitting EOB with all functional needs in reach   Transfers   Sit to Stand 5  Supervision   Additional items Increased time required   Stand to Sit 5  Supervision   Additional items Increased time required   Additional Comments w/ no DME; increased time 2* SOB   Functional Mobility   Functional Mobility 5  Supervision   Additional Comments Pt completed short household " functional mobility distances within room @ supervision level w/ no DME in which pt becomingly increasingly SOB, taking multiple standing rest breaks after a few steps; on 3 LO2, spO2 93%; increased time throughout   Balance   Static Sitting Good   Dynamic Sitting Fair +   Static Standing Fair +   Dynamic Standing Fair   Ambulatory Fair   Activity Tolerance   Activity Tolerance Patient limited by fatigue  (SOB)   Nurse Made Aware RN cleared   RUE Assessment   RUE Assessment WFL   LUE Assessment   LUE Assessment WFL   Hand Function   Gross Motor Coordination Functional   Fine Motor Coordination Functional   Cognition   Overall Cognitive Status WFL   Arousal/Participation Alert;Responsive;Arousable;Cooperative   Attention Within functional limits   Orientation Level Oriented X4   Memory Within functional limits   Following Commands Follows all commands and directions without difficulty   Comments Pt pleasant and cooperative   Assessment   Limitation Decreased ADL status;Decreased endurance;Decreased self-care trans;Decreased high-level ADLs   Prognosis Fair   Assessment Pt is a 55 yo male who [facility] with hypoxia, acute worsening of respiratory status, increased oxygen requirement, and tachycardia in which pt dx w/ primary malignant neoplasm of right lung metastatic to other site. Pt  has a past medical history of Cancer (HCC) (04,25,2024), Diabetes mellitus (HCC), High cholesterol, History of blood clots, Hypertension, Leucocytosis (04/20/2024), Lung cancer (HCC), Malignant neoplasm of overlapping sites of right lung (HCC) (06/04/2024), Pneumonia, and Pulmonary embolism (HCC). Pt with active OT orders in which OT consulted to assess pt's functional status and occupational performance to determine safe d/c needs. Pt lives with his wife in a 2  with 2 HILARY. PTA, pt was independent in ADLs/IADLs and uses no DME for functional mobility. (+) driving. Currently, pt performing bed mobility,  functional transfers/mobility, and UB/LB ADLs all @ supervision level with increased time 2* SOB. Pt demonstrates the following limitations/impairments which impact the pt's ability to engage in valued occupations: SOB, balance, endurance/activity tolerance, standing tolerance, postural/trunk control, strength, safety awareness, and pain. From an OT standpoint, recommend discharge w/ Level 3 resources once medically stable.   The patient's raw score on the AM-PAC Daily Activity Inpatient Short Form is 20. A raw score of greater than or equal to 19 suggests the patient may benefit from discharge to home. Please refer to the recommendation of the Occupational Therapist for safe discharge planning.  Pt would benefit from skilled OT services 1-2x/wk to address acute care needs and underlying performance skills to promote safety, decrease fall risk, and enhance occupational performance to return to OF. Goals to be met within the next 10-14 days.   Goals   Patient Goals To be able to breathe better   LTG Time Frame 10-14   Long Term Goal #1 See OT goals listed below   Plan   Treatment Interventions ADL retraining;Functional transfer training;Endurance training;Patient/family training;Equipment evaluation/education;Compensatory technique education;Continued evaluation;Energy conservation;Activityengagement   Goal Expiration Date 01/21/25   OT Frequency 1-2x/wk  (Would benefit from continued energy conservation techniques during functional activity and occupational performance and compensatory technique education during ADLs)   Discharge Recommendation   Rehab Resource Intensity Level, OT III (Minimum Resource Intensity)  (Outpatient pulmonary rehab if appropriate per MD)   Equipment Recommended Shower/Tub chair with back ($)   AM-PAC Daily Activity Inpatient   Lower Body Dressing 3   Bathing 3   Toileting 3   Upper Body Dressing 3   Grooming 4   Eating 4   Daily Activity Raw Score 20   Daily Activity Standardized Score  (Calc for Raw Score >=11) 42.03   AM-PAC Applied Cognition Inpatient   Following a Speech/Presentation 4   Understanding Ordinary Conversation 4   Taking Medications 4   Remembering Where Things Are Placed or Put Away 4   Remembering List of 4-5 Errands 4   Taking Care of Complicated Tasks 4   Applied Cognition Raw Score 24   Applied Cognition Standardized Score 62.21   End of Consult   Education Provided Yes   Patient Position at End of Consult Seated edge of bed;All needs within reach   Nurse Communication Nurse aware of consult     OT GOALS:    Pt will improve functional mobility during ADL/IADL/leisure tasks with Mod I using AE/DME prn.    Pt will improve activity tolerance/functional endurance during ADL/IADL/leisure tasks for at least 20 minutes to improve occupational performance and engagement in valued occupations using AE/DME prn.    Pt will engage in ongoing functional/formal cognitive assessments to assist with safe d/c planning and increase safety during functional tasks.    Pt will participate in simulated IADL management task w/ Mod I to increase independence and engagement in valued occupations w/ good balance/safety.    Pt will improve dynamic standing balance for at least 15 minutes with Mod I during functional tasks to decrease fall risk and improve independence and engagement in ADL/IADL/leisure activities.    Pt will follow 100% of multi-step commands in ADL/IADL/leisure activities to improve functional cognition used in functional daily routines.    Pt will complete functional transfers on and off all surfaces used in daily routines with Mod I for safety to maximize functional/occupational performance.    Pt will complete all bed mobility tasks with Mod I to serve as a prerequisite for EOB/OOB ADL/IADL/leisure tasks, optimize positioning/comfort, and increase functional independence.    Pt will independently demonstrate good carryover of safety precautions and education/training during  ADL/IADL/leisure tasks with energy conservation techniques s/p skilled instruction without verbal cues.    Pt will complete UB ADL tasks with Mod I using AE/DME prn to increase functional independence in ADL/IADL/leisure tasks.    Pt will complete LB ADL tasks with Mod I using AE/DME prn to increase functional independence in ADL/IADL/leisure tasks.     Pt will complete toileting tasks with Mod I and good hygiene/thoroughness using AE/DME prn to increase functional independence.    Pt will independently identify and utilize 2-3 positive coping strategies to enhance overall wellbeing and engagement in valued occupations.    Dorothea Brower MS, OTR/L

## 2025-01-07 NOTE — ASSESSMENT & PLAN NOTE
Received last chemo on Tuesday, 12/31/2024  Next chemo scheduled for Tuesday, 1/21/2025  PT/OT eval on 01/07: level 3

## 2025-01-07 NOTE — ASSESSMENT & PLAN NOTE
Pt states he doesn't feel strong enough to get up and walk.   Still able to transfer self to commode without difficulties.     Plan:  PT/OT tommy on 01/07: level 3   Will provide PT services at home  CM aware

## 2025-01-07 NOTE — CASE MANAGEMENT
Case Management Discharge Planning Note    Patient name Papo Gibbs  Location S /S -01 MRN [id number]  : 1970 Date 2025       Current Admission Date: 2025  Current Admission Diagnosis:Primary malignant neoplasm of right lung metastatic to other site (HCC)   Patient Active Problem List    Diagnosis Date Noted Date Diagnosed    Ambulatory dysfunction 2025     Peripheral edema 2025     Cancer related pain 2025     Antineoplastic chemotherapy induced anemia 2024     Metastasis to brain (HCC) 2024     DM2 (diabetes mellitus, type 2) (HCC) 2024     Pleural effusion 2024     Tachycardia 2024     Right-sided thoracic back pain 2024     Acid reflux 10/10/2024     Nephrolithiasis 2024     Fatty liver 2024     Hepatomegaly 2024     Chronic anticoagulation 2024     Chemotherapy-induced nausea 2024     Port-A-Cath in place 2024     Adrenal nodule (HCC) 2024     Acute deep vein thrombosis (DVT) of popliteal vein of left lower extremity (HCC) 2024     Acute deep vein thrombosis (DVT) of left femoral vein (HCC) 2024     Primary malignant neoplasm of right lung metastatic to other site (HCC) 2024     Acquired left ventricular hypertrophy 2024     Palliative care encounter 2024     History of pulmonary embolism 2024     Chronic hypoxemic respiratory failure (HCC) 2024     Restrictive lung disease 2024     Adenocarcinoma of overlapping sites of right lung (HCC) 2024     Respiratory insufficiency 2024     Tobacco dependence 2024     SOB (shortness of breath) 2024     Chest pain 2024     Acute pulmonary embolism with acute cor pulmonale, unspecified pulmonary embolism type (HCC) 2024     Abnormal CT of the chest 2024     HTN (hypertension) 2024     Encounter to discuss test results 2022     Gross hematuria  07/03/2022       LOS (days): 2  Geometric Mean LOS (GMLOS) (days):   Days to GMLOS:     OBJECTIVE:  Risk of Unplanned Readmission Score: 24.56         Current admission status: Inpatient   Preferred Pharmacy:   [facility] #[id number] - [place] - [address]  [address] [place] [address]  Phone: [phone] Fax: [phone]    Primary Care Provider: Amelie Bacon MD    Primary Insurance: [id number]  Secondary Insurance:     DISCHARGE DETAILS:    Discharge planning discussed with:: Pt and his parents at bedside  [place] of Choice: Yes        Were Treatment Team discharge recommendations reviewed with patient/caregiver?: Yes  Did patient/caregiver verbalize understanding of patient care needs?: Yes  Were patient/caregiver advised of the risks associated with not following Treatment Team discharge recommendations?: Yes    Contacts  Contact Method: In Person  Reason/Outcome: Discharge Planning, Referral    Requested Home Health Care         Is the patient interested in [facility] at discharge?: Yes  Home Health Discipline requested:: Occupational Therapy, Physical Therapy  Home Health Agency Name:: [facility] External Referral Reason (only applicable if external HHA name selected): Services not provided in network or near patient location  Home Health Follow-Up Provider:: PCP  Home Health Services Needed:: Strengthening/Theraputic Exercises to Improve Function, Oxygen Via Nasal Cannula, Gait/ADL Training, Evaluate Functional Status and Safety  Homebound Criteria Met:: Uses an Assist Device (i.e. cane, walker, etc)  Supporting Clincal Findings:: Requires Oxygen, Limited Endurance    DME Referral Provided  Referral made for DME?: Yes  DME referral completed for the following items:: Walker  [facility]    Other Referral/Resources/Interventions Provided:  Referral Comments: RW ordered via parachute. Pt is rec'd for home PT and OT. Pt agreeabel to blanket referrals. [facility]  reserved in [place]. Pt states that his wife is a nurse and is able to assist with asept catheter maintenance.

## 2025-01-07 NOTE — PROGRESS NOTES
Progress Note - Hospitalist   Name: Papo Gibbs 54 y.o. male I MRN: [id number]  Unit/Bed#: S -01 I Date of Admission: 1/4/2025   Date of Service: 1/7/2025 I Hospital Day: 2  { ?Quick Links I Problem List I PORCH I Billing Tip:55879}  Assessment & Plan  Primary malignant neoplasm of right lung metastatic to other site (HCC)  Presentation:  Patient present with acute on chronic respiratory failure.  Likely secondary to progressing malignancy vs PNA.  Initial procal negative.  Met SIRS criteria based on leukopenia, tachycardia, and tachypnea.  VBG showed respiratory alkalosis 2/2 hyperventilation.  Patient uses 3L NC at baseline.  Was on 4L, currently on 3L NC on 1/06.   He becomes tachypnic and SOB with sitting up.  Follow Heme/Onc, Rad/Onc and Palliative as OP.  Possible addition of docetaxel and ramucirumab per oncology.  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   Increasing partially loculated moderate right pleural effusion.   No pulmonary embolism.   New interstitial edema in the left lung that is probably related to volume overload.   Developing left lung carcinomatosis is also possible.   Possible small pneumonia within the superior segment of the left lower lobe.   There are enlarged subcarinal lymph nodes measuring up to 1.2 cm that have increased in size   MRI Brain w/wo 12/13/2024 :  Status post treatment of right frontal brain metastasis. No metastases currently.   CTA PE 1/5:  Increasing airspace consolidation in the right middle lobe, otherwise stable in the right upper and lower lobes.   Stable diffuse lymphangitic spread of tumor throughout the right lung and evidence of increasing lymphangitic spread of tumor in the left lung,     Lab Results   Component Value Date    SARSCOV2 Negative 04/20/2024    SARSCOVAG Negative 01/04/2025    INFLUA Negative 04/20/2024    RAPFLUA  Negative 01/04/2025    INFLUB Negative 04/20/2024    RAPFLUB Negative 01/04/2025    RSV Negative 04/20/2024       Recent Labs     01/04/25  2243 01/05/25  0402 01/06/25  0506   WBC 3.96* 2.28* 2.71*     Recent Labs     01/04/25  2243 01/06/25  0506   PROCALCITONI 0.21 0.22        Differential Dx:  Progression of malignancy vs PNA.  Less suspicious of volume overload.    Assessment:  Less likely infection given no fever, no leukocytosis.  Had WBC 12 on previous check pre-admit.  WBC 2.71 on 1/06 down from 2.28 on 1/05   Pro-calcitonin on 1/06 was 0.22  Symptoms possibly exacerbated by restrictive lung disease.  Previous imaging showed concern for volume overload.  Consolidations in right lung may impede imaging interpretation.  Previous infectious work up was negative.    Plan:  Trend WBC and fever curve.  Monitor daily weight  Titrate supplement oxygen for 02 > 90%  Chronic hypoxemic respiratory failure (HCC)  Uses 3L NC at baseline.  Was on 4 L. Currently on 3L, as of 1/06  See above.  Antineoplastic chemotherapy induced anemia  Hgb around 10 at baseline.  Has been downtrending since 12/12  Now with Hgb in the 7.7 range.  Hgb on 1/05 was 6.5 and required 1 unit of pRBCs  Likely secondary to antineoplastic agents.  No symptoms of lightheadedness, dizziness, or syncope.  Iron panel: 45, TIBC: 254, ferritin: 978, Iron sat: 18  Cancer related pain  Currently utilizing oxycodone 2.5 mg q4 hours for pain.  5 mg q4 hours for severe pain.  Follows with Dr. Estrella of Palliative as OP.  Urinary hesitancy and anxious feelings with urination, has been going on for months. Denies dysuria, flank plain, fever/chills.     Will continue to monitor symptoms.   Abnormal CT of the chest  Pleural catheter placed 12/12/24.  Previous pneumonitis on Keytruda.  CT 1/5 revealed:  Increasing airspace consolidation in the right middle lobe, otherwise stable in the right upper and lower lobes.   Stable diffuse lymphangitic spread of tumor  throughout the right lung and evidence of increasing lymphangitic spread of tumor in the left lung, compared to the CT from 12/8/2024.   No evidence of acute pulmonary embolus.   Stable small right loculated effusion.   DM2 (diabetes mellitus, type 2) (HCC)  Lab Results   Component Value Date    HGBA1C 8.1 (H) 12/09/2024     (P) [id number]  Recent Labs     01/04/25  2243 01/05/25  0402 01/05/25  0822 01/06/25  0506 01/06/25  0710 01/06/25  1122 01/06/25  1650   POCGLU  --   --    < >  --  139 238* 178*   GLUC 204* 211*  --  136  --   --   --     < > = values in this interval not displayed.     Hold home regimen while inpatient and resume on discharge Metformin and Amaryl.  Diabetic diet  Insulin regimen  SSI  Goal -180 while admitted, adjusting insulin regimen as appropriate  Monitor for hypoglycemia and treat per protocol  Restrictive lung disease  Spirometry in May 2024 revealed FEV1 of 41%  Likely s/p radiation.  Peripheral edema  New lower extremity edema not noted on prior admissions.  Patient reports this began 2-3 days ago.  Dry weight ~200 lbs.  No appreciable JVD.  Echo 12/9/24  EF 60%  Aortic valve sclerosis  No other evidence of volume overload.  Based on available data, this is possibly related to lymphangitic spread.  VAS Venous Duplex Lower Limb Bilateral 1/05/2025: No evidence of acute or chronic deep vein thrombosis.    History of pulmonary embolism  CTA 10/4/24  Tiny subsegmental embolus in the right lower lobe. Given its small size, it is of doubtful clinical significance.   Continue Eliquis 5 mg BID.  Tobacco dependence  Smoking history of 2 ppd for >20 years.  Quit smoking in April 2024 prior to cancer diagnosis.  Adenocarcinoma of overlapping sites of right lung (HCC)  Received last chemo on Tuesday, 12/31/2024  Next chemo scheduled for Tuesday, 1/21/2025  PT/OT eval   Ambulatory dysfunction  Pt states he doesn't feel strong enough to get up and walk.   Still able to transfer  self to commode without difficulties.     Plan:    PT/OT eval and treat     VTE Pharmacologic Prophylaxis: VTE Score: 9 {VTE Risk:97197}    Mobility:   Basic Mobility Inpatient Raw Score: 16  -[facility] Goal: 5: Stand one or more mins  -[facility] Achieved: 2: Bed activities/Dependent transfer  {Mobility:04666}    Patient Centered Rounds: {Pt Centered Care Rounds:41694}   Discussions with Specialists or Other Care Team Provider: ***    Education and Discussions with Family / Patient: {Family Communication:93052}    Current Length of Stay: 2 day(s)  Current Patient Status: Inpatient   Certification Statement: {Certification Statement:88099}  Discharge Plan: {Discharge Plan:65861}    Code Status: Level 1 - Full Code    Subjective   ***    Objective :{?Quick Links I ICU Summary I Vitals I I/Os I LDAs I Mobility (PT/OT) I Code Status / ACP   ?Quick Links I Active Meds I Pain Meds I Antibiotics I Anticoagulants:03525}  HR:  [112] 112  BP: (138)/(72) 138/72  SpO2:  [93 %] 93 %  O2 Device: Nasal cannula  Nasal Cannula O2 Flow Rate (L/min):  [3 L/min] 3 L/min    Body mass index is 29.94 kg/m².     Input and Output Summary (last 24 hours):     Intake/Output Summary (Last 24 hours) at 1/7/2025 0640  Last data filed at 1/7/2025 0601  Gross per 24 hour   Intake 1000 ml   Output 925 ml   Net 75 ml       Physical Exam  ***    Lines/Drains:      Central Line:  Goal for removal: {Central Line Removal Goal:35474}             {?Quick Links I Lab Review I Micro Results I Radiology I Cardiology:53848}  Lab Results: I have reviewed the following results:   Results from last 7 days   Lab Units 01/06/25  0506 01/05/25  0558 01/05/25  0402   WBC Thousand/uL 2.71*  --  2.28*   HEMOGLOBIN g/dL 7.7*   < > 6.6*   HEMATOCRIT % 23.9*   < > 21.4*   PLATELETS Thousands/uL 235  --  162   BANDS PCT % 4  --   --    SEGS PCT %  --   --  66   LYMPHO PCT % 29  --  27   MONO PCT % 7  --  5   EOS PCT % 0  --  0    < > = values in this interval not displayed.  "    Results from last 7 days   Lab Units 01/06/25  0506 01/05/25  0402   SODIUM mmol/L 139 139   POTASSIUM mmol/L 3.6 4.0   CHLORIDE mmol/L 105 108   CO2 mmol/L 27 25   BUN mg/dL 11 13   CREATININE mg/dL 0.55* 0.64   ANION GAP mmol/L 7 6   CALCIUM mg/dL 8.4 7.9*   ALBUMIN g/dL  --  2.6*   TOTAL BILIRUBIN mg/dL  --  0.33   ALK PHOS U/L  --  75   ALT U/L  --  19   AST U/L  --  20   GLUCOSE RANDOM mg/dL 136 211*     Results from last 7 days   Lab Units 01/04/25  2243   INR  1.34*     Results from last 7 days   Lab Units 01/06/25  1650 01/06/25  1122 01/06/25  0710 01/05/25  2052 01/05/25  1648 01/05/25  1121 01/05/25  0822   POC GLUCOSE mg/dl 178* 238* 139 136 158* 187* 183*         Results from last 7 days   Lab Units 01/06/25  0506 01/05/25  0108 01/04/25  2243   LACTIC ACID mmol/L  --  1.1 3.6*   PROCALCITONIN ng/ml 0.22  --  0.21       Recent Cultures (last 7 days):   Results from last 7 days   Lab Units 01/05/25  0453 01/04/25  2250 01/04/25  2243   BLOOD CULTURE   --  No Growth at 24 hrs. No Growth at 24 hrs.   LEGIONELLA URINARY ANTIGEN  Negative  --   --        {Imaging Results Review:81006::\"No pertinent imaging studies reviewed.\"}  {Other Study Results Review:68406::\"No additional pertinent studies reviewed.\"}    Last 24 Hours Medication List:     Current Facility-Administered Medications:     acetaminophen (TYLENOL) tablet 975 mg, Q8H PRN    albuterol (PROVENTIL HFA,VENTOLIN HFA) inhaler 2 puff, Q4H PRN    ALPRAZolam (XANAX) tablet 0.25 mg, Q8H PRN    amitriptyline (ELAVIL) tablet 200 mg, HS    apixaban (ELIQUIS) tablet 5 mg, BID    atorvastatin (LIPITOR) tablet 10 mg, Daily    benzonatate (TESSALON PERLES) capsule 200 mg, TID PRN    famotidine (PEPCID) tablet 40 mg, Daily PRN    folic acid (FOLVITE) tablet 1,000 mcg, Daily    insulin glargine (LANTUS) subcutaneous injection 3 Units 0.03 mL, Daily With Breakfast    insulin lispro (HumALOG/ADMELOG) 100 units/mL subcutaneous injection 2-12 Units, TID AC " **AND** Fingerstick Glucose (POCT), TID AC    levothyroxine tablet 25 mcg, Daily    losartan (COZAAR) tablet 25 mg, Daily    umeclidinium 62.5 mcg/actuation inhaler AEPB 1 puff, Daily **AND** olodaterol HCl (STRIVERDI RESPIMAT) inhaler 2 puff, Daily    ondansetron (ZOFRAN-ODT) dispersible tablet 8 mg, Q8H PRN    oxyCODONE (ROXICODONE) split tablet 2.5 mg, Q4H PRN    senna (SENOKOT) tablet 8.6 mg, HS PRN    Insert peripheral IV, Once **AND** sodium chloride (PF) 0.9 % injection 3 mL, Q1H PRN    Administrative Statements   Today, Patient Was Seen By: Renee Syed DO  {Time Spent (Optional):94310}    **Please Note: This note may have been constructed using a voice recognition system.**

## 2025-01-07 NOTE — DISCHARGE SUMMARY
Discharge Summary - Hospitalist   Name: Papo Gibbs 54 y.o. male I MRN: [id number]  Unit/Bed#: S -01 I Date of Admission: 1/4/2025   Date of Service: 1/7/2025 I Hospital Day: 2     Assessment & Plan  Primary malignant neoplasm of right lung metastatic to other site (HCC)  Presentation:  Patient present with acute on chronic respiratory failure.  Likely secondary to progressing malignancy vs PNA.  Initial procal negative.  Met SIRS criteria based on leukopenia, tachycardia, and tachypnea.  VBG showed respiratory alkalosis 2/2 hyperventilation.  Patient uses 3L NC at baseline.  Was on 4L, currently on 3L NC on 1/06.   He becomes tachypnic and SOB with sitting up.  Follow Heme/Onc, Rad/Onc and Palliative as OP.  Possible addition of docetaxel and ramucirumab per oncology.  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   Increasing partially loculated moderate right pleural effusion.   No pulmonary embolism.   New interstitial edema in the left lung that is probably related to volume overload.   Developing left lung carcinomatosis is also possible.   Possible small pneumonia within the superior segment of the left lower lobe.   There are enlarged subcarinal lymph nodes measuring up to 1.2 cm that have increased in size   MRI Brain w/wo 12/13/2024 :  Status post treatment of right frontal brain metastasis. No metastases currently.   CTA PE 1/5:  Increasing airspace consolidation in the right middle lobe, otherwise stable in the right upper and lower lobes.   Stable diffuse lymphangitic spread of tumor throughout the right lung and evidence of increasing lymphangitic spread of tumor in the left lung,     Lab Results   Component Value Date    SARSCOV2 Negative 04/20/2024    SARSCOVAG Negative 01/04/2025    INFLUA Negative 04/20/2024    RAPFLUA Negative 01/04/2025    INFLUB Negative 04/20/2024     RAPFLUB Negative 01/04/2025    RSV Negative 04/20/2024       Recent Labs     01/04/25  2243 01/05/25  0402 01/06/25  0506   WBC 3.96* 2.28* 2.71*     Recent Labs     01/04/25  2243 01/06/25  0506   PROCALCITONI 0.21 0.22     Differential Dx:  Progression of malignancy vs PNA.  Less suspicious of volume overload.    Assessment:  Less likely infection given no fever, no leukocytosis.  Had WBC 12 on previous check pre-admit.  WBC 2.71 on 1/06 down from 2.28 on 1/05   Pro-calcitonin on 1/06 was 0.22  Symptoms possibly exacerbated by restrictive lung disease.  Previous imaging showed concern for volume overload.  Consolidations in right lung may impede imaging interpretation.  Previous infectious work up was negative.    Plan:  Continue following outpatient pulmonology  Continue following heme-onc outpatient  Continue chemotherapy treatment plan  Per recommendation by pulm, discharge on a ASEPT cath  Chronic hypoxemic respiratory failure (HCC)  Uses 3L NC at baseline.  Antineoplastic chemotherapy induced anemia  Hgb around 10 at baseline.  Has been downtrending since 12/12  Now with Hgb in the 7.7 range.  Hgb on 1/05 was 6.5 and required 1 unit of pRBCs  Likely secondary to antineoplastic agents.  No symptoms of lightheadedness, dizziness, or syncope.  Iron panel: 45, TIBC: 254, ferritin: 978, Iron sat: 18  Cancer related pain  Currently utilizing oxycodone 2.5 mg q4 hours for pain.  5 mg q4 hours for severe pain.  Follows with Dr. Estrella of Palliative as OP.  Urinary hesitancy and anxious feelings with urination, has been going on for months. Denies dysuria, flank plain, fever/chills.     Today on evaluation, patient states that he is feeling less anxious about urination with no hesitancy.  He explains he gives himself more time to get to the bathroom to alleviate anxiety about incontinence.    Abnormal CT of the chest  Pleural catheter placed 12/12/24.  Previous pneumonitis on Keytruda.  CT 1/5 revealed:  Increasing  airspace consolidation in the right middle lobe, otherwise stable in the right upper and lower lobes.   Stable diffuse lymphangitic spread of tumor throughout the right lung and evidence of increasing lymphangitic spread of tumor in the left lung, compared to the CT from 12/8/2024.   No evidence of acute pulmonary embolus.   Stable small right loculated effusion.   DM2 (diabetes mellitus, type 2) (HCC)  Lab Results   Component Value Date    HGBA1C 8.1 (H) 12/09/2024     (P) [id number]  Recent Labs     01/04/25  2243 01/05/25  0402 01/05/25  0822 01/06/25  0506 01/06/25  0710 01/06/25  1650 01/07/25  0802 01/07/25  1316   POCGLU  --   --    < >  --    < > 178* 130 221*   GLUC 204* 211*  --  136  --   --   --   --     < > = values in this interval not displayed.     Resume on metformin and Amaryl.  Restrictive lung disease  Spirometry in May 2024 revealed FEV1 of 41%  Likely s/p radiation.  Peripheral edema  New lower extremity edema not noted on prior admissions.  Patient reports this began 2-3 days ago.  Dry weight ~200 lbs.  No appreciable JVD.  Echo 12/9/24  EF 60%  Aortic valve sclerosis  No other evidence of volume overload.  Based on available data, this is possibly related to lymphangitic spread.  VAS Venous Duplex Lower Limb Bilateral 1/05/2025: No evidence of acute or chronic deep vein thrombosis.    History of pulmonary embolism  CTA 10/4/24  Tiny subsegmental embolus in the right lower lobe. Given its small size, it is of doubtful clinical significance.   Continue Eliquis 5 mg BID.  Tobacco dependence  Smoking history of 2 ppd for >20 years.  Quit smoking in April 2024 prior to cancer diagnosis.  Adenocarcinoma of overlapping sites of right lung (HCC)  Received last chemo on Tuesday, 12/31/2024  Next chemo scheduled for Tuesday, 1/21/2025  PT/OT eval on 01/07: level 3   Ambulatory dysfunction  Pt states he doesn't feel strong enough to get up and walk.   Still able to transfer self to commode  without difficulties.     Plan:  PT/OT eval on 01/07: level 3   Will provide PT services at home  CM aware     Medical Problems       Resolved Problems  Date Reviewed: 1/4/2025   None       Discharging Physician / Practitioner: Greta Rivera  PCP: Amelie Bacon MD  Admission Date:   Admission Orders (From admission, onward)       Ordered        01/05/25 0216  INPATIENT ADMISSION  Once                          Discharge Date: 01/07/25    Consultations During Hospital Stay:  Pulmonary     Procedures Performed:   None     Significant Findings / Test Results:   VAS Venous Duplex Lower Limb Bilateral 01/05/2025: No evidence of acute or chronic deep vein thrombosis. No evidence of superficial thrombophlebitis noted.  US Kidney and Bladder 01/05/2025: Unchanged 1.5 cm nonobstructing left renal lower pole calculus. No evidence of hydronephrosis.  CTA Chest PE Study 01/04/2025: Stable airspace consolidation in the right upper lobe. Complete airspace consolidation throughout the right middle lobe. Airspace consolidation throughout the right lower lobe with only minimal aeration. Stable nodular interseptal thickening. throughout the right lung. Increasing nodular septal thickening in the left lung when compared to the 12/24 CT. Mild centrilobular emphysema. Occlusion of right middle lobe segmental bronchi.  X-ry Chest 01/04/2025: Persistent right lower lung consolidation and diffuse reticulonodular opacities. Aeration of the right upper lung has improved from 12/6/2024. Small right pleural effusion.      Incidental Findings:   None    Test Results Pending at Discharge (will require follow up):   None.      Outpatient Tests Requested:  None  Complications:  None.     Reason for Admission: Acute Worsening of Respiratory Status     Hospital Course:   Papo Gibbs is a 54 y.o. male patient with PMHx significant for DM2 and PE on eliqius who originally presented to the hospital on 1/4/2025 due to acute worsening of respiratory  status secondary to lung adenocarcinoma with metastasis to the brain. The patient had difficulty catching his breath after sitting up, which prompted his admission on 01/04. He noted that he was discharged on 12/08 for similar presentation. At home the patient requires 3 L of NC. He follows with Palliative, RadOnc, HemOnc as OP.     ED Course: On admission 01/04 pt meet SIRS criteria based on leukopenia, tachycardia, tachypnea. VBG showed respiratory alkalosis 2/2 hyperventilation.  Based on labs and work-up, infections sources was less likely given no fever and no leukocytosis. Symptoms appeared to be exacerbated by restrictive lung disease and 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 Required 4 L of NC, which is greater than baseline of 3 L NC.     CT chest PE 12/08/2024 showed increased partially loculated moderate right pleural effusion. Possibility of developing left lung carcinomatosis is also possible contributor to the symptoms. CTA 01/04 revealed increased airspace consolidation in the right middle lobe, otherwise stable in the right upper and lower lobes, with stable diffuse lymphangitic spread of tumor throughout the right lung and evidence of increased lymphangitic spread of tumor in the left lung, compared to the CT from 12/08/2024.      NC requirement was weaned to baseline of 3 L. HgB on 01/05 was 6.5 and required 1 unit of pRBCs, since then HgB levels have been monitored and stable >7.0.  Peripheral edema was noted on admission, VAS Venous Duplex Lower Limb Bilateral 01/05/25 was unremarkable with no evidence of acute or chronic DVT.     PT/OT eval on 01/07 minimal resource intensity.  Patient is medically stable and in agreement for discharge.    Please see above PORCH list of diagnoses and related plan for additional information.     Patient is ready for discharge and all questions and concerns where addresses at bedside with the team present. Patient will continue to follow-up  "with Palliative, RadOnc, HemOnc as OP.      Condition at Discharge: stable.     Discharge Day Visit / Exam:     Subjective: No acute overnight events.  Patient was seen and examined this morning at bedside.  Reports some SOB and pleuritic pain with coughing. Continues to report regular urination with decreased anxiety w/ urination. Denies dysuria/flank pain, fever/chills. Reports no other concerns or symptoms. Denies dizziness, numbness, decreased sensation in extremities. Denies nausea/vomiting, constipation/diarrhea, fever/chills. Continues to endorse regular appetite. Reports having a bowel movement last night.     Vitals: Blood Pressure: 165/98 (01/07/25 0753)  Pulse: (!) 114 (01/07/25 0753)  Temperature: 98.5 °F (36.9 °C) (01/07/25 0753)  Temp Source: Oral (01/05/25 2212)  Respirations: 18 (01/07/25 0753)  Height: 5' 7\" (170.2 cm) (01/05/25 1619)  Weight - Scale: 86.7 kg (191 lb 2.2 oz) (01/07/25 0600)  SpO2: 93 % (01/07/25 0753)  Physical Exam  Vitals and nursing note reviewed.   Constitutional:       Appearance: He is ill-appearing.   HENT:      Head: Normocephalic and atraumatic.      Nose: Nose normal.      Mouth/Throat:      Mouth: Mucous membranes are moist.   Eyes:      General:         Right eye: No discharge.         Left eye: No discharge.      Extraocular Movements: Extraocular movements intact.      Conjunctiva/sclera: Conjunctivae normal.      Pupils: Pupils are equal, round, and reactive to light.   Cardiovascular:      Rate and Rhythm: Normal rate and regular rhythm.      Pulses: Normal pulses.      Heart sounds: Normal heart sounds.   Pulmonary:      Effort: Pulmonary effort is normal.      Comments: Decreased breath sounds on the right lung upper, middle, and base  Abdominal:      General: Bowel sounds are normal. There is distension.      Tenderness: There is no abdominal tenderness.   Musculoskeletal:      Cervical back: Normal range of motion.      Right lower leg: No edema.      Left lower " leg: No edema.   Skin:     General: Skin is warm.      Capillary Refill: Capillary refill takes less than 2 seconds.      Coloration: Skin is not jaundiced.   Neurological:      General: No focal deficit present.      Mental Status: He is alert and oriented to person, place, and time.   Psychiatric:         Mood and Affect: Mood normal.         Behavior: Behavior normal.       Discussion with Family: Updated  (father and mother) at bedside.    Discharge instructions/Information to patient and family:   See after visit summary for information provided to patient and family.      Provisions for Follow-Up Care:  See after visit summary for information related to follow-up care and any pertinent home health orders.      Mobility at time of Discharge:   Basic Mobility Inpatient Raw Score: 17  JH-HLM Goal: 5: Stand one or more mins  JH-HLM Achieved: 7: Walk 25 feet or more  HLM Goal achieved. Continue to encourage appropriate mobility.     Disposition:   Home    Planned Readmission: none.     Discharge Medications:  See after visit summary for reconciled discharge medications provided to patient and/or family.      Administrative Statements   Discharge Statement:  I have spent a total time of 45 minutes in caring for this patient on the day of the visit/encounter. .    **Please Note: This note may have been constructed using a voice recognition system**

## 2025-01-07 NOTE — PHYSICAL THERAPY NOTE
Physical Therapy Evaluation:    2 forms of pt ID verified:name,birthdate and pt ID rangel    Patient's Name: Papo Gibbs    Admitting Diagnosis  Chest pain [R07.9]  ROBERTSON (dyspnea on exertion) [R06.09]  Adenocarcinoma (HCC) [C80.1]  Elevated troponin [R79.89]  Elevated d-dimer [R79.89]  Severe sepsis (HCC) [A41.9, R65.20]  Acute on chronic hypoxic respiratory failure (HCC) [J96.21]    Problem List  Patient Active Problem List   Diagnosis    Encounter to discuss test results    Gross hematuria    Acute pulmonary embolism with acute cor pulmonale, unspecified pulmonary embolism type (HCC)    Abnormal CT of the chest    HTN (hypertension)    Tobacco dependence    SOB (shortness of breath)    Chest pain    Respiratory insufficiency    Adenocarcinoma of overlapping sites of right lung (HCC)    Chronic hypoxemic respiratory failure (HCC)    Restrictive lung disease    Palliative care encounter    History of pulmonary embolism    Adrenal nodule (HCC)    Acute deep vein thrombosis (DVT) of popliteal vein of left lower extremity (HCC)    Acute deep vein thrombosis (DVT) of left femoral vein (HCC)    Primary malignant neoplasm of right lung metastatic to other site (HCC)    Acquired left ventricular hypertrophy    Port-A-Cath in place    Chemotherapy-induced nausea    Nephrolithiasis    Fatty liver    Hepatomegaly    Chronic anticoagulation    Acid reflux    Right-sided thoracic back pain    Pleural effusion    Tachycardia    DM2 (diabetes mellitus, type 2) (HCC)    Metastasis to brain (HCC)    Antineoplastic chemotherapy induced anemia    Cancer related pain    Peripheral edema    Ambulatory dysfunction       Past Medical History  Past Medical History:   Diagnosis Date    Cancer (HCC) 04,25,2024    Diabetes mellitus (HCC)     High cholesterol     History of blood clots     Hypertension     Leucocytosis 04/20/2024    Lung cancer (HCC)     Malignant neoplasm of overlapping sites of right lung (HCC) 06/04/2024    Pneumonia  "    Pulmonary embolism (HCC)        Past Surgical History  Past Surgical History:   Procedure Laterality Date    IR BIOPSY LYMPH NODE  4/23/2024    IR PORT PLACEMENT  5/20/2024    IR THORACENTESIS  12/9/2024    KNEE SURGERY      MANDIBLE FRACTURE SURGERY  1985    PATELLA FRACTURE SURGERY      RECTAL SURGERY          01/07/25 0940   PT Last Visit   PT Visit Date 01/07/25   Note Type   Note type Evaluation   Pain Assessment   Pain Assessment Tool 0-10   Pain Score 4   Pain Location/Orientation Orientation: Right;Location: Knee  (h/o patellar fracture and chronic pain per pt)   Restrictions/Precautions   Other Precautions Chair Alarm;Bed Alarm;Telemetry;O2;Fall Risk  (3 L NC O2)   Home Living   Type of Home House  (2 , 2 HILARY with available HR)   Home Equipment Other (Comment)  (no DME PTA)   Additional Comments pt lives with spouse in 2 , (+)HILARY,reports no recent falls,reports being completely (I) with ADLs, mobility no DME and IADLs PTA. Pt reports use of home O2 PTA   Prior Function   Level of [place] Independent with ADLs;Independent with functional mobility;Independent with IADLS   Lives With Spouse   Receives Help From Family   IADLs Independent with driving;Independent with meal prep;Independent with medication management   Falls in the last 6 months 0   Vocational Full time employment   Comments mainly works from home,(+)drive, use of 3 L NC home O2   General   Additional Pertinent History chest pain, primary malignant neoplasm of R lung metastasis,ROBERTSON,elevated troponins   Family/Caregiver Present No   Cognition   Overall Cognitive Status WFL   Arousal/Participation Cooperative   Attention Within functional limits   Orientation Level Oriented X4   Following Commands Follows one step commands without difficulty   Subjective   Subjective Pt sitting on bed working on personal laptop resting comfortably; pt willing and agreeable to work with PT and to participate in therapy intervention; \"I can try and walk " "with you, I didn't get far with the OT\"   RLE Assessment   RLE Assessment   (at least 4/5 grossly throughout)   LLE Assessment   LLE Assessment   (at least 4/5 grossly throughout)   Coordination   Movements are Fluid and Coordinated 0   Coordination and Movement Description slow ela, ROBERTSON with minimal exertion,ataxic and unsteady gait and movement pattern   Sensation WFL   Light Touch   RLE Light Touch Grossly intact   LLE Light Touch Grossly intact   Bed Mobility   Supine to Sit Unable to assess  (pt in static sit per and post mobility)   Transfers   Sit to Stand 5  Supervision   Additional items Assist x 1;Bedrails;Impulsive;Verbal cues   Stand to Sit 5  Supervision   Additional items Assist x 1;Bedrails;Impulsive;Verbal cues   Ambulation/Elevation   Gait pattern Antalgic;Narrow DAVIS;Forward Flexion;Inconsistent ela;Foward flexed;Short stride;Ataxia   Gait Assistance 5  Supervision   Additional items Assist x 1;Verbal cues   Assistive Device Rolling walker  (pt would cont to benefit from RW during mobility)   Distance 35 feet with use of RW and 3 L NC O2;limited ambulation distance 2* SOB with minimal exertion and fatigue;SpO2: 88% following ambulation with use of 3 L NC O2 and  bpm.   Stair Management Assistance   (pt declined, reports \"I can manage the 2 steps to get into my home and I will stay on the first floor if I need to\".)   Ambulation/Elevation Additional Comments pt able to recover with SpO2:95% with use of 3 L NC O2 following ambulation with seated rest break lasting 1-2 mins and education to perform PLB   Balance   Static Sitting Fair   Dynamic Sitting Fair   Static Standing Fair   Dynamic Standing Fair   Ambulatory Fair   Endurance Deficit   Endurance Deficit Yes   Endurance Deficit Description use of 3 L NC O2, SOB with minimal exertion   Activity Tolerance   Activity Tolerance Patient limited by fatigue;Patient limited by pain  (fair)   Medical Staff Made Aware CM, EPIC chat with MD "   Nurse Made Aware yes   Assessment   Prognosis Fair   Problem List Decreased strength;Decreased endurance;Impaired balance;Decreased mobility;Decreased safety awareness;Decreased skin integrity;Pain   Assessment Pt is a 55 yo male admitted to [facility]* chest pain, ROBERTSON< primary malignant neoplasm of R lung metastasis,elevated troponin,severe sepsis. Pt lives with supportive wife in 2 , first floor setup available, 2 HILARY with HR available,reports no recent falls, reports being completely (I) with ADLs,IADLs and mobility without use of DME PTA. (+)drive and works fulltime mainly at home. Pt currently is not at functional mobility baseline, needs A for mobility with new use of DME (RW) for endurance and energy conservation,ataxic and unsteady gait and movement patterns,current use of 3 L NC O2,multiple lines,masimo monitoring. Pt demonstrates limited mobility and gait including dec endurance, dec balance, dec BLE strength, ataxic and unsteady gait and movement pattern,pain in R knee chronic in nature per pt and needs S for TT and GT with use of RW. Pt would cont to benefit from skilled inpt PT services to maximize functional independence and to dec caregiver burden upon beign DC from the hospital.   Barriers to Discharge Inaccessible home environment  (2 SH, (+)HILARY)   Goals   Patient Goals to be able to eventually get upstairs to use the bedroom at home   STG Expiration Date 01/17/25   Short Term Goal #1 in 7-10 days: (1) Pt will be able to ambulate greater than 100 feet with use of RW on various surfaces needing S to mod (I) level of A in order to A pt to return to PLOF, (2) activity tolerance:45 mins/45mins, (3) pt will be able to perform sit to stand transfers needing mod (I) level of A to and from various surfaces consistently in order to return to PLOF, (4) pt will be able to perform BM needing mod (I) level of A to A pt to return to PLOF, (5) (I) with BLE therapeutic ex HEP in various positions to A pt to inc  balance,strength,mobility,endurance and to A to dec pain, (6) inc balance 1/2 grade in order to dec fall risk, (7) pt will be able to go up and down 1 FF of steps needing S level of A in order to navigate HILARY and 2 SH as able and as needed prior to D/C, (8) cont to provide pt and pt family education for safe D/C planning, (9) inc BLE strength 1/2 to 1 full grade in order to A pt to inc balance,strength,mobility,endurance and to A to dec pain   Long Term Goal #2 0   Plan   Treatment/Interventions Functional transfer training;LE strengthening/ROM;Elevations;Therapeutic exercise;Endurance training;Patient/family training;Equipment eval/education;Bed mobility;Gait training;Continued evaluation;Spoke to nursing;Spoke to case management;Spoke to MD   PT Frequency 3-5x/wk   Discharge Recommendation   Rehab Resource Intensity Level, PT III (Minimum Resource Intensity)   Equipment Recommended Walker   Walker Package Recommended Wheeled walker   Change/add to Walker Package? No   AM-PAC Basic Mobility Inpatient   Turning in Flat Bed Without Bedrails 3   Lying on Back to Sitting on Edge of Flat Bed Without Bedrails 3   Moving Bed to Chair 3   Standing Up From Chair Using Arms 3   Walk in Room 3   Climb 3-5 Stairs With Railing 2   Basic Mobility Inpatient Raw Score 17   Basic Mobility Standardized Score 39.67   [facility] Highest Level Of Mobility   -HLM Goal 5: Stand one or more mins   JH-HLM Achieved 7: Walk 25 feet or more           @Marita Giraldo, PT, DPT@

## 2025-01-08 ENCOUNTER — PATIENT OUTREACH (OUTPATIENT)
Dept: HEMATOLOGY ONCOLOGY | Facility: CLINIC | Age: 55
End: 2025-01-08

## 2025-01-08 LAB
ABO GROUP BLD BPU: NORMAL
ABO GROUP BLD BPU: NORMAL
BPU ID: NORMAL
BPU ID: NORMAL
CROSSMATCH: NORMAL
CROSSMATCH: NORMAL
UNIT DISPENSE STATUS: NORMAL
UNIT DISPENSE STATUS: NORMAL
UNIT PRODUCT CODE: NORMAL
UNIT PRODUCT CODE: NORMAL
UNIT PRODUCT VOLUME: 300 ML
UNIT PRODUCT VOLUME: 350 ML
UNIT RH: NORMAL
UNIT RH: NORMAL

## 2025-01-08 NOTE — PROGRESS NOTES
ASSESSMENT      Are you having any side effects from your treatment?  -no     Are you eating and drinking properly?  -yes     Are you having any pain?  -no     Have needs changed for a palliative care referral?  -pt established     Do you know when your upcoming appointments are?  -yes     Do you have a good support system?  -yes     Are you interested in any support groups?  - declined     Are there any changes in barriers to care?  -no    Do you have any questions or concerns regarding your treatment plan?  -Not at this time. Jessica stated he has no questions or concerns. He was very grateful for my call

## 2025-01-08 NOTE — UTILIZATION REVIEW
NOTIFICATION OF ADMISSION DISCHARGE   This is a Notification of Discharge from [facility]. Please be advised that this patient has been discharge from our facility. Below you will find the admission and discharge date and time including the patient’s disposition.   UTILIZATION REVIEW CONTACT:  Chiquis Mead  Utilization   Network Utilization Review Department  Phone: [phone] x carefully listen to the prompts. All voicemails are confidential.  Email: [email or web address]     ADMISSION INFORMATION  PRESENTATION DATE: 1/4/2025 10:31 PM  OBERVATION ADMISSION DATE: N/A  INPATIENT ADMISSION DATE: 1/5/25  2:16 AM   DISCHARGE DATE: 1/7/2025  3:48 PM   DISPOSITION:Home with Home Health Care    Network Utilization Review Department  ATTENTION: Please call with any questions or concerns to [phone] and carefully listen to the prompts so that you are directed to the right person. All voicemails are confidential.   For Discharge needs, contact Care Management DC Support Team at [phone] opt. 2  Send all requests for admission clinical reviews, approved or denied determinations and any other requests to dedicated fax number below belonging to the campus where the patient is receiving treatment. List of dedicated fax numbers for the Facilities:  FACILITY NAME UR FAX NUMBER   ADMISSION DENIALS (Administrative/Medical Necessity) [phone]   DISCHARGE SUPPORT TEAM ([facility]) [phone]   PARENT CHILD HEALTH (Maternity/NICU/Pediatrics) [phone]   [facility] [phone]   [facility] [phone]   [facility] [phone]   [facility] [phone]   [facility] [phone]   [facility] [phone]   [facility] [phone]   [facility]  [phone]   [facility] [phone]   [facility] [phone]   [facility] [phone]   [facility] [phone]

## 2025-01-09 ENCOUNTER — TRANSITIONAL CARE MANAGEMENT (OUTPATIENT)
Dept: FAMILY MEDICINE CLINIC | Facility: CLINIC | Age: 55
End: 2025-01-09

## 2025-01-10 LAB
BACTERIA BLD CULT: NORMAL
BACTERIA BLD CULT: NORMAL
CARIS ALK: NEGATIVE
CARIS GENOMIC LOH - EXOME: NORMAL
CARIS HER2/NEU: NORMAL
CARIS HLA-A: NORMAL
CARIS HLA-B: NORMAL
CARIS HLA-C: NORMAL
CARIS MSI - EXOME: NORMAL
CARIS PD-L1 (22C3): NEGATIVE
CARIS PD-L1 (SP263): NEGATIVE
CARIS PD-L1 FDA (28-8): NEGATIVE
CARIS PD-L1 FDA(SP142): NEGATIVE
CARIS TMB - EXOME: NORMAL

## 2025-01-12 NOTE — PROGRESS NOTES
Hematology/Oncology Outpatient Office Note    Date of Service: 2025    [facility]  [address]  [place] [address]  [phone]    Reason for Consultation:   Chief Complaint   Patient presents with    Follow-up       Cancer Stage at diagnosis: IV    Referral Physician: No ref. provider found    Primary Care Physician:  Amelie Bacon MD     Nickname: Jessica    Spouse: Mabel  (RN at [employer] in Cardiology)    Original ECO    Today's ECO    Goals and Barriers:  Current Goal: Minimize effects of disease burden, extend life.   Barriers to accomplishing this: SOB    Patient's Capacity to Self Care:  Patient is able to self care    ASSESSMENT & PLAN      Diagnosis ICD-10-CM Associated Orders   1. Adenocarcinoma of overlapping sites of right lung (HCC)  C34.81 CT chest wo contrast            This is a 54 y.o. c PMHx notable for DM, HLP, blood clots, HTN, remote nicotine abuse, being seen in consultation for metastatic lung adeno       Discussion of decision making  Oncology history updated, accordingly, during this visit  Goals of care/patient communication  I discussed with the patient the clinical course leading up to their cancer diagnosis. I reviewed relevant office notes, imaging reports and pathology result as well.  I told the patient that this is a case of incurable disease and what this means. We discussed that the goal of anti-cancer therapy is to provide best quality of life, extend overall survival, and progression free survival as shown in clinical trials. We also discussed that there might be a point when the cancer will no longer respond to this anti-neoplastic therapy. As a result, we also discussed the role of the palliative care team being introduced early in the treatment course. We will be making this referral  I explained the risks/benefits of the proposed cancer therapy: Carbo-Alimta-Keytruda and after discussion including  understanding risks of possible life-threatening complications and therapy-related malignancy development, informed consent for blood products and treatment has been signed and obtained.  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 Conclusive Evidence of Malignancy. Metastatic adenocarcinoma compatible with pulmonary origin,   Treatment: s/p Carbo-Alimta-Keytruda, holding Ramucirumab + Docetaxel for now and doing Alimta alone  Other Supportive care: Zofran, EMLA  Treatment Team Members  Surgeon  Rad Onc  Palliative: Dr. Estrella  Labs  Diagnostics  4/19/2024 CT Chest PE: Acute moderate clot burden multifocal bilateral peripheral pulmonary embolism, mild R heart strain. Findings suggestive of multifocal pneumonia. Pathologically enlarged mediastinal, hilar, para-aortic, retrocrural, and upper abdominal lymph nodes. Nonspecific 1.9 cm left adrenal nodule  4/20/2024 CT Abd/pelv w/wo c: There is a 2.1 x 1.8 x 2.6 cm left adrenal nodule. Abdominal and thoracic lymphadenopathy  5/4/2024 MRI Brain w/wo c: 4 mm metastasis in the posterior right frontal lobe without significant edema. No other definite enhancing lesions but evaluation is limited due to motion artifact.  Tiny focus of restricted diffusion in the left frontal lobe without definite enhancement could represent acute/early subacute infarct  5/8/2024: Neuro working group recommended close MRI Brain surveillance for the 2 small brain mets to see how it responds to treatment,  Dr. Paulo Alcala will trend it  8/2/2024 CT CAP w/c: [place]!  Interval decrease in size of mediastinal and hilar lymph nodes. Left hilar lymph node measures 1.3 x 1.1 cm compared to 2.3 x 2.8 cm previously. Subcarinal lymph node measures 1.7 x 1 cm compared to 4.3 x 2.5 cm  Significant interval decrease in size of right upper lobe mass with lymphangitic carcinomatosis and extension into the right middle lobe with residual wedge-shaped airspace opacity now measuring 4.6 x 2.6 cm   Interval decrease in size of left adrenal lesion measuring 1.2 x 1.2 cm compared to 2.1 x 1.8 cm previously  10/25/2024 CT CAP w/c: not clear if POD. Worsening lymphangitic tumor progression compared to prior. New/increased small right pleural effusion. Resolving nodular thickening medial limb left adrenal gland. No new metastatic disease in the abdomen or pelvis.   Resolving nodular thickening medial limb left adrenal gland. No new metastatic disease in the abdomen or pelvis  Primary lesion right middle lobe measures approximately 4.0 x 2.8 cm, stable allowing for differences in technique and slice selection. Significant progression of lymphangitic tumor compared to August 2, 2024 and October 4, 2024, with worsening   diffuse interlobular septal thickening throughout the right lung, peribronchovascular nodularity  11/11/2024 thoracic tumor board determine there is likely no disease progression and to hold off on Ramucirumab + Docetaxel. Continue Alimta  12/8/2024 CT Chest PE: Since October 2024 there is 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 Increasing partially loculated moderate right pleural effusion. No pulmonary embolism. New interstitial edema in the left lung that is probably related to volume overload. Developing left lung carcinomatosis is also possible. Attention to this finding on follow-up imaging advised. Possible small pneumonia within the superior segment of the left lower lobe. There are enlarged subcarinal  lymph nodes measuring up to 1.2 cm that have increased in size   12/13/2024 MRI Brain w/wo c: Status post treatment of right frontal brain metastasis. No metastases currently    Discussion of decision making    I personally reviewed the following lab results, the image studies, pathology, other specialty/physicians consult notes and recommendations, and outside medical records. I had a lengthy discussion with the patient and shared the work-up findings. We discussed the diagnosis and management plan as below. I spent 43 minutes reviewing the records (labs, clinician notes, outside records, medical history, ordering medicine/tests/procedures, monitoring of anti-neoplastic toxicities, interpreting the imaging/labs previously done) and coordination of care as well as direct time with the patient today, of which greater than 50% of the time was spent in counseling and coordination of care with the patient/family.      Plan/Labs  Restaging CT Chest ordered 2/17/2025 following C3 treatment (cancel other CT order)*  MRI Brain restaging as per Rad Onc  Rad Onc f/u for surveillance of 2 small brain mets  Infusion chairs ordered for docetaxel/Ramucirumab q21 days, C3 coming up  F/u palliative care       Follow Up: q3 weeks scheduled thru 4/21/2025    All questions were answered to the patient's satisfaction during this encounter. The patient knows the contact information for our office and knows to reach out for any relevant concerns related to this encounter. They are to call for any temperature 100.4 or higher, new symptoms including but not restricted to shaking chills, decreased appetite, nausea, vomiting, diarrhea, increased fatigue, shortness of breath or chest pain, confusion, and not feeling the strength to come to the clinic. For all other listed problems and medical diagnosis in their chart - they are managed by PCP and/or other specialists, which the patient acknowledges. Thank you very much for your consultation  and making us a part of this patient's care. We are continuing to follow closely with you. Please do not hesitate to reach out to me with any additional questions or concerns.    Maryana Sandoval MD  Hematology & Medical Oncology Staff Physician             Disclaimer: This document was prepared using To8to Direct technology. If a word or phrase is confusing, or does not make sense, this is likely due to recognition error which was not discovered during this clinician's review. If you believe an error has occurred, please contact me through [facility] service line for juliana?cation.      ONCOLOGY HISTORY OF PRESENT ILLNESS        Oncology History Overview Note   with multifocal bilateral peripheral pulmonary embolism. Biopsy 4/23/24 left retroperitoneal lymph node revealed metastatic adenocarcinoma of lung primary. MRI brain 5/4/24 revealed 4 mm metastasis in the posterior right frontal lobe. On 7/3/24 he underwent SRS to a small right frontal metastasis to a dose of 2000cGy in 1 fraction. The pt was last seen in radiation on 09/18/24. The pt returns today for follow up.    09/24/24 - Hem [facility]  Pt to have CT next month  Consider Pepcid / Gas-x for dyspepsia      10/04/24 - CTA chest pe study  IMPRESSION:  Tiny subsegmental embolus in the right lower lobe. Given its small size, it is of doubtful clinical significance.  Patchy new consolidation in the right lower lobe, infectious or inflammatory.  Stable tumor in the right midlung with progression of lymphangitic spread throughout the right lung.      10/25/24 - CT chest abdomen pelvis w contrast  IMPRESSION:  Worsening lymphangitic tumor progression compared to prior from 10/4/2024 and August 2, 2024.  New/increased small right pleural effusion, probably malignant.  Resolving nodular thickening medial limb left adrenal gland. No new metastatic disease in the abdomen or pelvis.  Nonobstructing left nephrolithiasis.  The study was marked in EPIC for  significant notification.    10/21/24 - Pulmonary, [place]  Pt remains on Keytruda and Alimta - tolerating well  On 2L supplemental oxygen    11/04/24 - [facility]ime Ly  Most recent imaging shows progression  Pt now on Ramucirumab and Docetaxel after disease progression  Will be discussed at tumor board     11/11/24 - Tumor board  PHYSICIAN RECOMMENDED PLAN:  - Treat as pneumonitis (steroids).   - Continue Alimta  - Repeat imaging in 6 weeks    11/22/24 - Jaime Ly  Steroid taper  to end in 2 weeks (mid December)  Acyclovir being ordered for shingles  Continue with Alimta 500mg/m2 every 3 weeks      12/13/24 - MRI Brain BT w wo contrast  IMPRESSION:  1. Status post treatment of right frontal brain metastasis. No metastases currently.  Continued clinical and imaging followup advised.  2. No acute infarction, intracranial image or mass.  3. 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      Upcoming:       Adenocarcinoma of overlapping sites of right lung (HCC)   4/23/2024 Biopsy    Final Diagnosis   A. Lymph Node, Left retroperitoneal, Biopsy:  - Metastatic adenocarcinoma of lung primary.         5/2/2024 Initial Diagnosis    Adenocarcinoma of overlapping sites of right lung (HCC)     5/2/2024 -  Cancer Staged    Staging form: Lung, AJCC 8th Edition  - Clinical: Stage IV (cN3, cM1) - Signed by Maryana Sandoval MD on 5/2/2024 5/21/2024 - 10/15/2024 Chemotherapy    cyanocobalamin, 1,000 mcg, Intramuscular, Once, 4 of 5 cycles  Administration: 1,000 mcg (5/14/2024), 1,000 mcg (7/2/2024), 1,000 mcg (9/3/2024), 1,000 mcg (10/15/2024)  alteplase (CATHFLO), 2 mg, Intracatheter, Every 1 Minute as needed, 8 of 10 cycles  fosaprepitant (EMEND) IVPB, 150 mg, Intravenous, Once, 6 of 6 cycles  Administration: 150 mg (5/21/2024), 150 mg (7/2/2024), 150 mg (7/23/2024), 150 mg (9/3/2024), 150 mg (6/11/2024), 150 mg (8/13/2024)  CARBOplatin (PARAPLATIN) IVPB ([facility] [place] DOSING), 672 mg,  Intravenous, Once, 6 of 6 cycles  Administration: 672 mg (5/21/2024), 692.5 mg (7/2/2024), 659 mg (7/23/2024), 641 mg (9/3/2024), 692.5 mg (6/11/2024), 647 mg (8/13/2024)  pemetrexed (ALIMTA) chemo infusion, 1,020 mg, Intravenous, Once, 8 of 10 cycles  Administration: 1,000 mg (5/21/2024), 1,000 mg (7/2/2024), 1,000 mg (7/23/2024), 1,000 mg (9/3/2024), 1,000 mg (6/11/2024), 1,000 mg (8/13/2024), 1,000 mg (9/24/2024), 1,000 mg (10/15/2024)  pembrolizumab (KEYTRUDA) IVPB, 200 mg, Intravenous, Once, 8 of 10 cycles  Administration: 200 mg (5/21/2024), 200 mg (7/2/2024), 200 mg (7/23/2024), 200 mg (9/3/2024), 200 mg (6/11/2024), 200 mg (8/13/2024), 200 mg (9/24/2024), 200 mg (10/15/2024)     7/3/2024 - 7/3/2024 Radiation    Treatments:  Course: C1 SRS    Plan ID Energy Fractions Dose per Fraction (cGy) Dose Correction (cGy) Total Dose Delivered (cGy) Elapsed Days   SRSRFrontN 6X-FFF 1 / 1 2,000 0 2,000 0      Treatment Dates:  7/3/2024 - 7/3/2024.      11/26/2024 - 12/23/2024 Chemotherapy    cyanocobalamin, 1,000 mcg, Intramuscular, Once, 1 of 3 cycles  Administration: 1,000 mcg (11/19/2024)  alteplase (CATHFLO), 2 mg, Intracatheter, Every 1 Minute as needed, 2 of 6 cycles  pemetrexed (ALIMTA) chemo infusion, 1,040 mg, Intravenous, Once, 2 of 6 cycles  Administration: 1,000 mg (11/26/2024), 1,000 mg (12/23/2024)     11/26/2024 - 11/26/2024 Chemotherapy    alteplase (CATHFLO), 2 mg, Intracatheter, Every 1 Minute as needed, 0 of 6 cycles  ramucirumab (CYRAMZA) IVPB, 10 mg/kg = 975 mg, Intravenous, Once, 0 of 6 cycles  DOCEtaxel (TAXOTERE) chemo infusion, 75 mg/m2 = 156 mg, Intravenous, Once, 0 of 6 cycles     12/31/2024 -  Chemotherapy    alteplase (CATHFLO), 2 mg, Intracatheter, Every 1 Minute as needed, 2 of 6 cycles  ramucirumab (CYRAMZA) IVPB, 898 mg, Intravenous, Once, 2 of 6 cycles  Administration: 900 mg (12/31/2024), 900 mg (1/21/2025)  DOCEtaxel (TAXOTERE) chemo infusion, 75 mg/m2 = 150.8 mg, Intravenous, Once, 2  of 6 cycles  Administration: 150.8 mg (12/31/2024), 150.8 mg (1/21/2025)     Primary malignant neoplasm of right lung metastatic to other site (HCC)   6/4/2024 Initial Diagnosis    Primary malignant neoplasm of right lung metastatic to other site (HCC)     7/3/2024 - 7/3/2024 Radiation    Treatments:  Course: C1 SRS    Plan ID Energy Fractions Dose per Fraction (cGy) Dose Correction (cGy) Total Dose Delivered (cGy) Elapsed Days   SRSRFrontN 6X-FFF 1 / 1 2,000 0 2,000 0      Treatment Dates:  7/3/2024 - 7/3/2024.        Imaging on [10/25/2024] indicates likely pneumonitis and not likely POD, tumor board recommended resuming Alimta, holding Keytruda  Treating as potential irAE pneumonitis: Pred 100 mg with a taper of 20 mg over 6 weeks    12/8/2024: admission Chest x-ray concerning for large right-sided effusion.  Initial troponin 1547.    'most recent fluid is positive for carcinoma c/w hx of lung'    12/12/2024: Right-sided indwelling pleural catheter inserted    12/23/2024: thoracic tumor board  Referral to Pulmonary for urgent bronchoscopy and thoracentesis to confirm recurrence.  - If pneumonitis ruled out, refer to radiation oncology    12/26 bronch Right upper lobe was extrinsically compressed, right middle lobe was normal as was the bronchus intermedius.  Right lower lobe was extrinsically compressed    SUBJECTIVE  (INTERVAL HISTORY)      Clotting History None   Bleeding History None   Cancer History Lung Adeno   Family Cancer History None   H/O Blood/Plt Transfusion None   Tobacco/etoh/drug abuse 1.5 PPD x 40 years, quit 4/2024, no etoh abuse or rec drug use       Cancer Screening history C-scope 2014, due for colAdReadyrd (ordered)   Occupation  in the past, now        Walks 5-10 steps and feels quite [place].    Non-productive cough is ongoing with R chest symptoms.  Trouble going on his back    Tolerating diet well.   Vision is ok. (Still has Kaleidescope vision which had for years -  prior to cancer diagnosis)    I have reviewed the relevant past medical, surgical, social and family history. I have also reviewed allergies and medications for this patient.    Review of Systems  Baseline weight: 200-205 lbs  Today's weight: 212 lbs    Denies F/C, N/V, CP, LH, HA, rash, itching, gen weakness, unilateral weakness, diplopia, melena, hematuria, hematochezia, falls, diarrhea, or constipation       A 10-point review of system was performed, pertinent positive and negative were detailed as above. Otherwise, the 10-point review of system was negative.      Past Medical History:   Diagnosis Date    Cancer (HCC) ,,    Diabetes mellitus (HCC)     High cholesterol     History of blood clots     Hypertension     Leucocytosis 2024    Lung cancer (HCC)     Malignant neoplasm of overlapping sites of right lung (HCC) 2024    Pneumonia     Pulmonary embolism (HCC)        Past Surgical History:   Procedure Laterality Date    IR BIOPSY LYMPH NODE  2024    IR PORT PLACEMENT  2024    IR THORACENTESIS  2024    KNEE SURGERY      MANDIBLE FRACTURE SURGERY  1985    PATELLA FRACTURE SURGERY      RECTAL SURGERY         Family History   Problem Relation Age of Onset    No Known Problems Father     No Known Problems Mother        Social History     Socioeconomic History    Marital status: Single     Spouse name: Not on file    Number of children: Not on file    Years of education: Not on file    Highest education level: Not on file   Occupational History    Not on file   Tobacco Use    Smoking status: Former     Current packs/day: 0.00     Average packs/day: 1.5 packs/day for 35.0 years (52.5 ttl pk-yrs)     Types: Cigarettes     Quit date: 4/15/2024     Years since quittin.7     Passive exposure: Past    Smokeless tobacco: Never   Vaping Use    Vaping status: Never Used   Substance and Sexual Activity    Alcohol use: Not Currently    Drug use: Never    Sexual activity: Yes     Partners:  Female     Birth control/protection: Post-menopausal, None   Other Topics Concern    Not on file   Social History Narrative    Not on file     Social Drivers of Health     Financial Resource Strain: Not on file   Food Insecurity: No Food Insecurity (2025)    Nursing - Inadequate Food Risk Classification     Worried About Running Out of Food in the Last Year: Not on file     Ran Out of Food in the Last Year: Not on file     Ran Out of Food in the Last Year: Never true   Transportation Needs: No Transportation Needs (2025)    Nursing - Transportation Risk Classification     Lack of Transportation: Not on file     Lack of Transportation: No   Physical Activity: Not on file   Stress: Not on file   Social Connections: Not on file   Intimate Partner Violence: Unknown (2025)    Nursing IPS     Feels Physically and Emotionally Safe: Not on file     Physically Hurt by Someone: Not on file     Humiliated or Emotionally Abused by Someone: Not on file     Physically Hurt by Someone: No     Hurt or Threatened by Someone: No   Housing Stability: Unknown (2025)    Nursing: Inadequate Housing Risk Classification     Has Housing: Not on file     Worried About Losing Housing: Not on file     Unable to Get Utilities: Not on file     Unable to Pay for Housing in the Last Year: No     Has Housin       No Known Allergies    Current Outpatient Medications   Medication Sig Dispense Refill    acetaminophen (TYLENOL) 325 mg tablet Take 3 tablets (975 mg total) by mouth every 8 (eight) hours as needed for mild pain, headaches or fever      albuterol (PROVENTIL HFA,VENTOLIN HFA) 90 mcg/act inhaler INHALE 2 PUFFS EVERY 6 HOURS AS NEEDED FOR WHEEZING 8.5 g 5    amitriptyline (ELAVIL) 100 mg tablet 200 mg daily at bedtime      apixaban (Eliquis) 5 mg Take 1 tablet (5 mg total) by mouth 2 (two) times a day 60 tablet 5    atorvastatin (LIPITOR) 10 mg tablet Take 1 tablet (10 mg total) by mouth daily 90 tablet 3    benzonatate  (TESSALON) 200 MG capsule Take 1 capsule (200 mg total) by mouth 3 (three) times a day as needed for cough 20 capsule 4    famotidine (PEPCID) 40 MG tablet Take 40 mg by mouth daily as needed for heartburn or indigestion Taking as needed      folic acid (FOLVITE) 1 mg tablet TAKE 1 TABLET BY MOUTH EVERY DAY 90 tablet 2    glimepiride (AMARYL) 2 mg tablet TAKE 1 TABLET BY MOUTH DAILY WITH BREAKFAST 90 tablet 1    Lancets (OneTouch Delica Plus [id number]) MISC Use 1 Units 4 (four) times a day 400 each 2    levothyroxine 25 mcg tablet TAKE 1 TABLET BY MOUTH EVERY DAY 90 tablet 2    losartan (COZAAR) 25 mg tablet Take 25 mg by mouth daily      metFORMIN (GLUCOPHAGE) 1000 MG tablet Take 1,000 mg by mouth 2 (two) times a day      naloxone (NARCAN) 4 mg/0.1 mL nasal spray Administer 1 spray into a nostril. If no response after 2-3 minutes, give another dose in the other nostril using a new spray. For use in emergencies for opioid / pain medication reversal for accidental ingestion, respiratory depression, sedation or concerns for overdose. 1 each 1    ondansetron (ZOFRAN-ODT) 8 mg disintegrating tablet Take 1 tablet (8 mg total) by mouth every 8 (eight) hours as needed for vomiting or nausea 30 tablet 2    OneTouch Verio test strip Use 1 each 4 (four) times a day 400 each 2    oxyCODONE (Roxicodone) 5 immediate release tablet Take 0.5 tablets (2.5 mg total) by mouth every 4 (four) hours as needed for moderate pain If pain is severe, may instead take 1 tablet (5mg) every 4 hours as needed Max Daily Amount: 15 mg 60 tablet 0    senna (SENOKOT) 8.6 mg Take 1 tablet (8.6 mg total) by mouth daily at bedtime as needed for constipation 30 tablet 1    tiotropium-olodaterol (Stiolto Respimat) 2.5-2.5 MCG/ACT inhaler Inhale 2 puffs daily 4 g 3    Glucagon, rDNA, (Glucagon Emergency) 1 MG KIT Inject 1 mg as directed once as needed (hypoglycemia) for up to 1 dose (Patient not taking: Reported on 12/18/2024) 2 kit 0    meclizine  (ANTIVERT) 25 mg tablet Take 1 tablet (25 mg total) by mouth every 8 (eight) hours as needed for dizziness (Patient not taking: Reported on 12/18/2024) 30 tablet 0     No current facility-administered medications for this visit.     Facility-Administered Medications Ordered in Other Visits   Medication Dose Route Frequency Provider Last Rate Last Admin    alteplase (CATHFLO) injection 2 mg  2 mg Intracatheter Q1MIN PRN Maryana Sandoval MD           (Not in a hospital admission)      Objective:     24 Hour Vitals Assessment:     Vitals:    01/22/25 1008   BP: 132/68   Pulse: (!) 122   Resp: 18   Temp: (!) 97.3 °F (36.3 °C)   SpO2: 90%         PHYSICIAN EXAM:    General: Appearance: alert, cooperative, no distress.  HEENT: Normocephalic, atraumatic. No scleral icterus. conjunctivae clear. EOMI.  Chest: No tenderness to palpation. No open wound noted.  Lungs: Clear to auscultation bilaterally, Respirations unlabored.  Cardiac: Tachycardia, +S1and S2  Abdomen: Soft, non-tender, non-distended. Bowel sounds are normal.  Extremities:  No edema, cyanosis, clubbing.  Skin: Skin color, turgor are normal.   Lymphatics: no palpable supra-cervical, axillary, or inguinal adenopathy  Neurologic: Awake, Alert, and oriented, no gross focal deficits noted b/l.       DATA REVIEW:    Pathology Result:    Final Diagnosis   Date Value Ref Range Status   12/26/2024   Final    A. Lung, Right Lower Lobe,  core Biopsy:    -Adenocarcinoma of the lung   Note:  Tumor cells are  positive for CKC, CK7, TTF-1,  Napsin and, negative for CK20, CDX2. Controls reacted appropriately   Caris testing  ordered on Block A1 and the result will be issued in the supplemental report      B. Lung, Right Upper Lobe, , core Biopsy:    -Adenocarcinoma of the lung   Note:  Tumor cells are  positive for CKC, CK7, TTF-1,  Napsin and, negative for CK20, CDX2. Controls reacted appropriately    -Adenocarcinoma          12/26/2024   Final    A. Lung, Right Upper Lobe  Bronchoalveolar Lavage, :  Conclusive Evidence of Malignancy, see comment.      Satisfactory for evaluation.     B. Lung, Right Lower Lobe Bronchoalveolar Lavage, :  Conclusive Evidence of Malignancy, see comment.      Satisfactory for evaluation.      C. Lung, Right Upper Lobe Bronchial Brushing, :  Conclusive Evidence of Malignancy, see comment.    Satisfactory for evaluation.      D-E. Lung, Right Lower Lobe Bronchial Brushing (ThinPrep and cell block preparations):  Conclusive Evidence of Malignancy, see comment.    Satisfactory for evaluation.     Comment: The patient's history of metastatic carcinoma consistent with lung primary and lung masses on imaging is noted. The current sample shows conclusive evidence of malignancy in the upper and lower lobes. The abnormal cells are morphologically similar to [id number]. Additionally complete characterization of the malignant cells will be performed on the tissue biopsy [id number]. Clinical correlation is advised.     The cell block contains insufficient material for molecular testing; please consider separate tissue biopsy for send out testing.  Dr. Portillo is notified of the findings by Dr. Chauhan via [employer] on 12/30/24.  Intradepartmental consultation is in agreement (WT).         12/09/2024   Final    A-B. Thoracentesis, Right: (ThinPrep and cell block preparations)  Conclusive Evidence of Malignancy  Metastatic adenocarcinoma compatible with pulmonary origin, see comment.     Satisfactory for evaluation.    Comment: The patient has a history of pulmonary adenocarcinoma with metastasis; the current findings are compatible with the patient's known carcinoma.     Intradepartmental consultation is in agreement (IK).  Dr. Sandoval is notified of the findings by Dr. Chauhan via [employer] on 12/12/24.    B-1 can be used for ancillary testing if clinically indicated.        04/23/2024   Final    A. Lymph Node, Left retroperitoneal, Biopsy:  - Metastatic  adenocarcinoma of lung primary.     Comment: Specimen (block A2) is being sent to [facility] for MI Profile Testing. Upon completion of testing, [employer] report will be sent directly to the requesting provider, as well as posted in the Media Tab of EPIC for this patient by the Pathology Department.          Image Results:   Image result are reviewed and documented in Hematology/Oncology history    US kidney and bladder  Narrative: RENAL ULTRASOUND    INDICATION: Shortness of breath/change in vitals with urination.    COMPARISON: CT 10/25/2024 and 8/2/2024    TECHNIQUE: Ultrasound of the retroperitoneum was performed with a curvilinear transducer utilizing volumetric sweeps and still imaging techniques.    FINDINGS:    KIDNEYS:  Symmetric and normal size.  Right kidney: 11.2 x 6.5 x 5.3 cm. Volume 202.0 mL  Left kidney: 11.7 x 6.1 x 5.5 cm. Volume 208.2 mL    Right kidney  Normal echogenicity and contour.  No mass is identified.  No hydronephrosis.  No shadowing calculi.  No perinephric fluid collections.    Left kidney  Normal echogenicity and contour.  No mass is identified.  No hydronephrosis. Minimal fullness of the left renal pelvis, improved since the prior abdominal CT.  Unchanged 1.5 cm lower pole nonobstructing calculus.  No perinephric fluid collections.    URETERS:  Nonvisualized.    BLADDER:  Normally distended.  No focal thickening or mass lesions.  Bilateral ureteral jets detected.  Impression: 1.  Unchanged 1.5 cm nonobstructing left renal lower pole calculus.  2.  No evidence of hydronephrosis.    Workstation performed: [id number]   VAS VENOUS DUPLEX - LOWER LIMB BILATERAL     [facility] REPORT  CLINICAL:  Indications: Patient presents with bilateral lower extremity edema x 2 days.  Operative History:  Patient denies any cardiovascular surgery  Risk Factors  The patient has history of HTN, Diabetes (NIDDM (oral meds)), DVT and smoking  (current) 0.5 ppd.        CONCLUSION:      Impression:  RIGHT LOWER LIMB:  No evidence of acute or chronic deep vein thrombosis.  No evidence of superficial thrombophlebitis noted.  Doppler evaluation shows a normal response to augmentation maneuvers..  Popliteal, posterior tibial and anterior tibial arterial Doppler waveforms are  triphasic.     LEFT LOWER LIMB:  No evidence of acute or chronic deep vein thrombosis.  No evidence of superficial thrombophlebitis noted.  Doppler evaluation shows a normal response to augmentation maneuvers.  Popliteal, posterior tibial and anterior tibial arterial Doppler waveforms are  triphasic.     In comparison to previous exam on 04/20/24 there is resolution of prior DVT  bilaterally.     Technical findings were posted to chart in epic system.     SIGNATURE:  Electronically Signed by: DARLENE YOO DO, RPVI on 2025-01-05 01:05:19 PM  X-ray chest 1 view portable  Narrative: XR CHEST PORTABLE    INDICATION: Chest pain. History of lung cancer.    COMPARISON: Chest radiograph 12/26/2020. CTA chest 1/4/2025.    FINDINGS: Right chest wall port with catheter tip overlying the right atrium.    Diffuse reticulonodular opacities concerning for lymphangitic tumor spread as described on same-day CT. Diffuse hazy opacities may reflect component of edema. Persistent consolidation of the right middle and lower lobes with small right pleural effusion.   Aeration of the right upper lung has improved from 12/6/2024.    No pneumothorax or pleural effusion.    Normal cardiomediastinal silhouette.    Bones are unremarkable for age.    Normal upper abdomen.  Impression: Persistent right lower lung consolidation and diffuse reticulonodular opacities. Aeration of the right upper lung has improved from 12/6/2024. Small right pleural effusion.    Resident: Henrry Martinez I, the attending radiologist, have reviewed the images and agree with the final report above.    Workstation performed: [id number]  CT chest without contrast  Narrative: CT  CHEST WITHOUT IV CONTRAST    INDICATION: R06.09: Other forms of dyspnea.    COMPARISON: 12/8/24    TECHNIQUE: CT examination of the chest was performed without intravenous contrast. Multiplanar 2D reformatted images were created from the source data.    This examination, like all CT scans performed in the [facility], was performed utilizing techniques to minimize radiation dose exposure, including the use of iterative reconstruction and automated exposure control. Radiation dose length   product (DLP) for this visit: 298 mGy-cm    FINDINGS:    LUNGS: Stable extensive airspace consolidation in the right lung namely the right middle and lower lobes. Extensive lymphangitic spread of tumor throughout the right lung and increasing lymphangitic spread throughout the left lung. There is no tracheal   or endobronchial lesion.    PLEURA: Small loculated right pleural effusion measuring 2.2 cm in thickness anteriorly and up to 3.3 cm at the medial right lower lobe. Tunnelled catheter in right lung    HEART/GREAT VESSELS: Heart is unremarkable for patient's age. No thoracic aortic aneurysm.    MEDIASTINUM AND JEREMIAH: Unremarkable.    CHEST WALL AND LOWER NECK: Unremarkable.    VISUALIZED STRUCTURES IN THE UPPER ABDOMEN: Unremarkable.    OSSEOUS STRUCTURES: No acute fracture or destructive osseous lesion.  Impression: Stable extensive airspace consolidation in the right lung namely the right middle and lower lobes    Stable extensive lymphangitic spread of tumor throughout the right lung and increasing lymphangitic spread throughout the left lung.    Small loculated right pleural effusion    Workstation performed: [id number]  CTA chest pe study  Narrative: CTA - CHEST WITH IV CONTRAST - PULMONARY ANGIOGRAM    INDICATION: Dyspnea, chest pain and elevated D-dimer.    COMPARISON: 12/26/2024, 12/31/2024 and 1/4/2025.    TECHNIQUE: CTA examination of the chest was performed using angiographic technique according to a  protocol specifically tailored to evaluate for pulmonary embolism. Multiplanar 2D reformatted images were created from the source data. In addition, coronal   3D MIP postprocessing was performed on the acquisition scanner.    Radiation dose length product (DLP) for this visit: 254 mGy-cm . This examination, like all CT scans performed in the [facility], was performed utilizing techniques to minimize radiation dose exposure, including the use of iterative   reconstruction and automated exposure control.    IV Contrast: 65 mL of iohexol (OMNIPAQUE)    FINDINGS:    PULMONARY ARTERIAL TREE: Somewhat limited evaluation of distal branches in the right upper and lower lobes due to surrounding airspace consolidation. Respiratory motion artifact also limits evaluation of distal order branches in the left lower lobe.   Within these limitations, no evidence of filling defect to suggest an acute embolus.    LUNGS: Stable airspace consolidation in the right upper lobe. Complete airspace consolidation throughout the right middle lobe. Airspace consolidation throughout the right lower lobe with only minimal aeration. Stable nodular interseptal thickening   throughout the right lung. Increasing nodular septal thickening in the left lung when compared to the 12/24 CT. Mild centrilobular emphysema. Occlusion of right middle lobe segmental bronchi.    PLEURA: Small right effusion, likely loculated. Right chest tube in the medial basal hemithorax.    HEART/GREAT VESSELS: Normal heart size. Stable right shift of the heart mediastinum due to volume loss in the right lung.    MEDIASTINUM AND JEREMIAH: Stable prevascular lymph nodes. Right hilar and subcarinal soft tissue may also represent stable lymphadenopathy, not well differentiated from adjacent airspace consolidation.    CHEST WALL AND LOWER NECK: Unremarkable.    VISUALIZED STRUCTURES IN THE UPPER ABDOMEN: Unremarkable.    OSSEOUS STRUCTURES: No acute fracture or  "destructive osseous lesion.  Impression: 1. Increasing airspace consolidation in the right middle lobe, otherwise stable in the right upper and lower lobes.  2. Stable diffuse lymphangitic spread of tumor throughout the right lung and evidence of increasing lymphangitic spread of tumor in the left lung, compared to the CT from 12/8/2024.  3. No evidence of acute pulmonary embolus.  4. Stable small right loculated effusion.    Workstation performed: [id number]      LABS:  Lab data are reviewed and documented in HemOnc history.       Lab Results   Component Value Date    HGB 9.9 (L) 01/17/2025    HCT 32.0 (L) 01/17/2025    MCV 88 01/17/2025     01/17/2025    WBC 9.43 01/17/2025    NRBC 0 01/17/2025    BANDSPCT 4 01/06/2025     Lab Results   Component Value Date    K 3.7 01/17/2025     01/17/2025    CO2 27 01/17/2025    BUN 14 01/17/2025    CREATININE 0.61 01/17/2025    GLUF 148 (H) 01/14/2025    CALCIUM 8.9 01/17/2025    CORRECTEDCA 9.5 01/17/2025    AST 17 01/17/2025    ALT 19 01/17/2025    ALKPHOS 91 01/17/2025    EGFR 112 01/17/2025       Lab Results   Component Value Date    IRON 45 (L) 01/05/2025    TIBC 254.8 01/05/2025    FERRITIN 978 (H) 01/05/2025    FERRITIN 63 04/19/2024       No results found for: \"XLJRVBWW55\"    No results for input(s): \"WBC\", \"CREAT\", \"PLT\" in the last 72 hours.      By:  Maryana Sandoval MD, 1/22/2025, 10:23 AM                                  "

## 2025-01-14 ENCOUNTER — TELEPHONE (OUTPATIENT)
Age: 55
End: 2025-01-14

## 2025-01-14 ENCOUNTER — APPOINTMENT (OUTPATIENT)
Dept: LAB | Facility: CLINIC | Age: 55
End: 2025-01-14
Payer: COMMERCIAL

## 2025-01-14 ENCOUNTER — TELEPHONE (OUTPATIENT)
Dept: FAMILY MEDICINE CLINIC | Facility: CLINIC | Age: 55
End: 2025-01-14

## 2025-01-14 DIAGNOSIS — C34.81 ADENOCARCINOMA OF OVERLAPPING SITES OF RIGHT LUNG (HCC): ICD-10-CM

## 2025-01-14 DIAGNOSIS — C34.91 PRIMARY MALIGNANT NEOPLASM OF RIGHT LUNG METASTATIC TO OTHER SITE (HCC): ICD-10-CM

## 2025-01-14 DIAGNOSIS — T45.1X5A ANTINEOPLASTIC CHEMOTHERAPY INDUCED ANEMIA: Primary | ICD-10-CM

## 2025-01-14 DIAGNOSIS — D64.81 ANTINEOPLASTIC CHEMOTHERAPY INDUCED ANEMIA: ICD-10-CM

## 2025-01-14 DIAGNOSIS — T45.1X5A ANTINEOPLASTIC CHEMOTHERAPY INDUCED ANEMIA: ICD-10-CM

## 2025-01-14 DIAGNOSIS — R06.09 DOE (DYSPNEA ON EXERTION): ICD-10-CM

## 2025-01-14 DIAGNOSIS — E11.00 TYPE 2 DIABETES MELLITUS WITH HYPEROSMOLARITY WITHOUT COMA, WITHOUT LONG-TERM CURRENT USE OF INSULIN (HCC): ICD-10-CM

## 2025-01-14 DIAGNOSIS — D64.81 ANTINEOPLASTIC CHEMOTHERAPY INDUCED ANEMIA: Primary | ICD-10-CM

## 2025-01-14 LAB
ANION GAP SERPL CALCULATED.3IONS-SCNC: 6 MMOL/L (ref 4–13)
BASOPHILS # BLD AUTO: 0.03 THOUSANDS/ΜL (ref 0–0.1)
BASOPHILS NFR BLD AUTO: 1 % (ref 0–1)
BUN SERPL-MCNC: 15 MG/DL (ref 5–25)
CALCIUM SERPL-MCNC: 8.9 MG/DL (ref 8.4–10.2)
CHLORIDE SERPL-SCNC: 106 MMOL/L (ref 96–108)
CO2 SERPL-SCNC: 30 MMOL/L (ref 21–32)
CREAT SERPL-MCNC: 0.56 MG/DL (ref 0.6–1.3)
EOSINOPHIL # BLD AUTO: 0.01 THOUSAND/ΜL (ref 0–0.61)
EOSINOPHIL NFR BLD AUTO: 0 % (ref 0–6)
ERYTHROCYTE [DISTWIDTH] IN BLOOD BY AUTOMATED COUNT: 19.4 % (ref 11.6–15.1)
GFR SERPL CREATININE-BSD FRML MDRD: 116 ML/MIN/1.73SQ M
GLUCOSE P FAST SERPL-MCNC: 148 MG/DL (ref 65–99)
HCT VFR BLD AUTO: 30.6 % (ref 36.5–49.3)
HGB BLD-MCNC: 9.3 G/DL (ref 12–17)
IMM GRANULOCYTES # BLD AUTO: 0.02 THOUSAND/UL (ref 0–0.2)
IMM GRANULOCYTES NFR BLD AUTO: 0 % (ref 0–2)
LYMPHOCYTES # BLD AUTO: 1.02 THOUSANDS/ΜL (ref 0.6–4.47)
LYMPHOCYTES NFR BLD AUTO: 21 % (ref 14–44)
MAGNESIUM SERPL-MCNC: 1.8 MG/DL (ref 1.9–2.7)
MCH RBC QN AUTO: 27.2 PG (ref 26.8–34.3)
MCHC RBC AUTO-ENTMCNC: 30.4 G/DL (ref 31.4–37.4)
MCV RBC AUTO: 90 FL (ref 82–98)
MONOCYTES # BLD AUTO: 0.68 THOUSAND/ΜL (ref 0.17–1.22)
MONOCYTES NFR BLD AUTO: 14 % (ref 4–12)
MYCOBACTERIUM SPEC CULT: NORMAL
MYCOBACTERIUM SPEC CULT: NORMAL
NEUTROPHILS # BLD AUTO: 3.22 THOUSANDS/ΜL (ref 1.85–7.62)
NEUTS SEG NFR BLD AUTO: 64 % (ref 43–75)
NRBC BLD AUTO-RTO: 0 /100 WBCS
PLATELET # BLD AUTO: 212 THOUSANDS/UL (ref 149–390)
PMV BLD AUTO: 10.4 FL (ref 8.9–12.7)
POTASSIUM SERPL-SCNC: 3.9 MMOL/L (ref 3.5–5.3)
RBC # BLD AUTO: 3.42 MILLION/UL (ref 3.88–5.62)
RHODAMINE-AURAMINE STN SPEC: NORMAL
RHODAMINE-AURAMINE STN SPEC: NORMAL
SODIUM SERPL-SCNC: 142 MMOL/L (ref 135–147)
WBC # BLD AUTO: 4.98 THOUSAND/UL (ref 4.31–10.16)

## 2025-01-14 PROCEDURE — 80048 BASIC METABOLIC PNL TOTAL CA: CPT

## 2025-01-14 PROCEDURE — 85025 COMPLETE CBC W/AUTO DIFF WBC: CPT

## 2025-01-14 PROCEDURE — 83735 ASSAY OF MAGNESIUM: CPT

## 2025-01-14 PROCEDURE — 36415 COLL VENOUS BLD VENIPUNCTURE: CPT

## 2025-01-14 NOTE — TELEPHONE ENCOUNTER
Sent message to patient, received HH information, patient spouse reported that HH RN not able to visit today. Patient spouse stated patient will go to [facility] lab. Encouraged patient to go to ED if increase in Shortness of breath.

## 2025-01-14 NOTE — TELEPHONE ENCOUNTER
Called and spoke with Jessica. He states that he continues to be short of breath and can barely walk 10 feet with his oxygen on at 2L/min. His last O2 sat was 89-91 with the oxygen on. 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 He did sound winded on the phone using short phrases to respond to my questions. He states he is eating and drinking without problem. The home health nurse is coming today and is wife is asking if a CBC can be done today to check his blood level.

## 2025-01-14 NOTE — TELEPHONE ENCOUNTER
"I called patient this evening to schedule f/u with Dr Bacon.  It's scheduled for 1/22.  BUT wife is asking about \"portable ox unit (NOT tank)\" for him to go to his oncology ov and other ov.  She said she has waited & no one has gotten back to her ([facility] is suppose to be doing this?)  "

## 2025-01-14 NOTE — TELEPHONE ENCOUNTER
Regarding: shortness of breathe  ----- Message from Anahi GAMBLE sent at 1/14/2025  8:23 AM EST -----  Patients wife called asked if we can do another blood transfusion. He is having rapid breathing and hard to stand and move. He feels like he is low on oxygen when the rapid breathing starts. Symptoms slowly started last night but increased this morning. She is asking if we can also put in a CBC order that his home health nurse can do today instead of waiting till Friday when he is scheduled to go.

## 2025-01-15 NOTE — TELEPHONE ENCOUNTER
This should be done by his pulmonologist, we do not do those.I understand that she waited for a week, but this is is the first time I am hearing about it. Please forward to his Pulmonologist.

## 2025-01-15 NOTE — TELEPHONE ENCOUNTER
I spoke with patient's wife and let her know that this should go to pulmonology. If you can please have your staff assist with this.

## 2025-01-16 ENCOUNTER — TELEPHONE (OUTPATIENT)
Age: 55
End: 2025-01-16

## 2025-01-16 DIAGNOSIS — J96.11 CHRONIC HYPOXEMIC RESPIRATORY FAILURE (HCC): Primary | ICD-10-CM

## 2025-01-16 NOTE — TELEPHONE ENCOUNTER
Is call [facility] and make sure this patient gets a portable oxygen unit today, I placed another order in epic

## 2025-01-16 NOTE — TELEPHONE ENCOUNTER
Pt needs to be r/s on 2/24 with Dr. Sandoval. Nothing available until 3/20 and he's already scheduled for a visit with him, please advise on rescheduling this appt.    Thank you!

## 2025-01-16 NOTE — TELEPHONE ENCOUNTER
Pt spouse Tomasa calling in to follow up on the Order for the home portable oxygen. She has reached out to [facility] and they have informed her they haven't received anything.       Dr. Little has placed [facility] Wife also provided fax for [facility] [phone]

## 2025-01-17 ENCOUNTER — HOSPITAL ENCOUNTER (OUTPATIENT)
Dept: INFUSION CENTER | Facility: CLINIC | Age: 55
End: 2025-01-17
Payer: COMMERCIAL

## 2025-01-17 DIAGNOSIS — Z95.828 PORT-A-CATH IN PLACE: Primary | ICD-10-CM

## 2025-01-17 LAB
ALBUMIN SERPL BCG-MCNC: 3.2 G/DL (ref 3.5–5)
ALP SERPL-CCNC: 91 U/L (ref 34–104)
ALT SERPL W P-5'-P-CCNC: 19 U/L (ref 7–52)
ANION GAP SERPL CALCULATED.3IONS-SCNC: 11 MMOL/L (ref 4–13)
AST SERPL W P-5'-P-CCNC: 17 U/L (ref 13–39)
BASOPHILS # BLD AUTO: 0.07 THOUSANDS/ΜL (ref 0–0.1)
BASOPHILS NFR BLD AUTO: 1 % (ref 0–1)
BILIRUB SERPL-MCNC: 0.35 MG/DL (ref 0.2–1)
BUN SERPL-MCNC: 14 MG/DL (ref 5–25)
CALCIUM ALBUM COR SERPL-MCNC: 9.5 MG/DL (ref 8.3–10.1)
CALCIUM SERPL-MCNC: 8.9 MG/DL (ref 8.4–10.2)
CHLORIDE SERPL-SCNC: 105 MMOL/L (ref 96–108)
CO2 SERPL-SCNC: 27 MMOL/L (ref 21–32)
CREAT SERPL-MCNC: 0.61 MG/DL (ref 0.6–1.3)
DME PARACHUTE DELIVERY DATE ACTUAL: NORMAL
DME PARACHUTE DELIVERY DATE EXPECTED: NORMAL
DME PARACHUTE DELIVERY DATE REQUESTED: NORMAL
DME PARACHUTE ITEM DESCRIPTION: NORMAL
DME PARACHUTE ORDER STATUS: NORMAL
DME PARACHUTE SUPPLIER NAME: NORMAL
DME PARACHUTE SUPPLIER PHONE: NORMAL
EOSINOPHIL # BLD AUTO: 0.01 THOUSAND/ΜL (ref 0–0.61)
EOSINOPHIL NFR BLD AUTO: 0 % (ref 0–6)
ERYTHROCYTE [DISTWIDTH] IN BLOOD BY AUTOMATED COUNT: 19 % (ref 11.6–15.1)
GFR SERPL CREATININE-BSD FRML MDRD: 112 ML/MIN/1.73SQ M
GLUCOSE SERPL-MCNC: 122 MG/DL (ref 65–140)
HCT VFR BLD AUTO: 32 % (ref 36.5–49.3)
HGB BLD-MCNC: 9.9 G/DL (ref 12–17)
IMM GRANULOCYTES # BLD AUTO: 0.06 THOUSAND/UL (ref 0–0.2)
IMM GRANULOCYTES NFR BLD AUTO: 1 % (ref 0–2)
LYMPHOCYTES # BLD AUTO: 1.56 THOUSANDS/ΜL (ref 0.6–4.47)
LYMPHOCYTES NFR BLD AUTO: 17 % (ref 14–44)
MCH RBC QN AUTO: 27.3 PG (ref 26.8–34.3)
MCHC RBC AUTO-ENTMCNC: 30.9 G/DL (ref 31.4–37.4)
MCV RBC AUTO: 88 FL (ref 82–98)
MONOCYTES # BLD AUTO: 0.97 THOUSAND/ΜL (ref 0.17–1.22)
MONOCYTES NFR BLD AUTO: 10 % (ref 4–12)
NEUTROPHILS # BLD AUTO: 6.76 THOUSANDS/ΜL (ref 1.85–7.62)
NEUTS SEG NFR BLD AUTO: 71 % (ref 43–75)
NRBC BLD AUTO-RTO: 0 /100 WBCS
PLATELET # BLD AUTO: 248 THOUSANDS/UL (ref 149–390)
PMV BLD AUTO: 9.3 FL (ref 8.9–12.7)
POTASSIUM SERPL-SCNC: 3.7 MMOL/L (ref 3.5–5.3)
PROT SERPL-MCNC: 6.7 G/DL (ref 6.4–8.4)
RBC # BLD AUTO: 3.62 MILLION/UL (ref 3.88–5.62)
SODIUM SERPL-SCNC: 143 MMOL/L (ref 135–147)
WBC # BLD AUTO: 9.43 THOUSAND/UL (ref 4.31–10.16)

## 2025-01-17 PROCEDURE — 80053 COMPREHEN METABOLIC PANEL: CPT | Performed by: INTERNAL MEDICINE

## 2025-01-17 PROCEDURE — 85025 COMPLETE CBC W/AUTO DIFF WBC: CPT | Performed by: INTERNAL MEDICINE

## 2025-01-17 NOTE — PROGRESS NOTES
Patient presents to the [facility] for central lab draw. He offers no concerns at this time. Port accessed with good blood return. Declined AVS. Confirmed next appointment at the [facility] for 01/21/2025 @ 1330. Patient wheeled out to the waiting area to his family member.

## 2025-01-20 DIAGNOSIS — R11.2 CHEMOTHERAPY INDUCED NAUSEA AND VOMITING: ICD-10-CM

## 2025-01-20 DIAGNOSIS — R05.9 COUGH, UNSPECIFIED TYPE: ICD-10-CM

## 2025-01-20 DIAGNOSIS — T45.1X5A CHEMOTHERAPY INDUCED NAUSEA AND VOMITING: ICD-10-CM

## 2025-01-20 DIAGNOSIS — C34.81 ADENOCARCINOMA OF OVERLAPPING SITES OF RIGHT LUNG (HCC): ICD-10-CM

## 2025-01-21 ENCOUNTER — HOSPITAL ENCOUNTER (OUTPATIENT)
Dept: INFUSION CENTER | Facility: CLINIC | Age: 55
Discharge: HOME | End: 2025-01-21
Payer: COMMERCIAL

## 2025-01-21 VITALS — BODY MASS INDEX: 30.61 KG/M2 | HEIGHT: 67 IN | WEIGHT: 195 LBS

## 2025-01-21 DIAGNOSIS — C34.81 ADENOCARCINOMA OF OVERLAPPING SITES OF RIGHT LUNG (HCC): Primary | ICD-10-CM

## 2025-01-21 LAB
MYCOBACTERIUM SPEC CULT: NORMAL
MYCOBACTERIUM SPEC CULT: NORMAL
RHODAMINE-AURAMINE STN SPEC: NORMAL
RHODAMINE-AURAMINE STN SPEC: NORMAL

## 2025-01-21 PROCEDURE — 96367 TX/PROPH/DG ADDL SEQ IV INF: CPT

## 2025-01-21 PROCEDURE — 96417 CHEMO IV INFUS EACH ADDL SEQ: CPT

## 2025-01-21 PROCEDURE — 96413 CHEMO IV INFUSION 1 HR: CPT

## 2025-01-21 RX ORDER — BENZONATATE 200 MG/1
200 CAPSULE ORAL 3 TIMES DAILY PRN
Qty: 20 CAPSULE | Refills: 4 | Status: SHIPPED | OUTPATIENT
Start: 2025-01-21

## 2025-01-21 RX ORDER — ONDANSETRON 8 MG/1
8 TABLET, ORALLY DISINTEGRATING ORAL EVERY 8 HOURS PRN
Qty: 30 TABLET | Refills: 2 | Status: SHIPPED | OUTPATIENT
Start: 2025-01-21

## 2025-01-21 RX ORDER — SODIUM CHLORIDE 9 MG/ML
20 INJECTION, SOLUTION INTRAVENOUS ONCE
Status: COMPLETED | OUTPATIENT
Start: 2025-01-21 | End: 2025-01-21

## 2025-01-21 RX ADMIN — DEXAMETHASONE SODIUM PHOSPHATE: 10 INJECTION, SOLUTION INTRAMUSCULAR; INTRAVENOUS at 13:29

## 2025-01-21 RX ADMIN — DIPHENHYDRAMINE HYDROCHLORIDE 50 MG: 50 INJECTION, SOLUTION INTRAMUSCULAR; INTRAVENOUS at 13:50

## 2025-01-21 RX ADMIN — SODIUM CHLORIDE 900 MG: 0.9 INJECTION, SOLUTION INTRAVENOUS at 14:28

## 2025-01-21 RX ADMIN — DOCETAXEL 150.8 MG: 20 INJECTION, SOLUTION, CONCENTRATE INTRAVENOUS at 15:34

## 2025-01-21 RX ADMIN — SODIUM CHLORIDE 20 ML/HR: 0.9 INJECTION, SOLUTION INTRAVENOUS at 13:29

## 2025-01-21 NOTE — PROGRESS NOTES
Patient is here for Cyramza. Labs reviewed from 1/17, within treatment parameters. Port was accessed with good blood return noted. Patient resting with no complains. Will continue to monitor.

## 2025-01-22 ENCOUNTER — TELEPHONE (OUTPATIENT)
Age: 55
End: 2025-01-22

## 2025-01-22 ENCOUNTER — OFFICE VISIT (OUTPATIENT)
Dept: HEMATOLOGY ONCOLOGY | Facility: CLINIC | Age: 55
End: 2025-01-22
Payer: COMMERCIAL

## 2025-01-22 ENCOUNTER — TELEPHONE (OUTPATIENT)
Dept: HEMATOLOGY ONCOLOGY | Facility: CLINIC | Age: 55
End: 2025-01-22

## 2025-01-22 ENCOUNTER — TELEMEDICINE (OUTPATIENT)
Dept: FAMILY MEDICINE CLINIC | Facility: CLINIC | Age: 55
End: 2025-01-22
Payer: COMMERCIAL

## 2025-01-22 VITALS
HEART RATE: 122 BPM | BODY MASS INDEX: 30.54 KG/M2 | DIASTOLIC BLOOD PRESSURE: 68 MMHG | TEMPERATURE: 97.3 F | RESPIRATION RATE: 18 BRPM | SYSTOLIC BLOOD PRESSURE: 132 MMHG | OXYGEN SATURATION: 90 % | HEIGHT: 67 IN

## 2025-01-22 DIAGNOSIS — E78.2 MIXED HYPERLIPIDEMIA: ICD-10-CM

## 2025-01-22 DIAGNOSIS — C34.81 MALIGNANT NEOPLASM OF OVERLAPPING SITES OF RIGHT LUNG (HCC): ICD-10-CM

## 2025-01-22 DIAGNOSIS — E83.42 HYPOMAGNESEMIA: Primary | ICD-10-CM

## 2025-01-22 DIAGNOSIS — I10 PRIMARY HYPERTENSION: ICD-10-CM

## 2025-01-22 DIAGNOSIS — E03.9 ACQUIRED HYPOTHYROIDISM: ICD-10-CM

## 2025-01-22 DIAGNOSIS — I82.432 ACUTE DEEP VEIN THROMBOSIS (DVT) OF POPLITEAL VEIN OF LEFT LOWER EXTREMITY (HCC): ICD-10-CM

## 2025-01-22 DIAGNOSIS — I10 ESSENTIAL HYPERTENSION, BENIGN: ICD-10-CM

## 2025-01-22 DIAGNOSIS — I51.7: ICD-10-CM

## 2025-01-22 DIAGNOSIS — C34.81 ADENOCARCINOMA OF OVERLAPPING SITES OF RIGHT LUNG (HCC): Primary | ICD-10-CM

## 2025-01-22 DIAGNOSIS — C34.81 ADENOCARCINOMA OF OVERLAPPING SITES OF RIGHT LUNG (HCC): ICD-10-CM

## 2025-01-22 DIAGNOSIS — I26.99 PULMONARY EMBOLI (HCC): ICD-10-CM

## 2025-01-22 DIAGNOSIS — E11.65 UNCONTROLLED TYPE 2 DIABETES MELLITUS WITH HYPERGLYCEMIA, WITHOUT LONG-TERM CURRENT USE OF INSULIN (HCC): ICD-10-CM

## 2025-01-22 PROCEDURE — 99214 OFFICE O/P EST MOD 30 MIN: CPT | Performed by: FAMILY MEDICINE

## 2025-01-22 PROCEDURE — 99215 OFFICE O/P EST HI 40 MIN: CPT | Performed by: INTERNAL MEDICINE

## 2025-01-22 RX ORDER — LANOLIN ALCOHOL/MO/W.PET/CERES
400 CREAM (GRAM) TOPICAL DAILY
Qty: 30 TABLET | Refills: 1 | Status: SHIPPED | OUTPATIENT
Start: 2025-01-22

## 2025-01-22 RX ORDER — SODIUM CHLORIDE 9 MG/ML
20 INJECTION, SOLUTION INTRAVENOUS ONCE
OUTPATIENT
Start: 2025-02-11

## 2025-01-22 RX ORDER — LOSARTAN POTASSIUM 25 MG/1
25 TABLET ORAL DAILY
Qty: 90 TABLET | Refills: 1 | Status: SHIPPED | OUTPATIENT
Start: 2025-01-22

## 2025-01-22 NOTE — TELEPHONE ENCOUNTER
Patient is calling to let us know he is downstairs and will be up shortly for his appointment today.

## 2025-01-22 NOTE — TELEPHONE ENCOUNTER
Contacted [facility] pharmacy and left message for Jessica to return call as these records will need to come from Medical Records Department.

## 2025-01-22 NOTE — TELEPHONE ENCOUNTER
Caller: [facility]    Doctor: Vascular    Reason for call: Calling requesting message to be sent to Susi in regard to catheter placement 12/15/25. Requesting H & P and op notes be faxed to # [phone]    Call back#: [phone]

## 2025-01-22 NOTE — PROGRESS NOTES
Virtual Regular Visit  Name: Papo Gibbs      : 1970      MRN: [id number]  Encounter Provider: Amelie Bacon MD  Encounter Date: 2025   Encounter department: [facility]      Verification of patient location:  Patient is located at Home in the following state in which I hold an active license [place] :  Assessment & Plan  Hypomagnesemia    Orders:    magnesium Oxide (MAG-OX) 400 mg TABS; Take 1 tablet (400 mg total) by mouth daily    Pulmonary emboli (HCC)    Orders:    apixaban (Eliquis) 5 mg; Take 1 tablet (5 mg total) by mouth 2 (two) times a day    Mixed hyperlipidemia         Acquired left ventricular hypertrophy         Malignant neoplasm of overlapping sites of right lung (HCC)         Acquired hypothyroidism         Adenocarcinoma of overlapping sites of right lung (HCC)         Acute deep vein thrombosis (DVT) of popliteal vein of left lower extremity (HCC)         Essential hypertension, benign    Orders:    losartan (COZAAR) 25 mg tablet; Take 1 tablet (25 mg total) by mouth daily    Primary hypertension         Uncontrolled type 2 diabetes mellitus with hyperglycemia, without long-term current use of insulin (HCC)  On metformin,glimepiride uncontrolled due ot recurrent steroids use , does not want to use insulin, increase   Lab Results   Component Value Date    HGBA1C 8.1 (H) 2024                 Pulmonary emboli (HCC)  On eliquis,needs refils                Mixed hyperlipidemia  On atorvastatin       Acquired left ventricular hypertrophy         Malignant neoplasm of overlapping sites of right lung (HCC)         Acquired hypothyroidism         Adenocarcinoma of overlapping sites of right lung (HCC)         Acute deep vein thrombosis (DVT) of popliteal vein of left lower extremity (HCC)  On eliquis       Essential hypertension, benign  On losartan, BMP reviewed normal             Encounter provider Amelie Bacon MD    The patient was identified by name and  date of birth. Papo Gibbs was informed that this is a telemedicine visit and that the visit is being conducted through the Epic Embedded platform. He agrees to proceed..  My office door was closed. No one else was in the room.  He acknowledged consent and understanding of privacy and security of the video platform. The patient has agreed to participate and understands they can discontinue the visit at any time.    Patient is aware this is a billable service.     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   HENT:  Negative for congestion, rhinorrhea and sore throat.    Eyes:  Negative for discharge, redness and itching.   Respiratory:  Positive for shortness of breath. Negative for chest tightness and wheezing.    Cardiovascular:  Negative for chest pain and palpitations.   Gastrointestinal:  Negative for abdominal pain, constipation and diarrhea.   Genitourinary:  Negative for dysuria.   Skin:  Negative for pallor and rash.   Neurological:  Negative for dizziness, weakness, numbness and headaches.       Objective   There were no vitals taken for this visit.    Physical Exam  Vitals and nursing note reviewed.   Constitutional:       General: He is not in acute distress.     Appearance: He is well-developed. He is not ill-appearing or toxic-appearing.   HENT:      Head: Normocephalic and atraumatic.      Right Ear: External ear normal.      Left Ear: External ear normal.      Nose: No rhinorrhea.      Mouth/Throat:      Mouth: Mucous membranes are not pale and not cyanotic.    Eyes:      General:         Right eye: No discharge.         Left eye: No discharge.      Comments: Visual inspection   Pulmonary:      Effort: Pulmonary effort is normal. No respiratory distress.      Comments: 2l oxygen via Nasal cannula  Abdominal:      Palpations: Abdomen is soft.      Tenderness: There is no abdominal tenderness.   Musculoskeletal:         General: No tenderness or signs of injury.      Cervical back: Normal range of motion.   Skin:     Coloration: Skin is not jaundiced or pale.   Neurological:      General: No focal deficit present.      Mental Status: Mental status is at baseline.   Psychiatric:         Behavior: Behavior normal.         Thought Content: Thought content normal.

## 2025-01-23 PROBLEM — E11.65 UNCONTROLLED TYPE 2 DIABETES MELLITUS WITH HYPERGLYCEMIA, WITHOUT LONG-TERM CURRENT USE OF INSULIN (HCC): Status: ACTIVE | Noted: 2025-01-23

## 2025-01-23 PROBLEM — R93.89 ABNORMAL CT OF THE CHEST: Status: RESOLVED | Noted: 2024-04-19 | Resolved: 2025-01-23

## 2025-01-23 NOTE — ASSESSMENT & PLAN NOTE
On metformin,glimepiride uncontrolled due ot recurrent steroids use , does not want to use insulin, increase   Lab Results   Component Value Date    HGBA1C 8.1 (H) 12/09/2024

## 2025-01-24 LAB

## 2025-01-24 NOTE — TELEPHONE ENCOUNTER
Contacted [facility] and spoke to Jessica and informed her that she will need to reach out to  Medical Records for records and notes requested.  Provided her with contact number for  Medical Records.

## 2025-01-27 LAB

## 2025-02-02 ENCOUNTER — APPOINTMENT (EMERGENCY)
Dept: CT IMAGING | Facility: HOSPITAL | Age: 55
DRG: 871 | End: 2025-02-02
Payer: COMMERCIAL

## 2025-02-02 ENCOUNTER — APPOINTMENT (INPATIENT)
Dept: RADIOLOGY | Facility: HOSPITAL | Age: 55
DRG: 871 | End: 2025-02-02
Payer: COMMERCIAL

## 2025-02-02 ENCOUNTER — HOSPITAL ENCOUNTER (INPATIENT)
Facility: HOSPITAL | Age: 55
LOS: 11 days | Discharge: HOME HEALTH | DRG: 871 | End: 2025-02-13
Attending: EMERGENCY MEDICINE | Admitting: STUDENT IN AN ORGANIZED HEALTH CARE EDUCATION/TRAINING PROGRAM
Payer: COMMERCIAL

## 2025-02-02 ENCOUNTER — APPOINTMENT (EMERGENCY)
Dept: RADIOLOGY | Facility: HOSPITAL | Age: 55
DRG: 871 | End: 2025-02-02
Payer: COMMERCIAL

## 2025-02-02 DIAGNOSIS — Z51.5 PALLIATIVE CARE PATIENT: ICD-10-CM

## 2025-02-02 DIAGNOSIS — F41.9 ANXIETY DISORDER: ICD-10-CM

## 2025-02-02 DIAGNOSIS — C34.81 ADENOCARCINOMA OF OVERLAPPING SITES OF RIGHT LUNG (HCC): ICD-10-CM

## 2025-02-02 DIAGNOSIS — J18.9 PNEUMONIA: ICD-10-CM

## 2025-02-02 DIAGNOSIS — Z86.711 HISTORY OF PULMONARY EMBOLISM: ICD-10-CM

## 2025-02-02 DIAGNOSIS — R09.02 HYPOXIA: ICD-10-CM

## 2025-02-02 DIAGNOSIS — C79.31 METASTASIS TO BRAIN (HCC): ICD-10-CM

## 2025-02-02 DIAGNOSIS — G89.3 CANCER RELATED PAIN: ICD-10-CM

## 2025-02-02 DIAGNOSIS — J90 PLEURAL EFFUSION: ICD-10-CM

## 2025-02-02 DIAGNOSIS — R06.00 DYSPNEA: Primary | ICD-10-CM

## 2025-02-02 DIAGNOSIS — M54.6 RIGHT-SIDED THORACIC BACK PAIN, UNSPECIFIED CHRONICITY: ICD-10-CM

## 2025-02-02 PROBLEM — R19.7 DIARRHEA: Status: ACTIVE | Noted: 2025-02-02

## 2025-02-02 LAB
2HR DELTA HS TROPONIN: -9 NG/L
4HR DELTA HS TROPONIN: -6 NG/L
ALBUMIN SERPL BCG-MCNC: 3.3 G/DL (ref 3.5–5)
ALP SERPL-CCNC: 69 U/L (ref 34–104)
ALT SERPL W P-5'-P-CCNC: 15 U/L (ref 7–52)
ANION GAP SERPL CALCULATED.3IONS-SCNC: 7 MMOL/L (ref 4–13)
APTT PPP: 31 SECONDS (ref 23–34)
ARTERIAL PATENCY WRIST A: YES
AST SERPL W P-5'-P-CCNC: 14 U/L (ref 13–39)
ATRIAL RATE: 118 BPM
BASE EXCESS BLDA CALC-SCNC: 1.8 MMOL/L
BASOPHILS # BLD AUTO: 0.02 THOUSANDS/ΜL (ref 0–0.1)
BASOPHILS NFR BLD AUTO: 1 % (ref 0–1)
BILIRUB SERPL-MCNC: 0.35 MG/DL (ref 0.2–1)
BNP SERPL-MCNC: 263 PG/ML (ref 0–100)
BUN SERPL-MCNC: 16 MG/DL (ref 5–25)
CALCIUM ALBUM COR SERPL-MCNC: 9.5 MG/DL (ref 8.3–10.1)
CALCIUM SERPL-MCNC: 8.9 MG/DL (ref 8.4–10.2)
CARDIAC TROPONIN I PNL SERPL HS: 63 NG/L (ref ?–50)
CARDIAC TROPONIN I PNL SERPL HS: 66 NG/L (ref ?–50)
CARDIAC TROPONIN I PNL SERPL HS: 72 NG/L (ref ?–50)
CHLORIDE SERPL-SCNC: 104 MMOL/L (ref 96–108)
CO2 SERPL-SCNC: 29 MMOL/L (ref 21–32)
CREAT SERPL-MCNC: 0.57 MG/DL (ref 0.6–1.3)
EOSINOPHIL # BLD AUTO: 0.01 THOUSAND/ΜL (ref 0–0.61)
EOSINOPHIL NFR BLD AUTO: 0 % (ref 0–6)
ERYTHROCYTE [DISTWIDTH] IN BLOOD BY AUTOMATED COUNT: 17.5 % (ref 11.6–15.1)
FLUAV RNA RESP QL NAA+PROBE: NEGATIVE
FLUBV RNA RESP QL NAA+PROBE: NEGATIVE
GFR SERPL CREATININE-BSD FRML MDRD: 115 ML/MIN/1.73SQ M
GLUCOSE SERPL-MCNC: 165 MG/DL (ref 65–140)
GLUCOSE SERPL-MCNC: 165 MG/DL (ref 65–140)
GLUCOSE SERPL-MCNC: 176 MG/DL (ref 65–140)
HCO3 BLDA-SCNC: 26.8 MMOL/L (ref 22–28)
HCT VFR BLD AUTO: 31.4 % (ref 36.5–49.3)
HCT VFR BLD AUTO: 31.6 % (ref 36.5–49.3)
HGB BLD-MCNC: 9.6 G/DL (ref 12–17)
HGB BLD-MCNC: 9.7 G/DL (ref 12–17)
IMM GRANULOCYTES # BLD AUTO: 0.03 THOUSAND/UL (ref 0–0.2)
IMM GRANULOCYTES NFR BLD AUTO: 1 % (ref 0–2)
INR PPP: 1.3 (ref 0.85–1.19)
LACTATE SERPL-SCNC: 1.8 MMOL/L (ref 0.5–2)
LYMPHOCYTES # BLD AUTO: 0.93 THOUSANDS/ΜL (ref 0.6–4.47)
LYMPHOCYTES NFR BLD AUTO: 32 % (ref 14–44)
MAGNESIUM SERPL-MCNC: 1.9 MG/DL (ref 1.9–2.7)
MCH RBC QN AUTO: 26.1 PG (ref 26.8–34.3)
MCHC RBC AUTO-ENTMCNC: 30.7 G/DL (ref 31.4–37.4)
MCV RBC AUTO: 85 FL (ref 82–98)
MONOCYTES # BLD AUTO: 0.63 THOUSAND/ΜL (ref 0.17–1.22)
MONOCYTES NFR BLD AUTO: 22 % (ref 4–12)
NASAL CANNULA: ABNORMAL
NEUTROPHILS # BLD AUTO: 1.28 THOUSANDS/ΜL (ref 1.85–7.62)
NEUTS SEG NFR BLD AUTO: 44 % (ref 43–75)
NRBC BLD AUTO-RTO: 0 /100 WBCS
O2 CT BLDA-SCNC: 18.8 ML/DL (ref 16–23)
OXYHGB MFR BLDA: 93.5 % (ref 94–97)
P AXIS: 31 DEGREES
PCO2 BLDA: 43.3 MM HG (ref 36–44)
PH BLDA: 7.41 [PH] (ref 7.35–7.45)
PLATELET # BLD AUTO: 240 THOUSANDS/UL (ref 149–390)
PMV BLD AUTO: 10.2 FL (ref 8.9–12.7)
PO2 BLDA: 77.3 MM HG (ref 75–129)
POTASSIUM SERPL-SCNC: 3.7 MMOL/L (ref 3.5–5.3)
PR INTERVAL: 142 MS
PROCALCITONIN SERPL-MCNC: <0.05 NG/ML
PROT SERPL-MCNC: 6.3 G/DL (ref 6.4–8.4)
PROTHROMBIN TIME: 16.9 SECONDS (ref 12.3–15)
QRS AXIS: 70 DEGREES
QRSD INTERVAL: 82 MS
QT INTERVAL: 312 MS
QTC INTERVAL: 437 MS
RBC # BLD AUTO: 3.72 MILLION/UL (ref 3.88–5.62)
RSV RNA RESP QL NAA+PROBE: NEGATIVE
SARS-COV-2 RNA RESP QL NAA+PROBE: NEGATIVE
SODIUM SERPL-SCNC: 140 MMOL/L (ref 135–147)
SPECIMEN SOURCE: ABNORMAL
T WAVE AXIS: 113 DEGREES
VENTRICULAR RATE: 118 BPM
WBC # BLD AUTO: 2.9 THOUSAND/UL (ref 4.31–10.16)

## 2025-02-02 PROCEDURE — 85014 HEMATOCRIT: CPT

## 2025-02-02 PROCEDURE — 96365 THER/PROPH/DIAG IV INF INIT: CPT

## 2025-02-02 PROCEDURE — 99285 EMERGENCY DEPT VISIT HI MDM: CPT

## 2025-02-02 PROCEDURE — 85730 THROMBOPLASTIN TIME PARTIAL: CPT

## 2025-02-02 PROCEDURE — 99221 1ST HOSP IP/OBS SF/LOW 40: CPT

## 2025-02-02 PROCEDURE — 94760 N-INVAS EAR/PLS OXIMETRY 1: CPT

## 2025-02-02 PROCEDURE — 80053 COMPREHEN METABOLIC PANEL: CPT

## 2025-02-02 PROCEDURE — NC001 PR NO CHARGE: Performed by: PHYSICIAN ASSISTANT

## 2025-02-02 PROCEDURE — 71045 X-RAY EXAM CHEST 1 VIEW: CPT

## 2025-02-02 PROCEDURE — 87154 CUL TYP ID BLD PTHGN 6+ TRGT: CPT

## 2025-02-02 PROCEDURE — 96367 TX/PROPH/DG ADDL SEQ IV INF: CPT

## 2025-02-02 PROCEDURE — 0241U HB NFCT DS VIR RESP RNA 4 TRGT: CPT

## 2025-02-02 PROCEDURE — 99291 CRITICAL CARE FIRST HOUR: CPT | Performed by: EMERGENCY MEDICINE

## 2025-02-02 PROCEDURE — 99448 NTRPROF PH1/NTRNET/EHR 21-30: CPT | Performed by: INTERNAL MEDICINE

## 2025-02-02 PROCEDURE — 93005 ELECTROCARDIOGRAM TRACING: CPT

## 2025-02-02 PROCEDURE — 85610 PROTHROMBIN TIME: CPT

## 2025-02-02 PROCEDURE — 82948 REAGENT STRIP/BLOOD GLUCOSE: CPT

## 2025-02-02 PROCEDURE — 71275 CT ANGIOGRAPHY CHEST: CPT

## 2025-02-02 PROCEDURE — 84484 ASSAY OF TROPONIN QUANT: CPT

## 2025-02-02 PROCEDURE — 83605 ASSAY OF LACTIC ACID: CPT

## 2025-02-02 PROCEDURE — 36600 WITHDRAWAL OF ARTERIAL BLOOD: CPT

## 2025-02-02 PROCEDURE — 94664 DEMO&/EVAL PT USE INHALER: CPT

## 2025-02-02 PROCEDURE — 84145 PROCALCITONIN (PCT): CPT

## 2025-02-02 PROCEDURE — 85025 COMPLETE CBC W/AUTO DIFF WBC: CPT

## 2025-02-02 PROCEDURE — 87040 BLOOD CULTURE FOR BACTERIA: CPT

## 2025-02-02 PROCEDURE — 85018 HEMOGLOBIN: CPT

## 2025-02-02 PROCEDURE — 83880 ASSAY OF NATRIURETIC PEPTIDE: CPT

## 2025-02-02 PROCEDURE — 93010 ELECTROCARDIOGRAM REPORT: CPT | Performed by: INTERNAL MEDICINE

## 2025-02-02 PROCEDURE — 94640 AIRWAY INHALATION TREATMENT: CPT

## 2025-02-02 PROCEDURE — 83735 ASSAY OF MAGNESIUM: CPT

## 2025-02-02 PROCEDURE — 82805 BLOOD GASES W/O2 SATURATION: CPT

## 2025-02-02 PROCEDURE — 36415 COLL VENOUS BLD VENIPUNCTURE: CPT

## 2025-02-02 PROCEDURE — 96366 THER/PROPH/DIAG IV INF ADDON: CPT

## 2025-02-02 RX ORDER — HYDROXYZINE HYDROCHLORIDE 25 MG/1
25 TABLET, FILM COATED ORAL EVERY 6 HOURS PRN
Status: DISCONTINUED | OUTPATIENT
Start: 2025-02-02 | End: 2025-02-03

## 2025-02-02 RX ORDER — ECHINACEA PURPUREA EXTRACT 125 MG
1 TABLET ORAL
Status: DISCONTINUED | OUTPATIENT
Start: 2025-02-02 | End: 2025-02-13 | Stop reason: HOSPADM

## 2025-02-02 RX ORDER — CHLORHEXIDINE GLUCONATE ORAL RINSE 1.2 MG/ML
15 SOLUTION DENTAL EVERY 12 HOURS SCHEDULED
Status: DISCONTINUED | OUTPATIENT
Start: 2025-02-02 | End: 2025-02-13 | Stop reason: HOSPADM

## 2025-02-02 RX ORDER — ACETAMINOPHEN 325 MG/1
975 TABLET ORAL EVERY 8 HOURS PRN
Status: DISCONTINUED | OUTPATIENT
Start: 2025-02-02 | End: 2025-02-13 | Stop reason: HOSPADM

## 2025-02-02 RX ORDER — ECHINACEA PURPUREA EXTRACT 125 MG
1 TABLET ORAL ONCE
Status: COMPLETED | OUTPATIENT
Start: 2025-02-02 | End: 2025-02-02

## 2025-02-02 RX ORDER — GLIMEPIRIDE 2 MG/1
2 TABLET ORAL
Status: DISCONTINUED | OUTPATIENT
Start: 2025-02-03 | End: 2025-02-13 | Stop reason: HOSPADM

## 2025-02-02 RX ORDER — AMITRIPTYLINE HYDROCHLORIDE 100 MG/1
200 TABLET ORAL
Status: DISCONTINUED | OUTPATIENT
Start: 2025-02-02 | End: 2025-02-02 | Stop reason: DRUGHIGH

## 2025-02-02 RX ORDER — BENZONATATE 100 MG/1
200 CAPSULE ORAL 3 TIMES DAILY PRN
Status: DISCONTINUED | OUTPATIENT
Start: 2025-02-02 | End: 2025-02-13 | Stop reason: HOSPADM

## 2025-02-02 RX ORDER — FOLIC ACID 1 MG/1
1000 TABLET ORAL DAILY
Status: DISCONTINUED | OUTPATIENT
Start: 2025-02-02 | End: 2025-02-03

## 2025-02-02 RX ORDER — IPRATROPIUM BROMIDE AND ALBUTEROL SULFATE 2.5; .5 MG/3ML; MG/3ML
3 SOLUTION RESPIRATORY (INHALATION) ONCE
Status: COMPLETED | OUTPATIENT
Start: 2025-02-02 | End: 2025-02-02

## 2025-02-02 RX ORDER — LOSARTAN POTASSIUM 25 MG/1
25 TABLET ORAL DAILY
Status: DISCONTINUED | OUTPATIENT
Start: 2025-02-02 | End: 2025-02-13 | Stop reason: HOSPADM

## 2025-02-02 RX ORDER — IPRATROPIUM BROMIDE AND ALBUTEROL SULFATE 2.5; .5 MG/3ML; MG/3ML
3 SOLUTION RESPIRATORY (INHALATION)
Status: DISCONTINUED | OUTPATIENT
Start: 2025-02-02 | End: 2025-02-03

## 2025-02-02 RX ORDER — ATORVASTATIN CALCIUM 10 MG/1
10 TABLET, FILM COATED ORAL DAILY
Status: DISCONTINUED | OUTPATIENT
Start: 2025-02-02 | End: 2025-02-13 | Stop reason: HOSPADM

## 2025-02-02 RX ORDER — INSULIN LISPRO 100 [IU]/ML
1-6 INJECTION, SOLUTION INTRAVENOUS; SUBCUTANEOUS
Status: DISCONTINUED | OUTPATIENT
Start: 2025-02-02 | End: 2025-02-03

## 2025-02-02 RX ORDER — LEVOTHYROXINE SODIUM 25 UG/1
25 TABLET ORAL
Status: DISCONTINUED | OUTPATIENT
Start: 2025-02-03 | End: 2025-02-13 | Stop reason: HOSPADM

## 2025-02-02 RX ORDER — FAMOTIDINE 20 MG/1
40 TABLET, FILM COATED ORAL DAILY PRN
Status: DISCONTINUED | OUTPATIENT
Start: 2025-02-02 | End: 2025-02-13 | Stop reason: HOSPADM

## 2025-02-02 RX ORDER — ALBUTEROL SULFATE 90 UG/1
1 INHALANT RESPIRATORY (INHALATION) 4 TIMES DAILY
Status: DISCONTINUED | OUTPATIENT
Start: 2025-02-02 | End: 2025-02-02

## 2025-02-02 RX ADMIN — VANCOMYCIN HYDROCHLORIDE 1750 MG: 5 INJECTION, POWDER, LYOPHILIZED, FOR SOLUTION INTRAVENOUS at 20:54

## 2025-02-02 RX ADMIN — IOHEXOL 60 ML: 350 INJECTION, SOLUTION INTRAVENOUS at 08:07

## 2025-02-02 RX ADMIN — CHLORHEXIDINE GLUCONATE 15 ML: 1.2 RINSE ORAL at 23:33

## 2025-02-02 RX ADMIN — CEFEPIME 2000 MG: 2 INJECTION, POWDER, FOR SOLUTION INTRAVENOUS at 15:59

## 2025-02-02 RX ADMIN — ALBUTEROL SULFATE 1 PUFF: 90 AEROSOL, METERED RESPIRATORY (INHALATION) at 13:57

## 2025-02-02 RX ADMIN — VANCOMYCIN HYDROCHLORIDE 2000 MG: 5 INJECTION, POWDER, LYOPHILIZED, FOR SOLUTION INTRAVENOUS at 10:36

## 2025-02-02 RX ADMIN — UMECLIDINIUM 1 PUFF: 62.5 AEROSOL, POWDER ORAL at 14:38

## 2025-02-02 RX ADMIN — FOLIC ACID 1000 MCG: 1 TABLET ORAL at 13:57

## 2025-02-02 RX ADMIN — SALINE NASAL SPRAY 1 SPRAY: 1.5 SOLUTION NASAL at 11:57

## 2025-02-02 RX ADMIN — IPRATROPIUM BROMIDE AND ALBUTEROL SULFATE 3 ML: .5; 3 SOLUTION RESPIRATORY (INHALATION) at 15:59

## 2025-02-02 RX ADMIN — CEFEPIME 2000 MG: 2 INJECTION, POWDER, FOR SOLUTION INTRAVENOUS at 08:18

## 2025-02-02 RX ADMIN — AZITHROMYCIN MONOHYDRATE 500 MG: 500 INJECTION, POWDER, LYOPHILIZED, FOR SOLUTION INTRAVENOUS at 09:01

## 2025-02-02 RX ADMIN — HYDROXYZINE HYDROCHLORIDE 25 MG: 25 TABLET, FILM COATED ORAL at 22:48

## 2025-02-02 RX ADMIN — INSULIN LISPRO 1 UNITS: 100 INJECTION, SOLUTION INTRAVENOUS; SUBCUTANEOUS at 14:38

## 2025-02-02 RX ADMIN — INSULIN LISPRO 1 UNITS: 100 INJECTION, SOLUTION INTRAVENOUS; SUBCUTANEOUS at 19:37

## 2025-02-02 RX ADMIN — ATORVASTATIN CALCIUM 10 MG: 10 TABLET, FILM COATED ORAL at 13:57

## 2025-02-02 RX ADMIN — OLODATEROL RESPIMAT INHALATION SPRAY 2 PUFF: 2.5 SPRAY, METERED RESPIRATORY (INHALATION) at 14:38

## 2025-02-02 RX ADMIN — Medication 2.5 MG: at 15:59

## 2025-02-02 RX ADMIN — APIXABAN 5 MG: 5 TABLET, FILM COATED ORAL at 16:02

## 2025-02-02 RX ADMIN — IPRATROPIUM BROMIDE AND ALBUTEROL SULFATE 3 ML: 2.5; .5 SOLUTION RESPIRATORY (INHALATION) at 10:37

## 2025-02-02 RX ADMIN — BENZONATATE 200 MG: 100 CAPSULE ORAL at 20:00

## 2025-02-02 RX ADMIN — CEFEPIME 2000 MG: 2 INJECTION, POWDER, FOR SOLUTION INTRAVENOUS at 23:44

## 2025-02-02 RX ADMIN — LOSARTAN POTASSIUM 25 MG: 25 TABLET, FILM COATED ORAL at 13:57

## 2025-02-02 RX ADMIN — IPRATROPIUM BROMIDE AND ALBUTEROL SULFATE 3 ML: .5; 3 SOLUTION RESPIRATORY (INHALATION) at 21:41

## 2025-02-02 RX ADMIN — Medication 2.5 MG: at 19:58

## 2025-02-02 NOTE — ED PROVIDER NOTES
Time reflects when diagnosis was documented in both MDM as applicable and the Disposition within this note       Time User Action Codes Description Comment    2/2/2025  9:46 AM JusElijah pabloer Add [R06.00] Dyspnea     2/2/2025  9:46 AM JusElijah pabloer Add [R09.02] Hypoxia     2/2/2025 10:43 AM Jus, Devinopher Add [J18.9,  J91.8] Pleural effusion associated with pulmonary infection     2/2/2025 10:43 AM Jus, Elijaher Remove [J18.9,  J91.8] Pleural effusion associated with pulmonary infection     2/2/2025 12:54 PM Jus, Devinopher Add [J18.9,  J91.8] Pleural effusion associated with pulmonary infection     2/2/2025 12:54 PM Jus, Christopher Remove [J18.9,  J91.8] Pleural effusion associated with pulmonary infection     2/2/2025 12:54 PM JusElijah pabloer Add [J18.9] Pneumonia     2/2/2025  1:04 PM Akosua Moreau Add [J90] Pleural effusion     2/2/2025  1:53 PM Danisha Gardner Add [C34.81] Adenocarcinoma of overlapping sites of right lung (HCC)     2/2/2025  1:54 PM Robin Gardnera Add [C79.31] Metastasis to brain (HCC)     2/2/2025  3:01 PM Danisha Gardner Add [G89.3] Cancer related pain           ED Disposition       ED Disposition   Admit    Condition   Stable    Date/Time   Sun Feb 2, 2025 12:54 PM    Comment   Case was discussed with BOBBY and the patient's admission status was agreed to be Admission Status: inpatient status to the service of   .               Assessment & Plan       Medical Decision Making  54-year-old male presenting to ED with shortness of breath episodes.    Concerning for pneumonia, pulmonary effusion, viral illness, electrolyte abnormality, metabolic disturbance, PE.  Patient has known leukopenia and cancer.  Patient does have effusion on previous x-rays.    Will get CBC, CMP, troponin, BNP, CTA PE study.  See ED course for interpretation of results.    Following workup, I had a discussion between Bobby and critical care about admitting the  patient.  Critical care came to evaluate the patient and felt that given the patient's clinical status that having him stepdown 1 overnight to continue antibiotics and evaluate for these shortness of breath episodes would be appropriate.  Patient started on cefepime, vancomycin, azithromycin.    Patient admitted to stepdown 1.    Amount and/or Complexity of Data Reviewed  Labs: ordered. Decision-making details documented in ED Course.  Radiology: ordered and independent interpretation performed.    Risk  OTC drugs.  Prescription drug management.  Decision regarding hospitalization.        ED Course as of 02/02/25 1716   Sun Feb 02, 2025   0710 CBC and differential(!)  Neutropenia noted.  Anemia noted but no need for transfusion at this time.   0714 Will start antibiotics and get rest of surgery workup.   0748 hs TnI 0hr(!): 72  Elevated, will get 2-hour Trope.   0749 BNP(!): 263  Elevated from previous will consider diuretic   0749 Comprehensive metabolic panel(!)  Kidney function decreased from previous.  Electrolytes within normal limits.   0922 IMPRESSION:     1. No evidence of pulmonary embolus.     2. Extensive consolidation throughout the right lower lobe with complete collapse of the right middle lobe, similar to previous study. Underlying tumor and/or pneumonia in these regions cannot be excluded.     3. 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 Increasing small left pleural effusion. Cannot entirely exclude element of superimposed interstitial edema.     4. Stable small to moderate loculated right pleural effusion with indwelling right chest tube.     Workstation performed: [id number]     1018 hs TnI 2hr(!): 63  Reassuring   1018 Provider called to bedside due to respiratory distress.  Patient states he still feels like he is not getting a full breath then.  An ABG had just been collected and appears to be within  normal limits.  Patient sats are 95% on 4 L oxygen.  Will trial DuoNeb.       Medications   sodium chloride (OCEAN) 0.65 % nasal spray 1 spray (has no administration in time range)   acetaminophen (TYLENOL) tablet 975 mg (has no administration in time range)   apixaban (ELIQUIS) tablet 5 mg (5 mg Oral Given 2/2/25 1602)   atorvastatin (LIPITOR) tablet 10 mg (10 mg Oral Given 2/2/25 1357)   benzonatate (TESSALON PERLES) capsule 200 mg (has no administration in time range)   folic acid (FOLVITE) tablet 1,000 mcg (1,000 mcg Oral Given 2/2/25 1357)   glimepiride (AMARYL) tablet 2 mg ( Oral Held Dose 2/6/25 0730)   famotidine (PEPCID) tablet 40 mg (has no administration in time range)   levothyroxine tablet 25 mcg (has no administration in time range)   losartan (COZAAR) tablet 25 mg (25 mg Oral Given 2/2/25 1357)   oxyCODONE (ROXICODONE) split tablet 2.5 mg (2.5 mg Oral Given 2/2/25 1559)   amitriptyline (ELAVIL) tablet 150 mg (has no administration in time range)   insulin lispro (HumALOG/ADMELOG) 100 units/mL subcutaneous injection 1-6 Units (1 Units Subcutaneous Given 2/2/25 1438)   ipratropium-albuterol (DUO-NEB) 0.5-2.5 mg/3 mL inhalation solution 3 mL (3 mL Nebulization Given 2/2/25 1559)   cefepime (MAXIPIME) 2 g/50 mL dextrose IVPB (2,000 mg Intravenous New Bag 2/2/25 1559)   vancomycin (VANCOCIN) 1,750 mg in sodium chloride 0.9 % 500 mL IVPB (has no administration in time range)   cefepime (MAXIPIME) 2 g/50 mL dextrose IVPB (0 mg Intravenous Stopped 2/2/25 0848)   vancomycin (VANCOCIN) 2,000 mg in sodium chloride 0.9 % 500 mL IVPB (0 mg Intravenous Stopped 2/2/25 1236)   azithromycin (ZITHROMAX) 500 mg in sodium chloride 0.9% 250mL IVPB 500 mg (0 mg Intravenous Stopped 2/2/25 1027)   iohexol (OMNIPAQUE) 350 MG/ML injection (MULTI-DOSE) 60 mL (60 mL Intravenous Given 2/2/25 0807)   ipratropium-albuterol (DUO-NEB) 0.5-2.5 mg/3 mL inhalation solution 3 mL (3 mL Nebulization Given 2/2/25 1037)   sodium chloride  (OCEAN) 0.65 % nasal spray 1 spray (1 spray Each Nare Given 2/2/25 1153)       ED Risk Strat Scores   HEART Risk Score      Flowsheet Row Most Recent Value   Heart Score Risk Calculator    History 1 Filed at: 02/02/2025 1716   ECG 1 Filed at: 02/02/2025 1716   Age 1 Filed at: 02/02/2025 1716   Risk Factors 2 Filed at: 02/02/2025 1716   Troponin 2 Filed at: 02/02/2025 1716   HEART Score 7 Filed at: 02/02/2025 1716          HEART Risk Score      Flowsheet Row Most Recent Value   Heart Score Risk Calculator    History 1 Filed at: 02/02/2025 1716   ECG 1 Filed at: 02/02/2025 1716   Age 1 Filed at: 02/02/2025 1716   Risk Factors 2 Filed at: 02/02/2025 1716   Troponin 2 Filed at: 02/02/2025 1716   HEART Score 7 Filed at: 02/02/2025 1716                            SBIRT 20yo+      Flowsheet Row Most Recent Value   Initial Alcohol Screen: US AUDIT-C     1. How often do you have a drink containing alcohol? 0 Filed at: 02/02/2025 0629   3a. Male UNDER 65: How often do you have five or more drinks on one occasion? 0 Filed at: 02/02/2025 0629   Audit-C Score 0 Filed at: 02/02/2025 0629   CALE: How many times in the past year have you...    Used an illegal drug or used a prescription medication for non-medical reasons? Never Filed at: 02/02/2025 0629            Wells' Criteria for PE      Flowsheet Row Most Recent Value   Wells' Criteria for PE    Clinical signs and symptoms of DVT 0 Filed at: 02/02/2025 1716   PE is primary diagnosis or equally likely 0 Filed at: 02/02/2025 1716   HR >100 1.5 Filed at: 02/02/2025 1716   Immobilization at least 3 days or Surgery in the previous 4 weeks 0 Filed at: 02/02/2025 1716   Previous, objectively diagnosed PE or DVT 0 Filed at: 02/02/2025 1716   Hemoptysis 0 Filed at: 02/02/2025 1716   Malignancy with treatment within 6 months or palliative 1 Filed at: 02/02/2025 1716   Wells' Criteria Total 2.5 Filed at: 02/02/2025 1716                        History of Present Illness       Chief  Complaint   Patient presents with    Shortness of Breath     Pt coming via ems c/o worsening SOB on 4L per ems. Stage 4 lung cancer        Past Medical History:   Diagnosis Date    Cancer (HCC)     Diabetes mellitus (HCC)     High cholesterol     History of blood clots     Hypertension     Leucocytosis 2024    Lung cancer (HCC)     Malignant neoplasm of overlapping sites of right lung (HCC) 2024    Pneumonia     Pulmonary embolism (HCC)       Past Surgical History:   Procedure Laterality Date    IR BIOPSY LYMPH NODE  2024    IR PORT PLACEMENT  2024    IR THORACENTESIS  2024    KNEE SURGERY      MANDIBLE FRACTURE SURGERY  1985    PATELLA FRACTURE SURGERY      RECTAL SURGERY        Family History   Problem Relation Age of Onset    No Known Problems Father     No Known Problems Mother       Social History     Tobacco Use    Smoking status: Former     Current packs/day: 0.00     Average packs/day: 1.5 packs/day for 35.0 years (52.5 ttl pk-yrs)     Types: Cigarettes     Quit date: 4/15/2024     Years since quittin.8     Passive exposure: Past    Smokeless tobacco: Never   Vaping Use    Vaping status: Never Used   Substance Use Topics    Alcohol use: Not Currently    Drug use: Never      E-Cigarette/Vaping    E-Cigarette Use Never User       E-Cigarette/Vaping Substances    Nicotine No     THC No     CBD No     Flavoring No     Other No     Unknown No       I have reviewed and agree with the history as documented.     54-year-old male with a history of lung cancer on chemotherapy and mets to the brain presenting to the ED for shortness of breath.  Patient has received radiation therapy to the brain and chemotherapy.  Patient states he has episodes of shortness of breath where he feels that he is gasping for air and that his heart rate drops.  Of note during these episodes patient also loses control of his bladder when he urinates himself.  Patient states that these been going on  for few weeks however they become very frequent over the past 12 hours.  Episodes can last a few seconds to a minute.  Patient does endorse a feeling of passing out during the episodes but has not fully passed out.  During these episodes, patient does not have any tonic-clonic movement.        Review of Systems   Constitutional:  Negative for chills and fever.   HENT:  Negative for congestion and rhinorrhea.    Eyes:  Negative for visual disturbance.   Respiratory:  Positive for cough, chest tightness and shortness of breath. Negative for apnea, choking and wheezing.    Cardiovascular:  Negative for chest pain, palpitations and leg swelling.   Gastrointestinal:  Negative for abdominal pain, constipation, diarrhea, nausea and vomiting.   Endocrine: Negative for polyuria.   Genitourinary:  Positive for dysuria. Negative for hematuria.   Musculoskeletal:  Negative for arthralgias, back pain, myalgias, neck pain and neck stiffness.   Skin:  Negative for color change.   Neurological:  Negative for dizziness, weakness, light-headedness and headaches.   Psychiatric/Behavioral:  Negative for agitation and confusion.            Objective       ED Triage Vitals   Temperature Pulse Blood Pressure Respirations SpO2 Patient Position - Orthostatic VS   02/02/25 0628 02/02/25 0625 02/02/25 0625 02/02/25 0625 02/02/25 0625 02/02/25 0625   97.9 °F (36.6 °C) (!) 122 130/69 (!) 36 95 % Sitting      Temp Source Heart Rate Source BP Location FiO2 (%) Pain Score    02/02/25 0628 02/02/25 0625 02/02/25 0625 -- --    Oral Monitor Right arm        Vitals      Date and Time Temp Pulse SpO2 Resp BP Pain Score FACES Pain Rating User   02/02/25 1630 -- 122 94 % 20 122/71 -- -- SM   02/02/25 1500 -- 118 94 % 20 122/61 -- -- SM   02/02/25 1430 -- 119 97 % 20 149/82 -- -- MO   02/02/25 1252 -- 115 95 % -- 134/75 -- -- AR   02/02/25 1030 -- 116 96 % 34 132/77 -- -- NR   02/02/25 0930 -- 118 97 % 36 126/77 -- -- NR   02/02/25 0915 -- 116 98 % -- --  -- -- MO   02/02/25 0900 -- 114 93 % 32 125/76 -- -- NR   02/02/25 0830 -- 114 100 % 30 138/74 -- -- NR   02/02/25 0730 -- 110 97 % 34 141/60 -- -- NR   02/02/25 0700 -- 116 96 % -- 136/68 -- -- NR   02/02/25 0628 97.9 °F (36.6 °C) -- -- -- -- -- -- JA   02/02/25 0625 -- 122 95 % 36 130/69 -- -- JA            Physical Exam  Constitutional:       Appearance: He is well-developed.   HENT:      Head: Normocephalic and atraumatic.      Mouth/Throat:      Mouth: Mucous membranes are moist.   Eyes:      Extraocular Movements: Extraocular movements intact.      Pupils: Pupils are equal, round, and reactive to light.   Cardiovascular:      Rate and Rhythm: Regular rhythm. Tachycardia present.   Pulmonary:      Breath sounds: No decreased breath sounds, wheezing, rhonchi or rales.      Comments: Patient has episodes seen by ED provider.  Patient has to sit up and appears to be having agonal respirations versus gasping respiration.  During this respiration, patient will start coughing and then heart rate will drop from 120 down to the 50s.  Patient will continue to cough and then recapture his breath.  Heart rate will return to 120s.  Patient will regain breathing afterwards.  At different episodes patient will have urinary incontinence.  Abdominal:      Palpations: Abdomen is soft.   Musculoskeletal:         General: Normal range of motion.      Cervical back: Normal range of motion.   Skin:     General: Skin is warm.      Capillary Refill: Capillary refill takes less than 2 seconds.   Neurological:      General: No focal deficit present.      Mental Status: He is alert and oriented to person, place, and time.   Psychiatric:         Mood and Affect: Mood normal.         Behavior: Behavior normal.         Results Reviewed       Procedure Component Value Units Date/Time    Clostridium difficile toxin by PCR with EIA [[id number]]     Lab Status: No result Specimen: Stool from Per Rectum     Stool Enteric Bacterial Panel by PCR  [[id number]]     Lab Status: No result Specimen: Stool     Fingerstick Glucose (POCT) [[id number]]  (Abnormal) Collected: 02/02/25 1356    Lab Status: Final result Specimen: Blood Updated: 02/02/25 1400     POC Glucose 165 mg/dl     MRSA culture [[id number]]     Lab Status: No result Specimen: Nares     Blood culture #1 [[id number]] Collected: 02/02/25 0647    Lab Status: Preliminary result Specimen: Blood from Arm, Right Updated: 02/02/25 1301     Blood Culture Received in Microbiology Lab. Culture in Progress.    Blood culture #2 [[id number]] Collected: 02/02/25 0736    Lab Status: Preliminary result Specimen: Blood from Arm, Right Updated: 02/02/25 1301     Blood Culture Received in Microbiology Lab. Culture in Progress.    HS Troponin I 4hr [[id number]]  (Abnormal) Collected: 02/02/25 1134    Lab Status: Final result Specimen: Blood from Arm, Right Updated: 02/02/25 1213     hs TnI 4hr 66 ng/L      Delta 4hr hsTnI -6 ng/L     Blood gas, arterial [[id number]]  (Abnormal) Collected: 02/02/25 0952    Lab Status: Final result Specimen: Blood, Arterial from Radial, Left Updated: 02/02/25 1013     pH, Arterial 7.409     pCO2, Arterial 43.3 mm Hg      pO2, Arterial 77.3 mm Hg      HCO3, Arterial 26.8 mmol/L      Base Excess, Arterial 1.8 mmol/L      O2 Content, Arterial 18.8 mL/dL      O2 HGB,Arterial  93.5 %      SOURCE Radial, Left     VALERIO TEST Yes     Nasal Cannula NC 5 LPM    HS Troponin I 2hr [[id number]]  (Abnormal) Collected: 02/02/25 0904    Lab Status: Final result Specimen: Blood from Arm, Right Updated: 02/02/25 0950     hs TnI 2hr 63 ng/L      Delta 2hr hsTnI -9 ng/L     FLU/RSV/COVID - if FLU/RSV clinically relevant (2hr TAT) [[id number]]  (Normal) Collected: 02/02/25 0721    Lab Status: Final result Specimen: Nares from Nose Updated: 02/02/25 0852     SARS-CoV-2 Negative     INFLUENZA A PCR Negative     INFLUENZA B PCR Negative     RSV PCR Negative    Narrative:      This test has been performed using the  CoV-2/Flu/RSV plus assay on the [employer] GeneXpert platform. This test has been validated by the  and verified by the performing laboratory.     This test is designed to amplify and detect the following: nucleocapsid (N), envelope (E), and RNA-dependent RNA polymerase (RdRP) genes of the SARS-CoV-2 genome; matrix (M), basic polymerase (PB2), and acidic protein (PA) segments of the influenza A genome; matrix (M) and non-structural protein (NS) segments of the influenza B genome, and the nucleocapsid genes of RSV A and RSV B.     Positive results are indicative of the presence of Flu A, Flu B, RSV, and/or SARS-CoV-2 RNA. Positive results for SARS-CoV-2 or suspected novel influenza should be reported to state, local, or federal health departments according to local reporting requirements.      All results should be assessed in conjunction with clinical presentation and other laboratory markers for clinical management.     FOR PEDIATRIC PATIENTS - copy/paste COVID Guidelines URL to browser: [email or web address]       Procalcitonin [[id number]]  (Normal) Collected: 02/02/25 0647    Lab Status: Final result Specimen: Blood from Arm, Right Updated: 02/02/25 0814     Procalcitonin <0.05 ng/ml     Lactic acid [[id number]]  (Normal) Collected: 02/02/25 0742    Lab Status: Final result Specimen: Blood from Arm, Right Updated: 02/02/25 0809     LACTIC ACID 1.8 mmol/L     Narrative:      Result may be elevated if tourniquet was used during collection.    HS Troponin 0hr (reflex protocol) [[id number]]  (Abnormal) Collected: 02/02/25 0637    Lab Status: Final result Specimen: Blood from Arm, Right Updated: 02/02/25 0720     hs TnI 0hr 72 ng/L     B-Type Natriuretic Peptide(BNP) [[id number]]  (Abnormal) Collected: 02/02/25 0637    Lab Status: Final result Specimen: Blood from Arm, Right Updated: 02/02/25 0719      pg/mL     Comprehensive metabolic panel [[id number]]   (Abnormal) Collected: 02/02/25 0637    Lab Status: Final result Specimen: Blood from Arm, Right Updated: 02/02/25 0716     Sodium 140 mmol/L      Potassium 3.7 mmol/L      Chloride 104 mmol/L      CO2 29 mmol/L      ANION GAP 7 mmol/L      BUN 16 mg/dL      Creatinine 0.57 mg/dL      Glucose 165 mg/dL      Calcium 8.9 mg/dL      Corrected Calcium 9.5 mg/dL      AST 14 U/L      ALT 15 U/L      Alkaline Phosphatase 69 U/L      Total Protein 6.3 g/dL      Albumin 3.3 g/dL      Total Bilirubin 0.35 mg/dL      eGFR 115 ml/min/1.73sq m     Narrative:      National Kid[employer] Foundation guidelines for Chronic Kidney Disease (CKD):     Stage 1 with normal or high GFR (GFR > 90 mL/min/1.73 square meters)    Stage 2 Mild CKD (GFR = 60-89 mL/min/1.73 square meters)    Stage 3A Moderate CKD (GFR = 45-59 mL/min/1.73 square meters)    Stage 3B Moderate CKD (GFR = 30-44 mL/min/1.73 square meters)    Stage 4 Severe CKD (GFR = 15-29 mL/min/1.73 square meters)    Stage 5 End Stage CKD (GFR <15 mL/min/1.73 square meters)  Note: GFR calculation is accurate only with a steady state creatinine    Magnesium [[id number]]  (Normal) Collected: 02/02/25 0637    Lab Status: Final result Specimen: Blood from Arm, Right Updated: 02/02/25 0716     Magnesium 1.9 mg/dL     UA w Reflex to Microscopic w Reflex to Culture [[id number]]     Lab Status: No result Specimen: Urine     Protime-INR [[id number]]  (Abnormal) Collected: 02/02/25 0637    Lab Status: Final result Specimen: Blood from Arm, Right Updated: 02/02/25 0712     Protime 16.9 seconds      INR 1.30    Narrative:      INR Therapeutic Range    Indication                                             INR Range      Atrial Fibrillation                                               2.0-3.0  Hypercoagulable State                                    2.0.2.3  Left Ventricular Asist Device                            2.0-3.0  Mechanical Heart Valve                                  -    Aortic(with  afib, MI, embolism, HF, LA enlargement,    and/or coagulopathy)                                     2.0-3.0 (2.5-3.5)     Mitral                                                             2.5-3.5  Prosthetic/Bioprosthetic Heart Valve               2.0-3.0  Venous thromboembolism (VTE: VT, PE        2.0-3.0    APTT [[id number]]  (Normal) Collected: 02/02/25 0637    Lab Status: Final result Specimen: Blood from Arm, Right Updated: 02/02/25 0712     PTT 31 seconds     CBC and differential [[id number]]  (Abnormal) Collected: 02/02/25 0637    Lab Status: Final result Specimen: Blood from Arm, Right Updated: 02/02/25 0654     WBC 2.90 Thousand/uL      RBC 3.72 Million/uL      Hemoglobin 9.7 g/dL      Hematocrit 31.6 %      MCV 85 fL      MCH 26.1 pg      MCHC 30.7 g/dL      RDW 17.5 %      MPV 10.2 fL      Platelets 240 Thousands/uL      nRBC 0 /100 WBCs      Segmented % 44 %      Immature Grans % 1 %      Lymphocytes % 32 %      Monocytes % 22 %      Eosinophils Relative 0 %      Basophils Relative 1 %      Absolute Neutrophils 1.28 Thousands/µL      Absolute Immature Grans 0.03 Thousand/uL      Absolute Lymphocytes 0.93 Thousands/µL      Absolute Monocytes 0.63 Thousand/µL      Eosinophils Absolute 0.01 Thousand/µL      Basophils Absolute 0.02 Thousands/µL             CTA chest pe study   Final Interpretation by Harshad Dover DO (02/02 0901)      1. No evidence of pulmonary embolus.      2. Extensive consolidation throughout the right lower lobe with complete collapse of the right middle lobe, similar to previous study. Underlying tumor and/or pneumonia in these regions cannot be excluded.      3. 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 Increasing small left pleural effusion. Cannot entirely exclude element of superimposed interstitial edema.      4. Stable small to moderate loculated right pleural effusion with  indwelling right chest tube.      Workstation performed: [id number]         XR chest 1 view portable   ED Interpretation by Maxwell Barclay MD (02/02 0716)   Lower lobe consolidation on right with effusion.      Final Interpretation by Jeanne Eddy MD (02/02 1110)      Redemonstration of lymphangitic spread of tumor.      Redemonstration of right base opacity due to tumor, postobstructive pneumonia, and right pleural effusion on CT.            Workstation performed: [id number]             ECG 12 Lead Documentation Only    Date/Time: 2/2/2025 7:00 AM    Performed by: Maxwell Barclay MD  Authorized by: Maxwell Barclay MD    Indications / Diagnosis:  Shortness of breath  ECG reviewed by me, the ED Provider: yes    Patient location:  ED  Previous ECG:     Previous ECG:  Compared to current    Similarity:  Changes noted  Interpretation:     Interpretation: abnormal    Rate:     ECG rate:  118    ECG rate assessment: tachycardic    Rhythm:     Rhythm: sinus tachycardia    Ectopy:     Ectopy: none    QRS:     QRS axis:  Normal    QRS intervals:  Normal  Conduction:     Conduction: normal    ST segments:     ST segments:  Normal  T waves:     T waves: non-specific        ED Medication and Procedure Management   Prior to Admission Medications   Prescriptions Last Dose Informant Patient Reported? Taking?   Glucagon, rDNA, (Glucagon Emergency) 1 MG KIT  Self No No   Sig: Inject 1 mg as directed once as needed (hypoglycemia) for up to 1 dose   Patient not taking: Reported on 12/18/2024   Lancets (OneTouch Delica Plus [id number]) MISC  Self No No   Sig: Use 1 Units 4 (four) times a day   OneTouch Verio test strip  Self No No   Sig: Use 1 each 4 (four) times a day   acetaminophen (TYLENOL) 325 mg tablet  Self No No   Sig: Take 3 tablets (975 mg total) by mouth every 8 (eight) hours as needed for mild pain, headaches or fever   albuterol (PROVENTIL HFA,VENTOLIN HFA) 90 mcg/act inhaler  Self No No   Sig: INHALE 2  PUFFS EVERY 6 HOURS AS NEEDED FOR WHEEZING   amitriptyline (ELAVIL) 100 mg tablet  Self Yes No   Si mg daily at bedtime   apixaban (Eliquis) 5 mg   No No   Sig: Take 1 tablet (5 mg total) by mouth 2 (two) times a day   atorvastatin (LIPITOR) 10 mg tablet  Self No No   Sig: Take 1 tablet (10 mg total) by mouth daily   benzonatate (TESSALON) 200 MG capsule  Self No No   Sig: Take 1 capsule (200 mg total) by mouth 3 (three) times a day as needed for cough   famotidine (PEPCID) 40 MG tablet  Self Yes No   Sig: Take 40 mg by mouth daily as needed for heartburn or indigestion Taking as needed   folic acid (FOLVITE) 1 mg tablet  Self No No   Sig: TAKE 1 TABLET BY MOUTH EVERY DAY   glimepiride (AMARYL) 2 mg tablet  Self No No   Sig: TAKE 1 TABLET BY MOUTH DAILY WITH BREAKFAST   levothyroxine 25 mcg tablet  Self No No   Sig: TAKE 1 TABLET BY MOUTH EVERY DAY   losartan (COZAAR) 25 mg tablet   No No   Sig: Take 1 tablet (25 mg total) by mouth daily   magnesium Oxide (MAG-OX) 400 mg TABS   No No   Sig: Take 1 tablet (400 mg total) by mouth daily   meclizine (ANTIVERT) 25 mg tablet  Self No No   Sig: Take 1 tablet (25 mg total) by mouth every 8 (eight) hours as needed for dizziness   Patient not taking: Reported on 2024   metFORMIN (GLUCOPHAGE) 1000 MG tablet  Self Yes No   Sig: Take 1,000 mg by mouth 2 (two) times a day   naloxone (NARCAN) 4 mg/0.1 mL nasal spray  Self No No   Sig: Administer 1 spray into a nostril. If no response after 2-3 minutes, give another dose in the other nostril using a new spray. For use in emergencies for opioid / pain medication reversal for accidental ingestion, respiratory depression, sedation or concerns for overdose.   ondansetron (ZOFRAN-ODT) 8 mg disintegrating tablet  Self No No   Sig: Take 1 tablet (8 mg total) by mouth every 8 (eight) hours as needed for vomiting or nausea   oxyCODONE (Roxicodone) 5 immediate release tablet  Self No No   Sig: Take 0.5 tablets (2.5 mg total) by  mouth every 4 (four) hours as needed for moderate pain If pain is severe, may instead take 1 tablet (5mg) every 4 hours as needed Max Daily Amount: 15 mg   senna (SENOKOT) 8.6 mg  Self No No   Sig: Take 1 tablet (8.6 mg total) by mouth daily at bedtime as needed for constipation   tiotropium-olodaterol (Stiolto Respimat) 2.5-2.5 MCG/ACT inhaler  Self No No   Sig: Inhale 2 puffs daily      Facility-Administered Medications: None     Patient's Medications   Discharge Prescriptions    No medications on file     No discharge procedures on file.  ED SEPSIS DOCUMENTATION   Time reflects when diagnosis was documented in both MDM as applicable and the Disposition within this note       Time User Action Codes Description Comment    2/2/2025  9:46 AM Maxwell Luu [R06.00] Dyspnea     2/2/2025  9:46 AM Maxwell Luu [R09.02] Hypoxia     2/2/2025 10:43 AM Maxwell Luu Add [J18.9,  J91.8] Pleural effusion associated with pulmonary infection     2/2/2025 10:43 AM Maxwell Luu Remove [J18.9,  J91.8] Pleural effusion associated with pulmonary infection     2/2/2025 12:54 PM Maxwell Luu Add [J18.9,  J91.8] Pleural effusion associated with pulmonary infection     2/2/2025 12:54 PM Maxwell Luu Remove [J18.9,  J91.8] Pleural effusion associated with pulmonary infection     2/2/2025 12:54 PM Maxwell Luu Add [J18.9] Pneumonia     2/2/2025  1:04 PM Akosua Moreau Add [J90] Pleural effusion     2/2/2025  1:53 PM Danisha Gardner Add [C34.81] Adenocarcinoma of overlapping sites of right lung (HCC)     2/2/2025  1:54 PM Robin Gardnera Add [C79.31] Metastasis to brain (HCC)     2/2/2025  3:01 PM Robin Gardnera Add [G89.3] Cancer related pain            Initial Sepsis Screening       Row Name 02/02/25 0711                Is the patient's history suggestive of a new or worsening infection? Yes (Proceed)  -CD        Suspected source of infection pneumonia  -CD        Indicate  SIRS criteria Leukocytosis (WBC > 08060 IJL) OR Leukopenia (WBC <4000 IJL) OR Bandemia (WBC >10% bands);Tachycardia > 90 bpm  -CD        Are two or more of the above signs & symptoms of infection both present and new to the patient? Yes (Proceed)  -CD        Assess for evidence of organ dysfunction: Are any of the below criteria present within 6 hours of suspected infection and SIRS criteria that are NOT considered to be chronic conditions? --                  User Key  (r) = Recorded By, (t) = Taken By, (c) = Cosigned By      Initials Name Provider Type    CD Maxwell Barclay MD Resident                       Maxwell Barclay MD  02/02/25 2941

## 2025-02-02 NOTE — ED NOTES
Patient transferred to bedside commode with assist. Complaining of SOB, displaying labored breathing.      Srinath Dobbs RN  02/02/25 1295

## 2025-02-02 NOTE — Clinical Note
Case was discussed with LISA and the patient's admission status was agreed to be Admission Status: inpatient status to the service of Dr. Steele

## 2025-02-02 NOTE — ASSESSMENT & PLAN NOTE
- Stage IV, being treated with chemotherapy, follows Dr. Bryan outpatient  - Status post carbo, Alimta, Keytruda  - Ramucirumab on HOLD  - Docetaxel for now and receiving Alimta only  - Continue to follow with radiologic and palliative care teams outpatient, will order oncology consult while in hospital, appreciate recs   - Plan for restaging with brain MRI as per rad onc for surveillance of 2 small brains mets

## 2025-02-02 NOTE — ED ATTENDING ATTESTATION
2/2/2025  I, Nam King DO, saw and evaluated the patient. I have discussed the patient with the resident/non-physician practitioner and agree with the resident's/non-physician practitioner's findings, Plan of Care, and MDM as documented in the resident's/non-physician practitioner's note, except where noted. All available labs and Radiology studies were reviewed.  I was present for key portions of any procedure(s) performed by the resident/non-physician practitioner and I was immediately available to provide assistance.       At this point I agree with the current assessment done in the Emergency Department.  I have conducted an independent evaluation of this patient a history and physical is as follows:    54-year-old male presenting to emergency department with shortness of breath.  History of lung cancer on chemotherapy, metastatic disease to brain.  Received radiation therapy to brain.  He has had episodes where he gets very short of breath, loses control of his bladder and urinates himself, and his heart rate drops very low.  This has been going on for a few weeks however over the last 12 hours has been very frequent, the episodes last from seconds to a minute.  He states he feels like he will pass out during these episodes but has not had syncope.  Denies chest pain, fevers, chills, abdominal pain.  No numbness or tingling or visual changes.  He remembers the episodes when they occur, no tonic-clonic movements.    Upon physical examination, patient is tachycardic, is wearing his baseline 3 L of oxygen, he is somewhat tachypneic, has bilateral extremity nonpitting edema, no abdominal tenderness to palpation, GCS 15, no focal neurological deficits.    Per my independent interpretation of chest x-ray, right sided consolidation noted, appears improved compared to chest x-ray from 1//25.  He does have a Pleurx catheter.  Wife states that initially when it was placed she would drain it every day but now it has  been about every week.    Per my independent interpretation of EKG sinus tachycardia with heart rate of 118, normal axis, narrow QRS, intervals reassuring, no STEMI.    Hemoglobin around baseline, leukopenia noted.  Started on IV antibiotics for concern for possible pneumonia.  CT scan results as below.    Patient had episodes of dyspnea, bradycardia, feels hard to breathe.  These are self-limited, had them in the emergency department as well.  However RN informed us that he feels like it is harder to breathe now, he was evaluated at bedside and had bilateral breath sounds, no increased oxygen requirement and his baseline, ABG is reassuring.  Will trial DuoNeb to see if that helps him.  Case was discussed with internal medicine and ICU, ICU evaluated patient at bedside, ICU to admit to their service.    ED Course         Critical Care Time  Procedures    CTA chest pe study   Final Result      1. No evidence of pulmonary embolus.      2. Extensive consolidation throughout the right lower lobe with complete collapse of the right middle lobe, similar to previous study. Underlying tumor and/or pneumonia in these regions cannot be excluded.      3. 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 Increasing small left pleural effusion. Cannot entirely exclude element of superimposed interstitial edema.      4. Stable small to moderate loculated right pleural effusion with indwelling right chest tube.      Workstation performed: [id number]         XR chest 1 view portable   ED Interpretation   Lower lobe consolidation on right with effusion.      Final Result      Redemonstration of lymphangitic spread of tumor.      Redemonstration of right base opacity due to tumor, postobstructive pneumonia, and right pleural effusion on CT.            Workstation performed: [id number]             Critical Care Time Statement: Upon my evaluation,  this patient had a high probability of imminent or life-threatening deterioration due to respiratory distress, which required my direct attention, intervention, and personal management.  I spent a total of 40 minutes directly providing critical care services, including evaluating for the presence of life-threatening injuries or illnesses, management of organ system failure(s) , complex medical decision making (to support/prevent further life-threatening deterioration)., and interpretation of hemodynamic data. This time is exclusive of procedures, teaching, treating other patients, family meetings, and any prior time recorded by providers other than myself.

## 2025-02-02 NOTE — ASSESSMENT & PLAN NOTE
Presented to the ED with increasing SOB and dyspnea with minimal exertion, including walking and changing positions in bed.  -Was tachypneic with RR between 20s to 30s, tachycardic with HR between 120s to 30s  -At baseline 4 L of oxygen by nasal cannula  -2/2 CTA chest pe shows no evidence of PE, extensive consolidation throughout right lower lobe with complete collapse of right middle lobe similar to previous study, findings suggestive of infection Chittick carcinomatosis involving both lungs with increasing subpleural nodularity in the peripheral left lung, increasing small left pleural effusion, and stable small to moderate loculated right pleural effusion with indwelling right chest tube  -2/2 chest x-ray showing redemonstration of lymphatic spread of tumor, redemonstration of right base opacity due to tumor, postobstructive pneumonia, and right pleural effusion on CT  - Recent echo 12/9/24 showing EF 60% and LV wall thickness mildly increased and with mild concentric hypertrophy; mild AR, aortic valve sclerosis, mild annual calcification MV     -Currently was on 4 L of oxygen by nasal cannula, may consider high flow nasal cannula versus BiPAP  -Schedule DuoNebs; dced home breathing treatments, did well with duoneb while in ED   -Has pleurx catheter, may consider tpa dornase to help with drainage as spouse reported having diff with this recently   -Could consider repeat echo, holding off for now

## 2025-02-02 NOTE — ASSESSMENT & PLAN NOTE
Patient presents with increasing shortness of breath and dyspnea on minimal exertion even changing position, tachypneic with respiratory rate of high 20s to 30s, tachycardic with heart rate of 120s to 130s,   at baseline is on 4 L of oxygen by nasal cannula,  CT of the chest was reviewed shows-

## 2025-02-02 NOTE — ASSESSMENT & PLAN NOTE
- Reports 5 episodes of diarrhea starting 2/1; denies blood in stool  - Labs unremarkable for leukocytosis  - Follow up c diff and stool enteric testing

## 2025-02-02 NOTE — PROGRESS NOTES
Papo Gibbs is a 54 y.o. male who is currently ordered Vancomycin IV with management by the Pharmacy Consult service.  Relevant clinical data and objective / subjective history reviewed.  Vancomycin Assessment:  Indication and Goal AUC/Trough: Pneumonia (goal -600, trough >10)  Clinical Status:  new  Micro:     Renal Function:  SCr: 0.57 mg/dL  CrCl: 154 mL/min  Renal replacement: Not on dialysis  Days of Therapy: 1  Current Dose: 2000 mg IV loading dose given  Vancomycin Plan:  New Dosin mg IV every 12 hours  Estimated AUC: 498 mcg*hr/mL  Estimated Trough: 11.4 mcg/mL  Next Level: / at 0600  Renal Function Monitoring: Daily BMP and UOP  Pharmacy will continue to follow closely for s/sx of nephrotoxicity, infusion reactions and appropriateness of therapy.  BMP and CBC will be ordered per protocol. We will continue to follow the patient’s culture results and clinical progress daily.    Aleshia Aldrich, Pharmacist

## 2025-02-02 NOTE — H&P
H&P - Critical Care/ICU   Name: Papo Gibbs 54 y.o. male I MRN: [id number]  Unit/Bed#: ED-16 I Date of Admission: 2/2/2025   Date of Service: 2/2/2025 I Hospital Day: 0       Assessment & Plan  acute on Chronic hypoxemic respiratory failure (HCC)  Presented to the ED with increasing SOB and dyspnea with minimal exertion, including walking and changing positions in bed.  -Was tachypneic with RR between 20s to 30s, tachycardic with HR between 120s to 30s  -At baseline 4 L of oxygen by nasal cannula  -2/2 CTA chest pe shows no evidence of PE, extensive consolidation throughout right lower lobe with complete collapse of right middle lobe similar to previous study, findings suggestive of infection Chittick carcinomatosis involving both lungs with increasing subpleural nodularity in the peripheral left lung, increasing small left pleural effusion, and stable small to moderate loculated right pleural effusion with indwelling right chest tube  -2/2 chest x-ray showing redemonstration of lymphatic spread of tumor, redemonstration of right base opacity due to tumor, postobstructive pneumonia, and right pleural effusion on CT  - Recent echo 12/9/24 showing EF 60% and LV wall thickness mildly increased and with mild concentric hypertrophy; mild AR, aortic valve sclerosis, mild annual calcification MV     -Currently was on 4 L of oxygen by nasal cannula, may consider high flow nasal cannula versus BiPAP  -Schedule DuoNebs; dced home breathing treatments, did well with duoneb while in ED   -Has pleurx catheter, may consider tpa dornase to help with drainage as spouse reported having diff with this recently   -Could consider repeat echo, holding off for now   Adenocarcinoma of overlapping sites of right lung (HCC)  - Stage IV, being treated with chemotherapy, follows Dr. Bryan outpatient  - Status post carbo, Alimta, Keytruda  - Ramucirumab on HOLD  - Docetaxel for now and receiving Alimta only  - Continue to follow with  radiologic and palliative care teams outpatient, will order oncology consult while in hospital, appreciate recs   - Plan for restaging with brain MRI as per rad onc for surveillance of 2 small brains mets   Primary malignant neoplasm of right lung metastatic to other site (HCC)  See above  History of pulmonary embolism  CT pe: no demonstration of PE this admission   HTN (hypertension)  Continue losartan 25 mg daily  Monitor BP  Diarrhea  - Reports 5 episodes of diarrhea starting 2/1; denies blood in stool  - Labs unremarkable for leukocytosis  - Follow up c diff and stool enteric testing   Disposition: Stepdown Level 1    History of Present Illness   Papo Gibbs is a 54 y.o. who presents to the ED with SOB. Patient has a hx of lung cancer with mets to brain and had pleurx catheter in place, currently on chemotherapy as well as radiation to brain. Reports episodes of SOB, loss of bladder, and HR dropping, has happened 5 times today- thus worse than his baseline. Feels presyncopal during episodes especially when walking but has memory of episodes. No fevers, chills, abdominal pain, or visual disturbance. Also reports 5 episodes of non-bloody watery diarrhea yesterday 2/1, spouse reports did not smell odorous like c diff.     History obtained from spouse and the patient.  Review of Systems: Review of Systems   Constitutional:  Positive for activity change and fatigue. Negative for chills and fever.   HENT:  Negative for ear pain and sore throat.    Eyes:  Negative for pain and visual disturbance.   Respiratory:  Positive for shortness of breath.    Cardiovascular:  Negative for chest pain and palpitations.   Gastrointestinal:  Positive for diarrhea. Negative for abdominal pain and vomiting.   Genitourinary:  Negative for dysuria and hematuria.   Musculoskeletal:  Negative for arthralgias.   Skin:  Negative for color change and rash.   Neurological:  Negative for seizures and headaches.   Psychiatric/Behavioral:   Negative for behavioral problems and hallucinations.    All other systems reviewed and are negative.      Historical Information   Past Medical History:  04,25,2024: Cancer (HCC)  No date: Diabetes mellitus (HCC)  No date: High cholesterol  No date: History of blood clots  No date: Hypertension  04/20/2024: Leucocytosis  No date: Lung cancer (HCC)  06/04/2024: Malignant neoplasm of overlapping sites of right lung   (HCC)  No date: Pneumonia  No date: Pulmonary embolism (HCC) Past Surgical History:  4/23/2024: IR BIOPSY LYMPH NODE  5/20/2024: IR PORT PLACEMENT  12/9/2024: IR THORACENTESIS  No date: KNEE SURGERY  1985: MANDIBLE FRACTURE SURGERY  No date: PATELLA FRACTURE SURGERY  No date: RECTAL SURGERY   Current Outpatient Medications   Medication Instructions    acetaminophen (TYLENOL) 975 mg, Oral, Every 8 hours PRN    albuterol (PROVENTIL HFA,VENTOLIN HFA) 90 mcg/act inhaler INHALE 2 PUFFS EVERY 6 HOURS AS NEEDED FOR WHEEZING    amitriptyline (ELAVIL) 200 mg, Daily at bedtime    apixaban (ELIQUIS) 5 mg, Oral, 2 times daily    atorvastatin (LIPITOR) 10 mg, Oral, Daily    benzonatate (TESSALON) 200 mg, Oral, 3 times daily PRN    famotidine (PEPCID) 40 mg, Daily PRN    folic acid (FOLVITE) 1,000 mcg, Oral, Daily    glimepiride (AMARYL) 2 mg, Oral, Daily with breakfast    Glucagon Emergency 1 mg, Injection, Once as needed    Lancets (OneTouch Delica Plus [id number]) MISC 1 Units, Other, 4 times daily    levothyroxine 25 mcg, Oral, Daily    losartan (COZAAR) 25 mg, Oral, Daily    magnesium Oxide (MAG-OX) 400 mg, Oral, Daily    meclizine (ANTIVERT) 25 mg, Oral, Every 8 hours PRN    metFORMIN (GLUCOPHAGE) 1,000 mg, 2 times daily    naloxone (NARCAN) 4 mg/0.1 mL nasal spray Administer 1 spray into a nostril. If no response after 2-3 minutes, give another dose in the other nostril using a new spray. For use in emergencies for opioid / pain medication reversal for accidental ingestion, respiratory depression, sedation or  concerns for overdose.    ondansetron (ZOFRAN-ODT) 8 mg, Oral, Every 8 hours PRN    OneTouch Verio test strip 1 each, Other, 4 times daily    oxyCODONE (ROXICODONE) 2.5 mg, Oral, Every 4 hours PRN, If pain is severe, may instead take 1 tablet (5mg) every 4 hours as needed    senna (SENOKOT) 8.6 mg, Oral, Daily at bedtime PRN    tiotropium-olodaterol (Stiolto Respimat) 2.5-2.5 MCG/ACT inhaler 2 puffs, Inhalation, Daily    No Known Allergies   Social History     Tobacco Use    Smoking status: Former     Current packs/day: 0.00     Average packs/day: 1.5 packs/day for 35.0 years (52.5 ttl pk-yrs)     Types: Cigarettes     Quit date: 4/15/2024     Years since quittin.8     Passive exposure: Past    Smokeless tobacco: Never   Vaping Use    Vaping status: Never Used   Substance Use Topics    Alcohol use: Not Currently    Drug use: Never    Family History   Problem Relation Age of Onset    No Known Problems Father     No Known Problems Mother           Objective :                   Vitals I/O      Most Recent Min/Max in 24hrs   Temp 97.9 °F (36.6 °C) Temp  Min: 97.9 °F (36.6 °C)  Max: 97.9 °F (36.6 °C)   Pulse (!) 118 Pulse  Min: 110  Max: 122   Resp 20 Resp  Min: 20  Max: 36   /61 BP  Min: 122/61  Max: 149/82   O2 Sat 94 % SpO2  Min: 93 %  Max: 100 %      Intake/Output Summary (Last 24 hours) at 2025 1548  Last data filed at 2025 1236  Gross per 24 hour   Intake 800 ml   Output --   Net 800 ml       Diet Regular; Regular House    Invasive Monitoring           Physical Exam   Physical Exam  Vitals reviewed.   Eyes:      General: Vision grossly intact.      Extraocular Movements: Extraocular movements intact.      Conjunctiva/sclera: Conjunctivae normal.      Pupils: Pupils are equal, round, and reactive to light.   Skin:     General: Skin is warm.      Comments: Sarkis skin color   HENT:      Head: Normocephalic and atraumatic.      Nose: No congestion.   Neck:   no midline tenderness Cardiovascular:       Rate and Rhythm: Regular rhythm. Tachycardia present.      Pulses: Normal pulses.      Heart sounds: Normal heart sounds.   Musculoskeletal:         General: Swelling present. Normal range of motion.      Right lower le+ Edema present.      Left lower le+ Edema present.      Comments: 1+ pitting edema bilaterally    Abdominal:      Palpations: Abdomen is soft.      Tenderness: There is no abdominal tenderness. There is no guarding.      Hernia: No hernia is present.   Constitutional:       General: He is not in acute distress.     Appearance: He is ill-appearing. He is not toxic-appearing.      Interventions: He is not restrained.  Pulmonary:      Breath sounds: No stridor. No wheezing.      Comments: Decreased breath sounds on right, crackles hear RLL, increased work of breathing   Neurological:      General: No focal deficit present.      Mental Status: He is alert. Mental status is at baseline.      Motor: Strength full and intact in all extremities.          Diagnostic Studies        Lab Results: I have reviewed the following results:     Medications:  Scheduled PRN   amitriptyline, 150 mg, HS  apixaban, 5 mg, BID  atorvastatin, 10 mg, Daily  cefepime, 2 g, Q8H  folic acid, 1,000 mcg, Daily  [Held by provider] glimepiride, 2 mg, Daily With Breakfast  insulin lispro, 1-6 Units, 4 times day  ipratropium-albuterol, 3 mL, Q6H  [START ON 2/3/2025] levothyroxine, 25 mcg, Early Morning  losartan, 25 mg, Daily  vancomycin, 1,750 mg, Q12H      acetaminophen, 975 mg, Q8H PRN  benzonatate, 200 mg, TID PRN  famotidine, 40 mg, Daily PRN  oxyCODONE, 2.5 mg, Q4H PRN  sodium chloride, 1 spray, Q1H PRN       Continuous          Labs:   CBC    Recent Labs     25  0637   WBC 2.90*   HGB 9.7*   HCT 31.6*        BMP    Recent Labs     25  0637   SODIUM 140   K 3.7      CO2 29   AGAP 7   BUN 16   CREATININE 0.57*   CALCIUM 8.9       Coags    Recent Labs     25  0637   INR 1.30*   PTT 31         Additional Electrolytes  Recent Labs     02/02/25  0637   MG 1.9          Blood Gas    Recent Labs     02/02/25  0952   PHART 7.409   CRS8JEG 43.3   PO2ART 77.3   [id number] 26.8   BEART 1.8   SOURCE Radial, Left     Recent Labs     02/02/25  0952   SOURCE Radial, Left    LFTs  Recent Labs     02/02/25  0637   ALT 15   AST 14   ALKPHOS 69   ALB 3.3*   TBILI 0.35       Infectious  Recent Labs     02/02/25  0647   PROCALCITONI <0.05     Glucose  Recent Labs     02/02/25  0637   GLUC 165*

## 2025-02-02 NOTE — CONSULTS
Discussed with Critical Care AP- patient will be under ICU service. No need for Pulmonary consult for now.

## 2025-02-02 NOTE — CONSULTS
e-Consult ([facility]) - Oncology-Medical   Name: Papo Gibbs 54 y.o. male I MRN: [id number]  Unit/Bed#: ED-16 I Date of Admission: 2/2/2025   Date of Service: 2/2/2025 I Hospital Day: 0   Inpatient consult to Oncology  Consult performed by: Mayda Wu MD  Consult ordered by: Danisha Gardner DO        Physician Requesting Evaluation: Divine Dai, *   Reason for Evaluation / Principal Problem: Progressive dyspnea at rest and worsening respiratory symptoms caused by clinical and radiographic progression of bilateral lung lymphangitic carcinomatosis, originating from a primary adenocarcinoma of the lung (stage IV non-small cell lung cancer)    ASSESSMENT:  54-year-old male with widely metastatic stage IV non-small cell lung cancer of the right lung with metastases to intra-abdominal lymph nodes, intrathoracic lymph nodes, lungs and brain.  Initial pathologic diagnosis of stage IV non-small cell lung cancer establish on April 23, 2024.  He received frontline chemoimmunotherapy with carboplatin pemetrexed and pembrolizumab, 6 cycles from May 21, 2024 to August 13, 2024 followed by maintenance pemetrexed plus pembrolizumab until October 15, 2024.  Also, on July 3, 2024, the patient received a single fraction of stereotactic body radiotherapy to the brain, for treatment of brain metastatic disease.  The patient had radiographic and clinical progression of metastatic non-small cell lung cancer while on frontline chemoimmunotherapy.  On October 25, 2024 contrast-enhanced CT scan of the chest, abdomen and pelvis showed metastatic lung lymphangitic spread of adenocarcinoma.  Subsequently, the patient received 2 additional doses of palliative pemetrexed with pembrolizumab from November 26, 2024 to December 23, 2024.  Mr. Gibbs then received second line palliative systemic therapy with docetaxel plus ramucirumab, 2 cycles on December 31, 2024 and January 21, 2025.  Earlier today, the patient presented with a  several week history of progressive dyspnea including dyspnea at rest.  Episodes of dyspnea have been associated with urinary incontinence.  Otherwise, he denies systemic symptoms such as fever, chills, night sweats, and does not have pain.  Also, earlier today CT angiogram of the chest showed diffuse interlobular septal thickening bilaterally with suggestion of peribronchovascular nodularity right greater than left lung.  There was radiographic progression of juxtapleural nodularity suggested in the peripheral left upper and lower lobes, consistent with diffuse, bilateral lung lymphangitic carcinomatosis.  Also, there was complete collapse of the right middle lobe with extensive consolidative opacity involving the right lower lobe and to a lesser extent the right upper lobe.  A pulmonary embolism was not identified.    The overall findings are consistent with a rapidly progressive, widely metastatic non-small cell lung cancer with clinical and radiographic progression of diffuse bilateral lung lymphangitic carcinomatosis, originating from the primary lung adenocarcinoma.  The patient has chemoimmunotherapy refractory disease.  His dyspnea at rest, progressive respiratory symptoms, tachycardia, and intermittent tachypnea with probable hypoxemia are caused by his progressive bilateral lung lymphangitic spread of adenocarcinoma.  This is associated with a poor prognosis and lack of response to cytotoxic chemotherapy or immunotherapy.  I recommend best supportive care/palliative care, O2 support, and inpatient consultation to the palliative care service.    Oncology History Overview Note   with multifocal bilateral peripheral pulmonary embolism. Biopsy 4/23/24 left retroperitoneal lymph node revealed metastatic adenocarcinoma of lung primary. MRI brain 5/4/24 revealed 4 mm metastasis in the posterior right frontal lobe. On 7/3/24 he underwent SRS to a small right frontal metastasis to a dose of 2000cGy in 1 fraction.  The pt was last seen in radiation on 09/18/24. The pt returns today for follow up.    09/24/24 - Jaime Ly  Pt to have CT next month  Consider Pepcid / Gas-x for dyspepsia      10/04/24 - CTA chest pe study  IMPRESSION:  Tiny subsegmental embolus in the right lower lobe. Given its small size, it is of doubtful clinical significance.  Patchy new consolidation in the right lower lobe, infectious or inflammatory.  Stable tumor in the right midlung with progression of lymphangitic spread throughout the right lung.      10/25/24 - CT chest abdomen pelvis w contrast  IMPRESSION:  Worsening lymphangitic tumor progression compared to prior from 10/4/2024 and August 2, 2024.  New/increased small right pleural effusion, probably malignant.  Resolving nodular thickening medial limb left adrenal gland. No new metastatic disease in the abdomen or pelvis.  Nonobstructing left nephrolithiasis.  The study was marked in EPIC for significant notification.    10/21/24 - Pulmonary, Vicente  Pt remains on Keytruda and Alimta - tolerating well  On 2L supplemental oxygen    11/04/24 - Jaime Ly  Most recent imaging shows progression  Pt now on Ramucirumab and Docetaxel after disease progression  Will be discussed at tumor board     11/11/24 - Tumor board  PHYSICIAN RECOMMENDED PLAN:  - Treat as pneumonitis (steroids).   - Continue Alimta  - Repeat imaging in 6 weeks    11/22/24 - Jaime Ly  Steroid taper  to end in 2 weeks (mid December)  Acyclovir being ordered for shingles  Continue with Alimta 500mg/m2 every 3 weeks      12/13/24 - MRI Brain BT w wo contrast  IMPRESSION:  1. Status post treatment of right frontal brain metastasis. No metastases currently.  Continued clinical and imaging followup advised.  2. No acute infarction, intracranial image or mass.  3. 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      Upcoming:       Adenocarcinoma of overlapping sites of right lung (HCC)    4/23/2024 Biopsy    Final Diagnosis   A. Lymph Node, Left retroperitoneal, Biopsy:  - Metastatic adenocarcinoma of lung primary.         5/2/2024 Initial Diagnosis    Adenocarcinoma of overlapping sites of right lung (HCC)     5/2/2024 -  Cancer Staged    Staging form: Lung, AJCC 8th Edition  - Clinical: Stage IV (cN3, cM1) - Signed by Maryana Sandoavl MD on 5/2/2024 5/21/2024 - 10/15/2024 Chemotherapy    cyanocobalamin, 1,000 mcg, Intramuscular, Once, 4 of 5 cycles  Administration: 1,000 mcg (5/14/2024), 1,000 mcg (7/2/2024), 1,000 mcg (9/3/2024), 1,000 mcg (10/15/2024)  alteplase (CATHFLO), 2 mg, Intracatheter, Every 1 Minute as needed, 8 of 10 cycles  fosaprepitant (EMEND) IVPB, 150 mg, Intravenous, Once, 6 of 6 cycles  Administration: 150 mg (5/21/2024), 150 mg (7/2/2024), 150 mg (7/23/2024), 150 mg (9/3/2024), 150 mg (6/11/2024), 150 mg (8/13/2024)  CARBOplatin (PARAPLATIN) IVPB (GOG AUC DOSING), 672 mg, Intravenous, Once, 6 of 6 cycles  Administration: 672 mg (5/21/2024), 692.5 mg (7/2/2024), 659 mg (7/23/2024), 641 mg (9/3/2024), 692.5 mg (6/11/2024), 647 mg (8/13/2024)  pemetrexed (ALIMTA) chemo infusion, 1,020 mg, Intravenous, Once, 8 of 10 cycles  Administration: 1,000 mg (5/21/2024), 1,000 mg (7/2/2024), 1,000 mg (7/23/2024), 1,000 mg (9/3/2024), 1,000 mg (6/11/2024), 1,000 mg (8/13/2024), 1,000 mg (9/24/2024), 1,000 mg (10/15/2024)  pembrolizumab (KEYTRUDA) IVPB, 200 mg, Intravenous, Once, 8 of 10 cycles  Administration: 200 mg (5/21/2024), 200 mg (7/2/2024), 200 mg (7/23/2024), 200 mg (9/3/2024), 200 mg (6/11/2024), 200 mg (8/13/2024), 200 mg (9/24/2024), 200 mg (10/15/2024)     7/3/2024 - 7/3/2024 Radiation    Treatments:  Course: C1 SRS    Plan ID Energy Fractions Dose per Fraction (cGy) Dose Correction (cGy) Total Dose Delivered (cGy) Elapsed Days   SRSRFrontN 6X-FFF 1 / 1 2,000 0 2,000 0      Treatment Dates:  7/3/2024 - 7/3/2024.      11/26/2024 - 12/23/2024 Chemotherapy     cyanocobalamin, 1,000 mcg, Intramuscular, Once, 1 of 3 cycles  Administration: 1,000 mcg (11/19/2024)  alteplase (CATHFLO), 2 mg, Intracatheter, Every 1 Minute as needed, 2 of 6 cycles  pemetrexed (ALIMTA) chemo infusion, 1,040 mg, Intravenous, Once, 2 of 6 cycles  Administration: 1,000 mg (11/26/2024), 1,000 mg (12/23/2024)     11/26/2024 - 11/26/2024 Chemotherapy    alteplase (CATHFLO), 2 mg, Intracatheter, Every 1 Minute as needed, 0 of 6 cycles  ramucirumab (CYRAMZA) IVPB, 10 mg/kg = 975 mg, Intravenous, Once, 0 of 6 cycles  DOCEtaxel (TAXOTERE) chemo infusion, 75 mg/m2 = 156 mg, Intravenous, Once, 0 of 6 cycles     12/31/2024 -  Chemotherapy    alteplase (CATHFLO), 2 mg, Intracatheter, Every 1 Minute as needed, 2 of 6 cycles  ramucirumab (CYRAMZA) IVPB, 898 mg, Intravenous, Once, 2 of 6 cycles  Administration: 900 mg (12/31/2024), 900 mg (1/21/2025)  DOCEtaxel (TAXOTERE) chemo infusion, 75 mg/m2 = 150.8 mg, Intravenous, Once, 2 of 6 cycles  Administration: 150.8 mg (12/31/2024), 150.8 mg (1/21/2025)     Primary malignant neoplasm of right lung metastatic to other site (HCC)   6/4/2024 Initial Diagnosis    Primary malignant neoplasm of right lung metastatic to other site (HCC)     7/3/2024 - 7/3/2024 Radiation    Treatments:  Course: C1 SRS    Plan ID Energy Fractions Dose per Fraction (cGy) Dose Correction (cGy) Total Dose Delivered (cGy) Elapsed Days   SRSRFrontN 6X-FFF 1 / 1 2,000 0 2,000 0      Treatment Dates:  7/3/2024 - 7/3/2024.          RECOMMENDATIONS:  Consider inpatient palliative care service consultation for best supportive care/palliative care, specifically to support management of respiratory symptoms including dyspnea at rest caused by progressive bilateral lung lymphangitic carcinomatosis  O2 support, with a goal of keeping a oxygen saturation of 90% or above  No indication for systemic anticoagulation  It is unlikely that the patient would have a response of his progressive bilateral lung  lymphangitic carcinomatosis with a third line of palliative chemotherapy  At the time of discharge from the hospital, Mr. Gibbs may require home O2 to lessen episodes of dyspnea caused by his bilateral lung lymphangitic carcinomatosis  If the patient has sputum production, sent sputum sample for cultures  If there is a suspicion for pneumonia, consider broad antibiotic therapy (based on my review of the electronic health record data as well as imaging studies, I do not see evidence of active lung infection)  Call me with additional questions as needed       21-30 minutes, >50% of the total time devoted to medical consultative verbal/EMR discussion between providers. Written report will be generated in the EMR.    Mayda Wu MD

## 2025-02-03 PROBLEM — Z71.89 COUNSELING REGARDING ADVANCE CARE PLANNING AND GOALS OF CARE: Status: ACTIVE | Noted: 2025-02-03

## 2025-02-03 LAB
ALBUMIN SERPL BCG-MCNC: 3.1 G/DL (ref 3.5–5)
ALP SERPL-CCNC: 65 U/L (ref 34–104)
ALT SERPL W P-5'-P-CCNC: 13 U/L (ref 7–52)
ANION GAP SERPL CALCULATED.3IONS-SCNC: 8 MMOL/L (ref 4–13)
AST SERPL W P-5'-P-CCNC: 13 U/L (ref 13–39)
BASOPHILS # BLD AUTO: 0.03 THOUSANDS/ΜL (ref 0–0.1)
BASOPHILS NFR BLD AUTO: 1 % (ref 0–1)
BILIRUB SERPL-MCNC: 0.36 MG/DL (ref 0.2–1)
BUN SERPL-MCNC: 13 MG/DL (ref 5–25)
CALCIUM ALBUM COR SERPL-MCNC: 9.2 MG/DL (ref 8.3–10.1)
CALCIUM SERPL-MCNC: 8.5 MG/DL (ref 8.4–10.2)
CHLORIDE SERPL-SCNC: 106 MMOL/L (ref 96–108)
CO2 SERPL-SCNC: 27 MMOL/L (ref 21–32)
CREAT SERPL-MCNC: 0.47 MG/DL (ref 0.6–1.3)
EOSINOPHIL # BLD AUTO: 0 THOUSAND/ΜL (ref 0–0.61)
EOSINOPHIL NFR BLD AUTO: 0 % (ref 0–6)
ERYTHROCYTE [DISTWIDTH] IN BLOOD BY AUTOMATED COUNT: 17.2 % (ref 11.6–15.1)
GFR SERPL CREATININE-BSD FRML MDRD: 125 ML/MIN/1.73SQ M
GLUCOSE SERPL-MCNC: 154 MG/DL (ref 65–140)
GLUCOSE SERPL-MCNC: 163 MG/DL (ref 65–140)
GLUCOSE SERPL-MCNC: 167 MG/DL (ref 65–140)
GLUCOSE SERPL-MCNC: 190 MG/DL (ref 65–140)
GLUCOSE SERPL-MCNC: 207 MG/DL (ref 65–140)
HCT VFR BLD AUTO: 31.2 % (ref 36.5–49.3)
HGB BLD-MCNC: 9.5 G/DL (ref 12–17)
IMM GRANULOCYTES # BLD AUTO: 0.02 THOUSAND/UL (ref 0–0.2)
IMM GRANULOCYTES NFR BLD AUTO: 1 % (ref 0–2)
LYMPHOCYTES # BLD AUTO: 1.03 THOUSANDS/ΜL (ref 0.6–4.47)
LYMPHOCYTES NFR BLD AUTO: 32 % (ref 14–44)
MAGNESIUM SERPL-MCNC: 1.8 MG/DL (ref 1.9–2.7)
MCH RBC QN AUTO: 25.7 PG (ref 26.8–34.3)
MCHC RBC AUTO-ENTMCNC: 30.4 G/DL (ref 31.4–37.4)
MCV RBC AUTO: 85 FL (ref 82–98)
MONOCYTES # BLD AUTO: 0.74 THOUSAND/ΜL (ref 0.17–1.22)
MONOCYTES NFR BLD AUTO: 23 % (ref 4–12)
NEUTROPHILS # BLD AUTO: 1.44 THOUSANDS/ΜL (ref 1.85–7.62)
NEUTS SEG NFR BLD AUTO: 43 % (ref 43–75)
NRBC BLD AUTO-RTO: 0 /100 WBCS
PHOSPHATE SERPL-MCNC: 3 MG/DL (ref 2.7–4.5)
PLATELET # BLD AUTO: 241 THOUSANDS/UL (ref 149–390)
PMV BLD AUTO: 10 FL (ref 8.9–12.7)
POTASSIUM SERPL-SCNC: 3.5 MMOL/L (ref 3.5–5.3)
PROT SERPL-MCNC: 5.7 G/DL (ref 6.4–8.4)
RBC # BLD AUTO: 3.69 MILLION/UL (ref 3.88–5.62)
SODIUM SERPL-SCNC: 141 MMOL/L (ref 135–147)
WBC # BLD AUTO: 3.26 THOUSAND/UL (ref 4.31–10.16)

## 2025-02-03 PROCEDURE — 99223 1ST HOSP IP/OBS HIGH 75: CPT | Performed by: STUDENT IN AN ORGANIZED HEALTH CARE EDUCATION/TRAINING PROGRAM

## 2025-02-03 PROCEDURE — 85025 COMPLETE CBC W/AUTO DIFF WBC: CPT

## 2025-02-03 PROCEDURE — 87081 CULTURE SCREEN ONLY: CPT

## 2025-02-03 PROCEDURE — 82948 REAGENT STRIP/BLOOD GLUCOSE: CPT

## 2025-02-03 PROCEDURE — 94760 N-INVAS EAR/PLS OXIMETRY 1: CPT

## 2025-02-03 PROCEDURE — 99232 SBSQ HOSP IP/OBS MODERATE 35: CPT | Performed by: INTERNAL MEDICINE

## 2025-02-03 PROCEDURE — NC001 PR NO CHARGE: Performed by: INTERNAL MEDICINE

## 2025-02-03 PROCEDURE — 84100 ASSAY OF PHOSPHORUS: CPT

## 2025-02-03 PROCEDURE — 99233 SBSQ HOSP IP/OBS HIGH 50: CPT | Performed by: INTERNAL MEDICINE

## 2025-02-03 PROCEDURE — 83735 ASSAY OF MAGNESIUM: CPT

## 2025-02-03 PROCEDURE — 94640 AIRWAY INHALATION TREATMENT: CPT

## 2025-02-03 PROCEDURE — 80053 COMPREHEN METABOLIC PANEL: CPT

## 2025-02-03 RX ORDER — HYDROMORPHONE HCL/PF 1 MG/ML
0.5 SYRINGE (ML) INJECTION
Status: DISCONTINUED | OUTPATIENT
Start: 2025-02-03 | End: 2025-02-05

## 2025-02-03 RX ORDER — FUROSEMIDE 10 MG/ML
40 INJECTION INTRAMUSCULAR; INTRAVENOUS ONCE
Status: COMPLETED | OUTPATIENT
Start: 2025-02-03 | End: 2025-02-03

## 2025-02-03 RX ORDER — LORAZEPAM 0.5 MG/1
0.25 TABLET ORAL ONCE
Status: COMPLETED | OUTPATIENT
Start: 2025-02-03 | End: 2025-02-03

## 2025-02-03 RX ORDER — LEVALBUTEROL INHALATION SOLUTION 1.25 MG/3ML
1.25 SOLUTION RESPIRATORY (INHALATION)
Status: DISCONTINUED | OUTPATIENT
Start: 2025-02-03 | End: 2025-02-13

## 2025-02-03 RX ORDER — DEXMEDETOMIDINE HYDROCHLORIDE 4 UG/ML
.1-.7 INJECTION, SOLUTION INTRAVENOUS
Status: DISCONTINUED | OUTPATIENT
Start: 2025-02-03 | End: 2025-02-03

## 2025-02-03 RX ORDER — FOLIC ACID 1 MG/1
1 TABLET ORAL DAILY
Status: DISCONTINUED | OUTPATIENT
Start: 2025-02-04 | End: 2025-02-13 | Stop reason: HOSPADM

## 2025-02-03 RX ORDER — HYDROMORPHONE HCL IN WATER/PF 6 MG/30 ML
0.2 PATIENT CONTROLLED ANALGESIA SYRINGE INTRAVENOUS
Status: DISCONTINUED | OUTPATIENT
Start: 2025-02-03 | End: 2025-02-04

## 2025-02-03 RX ORDER — OXYCODONE HYDROCHLORIDE 5 MG/1
5 TABLET ORAL EVERY 4 HOURS PRN
Refills: 0 | Status: DISCONTINUED | OUTPATIENT
Start: 2025-02-03 | End: 2025-02-04

## 2025-02-03 RX ORDER — INSULIN LISPRO 100 [IU]/ML
1-6 INJECTION, SOLUTION INTRAVENOUS; SUBCUTANEOUS EVERY 6 HOURS SCHEDULED
Status: DISCONTINUED | OUTPATIENT
Start: 2025-02-03 | End: 2025-02-03

## 2025-02-03 RX ORDER — LORAZEPAM 0.5 MG/1
0.25 TABLET ORAL EVERY 6 HOURS PRN
Status: DISCONTINUED | OUTPATIENT
Start: 2025-02-03 | End: 2025-02-03

## 2025-02-03 RX ORDER — ALBUTEROL SULFATE 90 UG/1
2 INHALANT RESPIRATORY (INHALATION) EVERY 4 HOURS PRN
Status: DISCONTINUED | OUTPATIENT
Start: 2025-02-03 | End: 2025-02-13 | Stop reason: HOSPADM

## 2025-02-03 RX ORDER — POTASSIUM CHLORIDE 1500 MG/1
40 TABLET, EXTENDED RELEASE ORAL ONCE
Status: COMPLETED | OUTPATIENT
Start: 2025-02-03 | End: 2025-02-03

## 2025-02-03 RX ORDER — QUETIAPINE FUMARATE 25 MG/1
25 TABLET, FILM COATED ORAL
Status: DISCONTINUED | OUTPATIENT
Start: 2025-02-03 | End: 2025-02-13 | Stop reason: HOSPADM

## 2025-02-03 RX ORDER — ALPRAZOLAM 0.25 MG/1
0.25 TABLET ORAL ONCE AS NEEDED
Status: COMPLETED | OUTPATIENT
Start: 2025-02-03 | End: 2025-02-03

## 2025-02-03 RX ORDER — INSULIN LISPRO 100 [IU]/ML
1-6 INJECTION, SOLUTION INTRAVENOUS; SUBCUTANEOUS
Status: DISCONTINUED | OUTPATIENT
Start: 2025-02-03 | End: 2025-02-13 | Stop reason: HOSPADM

## 2025-02-03 RX ORDER — MAGNESIUM SULFATE HEPTAHYDRATE 40 MG/ML
2 INJECTION, SOLUTION INTRAVENOUS ONCE
Status: COMPLETED | OUTPATIENT
Start: 2025-02-03 | End: 2025-02-03

## 2025-02-03 RX ORDER — LORAZEPAM 0.5 MG/1
0.25 TABLET ORAL 2 TIMES DAILY PRN
Status: DISCONTINUED | OUTPATIENT
Start: 2025-02-03 | End: 2025-02-03

## 2025-02-03 RX ADMIN — VANCOMYCIN HYDROCHLORIDE 1750 MG: 5 INJECTION, POWDER, LYOPHILIZED, FOR SOLUTION INTRAVENOUS at 17:28

## 2025-02-03 RX ADMIN — FOLIC ACID 1000 MCG: 1 TABLET ORAL at 08:00

## 2025-02-03 RX ADMIN — APIXABAN 5 MG: 5 TABLET, FILM COATED ORAL at 17:28

## 2025-02-03 RX ADMIN — VANCOMYCIN HYDROCHLORIDE 1750 MG: 5 INJECTION, POWDER, LYOPHILIZED, FOR SOLUTION INTRAVENOUS at 06:15

## 2025-02-03 RX ADMIN — LOSARTAN POTASSIUM 25 MG: 25 TABLET, FILM COATED ORAL at 08:00

## 2025-02-03 RX ADMIN — INSULIN LISPRO 1 UNITS: 100 INJECTION, SOLUTION INTRAVENOUS; SUBCUTANEOUS at 08:46

## 2025-02-03 RX ADMIN — CHLORHEXIDINE GLUCONATE 15 ML: 1.2 RINSE ORAL at 08:00

## 2025-02-03 RX ADMIN — FUROSEMIDE 40 MG: 10 INJECTION, SOLUTION INTRAMUSCULAR; INTRAVENOUS at 17:28

## 2025-02-03 RX ADMIN — QUETIAPINE FUMARATE 25 MG: 25 TABLET ORAL at 22:25

## 2025-02-03 RX ADMIN — OXYCODONE HYDROCHLORIDE 5 MG: 5 TABLET ORAL at 18:41

## 2025-02-03 RX ADMIN — DEXMEDETOMIDINE HYDROCHLORIDE 0.1 MCG/KG/HR: 4 INJECTION, SOLUTION INTRAVENOUS at 10:29

## 2025-02-03 RX ADMIN — CEFEPIME 2000 MG: 2 INJECTION, POWDER, FOR SOLUTION INTRAVENOUS at 16:20

## 2025-02-03 RX ADMIN — APIXABAN 5 MG: 5 TABLET, FILM COATED ORAL at 08:00

## 2025-02-03 RX ADMIN — ALPRAZOLAM 0.25 MG: 0.25 TABLET ORAL at 01:54

## 2025-02-03 RX ADMIN — CEFEPIME 2000 MG: 2 INJECTION, POWDER, FOR SOLUTION INTRAVENOUS at 08:00

## 2025-02-03 RX ADMIN — MAGNESIUM SULFATE HEPTAHYDRATE 2 G: 40 INJECTION, SOLUTION INTRAVENOUS at 07:26

## 2025-02-03 RX ADMIN — INSULIN LISPRO 2 UNITS: 100 INJECTION, SOLUTION INTRAVENOUS; SUBCUTANEOUS at 22:30

## 2025-02-03 RX ADMIN — ATORVASTATIN CALCIUM 10 MG: 10 TABLET, FILM COATED ORAL at 08:00

## 2025-02-03 RX ADMIN — INSULIN LISPRO 1 UNITS: 100 INJECTION, SOLUTION INTRAVENOUS; SUBCUTANEOUS at 08:45

## 2025-02-03 RX ADMIN — IPRATROPIUM BROMIDE 0.5 MG: 0.5 SOLUTION RESPIRATORY (INHALATION) at 13:09

## 2025-02-03 RX ADMIN — LEVOTHYROXINE SODIUM 25 MCG: 25 TABLET ORAL at 06:15

## 2025-02-03 RX ADMIN — HYDROMORPHONE HYDROCHLORIDE 0.2 MG: 0.2 INJECTION, SOLUTION INTRAMUSCULAR; INTRAVENOUS; SUBCUTANEOUS at 13:37

## 2025-02-03 RX ADMIN — IPRATROPIUM BROMIDE AND ALBUTEROL SULFATE 3 ML: .5; 3 SOLUTION RESPIRATORY (INHALATION) at 07:11

## 2025-02-03 RX ADMIN — LEVALBUTEROL HYDROCHLORIDE 1.25 MG: 1.25 SOLUTION RESPIRATORY (INHALATION) at 13:09

## 2025-02-03 RX ADMIN — AMITRIPTYLINE HYDROCHLORIDE 150 MG: 25 TABLET, FILM COATED ORAL at 22:24

## 2025-02-03 RX ADMIN — FUROSEMIDE 40 MG: 10 INJECTION, SOLUTION INTRAVENOUS at 10:29

## 2025-02-03 RX ADMIN — POTASSIUM CHLORIDE 40 MEQ: 1500 TABLET, EXTENDED RELEASE ORAL at 08:00

## 2025-02-03 RX ADMIN — LEVALBUTEROL HYDROCHLORIDE 1.25 MG: 1.25 SOLUTION RESPIRATORY (INHALATION) at 21:47

## 2025-02-03 RX ADMIN — IPRATROPIUM BROMIDE 0.5 MG: 0.5 SOLUTION RESPIRATORY (INHALATION) at 21:47

## 2025-02-03 RX ADMIN — LORAZEPAM 0.25 MG: 0.5 TABLET ORAL at 22:24

## 2025-02-03 RX ADMIN — IPRATROPIUM BROMIDE AND ALBUTEROL SULFATE 3 ML: .5; 3 SOLUTION RESPIRATORY (INHALATION) at 01:05

## 2025-02-03 RX ADMIN — Medication 2.5 MG: at 10:28

## 2025-02-03 NOTE — ASSESSMENT & PLAN NOTE
Presented to the ED with increasing SOB and dyspnea with minimal exertion, including walking and changing positions in bed.  -Was tachypneic with RR between 20s to 30s, tachycardic with HR between 120s to 30s  -At baseline 4 L of oxygen by nasal cannula  -2/2 CTA chest pe shows no evidence of PE, extensive consolidation throughout right lower lobe with complete collapse of right middle lobe similar to previous study, findings suggestive of infection [place] carcinomatosis involving both lungs with increasing subpleural nodularity in the peripheral left lung, increasing small left pleural effusion, and stable small to moderate loculated right pleural effusion with indwelling right chest tube  -2/2 chest x-ray showing redemonstration of lymphatic spread of tumor, redemonstration of right base opacity due to tumor, postobstructive pneumonia, and right pleural effusion on CT  - Recent echo 12/9/24 showing EF 60% and LV wall thickness mildly increased and with mild concentric hypertrophy; mild AR, aortic valve sclerosis, mild annual calcification MV     -Was on 4-5 L of oxygen by nasal cannula, now on HFNC at 40 L/min with FiO2 40%  -Schedule DuoNebs; dced home breathing treatments, did well with duoneb while in ED   -Has pleurx catheter, may consider tpa dornase to help with drainage as spouse reported having diff with this recently   -Could consider repeat echo, holding off for now as most recent was 12/9/24

## 2025-02-03 NOTE — ASSESSMENT & PLAN NOTE
On oxycodone IR 2.5mg at home    Continue oxycodone IR 5 mg/10 mg every 4 hours as needed for moderate/severe pain  Continue hydromorphone IV 0.5 mg every 3 hours as needed for breakthrough pain

## 2025-02-03 NOTE — QUICK NOTE
Pleural catheter was drained for 250mL of serosanguinous fluid. Tolerated well. Drainage was stopped due to lack of fluid to drain. Plan for every other drainage. Sterilely dressed pleural catheter.

## 2025-02-03 NOTE — RESPIRATORY THERAPY NOTE
02/03/25 0710   Respiratory Assessment   Assessment Type Assess only   General Appearance Alert;Awake   Respiratory Pattern (S)  Labored;Dyspnea at rest;Dyspnea with exertion;Tachypneic;Tripod position   Chest Assessment Chest expansion symmetrical   Bilateral Breath Sounds Diminished   Resp Comments Patient found tri-poding in bed with oxygen disconnected from wall. SpO2 on monitor reading 44%. Placed patient on NBR to recover back to 100%. Nebulizer given. Patient requesting to go back on NBR post nebulizer.   O2 Device 4L NC   Oxygen Therapy/Pulse Ox   O2 Device Nasal cannula   O2 Therapy Oxygen   Nasal Cannula O2 Flow Rate (L/min) 4 L/min   Calculated FIO2 (%) - Nasal Cannula 36   SpO2 100 %   SpO2 Activity At Rest   $ Pulse Oximetry Spot Check Charge Completed

## 2025-02-03 NOTE — PROGRESS NOTES
Progress Note - Critical Care/ICU   Name: Papo Gibbs 54 y.o. male I MRN: [id number]  Unit/Bed#: ICU 01 I Date of Admission: 2/2/2025   Date of Service: 2/3/2025 I Hospital Day: 1       Critical Care Interval Transfer Note:    Brief Hospital Summary: Patient is a 54-year-old male with past medical history of stage IV adenocarcinoma of the lung with mets to the brain s/p chemo as well as radiation to the brain. Presented to the ED on 2/2 with shortness of breath as well as episodes of loss of bladder and bowel control followed by dyspnea on minimal exertion and vasovagal down to the 40s to 60s, episodes occurring past 2 weeks and progressing past week. Admitted for acute on chronic hypoxic respiratory failure. CT lungs showing redemonstration of lymphangitic spread as well as extensive consolidation in RLL with complete collapse of RML. Has Pleurx catheter which 600 cc of fluid was drained last night. Fluid was kayla/straw-colored then progressed to dark red. Hemoglobin after Pleurx drainage was 9.6 from 9.7 prior. Patient also reports 5 episodes of diarrhea day before admission, stool studies pending. Consulted oncology and palliative care. Patient made level 3 CODE STATUS, family considering hospice but not at this time as would like to see how/if respiratory status stabilizes over next 24 to 48 hours. Patient is on 4 L of oxygen via NC at home, was tried on nonrebreather and progressed to HFNC for comfort, has been satting well throughout. Patient did not like HFNC, currently on face mask at 6L/min. Precedex added on for anxiety purposes and air hunger. Palliative care added dilaudid for anxiety and air hunger as well.     Barriers to discharge:   Respiratory status, ongoing GOC conversations     Consults: IP CONSULT TO PHARMACY  IP CONSULT TO PULMONOLOGY  IP CONSULT TO ONCOLOGY  IP CONSULT TO PALLIATIVE CARE    Recommended to review admission imaging for incidental findings and document in discharge navigator:  Chart reviewed, no known incidental findings noted at this time.      Discharge Plan: Anticipate discharge in 24-48 hrs to discharge location to be determined pending rehab evaluations.  Central access plan: Medications requiring central line       Patient seen and evaluated by Critical Care today and deemed to be appropriate for transfer to [facility]. Spoke to Dr. Nicholas from [facility] to accept transfer. Critical care can be contacted via SecureChat with any questions or concerns. Please use the Critical Care AP Role in Secure Chat for any provider inquires until the patient is transferred out of the ICU or until tomorrow at 0600.

## 2025-02-03 NOTE — ASSESSMENT & PLAN NOTE
- Stage IV, being treated with chemotherapy, follows Dr. Bryan outpatient  - Status post carbo, Alimta, Keytruda from May to Aug 2024  - July 2024 received rad to brain   - Oct 2024 CT CAP showed met lung lymphangitic spread of adenocarcinoma and subsequently received palliative pemetrex with keytruda from Nov to Dec 2024 then docetaxel plus ramucirumab from Dec to Jan 21 2025  - Continue to follow with radiologic and palliative care teams outpatient, will order oncology consult while in hospital, appreciate recs   - Palliative care consulted, appreciate recs  - Plan for restaging with brain MRI as per rad onc for surveillance of 2 small brains mets

## 2025-02-03 NOTE — CONSULTS
Inpatient Consultation - Palliative and Supportive Care   Papo Gibbs 54 y.o. male [id number]    Cancer related pain  Assessment & Plan  On oxycodone IR 2.5mg at home    Start hydromorphone IV 0.2 mg every 3 hours as needed for moderate/severe pain  Start hydromorphone IV 0.5 mg every 3 hours as needed for breakthrough pain    Adenocarcinoma of overlapping sites of right lung (HCC)  Assessment & Plan  Diagnosed 5/2024  Stage IV mets to brain and pleura s/p pleurx  S/p carboplatin/Alimta/Keytruda  Most recently treated with just Alimta    Imaging shows worsening disease with likely postobstructive pneumonia with collapse on R and lymphangitic carcinomatosis of both lungs    * acute on Chronic hypoxemic respiratory failure (HCC)  Assessment & Plan  On non-rebreather    For air hunger:  ICU starting Precedex gtt  Start IV dilaudid 0.2mg q3h prn for air hunger and 0.5mg q3h prn for breakthrough symptoms    Counseling regarding advance care planning and goals of care  Assessment & Plan  Patient confirms that he would like to forego any more cancer related treatments  We will be focusing on symptom management and plan to talk more about different options moving forward starting tomorrow-->palliative care with other disease directed treatments vs transition to pure comfort cares/hospice                  Code Status: Switch to DNAR/DNI - Level 3               Decisional apparatus:  Patient is competent on my exam today.  If competence is lost, patient's substitute decision maker would default to Tomasa (spouse) and then Giancarlo (alternate)               Advance Directive / Living Will / POLST:  On file       Palliative care by specialist  Assessment & Plan  Palliative diagnosis: Adenocarcinoma of the right lung  PPS: 50%  PSC Provider: Dr. Estrella    Supportive listening and presence provided  Anxiety containment provided  Instructions for management and anticipatory guidance provided  Communicated and coordinated with  critical care and oncology teams           I have reviewed the patient's controlled substance dispensing history in the Prescription Drug Monitoring Program in compliance with the [facility] regulations before prescribing any controlled substances.         We appreciate the invitation to be involved in this patient's care.  We will continue to follow.  Please do not hesitate to reach our on call provider through our clinic answering service at [phone] should you have acute symptom control concerns.    Gordo Solis DO  Palliative and Supportive Care  Clinic/Answering Service: [phone]  You can find me on [employer] Secure Chat!       IDENTIFICATION:  Inpatient consult to Palliative Care  Consult performed by: Gordo Solis DO  Consult ordered by: Danisha Gardner DO        Physician Requesting Consult: Rashel Oconnor DO  Reason for Consult / Principal Problem: Metastatic lung cancer; palliative care patient  Hx and PE limited by: Acuity of condition    NARRATIVE AND INTERVAL HISTORY:       53 yo male with T2DM and lung cancer (stage IV adenocarcinoma with mets to brain and recurrent pleural effusion s/p pleurx) who presented to the ED 2/2/25 with dyspnea, diarrhea, and episodes of presyncope. Required 4L NC upon EMS arrival.  Upon arrival to the ED vital signs significant for tachycardia 110-120s and tachypnea low 30s.  Lab work significant for troponin 72, , creatinine 0.57, glucose 165, total protein 6.3, albumin 3.3, WBC 2.9, hemoglobin 9.7.  CTA chest PE study revealed no evidence of PE, but extensive consolidation of the right lower lobe with complete collapse of the right middle lobe with underlying tumor and/or pneumonia, lymphangitic carcinomatosis involving both lungs with increasing subpleural nodularity in peripheral left lung, increasing small left pleural effusion, small stable moderate loculated right pleural effusion.  ECG revealed sinus tachycardia and left atrial enlargement with nonspecific T  wave abnormality.  Admitted to stepdown 1 service for acute on chronic hypoxemic respiratory failure.    Reportedly, Pleurx catheter is not draining much and there are limited options given surrounding lung collapse along with postobstructive pneumonias.  Moreover, if this is indeed lymphangitic spread of tumor, these are irreversible changes.    For this encounter today, patient's spouse was present in the room as he was on nonrebreather with ICU nurse present.  He has been feeling a lot of air hunger, subcostal pain, and anxiety.  ICU team is about to start Precedex drip.  He confirms he would like to stop cancer related treatments.    Past Medical History:   Diagnosis Date    Cancer (HCC) ,,    Diabetes mellitus (HCC)     High cholesterol     History of blood clots     Hypertension     Leucocytosis 2024    Lung cancer (HCC)     Malignant neoplasm of overlapping sites of right lung (HCC) 2024    Pneumonia     Pulmonary embolism (HCC)      Past Surgical History:   Procedure Laterality Date    IR BIOPSY LYMPH NODE  2024    IR PORT PLACEMENT  2024    IR THORACENTESIS  2024    KNEE SURGERY      MANDIBLE FRACTURE SURGERY  1985    PATELLA FRACTURE SURGERY      RECTAL SURGERY       Social History     Socioeconomic History    Marital status: Single     Spouse name: Not on file    Number of children: Not on file    Years of education: Not on file    Highest education level: Not on file   Occupational History    Not on file   Tobacco Use    Smoking status: Former     Current packs/day: 0.00     Average packs/day: 1.5 packs/day for 35.0 years (52.5 ttl pk-yrs)     Types: Cigarettes     Quit date: 4/15/2024     Years since quittin.8     Passive exposure: Past    Smokeless tobacco: Never   Vaping Use    Vaping status: Never Used   Substance and Sexual Activity    Alcohol use: Not Currently    Drug use: Never    Sexual activity: Yes     Partners: Female     Birth control/protection:  Post-menopausal, None   Other Topics Concern    Not on file   Social History Narrative    Not on file     Social Drivers of Health     Financial Resource Strain: Not on file   Food Insecurity: No Food Insecurity (2025)    Nursing - Inadequate Food Risk Classification     Worried About Running Out of Food in the Last Year: Not on file     Ran Out of Food in the Last Year: Not on file     Ran Out of Food in the Last Year: Never true   Transportation Needs: No Transportation Needs (2025)    Nursing - Transportation Risk Classification     Lack of Transportation: Not on file     Lack of Transportation: No   Physical Activity: Not on file   Stress: Not on file   Social Connections: Not on file   Intimate Partner Violence: Unknown (2025)    Nursing IPS     Feels Physically and Emotionally Safe: Not on file     Physically Hurt by Someone: Not on file     Humiliated or Emotionally Abused by Someone: Not on file     Physically Hurt by Someone: No     Hurt or Threatened by Someone: No   Housing Stability: Unknown (2025)    Nursing: Inadequate Housing Risk Classification     Has Housing: Not on file     Worried About Losing Housing: Not on file     Unable to Get Utilities: Not on file     Unable to Pay for Housing in the Last Year: No     Has Housin     Family History   Problem Relation Age of Onset    No Known Problems Father     No Known Problems Mother        MEDICATIONS / ALLERGIES:    all current active meds have been reviewed, current meds:   Current Facility-Administered Medications:     acetaminophen (TYLENOL) tablet 975 mg, Q8H PRN    albuterol (PROVENTIL HFA,VENTOLIN HFA) inhaler 2 puff, Q4H PRN    amitriptyline (ELAVIL) tablet 150 mg, HS    apixaban (ELIQUIS) tablet 5 mg, BID    atorvastatin (LIPITOR) tablet 10 mg, Daily    benzonatate (TESSALON PERLES) capsule 200 mg, TID PRN    cefepime (MAXIPIME) 2 g/50 mL dextrose IVPB, Q8H, Last Rate: 2,000 mg (25 0800)    chlorhexidine (PERIDEX) 0.12  % oral rinse 15 mL, Q12H LATONIA    famotidine (PEPCID) tablet 40 mg, Daily PRN    folic acid (FOLVITE) tablet 1,000 mcg, Daily    [Held by provider] glimepiride (AMARYL) tablet 2 mg, Daily With Breakfast    HYDROmorphone (DILAUDID) injection 0.5 mg, Q3H PRN    HYDROmorphone HCl (DILAUDID) injection 0.2 mg, Q3H PRN    insulin lispro (HumALOG/ADMELOG) 100 units/mL subcutaneous injection 1-6 Units, 4x Daily (AC & HS) **AND** Fingerstick Glucose (POCT), 4x Daily AC and at bedtime    ipratropium (ATROVENT) 0.02 % inhalation solution 0.5 mg, Q6H    levalbuterol (XOPENEX) inhalation solution 1.25 mg, Q6H    levothyroxine tablet 25 mcg, Early Morning    losartan (COZAAR) tablet 25 mg, Daily    pneumococcal 20-juana conj vacc (PREVNAR 20) IM Injection 0.5 mL, Once    QUEtiapine (SEROquel) tablet 25 mg, HS    racepinephrine 2.25 % inhalation solution **ADS Override Pull**,     sodium chloride (OCEAN) 0.65 % nasal spray 1 spray, Q1H PRN    vancomycin (VANCOCIN) 1,750 mg in sodium chloride 0.9 % 500 mL IVPB, Q12H, Last Rate: 1,750 mg (25 0615), and PTA meds:   Prior to Admission Medications   Prescriptions Last Dose Informant Patient Reported? Taking?   Glucagon, rDNA, (Glucagon Emergency) 1 MG KIT  Self No No   Sig: Inject 1 mg as directed once as needed (hypoglycemia) for up to 1 dose   Patient not taking: Reported on 2024   Lancets (OneTouch Delica Plus [id number]) MISC  Self No No   Sig: Use 1 Units 4 (four) times a day   OneTouch Verio test strip  Self No No   Sig: Use 1 each 4 (four) times a day   acetaminophen (TYLENOL) 325 mg tablet  Self No No   Sig: Take 3 tablets (975 mg total) by mouth every 8 (eight) hours as needed for mild pain, headaches or fever   albuterol (PROVENTIL HFA,VENTOLIN HFA) 90 mcg/act inhaler  Self No No   Sig: INHALE 2 PUFFS EVERY 6 HOURS AS NEEDED FOR WHEEZING   amitriptyline (ELAVIL) 100 mg tablet  Self Yes No   Si mg daily at bedtime   apixaban (Eliquis) 5 mg   No No   Sig: Take 1  tablet (5 mg total) by mouth 2 (two) times a day   atorvastatin (LIPITOR) 10 mg tablet  Self No No   Sig: Take 1 tablet (10 mg total) by mouth daily   benzonatate (TESSALON) 200 MG capsule  Self No No   Sig: Take 1 capsule (200 mg total) by mouth 3 (three) times a day as needed for cough   famotidine (PEPCID) 40 MG tablet  Self Yes No   Sig: Take 40 mg by mouth daily as needed for heartburn or indigestion Taking as needed   folic acid (FOLVITE) 1 mg tablet  Self No No   Sig: TAKE 1 TABLET BY MOUTH EVERY DAY   glimepiride (AMARYL) 2 mg tablet  Self No No   Sig: TAKE 1 TABLET BY MOUTH DAILY WITH BREAKFAST   levothyroxine 25 mcg tablet  Self No No   Sig: TAKE 1 TABLET BY MOUTH EVERY DAY   losartan (COZAAR) 25 mg tablet   No No   Sig: Take 1 tablet (25 mg total) by mouth daily   magnesium Oxide (MAG-OX) 400 mg TABS   No No   Sig: Take 1 tablet (400 mg total) by mouth daily   meclizine (ANTIVERT) 25 mg tablet  Self No No   Sig: Take 1 tablet (25 mg total) by mouth every 8 (eight) hours as needed for dizziness   Patient not taking: Reported on 12/18/2024   metFORMIN (GLUCOPHAGE) 1000 MG tablet  Self Yes No   Sig: Take 1,000 mg by mouth 2 (two) times a day   naloxone (NARCAN) 4 mg/0.1 mL nasal spray  Self No No   Sig: Administer 1 spray into a nostril. If no response after 2-3 minutes, give another dose in the other nostril using a new spray. For use in emergencies for opioid / pain medication reversal for accidental ingestion, respiratory depression, sedation or concerns for overdose.   ondansetron (ZOFRAN-ODT) 8 mg disintegrating tablet  Self No No   Sig: Take 1 tablet (8 mg total) by mouth every 8 (eight) hours as needed for vomiting or nausea   oxyCODONE (Roxicodone) 5 immediate release tablet  Self No No   Sig: Take 0.5 tablets (2.5 mg total) by mouth every 4 (four) hours as needed for moderate pain If pain is severe, may instead take 1 tablet (5mg) every 4 hours as needed Max Daily Amount: 15 mg   senna (SENOKOT) 8.6  mg  Self No No   Sig: Take 1 tablet (8.6 mg total) by mouth daily at bedtime as needed for constipation   tiotropium-olodaterol (Stiolto Respimat) 2.5-2.5 MCG/ACT inhaler  Self No No   Sig: Inhale 2 puffs daily      Facility-Administered Medications: None       No Known Allergies    OBJECTIVE:    Physical Exam  Physical Exam  Constitutional:       General: He is not in acute distress.     Appearance: He is ill-appearing.   HENT:      Head: Normocephalic and atraumatic.      Right Ear: External ear normal.      Left Ear: External ear normal.   Eyes:      General: No scleral icterus.     Conjunctiva/sclera: Conjunctivae normal.   Cardiovascular:      Rate and Rhythm: Regular rhythm. Tachycardia present.      Pulses: Normal pulses.   Pulmonary:      Effort: Tachypnea and accessory muscle usage present.   Abdominal:      General: There is no distension.      Palpations: Abdomen is soft.      Tenderness: There is abdominal tenderness (RUQ; costal area).   Musculoskeletal:      Right lower leg: No edema.      Left lower leg: No edema.   Skin:     Coloration: Skin is pale. Skin is not jaundiced.   Neurological:      General: No focal deficit present.      Mental Status: He is alert. Mental status is at baseline.   Psychiatric:         Attention and Perception: Attention and perception normal.         Mood and Affect: Mood is anxious. Mood is not depressed.         Speech: Speech normal.         Behavior: Behavior normal. Behavior is cooperative.         Lab Results: I have personally reviewed pertinent labs., CBC:   Lab Results   Component Value Date    WBC 3.26 (L) 02/03/2025    HGB 9.5 (L) 02/03/2025    HCT 31.2 (L) 02/03/2025    MCV 85 02/03/2025     02/03/2025    RBC 3.69 (L) 02/03/2025    MCH 25.7 (L) 02/03/2025    MCHC 30.4 (L) 02/03/2025    RDW 17.2 (H) 02/03/2025    MPV 10.0 02/03/2025    NRBC 0 02/03/2025   , CMP:   Lab Results   Component Value Date    SODIUM 141 02/03/2025    K 3.5 02/03/2025      "02/03/2025    CO2 27 02/03/2025    BUN 13 02/03/2025    CREATININE 0.47 (L) 02/03/2025    CALCIUM 8.5 02/03/2025    AST 13 02/03/2025    ALT 13 02/03/2025    ALKPHOS 65 02/03/2025    EGFR 125 02/03/2025   , BMP:  Lab Results   Component Value Date    SODIUM 141 02/03/2025    K 3.5 02/03/2025     02/03/2025    CO2 27 02/03/2025    BUN 13 02/03/2025    CREATININE 0.47 (L) 02/03/2025    GLUC 154 (H) 02/03/2025    CALCIUM 8.5 02/03/2025    AGAP 8 02/03/2025    EGFR 125 02/03/2025   , PT/PTT:No results found for: \"PT\", \"PTT\"  Imaging Studies: Reviewed and interpreted the pertinent studies  EKG, Pathology, and Other Studies: Reviewed and interpreted the pertinent studies    I have spent a total time of 60 minutes in caring for this patient on the day of the visit/encounter including Diagnostic results, Instructions for management, Impressions, Counseling / Coordination of care, Documenting in the medical record, Reviewing / ordering tests, medicine, procedures  , Obtaining or reviewing history  , and Communicating with other healthcare professionals . Topics discussed with the patient / family include symptom assessment and management, medication review, medication adjustment, psychosocial support, advanced directives, goals of care, opioid titration, supportive listening, and anticipatory guidance.   "

## 2025-02-03 NOTE — ASSESSMENT & PLAN NOTE
Down to NC    Symptoms exacerbated by pain and anxiety    For air hunger:  Oxycodone 5mg-10mg q4h prn, and Dilaudid 0.5mg IV q3h prn for breakthrough symptoms  Good benefit from lorazepam 0.25 mg--> start Valium 2 mg twice daily

## 2025-02-03 NOTE — UTILIZATION REVIEW
Initial Clinical Review    Admission: Date/Time/Statement:   Admission Orders (From admission, onward)       Ordered        02/02/25 1341  INPATIENT ADMISSION  Once                          Orders Placed This Encounter   Procedures    INPATIENT ADMISSION     Standing Status:   Standing     Number of Occurrences:   1     Level of Care:   Level 1 Stepdown [13]     Estimated length of stay:   More than 2 Midnights     Certification:   I certify that inpatient services are medically necessary for this patient for a duration of greater than two midnights. See H&P and MD Progress Notes for additional information about the patient's course of treatment.     ED Arrival Information       Expected   -    Arrival   2/2/2025 06:19    Acuity   Emergent              Means of arrival   Ambulance    Escorted by   [facility]    Service   Critical Care/ICU    Admission type   Emergency              Arrival complaint   EMS             Chief Complaint   Patient presents with    Shortness of Breath     Pt coming via ems c/o worsening SOB on 4L per ems. Stage 4 lung cancer        Initial Presentation: 54 y.o. male  to ED via EMS from home.    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  Presented to ED with shortness of breath, gasps for air and heart rate drops, same time incontinent of urine starting few weeks prior to arrival but worse last 12 hours.  Feels like going to pass out.  Yesterday with 5 episodes of diarrhea.   PMHx: lung cancer with mets to brain and had pleurx catheter in place, currently on chemotherapy as well as radiation to brain.  Baseline oxygen is 4 liters.   On exam: tachycardia.  Patient has to sit up and appears to be having agonal respirations versus gasping respiration. During this respiration, patient will start coughing and then heart rate will drop from 120 down to the 50s. Patient  "will continue to cough and then recapture his breath. Heart rate will return to 120s. Patient will regain breathing afterwards. At different episodes patient will have urinary incontinence.   Wbc 2.90.  H&H 9.7/31.6.  INR 1.30 on Eliquis.  .  Hs Tnl 72>63/-9.   CTA chest pe shows no evidence of PE, extensive consolidation throughout right lower lobe with complete collapse of right middle lobe similar to previous study, findings suggestive of infection [place] carcinomatosis involving both lungs with increasing subpleural nodularity in the peripheral left lung, increasing small left pleural effusion, and stable small to moderate loculated right pleural effusion with indwelling right chest tube.   ED treatment: placed on oxygen 4 liters.  Given DuoNeb.    Plan includes to admit to Stepdown.  Continue oxygen, wean as able.  Consult oncology.  OP follow up with radiology oncology and palliative care.  Monitor BP and continue home Losartan.  Pleurx with limited drainage and will give TNK and Dnase.   Antibiotics for possible post obstructive pneumonia.       2/2/25 per Oncology - \"The overall findings are consistent with a rapidly progressive, widely metastatic non-small cell lung cancer with clinical and radiographic progression of diffuse bilateral lung lymphangitic carcinomatosis, originating from the primary lung adenocarcinoma.  The patient has chemoimmunotherapy refractory disease.  His dyspnea at rest, progressive respiratory symptoms, tachycardia, and intermittent tachypnea with probable hypoxemia are caused by his progressive bilateral lung lymphangitic spread of adenocarcinoma.  This is associated with a poor prognosis and lack of response to cytotoxic chemotherapy or immunotherapy.  I recommend best supportive care/palliative care, O2 support, and inpatient consultation to the palliative care service.\"      Date: 2/3/25   Day 2: fatigue.  Shortness of breath.  Anxious.  Last evening Pleurx cath drained " manually -  , no return on syrigine, but then fibrin clot passed followed by drainage. Initially, drainage was kayla/straw colored, but then transitioned to darker red. A total of 600mL was drained from right lung.  On exam:  ashen skin.  Trace edema of BLE.  Appears ill.  Tachypnea, accessory muscle use.  Lungs - Expiratory wheezing heard throughout lung fields bilaterally, right lower lobe decreased breath sounds most prominent, right middle lobe decreased breath sounds, crackles bilateral lower bases of the lungs .  Wbc 3.26.  H&H 9.5/31.2.  Mg 1.8.  on high flow oxygen, wean as able.  Scheduled DuoNebs.  Consult Palliative care.   Continue antibiotics.  Trial of Lasix.  Started on Precedex gtt.     ED Treatment-Medication Administration from 02/02/2025 0619 to 02/02/2025 1801         Date/Time Order Dose Route Action     02/02/2025 0818 cefepime (MAXIPIME) 2 g/50 mL dextrose IVPB 2,000 mg Intravenous New Bag     02/02/2025 1036 vancomycin (VANCOCIN) 2,000 mg in sodium chloride 0.9 % 500 mL IVPB 2,000 mg Intravenous New Bag     02/02/2025 0901 azithromycin (ZITHROMAX) 500 mg in sodium chloride 0.9% 250mL IVPB 500 mg 500 mg Intravenous New Bag     02/02/2025 1037 ipratropium-albuterol (DUO-NEB) 0.5-2.5 mg/3 mL inhalation solution 3 mL 3 mL Nebulization Given     02/02/2025 1157 sodium chloride (OCEAN) 0.65 % nasal spray 1 spray 1 spray Each Nare Given     02/02/2025 1357 albuterol (PROVENTIL HFA,VENTOLIN HFA) inhaler 1 puff 1 puff Inhalation Given     02/02/2025 1602 apixaban (ELIQUIS) tablet 5 mg 5 mg Oral Given     02/02/2025 1357 atorvastatin (LIPITOR) tablet 10 mg 10 mg Oral Given     02/02/2025 1357 folic acid (FOLVITE) tablet 1,000 mcg 1,000 mcg Oral Given     02/02/2025 1357 losartan (COZAAR) tablet 25 mg 25 mg Oral Given     02/02/2025 1559 oxyCODONE (ROXICODONE) split tablet 2.5 mg 2.5 mg Oral Given     02/02/2025 1438 umeclidinium 62.5 mcg/actuation inhaler AEPB 1 puff 1 puff Inhalation Given      02/02/2025 1438 olodaterol HCl (STRIVERDI RESPIMAT) inhaler 2 puff 2 puff Inhalation Given     02/02/2025 1438 insulin lispro (HumALOG/ADMELOG) 100 units/mL subcutaneous injection 1-6 Units 1 Units Subcutaneous Given     02/02/2025 1559 ipratropium-albuterol (DUO-NEB) 0.5-2.5 mg/3 mL inhalation solution 3 mL 3 mL Nebulization Given     02/02/2025 1559 cefepime (MAXIPIME) 2 g/50 mL dextrose IVPB 2,000 mg Intravenous New Bag            Scheduled Medications:  amitriptyline, 150 mg, Oral, HS  apixaban, 5 mg, Oral, BID  atorvastatin, 10 mg, Oral, Daily  cefepime, 2 g, Intravenous, Q8H  chlorhexidine, 15 mL, Mouth/Throat, Q12H LATONIA  folic acid, 1,000 mcg, Oral, Daily  furosemide, 40 mg, Intravenous, Once 2/3/25 at 0930  [Held by provider] glimepiride, 2 mg, Oral, Daily With Breakfast  insulin lispro, 1-6 Units, Subcutaneous, Q6H LATONIA  ipratropium, 0.5 mg, Nebulization, Q6H  levalbuterol, 1.25 mg, Nebulization, Q6H  levothyroxine, 25 mcg, Oral, Early Morning  losartan, 25 mg, Oral, Daily  pneumococcal 20-juana conj vacc, 0.5 mL, Intramuscular, Once  QUEtiapine, 25 mg, Oral, HS  vancomycin, 1,750 mg, Intravenous, Q12H    albuterol (PROVENTIL HFA,VENTOLIN HFA) inhaler 1 puff  Dose: 1 puff  Freq: 4 times daily Route: IN  Start: 02/02/25 1315 End: 02/02/25 1508  azithromycin (ZITHROMAX) 500 mg in sodium chloride 0.9% 250mL IVPB 500 mg  Dose: 500 mg  Freq: Once Route: IV  Last Dose: Stopped (02/02/25 1027)  Start: 02/02/25 0715 End: 02/02/25 1027  magnesium sulfate 2 g/50 mL IVPB (premix) 2 g  Dose: 2 g  Freq: Once Route: IV  Last Dose: 2 g (02/03/25 0726)  Start: 02/03/25 0630 End: 02/03/25 0926  umeclidinium 62.5 mcg/actuation inhaler AEPB 1 puff  Dose: 1 puff  Freq: Daily Route: IN  Start: 02/02/25 1500 End: 02/02/25 1508   olodaterol HCl (STRIVERDI RESPIMAT) inhaler 2 puff  Dose: 2 puff  Freq: Daily Route: IN  Start: 02/02/25 1500 End: 02/02/25 1508  potassium chloride (Klor-Con M20) CR tablet 40 mEq  Dose: 40 mEq  Freq: Once  Route: PO  Start: 02/03/25 0630 End: 02/03/25 0800     Continuous IV Infusions:  dexmedetomidine, 0.1-0.7 mcg/kg/hr, Intravenous, Titrated      PRN Meds:  acetaminophen, 975 mg, Oral, Q8H PRN  albuterol, 2 puff, Inhalation, Q4H PRN  benzonatate, 200 mg, Oral, TID PRN x 1 2/2  famotidine, 40 mg, Oral, Daily PRN  oxyCODONE, 2.5 mg, Oral, Q4H PRN - x 2 2/2  racepinephrine, , ,   sodium chloride, 1 spray, Each Nare, Q1H PRN    ALPRAZolam (XANAX) tablet 0.25 mg - x 1 2/3  Dose: 0.25 mg  Freq: Once as needed Route: PO  PRN Reason: anxiety  Start: 02/03/25 0100 End: 02/03/25 0154  ALPRAZolam (XANAX) tablet 0.25 mg x 1 2/2  Dose: 0.25 mg  Freq: Once as needed Route: PO  PRN Reason: anxiety  Start: 02/03/25 0100 End: 02/03/25 0154      ED Triage Vitals   Temperature Pulse Respirations Blood Pressure SpO2 Pain Score   02/02/25 0628 02/02/25 0625 02/02/25 0625 02/02/25 0625 02/02/25 0625 02/02/25 1830   97.9 °F (36.6 °C) (!) 122 (!) 36 130/69 95 % No Pain     Weight (last 2 days)       Date/Time Weight    02/02/25 1900 86.2 (190.04)            Vital Signs (last 3 days)       Date/Time Temp Pulse Resp BP MAP (mmHg) SpO2 FiO2 (%) Calculated FIO2 (%) - Nasal Cannula O2 Flow Rate (L/min) Nasal Cannula O2 Flow Rate (L/min) O2 Device O2 Interface Device Patient Position - Orthostatic VS Pain    02/03/25 0922 -- -- -- -- -- 100 % 40 80 40 L/min 15 L/min High flow nasal cannula HFNC prongs -- --    02/03/25 0900 -- 118 41 172/85 117 100 % -- -- -- -- -- -- -- --    02/03/25 0800 -- 117 41 161/84 116 92 % -- -- -- -- -- -- -- --    02/03/25 0743 -- -- -- -- -- 94 % 40 -- 40 L/min -- High flow nasal cannula HFNC prongs -- --    02/03/25 0715 -- -- -- -- -- -- -- -- -- -- -- -- -- No Pain    02/03/25 0710 97.3 °F (36.3 °C) 135 38 167/99 127 100 % -- 36 -- 4 L/min Nasal cannula -- -- --    02/03/25 0700 -- 118 43 167/99 127 97 % -- -- -- -- -- -- -- --    02/03/25 0600 -- 117 29 -- -- 94 % -- -- -- -- -- -- -- --    02/03/25 0500 -- 121  41 -- -- 94 % -- -- -- -- -- -- -- --    02/03/25 0400 -- 120 25 -- -- 95 % -- 40 -- 5 L/min Nasal cannula -- -- No Pain    02/03/25 0300 -- 122 25 -- -- 94 % -- -- -- -- -- -- -- --    02/03/25 0200 -- 123 47 -- -- 95 % -- -- -- -- -- -- -- --    02/03/25 0156 97.7 °F (36.5 °C) 122 31 133/79 100 94 % -- 40 -- 5 L/min Nasal cannula -- -- No Pain    02/03/25 0105 -- -- -- -- -- 97 % -- 40 -- 5 L/min Nasal cannula -- -- --    02/03/25 0000 -- -- -- -- -- -- -- -- -- -- -- -- -- No Pain    02/02/25 2300 98.1 °F (36.7 °C) 118 37 143/84 107 97 % -- 40 -- 5 L/min Nasal cannula -- Sitting --    02/02/25 2141 -- -- -- -- -- 99 % -- -- -- -- -- -- -- --    02/02/25 1958 -- -- -- -- -- -- -- -- -- -- -- -- -- 7    02/02/25 1834 98 °F (36.7 °C) 115 29 141/76 107 96 % -- 40 -- 5 L/min Nasal cannula -- -- --    02/02/25 1830 -- -- -- -- -- -- -- -- -- -- -- -- -- No Pain    02/02/25 1730 -- 118 24 152/78 108 94 % -- 36 -- 4 L/min Nasal cannula -- Lying --    02/02/25 1630 -- 122 20 122/71 90 94 % -- -- -- -- None (Room air) -- Lying --    02/02/25 1500 -- 118 20 122/61 87 94 % -- 36 -- 4 L/min None (Room air) -- Lying --    02/02/25 1430 -- 119 20 149/82 107 97 % -- 40 -- 5 L/min Nasal cannula -- Sitting --    02/02/25 1252 -- 115 -- 134/75 -- 95 % -- -- -- -- None (Room air) -- Lying --    02/02/25 1030 -- 116 34 132/77 -- 96 % -- 36 -- 4 L/min Nasal cannula -- -- --    02/02/25 0930 -- 118 36 126/77 96 97 % -- 36 -- 4 L/min Nasal cannula -- -- --    02/02/25 0915 -- 116 -- -- -- 98 % -- 36 -- 4 L/min Nasal cannula -- Lying --    02/02/25 0900 -- 114 32 125/76 95 93 % -- 36 -- 4 L/min Nasal cannula -- -- --    02/02/25 0830 -- 114 30 138/74 100 100 % -- 36 -- 4 L/min Nasal cannula -- -- --    02/02/25 0730 -- 110 34 141/60 86 97 % -- 36 -- 4 L/min Nasal cannula -- -- --    02/02/25 0700 -- 116 -- 136/68 95 96 % -- -- -- -- -- -- -- --    02/02/25 0628 97.9 °F (36.6 °C) -- -- -- -- -- -- -- -- -- -- -- -- --    02/02/25 0625 --  122 36 130/69 92 95 % -- 36 -- 4 L/min Nasal cannula -- Sitting --              Pertinent Labs/Diagnostic Test Results:   Radiology:  XR chest portable ICU   Final Interpretation by Maxwell Zuñiga MD (02/03 0924)      No significant change of diffuse lung disease known to represent lymphangitic tumor spread with persistent right-sided pleural fluid and basilar lung consolidation.      Minimal pleural fluid versus pleural tumor or thickening laterally in the left mid chest. No other significant left-sided effusion seen.      No pneumothorax identified            Workstation performed: [id number]         CTA chest pe study   Final Interpretation by Harshad Dover DO (02/02 0901)      1. No evidence of pulmonary embolus.      2. Extensive consolidation throughout the right lower lobe with complete collapse of the right middle lobe, similar to previous study. Underlying tumor and/or pneumonia in these regions cannot be excluded.      3. 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 Increasing small left pleural effusion. Cannot entirely exclude element of superimposed interstitial edema.      4. Stable small to moderate loculated right pleural effusion with indwelling right chest tube.      Workstation performed: [id number]         XR chest 1 view portable   ED Interpretation by Maxwell Barclay MD (02/02 0716)   Lower lobe consolidation on right with effusion.      Final Interpretation by Jeanne Eddy MD (02/02 1110)      Redemonstration of lymphangitic spread of tumor.      Redemonstration of right base opacity due to tumor, postobstructive pneumonia, and right pleural effusion on CT.            Workstation performed: [id number]           Cardiology:  ECG 12 lead   Final Result by Felicia Kenny DO (02/02 1626)   Sinus tachycardia   Possible Left atrial enlargement   Nonspecific T wave abnormality    Abnormal ECG   When compared with ECG of 11-Dec-2024 16:56,   Left posterior fascicular block is no longer Present   Criteria for Inferior infarct are no longer Present   Confirmed by Felicia Kenny ([id number]) on 2/2/2025 4:26:24 PM        GI:  No orders to display       Results from last 7 days   Lab Units 02/02/25  0721   SARS-COV-2  Negative     Results from last 7 days   Lab Units 02/03/25  0509 02/02/25  2325 02/02/25  0637   WBC Thousand/uL 3.26*  --  2.90*   HEMOGLOBIN g/dL 9.5* 9.6* 9.7*   HEMATOCRIT % 31.2* 31.4* 31.6*   PLATELETS Thousands/uL 241  --  240   TOTAL NEUT ABS Thousands/µL 1.44*  --  1.28*     Results from last 7 days   Lab Units 02/03/25  0509 02/02/25  0637   SODIUM mmol/L 141 140   POTASSIUM mmol/L 3.5 3.7   CHLORIDE mmol/L 106 104   CO2 mmol/L 27 29   ANION GAP mmol/L 8 7   BUN mg/dL 13 16   CREATININE mg/dL 0.47* 0.57*   EGFR ml/min/1.73sq m 125 115   CALCIUM mg/dL 8.5 8.9   MAGNESIUM mg/dL 1.8* 1.9   PHOSPHORUS mg/dL 3.0  --      Results from last 7 days   Lab Units 02/03/25  0509 02/02/25  0637   AST U/L 13 14   ALT U/L 13 15   ALK PHOS U/L 65 69   TOTAL PROTEIN g/dL 5.7* 6.3*   ALBUMIN g/dL 3.1* 3.3*   TOTAL BILIRUBIN mg/dL 0.36 0.35     Results from last 7 days   Lab Units 02/03/25  0817 02/02/25  1933 02/02/25  1356   POC GLUCOSE mg/dl 163* 176* 165*     Results from last 7 days   Lab Units 02/03/25  0509 02/02/25  0637   GLUCOSE RANDOM mg/dL 154* 165*     Results from last 7 days   Lab Units 02/02/25  0952   PH ART  7.409   PCO2 ART mm Hg 43.3   PO2 ART mm Hg 77.3   HCO3 ART mmol/L 26.8   BASE EXC ART mmol/L 1.8   O2 CONTENT ART mL/dL 18.8   O2 HGB, ARTERIAL % 93.5*   ABG SOURCE  Radial, Left     Results from last 7 days   Lab Units 02/02/25  1134 02/02/25  0904 02/02/25  0637   HS TNI 0HR ng/L  --   --  72*   HS TNI 2HR ng/L  --  63*  --    HSTNI D2 ng/L  --  -9  --    HS TNI 4HR ng/L 66*  --   --    HSTNI D4 ng/L -6  --   --      Results from last 7 days   Lab Units 02/02/25  0637    PROTIME seconds 16.9*   INR  1.30*   PTT seconds 31     Results from last 7 days   Lab Units 02/02/25  0647   PROCALCITONIN ng/ml <0.05     Results from last 7 days   Lab Units 02/02/25  0742   LACTIC ACID mmol/L 1.8     Results from last 7 days   Lab Units 02/02/25  0637   BNP pg/mL 263*     Results from last 7 days   Lab Units 02/02/25  0721   INFLUENZA A PCR  Negative   INFLUENZA B PCR  Negative   RSV PCR  Negative     Results from last 7 days   Lab Units 02/02/25  0736 02/02/25  0647   BLOOD CULTURE  Received in Microbiology Lab. Culture in Progress. Received in Microbiology Lab. Culture in Progress.       Past Medical History:   Diagnosis Date    Cancer (HCC) 04,25,2024    Diabetes mellitus (HCC)     High cholesterol     History of blood clots     Hypertension     Leucocytosis 04/20/2024    Lung cancer (HCC)     Malignant neoplasm of overlapping sites of right lung (HCC) 06/04/2024    Pneumonia     Pulmonary embolism (HCC)      Present on Admission:   HTN (hypertension)   Adenocarcinoma of overlapping sites of right lung (HCC)   acute on Chronic hypoxemic respiratory failure (HCC)   History of pulmonary embolism   Primary malignant neoplasm of right lung metastatic to other site (HCC)      Admitting Diagnosis: Metastasis to brain (HCC) [C79.31]  Pneumonia [J18.9]  Dyspnea [R06.00]  SOB (shortness of breath) [R06.02]  Pleural effusion [J90]  Hypoxia [R09.02]  Cancer related pain [G89.3]  Adenocarcinoma of overlapping sites of right lung (HCC) [C34.81]  Age/Sex: 54 y.o. male    Network Utilization Review Department  ATTENTION: Please call with any questions or concerns to [phone] and carefully listen to the prompts so that you are directed to the right person. All voicemails are confidential.   For Discharge needs, contact Care Management DC Support Team at [phone] opt. [phone]  Send all requests for admission clinical reviews, approved or denied determinations and any other requests to dedicated fax number  below belonging to the campus where the patient is receiving treatment. List of dedicated fax numbers for the Facilities:  FACILITY NAME UR FAX NUMBER   ADMISSION DENIALS (Administrative/Medical Necessity) [phone]   DISCHARGE SUPPORT TEAM (NETWORK) [phone]   PARENT CHILD HEALTH (Maternity/NICU/Pediatrics) [phone]   [facility] [phone]   [facility] [phone]   [facility] [phone]   [facility] [phone]   [facility] [phone]   [facility] [phone]   [facility] [phone]   [facility] [phone]   [facility] [phone]   [facility] [phone]   [facility] [phone]   [facility] [place] [phone]

## 2025-02-03 NOTE — QUICK NOTE
Pleurex catheter drained manually. Initially, no return on syrigine, but then fibrin clot passed followed by drainage. Initially, drainage was kayla/straw colored, but then transitioned to darker red. A total of 600mL was drained from right lung.   H&H order placed considering patient's drainage color and being on Eliquis. .

## 2025-02-03 NOTE — ASSESSMENT & PLAN NOTE
Patient confirms that he would like to forego any more cancer related treatments  We will be focusing on symptom management and plan to talk more about different options moving forward-->palliative care with other disease directed treatments vs transition to pure comfort cares/hospice-->he is still undecided                 Code Status: Switch to DNAR/DNI - Level 3               Decisional apparatus:  Patient is competent on my exam today.  If competence is lost, patient's substitute decision maker would default to Tomasa (spouse) and then Giancarlo (alternate)               Advance Directive / Living Will / POLST:  On file

## 2025-02-03 NOTE — ASSESSMENT & PLAN NOTE
Palliative diagnosis: Adenocarcinoma of the right lung  PPS: 50%  PSC Provider: Dr. Estrella    Supportive listening and presence provided  Anxiety containment provided  Instructions for management and anticipatory guidance provided  Communicated and coordinated with critical care and oncology teams

## 2025-02-03 NOTE — PROGRESS NOTES
Medical Oncology/Hematology Progress Note  Papo Gibbs, male, 54 y.o., 1970,  ICU 01/ICU 01, [id number]     Assessment and Plan    1. Stage IV lung adenocarcinoma, with progression  2. Acute hypoxemic respiratory failure  3. Progressive lymphangitic tumor involvement  4. Malignant right sided pleural effusion, s/p pleurx catheter placement    Mr. Gibbs is a 54 year old male with metastatic lung adenocarcinoma with disease progression on 2 lines of treatment. Patient was originally diagnosed with stage IV lung adenocarcinoma in April 2024 based on retroperitoneal lymph node biopsy.  Baseline imaging was notable for brain metastasis, abdominal and thoracic lymphadenopathy, and left adrenal metastasis.  Molecular testing was negative for any targetable mutations, PD-L1 status was negative, and TMB was low at 9.  Patient was started on first-line systemic treatment with carboplatin, pemetrexed, and pembrolizumab in May 2024, s/p 6 cycles of chemoimmunotherapy as of 9/3/24, after which patient was switched to maintenance with pembrolizumab and pemetrexed.  There was concern for pneumonitis based on October 2024 imaging, at which time pembrolizumab was stopped and pemetrexed alone was continued as maintenance therapy.  CT chest from 12/8/2024 was concerning for continued progression of lymphangitic carcinomatosis involving the right lung, increasing right pleural effusion, increasing mediastinal lymphadenopathy and enlarging gastrohepatic lymph node involvement.  Given concern for disease progression, patient was switched to second line of treatment with ramucirumab and docetaxel combination.  Patient also underwent Pleurx catheter placement on 12/12/2024. He is status post 2 cycles of second line of treatment with ramucirumab and docetaxel thus far, with most recent treatment on 1/21/2024.    Patient presented to [facility] on 2/2/25 with worsening shortness of breath.  CT chest is concerning for  lymphangitic carcinomatosis involving both lungs with increasing subpleural nodularity and pleural effusions. This is concerning for lack of treatment response to current line of therapy.  Unfortunately, we do not have any treatment options with great efficacy in the absence of any targetable mutations. Although single agent chemotherapy options include vinorelbine and gemcitabine, we're looking at maximum overall response rate of <10% and progression free survival of ~2 months on average, with potential toxicities of increased fatigue, etc.     I had an extensive discussion with both the patient and wife regarding most recent imaging findings, concerns of lack of treatment response, and limited single-agent chemotherapy options with limited efficacy outcomes. Patient and wife expressed understanding. They are concerned about patient potentially having more side effects from any future lines of treatment and are leaning towards comfort measures at this time. They would like more time to see how his respiratory status stabilizes before making any final decisions, which is reasonable.     Palliative care team and ICU teams are on board, both teams were updated regarding the above discussion.     Summary of recommendations:   Patient with metastatic lung adenocarcinoma with rapid progression of lymphangitic carcinomatosis, with progression on 2 lines of treatment. Limited treatment options with limited efficacy left at this juncture.   Patient and wife are leaning towards potentially pursuing comfort measures at this time, but would like to see how his respiratory status stabilizes over next 24-48 hrs.   Continue close monitoring of respiratory status and hemodynamics.  Will continue to follow for goals of care discussion    Outpatient follow up plan: Patient currently has an OP medical oncology f/u scheduled with Dr. Sandoval on 3/4/25 at [address] [place] [address]     Communication with patient/family:  Patient and wife,  Tomasa Hampton, were updated regarding the above recommendations    Communication with team: ICU team resident was updated regarding the above recommendations    Case was discussed with hematology/oncology attending, Dr. Merchant      Subjective:     Mr. Gibbs is a 54 year old male who was originally diagnosed with stage IV lung adenocarcinoma in April 2024 with progression on 2 lines of treatment, specifically 1L of carboplatin, pemetrexed, and pembrolizumab, then 2L with ramucirumab and docetaxel. He is status post 2 cycles of ramucirumab and docetaxel with most recent treatment on 1/21/25.     Patient presented to [facility] on 2/2/25 with progressively worsening SOB. CTA was negative for PE, but notable for findings suggestive of lymphangitic carcinomatosis with increasing subpleural nodularity.     Patient and wife were seen at bedside this morning. He was placed on HFNC this morning. As per ICU team, this was moreso as per pt's performance rather than hypoxemia refractory to use of NC. Use of accessory muscles noted. Patient has been on 3-4L home oxygen since he was diagnosed with lung cancer.  His breathing has been acutely worsening over the past several weeks, prompting him to seek hospital evaluation.      Review of Systems:    Review of Systems   Constitutional:  Positive for fatigue and unexpected weight change (>20 lb wt loss since October). Negative for fever.   HENT:  Negative for nosebleeds, trouble swallowing and voice change.    Eyes:  Negative for redness and visual disturbance.   Respiratory:  Positive for cough and shortness of breath.    Cardiovascular:  Positive for palpitations. Negative for chest pain and leg swelling.   Gastrointestinal:  Positive for diarrhea. Negative for abdominal pain, blood in stool, nausea and vomiting.   Genitourinary:  Negative for difficulty urinating, dysuria, frequency and hematuria.   Musculoskeletal:  Negative for arthralgias, back pain and gait problem.    Skin:  Negative for color change, pallor, rash and wound.   Neurological:  Positive for weakness (generalized). Negative for dizziness, facial asymmetry, speech difficulty and headaches.   Hematological:  Negative for adenopathy. Does not bruise/bleed easily.   Psychiatric/Behavioral:  Negative for agitation. The patient is nervous/anxious.        Oncology History:   Cancer Staging   Adenocarcinoma of overlapping sites of right lung (HCC)  Staging form: Lung, AJCC 8th Edition  - Clinical: Stage IV (cN3, cM1) - Signed by Maryana Sandoval MD on 5/2/2024    Oncology History Overview Note   with multifocal bilateral peripheral pulmonary embolism. Biopsy 4/23/24 left retroperitoneal lymph node revealed metastatic adenocarcinoma of lung primary. MRI brain 5/4/24 revealed 4 mm metastasis in the posterior right frontal lobe. On 7/3/24 he underwent SRS to a small right frontal metastasis to a dose of 2000cGy in 1 fraction. The pt was last seen in radiation on 09/18/24. The pt returns today for follow up.    09/24/24 - Hem OncJaime  Pt to have CT next month  Consider Pepcid / Gas-x for dyspepsia      10/04/24 - CTA chest pe study  IMPRESSION:  Tiny subsegmental embolus in the right lower lobe. Given its small size, it is of doubtful clinical significance.  Patchy new consolidation in the right lower lobe, infectious or inflammatory.  Stable tumor in the right midlung with progression of lymphangitic spread throughout the right lung.      10/25/24 - CT chest abdomen pelvis w contrast  IMPRESSION:  Worsening lymphangitic tumor progression compared to prior from 10/4/2024 and August 2, 2024.  New/increased small right pleural effusion, probably malignant.  Resolving nodular thickening medial limb left adrenal gland. No new metastatic disease in the abdomen or pelvis.  Nonobstructing left nephrolithiasis.  The study was marked in EPIC for significant notification.    10/21/24 - Pulmonary, Vicente  Pt remains on Keytruda  and Alimta - tolerating well  On 2L supplemental oxygen    11/04/24 - Jaime Ly  Most recent imaging shows progression  Pt now on Ramucirumab and Docetaxel after disease progression  Will be discussed at tumor board     11/11/24 - Tumor board  PHYSICIAN RECOMMENDED PLAN:  - Treat as pneumonitis (steroids).   - Continue Alimta  - Repeat imaging in 6 weeks    11/22/24 - Jaime Ly  Steroid taper  to end in 2 weeks (mid December)  Acyclovir being ordered for shingles  Continue with Alimta 500mg/m2 every 3 weeks      12/13/24 - MRI Brain BT w wo contrast  IMPRESSION:  1. Status post treatment of right frontal brain metastasis. No metastases currently.  Continued clinical and imaging followup advised.  2. No acute infarction, intracranial image or mass.  3. 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      Upcoming:       Adenocarcinoma of overlapping sites of right lung (HCC)   4/23/2024 Biopsy    Final Diagnosis   A. Lymph Node, Left retroperitoneal, Biopsy:  - Metastatic adenocarcinoma of lung primary.         5/2/2024 Initial Diagnosis    Adenocarcinoma of overlapping sites of right lung (HCC)     5/2/2024 -  Cancer Staged    Staging form: Lung, AJCC 8th Edition  - Clinical: Stage IV (cN3, cM1) - Signed by Maryana Sandoval MD on 5/2/2024 5/21/2024 - 10/15/2024 Chemotherapy    cyanocobalamin, 1,000 mcg, Intramuscular, Once, 4 of 5 cycles  Administration: 1,000 mcg (5/14/2024), 1,000 mcg (7/2/2024), 1,000 mcg (9/3/2024), 1,000 mcg (10/15/2024)  alteplase (CATHFLO), 2 mg, Intracatheter, Every 1 Minute as needed, 8 of 10 cycles  fosaprepitant (EMEND) IVPB, 150 mg, Intravenous, Once, 6 of 6 cycles  Administration: 150 mg (5/21/2024), 150 mg (7/2/2024), 150 mg (7/23/2024), 150 mg (9/3/2024), 150 mg (6/11/2024), 150 mg (8/13/2024)  CARBOplatin (PARAPLATIN) IVPB (GOG AUC DOSING), 672 mg, Intravenous, Once, 6 of 6 cycles  Administration: 672 mg (5/21/2024), 692.5 mg  (7/2/2024), 659 mg (7/23/2024), 641 mg (9/3/2024), 692.5 mg (6/11/2024), 647 mg (8/13/2024)  pemetrexed (ALIMTA) chemo infusion, 1,020 mg, Intravenous, Once, 8 of 10 cycles  Administration: 1,000 mg (5/21/2024), 1,000 mg (7/2/2024), 1,000 mg (7/23/2024), 1,000 mg (9/3/2024), 1,000 mg (6/11/2024), 1,000 mg (8/13/2024), 1,000 mg (9/24/2024), 1,000 mg (10/15/2024)  pembrolizumab (KEYTRUDA) IVPB, 200 mg, Intravenous, Once, 8 of 10 cycles  Administration: 200 mg (5/21/2024), 200 mg (7/2/2024), 200 mg (7/23/2024), 200 mg (9/3/2024), 200 mg (6/11/2024), 200 mg (8/13/2024), 200 mg (9/24/2024), 200 mg (10/15/2024)     7/3/2024 - 7/3/2024 Radiation    Treatments:  Course: C1 SRS    Plan ID Energy Fractions Dose per Fraction (cGy) Dose Correction (cGy) Total Dose Delivered (cGy) Elapsed Days   SRSRFrontN 6X-FFF 1 / 1 2,000 0 2,000 0      Treatment Dates:  7/3/2024 - 7/3/2024.      11/26/2024 - 12/23/2024 Chemotherapy    cyanocobalamin, 1,000 mcg, Intramuscular, Once, 1 of 3 cycles  Administration: 1,000 mcg (11/19/2024)  alteplase (CATHFLO), 2 mg, Intracatheter, Every 1 Minute as needed, 2 of 6 cycles  pemetrexed (ALIMTA) chemo infusion, 1,040 mg, Intravenous, Once, 2 of 6 cycles  Administration: 1,000 mg (11/26/2024), 1,000 mg (12/23/2024)     11/26/2024 - 11/26/2024 Chemotherapy    alteplase (CATHFLO), 2 mg, Intracatheter, Every 1 Minute as needed, 0 of 6 cycles  ramucirumab (CYRAMZA) IVPB, 10 mg/kg = 975 mg, Intravenous, Once, 0 of 6 cycles  DOCEtaxel (TAXOTERE) chemo infusion, 75 mg/m2 = 156 mg, Intravenous, Once, 0 of 6 cycles     12/31/2024 -  Chemotherapy    alteplase (CATHFLO), 2 mg, Intracatheter, Every 1 Minute as needed, 2 of 6 cycles  ramucirumab (CYRAMZA) IVPB, 898 mg, Intravenous, Once, 2 of 6 cycles  Administration: 900 mg (12/31/2024), 900 mg (1/21/2025)  DOCEtaxel (TAXOTERE) chemo infusion, 75 mg/m2 = 150.8 mg, Intravenous, Once, 2 of 6 cycles  Administration: 150.8 mg (12/31/2024), 150.8 mg (1/21/2025)      Primary malignant neoplasm of right lung metastatic to other site (HCC)   2024 Initial Diagnosis    Primary malignant neoplasm of right lung metastatic to other site (HCC)     7/3/2024 - 7/3/2024 Radiation    Treatments:  Course: C1 SRS    Plan ID Energy Fractions Dose per Fraction (cGy) Dose Correction (cGy) Total Dose Delivered (cGy) Elapsed Days   SRSRFrontN 6X-FFF  2,000 0 2,000 0      Treatment Dates:  7/3/2024 - 7/3/2024.          Past Medical History:   Past Medical History:   Diagnosis Date    Cancer (HCC) ,    Diabetes mellitus (HCC)     High cholesterol     History of blood clots     Hypertension     Leucocytosis 2024    Lung cancer (HCC)     Malignant neoplasm of overlapping sites of right lung (HCC) 2024    Pneumonia     Pulmonary embolism (HCC)        Past Surgical History:   Procedure Laterality Date    IR BIOPSY LYMPH NODE  2024    IR PORT PLACEMENT  2024    IR THORACENTESIS  2024    KNEE SURGERY      MANDIBLE FRACTURE SURGERY  1985    PATELLA FRACTURE SURGERY      RECTAL SURGERY         Family History   Problem Relation Age of Onset    No Known Problems Father     No Known Problems Mother        Social History     Socioeconomic History    Marital status: Single     Spouse name: None    Number of children: None    Years of education: None    Highest education level: None   Occupational History    None   Tobacco Use    Smoking status: Former     Current packs/day: 0.00     Average packs/day: 1.5 packs/day for 35.0 years (52.5 ttl pk-yrs)     Types: Cigarettes     Quit date: 4/15/2024     Years since quittin.8     Passive exposure: Past    Smokeless tobacco: Never   Vaping Use    Vaping status: Never Used   Substance and Sexual Activity    Alcohol use: Not Currently    Drug use: Never    Sexual activity: Yes     Partners: Female     Birth control/protection: Post-menopausal, None   Other Topics Concern    None   Social History Narrative    None      Social Drivers of Health     Financial Resource Strain: Not on file   Food Insecurity: No Food Insecurity (2025)    Nursing - Inadequate Food Risk Classification     Worried About Running Out of Food in the Last Year: Not on file     Ran Out of Food in the Last Year: Not on file     Ran Out of Food in the Last Year: Never true   Transportation Needs: No Transportation Needs (2025)    Nursing - Transportation Risk Classification     Lack of Transportation: Not on file     Lack of Transportation: No   Physical Activity: Not on file   Stress: Not on file   Social Connections: Not on file   Intimate Partner Violence: Unknown (2025)    Nursing IPS     Feels Physically and Emotionally Safe: Not on file     Physically Hurt by Someone: Not on file     Humiliated or Emotionally Abused by Someone: Not on file     Physically Hurt by Someone: No     Hurt or Threatened by Someone: No   Housing Stability: Unknown (2025)    Nursing: Inadequate Housing Risk Classification     Has Housing: Not on file     Worried About Losing Housing: Not on file     Unable to Get Utilities: Not on file     Unable to Pay for Housing in the Last Year: No     Has Housin         Current Facility-Administered Medications:     acetaminophen (TYLENOL) tablet 975 mg, 975 mg, Oral, Q8H PRN, Akosua Moreau MD    albuterol (PROVENTIL HFA,VENTOLIN HFA) inhaler 2 puff, 2 puff, Inhalation, Q4H PRN, Divine Dai MD    amitriptyline (ELAVIL) tablet 150 mg, 150 mg, Oral, HS, Danisha Gardner DO    apixaban (ELIQUIS) tablet 5 mg, 5 mg, Oral, BID, Akosua Moreau MD, 5 mg at 25 0800    atorvastatin (LIPITOR) tablet 10 mg, 10 mg, Oral, Daily, Akosua Moreau MD, 10 mg at 25 0800    benzonatate (TESSALON PERLES) capsule 200 mg, 200 mg, Oral, TID PRN, Akosua Moreau MD, 200 mg at 25    cefepime (MAXIPIME) 2 g/50 mL dextrose IVPB, 2 g, Intravenous, Q8H, Danisha  Sostorecz, DO, Last Rate: 100 mL/hr at 02/03/25 0800, 2,000 mg at 02/03/25 0800    chlorhexidine (PERIDEX) 0.12 % oral rinse 15 mL, 15 mL, Mouth/Throat, Q12H LATONIA, Genet Craig CRNP, 15 mL at 02/03/25 0800    dexmedeTOMIDine (Precedex) 400 mcg in sodium chloride 0.9% 100 mL, 0.1-0.7 mcg/kg/hr, Intravenous, Titrated, DIANNE Simpson, Last Rate: 4.3 mL/hr at 02/03/25 1040, 0.2 mcg/kg/hr at 02/03/25 1040    famotidine (PEPCID) tablet 40 mg, 40 mg, Oral, Daily PRN, Akosua Moreau MD    folic acid (FOLVITE) tablet 1,000 mcg, 1,000 mcg, Oral, Daily, Akosua Moreau MD, 1,000 mcg at 02/03/25 0800    [Held by provider] glimepiride (AMARYL) tablet 2 mg, 2 mg, Oral, Daily With Breakfast, Akosua Moreau MD    HYDROmorphone (DILAUDID) injection 0.5 mg, 0.5 mg, Intravenous, Q3H PRN, Gordo Solis DO    HYDROmorphone HCl (DILAUDID) injection 0.2 mg, 0.2 mg, Intravenous, Q3H PRN, Gordo Solis DO    insulin lispro (HumALOG/ADMELOG) 100 units/mL subcutaneous injection 1-6 Units, 1-6 Units, Subcutaneous, Q6H LATONIA **AND** Fingerstick Glucose (POCT), , , Q6H, DIANNE Simpson    ipratropium (ATROVENT) 0.02 % inhalation solution 0.5 mg, 0.5 mg, Nebulization, Q6H, Divine Dai MD    levalbuterol (XOPENEX) inhalation solution 1.25 mg, 1.25 mg, Nebulization, Q6H, Divine Dai MD    levothyroxine tablet 25 mcg, 25 mcg, Oral, Early Morning, Akosua Moreau MD, 25 mcg at 02/03/25 0615    losartan (COZAAR) tablet 25 mg, 25 mg, Oral, Daily, Akosua Moreau MD, 25 mg at 02/03/25 0800    pneumococcal 20-juana conj vacc (PREVNAR 20) IM Injection 0.5 mL, 0.5 mL, Intramuscular, Once, Divine Dai MD    QUEtiapine (SEROquel) tablet 25 mg, 25 mg, Oral, HS, DIANNE iSmpson    racepinephrine 2.25 % inhalation solution **ADS Override Pull**, , , ,     sodium chloride (OCEAN) 0.65 % nasal spray 1  "spray, 1 spray, Each Nare, Q1H PRN, Akosua Moreau MD    vancomycin (VANCOCIN) 1,750 mg in sodium chloride 0.9 % 500 mL IVPB, 1,750 mg, Intravenous, Q12H, Divine Dai MD, Last Rate: 250 mL/hr at 02/03/25 0615, 1,750 mg at 02/03/25 0615      Medications Prior to Admission:     acetaminophen (TYLENOL) 325 mg tablet    albuterol (PROVENTIL HFA,VENTOLIN HFA) 90 mcg/act inhaler    amitriptyline (ELAVIL) 100 mg tablet    apixaban (Eliquis) 5 mg    atorvastatin (LIPITOR) 10 mg tablet    benzonatate (TESSALON) 200 MG capsule    famotidine (PEPCID) 40 MG tablet    folic acid (FOLVITE) 1 mg tablet    glimepiride (AMARYL) 2 mg tablet    Glucagon, rDNA, (Glucagon Emergency) 1 MG KIT    Lancets (OneTouch Delica Plus [id number]) MISC    levothyroxine 25 mcg tablet    losartan (COZAAR) 25 mg tablet    magnesium Oxide (MAG-OX) 400 mg TABS    meclizine (ANTIVERT) 25 mg tablet    metFORMIN (GLUCOPHAGE) 1000 MG tablet    naloxone (NARCAN) 4 mg/0.1 mL nasal spray    ondansetron (ZOFRAN-ODT) 8 mg disintegrating tablet    OneTouch Verio test strip    oxyCODONE (Roxicodone) 5 immediate release tablet    senna (SENOKOT) 8.6 mg    tiotropium-olodaterol (Stiolto Respimat) 2.5-2.5 MCG/ACT inhaler    No Known Allergies      Physical Exam:     BP (!) 172/85   Pulse (!) 118   Temp (!) 97.3 °F (36.3 °C) (Axillary)   Resp (!) 41   Ht 5' 7\" (1.702 m)   Wt 86.2 kg (190 lb 0.6 oz)   SpO2 100%   BMI 29.76 kg/m²     Physical Exam  Vitals reviewed.   Constitutional:       General: He is not in acute distress.     Appearance: He is not toxic-appearing.   HENT:      Mouth/Throat:      Mouth: Mucous membranes are moist.      Pharynx: No oropharyngeal exudate or posterior oropharyngeal erythema.   Eyes:      General: No scleral icterus.     Extraocular Movements: Extraocular movements intact.      Conjunctiva/sclera: Conjunctivae normal.   Cardiovascular:      Rate and Rhythm: Regular rhythm. Tachycardia present.      Heart " sounds: No murmur heard.     No gallop.   Pulmonary:      Comments: Decreased breath sounds b/l  On HFNC at the time of my encounter  Use of accessory muscles noted  Pleurx Catheter in place  Musculoskeletal:      Cervical back: Normal range of motion. No rigidity.      Right lower leg: No edema.      Left lower leg: No edema.   Lymphadenopathy:      Cervical: No cervical adenopathy.   Neurological:      General: No focal deficit present.      Mental Status: He is alert and oriented to person, place, and time.   Psychiatric:      Comments: anxious         Recent Results (from the past 48 hours)   CBC and differential    Collection Time: 02/02/25  6:37 AM   Result Value Ref Range    WBC 2.90 (L) 4.31 - 10.16 Thousand/uL    RBC 3.72 (L) 3.88 - 5.62 Million/uL    Hemoglobin 9.7 (L) 12.0 - 17.0 g/dL    Hematocrit 31.6 (L) 36.5 - 49.3 %    MCV 85 82 - 98 fL    MCH 26.1 (L) 26.8 - 34.3 pg    MCHC 30.7 (L) 31.4 - 37.4 g/dL    RDW 17.5 (H) 11.6 - 15.1 %    MPV 10.2 8.9 - 12.7 fL    Platelets 240 149 - 390 Thousands/uL    nRBC 0 /100 WBCs    Segmented % 44 43 - 75 %    Immature Grans % 1 0 - 2 %    Lymphocytes % 32 14 - 44 %    Monocytes % 22 (H) 4 - 12 %    Eosinophils Relative 0 0 - 6 %    Basophils Relative 1 0 - 1 %    Absolute Neutrophils 1.28 (L) 1.85 - 7.62 Thousands/µL    Absolute Immature Grans 0.03 0.00 - 0.20 Thousand/uL    Absolute Lymphocytes 0.93 0.60 - 4.47 Thousands/µL    Absolute Monocytes 0.63 0.17 - 1.22 Thousand/µL    Eosinophils Absolute 0.01 0.00 - 0.61 Thousand/µL    Basophils Absolute 0.02 0.00 - 0.10 Thousands/µL   Comprehensive metabolic panel    Collection Time: 02/02/25  6:37 AM   Result Value Ref Range    Sodium 140 135 - 147 mmol/L    Potassium 3.7 3.5 - 5.3 mmol/L    Chloride 104 96 - 108 mmol/L    CO2 29 21 - 32 mmol/L    ANION GAP 7 4 - 13 mmol/L    BUN 16 5 - 25 mg/dL    Creatinine 0.57 (L) 0.60 - 1.30 mg/dL    Glucose 165 (H) 65 - 140 mg/dL    Calcium 8.9 8.4 - 10.2 mg/dL    Corrected  "Calcium 9.5 8.3 - 10.1 mg/dL    AST 14 13 - 39 U/L    ALT 15 7 - 52 U/L    Alkaline Phosphatase 69 34 - 104 U/L    Total Protein 6.3 (L) 6.4 - 8.4 g/dL    Albumin 3.3 (L) 3.5 - 5.0 g/dL    Total Bilirubin 0.35 0.20 - 1.00 mg/dL    eGFR 115 ml/min/1.73sq m   HS Troponin 0hr (reflex protocol)    Collection Time: 02/02/25  6:37 AM   Result Value Ref Range    hs TnI 0hr 72 (H) \"Refer to ACS Flowchart\"- see link ng/L   Magnesium    Collection Time: 02/02/25  6:37 AM   Result Value Ref Range    Magnesium 1.9 1.9 - 2.7 mg/dL   B-Type Natriuretic Peptide(BNP)    Collection Time: 02/02/25  6:37 AM   Result Value Ref Range     (H) 0 - 100 pg/mL   Protime-INR    Collection Time: 02/02/25  6:37 AM   Result Value Ref Range    Protime 16.9 (H) 12.3 - 15.0 seconds    INR 1.30 (H) 0.85 - 1.19   APTT    Collection Time: 02/02/25  6:37 AM   Result Value Ref Range    PTT 31 23 - 34 seconds   ECG 12 lead    Collection Time: 02/02/25  6:41 AM   Result Value Ref Range    Ventricular Rate 118 BPM    Atrial Rate 118 BPM    OK Interval 142 ms    QRSD Interval 82 ms    QT Interval 312 ms    QTC Interval 437 ms    P Axis 31 degrees    QRS Axis 70 degrees    T Wave Axis 113 degrees   Procalcitonin    Collection Time: 02/02/25  6:47 AM   Result Value Ref Range    Procalcitonin <0.05 <=0.25 ng/ml   Blood culture #1    Collection Time: 02/02/25  6:47 AM    Specimen: Arm, Right; Blood   Result Value Ref Range    Blood Culture Received in Microbiology Lab. Culture in Progress.    FLU/RSV/COVID - if FLU/RSV clinically relevant (2hr TAT)    Collection Time: 02/02/25  7:21 AM    Specimen: Nose; Nares   Result Value Ref Range    SARS-CoV-2 Negative Negative    INFLUENZA A PCR Negative Negative    INFLUENZA B PCR Negative Negative    RSV PCR Negative Negative   Blood culture #2    Collection Time: 02/02/25  7:36 AM    Specimen: Arm, Right; Blood   Result Value Ref Range    Blood Culture Received in Microbiology Lab. Culture in Progress.    Lactic " "acid    Collection Time: 02/02/25  7:42 AM   Result Value Ref Range    LACTIC ACID 1.8 0.5 - 2.0 mmol/L   HS Troponin I 2hr    Collection Time: 02/02/25  9:04 AM   Result Value Ref Range    hs TnI 2hr 63 (H) \"Refer to ACS Flowchart\"- see link ng/L    Delta 2hr hsTnI -9 <20 ng/L   Blood gas, arterial    Collection Time: 02/02/25  9:52 AM   Result Value Ref Range    pH, Arterial 7.409 7.350 - 7.450    pCO2, Arterial 43.3 36.0 - 44.0 mm Hg    pO2, Arterial 77.3 75.0 - 129.0 mm Hg    HCO3, Arterial 26.8 22.0 - 28.0 mmol/L    Base Excess, Arterial 1.8 mmol/L    O2 Content, Arterial 18.8 16.0 - 23.0 mL/dL    O2 HGB,Arterial  93.5 (L) 94.0 - 97.0 %    SOURCE Radial, Left     VALERIO TEST Yes     Nasal Cannula NC 5 LPM    HS Troponin I 4hr    Collection Time: 02/02/25 11:34 AM   Result Value Ref Range    hs TnI 4hr 66 (H) \"Refer to ACS Flowchart\"- see link ng/L    Delta 4hr hsTnI -6 <20 ng/L   Fingerstick Glucose (POCT)    Collection Time: 02/02/25  1:56 PM   Result Value Ref Range    POC Glucose 165 (H) 65 - 140 mg/dl   Fingerstick Glucose (POCT)    Collection Time: 02/02/25  7:33 PM   Result Value Ref Range    POC Glucose 176 (H) 65 - 140 mg/dl   Hemoglobin and hematocrit, blood    Collection Time: 02/02/25 11:25 PM   Result Value Ref Range    Hemoglobin 9.6 (L) 12.0 - 17.0 g/dL    Hematocrit 31.4 (L) 36.5 - 49.3 %   CBC and differential    Collection Time: 02/03/25  5:09 AM   Result Value Ref Range    WBC 3.26 (L) 4.31 - 10.16 Thousand/uL    RBC 3.69 (L) 3.88 - 5.62 Million/uL    Hemoglobin 9.5 (L) 12.0 - 17.0 g/dL    Hematocrit 31.2 (L) 36.5 - 49.3 %    MCV 85 82 - 98 fL    MCH 25.7 (L) 26.8 - 34.3 pg    MCHC 30.4 (L) 31.4 - 37.4 g/dL    RDW 17.2 (H) 11.6 - 15.1 %    MPV 10.0 8.9 - 12.7 fL    Platelets 241 149 - 390 Thousands/uL    nRBC 0 /100 WBCs    Segmented % 43 43 - 75 %    Immature Grans % 1 0 - 2 %    Lymphocytes % 32 14 - 44 %    Monocytes % 23 (H) 4 - 12 %    Eosinophils Relative 0 0 - 6 %    Basophils Relative 1 " 0 - 1 %    Absolute Neutrophils 1.44 (L) 1.85 - 7.62 Thousands/µL    Absolute Immature Grans 0.02 0.00 - 0.20 Thousand/uL    Absolute Lymphocytes 1.03 0.60 - 4.47 Thousands/µL    Absolute Monocytes 0.74 0.17 - 1.22 Thousand/µL    Eosinophils Absolute 0.00 0.00 - 0.61 Thousand/µL    Basophils Absolute 0.03 0.00 - 0.10 Thousands/µL   Comprehensive metabolic panel    Collection Time: 02/03/25  5:09 AM   Result Value Ref Range    Sodium 141 135 - 147 mmol/L    Potassium 3.5 3.5 - 5.3 mmol/L    Chloride 106 96 - 108 mmol/L    CO2 27 21 - 32 mmol/L    ANION GAP 8 4 - 13 mmol/L    BUN 13 5 - 25 mg/dL    Creatinine 0.47 (L) 0.60 - 1.30 mg/dL    Glucose 154 (H) 65 - 140 mg/dL    Calcium 8.5 8.4 - 10.2 mg/dL    Corrected Calcium 9.2 8.3 - 10.1 mg/dL    AST 13 13 - 39 U/L    ALT 13 7 - 52 U/L    Alkaline Phosphatase 65 34 - 104 U/L    Total Protein 5.7 (L) 6.4 - 8.4 g/dL    Albumin 3.1 (L) 3.5 - 5.0 g/dL    Total Bilirubin 0.36 0.20 - 1.00 mg/dL    eGFR 125 ml/min/1.73sq m   Magnesium    Collection Time: 02/03/25  5:09 AM   Result Value Ref Range    Magnesium 1.8 (L) 1.9 - 2.7 mg/dL   Phosphorus    Collection Time: 02/03/25  5:09 AM   Result Value Ref Range    Phosphorus 3.0 2.7 - 4.5 mg/dL   Fingerstick Glucose (POCT)    Collection Time: 02/03/25  8:17 AM   Result Value Ref Range    POC Glucose 163 (H) 65 - 140 mg/dl       XR chest portable ICU  Result Date: 2/3/2025  Narrative: XR CHEST PORTABLE ICU INDICATION: Drained pleural effusion. COMPARISON: 2/2/2025 FINDINGS: Right-sided infusion port catheter device is stable. Diffuse interstitial prominence with patchy and nodular pulmonary opacities bilaterally right greater than left persist without improvement. There is persistent right pleural effusion without improvement from the most recent prior study. No significant left-sided effusion seen at this time other than perhaps a small loculated amount of fluid laterally along the chest wall or this may be some pleural thickening  or pleural tumor. No pneumothorax Right cardiac border mostly obscured. Overall, grossly stable cardiomediastinal silhouette. No acute fractures are seen Normal upper abdomen.     Impression: No significant change of diffuse lung disease known to represent lymphangitic tumor spread with persistent right-sided pleural fluid and basilar lung consolidation. Minimal pleural fluid versus pleural tumor or thickening laterally in the left mid chest. No other significant left-sided effusion seen. No pneumothorax identified Workstation performed: [id number]     XR chest 1 view portable  Result Date: 2/2/2025  Narrative: XR CHEST PORTABLE INDICATION: SOB. COMPARISON: CXR and chest CT 1/4/2025, chest CT 2/2/2025. FINDINGS: Right port in right atrium. Redemonstration of extensive opacity in the right hemithorax due to lymphangitic spread of tumor, postobstructive pneumonia, and pleural effusion. Lymphangitic spread of tumor throughout the left lung. Left pleural effusion better shown on CT. Normal cardiomediastinal silhouette. Bones are unremarkable for age. Normal upper abdomen.     Impression: Redemonstration of lymphangitic spread of tumor. Redemonstration of right base opacity due to tumor, postobstructive pneumonia, and right pleural effusion on CT. Workstation performed: [id number]     CTA chest pe study  Result Date: 2/2/2025  Narrative: CTA - CHEST WITH IV CONTRAST - PULMONARY ANGIOGRAM INDICATION: Tachypnea, tachycardic. History of lung cancer. COMPARISON: CT chest 1/4/2025 TECHNIQUE: CTA examination of the chest was performed using angiographic technique according to a protocol specifically tailored to evaluate for pulmonary embolism. Multiplanar 2D reformatted images were created from the source data. In addition, coronal  3D MIP postprocessing was performed on the acquisition scanner. Radiation dose length product (DLP) for this visit: 319 mGy-cm. This examination, like all CT scans performed in the [facility], was performed utilizing techniques to minimize radiation dose exposure, including the use of iterative reconstruction and automated exposure control. IV Contrast: 60 mL of iohexol (OMNIPAQUE) FINDINGS: PULMONARY ARTERIAL TREE:  No pulmonary embolus. LUNGS: Again evident is complete collapse of the right middle lobe with extensive consolidative opacity involving the right lower lobe and to a lesser extent the right upper lobe, not significantly changed from the previous study. Diffuse interlobular septal thickening again noted bilaterally with suggestion of peribronchovascular nodularity right greater than left lung. Some progression of juxtapleural nodularity suggested in the peripheral left upper and lower lobes. 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 Some element of superimposed interstitial edema cannot be excluded. PLEURA: Small to moderate right pleural effusion which is partially loculated, similar to prior study with indwelling chest tube inferiorly. Please note the inferior right pleural space was not fully included on axial imaging. Small left pleural effusion increased from prior study. HEART/GREAT VESSELS: Heart is unremarkable for patient's age. Small pericardial effusion, similar. Atherosclerotic changes thoracic aorta and coronary arteries. No thoracic aortic aneurysm. MEDIASTINUM AND JEREMIAH: Stable prevascular lymph nodes measuring up to about 1 cm. Stable soft tissue density right hilum surrounding the distal right upper lobe bronchus, bronchus intermedius and central right middle and lower lobe bronchi. CHEST WALL AND LOWER NECK: Right chest Port-A-Cath, tip in the right atrium. VISUALIZED STRUCTURES IN THE UPPER ABDOMEN: Calcified right hepatic lobe granuloma. OSSEOUS STRUCTURES: No acute fracture or destructive osseous lesion. Degenerative changes of the spine.     Impression: 1. No evidence of pulmonary embolus. 2. Extensive consolidation throughout the  right lower lobe with complete collapse of the right middle lobe, similar to previous study. Underlying tumor and/or pneumonia in these regions cannot be excluded. 3. 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 Increasing small left pleural effusion. Cannot entirely exclude element of superimposed interstitial edema. 4. Stable small to moderate loculated right pleural effusion with indwelling right chest tube. Workstation performed: [id number]     US kidney and bladder  Result Date: 1/5/2025  Narrative: RENAL ULTRASOUND INDICATION: Shortness of breath/change in vitals with urination. COMPARISON: CT 10/25/2024 and 8/2/2024 TECHNIQUE: Ultrasound of the retroperitoneum was performed with a curvilinear transducer utilizing volumetric sweeps and still imaging techniques. FINDINGS: KIDNEYS: Symmetric and normal size. Right kidney: 11.2 x 6.5 x 5.3 cm. Volume 202.0 mL Left kidney: 11.7 x 6.1 x 5.5 cm. Volume 208.2 mL Right kidney Normal echogenicity and contour. No mass is identified. No hydronephrosis. No shadowing calculi. No perinephric fluid collections. Left kidney Normal echogenicity and contour. No mass is identified. No hydronephrosis. Minimal fullness of the left renal pelvis, improved since the prior abdominal CT. Unchanged 1.5 cm lower pole nonobstructing calculus. No perinephric fluid collections. URETERS: Nonvisualized. BLADDER: Normally distended. No focal thickening or mass lesions. Bilateral ureteral jets detected.     Impression: 1.  Unchanged 1.5 cm nonobstructing left renal lower pole calculus. 2.  No evidence of hydronephrosis. Workstation performed: [id number]      VAS VENOUS DUPLEX - LOWER LIMB BILATERAL  Result Date: 1/5/2025  Narrative:  [facility] REPORT CLINICAL: Indications: Patient presents with bilateral lower extremity edema x 2 days. Operative History: Patient denies any cardiovascular surgery  Risk Factors The patient has history of HTN, Diabetes (NIDDM (oral meds)), DVT and smoking (current) 0.5 ppd.   CONCLUSION:  Impression: RIGHT LOWER LIMB: No evidence of acute or chronic deep vein thrombosis. No evidence of superficial thrombophlebitis noted. Doppler evaluation shows a normal response to augmentation maneuvers.. Popliteal, posterior tibial and anterior tibial arterial Doppler waveforms are triphasic.  LEFT LOWER LIMB: No evidence of acute or chronic deep vein thrombosis. No evidence of superficial thrombophlebitis noted. Doppler evaluation shows a normal response to augmentation maneuvers. Popliteal, posterior tibial and anterior tibial arterial Doppler waveforms are triphasic.  In comparison to previous exam on 04/20/24 there is resolution of prior DVT bilaterally.  Technical findings were posted to chart in epic system.  SIGNATURE: Electronically Signed by: DARLENE YOO DO, RPVI on 2025-01-05 01:05:19 PM    X-ray chest 1 view portable  Result Date: 1/5/2025  Narrative: XR CHEST PORTABLE INDICATION: Chest pain. History of lung cancer. COMPARISON: Chest radiograph 12/26/2020. CTA chest 1/4/2025. FINDINGS: Right chest wall port with catheter tip overlying the right atrium. Diffuse reticulonodular opacities concerning for lymphangitic tumor spread as described on same-day CT. Diffuse hazy opacities may reflect component of edema. Persistent consolidation of the right middle and lower lobes with small right pleural effusion.  Aeration of the right upper lung has improved from 12/6/2024. No pneumothorax or pleural effusion. Normal cardiomediastinal silhouette. Bones are unremarkable for age. Normal upper abdomen.     Impression: Persistent right lower lung consolidation and diffuse reticulonodular opacities. Aeration of the right upper lung has improved from 12/6/2024. Small right pleural effusion. Resident: Henrry Martinez I, the attending radiologist, have reviewed the images and agree with the  final report above. Workstation performed: [id number]     CTA chest pe study  Result Date: 1/5/2025  Narrative: CTA - CHEST WITH IV CONTRAST - PULMONARY ANGIOGRAM INDICATION: Dyspnea, chest pain and elevated D-dimer. COMPARISON: 12/26/2024, 12/31/2024 and 1/4/2025. TECHNIQUE: CTA examination of the chest was performed using angiographic technique according to a protocol specifically tailored to evaluate for pulmonary embolism. Multiplanar 2D reformatted images were created from the source data. In addition, coronal  3D MIP postprocessing was performed on the acquisition scanner. Radiation dose length product (DLP) for this visit: 254 mGy-cm . This examination, like all CT scans performed in the [facility], was performed utilizing techniques to minimize radiation dose exposure, including the use of iterative reconstruction and automated exposure control. IV Contrast: 65 mL of iohexol (OMNIPAQUE) FINDINGS: PULMONARY ARTERIAL TREE: Somewhat limited evaluation of distal branches in the right upper and lower lobes due to surrounding airspace consolidation. Respiratory motion artifact also limits evaluation of distal order branches in the left lower lobe. Within these limitations, no evidence of filling defect to suggest an acute embolus. LUNGS: Stable airspace consolidation in the right upper lobe. Complete airspace consolidation throughout the right middle lobe. Airspace consolidation throughout the right lower lobe with only minimal aeration. Stable nodular interseptal thickening throughout the right lung. Increasing nodular septal thickening in the left lung when compared to the 12/24 CT. Mild centrilobular emphysema. Occlusion of right middle lobe segmental bronchi. PLEURA: Small right effusion, likely loculated. Right chest tube in the medial basal hemithorax. HEART/GREAT VESSELS: Normal heart size. Stable right shift of the heart mediastinum due to volume loss in the right lung. MEDIASTINUM AND  JEREMIAH: Stable prevascular lymph nodes. Right hilar and subcarinal soft tissue may also represent stable lymphadenopathy, not well differentiated from adjacent airspace consolidation. CHEST WALL AND LOWER NECK: Unremarkable. VISUALIZED STRUCTURES IN THE UPPER ABDOMEN: Unremarkable. OSSEOUS STRUCTURES: No acute fracture or destructive osseous lesion.     Impression: 1. Increasing airspace consolidation in the right middle lobe, otherwise stable in the right upper and lower lobes. 2. Stable diffuse lymphangitic spread of tumor throughout the right lung and evidence of increasing lymphangitic spread of tumor in the left lung, compared to the CT from 12/8/2024. 3. No evidence of acute pulmonary embolus. 4. Stable small right loculated effusion. Workstation performed: [id number]       Labs and pertinent reports reviewed.

## 2025-02-03 NOTE — ASSESSMENT & PLAN NOTE
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

## 2025-02-03 NOTE — PROGRESS NOTES
Papo Gibbs is a 54 y.o. male who is currently ordered Vancomycin IV with management by the Pharmacy Consult service.  Relevant clinical data and objective / subjective history reviewed.  Vancomycin Assessment:  Indication and Goal AUC/Trough: Pneumonia (goal -600, trough >10)  Clinical Status: critically ill   Micro:   2/3 MRSA: pending  2/2 blood: pending    Renal Function:  SCr: 0.47 mg/dL  CrCl: 186.1 mL/min  Renal replacement: Not on dialysis  Days of Therapy: 2  Current Dose: 1750 mg IV Q12H  Vancomycin Plan:  New Dosing: continue current dose, 1750 mg IV Q12H  Estimated AUC: 506 mcg*hr/mL  Estimated Trough: 11.5 mcg/mL  Next Level: 2/4 at 0500  Renal Function Monitoring: Daily BMP and UOP  Pharmacy will continue to follow closely for s/sx of nephrotoxicity, infusion reactions and appropriateness of therapy.  BMP and CBC will be ordered per protocol. We will continue to follow the patient’s culture results and clinical progress daily.    Stephanie Pineda, Pharmacist

## 2025-02-03 NOTE — ASSESSMENT & PLAN NOTE
- Reports 5 episodes of diarrhea starting 2/1; denies blood in stool  -For past 2 weeks, has been having episodes of gastrocolic like reflex followed by anxiety and this shortness of breath with dyspnea on exertion  -Oncology following, appreciate recs; could be progression of primary lung cancer with mets to abdomen; last imaging of abdomen in October 2024, at that time no mets vs cdiff vs gastroenteritis   - Labs unremarkable for leukocytosis  - Follow up c diff and stool enteric testing

## 2025-02-03 NOTE — RESPIRATORY THERAPY NOTE
02/03/25 0743   Respiratory Assessment   Assessment Type Assess only   General Appearance Awake;Alert   Respiratory Pattern Dyspnea at rest;Tachypneic   Chest Assessment Chest expansion symmetrical   Resp Comments placed on HFNC 40L/40% for increased WOB. Changed duo neb to Q6 xop/atro per prot   Non-Invasive Information   O2 Interface Device HFNC prongs   Non-Invasive Ventilation Mode HFNC (High flow)   SpO2 94 %   $ Pulse Oximetry Spot Check Charge Completed   Non-Invasive Settings   FiO2 (%) 40   Flow (lpm) 40   Temperature (Set) 31   Non-Invasive Readings   Heater Temperature (Obs) 31   Skin Intervention Skin intact   Maintenance   Water bag changed Yes

## 2025-02-03 NOTE — PROGRESS NOTES
Progress Note - Critical Care/ICU   Name: Papo Gibbs 54 y.o. male I MRN: [id number]  Unit/Bed#: ICU 01 I Date of Admission: 2/2/2025   Date of Service: 2/3/2025 I Hospital Day: 1       Assessment & Plan  acute on Chronic hypoxemic respiratory failure (HCC)  Presented to the ED with increasing SOB and dyspnea with minimal exertion, including walking and changing positions in bed.  -Was tachypneic with RR between 20s to 30s, tachycardic with HR between 120s to 30s  -At baseline 4 L of oxygen by nasal cannula  -2/2 CTA chest pe shows no evidence of PE, extensive consolidation throughout right lower lobe with complete collapse of right middle lobe similar to previous study, findings suggestive of infection [place] carcinomatosis involving both lungs with increasing subpleural nodularity in the peripheral left lung, increasing small left pleural effusion, and stable small to moderate loculated right pleural effusion with indwelling right chest tube  -2/2 chest x-ray showing redemonstration of lymphatic spread of tumor, redemonstration of right base opacity due to tumor, postobstructive pneumonia, and right pleural effusion on CT  - Recent echo 12/9/24 showing EF 60% and LV wall thickness mildly increased and with mild concentric hypertrophy; mild AR, aortic valve sclerosis, mild annual calcification MV     -Was on 4-5 L of oxygen by nasal cannula, now on HFNC at 40 L/min with FiO2 40%  -Schedule DuoNebs; dced home breathing treatments, did well with duoneb while in ED   -Has pleurx catheter, may consider tpa dornase to help with drainage as spouse reported having diff with this recently   -Could consider repeat echo, holding off for now as most recent was 12/9/24  Adenocarcinoma of overlapping sites of right lung (HCC)  - Stage IV, being treated with chemotherapy, follows Dr. Bryan outpatient  - Status post carbo, Alimta, Keytruda from May to Aug 2024  - July 2024 received rad to brain   - Oct 2024 CT CAP showed  met lung lymphangitic spread of adenocarcinoma and subsequently received palliative pemetrex with keytruda from Nov to Dec 2024 then docetaxel plus ramucirumab from Dec to Jan 21 2025  - Continue to follow with radiologic and palliative care teams outpatient, will order oncology consult while in hospital, appreciate recs   - Palliative care consulted, appreciate recs  - Plan for restaging with brain MRI as per rad onc for surveillance of 2 small brains mets   Primary malignant neoplasm of right lung metastatic to other site (HCC)  See above  History of pulmonary embolism  CT pe: no demonstration of PE this admission   HTN (hypertension)  Continue losartan 25 mg daily  Monitor BP  Diarrhea  - Reports 5 episodes of diarrhea starting 2/1; denies blood in stool  -For past 2 weeks, has been having episodes of gastrocolic like reflex followed by anxiety and this shortness of breath with dyspnea on exertion  -Oncology following, appreciate recs; could be progression of primary lung cancer with mets to abdomen; last imaging of abdomen in October 2024, at that time no mets vs cdiff vs gastroenteritis   - Labs unremarkable for leukocytosis  - Follow up c diff and stool enteric testing   Disposition: Stepdown Level 1    ICU Core Measures     A: Assess, Prevent, and Manage Pain Has pain been assessed? Yes  Need for changes to pain regimen? No   B: Both SAT/SAT  N/A   C: Choice of Sedation RASS Goal: 0 Alert and Calm  Need for changes to sedation or analgesia regimen? No   D: Delirium CAM-ICU: Negative   E: Early Mobility  Plan for early mobility? Yes   F: Family Engagement Plan for family engagement today? Yes       Antibiotic Review: Awaiting culture results.  and Continue broad spectrum secondary to severity of illness.     Review of Invasive Devices:      Central access plan: Medications requiring central line      Prophylaxis:  VTE VTE covered by:  apixaban, Oral, 5 mg at 02/03/25 0800       Stress Ulcer  covered  byfamotidine (PEPCID) 40 MG tablet [[id number]] (Long-Term Med), famotidine (PEPCID) tablet 40 mg [[id number]]         24 Hour Events : Ongoing episodes of loss of bladder and bowel control followed by SOB and anxiety; drained 600 cc of fluid from Pleurx catheter, kayla/straw-colored fluid at first which then changed to dark red fluid after; hemoglobin check after drainage 9.6 from 9.7  Subjective   Review of Systems: Review of Systems   Constitutional:  Positive for activity change and fatigue.   HENT:  Negative for postnasal drip and sinus pain.    Eyes:  Negative for redness and itching.   Respiratory:  Positive for shortness of breath. Negative for chest tightness.    Cardiovascular:  Negative for chest pain and palpitations.        Trace edema bilateral lower extremities   Gastrointestinal:  Positive for diarrhea. Negative for blood in stool and constipation.   Endocrine: Positive for cold intolerance. Negative for heat intolerance.   Genitourinary:  Negative for difficulty urinating and dysuria.   Musculoskeletal:  Negative for neck pain and neck stiffness.   Skin:  Positive for pallor.   Neurological:  Negative for speech difficulty and headaches.   Psychiatric/Behavioral:  Negative for confusion. The patient is nervous/anxious.        Objective :                   Vitals I/O      Most Recent Min/Max in 24hrs   Temp (!) 97.3 °F (36.3 °C) Temp  Min: 97.3 °F (36.3 °C)  Max: 98.1 °F (36.7 °C)   Pulse (!) 135 Pulse  Min: 114  Max: 135   Resp (!) 38 Resp  Min: 20  Max: 47   /99 BP  Min: 122/71  Max: 167/99   O2 Sat 94 % SpO2  Min: 93 %  Max: 100 %      Intake/Output Summary (Last 24 hours) at 2/3/2025 0855  Last data filed at 2/3/2025 0031  Gross per 24 hour   Intake 1350 ml   Output 600 ml   Net 750 ml       Diet Regular; Regular House    Invasive Monitoring           Physical Exam   Physical Exam  Vitals reviewed.   Eyes:      General: Vision grossly intact.      Extraocular Movements: Extraocular movements  intact.      Conjunctiva/sclera: Conjunctivae normal.   Skin:     General: Skin is warm.      Comments: Ashen skin   HENT:      Head: Normocephalic and atraumatic.      Nose: No congestion.   Cardiovascular:      Rate and Rhythm: Normal rate and regular rhythm.      Pulses: Normal pulses.      Heart sounds: Normal heart sounds.   Musculoskeletal:         General: Swelling present. Normal range of motion.      Comments: Trace edema bilateral lower extremities   Abdominal: General: Bowel sounds are normal.      Palpations: Abdomen is soft.      Tenderness: There is no abdominal tenderness. There is no guarding.   Constitutional:       Appearance: He is ill-appearing. He is not toxic-appearing.      Interventions: He is not intubated and not restrained.  Pulmonary:      Effort: Tachypnea, accessory muscle usage and accessory muscle usage present. He is not intubated.      Breath sounds: Rhonchi present.      Comments: Expiratory wheezing heard throughout lung fields bilaterally, right lower lobe decreased breath sounds most prominent, right middle lobe decreased breath sounds, crackles bilateral lower bases of the lungs  Neurological:      General: No focal deficit present.      Mental Status: He is alert and oriented to person, place and time. Mental status is at baseline.      Motor: gross motor function is at baseline for patient. Strength full and intact in all extremities.          Diagnostic Studies        Lab Results: I have reviewed the following results:     Medications:  Scheduled PRN   amitriptyline, 150 mg, HS  apixaban, 5 mg, BID  atorvastatin, 10 mg, Daily  cefepime, 2 g, Q8H  chlorhexidine, 15 mL, Q12H LATONIA  folic acid, 1,000 mcg, Daily  [Held by provider] glimepiride, 2 mg, Daily With Breakfast  insulin lispro, 1-6 Units, 4 times day  ipratropium, 0.5 mg, Q6H  levalbuterol, 1.25 mg, Q6H  levothyroxine, 25 mcg, Early Morning  losartan, 25 mg, Daily  magnesium sulfate, 2 g, Once  pneumococcal 20-juana conj  vacc, 0.5 mL, Once  vancomycin, 1,750 mg, Q12H      acetaminophen, 975 mg, Q8H PRN  albuterol, 2 puff, Q4H PRN  benzonatate, 200 mg, TID PRN  famotidine, 40 mg, Daily PRN  hydrOXYzine HCL, 25 mg, Q6H PRN  oxyCODONE, 2.5 mg, Q4H PRN  sodium chloride, 1 spray, Q1H PRN       Continuous          Labs:   CBC    Recent Labs     02/02/25  0637 02/02/25  2325 02/03/25  0509   WBC 2.90*  --  3.26*   HGB 9.7* 9.6* 9.5*   HCT 31.6* 31.4* 31.2*     --  241     BMP    Recent Labs     02/02/25  0637 02/03/25  0509   SODIUM 140 141   K 3.7 3.5    106   CO2 29 27   AGAP 7 8   BUN 16 13   CREATININE 0.57* 0.47*   CALCIUM 8.9 8.5       Coags    Recent Labs     02/02/25  0637   INR 1.30*   PTT 31        Additional Electrolytes  Recent Labs     02/02/25  0637 02/03/25  0509   MG 1.9 1.8*   PHOS  --  3.0          Blood Gas    Recent Labs     02/02/25  0952   PHART 7.409   KWH5TIM 43.3   PO2ART 77.3   RCP7RFQ 26.8   BEART 1.8   SOURCE Radial, Left     Recent Labs     02/02/25  0952   SOURCE Radial, Left    LFTs  Recent Labs     02/02/25  0637 02/03/25  0509   ALT 15 13   AST 14 13   ALKPHOS 69 65   ALB 3.3* 3.1*   TBILI 0.35 0.36       Infectious  Recent Labs     02/02/25  0647   PROCALCITONI <0.05     Glucose  Recent Labs     02/02/25  0637 02/03/25  0509   GLUC 165* 154*

## 2025-02-04 ENCOUNTER — APPOINTMENT (INPATIENT)
Dept: RADIOLOGY | Facility: HOSPITAL | Age: 55
DRG: 871 | End: 2025-02-04
Payer: COMMERCIAL

## 2025-02-04 ENCOUNTER — TELEPHONE (OUTPATIENT)
Dept: HEMATOLOGY ONCOLOGY | Facility: CLINIC | Age: 55
End: 2025-02-04

## 2025-02-04 LAB
ANION GAP SERPL CALCULATED.3IONS-SCNC: 5 MMOL/L (ref 4–13)
BASOPHILS # BLD AUTO: 0.04 THOUSANDS/ΜL (ref 0–0.1)
BASOPHILS NFR BLD AUTO: 1 % (ref 0–1)
BUN SERPL-MCNC: 12 MG/DL (ref 5–25)
CALCIUM SERPL-MCNC: 8.6 MG/DL (ref 8.4–10.2)
CHLORIDE SERPL-SCNC: 103 MMOL/L (ref 96–108)
CO2 SERPL-SCNC: 33 MMOL/L (ref 21–32)
CREAT SERPL-MCNC: 0.58 MG/DL (ref 0.6–1.3)
EOSINOPHIL # BLD AUTO: 0.01 THOUSAND/ΜL (ref 0–0.61)
EOSINOPHIL NFR BLD AUTO: 0 % (ref 0–6)
ERYTHROCYTE [DISTWIDTH] IN BLOOD BY AUTOMATED COUNT: 17.4 % (ref 11.6–15.1)
GFR SERPL CREATININE-BSD FRML MDRD: 114 ML/MIN/1.73SQ M
GLUCOSE SERPL-MCNC: 123 MG/DL (ref 65–140)
GLUCOSE SERPL-MCNC: 139 MG/DL (ref 65–140)
GLUCOSE SERPL-MCNC: 146 MG/DL (ref 65–140)
GLUCOSE SERPL-MCNC: 165 MG/DL (ref 65–140)
GLUCOSE SERPL-MCNC: 269 MG/DL (ref 65–140)
HCT VFR BLD AUTO: 31.9 % (ref 36.5–49.3)
HGB BLD-MCNC: 9.5 G/DL (ref 12–17)
IMM GRANULOCYTES # BLD AUTO: 0.02 THOUSAND/UL (ref 0–0.2)
IMM GRANULOCYTES NFR BLD AUTO: 1 % (ref 0–2)
LYMPHOCYTES # BLD AUTO: 1.07 THOUSANDS/ΜL (ref 0.6–4.47)
LYMPHOCYTES NFR BLD AUTO: 28 % (ref 14–44)
MAGNESIUM SERPL-MCNC: 2 MG/DL (ref 1.9–2.7)
MCH RBC QN AUTO: 25.6 PG (ref 26.8–34.3)
MCHC RBC AUTO-ENTMCNC: 29.8 G/DL (ref 31.4–37.4)
MCV RBC AUTO: 86 FL (ref 82–98)
MONOCYTES # BLD AUTO: 0.83 THOUSAND/ΜL (ref 0.17–1.22)
MONOCYTES NFR BLD AUTO: 21 % (ref 4–12)
MRSA NOSE QL CULT: NORMAL
MYCOBACTERIUM SPEC CULT: NORMAL
MYCOBACTERIUM SPEC CULT: NORMAL
NEUTROPHILS # BLD AUTO: 1.9 THOUSANDS/ΜL (ref 1.85–7.62)
NEUTS SEG NFR BLD AUTO: 49 % (ref 43–75)
NRBC BLD AUTO-RTO: 0 /100 WBCS
PLATELET # BLD AUTO: 221 THOUSANDS/UL (ref 149–390)
PMV BLD AUTO: 10.2 FL (ref 8.9–12.7)
POTASSIUM SERPL-SCNC: 3.4 MMOL/L (ref 3.5–5.3)
RBC # BLD AUTO: 3.71 MILLION/UL (ref 3.88–5.62)
RHODAMINE-AURAMINE STN SPEC: NORMAL
RHODAMINE-AURAMINE STN SPEC: NORMAL
SODIUM SERPL-SCNC: 141 MMOL/L (ref 135–147)
VANCOMYCIN SERPL-MCNC: 21.4 UG/ML (ref 10–20)
WBC # BLD AUTO: 3.87 THOUSAND/UL (ref 4.31–10.16)

## 2025-02-04 PROCEDURE — 80048 BASIC METABOLIC PNL TOTAL CA: CPT

## 2025-02-04 PROCEDURE — 82948 REAGENT STRIP/BLOOD GLUCOSE: CPT

## 2025-02-04 PROCEDURE — 71045 X-RAY EXAM CHEST 1 VIEW: CPT

## 2025-02-04 PROCEDURE — 99232 SBSQ HOSP IP/OBS MODERATE 35: CPT | Performed by: INTERNAL MEDICINE

## 2025-02-04 PROCEDURE — 94640 AIRWAY INHALATION TREATMENT: CPT

## 2025-02-04 PROCEDURE — 80202 ASSAY OF VANCOMYCIN: CPT

## 2025-02-04 PROCEDURE — 99233 SBSQ HOSP IP/OBS HIGH 50: CPT | Performed by: STUDENT IN AN ORGANIZED HEALTH CARE EDUCATION/TRAINING PROGRAM

## 2025-02-04 PROCEDURE — 94760 N-INVAS EAR/PLS OXIMETRY 1: CPT

## 2025-02-04 PROCEDURE — 83735 ASSAY OF MAGNESIUM: CPT

## 2025-02-04 PROCEDURE — 85025 COMPLETE CBC W/AUTO DIFF WBC: CPT

## 2025-02-04 RX ORDER — ALBUMIN (HUMAN) 12.5 G/50ML
25 SOLUTION INTRAVENOUS ONCE
Status: COMPLETED | OUTPATIENT
Start: 2025-02-04 | End: 2025-02-04

## 2025-02-04 RX ORDER — POTASSIUM CHLORIDE 1500 MG/1
40 TABLET, EXTENDED RELEASE ORAL ONCE
Status: COMPLETED | OUTPATIENT
Start: 2025-02-04 | End: 2025-02-04

## 2025-02-04 RX ORDER — OXYCODONE HYDROCHLORIDE 10 MG/1
10 TABLET ORAL EVERY 4 HOURS PRN
Refills: 0 | Status: DISCONTINUED | OUTPATIENT
Start: 2025-02-04 | End: 2025-02-13 | Stop reason: HOSPADM

## 2025-02-04 RX ORDER — DIAZEPAM 2 MG/1
2 TABLET ORAL 2 TIMES DAILY
Status: DISCONTINUED | OUTPATIENT
Start: 2025-02-04 | End: 2025-02-05

## 2025-02-04 RX ORDER — FUROSEMIDE 10 MG/ML
40 INJECTION INTRAMUSCULAR; INTRAVENOUS ONCE
Status: COMPLETED | OUTPATIENT
Start: 2025-02-04 | End: 2025-02-04

## 2025-02-04 RX ORDER — OXYCODONE HYDROCHLORIDE 5 MG/1
5 TABLET ORAL EVERY 4 HOURS PRN
Refills: 0 | Status: DISCONTINUED | OUTPATIENT
Start: 2025-02-04 | End: 2025-02-13 | Stop reason: HOSPADM

## 2025-02-04 RX ADMIN — OXYCODONE HYDROCHLORIDE 5 MG: 5 TABLET ORAL at 11:18

## 2025-02-04 RX ADMIN — FUROSEMIDE 40 MG: 10 INJECTION, SOLUTION INTRAMUSCULAR; INTRAVENOUS at 12:02

## 2025-02-04 RX ADMIN — APIXABAN 5 MG: 5 TABLET, FILM COATED ORAL at 08:54

## 2025-02-04 RX ADMIN — MORPHINE SULFATE 2 MG: 2 INJECTION, SOLUTION INTRAMUSCULAR; INTRAVENOUS at 16:25

## 2025-02-04 RX ADMIN — INSULIN LISPRO 1 UNITS: 100 INJECTION, SOLUTION INTRAVENOUS; SUBCUTANEOUS at 13:08

## 2025-02-04 RX ADMIN — CEFEPIME 2000 MG: 2 INJECTION, POWDER, FOR SOLUTION INTRAVENOUS at 01:00

## 2025-02-04 RX ADMIN — LEVALBUTEROL HYDROCHLORIDE 1.25 MG: 1.25 SOLUTION RESPIRATORY (INHALATION) at 13:52

## 2025-02-04 RX ADMIN — IPRATROPIUM BROMIDE 0.5 MG: 0.5 SOLUTION RESPIRATORY (INHALATION) at 01:45

## 2025-02-04 RX ADMIN — CEFEPIME 2000 MG: 2 INJECTION, POWDER, FOR SOLUTION INTRAVENOUS at 08:59

## 2025-02-04 RX ADMIN — FOLIC ACID 1 MG: 1 TABLET ORAL at 08:54

## 2025-02-04 RX ADMIN — IPRATROPIUM BROMIDE 0.5 MG: 0.5 SOLUTION RESPIRATORY (INHALATION) at 20:50

## 2025-02-04 RX ADMIN — LEVOTHYROXINE SODIUM 25 MCG: 25 TABLET ORAL at 05:15

## 2025-02-04 RX ADMIN — AMITRIPTYLINE HYDROCHLORIDE 150 MG: 25 TABLET, FILM COATED ORAL at 21:54

## 2025-02-04 RX ADMIN — POTASSIUM CHLORIDE 40 MEQ: 1500 TABLET, EXTENDED RELEASE ORAL at 08:54

## 2025-02-04 RX ADMIN — LEVALBUTEROL HYDROCHLORIDE 1.25 MG: 1.25 SOLUTION RESPIRATORY (INHALATION) at 01:45

## 2025-02-04 RX ADMIN — ALBUMIN (HUMAN) 25 G: 0.25 INJECTION, SOLUTION INTRAVENOUS at 12:02

## 2025-02-04 RX ADMIN — INSULIN LISPRO 3 UNITS: 100 INJECTION, SOLUTION INTRAVENOUS; SUBCUTANEOUS at 17:50

## 2025-02-04 RX ADMIN — LEVALBUTEROL HYDROCHLORIDE 1.25 MG: 1.25 SOLUTION RESPIRATORY (INHALATION) at 07:44

## 2025-02-04 RX ADMIN — IPRATROPIUM BROMIDE 0.5 MG: 0.5 SOLUTION RESPIRATORY (INHALATION) at 13:52

## 2025-02-04 RX ADMIN — CEFEPIME 2000 MG: 2 INJECTION, POWDER, FOR SOLUTION INTRAVENOUS at 17:50

## 2025-02-04 RX ADMIN — ATORVASTATIN CALCIUM 10 MG: 10 TABLET, FILM COATED ORAL at 08:54

## 2025-02-04 RX ADMIN — IPRATROPIUM BROMIDE 0.5 MG: 0.5 SOLUTION RESPIRATORY (INHALATION) at 07:52

## 2025-02-04 RX ADMIN — CHLORHEXIDINE GLUCONATE 15 ML: 1.2 RINSE ORAL at 08:54

## 2025-02-04 RX ADMIN — LOSARTAN POTASSIUM 25 MG: 25 TABLET, FILM COATED ORAL at 08:59

## 2025-02-04 RX ADMIN — VANCOMYCIN HYDROCHLORIDE 1750 MG: 5 INJECTION, POWDER, LYOPHILIZED, FOR SOLUTION INTRAVENOUS at 04:47

## 2025-02-04 RX ADMIN — APIXABAN 5 MG: 5 TABLET, FILM COATED ORAL at 17:50

## 2025-02-04 RX ADMIN — QUETIAPINE FUMARATE 25 MG: 25 TABLET ORAL at 21:54

## 2025-02-04 RX ADMIN — LEVALBUTEROL HYDROCHLORIDE 1.25 MG: 1.25 SOLUTION RESPIRATORY (INHALATION) at 20:50

## 2025-02-04 RX ADMIN — VANCOMYCIN HYDROCHLORIDE 1250 MG: 5 INJECTION, POWDER, LYOPHILIZED, FOR SOLUTION INTRAVENOUS at 22:21

## 2025-02-04 RX ADMIN — HYDROMORPHONE HYDROCHLORIDE 0.5 MG: 1 INJECTION, SOLUTION INTRAMUSCULAR; INTRAVENOUS; SUBCUTANEOUS at 19:34

## 2025-02-04 RX ADMIN — CHLORHEXIDINE GLUCONATE 15 ML: 1.2 RINSE ORAL at 21:54

## 2025-02-04 RX ADMIN — DIAZEPAM 2 MG: 2 TABLET ORAL at 17:50

## 2025-02-04 RX ADMIN — MORPHINE SULFATE 2 MG: 2 INJECTION, SOLUTION INTRAMUSCULAR; INTRAVENOUS at 12:02

## 2025-02-04 NOTE — RESPIRATORY THERAPY NOTE
02/04/25 0747   Respiratory Assessment   Assessment Type During-treatment   General Appearance Alert;Awake   Respiratory Pattern Dyspnea at rest;Labored;Tachypneic   Chest Assessment Chest expansion symmetrical   Bilateral Breath Sounds Diminished   Resp Comments Pt wore NRB overnight. Pt stated it worked better than HFNC. RT educated pt on benefits of HFnC vs NRB. Pt placed back on HFNC 80%/60L. Pt appears to be doing wel with HFNC. Pt does have significant WOB, pt states he is SOB.   O2 Device HFNC   Oxygen Therapy/Pulse Ox   O2 Device (S)  High flow nasal cannula   O2 Therapy Oxygen humidified   FiO2 (%) 80   O2 Flow Rate (L/min) 60 L/min   SpO2 99 %   SpO2 Activity At Rest   $ Pulse Oximetry Spot Check Charge Completed

## 2025-02-04 NOTE — ASSESSMENT & PLAN NOTE
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   Has Pleurx catheter which was drained 250 ml of serosanguinous fluid last night  -  Recent echo 12/9/24 showing EF 60% and LV wall thickness mildly increased and with mild concentric hypertrophy; mild AR, aortic valve sclerosis, mild annual calcification MV      2/4 Given lasix 40 mg IV x 1 for shortness of breath, f/u CXR   PRN morphine for air hunger  F/U CXR  Titrate to room air if able  Continue to wean O2 requirements

## 2025-02-04 NOTE — PROGRESS NOTES
Papo Gibbs is a 54 y.o. male who is currently ordered Vancomycin IV with management by the Pharmacy Consult service.  Relevant clinical data and objective / subjective history reviewed.  Vancomycin Assessment:  Indication and Goal AUC/Trough: Pneumonia (goal -600, trough >10)  Clinical Status: stable  Micro:   2/3 MRSA: pending   blood: no growth at 24 hours   Renal Function:  SCr: 0.58 mg/dL  CrCl: 150.8 mL/min  Renal replacement: Not on dialysis  Days of Therapy: 3  Current Dose: 1750 mg IV Q12H  Vancomycin Plan:  New Dosin mg IV Q12H  Estimated AUC: 503 mcg*hr/mL  Estimated Trough: 13.3 mcg/mL  Next Level:  at 0600  Renal Function Monitoring: Daily BMP and UOP  Pharmacy will continue to follow closely for s/sx of nephrotoxicity, infusion reactions and appropriateness of therapy.  BMP and CBC will be ordered per protocol. We will continue to follow the patient’s culture results and clinical progress daily.    Stephanie Pineda, Pharmacist

## 2025-02-04 NOTE — ASSESSMENT & PLAN NOTE
Stage IV, being treated with chemotherapy, follows Dr. Bryan outpatient  - Status post carbo, Alimta, Keytruda from May to Aug 2024  - July 2024 received rad to brain   - Oct 2024 CT CAP showed met lung lymphangitic spread of adenocarcinoma and subsequently received palliative pemetrex with keytruda from Nov to Dec 2024 then docetaxel plus ramucirumab from Dec to Jan 21 2025  Oncology consulted  Palliative consulted  Guarded prognosis  Goals of care talks on going

## 2025-02-04 NOTE — CASE MANAGEMENT
Case Management Assessment & Discharge Planning Note    Patient name Papo Gibbs  Location ICU ICU  MRN [id number]  : 1970 Date 2025       Current Admission Date: 2025  Current Admission Diagnosis:acute on Chronic hypoxemic respiratory failure (HCC)   Patient Active Problem List    Diagnosis Date Noted Date Diagnosed    Counseling regarding advance care planning and goals of care 2025     Diarrhea 2025     Uncontrolled type 2 diabetes mellitus with hyperglycemia, without long-term current use of insulin (HCC) 2025     Ambulatory dysfunction 2025     Peripheral edema 2025     Cancer related pain 2025     Antineoplastic chemotherapy induced anemia 2024     Metastasis to brain (HCC) 2024     DM2 (diabetes mellitus, type 2) (HCC) 2024     Pleural effusion 2024     Tachycardia 2024     Right-sided thoracic back pain 2024     Acid reflux 10/10/2024     Nephrolithiasis 2024     Fatty liver 2024     Hepatomegaly 2024     Chronic anticoagulation 2024     Chemotherapy-induced nausea 2024     Port-A-Cath in place 2024     Adrenal nodule (HCC) 2024     Acute deep vein thrombosis (DVT) of popliteal vein of left lower extremity (HCC) 2024     Acute deep vein thrombosis (DVT) of left femoral vein (HCC) 2024     Primary malignant neoplasm of right lung metastatic to other site (HCC) 2024     Acquired left ventricular hypertrophy 2024     Palliative care by specialist 2024     History of pulmonary embolism 2024     acute on Chronic hypoxemic respiratory failure (HCC) 2024     Restrictive lung disease 2024     Adenocarcinoma of overlapping sites of right lung (HCC) 2024     Respiratory insufficiency 2024     Tobacco dependence 2024     SOB (shortness of breath) 2024     Chest pain 2024     Acute pulmonary embolism  with acute cor pulmonale, unspecified pulmonary embolism type (HCC) 04/19/2024     HTN (hypertension) 04/19/2024     Encounter to discuss test results 07/03/2022     Gross hematuria 07/03/2022       LOS (days): 2  Geometric Mean LOS (GMLOS) (days):   Days to GMLOS:     OBJECTIVE:  PATIENT READMITTED TO HOSPITAL  Risk of Unplanned Readmission Score: 34.12         Current admission status: Inpatient       Preferred Pharmacy:   [facility] #[id number] - [place] - [address]  [address] [address]  Phone: [phone] Fax: [phone]    Primary Care Provider: Amelie Bacon MD    Primary Insurance: [employer]  Secondary Insurance:     ASSESSMENT:  Active Health Care Proxies    There are no active Health Care Proxies on file.       Patient Information  Admitted from:: Home  Mental Status: Alert  During Assessment patient was accompanied by: Spouse (Tomasa)  Assessment information provided by:: Patient, Spouse  Primary Caregiver: Family  Caregiver's Name:: Wife, Tomasa and also has other family that is supportive and helpful for family at home  Caregiver's Relationship to Patient:: Family Member  Support Systems: Spouse/significant other, Family members  County of Residence: [place]  What city do you live in?: [place]  Home entry access options. Select all that apply.: Stairs  Number of steps to enter home.: 2  Type of Current Residence: 2 story home  Upon entering residence, is there a bedroom on the main floor (no further steps)?: No  A bedroom is located on the following floor levels of residence (select all that apply):: 2nd Floor  Upon entering residence, is there a bathroom on the main floor (no further steps)?: Yes (1/2 bathroom on first floor)  Number of steps to 2nd floor from main floor: One Flight  Living Arrangements: Lives w/ Spouse/significant other    Activities of Daily Living Prior to Admission  Functional Status: Assistance  Completes ADLs independently?: No  Level of  ADL dependence: Assistance  Ambulates independently?: Yes  Does patient use assisted devices?: Yes  Assisted Devices (DME) used: Walker, Portable Oxygen tanks, Home Oxygen concentrator  DME Company Name (respiratory supplies): [employer]  O2 Rate(s): 4L  Does patient currently own DME?: Yes  What DME does the patient currently own?: Portable Oxygen tanks, Home Oxygen concentrator, Walker  Does patient have a history of Outpatient Therapy (PT/OT)?: No  Does the patient have a history of Short-Term Rehab?: No  Does patient have a history of HHC?: Yes  Does patient currently have HHC?: Yes    Current Home Health Care  Type of Current Home Care Services: Home PT, Home OT, Nurse visit  [facility] Agency Name[facility]  Current Home Health Follow-Up Provider:: MANDY    Patient Information Continued  Income Source: Employed  Does patient have prescription coverage?: Yes  Does patient receive dialysis treatments?: No  Does patient have a history of substance abuse?: No  Does patient have a history of Mental Health Diagnosis?: No    Means of Transportation  Means of Transport to Appts:: Drives Self    DISCHARGE DETAILS:    Discharge planning discussed with:: Pt and his wife, Tomasa    Contacts  Patient Contacts: Tomasa Memo (Spouse)  Relationship to Patient:: Family  Contact Method: In Person  Reason/Outcome: Discharge Planning, Emergency Contact, Continuity of Care    Other Referral/Resources/Interventions Provided:  Interventions: Other (Specify)  Referral Comments: CM met with pt and his wife, Tomasa, at bedside. Introduced self/role with dcp. Pt reside with his wife and dog. Needs some help with ADLs. Pt's wife does work, but pt is well supported by her and other family. DME owned: walker, w/c, and had O2 at home. Pt current with [facility] for RN PT/OT. Aware CM will f/u with any recommendations for dcp. PCP and pharmacy confirmed.

## 2025-02-04 NOTE — PLAN OF CARE
Problem: PAIN - ADULT  Goal: Verbalizes/displays adequate comfort level or baseline comfort level  Description: Interventions:  - Encourage patient to monitor pain and request assistance  - Assess pain using appropriate pain scale  - Administer analgesics based on type and severity of pain and evaluate response  - Implement non-pharmacological measures as appropriate and evaluate response  - Consider cultural and social influences on pain and pain management  - Notify physician/advanced practitioner if interventions unsuccessful or patient reports new pain  Outcome: Progressing     Problem: INFECTION - ADULT  Goal: Absence or prevention of progression during hospitalization  Description: INTERVENTIONS:  - Assess and monitor for signs and symptoms of infection  - Monitor lab/diagnostic results  - Monitor all insertion sites, i.e. indwelling lines, tubes, and drains  - Monitor endotracheal if appropriate and nasal secretions for changes in amount and color  - [place] appropriate cooling/warming therapies per order  - Administer medications as ordered  - Instruct and encourage patient and family to use good hand hygiene technique  - Identify and instruct in appropriate isolation precautions for identified infection/condition  Outcome: Progressing  Goal: Absence of fever/infection during neutropenic period  Description: INTERVENTIONS:  - Monitor WBC    Outcome: Progressing     Problem: DISCHARGE PLANNING  Goal: Discharge to home or other facility with appropriate resources  Description: INTERVENTIONS:  - Identify barriers to discharge w/patient and caregiver  - Arrange for needed discharge resources and transportation as appropriate  - Identify discharge learning needs (meds, wound care, etc.)  - Arrange for interpretive services to assist at discharge as needed  - Refer to Case Management Department for coordinating discharge planning if the patient needs post-hospital services based on physician/advanced  practitioner order or complex needs related to functional status, cognitive ability, or social support system  Outcome: Progressing     Problem: DISCHARGE PLANNING  Goal: Discharge to home or other facility with appropriate resources  Description: INTERVENTIONS:  - Identify barriers to discharge w/patient and caregiver  - Arrange for needed discharge resources and transportation as appropriate  - Identify discharge learning needs (meds, wound care, etc.)  - Arrange for interpretive services to assist at discharge as needed  - Refer to Case Management Department for coordinating discharge planning if the patient needs post-hospital services based on physician/advanced practitioner order or complex needs related to functional status, cognitive ability, or social support system  Outcome: Progressing     Problem: Knowledge Deficit  Goal: Patient/family/caregiver demonstrates understanding of disease process, treatment plan, medications, and discharge instructions  Description: Complete learning assessment and assess knowledge base.  Interventions:  - Provide teaching at level of understanding  - Provide teaching via preferred learning methods  Outcome: Progressing     Problem: Prexisting or High Potential for Compromised Skin Integrity  Goal: Skin integrity is maintained or improved  Description: INTERVENTIONS:  - Identify patients at risk for skin breakdown  - Assess and monitor skin integrity  - Assess and monitor nutrition and hydration status  - Monitor labs   - Assess for incontinence   - Turn and reposition patient  - Assist with mobility/ambulation  - Relieve pressure over bony prominences  - Avoid friction and shearing  - Provide appropriate hygiene as needed including keeping skin clean and dry  - Evaluate need for skin moisturizer/barrier cream  - Collaborate with interdisciplinary team   - Patient/family teaching  - Consider wound care consult   Outcome: Progressing

## 2025-02-04 NOTE — PROGRESS NOTES
Progress Note - Hospitalist   Name: Papo Gibbs 54 y.o. male I MRN: [id number]  Unit/Bed#: ICU 01 I Date of Admission: 2/2/2025   Date of Service: 2/4/2025 I Hospital Day: 2    Assessment & Plan  HTN (hypertension)  Continue losartan 25 mg daily   Adenocarcinoma of overlapping sites of right lung (HCC)  Stage IV, being treated with chemotherapy, follows Dr. Bryan outpatient  - Status post carbo, Alimta, Keytruda from May to Aug 2024  - July 2024 received rad to brain   - Oct 2024 CT CAP showed met lung lymphangitic spread of adenocarcinoma and subsequently received palliative pemetrex with keytruda from Nov to Dec 2024 then docetaxel plus ramucirumab from Dec to Jan 21 2025  Oncology consulted  Palliative consulted  Guarded prognosis  Goals of care talks on going        acute on Chronic hypoxemic respiratory failure (HCC)  54-year-old male with past medical history of stage IV adenocarcinoma of the lung with mets to the brain s/p chemo as well as radiation to the brain presented to the ED on 2/2 with shortness of breath     CT lungs showing redemonstration of lymphangitic spread as well as extensive consolidation in RLL with complete collapse of RML.   Has Pleurx catheter which was drained 250 ml of serosanguinous fluid last night  -  Recent echo 12/9/24 showing EF 60% and LV wall thickness mildly increased and with mild concentric hypertrophy; mild AR, aortic valve sclerosis, mild annual calcification MV      2/4 Given lasix 40 mg IV x 1 for shortness of breath, f/u CXR   PRN morphine for air hunger  F/U CXR  Titrate to room air if able  Continue to wean O2 requirements    History of pulmonary embolism  Continue eliquis 5 mg bid  Cancer related pain  Continue oxycodone IR 5 mg/10 mg every 4 hours as needed for moderate/severe pain  Continue hydromorphone IV 0.5   Counseling regarding advance care planning and goals of care      VTE Pharmacologic Prophylaxis:   Moderate Risk (Score 3-4) - Pharmacological DVT  Prophylaxis Ordered: apixaban (Eliquis).    Mobility:   Basic Mobility Inpatient Raw Score: 16  JH-HLM Goal: 5: Stand one or more mins  JH-HLM Achieved: 3: Sit at edge of bed  JH-HLM Goal NOT achieved. Continue with multidisciplinary rounding and encourage appropriate mobility to improve upon JH-HLM goals.    Patient Centered Rounds: I performed bedside rounds with nursing staff today.   Discussions with Specialists or Other Care Team Provider: case management    Education and Discussions with Family / Patient: Updated  (wife) at bedside.    Current Length of Stay: 2 day(s)  Current Patient Status: Inpatient   Certification Statement: The patient will continue to require additional inpatient hospital stay due to hypoxic respiratory failure  Discharge Plan: Anticipate discharge in 48-72 hrs to discharge location to be determined pending rehab evaluations.    Code Status: Level 3 - DNAR and DNI    Subjective   Pt states he continues to have shortness of breath    Objective :  Temp:  [97.1 °F (36.2 °C)-97.8 °F (36.6 °C)] 97.1 °F (36.2 °C)  HR:  [110-128] 125  BP: (112-151)/(69-86) 126/69  Resp:  [14-57] 41  SpO2:  [95 %-100 %] 95 %  O2 Device: Mid flow nasal cannula  Nasal Cannula O2 Flow Rate (L/min):  [6 L/min-15 L/min] 6 L/min  FiO2 (%):  [40-80] 40    Body mass index is 29.76 kg/m².     Input and Output Summary (last 24 hours):     Intake/Output Summary (Last 24 hours) at 2/4/2025 1415  Last data filed at 2/4/2025 1311  Gross per 24 hour   Intake 1036.07 ml   Output 3190 ml   Net -2153.93 ml       Physical Exam  Vitals and nursing note reviewed.   Constitutional:       General: He is not in acute distress.     Appearance: He is well-developed. He is ill-appearing.   HENT:      Head: Normocephalic and atraumatic.   Eyes:      Conjunctiva/sclera: Conjunctivae normal.   Cardiovascular:      Rate and Rhythm: Normal rate and regular rhythm.      Heart sounds: No murmur heard.  Pulmonary:      Effort:  Pulmonary effort is normal.      Comments: Decreased breath sounds right lower and middle lobe  Abdominal:      Palpations: Abdomen is soft.      Tenderness: There is no abdominal tenderness.   Skin:     General: Skin is warm and dry.      Capillary Refill: Capillary refill takes less than 2 seconds.   Neurological:      Mental Status: He is alert.   Psychiatric:         Mood and Affect: Mood normal.           Lines/Drains:  Lines/Drains/Airways       Active Status       Name Placement date Placement time Site Days    Port A Cath 05/20/24 Right Chest 05/20/24  1427  Chest  260    Pleural Effusion Long-Term Catheter 12/12/24  1009  --  54                    Central Line:  Goal for removal: Port accessed. Will de-access as appropriate.               Lab Results: I have reviewed the following results:   Results from last 7 days   Lab Units 02/04/25  0440   WBC Thousand/uL 3.87*   HEMOGLOBIN g/dL 9.5*   HEMATOCRIT % 31.9*   PLATELETS Thousands/uL 221   SEGS PCT % 49   LYMPHO PCT % 28   MONO PCT % 21*   EOS PCT % 0     Results from last 7 days   Lab Units 02/04/25  0440 02/03/25  0509   SODIUM mmol/L 141 141   POTASSIUM mmol/L 3.4* 3.5   CHLORIDE mmol/L 103 106   CO2 mmol/L 33* 27   BUN mg/dL 12 13   CREATININE mg/dL 0.58* 0.47*   ANION GAP mmol/L 5 8   CALCIUM mg/dL 8.6 8.5   ALBUMIN g/dL  --  3.1*   TOTAL BILIRUBIN mg/dL  --  0.36   ALK PHOS U/L  --  65   ALT U/L  --  13   AST U/L  --  13   GLUCOSE RANDOM mg/dL 146* 154*     Results from last 7 days   Lab Units 02/02/25  0637   INR  1.30*     Results from last 7 days   Lab Units 02/04/25  1145 02/04/25  0757 02/03/25  2101 02/03/25  1637 02/03/25  1205 02/03/25  0817 02/02/25  1933 02/02/25  1356   POC GLUCOSE mg/dl 165* 139 190* 207* 167* 163* 176* 165*         Results from last 7 days   Lab Units 02/02/25  0742 02/02/25  0647   LACTIC ACID mmol/L 1.8  --    PROCALCITONIN ng/ml  --  <0.05       Recent Cultures (last 7 days):   Results from last 7 days   Lab Units  02/02/25  0736 02/02/25  0647   BLOOD CULTURE  No Growth at 48 hrs. No Growth at 48 hrs.       Imaging Results Review: I reviewed radiology reports from this admission including: CT chest.  Other Study Results Review: EKG was reviewed.     Last 24 Hours Medication List:     Current Facility-Administered Medications:     acetaminophen (TYLENOL) tablet 975 mg, Q8H PRN    albuterol (PROVENTIL HFA,VENTOLIN HFA) inhaler 2 puff, Q4H PRN    amitriptyline (ELAVIL) tablet 150 mg, HS    apixaban (ELIQUIS) tablet 5 mg, BID    atorvastatin (LIPITOR) tablet 10 mg, Daily    benzonatate (TESSALON PERLES) capsule 200 mg, TID PRN    cefepime (MAXIPIME) 2 g/50 mL dextrose IVPB, Q8H, Last Rate: 2,000 mg (02/04/25 0859)    chlorhexidine (PERIDEX) 0.12 % oral rinse 15 mL, Q12H LATONIA    diazepam (VALIUM) tablet 2 mg, BID    famotidine (PEPCID) tablet 40 mg, Daily PRN    folic acid (FOLVITE) tablet 1 mg, Daily    [Held by provider] glimepiride (AMARYL) tablet 2 mg, Daily With Breakfast    HYDROmorphone (DILAUDID) injection 0.5 mg, Q3H PRN    insulin lispro (HumALOG/ADMELOG) 100 units/mL subcutaneous injection 1-6 Units, 4x Daily (AC & HS) **AND** Fingerstick Glucose (POCT), 4x Daily AC and at bedtime    ipratropium (ATROVENT) 0.02 % inhalation solution 0.5 mg, Q6H    levalbuterol (XOPENEX) inhalation solution 1.25 mg, Q6H    levothyroxine tablet 25 mcg, Early Morning    losartan (COZAAR) tablet 25 mg, Daily    morphine injection 2 mg, Q4H PRN    oxyCODONE (ROXICODONE) IR tablet 5 mg, Q4H PRN **OR** oxyCODONE (ROXICODONE) immediate release tablet 10 mg, Q4H PRN    pneumococcal 20-juana conj vacc (PREVNAR 20) IM Injection 0.5 mL, Once    QUEtiapine (SEROquel) tablet 25 mg, HS    sodium chloride (OCEAN) 0.65 % nasal spray 1 spray, Q1H PRN    vancomycin (VANCOCIN) 1,250 mg in sodium chloride 0.9 % 250 mL IVPB, Q12H    Administrative Statements   Today, Patient Was Seen By: Salinas Horner DO      **Please Note: This note may have been constructed  using a voice recognition system.**

## 2025-02-04 NOTE — PROGRESS NOTES
"History/ Recent events  Patient states that shortness breath is improved slightly since yesterday.  Continues to have cough.  No sputum production.  No fevers or chills.    Physical Exam  VS /86 (BP Location: Right arm)   Pulse (!) 125   Temp 97.7 °F (36.5 °C) (Oral)   Resp (!) 41   Ht 5' 7\" (1.702 m)   Wt 86.2 kg (190 lb 0.6 oz)   SpO2 99%   BMI 29.76 kg/m²   Mild respiratory distress  S1-S2  Markedly diminished breath sound diffusely  Soft, nontender, obese abdomen  Awake and alert    Labs: I have personally reviewed pertinent lab results.  Results from last 7 days   Lab Units 02/04/25  0440 02/03/25  0509 02/02/25  2325 02/02/25  0637   WBC Thousand/uL 3.87* 3.26*  --  2.90*   HEMOGLOBIN g/dL 9.5* 9.5* 9.6* 9.7*   HEMATOCRIT % 31.9* 31.2* 31.4* 31.6*   PLATELETS Thousands/uL 221 241  --  240   SEGS PCT % 49 43  --  44   MONO PCT % 21* 23*  --  22*   EOS PCT % 0 0  --  0      Results from last 7 days   Lab Units 02/04/25  0440 02/03/25  0509 02/02/25  0637   SODIUM mmol/L 141 141 140   POTASSIUM mmol/L 3.4* 3.5 3.7   CHLORIDE mmol/L 103 106 104   CO2 mmol/L 33* 27 29   BUN mg/dL 12 13 16   CREATININE mg/dL 0.58* 0.47* 0.57*   CALCIUM mg/dL 8.6 8.5 8.9   ALK PHOS U/L  --  65 69   ALT U/L  --  13 15   AST U/L  --  13 14     Results from last 7 days   Lab Units 02/02/25  0637   INR  1.30*     Results from last 7 days   Lab Units 02/02/25  0742   LACTIC ACID mmol/L 1.8     Results Reviewed       Procedure Component Value Units Date/Time    MRSA culture [[id number]]  (Normal) Collected: 02/03/25 0027    Lab Status: Final result Specimen: Nares from Nose Updated: 02/04/25 1015     MRSA Culture Only No Methicillin Resistant Staphlyococcus aureus (MRSA) isolated    Blood culture #1 [[id number]] Collected: 02/02/25 0647    Lab Status: Preliminary result Specimen: Blood from Arm, Right Updated: 02/03/25 1201     Blood Culture No Growth at 24 hrs.    Blood culture #2 [[id number]] Collected: 02/02/25 0736    Lab " Status: Preliminary result Specimen: Blood from Arm, Right Updated: 02/03/25 1201     Blood Culture No Growth at 24 hrs.    Comprehensive metabolic panel [[id number]]  (Abnormal) Collected: 02/03/25 0509    Lab Status: Final result Specimen: Blood from Arm, Right Updated: 02/03/25 0544     Sodium 141 mmol/L      Potassium 3.5 mmol/L      Chloride 106 mmol/L      CO2 27 mmol/L      ANION GAP 8 mmol/L      BUN 13 mg/dL      Creatinine 0.47 mg/dL      Glucose 154 mg/dL      Calcium 8.5 mg/dL      Corrected Calcium 9.2 mg/dL      AST 13 U/L      ALT 13 U/L      Alkaline Phosphatase 65 U/L      Total Protein 5.7 g/dL      Albumin 3.1 g/dL      Total Bilirubin 0.36 mg/dL      eGFR 125 ml/min/1.73sq m     Narrative:      National Kidney Disease Foundation guidelines for Chronic Kidney Disease (CKD):     Stage 1 with normal or high GFR (GFR > 90 mL/min/1.73 square meters)    Stage 2 Mild CKD (GFR = 60-89 mL/min/1.73 square meters)    Stage 3A Moderate CKD (GFR = 45-59 mL/min/1.73 square meters)    Stage 3B Moderate CKD (GFR = 30-44 mL/min/1.73 square meters)    Stage 4 Severe CKD (GFR = 15-29 mL/min/1.73 square meters)    Stage 5 End Stage CKD (GFR <15 mL/min/1.73 square meters)  Note: GFR calculation is accurate only with a steady state creatinine    Magnesium [[id number]]  (Abnormal) Collected: 02/03/25 0509    Lab Status: Final result Specimen: Blood from Arm, Right Updated: 02/03/25 0544     Magnesium 1.8 mg/dL     Phosphorus [[id number]]  (Normal) Collected: 02/03/25 0509    Lab Status: Final result Specimen: Blood from Arm, Right Updated: 02/03/25 0544     Phosphorus 3.0 mg/dL     CBC and differential [[id number]]  (Abnormal) Collected: 02/03/25 0509    Lab Status: Final result Specimen: Blood from Arm, Right Updated: 02/03/25 0524     WBC 3.26 Thousand/uL      RBC 3.69 Million/uL      Hemoglobin 9.5 g/dL      Hematocrit 31.2 %      MCV 85 fL      MCH 25.7 pg      MCHC 30.4 g/dL      RDW 17.2 %      MPV 10.0 fL       Platelets 241 Thousands/uL      nRBC 0 /100 WBCs      Segmented % 43 %      Immature Grans % 1 %      Lymphocytes % 32 %      Monocytes % 23 %      Eosinophils Relative 0 %      Basophils Relative 1 %      Absolute Neutrophils 1.44 Thousands/µL      Absolute Immature Grans 0.02 Thousand/uL      Absolute Lymphocytes 1.03 Thousands/µL      Absolute Monocytes 0.74 Thousand/µL      Eosinophils Absolute 0.00 Thousand/µL      Basophils Absolute 0.03 Thousands/µL     Stool Enteric Bacterial Panel by PCR [[id number]]     Lab Status: No result Specimen: Stool     Fingerstick Glucose (POCT) [[id number]]  (Abnormal) Collected: 02/02/25 1356    Lab Status: Final result Specimen: Blood Updated: 02/02/25 1400     POC Glucose 165 mg/dl     HS Troponin I 4hr [[id number]]  (Abnormal) Collected: 02/02/25 1134    Lab Status: Final result Specimen: Blood from Arm, Right Updated: 02/02/25 1213     hs TnI 4hr 66 ng/L      Delta 4hr hsTnI -6 ng/L     Blood gas, arterial [[id number]]  (Abnormal) Collected: 02/02/25 0952    Lab Status: Final result Specimen: Blood, Arterial from Radial, Left Updated: 02/02/25 1013     pH, Arterial 7.409     pCO2, Arterial 43.3 mm Hg      pO2, Arterial 77.3 mm Hg      HCO3, Arterial 26.8 mmol/L      Base Excess, Arterial 1.8 mmol/L      O2 Content, Arterial 18.8 mL/dL      O2 HGB,Arterial  93.5 %      SOURCE Radial, Left     VALERIO TEST Yes     Nasal Cannula NC 5 LPM    HS Troponin I 2hr [[id number]]  (Abnormal) Collected: 02/02/25 0904    Lab Status: Final result Specimen: Blood from Arm, Right Updated: 02/02/25 0950     hs TnI 2hr 63 ng/L      Delta 2hr hsTnI -9 ng/L     FLU/RSV/COVID - if FLU/RSV clinically relevant (2hr TAT) [[id number]]  (Normal) Collected: 02/02/25 0721    Lab Status: Final result Specimen: Nares from Nose Updated: 02/02/25 0852     SARS-CoV-2 Negative     INFLUENZA A PCR Negative     INFLUENZA B PCR Negative     RSV PCR Negative    Narrative:      This test has been performed using the  CoV-2/Flu/RSV plus assay on the [employer] GeneXpert platform. This test has been validated by the  and verified by the performing laboratory.     This test is designed to amplify and detect the following: nucleocapsid (N), envelope (E), and RNA-dependent RNA polymerase (RdRP) genes of the SARS-CoV-2 genome; matrix (M), basic polymerase (PB2), and acidic protein (PA) segments of the influenza A genome; matrix (M) and non-structural protein (NS) segments of the influenza B genome, and the nucleocapsid genes of RSV A and RSV B.     Positive results are indicative of the presence of Flu A, Flu B, RSV, and/or SARS-CoV-2 RNA. Positive results for SARS-CoV-2 or suspected novel influenza should be reported to state, local, or federal health departments according to local reporting requirements.      All results should be assessed in conjunction with clinical presentation and other laboratory markers for clinical management.     FOR PEDIATRIC PATIENTS - copy/paste COVID Guidelines URL to browser: [email or web address]       Procalcitonin [[id number]]  (Normal) Collected: 02/02/25 0647    Lab Status: Final result Specimen: Blood from Arm, Right Updated: 02/02/25 0814     Procalcitonin <0.05 ng/ml     Lactic acid [[id number]]  (Normal) Collected: 02/02/25 0742    Lab Status: Final result Specimen: Blood from Arm, Right Updated: 02/02/25 0809     LACTIC ACID 1.8 mmol/L     Narrative:      Result may be elevated if tourniquet was used during collection.    HS Troponin 0hr (reflex protocol) [[id number]]  (Abnormal) Collected: 02/02/25 0637    Lab Status: Final result Specimen: Blood from Arm, Right Updated: 02/02/25 0720     hs TnI 0hr 72 ng/L     B-Type Natriuretic Peptide(BNP) [[id number]]  (Abnormal) Collected: 02/02/25 0637    Lab Status: Final result Specimen: Blood from Arm, Right Updated: 02/02/25 0719      pg/mL     Comprehensive metabolic panel [[id number]]   (Abnormal) Collected: 02/02/25 0637    Lab Status: Final result Specimen: Blood from Arm, Right Updated: 02/02/25 0716     Sodium 140 mmol/L      Potassium 3.7 mmol/L      Chloride 104 mmol/L      CO2 29 mmol/L      ANION GAP 7 mmol/L      BUN 16 mg/dL      Creatinine 0.57 mg/dL      Glucose 165 mg/dL      Calcium 8.9 mg/dL      Corrected Calcium 9.5 mg/dL      AST 14 U/L      ALT 15 U/L      Alkaline Phosphatase 69 U/L      Total Protein 6.3 g/dL      Albumin 3.3 g/dL      Total Bilirubin 0.35 mg/dL      eGFR 115 ml/min/1.73sq m     Narrative:      National Kidney Disease Foundation guidelines for Chronic Kidney Disease (CKD):     Stage 1 with normal or high GFR (GFR > 90 mL/min/1.73 square meters)    Stage 2 Mild CKD (GFR = 60-89 mL/min/1.73 square meters)    Stage 3A Moderate CKD (GFR = 45-59 mL/min/1.73 square meters)    Stage 3B Moderate CKD (GFR = 30-44 mL/min/1.73 square meters)    Stage 4 Severe CKD (GFR = 15-29 mL/min/1.73 square meters)    Stage 5 End Stage CKD (GFR <15 mL/min/1.73 square meters)  Note: GFR calculation is accurate only with a steady state creatinine    Magnesium [[id number]]  (Normal) Collected: 02/02/25 0637    Lab Status: Final result Specimen: Blood from Arm, Right Updated: 02/02/25 0716     Magnesium 1.9 mg/dL     UA w Reflex to Microscopic w Reflex to Culture [[id number]]     Lab Status: No result Specimen: Urine     Protime-INR [[id number]]  (Abnormal) Collected: 02/02/25 0637    Lab Status: Final result Specimen: Blood from Arm, Right Updated: 02/02/25 0712     Protime 16.9 seconds      INR 1.30    Narrative:      INR Therapeutic Range    Indication                                             INR Range      Atrial Fibrillation                                               2.0-3.0  Hypercoagulable State                                    2.0.2.3  Left Ventricular Asist Device                            2.0-3.0  Mechanical Heart Valve                                  -    Aortic(with  afib, MI, embolism, HF, LA enlargement,    and/or coagulopathy)                                     2.0-3.0 (2.5-3.5)     Mitral                                                             2.5-3.5  Prosthetic/Bioprosthetic Heart Valve               2.0-3.0  Venous thromboembolism (VTE: VT, PE        2.0-3.0    APTT [[id number]]  (Normal) Collected: 02/02/25 0637    Lab Status: Final result Specimen: Blood from Arm, Right Updated: 02/02/25 0712     PTT 31 seconds     CBC and differential [[id number]]  (Abnormal) Collected: 02/02/25 0637    Lab Status: Final result Specimen: Blood from Arm, Right Updated: 02/02/25 0654     WBC 2.90 Thousand/uL      RBC 3.72 Million/uL      Hemoglobin 9.7 g/dL      Hematocrit 31.6 %      MCV 85 fL      MCH 26.1 pg      MCHC 30.7 g/dL      RDW 17.5 %      MPV 10.2 fL      Platelets 240 Thousands/uL      nRBC 0 /100 WBCs      Segmented % 44 %      Immature Grans % 1 %      Lymphocytes % 32 %      Monocytes % 22 %      Eosinophils Relative 0 %      Basophils Relative 1 %      Absolute Neutrophils 1.28 Thousands/µL      Absolute Immature Grans 0.03 Thousand/uL      Absolute Lymphocytes 0.93 Thousands/µL      Absolute Monocytes 0.63 Thousand/µL      Eosinophils Absolute 0.01 Thousand/µL      Basophils Absolute 0.02 Thousands/µL              MICRO  Results from last 7 days   Lab Units 02/03/25  0027 02/02/25  0736 02/02/25  0647   BLOOD CULTURE   --  No Growth at 24 hrs. No Growth at 24 hrs.   MRSA CULTURE ONLY  No Methicillin Resistant Staphlyococcus aureus (MRSA) isolated  --   --        Imaging/Radiology, Pathology  Results Review Statement: No pertinent imaging studies reviewed.    Current Medications    Current Facility-Administered Medications:     acetaminophen (TYLENOL) tablet 975 mg, 975 mg, Oral, Q8H PRN, Danisha Sostorecz, DO    albuterol (PROVENTIL HFA,VENTOLIN HFA) inhaler 2 puff, 2 puff, Inhalation, Q4H PRN, Danisha Sostorecz, DO    amitriptyline (ELAVIL) tablet 150 mg, 150 mg,  Oral, HS, Danisha Sostorecz, DO, 150 mg at 02/03/25 2224    apixaban (ELIQUIS) tablet 5 mg, 5 mg, Oral, BID, Danisha Sostorecz, DO, 5 mg at 02/04/25 0854    atorvastatin (LIPITOR) tablet 10 mg, 10 mg, Oral, Daily, Danisha Sostorecz, DO, 10 mg at 02/04/25 0854    benzonatate (TESSALON PERLES) capsule 200 mg, 200 mg, Oral, TID PRN, Danisha Sostorecz, DO, 200 mg at 02/02/25 2000    cefepime (MAXIPIME) 2 g/50 mL dextrose IVPB, 2 g, Intravenous, Q8H, Danisha Sostorecz, DO, Last Rate: 100 mL/hr at 02/04/25 0859, 2,000 mg at 02/04/25 0859    chlorhexidine (PERIDEX) 0.12 % oral rinse 15 mL, 15 mL, Mouth/Throat, Q12H LATONIA, Danisha Sostorecz, DO, 15 mL at 02/04/25 0854    famotidine (PEPCID) tablet 40 mg, 40 mg, Oral, Daily PRN, Danisha Sostorecz, DO    folic acid (FOLVITE) tablet 1 mg, 1 mg, Oral, Daily, Zohreh Iyer PA-C, 1 mg at 02/04/25 0854    [Held by provider] glimepiride (AMARYL) tablet 2 mg, 2 mg, Oral, Daily With Breakfast, Akosua Moreau MD    HYDROmorphone (DILAUDID) injection 0.5 mg, 0.5 mg, Intravenous, Q3H PRN, Danisha Sostorecz, DO    HYDROmorphone HCl (DILAUDID) injection 0.2 mg, 0.2 mg, Intravenous, Q3H PRN, Danisha Sostorecz, DO, 0.2 mg at 02/03/25 1337    insulin lispro (HumALOG/ADMELOG) 100 units/mL subcutaneous injection 1-6 Units, 1-6 Units, Subcutaneous, 4x Daily (AC & HS), 2 Units at 02/03/25 2230 **AND** Fingerstick Glucose (POCT), , , 4x Daily AC and at bedtime, Zohreh Iyer PA-C    ipratropium (ATROVENT) 0.02 % inhalation solution 0.5 mg, 0.5 mg, Nebulization, Q6H, Danisha Sostorecz, DO, 0.5 mg at 02/04/25 0752    levalbuterol (XOPENEX) inhalation solution 1.25 mg, 1.25 mg, Nebulization, Q6H, Danisha Sostorecz, DO, 1.25 mg at 02/04/25 0744    levothyroxine tablet 25 mcg, 25 mcg, Oral, Early Morning, Danisha Sostorecz, DO, 25 mcg at 02/04/25 0515    losartan (COZAAR) tablet 25 mg, 25 mg, Oral, Daily, Danisha Sostorecz, DO, 25 mg at 02/04/25 0859    oxyCODONE  "(ROXICODONE) IR tablet 5 mg, 5 mg, Oral, Q4H PRN, Nuzhat Scott MD, 5 mg at 02/03/25 1841    pneumococcal 20-juana conj vacc (PREVNAR 20) IM Injection 0.5 mL, 0.5 mL, Intramuscular, Once, Danisha Sostorecz, DO    QUEtiapine (SEROquel) tablet 25 mg, 25 mg, Oral, HS, Danisha Sostorecz, DO, 25 mg at 02/03/25 2225    sodium chloride (OCEAN) 0.65 % nasal spray 1 spray, 1 spray, Each Nare, Q1H PRN, Danisha Sostorecz, DO    vancomycin (VANCOCIN) 1,250 mg in sodium chloride 0.9 % 250 mL IVPB, 1,250 mg, Intravenous, Q12H, Salinas Horner DO       Assessment  Severe hypoxic respiratory failure requiring nonrebreather mask at 100% FiO2  Adenocarcinoma of the lung, stage IV  History of pulmonary embolism    Plan    Pulm: I recommend weaning off 100% FiO2 via nonrebreather.  I recommend trying mid flow nasal cannula.  Goal SpO2 would be 88% to 92%.  Home dose of oxygen is 4 L of supplemental oxygen via nasal cannula.  Heme/Onc: Treatment for adenocarcinoma is currently on hold.  Recommend continuing anticoagulation for history of DVT.  Recommend following with palliative care medicine.    Pulmonary medicine will continue to follow as consulting physician.      Critical Care Time Statement  Upon my evaluation, this patient has a high probability of imminent or life-threatening deterioration due to severe hypoxic respiratory failure requiring 100% FiO2 via nonrebreather mask.  Which required my direct attention, intervention, and personal management.     I have personally provided 32 minutes of critical care time, exclusive of procedures, teaching, and any prior time recorded by providers other than myself. Time includes review of laboratory data, radiology results, discussion with consultants, and monitoring for potential decompensation.        Helio Sharma MD    Note: Portions of the record may have been created with voice recognition software. Occasional wrong word or \"sound a like\" substitutions may have occurred due to the " inherent limitations of voice recognition software. Read the chart carefully and recognize, using context, where substitutions have occurred.

## 2025-02-04 NOTE — PLAN OF CARE
Problem: PAIN - ADULT  Goal: Verbalizes/displays adequate comfort level or baseline comfort level  Description: Interventions:  - Encourage patient to monitor pain and request assistance  - Assess pain using appropriate pain scale  - Administer analgesics based on type and severity of pain and evaluate response  - Implement non-pharmacological measures as appropriate and evaluate response  - Consider cultural and social influences on pain and pain management  - Notify physician/advanced practitioner if interventions unsuccessful or patient reports new pain  Outcome: Progressing     Problem: INFECTION - ADULT  Goal: Absence or prevention of progression during hospitalization  Description: INTERVENTIONS:  - Assess and monitor for signs and symptoms of infection  - Monitor lab/diagnostic results  - Monitor all insertion sites, i.e. indwelling lines, tubes, and drains  - Monitor endotracheal if appropriate and nasal secretions for changes in amount and color  - [place] appropriate cooling/warming therapies per order  - Administer medications as ordered  - Instruct and encourage patient and family to use good hand hygiene technique  - Identify and instruct in appropriate isolation precautions for identified infection/condition  Outcome: Progressing  Goal: Absence of fever/infection during neutropenic period  Description: INTERVENTIONS:  - Monitor WBC    Outcome: Progressing     Problem: DISCHARGE PLANNING  Goal: Discharge to home or other facility with appropriate resources  Description: INTERVENTIONS:  - Identify barriers to discharge w/patient and caregiver  - Arrange for needed discharge resources and transportation as appropriate  - Identify discharge learning needs (meds, wound care, etc.)  - Arrange for interpretive services to assist at discharge as needed  - Refer to Case Management Department for coordinating discharge planning if the patient needs post-hospital services based on physician/advanced  practitioner order or complex needs related to functional status, cognitive ability, or social support system  Outcome: Progressing     Problem: Knowledge Deficit  Goal: Patient/family/caregiver demonstrates understanding of disease process, treatment plan, medications, and discharge instructions  Description: Complete learning assessment and assess knowledge base.  Interventions:  - Provide teaching at level of understanding  - Provide teaching via preferred learning methods  Outcome: Progressing     Problem: Prexisting or High Potential for Compromised Skin Integrity  Goal: Skin integrity is maintained or improved  Description: INTERVENTIONS:  - Identify patients at risk for skin breakdown  - Assess and monitor skin integrity  - Assess and monitor nutrition and hydration status  - Monitor labs   - Assess for incontinence   - Turn and reposition patient  - Assist with mobility/ambulation  - Relieve pressure over bony prominences  - Avoid friction and shearing  - Provide appropriate hygiene as needed including keeping skin clean and dry  - Evaluate need for skin moisturizer/barrier cream  - Collaborate with interdisciplinary team   - Patient/family teaching  - Consider wound care consult   Outcome: Progressing

## 2025-02-04 NOTE — PROGRESS NOTES
Progress Note - Palliative Care   Name: Papo Gibbs 54 y.o. 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 Estrella    Supportive listening and presence provided  Anxiety containment provided  Instructions for management and anticipatory guidance provided  Communicated and coordinated with critical care and oncology teams  Counseling regarding advance care planning and goals of care  Patient confirms that he would like to forego any more cancer related treatments  We will be focusing on symptom management and plan to talk more about different options moving forward-->palliative care with other disease directed treatments vs transition to pure comfort cares/hospice-->he is still undecided                 Code Status: Switch to DNAR/DNI - Level 3               Decisional apparatus:  Patient is competent on my exam today.  If competence is lost, patient's substitute decision maker would default to  Tomasa (spouse) and then Giancarlo (alternate)               Advance Directive / Living Will / POLST:  On file       NARRATIVE AND INTERVAL HISTORY:     Overnight, the patient required NRB, but was switched back to HF NC, then to [facility].  Received 1 dose of hydromorphone IV 0.2 mg at 1337, but wanted to try oxycodone IR 5 mg and was given that on 1841.  Family at bedside for this encounter today as he sits in his hospital bed with mid flow nasal cannula.  Slept well overnight, and responded well to 0.25 mg dose of Ativan.  Still having intermittent pain, anxiety, and air hunger.    MEDICATIONS / ALLERGIES:     all current active meds have been reviewed, current meds:   Current Facility-Administered Medications:     acetaminophen (TYLENOL) tablet 975 mg, Q8H PRN    albuterol (PROVENTIL HFA,VENTOLIN HFA) inhaler 2 puff, Q4H PRN    amitriptyline (ELAVIL) tablet 150 mg, HS    apixaban (ELIQUIS) tablet 5 mg, BID    atorvastatin (LIPITOR) tablet 10 mg, Daily    benzonatate (TESSALON PERLES) capsule 200 mg, TID PRN    cefepime (MAXIPIME) 2 g/50 mL dextrose IVPB, Q8H, Last Rate: 2,000 mg (02/04/25 0100)    chlorhexidine (PERIDEX) 0.12 % oral rinse 15 mL, Q12H LATONIA    famotidine (PEPCID) tablet 40 mg, Daily PRN    folic acid (FOLVITE) tablet 1 mg, Daily    [Held by provider] glimepiride (AMARYL) tablet 2 mg, Daily With Breakfast    HYDROmorphone (DILAUDID) injection 0.5 mg, Q3H PRN    HYDROmorphone HCl (DILAUDID) injection 0.2 mg, Q3H PRN    insulin lispro (HumALOG/ADMELOG) 100 units/mL subcutaneous injection 1-6 Units, 4x Daily (AC & HS) **AND** Fingerstick Glucose (POCT), 4x Daily AC and at bedtime    ipratropium (ATROVENT) 0.02 % inhalation solution 0.5 mg, Q6H    levalbuterol (XOPENEX) inhalation solution 1.25 mg, Q6H    levothyroxine tablet 25 mcg, Early Morning    losartan (COZAAR) tablet 25 mg, Daily    oxyCODONE (ROXICODONE) IR tablet 5 mg, Q4H PRN    pneumococcal 20-juana conj vacc (PREVNAR 20) IM Injection 0.5 mL, Once     QUEtiapine (SEROquel) tablet 25 mg, HS    sodium chloride (OCEAN) 0.65 % nasal spray 1 spray, Q1H PRN    vancomycin (VANCOCIN) 1,250 mg in sodium chloride 0.9 % 250 mL IVPB, Q12H, and PTA meds:   Prior to Admission Medications   Prescriptions Last Dose Informant Patient Reported? Taking?   Glucagon, rDNA, (Glucagon Emergency) 1 MG KIT  Self No No   Sig: Inject 1 mg as directed once as needed (hypoglycemia) for up to 1 dose   Patient not taking: Reported on 2024   Lancets (OneTouch Delica Plus [id number]) MISC  Self No No   Sig: Use 1 Units 4 (four) times a day   OneTouch Verio test strip  Self No No   Sig: Use 1 each 4 (four) times a day   acetaminophen (TYLENOL) 325 mg tablet  Self No No   Sig: Take 3 tablets (975 mg total) by mouth every 8 (eight) hours as needed for mild pain, headaches or fever   albuterol (PROVENTIL HFA,VENTOLIN HFA) 90 mcg/act inhaler  Self No No   Sig: INHALE 2 PUFFS EVERY 6 HOURS AS NEEDED FOR WHEEZING   amitriptyline (ELAVIL) 100 mg tablet  Self Yes No   Si mg daily at bedtime   apixaban (Eliquis) 5 mg   No No   Sig: Take 1 tablet (5 mg total) by mouth 2 (two) times a day   atorvastatin (LIPITOR) 10 mg tablet  Self No No   Sig: Take 1 tablet (10 mg total) by mouth daily   benzonatate (TESSALON) 200 MG capsule  Self No No   Sig: Take 1 capsule (200 mg total) by mouth 3 (three) times a day as needed for cough   famotidine (PEPCID) 40 MG tablet  Self Yes No   Sig: Take 40 mg by mouth daily as needed for heartburn or indigestion Taking as needed   folic acid (FOLVITE) 1 mg tablet  Self No No   Sig: TAKE 1 TABLET BY MOUTH EVERY DAY   glimepiride (AMARYL) 2 mg tablet  Self No No   Sig: TAKE 1 TABLET BY MOUTH DAILY WITH BREAKFAST   levothyroxine 25 mcg tablet  Self No No   Sig: TAKE 1 TABLET BY MOUTH EVERY DAY   losartan (COZAAR) 25 mg tablet   No No   Sig: Take 1 tablet (25 mg total) by mouth daily   magnesium Oxide (MAG-OX) 400 mg TABS   No No   Sig: Take 1 tablet (400 mg total) by  mouth daily   meclizine (ANTIVERT) 25 mg tablet  Self No No   Sig: Take 1 tablet (25 mg total) by mouth every 8 (eight) hours as needed for dizziness   Patient not taking: Reported on 12/18/2024   metFORMIN (GLUCOPHAGE) 1000 MG tablet  Self Yes No   Sig: Take 1,000 mg by mouth 2 (two) times a day   naloxone (NARCAN) 4 mg/0.1 mL nasal spray  Self No No   Sig: Administer 1 spray into a nostril. If no response after 2-3 minutes, give another dose in the other nostril using a new spray. For use in emergencies for opioid / pain medication reversal for accidental ingestion, respiratory depression, sedation or concerns for overdose.   ondansetron (ZOFRAN-ODT) 8 mg disintegrating tablet  Self No No   Sig: Take 1 tablet (8 mg total) by mouth every 8 (eight) hours as needed for vomiting or nausea   oxyCODONE (Roxicodone) 5 immediate release tablet  Self No No   Sig: Take 0.5 tablets (2.5 mg total) by mouth every 4 (four) hours as needed for moderate pain If pain is severe, may instead take 1 tablet (5mg) every 4 hours as needed Max Daily Amount: 15 mg   senna (SENOKOT) 8.6 mg  Self No No   Sig: Take 1 tablet (8.6 mg total) by mouth daily at bedtime as needed for constipation   tiotropium-olodaterol (Stiolto Respimat) 2.5-2.5 MCG/ACT inhaler  Self No No   Sig: Inhale 2 puffs daily      Facility-Administered Medications: None       No Known Allergies    OBJECTIVE:    Physical Exam  Physical Exam  Constitutional:       Appearance: He is ill-appearing. He is not toxic-appearing.      Interventions: Nasal cannula in place.   HENT:      Head: Normocephalic and atraumatic.      Right Ear: External ear normal.      Left Ear: External ear normal.      Nose: Nose normal.   Eyes:      General: No scleral icterus.     Conjunctiva/sclera: Conjunctivae normal.   Cardiovascular:      Rate and Rhythm: Regular rhythm. Tachycardia present.      Pulses: Normal pulses.   Pulmonary:      Comments: Mildly tachypneic with increased  "effort  Abdominal:      General: There is no distension.      Palpations: Abdomen is soft.      Tenderness: There is abdominal tenderness (subcostal).   Musculoskeletal:      Right lower leg: No edema.      Left lower leg: No edema.   Skin:     Coloration: Skin is pale. Skin is not jaundiced.   Neurological:      General: No focal deficit present.      Mental Status: He is alert. Mental status is at baseline.   Psychiatric:         Attention and Perception: Attention and perception normal.         Mood and Affect: Mood is anxious. Affect is tearful.         Speech: Speech normal.         Behavior: Behavior is cooperative.         Thought Content: Thought content normal.         Cognition and Memory: Cognition and memory normal.         Judgment: Judgment normal.         Lab Results: I have personally reviewed pertinent labs., CBC:   Lab Results   Component Value Date    WBC 3.87 (L) 02/04/2025    HGB 9.5 (L) 02/04/2025    HCT 31.9 (L) 02/04/2025    MCV 86 02/04/2025     02/04/2025    RBC 3.71 (L) 02/04/2025    MCH 25.6 (L) 02/04/2025    MCHC 29.8 (L) 02/04/2025    RDW 17.4 (H) 02/04/2025    MPV 10.2 02/04/2025    NRBC 0 02/04/2025   , CMP:   Lab Results   Component Value Date    SODIUM 141 02/04/2025    K 3.4 (L) 02/04/2025     02/04/2025    CO2 33 (H) 02/04/2025    BUN 12 02/04/2025    CREATININE 0.58 (L) 02/04/2025    CALCIUM 8.6 02/04/2025    EGFR 114 02/04/2025   , BMP:  Lab Results   Component Value Date    SODIUM 141 02/04/2025    K 3.4 (L) 02/04/2025     02/04/2025    CO2 33 (H) 02/04/2025    BUN 12 02/04/2025    CREATININE 0.58 (L) 02/04/2025    GLUC 146 (H) 02/04/2025    CALCIUM 8.6 02/04/2025    AGAP 5 02/04/2025    EGFR 114 02/04/2025   , PT/PTT:No results found for: \"PT\", \"PTT\"  Imaging Studies: Reviewed and interpreted the pertinent studies  EKG, Pathology, and Other Studies: Reviewed and interpreted the pertinent studies    I have spent a total time of 50 minutes in caring for this " patient on the day of the visit/encounter including Instructions for management, Impressions, Counseling / Coordination of care, Documenting in the medical record, Reviewing / ordering tests, medicine, procedures  , and Obtaining or reviewing history  . Topics discussed with the patient / family include symptom assessment and management, medication review, medication adjustment, psychosocial support, opioid titration, supportive listening, and anticipatory guidance.

## 2025-02-04 NOTE — ASSESSMENT & PLAN NOTE
Continue oxycodone IR 5 mg/10 mg every 4 hours as needed for moderate/severe pain  Continue hydromorphone IV 0.5

## 2025-02-05 ENCOUNTER — PATIENT MESSAGE (OUTPATIENT)
Dept: HEMATOLOGY ONCOLOGY | Facility: CLINIC | Age: 55
End: 2025-02-05

## 2025-02-05 PROBLEM — A41.9 SEPSIS (HCC): Status: ACTIVE | Noted: 2025-02-05

## 2025-02-05 LAB
ALBUMIN SERPL BCG-MCNC: 3.3 G/DL (ref 3.5–5)
ANION GAP SERPL CALCULATED.3IONS-SCNC: 6 MMOL/L (ref 4–13)
BUN SERPL-MCNC: 14 MG/DL (ref 5–25)
CALCIUM ALBUM COR SERPL-MCNC: 9.6 MG/DL (ref 8.3–10.1)
CALCIUM SERPL-MCNC: 9 MG/DL (ref 8.4–10.2)
CHLORIDE SERPL-SCNC: 104 MMOL/L (ref 96–108)
CO2 SERPL-SCNC: 31 MMOL/L (ref 21–32)
CREAT SERPL-MCNC: 0.62 MG/DL (ref 0.6–1.3)
ERYTHROCYTE [DISTWIDTH] IN BLOOD BY AUTOMATED COUNT: 17.5 % (ref 11.6–15.1)
GFR SERPL CREATININE-BSD FRML MDRD: 111 ML/MIN/1.73SQ M
GLUCOSE SERPL-MCNC: 147 MG/DL (ref 65–140)
GLUCOSE SERPL-MCNC: 166 MG/DL (ref 65–140)
GLUCOSE SERPL-MCNC: 177 MG/DL (ref 65–140)
GLUCOSE SERPL-MCNC: 180 MG/DL (ref 65–140)
GLUCOSE SERPL-MCNC: 221 MG/DL (ref 65–140)
HCT VFR BLD AUTO: 31.4 % (ref 36.5–49.3)
HGB BLD-MCNC: 9.2 G/DL (ref 12–17)
MAGNESIUM SERPL-MCNC: 1.9 MG/DL (ref 1.9–2.7)
MCH RBC QN AUTO: 25.8 PG (ref 26.8–34.3)
MCHC RBC AUTO-ENTMCNC: 29.3 G/DL (ref 31.4–37.4)
MCV RBC AUTO: 88 FL (ref 82–98)
PHOSPHATE SERPL-MCNC: 3.2 MG/DL (ref 2.7–4.5)
PLATELET # BLD AUTO: 205 THOUSANDS/UL (ref 149–390)
PMV BLD AUTO: 10.1 FL (ref 8.9–12.7)
POTASSIUM SERPL-SCNC: 3.8 MMOL/L (ref 3.5–5.3)
RBC # BLD AUTO: 3.56 MILLION/UL (ref 3.88–5.62)
SODIUM SERPL-SCNC: 141 MMOL/L (ref 135–147)
WBC # BLD AUTO: 4.08 THOUSAND/UL (ref 4.31–10.16)

## 2025-02-05 PROCEDURE — 94760 N-INVAS EAR/PLS OXIMETRY 1: CPT

## 2025-02-05 PROCEDURE — 99233 SBSQ HOSP IP/OBS HIGH 50: CPT | Performed by: STUDENT IN AN ORGANIZED HEALTH CARE EDUCATION/TRAINING PROGRAM

## 2025-02-05 PROCEDURE — 80069 RENAL FUNCTION PANEL: CPT | Performed by: PHYSICIAN ASSISTANT

## 2025-02-05 PROCEDURE — 82948 REAGENT STRIP/BLOOD GLUCOSE: CPT

## 2025-02-05 PROCEDURE — 83735 ASSAY OF MAGNESIUM: CPT | Performed by: PHYSICIAN ASSISTANT

## 2025-02-05 PROCEDURE — 85027 COMPLETE CBC AUTOMATED: CPT | Performed by: PHYSICIAN ASSISTANT

## 2025-02-05 PROCEDURE — 94640 AIRWAY INHALATION TREATMENT: CPT

## 2025-02-05 PROCEDURE — 99232 SBSQ HOSP IP/OBS MODERATE 35: CPT | Performed by: INTERNAL MEDICINE

## 2025-02-05 PROCEDURE — 99232 SBSQ HOSP IP/OBS MODERATE 35: CPT | Performed by: STUDENT IN AN ORGANIZED HEALTH CARE EDUCATION/TRAINING PROGRAM

## 2025-02-05 RX ORDER — ECHINACEA PURPUREA EXTRACT 125 MG
2 TABLET ORAL 3 TIMES DAILY
Status: DISCONTINUED | OUTPATIENT
Start: 2025-02-05 | End: 2025-02-13 | Stop reason: HOSPADM

## 2025-02-05 RX ORDER — FUROSEMIDE 10 MG/ML
40 INJECTION INTRAMUSCULAR; INTRAVENOUS ONCE
Status: COMPLETED | OUTPATIENT
Start: 2025-02-05 | End: 2025-02-05

## 2025-02-05 RX ORDER — DIAZEPAM 5 MG/1
5 TABLET ORAL 2 TIMES DAILY
Status: DISCONTINUED | OUTPATIENT
Start: 2025-02-05 | End: 2025-02-13 | Stop reason: HOSPADM

## 2025-02-05 RX ORDER — ALBUMIN (HUMAN) 12.5 G/50ML
25 SOLUTION INTRAVENOUS ONCE
Status: COMPLETED | OUTPATIENT
Start: 2025-02-05 | End: 2025-02-05

## 2025-02-05 RX ADMIN — INSULIN LISPRO 1 UNITS: 100 INJECTION, SOLUTION INTRAVENOUS; SUBCUTANEOUS at 21:50

## 2025-02-05 RX ADMIN — MORPHINE SULFATE 2 MG: 2 INJECTION, SOLUTION INTRAMUSCULAR; INTRAVENOUS at 09:04

## 2025-02-05 RX ADMIN — INSULIN LISPRO 1 UNITS: 100 INJECTION, SOLUTION INTRAVENOUS; SUBCUTANEOUS at 12:35

## 2025-02-05 RX ADMIN — AMITRIPTYLINE HYDROCHLORIDE 150 MG: 25 TABLET, FILM COATED ORAL at 21:43

## 2025-02-05 RX ADMIN — LEVOTHYROXINE SODIUM 25 MCG: 25 TABLET ORAL at 05:53

## 2025-02-05 RX ADMIN — LEVALBUTEROL HYDROCHLORIDE 1.25 MG: 1.25 SOLUTION RESPIRATORY (INHALATION) at 02:22

## 2025-02-05 RX ADMIN — LEVALBUTEROL HYDROCHLORIDE 1.25 MG: 1.25 SOLUTION RESPIRATORY (INHALATION) at 07:45

## 2025-02-05 RX ADMIN — FOLIC ACID 1 MG: 1 TABLET ORAL at 09:04

## 2025-02-05 RX ADMIN — DIAZEPAM 2 MG: 2 TABLET ORAL at 09:04

## 2025-02-05 RX ADMIN — Medication 12.5 MG: at 12:37

## 2025-02-05 RX ADMIN — CEFEPIME 2000 MG: 2 INJECTION, POWDER, FOR SOLUTION INTRAVENOUS at 16:13

## 2025-02-05 RX ADMIN — QUETIAPINE FUMARATE 25 MG: 25 TABLET ORAL at 21:43

## 2025-02-05 RX ADMIN — MORPHINE SULFATE 2 MG: 2 INJECTION, SOLUTION INTRAMUSCULAR; INTRAVENOUS at 14:29

## 2025-02-05 RX ADMIN — IPRATROPIUM BROMIDE 0.5 MG: 0.5 SOLUTION RESPIRATORY (INHALATION) at 02:22

## 2025-02-05 RX ADMIN — IPRATROPIUM BROMIDE 0.5 MG: 0.5 SOLUTION RESPIRATORY (INHALATION) at 13:26

## 2025-02-05 RX ADMIN — IPRATROPIUM BROMIDE 0.5 MG: 0.5 SOLUTION RESPIRATORY (INHALATION) at 19:11

## 2025-02-05 RX ADMIN — ATORVASTATIN CALCIUM 10 MG: 10 TABLET, FILM COATED ORAL at 09:04

## 2025-02-05 RX ADMIN — ALBUTEROL SULFATE 2 PUFF: 90 AEROSOL, METERED RESPIRATORY (INHALATION) at 17:19

## 2025-02-05 RX ADMIN — APIXABAN 5 MG: 5 TABLET, FILM COATED ORAL at 18:15

## 2025-02-05 RX ADMIN — FUROSEMIDE 40 MG: 10 INJECTION, SOLUTION INTRAMUSCULAR; INTRAVENOUS at 12:17

## 2025-02-05 RX ADMIN — CEFEPIME 2000 MG: 2 INJECTION, POWDER, FOR SOLUTION INTRAVENOUS at 00:42

## 2025-02-05 RX ADMIN — LOSARTAN POTASSIUM 25 MG: 25 TABLET, FILM COATED ORAL at 09:04

## 2025-02-05 RX ADMIN — CEFEPIME 2000 MG: 2 INJECTION, POWDER, FOR SOLUTION INTRAVENOUS at 09:25

## 2025-02-05 RX ADMIN — SALINE NASAL SPRAY 2 SPRAY: 1.5 SOLUTION NASAL at 16:12

## 2025-02-05 RX ADMIN — MORPHINE SULFATE 2 MG: 2 INJECTION, SOLUTION INTRAMUSCULAR; INTRAVENOUS at 18:15

## 2025-02-05 RX ADMIN — VANCOMYCIN HYDROCHLORIDE 1250 MG: 5 INJECTION, POWDER, LYOPHILIZED, FOR SOLUTION INTRAVENOUS at 10:19

## 2025-02-05 RX ADMIN — LEVALBUTEROL HYDROCHLORIDE 1.25 MG: 1.25 SOLUTION RESPIRATORY (INHALATION) at 13:26

## 2025-02-05 RX ADMIN — ALBUMIN (HUMAN) 25 G: 0.25 INJECTION, SOLUTION INTRAVENOUS at 12:19

## 2025-02-05 RX ADMIN — DIAZEPAM 5 MG: 5 TABLET ORAL at 18:15

## 2025-02-05 RX ADMIN — CHLORHEXIDINE GLUCONATE 15 ML: 1.2 RINSE ORAL at 09:04

## 2025-02-05 RX ADMIN — IPRATROPIUM BROMIDE 0.5 MG: 0.5 SOLUTION RESPIRATORY (INHALATION) at 07:44

## 2025-02-05 RX ADMIN — INSULIN LISPRO 2 UNITS: 100 INJECTION, SOLUTION INTRAVENOUS; SUBCUTANEOUS at 17:04

## 2025-02-05 RX ADMIN — Medication 12.5 MG: at 21:44

## 2025-02-05 RX ADMIN — INSULIN LISPRO 1 UNITS: 100 INJECTION, SOLUTION INTRAVENOUS; SUBCUTANEOUS at 09:25

## 2025-02-05 RX ADMIN — SALINE NASAL SPRAY 2 SPRAY: 1.5 SOLUTION NASAL at 12:16

## 2025-02-05 RX ADMIN — APIXABAN 5 MG: 5 TABLET, FILM COATED ORAL at 09:04

## 2025-02-05 RX ADMIN — LEVALBUTEROL HYDROCHLORIDE 1.25 MG: 1.25 SOLUTION RESPIRATORY (INHALATION) at 19:11

## 2025-02-05 RX ADMIN — VANCOMYCIN HYDROCHLORIDE 1250 MG: 5 INJECTION, POWDER, LYOPHILIZED, FOR SOLUTION INTRAVENOUS at 21:47

## 2025-02-05 NOTE — PROGRESS NOTES
Progress Note - Palliative Care   Name: Papo Gibbs 54 y.o. 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 Estrella    Supportive listening and presence provided  Anxiety containment provided  Instructions for management and anticipatory guidance provided  Counseling regarding advance care planning and goals of care  Given that symptoms are better controlled, he confirms that he would still like to forego any more cancer related therapies.  However, he would still like to be able to be hospitalized for different situations and continue to follow-up with the palliative care team in the outpatient setting.  We will make sure he sees us in the office after discharge.                 Code Status: Switch to DNAR/DNI - Level 3               Decisional apparatus:  Patient is competent on my exam today.  If competence is lost, patient's substitute decision maker would default to Tomasa (spouse) and then Giancarlo (alternate)                Advance Directive / Living Will / POLST:  On file         NARRATIVE AND INTERVAL HISTORY:    Transferred out of ICU.  Downtitrated to 6L/min NC O2. In last 24hours got 5mg oxy IR PO, 4mg morphine IV, 0.5mg hydromorphone IV (total OME 29.5).  Patient seen and examined in hospital bed with sister present.  States that his pain is well-controlled along with air hunger feeling better.  Anxiety is better controlled as well, but could be better.  He would prefer an increase in the Valium dosing.  No adverse effects.  Would like to have a nasal spray to keep his nose hydrated.  Confirms he would like to forego any more cancer related treatments, but would be amenable to getting hospitalized in the future for other conditions and continue following with the palliative care specialists.    MEDICATIONS / ALLERGIES:     all current active meds have been reviewed, current meds:   Current Facility-Administered Medications:     acetaminophen (TYLENOL) tablet 975 mg, Q8H PRN    albuterol (PROVENTIL HFA,VENTOLIN HFA) inhaler 2 puff, Q4H PRN    amitriptyline (ELAVIL) tablet 150 mg, HS    apixaban (ELIQUIS) tablet 5 mg, BID    atorvastatin (LIPITOR) tablet 10 mg, Daily    benzonatate (TESSALON PERLES) capsule 200 mg, TID PRN    cefepime (MAXIPIME) 2 g/50 mL dextrose IVPB, Q8H, Last Rate: 2,000 mg (02/05/25 0042)    chlorhexidine (PERIDEX) 0.12 % oral rinse 15 mL, Q12H LATONIA    diazepam (VALIUM) tablet 2 mg, BID    famotidine (PEPCID) tablet 40 mg, Daily PRN    folic acid (FOLVITE) tablet 1 mg, Daily    [Held by provider] glimepiride (AMARYL) tablet 2 mg, Daily With Breakfast    HYDROmorphone (DILAUDID) injection 0.5 mg, Q3H PRN    influenza vaccine, recombinant (PF) (Flublok) IM injection 0.5 mL, Once    insulin lispro (HumALOG/ADMELOG) 100 units/mL subcutaneous injection 1-6 Units, 4x Daily (AC & HS) **AND** Fingerstick Glucose (POCT), 4x Daily AC and at bedtime    ipratropium (ATROVENT) 0.02 % inhalation solution 0.5 mg, Q6H     levalbuterol (XOPENEX) inhalation solution 1.25 mg, Q6H    levothyroxine tablet 25 mcg, Early Morning    losartan (COZAAR) tablet 25 mg, Daily    morphine injection 2 mg, Q4H PRN    oxyCODONE (ROXICODONE) IR tablet 5 mg, Q4H PRN **OR** oxyCODONE (ROXICODONE) immediate release tablet 10 mg, Q4H PRN    pneumococcal 20-juana conj vacc (PREVNAR 20) IM Injection 0.5 mL, Once    QUEtiapine (SEROquel) tablet 25 mg, HS    sodium chloride (OCEAN) 0.65 % nasal spray 1 spray, Q1H PRN    vancomycin (VANCOCIN) 1,250 mg in sodium chloride 0.9 % 250 mL IVPB, Q12H, Last Rate: 1,250 mg (251), and PTA meds:   Prior to Admission Medications   Prescriptions Last Dose Informant Patient Reported? Taking?   Glucagon, rDNA, (Glucagon Emergency) 1 MG KIT  Self No No   Sig: Inject 1 mg as directed once as needed (hypoglycemia) for up to 1 dose   Patient not taking: Reported on 2024   Lancets (OneTouch Delica Plus [id number]) MISC  Self No No   Sig: Use 1 Units 4 (four) times a day   OneTouch Verio test strip  Self No No   Sig: Use 1 each 4 (four) times a day   acetaminophen (TYLENOL) 325 mg tablet  Self No No   Sig: Take 3 tablets (975 mg total) by mouth every 8 (eight) hours as needed for mild pain, headaches or fever   albuterol (PROVENTIL HFA,VENTOLIN HFA) 90 mcg/act inhaler  Self No No   Sig: INHALE 2 PUFFS EVERY 6 HOURS AS NEEDED FOR WHEEZING   amitriptyline (ELAVIL) 100 mg tablet  Self Yes No   Si mg daily at bedtime   apixaban (Eliquis) 5 mg   No No   Sig: Take 1 tablet (5 mg total) by mouth 2 (two) times a day   atorvastatin (LIPITOR) 10 mg tablet  Self No No   Sig: Take 1 tablet (10 mg total) by mouth daily   benzonatate (TESSALON) 200 MG capsule  Self No No   Sig: Take 1 capsule (200 mg total) by mouth 3 (three) times a day as needed for cough   famotidine (PEPCID) 40 MG tablet  Self Yes No   Sig: Take 40 mg by mouth daily as needed for heartburn or indigestion Taking as needed   folic acid (FOLVITE) 1 mg tablet   Self No No   Sig: TAKE 1 TABLET BY MOUTH EVERY DAY   glimepiride (AMARYL) 2 mg tablet  Self No No   Sig: TAKE 1 TABLET BY MOUTH DAILY WITH BREAKFAST   levothyroxine 25 mcg tablet  Self No No   Sig: TAKE 1 TABLET BY MOUTH EVERY DAY   losartan (COZAAR) 25 mg tablet   No No   Sig: Take 1 tablet (25 mg total) by mouth daily   magnesium Oxide (MAG-OX) 400 mg TABS   No No   Sig: Take 1 tablet (400 mg total) by mouth daily   meclizine (ANTIVERT) 25 mg tablet  Self No No   Sig: Take 1 tablet (25 mg total) by mouth every 8 (eight) hours as needed for dizziness   Patient not taking: Reported on 12/18/2024   metFORMIN (GLUCOPHAGE) 1000 MG tablet  Self Yes No   Sig: Take 1,000 mg by mouth 2 (two) times a day   naloxone (NARCAN) 4 mg/0.1 mL nasal spray  Self No No   Sig: Administer 1 spray into a nostril. If no response after 2-3 minutes, give another dose in the other nostril using a new spray. For use in emergencies for opioid / pain medication reversal for accidental ingestion, respiratory depression, sedation or concerns for overdose.   ondansetron (ZOFRAN-ODT) 8 mg disintegrating tablet  Self No No   Sig: Take 1 tablet (8 mg total) by mouth every 8 (eight) hours as needed for vomiting or nausea   oxyCODONE (Roxicodone) 5 immediate release tablet  Self No No   Sig: Take 0.5 tablets (2.5 mg total) by mouth every 4 (four) hours as needed for moderate pain If pain is severe, may instead take 1 tablet (5mg) every 4 hours as needed Max Daily Amount: 15 mg   senna (SENOKOT) 8.6 mg  Self No No   Sig: Take 1 tablet (8.6 mg total) by mouth daily at bedtime as needed for constipation   tiotropium-olodaterol (Stiolto Respimat) 2.5-2.5 MCG/ACT inhaler  Self No No   Sig: Inhale 2 puffs daily      Facility-Administered Medications: None       No Known Allergies    OBJECTIVE:    Physical Exam  Physical Exam  Constitutional:       Appearance: He is not ill-appearing or toxic-appearing.      Interventions: Nasal cannula in place.   HENT:       Head: Normocephalic and atraumatic.      Right Ear: External ear normal.      Left Ear: External ear normal.      Nose: Nose normal.      Mouth/Throat:      Mouth: Mucous membranes are moist.      Pharynx: Oropharynx is clear.   Eyes:      General: No scleral icterus.     Conjunctiva/sclera: Conjunctivae normal.   Cardiovascular:      Rate and Rhythm: Regular rhythm. Tachycardia present.      Pulses: Normal pulses.   Pulmonary:      Effort: Pulmonary effort is normal. No respiratory distress.   Abdominal:      General: There is no distension.      Tenderness: There is no abdominal tenderness.   Musculoskeletal:         General: Tenderness (intercostal region) present.      Right lower leg: No edema.      Left lower leg: No edema.   Skin:     Coloration: Skin is pale. Skin is not jaundiced.   Neurological:      General: No focal deficit present.      Mental Status: Mental status is at baseline.   Psychiatric:         Mood and Affect: Mood normal.         Behavior: Behavior normal.         Thought Content: Thought content normal.         Judgment: Judgment normal.      Comments: Anxiety better controlled         Lab Results: I have personally reviewed pertinent labs., CBC:   Lab Results   Component Value Date    WBC 4.08 (L) 02/05/2025    HGB 9.2 (L) 02/05/2025    HCT 31.4 (L) 02/05/2025    MCV 88 02/05/2025     02/05/2025    RBC 3.56 (L) 02/05/2025    MCH 25.8 (L) 02/05/2025    MCHC 29.3 (L) 02/05/2025    RDW 17.5 (H) 02/05/2025    MPV 10.1 02/05/2025   , CMP:   Lab Results   Component Value Date    SODIUM 141 02/05/2025    K 3.8 02/05/2025     02/05/2025    CO2 31 02/05/2025    BUN 14 02/05/2025    CREATININE 0.62 02/05/2025    CALCIUM 9.0 02/05/2025    EGFR 111 02/05/2025   , BMP:  Lab Results   Component Value Date    SODIUM 141 02/05/2025    K 3.8 02/05/2025     02/05/2025    CO2 31 02/05/2025    BUN 14 02/05/2025    CREATININE 0.62 02/05/2025    GLUC 147 (H) 02/05/2025    CALCIUM 9.0  "02/05/2025    AGAP 6 02/05/2025    EGFR 111 02/05/2025   , PT/PTT:No results found for: \"PT\", \"PTT\"  Imaging Studies: Reviewed and interpreted the pertinent studies  EKG, Pathology, and Other Studies: Reviewed and interpreted the pertinent studies    I have spent a total time of 35 minutes in caring for this patient on the day of the visit/encounter including Instructions for management, Impressions, Counseling / Coordination of care, Documenting in the medical record, Reviewing / ordering tests, medicine, procedures  , Obtaining or reviewing history  , and Communicating with other healthcare professionals . Topics discussed with the patient / family include symptom assessment and management, medication review, medication adjustment, psychosocial support, goals of care, opioid titration, supportive listening, and anticipatory guidance.   "

## 2025-02-05 NOTE — ASSESSMENT & PLAN NOTE
Primarily due to metastatic cancer as below +/- superimposed PNA (though procal negative)  - continue O2 for sat goal > 88%  - palliative care following with opioids and benzos for work of breathing  - 5-7 days abx

## 2025-02-05 NOTE — ASSESSMENT & PLAN NOTE
Significant b/l consolidations consistent with metastatic lung Ca and lymphangitic spread  - no further chemo or cancer therapy planned  - palliative following for pain control

## 2025-02-05 NOTE — ASSESSMENT & PLAN NOTE
Sepsis, present on admission, due to post obstructive PNA evidenced by Wbc 2.90, Tachycardia (), Tachypnea (RR 36), acute on chronic respiratory failure requiring IV Cefepime/ Vancomycin, lactic acid and blood cultures.

## 2025-02-05 NOTE — PROGRESS NOTES
Papo Gibbs is a 54 y.o. male who is currently ordered Vancomycin IV with management by the Pharmacy Consult service.  Relevant clinical data and objective / subjective history reviewed.  Vancomycin Assessment:  Indication and Goal AUC/Trough: Pneumonia (goal -600, trough >10)  Clinical Status: stable  Micro:     Renal Function:  SCr: 0.62 mg/dL  CrCl: 107.3 mL/min  Renal replacement: Not on dialysis  Days of Therapy: 4  Current Dose: 1250 mg IV every 12 hours  Vancomycin Plan:  New Dosing: continue current dose  Estimated AUC: 540 mcg*hr/mL  Estimated Trough: 14.6 mcg/mL  Next Level: 2/11 @ 0600  Renal Function Monitoring: Daily BMP and UOP  Pharmacy will continue to follow closely for s/sx of nephrotoxicity, infusion reactions and appropriateness of therapy.  BMP and CBC will be ordered per protocol. We will continue to follow the patient’s culture results and clinical progress daily.    Yolanda Hampton, Pharmacist

## 2025-02-05 NOTE — PLAN OF CARE
Problem: PAIN - ADULT  Goal: Verbalizes/displays adequate comfort level or baseline comfort level  Description: Interventions:  - Encourage patient to monitor pain and request assistance  - Assess pain using appropriate pain scale  - Administer analgesics based on type and severity of pain and evaluate response  - Implement non-pharmacological measures as appropriate and evaluate response  - Consider cultural and social influences on pain and pain management  - Notify physician/advanced practitioner if interventions unsuccessful or patient reports new pain  Outcome: Progressing     Problem: INFECTION - ADULT  Goal: Absence or prevention of progression during hospitalization  Description: INTERVENTIONS:  - Assess and monitor for signs and symptoms of infection  - Monitor lab/diagnostic results  - Monitor all insertion sites, i.e. indwelling lines, tubes, and drains  - Monitor endotracheal if appropriate and nasal secretions for changes in amount and color  - [place] appropriate cooling/warming therapies per order  - Administer medications as ordered  - Instruct and encourage patient and family to use good hand hygiene technique  - Identify and instruct in appropriate isolation precautions for identified infection/condition  Outcome: Progressing  Goal: Absence of fever/infection during neutropenic period  Description: INTERVENTIONS:  - Monitor WBC    Outcome: Progressing     Problem: DISCHARGE PLANNING  Goal: Discharge to home or other facility with appropriate resources  Description: INTERVENTIONS:  - Identify barriers to discharge w/patient and caregiver  - Arrange for needed discharge resources and transportation as appropriate  - Identify discharge learning needs (meds, wound care, etc.)  - Arrange for interpretive services to assist at discharge as needed  - Refer to Case Management Department for coordinating discharge planning if the patient needs post-hospital services based on physician/advanced  practitioner order or complex needs related to functional status, cognitive ability, or social support system  Outcome: Progressing     Problem: Knowledge Deficit  Goal: Patient/family/caregiver demonstrates understanding of disease process, treatment plan, medications, and discharge instructions  Description: Complete learning assessment and assess knowledge base.  Interventions:  - Provide teaching at level of understanding  - Provide teaching via preferred learning methods  Outcome: Progressing

## 2025-02-05 NOTE — ASSESSMENT & PLAN NOTE
Palliative diagnosis: Adenocarcinoma of the right lung  PPS: 50%  PSC Provider: Dr. Estrella    Supportive listening and presence provided  Anxiety containment provided  Instructions for management and anticipatory guidance provided

## 2025-02-05 NOTE — ASSESSMENT & PLAN NOTE
On oxycodone IR 2.5mg at home    Continue oxycodone IR 5 mg/10 mg every 4 hours as needed for moderate/severe pain  Continue morphine IV 2 mg every 4 hours as needed for breakthrough pain

## 2025-02-05 NOTE — ASSESSMENT & PLAN NOTE
Given that symptoms are better controlled, he confirms that he would still like to forego any more cancer related therapies.  However, he would still like to be able to be hospitalized for different situations and continue to follow-up with the palliative care team in the outpatient setting.  We will make sure he sees us in the office after discharge.                 Code Status: Switch to DNAR/DNI - Level 3               Decisional apparatus:  Patient is competent on my exam today.  If competence is lost, patient's substitute decision maker would default to Tomasa (spouse) and then Giancarlo (alternate)               Advance Directive / Living Will / POLST:  On file

## 2025-02-05 NOTE — ASSESSMENT & PLAN NOTE
Down to 6L NC O2    Symptoms exacerbated by pain and anxiety    For air hunger:  Oxycodone 5mg-10mg q4h prn, and Morphine 2mg IV q4h prn for breakthrough symptoms  Increased to Valium 5 mg twice daily

## 2025-02-05 NOTE — QUICK NOTE
Met Mr. Gibbs at bedside. He has noticed some mild improvement of his breathing, although he still continues to have significant dyspnea even with minimal exertion.  Patient conveyed that given his cancer has progressed on the most recent systemic treatment, he has decided to forego any future chemotherapy treatments especially with <10% efficacy with any future single agent chemotherapy.  Patient and his wife would like to take it 1 day at a time for now to see how his breathing stabilizes.  They have not not made any final decisions regarding hospice at this juncture.  I talked to pt's wife, Lillie, over phone also to provide an update. As of now, appointments with Dr. Sandoval and infusion appointments for treatments are canceled, but patient and wife know to reach out to our office with any questions/concerns in the future.     Wife Tomasa inquired about what else is keeping him in the hospital. I explained that we are waiting to see his oxygen requirements and mobility evaluation with PT. Wife relayed that the oxygen concentrator they have at this time only has maximum 5 L O2 capacity and that if his ambulatory oxygen requirement is higher, he will need a different oxygen concentrator for use outside the hospital.  Also, wife relayed that she would like to take the patient home after PT evaluation; she is not interested in rehab at this time.    Medical oncology team will sign off at this time, but please feel free to call with any questions or concerns.     Primary team prove was updated regarding updates from medical oncology team and discussion with wife.

## 2025-02-05 NOTE — PROGRESS NOTES
Progress Note - Pulmonology   Name: Papo Gibbs 54 y.o. male I MRN: [id number]  Unit/Bed#: W -01 I Date of Admission: 2/2/2025   Date of Service: 2/5/2025 I Hospital Day: 3    Assessment & Plan  acute on Chronic hypoxemic respiratory failure (HCC)  Primarily due to metastatic cancer as below +/- superimposed PNA (though procal negative)  - continue O2 for sat goal > 88%  - palliative care following with opioids and benzos for work of breathing  - 5-7 days abx  Adenocarcinoma of overlapping sites of right lung (HCC)  Significant b/l consolidations consistent with metastatic lung Ca and lymphangitic spread  - no further chemo or cancer therapy planned  - palliative following for pain control  History of pulmonary embolism  Continue AC    Pulmonary will sign off    24 Hour Events : weaned from NRB to 6L NC  Subjective : reports increasing dyspnea which improves w opioids and nebs    Objective :  Temp:  [97.1 °F (36.2 °C)-98.4 °F (36.9 °C)] 98 °F (36.7 °C)  HR:  [120-131] 131  BP: (122-128)/(69-86) 125/86  Resp:  [16-18] 18  SpO2:  [92 %-97 %] 97 %  O2 Device: Nasal cannula  Nasal Cannula O2 Flow Rate (L/min):  [6 L/min-8 L/min] 6 L/min  FiO2 (%):  [40] 40    Physical Exam  Exam:   Appearance -- chronically ill appearing  Neuro -- A&Ox3, wnl  Heart -- RRR, no murmurs  Lungs -- wheezing on L, diminished breath sounds b/l  Abdomen -- soft, NTND,  Extremities -- WWP, no edema  Skin -- no rash      Lab Results: I have reviewed the following results:   .     02/05/25  0551   WBC 4.08*   HGB 9.2*   HCT 31.4*      SODIUM 141   K 3.8      CO2 31   BUN 14   CREATININE 0.62   GLUC 147*   MG 1.9   PHOS 3.2   ALB 3.3*

## 2025-02-05 NOTE — PLAN OF CARE
Problem: PAIN - ADULT  Goal: Verbalizes/displays adequate comfort level or baseline comfort level  Description: Interventions:  - Encourage patient to monitor pain and request assistance  - Assess pain using appropriate pain scale  - Administer analgesics based on type and severity of pain and evaluate response  - Implement non-pharmacological measures as appropriate and evaluate response  - Consider cultural and social influences on pain and pain management  - Notify physician/advanced practitioner if interventions unsuccessful or patient reports new pain  Outcome: Progressing     Problem: INFECTION - ADULT  Goal: Absence or prevention of progression during hospitalization  Description: INTERVENTIONS:  - Assess and monitor for signs and symptoms of infection  - Monitor lab/diagnostic results  - Monitor all insertion sites, i.e. indwelling lines, tubes, and drains  - Monitor endotracheal if appropriate and nasal secretions for changes in amount and color  - [place] appropriate cooling/warming therapies per order  - Administer medications as ordered  - Instruct and encourage patient and family to use good hand hygiene technique  - Identify and instruct in appropriate isolation precautions for identified infection/condition  Outcome: Progressing  Goal: Absence of fever/infection during neutropenic period  Description: INTERVENTIONS:  - Monitor WBC    Outcome: Progressing     Problem: DISCHARGE PLANNING  Goal: Discharge to home or other facility with appropriate resources  Description: INTERVENTIONS:  - Identify barriers to discharge w/patient and caregiver  - Arrange for needed discharge resources and transportation as appropriate  - Identify discharge learning needs (meds, wound care, etc.)  - Arrange for interpretive services to assist at discharge as needed  - Refer to Case Management Department for coordinating discharge planning if the patient needs post-hospital services based on physician/advanced  practitioner order or complex needs related to functional status, cognitive ability, or social support system  Outcome: Progressing     Problem: Knowledge Deficit  Goal: Patient/family/caregiver demonstrates understanding of disease process, treatment plan, medications, and discharge instructions  Description: Complete learning assessment and assess knowledge base.  Interventions:  - Provide teaching at level of understanding  - Provide teaching via preferred learning methods  Outcome: Progressing     Problem: Prexisting or High Potential for Compromised Skin Integrity  Goal: Skin integrity is maintained or improved  Description: INTERVENTIONS:  - Identify patients at risk for skin breakdown  - Assess and monitor skin integrity  - Assess and monitor nutrition and hydration status  - Monitor labs   - Assess for incontinence   - Turn and reposition patient  - Assist with mobility/ambulation  - Relieve pressure over bony prominences  - Avoid friction and shearing  - Provide appropriate hygiene as needed including keeping skin clean and dry  - Evaluate need for skin moisturizer/barrier cream  - Collaborate with interdisciplinary team   - Patient/family teaching  - Consider wound care consult   Outcome: Progressing

## 2025-02-06 LAB
ALBUMIN SERPL BCG-MCNC: 3.4 G/DL (ref 3.5–5)
ALL TARGETS: NOT DETECTED
ANION GAP SERPL CALCULATED.3IONS-SCNC: 5 MMOL/L (ref 4–13)
BACTERIA BLD CULT: ABNORMAL
BUN SERPL-MCNC: 13 MG/DL (ref 5–25)
CALCIUM ALBUM COR SERPL-MCNC: 9.6 MG/DL (ref 8.3–10.1)
CALCIUM SERPL-MCNC: 9.1 MG/DL (ref 8.4–10.2)
CHLORIDE SERPL-SCNC: 102 MMOL/L (ref 96–108)
CO2 SERPL-SCNC: 34 MMOL/L (ref 21–32)
CREAT SERPL-MCNC: 0.58 MG/DL (ref 0.6–1.3)
ERYTHROCYTE [DISTWIDTH] IN BLOOD BY AUTOMATED COUNT: 17.3 % (ref 11.6–15.1)
GFR SERPL CREATININE-BSD FRML MDRD: 114 ML/MIN/1.73SQ M
GLUCOSE SERPL-MCNC: 125 MG/DL (ref 65–140)
GLUCOSE SERPL-MCNC: 139 MG/DL (ref 65–140)
GLUCOSE SERPL-MCNC: 144 MG/DL (ref 65–140)
GLUCOSE SERPL-MCNC: 190 MG/DL (ref 65–140)
GLUCOSE SERPL-MCNC: 191 MG/DL (ref 65–140)
GRAM STN SPEC: ABNORMAL
HCT VFR BLD AUTO: 31.1 % (ref 36.5–49.3)
HGB BLD-MCNC: 9.4 G/DL (ref 12–17)
MAGNESIUM SERPL-MCNC: 1.9 MG/DL (ref 1.9–2.7)
MCH RBC QN AUTO: 25.6 PG (ref 26.8–34.3)
MCHC RBC AUTO-ENTMCNC: 30.2 G/DL (ref 31.4–37.4)
MCV RBC AUTO: 85 FL (ref 82–98)
PHOSPHATE SERPL-MCNC: 2.7 MG/DL (ref 2.7–4.5)
PLATELET # BLD AUTO: 214 THOUSANDS/UL (ref 149–390)
PMV BLD AUTO: 10.2 FL (ref 8.9–12.7)
POTASSIUM SERPL-SCNC: 3.4 MMOL/L (ref 3.5–5.3)
RBC # BLD AUTO: 3.67 MILLION/UL (ref 3.88–5.62)
SODIUM SERPL-SCNC: 141 MMOL/L (ref 135–147)
WBC # BLD AUTO: 5.12 THOUSAND/UL (ref 4.31–10.16)

## 2025-02-06 PROCEDURE — 94640 AIRWAY INHALATION TREATMENT: CPT

## 2025-02-06 PROCEDURE — 99232 SBSQ HOSP IP/OBS MODERATE 35: CPT | Performed by: STUDENT IN AN ORGANIZED HEALTH CARE EDUCATION/TRAINING PROGRAM

## 2025-02-06 PROCEDURE — 94760 N-INVAS EAR/PLS OXIMETRY 1: CPT

## 2025-02-06 PROCEDURE — 97167 OT EVAL HIGH COMPLEX 60 MIN: CPT

## 2025-02-06 PROCEDURE — 85027 COMPLETE CBC AUTOMATED: CPT | Performed by: PHYSICIAN ASSISTANT

## 2025-02-06 PROCEDURE — 82948 REAGENT STRIP/BLOOD GLUCOSE: CPT

## 2025-02-06 PROCEDURE — 80069 RENAL FUNCTION PANEL: CPT | Performed by: PHYSICIAN ASSISTANT

## 2025-02-06 PROCEDURE — 83735 ASSAY OF MAGNESIUM: CPT | Performed by: PHYSICIAN ASSISTANT

## 2025-02-06 PROCEDURE — 99233 SBSQ HOSP IP/OBS HIGH 50: CPT | Performed by: STUDENT IN AN ORGANIZED HEALTH CARE EDUCATION/TRAINING PROGRAM

## 2025-02-06 RX ORDER — FUROSEMIDE 10 MG/ML
40 INJECTION INTRAMUSCULAR; INTRAVENOUS ONCE
Status: DISCONTINUED | OUTPATIENT
Start: 2025-02-06 | End: 2025-02-06

## 2025-02-06 RX ORDER — ALBUMIN (HUMAN) 12.5 G/50ML
25 SOLUTION INTRAVENOUS ONCE
Status: COMPLETED | OUTPATIENT
Start: 2025-02-06 | End: 2025-02-06

## 2025-02-06 RX ORDER — FUROSEMIDE 10 MG/ML
40 INJECTION INTRAMUSCULAR; INTRAVENOUS
Status: COMPLETED | OUTPATIENT
Start: 2025-02-06 | End: 2025-02-07

## 2025-02-06 RX ORDER — FUROSEMIDE 10 MG/ML
40 INJECTION INTRAMUSCULAR; INTRAVENOUS
Status: DISCONTINUED | OUTPATIENT
Start: 2025-02-06 | End: 2025-02-06

## 2025-02-06 RX ADMIN — MORPHINE SULFATE 2 MG: 2 INJECTION, SOLUTION INTRAMUSCULAR; INTRAVENOUS at 07:49

## 2025-02-06 RX ADMIN — DIAZEPAM 5 MG: 5 TABLET ORAL at 08:47

## 2025-02-06 RX ADMIN — FOLIC ACID 1 MG: 1 TABLET ORAL at 08:47

## 2025-02-06 RX ADMIN — FUROSEMIDE 40 MG: 10 INJECTION, SOLUTION INTRAMUSCULAR; INTRAVENOUS at 16:29

## 2025-02-06 RX ADMIN — INSULIN LISPRO 2 UNITS: 100 INJECTION, SOLUTION INTRAVENOUS; SUBCUTANEOUS at 21:46

## 2025-02-06 RX ADMIN — DIAZEPAM 5 MG: 5 TABLET ORAL at 18:00

## 2025-02-06 RX ADMIN — OXYCODONE HYDROCHLORIDE 10 MG: 10 TABLET ORAL at 18:09

## 2025-02-06 RX ADMIN — FUROSEMIDE 40 MG: 10 INJECTION, SOLUTION INTRAMUSCULAR; INTRAVENOUS at 12:08

## 2025-02-06 RX ADMIN — LOSARTAN POTASSIUM 25 MG: 25 TABLET, FILM COATED ORAL at 08:48

## 2025-02-06 RX ADMIN — CHLORHEXIDINE GLUCONATE 15 ML: 1.2 RINSE ORAL at 08:47

## 2025-02-06 RX ADMIN — ALBUMIN (HUMAN) 25 G: 0.25 INJECTION, SOLUTION INTRAVENOUS at 12:07

## 2025-02-06 RX ADMIN — INSULIN LISPRO 1 UNITS: 100 INJECTION, SOLUTION INTRAVENOUS; SUBCUTANEOUS at 16:44

## 2025-02-06 RX ADMIN — APIXABAN 5 MG: 5 TABLET, FILM COATED ORAL at 18:00

## 2025-02-06 RX ADMIN — CEFEPIME 2000 MG: 2 INJECTION, POWDER, FOR SOLUTION INTRAVENOUS at 00:49

## 2025-02-06 RX ADMIN — SALINE NASAL SPRAY 2 SPRAY: 1.5 SOLUTION NASAL at 21:45

## 2025-02-06 RX ADMIN — IPRATROPIUM BROMIDE 0.5 MG: 0.5 SOLUTION RESPIRATORY (INHALATION) at 19:11

## 2025-02-06 RX ADMIN — SALINE NASAL SPRAY 1 SPRAY: 1.5 SOLUTION NASAL at 14:07

## 2025-02-06 RX ADMIN — LEVALBUTEROL HYDROCHLORIDE 1.25 MG: 1.25 SOLUTION RESPIRATORY (INHALATION) at 01:08

## 2025-02-06 RX ADMIN — QUETIAPINE FUMARATE 25 MG: 25 TABLET ORAL at 21:41

## 2025-02-06 RX ADMIN — MORPHINE SULFATE 2 MG: 2 INJECTION, SOLUTION INTRAMUSCULAR; INTRAVENOUS at 12:03

## 2025-02-06 RX ADMIN — CHLORHEXIDINE GLUCONATE 15 ML: 1.2 RINSE ORAL at 21:41

## 2025-02-06 RX ADMIN — IPRATROPIUM BROMIDE 0.5 MG: 0.5 SOLUTION RESPIRATORY (INHALATION) at 07:10

## 2025-02-06 RX ADMIN — Medication 12.5 MG: at 08:47

## 2025-02-06 RX ADMIN — APIXABAN 5 MG: 5 TABLET, FILM COATED ORAL at 08:48

## 2025-02-06 RX ADMIN — VANCOMYCIN HYDROCHLORIDE 1250 MG: 5 INJECTION, POWDER, LYOPHILIZED, FOR SOLUTION INTRAVENOUS at 22:12

## 2025-02-06 RX ADMIN — AMITRIPTYLINE HYDROCHLORIDE 150 MG: 25 TABLET, FILM COATED ORAL at 21:41

## 2025-02-06 RX ADMIN — SALINE NASAL SPRAY 2 SPRAY: 1.5 SOLUTION NASAL at 16:08

## 2025-02-06 RX ADMIN — LEVALBUTEROL HYDROCHLORIDE 1.25 MG: 1.25 SOLUTION RESPIRATORY (INHALATION) at 13:15

## 2025-02-06 RX ADMIN — SALINE NASAL SPRAY 1 SPRAY: 1.5 SOLUTION NASAL at 07:39

## 2025-02-06 RX ADMIN — VANCOMYCIN HYDROCHLORIDE 1250 MG: 5 INJECTION, POWDER, LYOPHILIZED, FOR SOLUTION INTRAVENOUS at 10:25

## 2025-02-06 RX ADMIN — CEFEPIME 2000 MG: 2 INJECTION, POWDER, FOR SOLUTION INTRAVENOUS at 16:03

## 2025-02-06 RX ADMIN — LEVALBUTEROL HYDROCHLORIDE 1.25 MG: 1.25 SOLUTION RESPIRATORY (INHALATION) at 07:10

## 2025-02-06 RX ADMIN — ALBUTEROL SULFATE 2 PUFF: 90 AEROSOL, METERED RESPIRATORY (INHALATION) at 12:16

## 2025-02-06 RX ADMIN — CEFEPIME 2000 MG: 2 INJECTION, POWDER, FOR SOLUTION INTRAVENOUS at 09:04

## 2025-02-06 RX ADMIN — SALINE NASAL SPRAY 2 SPRAY: 1.5 SOLUTION NASAL at 08:47

## 2025-02-06 RX ADMIN — IPRATROPIUM BROMIDE 0.5 MG: 0.5 SOLUTION RESPIRATORY (INHALATION) at 01:08

## 2025-02-06 RX ADMIN — LEVALBUTEROL HYDROCHLORIDE 1.25 MG: 1.25 SOLUTION RESPIRATORY (INHALATION) at 19:11

## 2025-02-06 RX ADMIN — LEVOTHYROXINE SODIUM 25 MCG: 25 TABLET ORAL at 05:10

## 2025-02-06 RX ADMIN — ATORVASTATIN CALCIUM 10 MG: 10 TABLET, FILM COATED ORAL at 08:48

## 2025-02-06 RX ADMIN — IPRATROPIUM BROMIDE 0.5 MG: 0.5 SOLUTION RESPIRATORY (INHALATION) at 13:15

## 2025-02-06 RX ADMIN — BENZONATATE 200 MG: 100 CAPSULE ORAL at 18:09

## 2025-02-06 NOTE — PROGRESS NOTES
Papo Gibbs is a 54 y.o. male who is currently ordered Vancomycin IV with management by the Pharmacy Consult service.  Relevant clinical data and objective / subjective history reviewed.  Vancomycin Assessment:  Indication and Goal AUC/Trough: Pneumonia (goal -600, trough >10)  Clinical Status: stable  Micro:     Renal Function:  SCr: 0.58 mg/dL  CrCl: 114 mL/min  Renal replacement: Not on dialysis  Days of Therapy: 5  Current Dose: 1250 mg IV every 12 hours  Vancomycin Plan:  New Dosing: continue current dose  Estimated AUC: 508 mcg*hr/mL  Estimated Trough: 13.2 mcg/mL  Next Level: 2/11 @ 0600  Renal Function Monitoring: Daily BMP and UOP  Pharmacy will continue to follow closely for s/sx of nephrotoxicity, infusion reactions and appropriateness of therapy.  BMP and CBC will be ordered per protocol. We will continue to follow the patient’s culture results and clinical progress daily.    Yolanda Hampton, Pharmacist

## 2025-02-06 NOTE — ASSESSMENT & PLAN NOTE
Palliative diagnosis: Adenocarcinoma of the right lung  PPS: 50%  PSC Provider: Dr. Estrella    Supportive listening and presence provided  Anxiety containment provided  Instructions for management and anticipatory guidance provided    Counseled on possibility of Home Palliative Program (eligible zip code [id number])-->spouse interested, but she will check with patient to see if he's interested-->we will follow-up

## 2025-02-06 NOTE — PLAN OF CARE
Problem: PAIN - ADULT  Goal: Verbalizes/displays adequate comfort level or baseline comfort level  Description: Interventions:  - Encourage patient to monitor pain and request assistance  - Assess pain using appropriate pain scale  - Administer analgesics based on type and severity of pain and evaluate response  - Implement non-pharmacological measures as appropriate and evaluate response  - Consider cultural and social influences on pain and pain management  - Notify physician/advanced practitioner if interventions unsuccessful or patient reports new pain  Outcome: Progressing     Problem: INFECTION - ADULT  Goal: Absence or prevention of progression during hospitalization  Description: INTERVENTIONS:  - Assess and monitor for signs and symptoms of infection  - Monitor lab/diagnostic results  - Monitor all insertion sites, i.e. indwelling lines, tubes, and drains  - Monitor endotracheal if appropriate and nasal secretions for changes in amount and color  - [place] appropriate cooling/warming therapies per order  - Administer medications as ordered  - Instruct and encourage patient and family to use good hand hygiene technique  - Identify and instruct in appropriate isolation precautions for identified infection/condition  Outcome: Progressing  Goal: Absence of fever/infection during neutropenic period  Description: INTERVENTIONS:  - Monitor WBC    Outcome: Progressing     Problem: Knowledge Deficit  Goal: Patient/family/caregiver demonstrates understanding of disease process, treatment plan, medications, and discharge instructions  Description: Complete learning assessment and assess knowledge base.  Interventions:  - Provide teaching at level of understanding  - Provide teaching via preferred learning methods  Outcome: Progressing     Problem: DISCHARGE PLANNING  Goal: Discharge to home or other facility with appropriate resources  Description: INTERVENTIONS:  - Identify barriers to discharge w/patient and  caregiver  - Arrange for needed discharge resources and transportation as appropriate  - Identify discharge learning needs (meds, wound care, etc.)  - Arrange for interpretive services to assist at discharge as needed  - Refer to Case Management Department for coordinating discharge planning if the patient needs post-hospital services based on physician/advanced practitioner order or complex needs related to functional status, cognitive ability, or social support system  Outcome: Progressing

## 2025-02-06 NOTE — ASSESSMENT & PLAN NOTE
6-8L NC O2    Symptoms exacerbated by pain and anxiety    For air hunger:  Oxycodone 5mg-10mg q4h prn, and Morphine 2mg IV q4h prn for breakthrough symptoms  Continue Valium 5 mg twice daily

## 2025-02-06 NOTE — PROGRESS NOTES
Progress Note - Palliative Care   Name: Papo Gibbs 54 y.o. 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 Estrella    Supportive listening and presence provided  Anxiety containment provided  Instructions for management and anticipatory guidance provided    Counseled on possibility of Home Palliative Program (eligible zip code [id number])-->spouse interested, but she will check with patient to see if he's interested-->we will follow-up  Counseling regarding advance care planning and goals of care  Given that symptoms are better controlled, he confirms that he would still like to forego any more cancer related therapies.  However, he would still like to be able to be hospitalized for different situations and continue to follow-up with the palliative care team in the outpatient setting.  We will make sure he sees us in the office after discharge.                 Code Status: Switch to DNAR/DNI - Level 3               Decisional apparatus:   Patient is competent on my exam today.  If competence is lost, patient's substitute decision maker would default to Tomasa (spouse) and then Giancarlo (alternate)               Advance Directive / Living Will / POLST:  On file         NARRATIVE AND INTERVAL HISTORY:       Patient seen and examined at bedside.  He is sleeping comfortably and easily aroused.  He states that symptoms of pain and air hunger and anxiety are well-controlled.  He is responding better to the Valium 5 mg dose.  No complaints or concerns at the current time.  Spoke with RN team, who mentioned that morphine IV medication (he received 6 mg total in the last 24 hours) had not been helping his air hunger reportedly.    MEDICATIONS / ALLERGIES:     all current active meds have been reviewed, current meds:   Current Facility-Administered Medications:     acetaminophen (TYLENOL) tablet 975 mg, Q8H PRN    albuterol (PROVENTIL HFA,VENTOLIN HFA) inhaler 2 puff, Q4H PRN    amitriptyline (ELAVIL) tablet 150 mg, HS    apixaban (ELIQUIS) tablet 5 mg, BID    atorvastatin (LIPITOR) tablet 10 mg, Daily    benzonatate (TESSALON PERLES) capsule 200 mg, TID PRN    cefepime (MAXIPIME) 2 g/50 mL dextrose IVPB, Q8H, Last Rate: 2,000 mg (02/06/25 0904)    chlorhexidine (PERIDEX) 0.12 % oral rinse 15 mL, Q12H LATONIA    diazepam (VALIUM) tablet 5 mg, BID    famotidine (PEPCID) tablet 40 mg, Daily PRN    folic acid (FOLVITE) tablet 1 mg, Daily    [Held by provider] glimepiride (AMARYL) tablet 2 mg, Daily With Breakfast    influenza vaccine, recombinant (PF) (Flublok) IM injection 0.5 mL, Once    insulin lispro (HumALOG/ADMELOG) 100 units/mL subcutaneous injection 1-6 Units, 4x Daily (AC & HS) **AND** Fingerstick Glucose (POCT), 4x Daily AC and at bedtime    ipratropium (ATROVENT) 0.02 % inhalation solution 0.5 mg, Q6H    levalbuterol (XOPENEX) inhalation solution 1.25 mg, Q6H    levothyroxine tablet 25 mcg, Early Morning    losartan (COZAAR) tablet 25 mg, Daily    metoprolol  tartrate (LOPRESSOR) partial tablet 12.5 mg, Q12H LATONIA    morphine injection 2 mg, Q4H PRN    naloxone (NARCAN) 0.04 mg/mL syringe 0.04 mg, Q1MIN PRN    oxyCODONE (ROXICODONE) IR tablet 5 mg, Q4H PRN **OR** oxyCODONE (ROXICODONE) immediate release tablet 10 mg, Q4H PRN    pneumococcal 20-juana conj vacc (PREVNAR 20) IM Injection 0.5 mL, Once    QUEtiapine (SEROquel) tablet 25 mg, HS    sodium chloride (OCEAN) 0.65 % nasal spray 1 spray, Q1H PRN    sodium chloride (OCEAN) 0.65 % nasal spray 2 spray, TID    vancomycin (VANCOCIN) 1,250 mg in sodium chloride 0.9 % 250 mL IVPB, Q12H, Last Rate: 1,250 mg (25 2275), and PTA meds:   Prior to Admission Medications   Prescriptions Last Dose Informant Patient Reported? Taking?   Glucagon, rDNA, (Glucagon Emergency) 1 MG KIT  Self No No   Sig: Inject 1 mg as directed once as needed (hypoglycemia) for up to 1 dose   Patient not taking: Reported on 2024   Lancets (OneTouch Delica Plus [id number]) MISC  Self No No   Sig: Use 1 Units 4 (four) times a day   OneTouch Verio test strip  Self No No   Sig: Use 1 each 4 (four) times a day   acetaminophen (TYLENOL) 325 mg tablet  Self No No   Sig: Take 3 tablets (975 mg total) by mouth every 8 (eight) hours as needed for mild pain, headaches or fever   albuterol (PROVENTIL HFA,VENTOLIN HFA) 90 mcg/act inhaler  Self No No   Sig: INHALE 2 PUFFS EVERY 6 HOURS AS NEEDED FOR WHEEZING   amitriptyline (ELAVIL) 100 mg tablet  Self Yes No   Si mg daily at bedtime   apixaban (Eliquis) 5 mg   No No   Sig: Take 1 tablet (5 mg total) by mouth 2 (two) times a day   atorvastatin (LIPITOR) 10 mg tablet  Self No No   Sig: Take 1 tablet (10 mg total) by mouth daily   benzonatate (TESSALON) 200 MG capsule  Self No No   Sig: Take 1 capsule (200 mg total) by mouth 3 (three) times a day as needed for cough   famotidine (PEPCID) 40 MG tablet  Self Yes No   Sig: Take 40 mg by mouth daily as needed for heartburn or indigestion Taking as needed    folic acid (FOLVITE) 1 mg tablet  Self No No   Sig: TAKE 1 TABLET BY MOUTH EVERY DAY   glimepiride (AMARYL) 2 mg tablet  Self No No   Sig: TAKE 1 TABLET BY MOUTH DAILY WITH BREAKFAST   levothyroxine 25 mcg tablet  Self No No   Sig: TAKE 1 TABLET BY MOUTH EVERY DAY   losartan (COZAAR) 25 mg tablet   No No   Sig: Take 1 tablet (25 mg total) by mouth daily   magnesium Oxide (MAG-OX) 400 mg TABS   No No   Sig: Take 1 tablet (400 mg total) by mouth daily   meclizine (ANTIVERT) 25 mg tablet  Self No No   Sig: Take 1 tablet (25 mg total) by mouth every 8 (eight) hours as needed for dizziness   Patient not taking: Reported on 12/18/2024   metFORMIN (GLUCOPHAGE) 1000 MG tablet  Self Yes No   Sig: Take 1,000 mg by mouth 2 (two) times a day   naloxone (NARCAN) 4 mg/0.1 mL nasal spray  Self No No   Sig: Administer 1 spray into a nostril. If no response after 2-3 minutes, give another dose in the other nostril using a new spray. For use in emergencies for opioid / pain medication reversal for accidental ingestion, respiratory depression, sedation or concerns for overdose.   ondansetron (ZOFRAN-ODT) 8 mg disintegrating tablet  Self No No   Sig: Take 1 tablet (8 mg total) by mouth every 8 (eight) hours as needed for vomiting or nausea   oxyCODONE (Roxicodone) 5 immediate release tablet  Self No No   Sig: Take 0.5 tablets (2.5 mg total) by mouth every 4 (four) hours as needed for moderate pain If pain is severe, may instead take 1 tablet (5mg) every 4 hours as needed Max Daily Amount: 15 mg   senna (SENOKOT) 8.6 mg  Self No No   Sig: Take 1 tablet (8.6 mg total) by mouth daily at bedtime as needed for constipation   tiotropium-olodaterol (Stiolto Respimat) 2.5-2.5 MCG/ACT inhaler  Self No No   Sig: Inhale 2 puffs daily      Facility-Administered Medications: None       No Known Allergies    OBJECTIVE:    Physical Exam  Physical Exam  Constitutional:       General: He is sleeping.      Appearance: He is not ill-appearing or  toxic-appearing.      Interventions: Nasal cannula in place.   HENT:      Head: Normocephalic and atraumatic.      Right Ear: External ear normal.      Left Ear: External ear normal.      Nose: Nose normal.      Mouth/Throat:      Mouth: Mucous membranes are moist.      Pharynx: Oropharynx is clear.   Eyes:      General: No scleral icterus.     Conjunctiva/sclera: Conjunctivae normal.   Cardiovascular:      Rate and Rhythm: Regular rhythm. Tachycardia present.      Pulses: Normal pulses.   Pulmonary:      Effort: Pulmonary effort is normal. No respiratory distress.   Abdominal:      General: There is no distension.      Palpations: Abdomen is soft.      Tenderness: There is no abdominal tenderness.   Musculoskeletal:      Right lower leg: No edema.      Left lower leg: No edema.   Skin:     Coloration: Skin is pale. Skin is not jaundiced.   Neurological:      General: No focal deficit present.      Mental Status: He is easily aroused. Mental status is at baseline.   Psychiatric:         Mood and Affect: Mood normal.         Behavior: Behavior normal.         Thought Content: Thought content normal.         Judgment: Judgment normal.         Lab Results: I have personally reviewed pertinent labs., CBC:   Lab Results   Component Value Date    WBC 5.12 02/06/2025    HGB 9.4 (L) 02/06/2025    HCT 31.1 (L) 02/06/2025    MCV 85 02/06/2025     02/06/2025    RBC 3.67 (L) 02/06/2025    MCH 25.6 (L) 02/06/2025    MCHC 30.2 (L) 02/06/2025    RDW 17.3 (H) 02/06/2025    MPV 10.2 02/06/2025   , CMP:   Lab Results   Component Value Date    SODIUM 141 02/06/2025    K 3.4 (L) 02/06/2025     02/06/2025    CO2 34 (H) 02/06/2025    BUN 13 02/06/2025    CREATININE 0.58 (L) 02/06/2025    CALCIUM 9.1 02/06/2025    EGFR 114 02/06/2025   , BMP:  Lab Results   Component Value Date    SODIUM 141 02/06/2025    K 3.4 (L) 02/06/2025     02/06/2025    CO2 34 (H) 02/06/2025    BUN 13 02/06/2025    CREATININE 0.58 (L) 02/06/2025  "   GLUC 139 02/06/2025    CALCIUM 9.1 02/06/2025    AGAP 5 02/06/2025    EGFR 114 02/06/2025   , PT/PTT:No results found for: \"PT\", \"PTT\"  Imaging Studies: Reviewed and interpreted the pertinent studies  EKG, Pathology, and Other Studies: Reviewed and interpreted the pertinent studies    I have spent a total time of 35 minutes in caring for this patient on the day of the visit/encounter including Instructions for management, Patient and family education, Impressions, Counseling / Coordination of care, Documenting in the medical record, Reviewing / ordering tests, medicine, procedures  , Obtaining or reviewing history  , and Communicating with other healthcare professionals . Topics discussed with the patient / family include symptom assessment and management, medication review, psychosocial support, goals of care, supportive listening, and anticipatory guidance.   "

## 2025-02-06 NOTE — ASSESSMENT & PLAN NOTE
History of tachycardia   Trial of lopressor 12.5 mg bid    Noted.     Will update [facility] pharmacy.

## 2025-02-06 NOTE — PROCEDURES
Venous Access Line Insertion    Date/Time: 2/6/2025 11:42 AM    Performed by: Maribel Gregg RN  Authorized by: Salinas Horner DO    Patient location:  Bedside  [place] protocol:     Procedure explained and questions answered to patient or proxy's satisfaction: yes    Pre-procedure details:     Hand hygiene: Hand hygiene performed prior to insertion      Skin preparation:  ChloraPrep  Procedure details:     Complex Venous Access Line Type: US Guided Peripheral IV      Peripheral IV Indications: other specified disorders of the vein      Orientation:  Right and lower    Location:  Arm    Catheter size:  20 gauge    Patient evaluated for contraindications to access (i.e. fistula, thrombosis, etc): Yes      Approach: percutaneous technique used      Sterile ultrasound techniques: Sterile gel and sterile probe covers were used      Number of attempts:  1    Successful placement: yes    Anesthesia (see MAR for exact dosages):     Anesthesia method:  None  Post-procedure details:     Post-procedure:  Dressing applied    Assessment:  Blood return through all ports    Post-procedure complications: none      Patient tolerance of procedure:  Tolerated well, no immediate complications

## 2025-02-06 NOTE — PLAN OF CARE
Problem: PAIN - ADULT  Goal: Verbalizes/displays adequate comfort level or baseline comfort level  Description: Interventions:  - Encourage patient to monitor pain and request assistance  - Assess pain using appropriate pain scale  - Administer analgesics based on type and severity of pain and evaluate response  - Implement non-pharmacological measures as appropriate and evaluate response  - Consider cultural and social influences on pain and pain management  - Notify physician/advanced practitioner if interventions unsuccessful or patient reports new pain  Outcome: Progressing     Problem: INFECTION - ADULT  Goal: Absence or prevention of progression during hospitalization  Description: INTERVENTIONS:  - Assess and monitor for signs and symptoms of infection  - Monitor lab/diagnostic results  - Monitor all insertion sites, i.e. indwelling lines, tubes, and drains  - Monitor endotracheal if appropriate and nasal secretions for changes in amount and color  - [place] appropriate cooling/warming therapies per order  - Administer medications as ordered  - Instruct and encourage patient and family to use good hand hygiene technique  - Identify and instruct in appropriate isolation precautions for identified infection/condition  Outcome: Progressing  Goal: Absence of fever/infection during neutropenic period  Description: INTERVENTIONS:  - Monitor WBC    Outcome: Progressing     Problem: DISCHARGE PLANNING  Goal: Discharge to home or other facility with appropriate resources  Description: INTERVENTIONS:  - Identify barriers to discharge w/patient and caregiver  - Arrange for needed discharge resources and transportation as appropriate  - Identify discharge learning needs (meds, wound care, etc.)  - Arrange for interpretive services to assist at discharge as needed  - Refer to Case Management Department for coordinating discharge planning if the patient needs post-hospital services based on physician/advanced  practitioner order or complex needs related to functional status, cognitive ability, or social support system  Outcome: Progressing     Problem: Knowledge Deficit  Goal: Patient/family/caregiver demonstrates understanding of disease process, treatment plan, medications, and discharge instructions  Description: Complete learning assessment and assess knowledge base.  Interventions:  - Provide teaching at level of understanding  - Provide teaching via preferred learning methods  Outcome: Progressing     Problem: Prexisting or High Potential for Compromised Skin Integrity  Goal: Skin integrity is maintained or improved  Description: INTERVENTIONS:  - Identify patients at risk for skin breakdown  - Assess and monitor skin integrity  - Assess and monitor nutrition and hydration status  - Monitor labs   - Assess for incontinence   - Turn and reposition patient  - Assist with mobility/ambulation  - Relieve pressure over bony prominences  - Avoid friction and shearing  - Provide appropriate hygiene as needed including keeping skin clean and dry  - Evaluate need for skin moisturizer/barrier cream  - Collaborate with interdisciplinary team   - Patient/family teaching  - Consider wound care consult   Outcome: Progressing

## 2025-02-06 NOTE — ASSESSMENT & PLAN NOTE
Stage IV, being treated with chemotherapy, follows Dr. Bryan outpatient  - Status post carbo, Alimta, Keytruda from May to Aug 2024  - July 2024 received rad to brain   - Oct 2024 CT CAP showed met lung lymphangitic spread of adenocarcinoma and subsequently received palliative pemetrex with keytruda from Nov to Dec 2024 then docetaxel plus ramucirumab from Dec to Jan 21 2025  Oncology consulted  Palliative consulted  Guarded prognosis  Goals of care talks on going    Patient not interested in pursuing any active chemo treatments in the near future. Patient and wife would like to take it one day at a time for now.

## 2025-02-06 NOTE — PROGRESS NOTES
Progress Note - Hospitalist   Name: Papo Gibbs 54 y.o. male I MRN: [id number]  Unit/Bed#: W -01 I Date of Admission: 2/2/2025   Date of Service: 2/6/2025 I Hospital Day: 4    Assessment & Plan  HTN (hypertension)  Continue losartan 25 mg daily   Adenocarcinoma of overlapping sites of right lung (HCC)  Stage IV, being treated with chemotherapy, follows Dr. Bryan outpatient  - Status post carbo, Alimta, Keytruda from May to Aug 2024  - July 2024 received rad to brain   - Oct 2024 CT CAP showed met lung lymphangitic spread of adenocarcinoma and subsequently received palliative pemetrex with keytruda from Nov to Dec 2024 then docetaxel plus ramucirumab from Dec to Jan 21 2025  Oncology consulted  Palliative consulted  Guarded prognosis  Goals of care talks on going    Patient not interested in pursuing any active chemo treatments in the near future. Patient and wife would like to take it one day at a time for now.     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   Has Pleurx catheter which was drained 250 ml of serosanguinous fluid last night  -  Recent echo 12/9/24 showing EF 60% and LV wall thickness mildly increased and with mild concentric hypertrophy; mild AR, aortic valve sclerosis, mild annual calcification MV      Given lasix 40 mg IV daily 2/4-2/6  Pulm following   Cefepime day 5 of 7  History of pulmonary embolism  Continue eliquis 5 mg bid  Cancer related pain  Continue oxycodone IR 5 mg/10 mg every 4 hours as needed for moderate/severe pain  Continue hydromorphone IV 0.5   Sepsis (HCC)  Sepsis, present on admission, due to post obstructive PNA evidenced by Wbc  2.90, Tachycardia (), Tachypnea (RR 36), acute on chronic respiratory failure requiring IV Cefepime/ Vancomycin, lactic acid and blood cultures.        Tachycardia  History of tachycardia   Trial of lopressor 12.5 mg bid     VTE Pharmacologic Prophylaxis:   Moderate Risk (Score 3-4) - Pharmacological DVT Prophylaxis Ordered: apixaban (Eliquis).    Mobility:   Basic Mobility Inpatient Raw Score: 12  JH-HLM Goal: 4: Move to chair/commode  JH-HLM Achieved: 4: Move to chair/commode  JH-HLM Goal NOT achieved. Continue with multidisciplinary rounding and encourage appropriate mobility to improve upon JH-HLM goals.    Patient Centered Rounds: I performed bedside rounds with nursing staff today.   Discussions with Specialists or Other Care Team Provider: case management    Education and Discussions with Family / Patient: Updated  (wife) at bedside.    Current Length of Stay: 4 day(s)  Current Patient Status: Inpatient   Certification Statement: The patient will continue to require additional inpatient hospital stay due to hypoxic respiratory failure  Discharge Plan: Anticipate discharge in 48-72 hrs to discharge location to be determined pending rehab evaluations.    Code Status: Level 3 - DNAR and DNI    Subjective   Patient states lasix has been helping with breathing      Objective :  Temp:  [97.5 °F (36.4 °C)-98 °F (36.7 °C)] 97.5 °F (36.4 °C)  HR:  [111-122] 122  BP: (124-131)/(79-80) 131/80  Resp:  [16-20] 16  SpO2:  [92 %-96 %] 93 %  O2 Device: Nasal cannula  Nasal Cannula O2 Flow Rate (L/min):  [4 L/min-6 L/min] 4 L/min    Body mass index is 28.24 kg/m².     Input and Output Summary (last 24 hours):     Intake/Output Summary (Last 24 hours) at 2/6/2025 1458  Last data filed at 2/6/2025 0730  Gross per 24 hour   Intake 120 ml   Output 900 ml   Net -780 ml       Physical Exam  Vitals and nursing note reviewed.   Constitutional:       General: He is not in acute distress.     Appearance: He is  well-developed. He is obese. He is ill-appearing.   HENT:      Head: Normocephalic and atraumatic.   Eyes:      Conjunctiva/sclera: Conjunctivae normal.   Cardiovascular:      Rate and Rhythm: Regular rhythm. Tachycardia present.      Heart sounds: No murmur heard.  Pulmonary:      Effort: Pulmonary effort is normal.      Comments: Decreased breath sounds right lower and middle lobe  Abdominal:      Palpations: Abdomen is soft.      Tenderness: There is no abdominal tenderness.   Skin:     General: Skin is warm and dry.      Capillary Refill: Capillary refill takes less than 2 seconds.   Neurological:      Mental Status: He is alert.   Psychiatric:         Mood and Affect: Mood normal.           Lines/Drains:  Lines/Drains/Airways       Active Status       Name Placement date Placement time Site Days    Port A Cath 05/20/24 Right Chest 05/20/24  1427  Chest  262    Pleural Effusion Long-Term Catheter 12/12/24  1009  --  56                    Central Line:  Goal for removal: Port accessed. Will de-access as appropriate.               Lab Results: I have reviewed the following results:   Results from last 7 days   Lab Units 02/06/25  0538 02/05/25  0551 02/04/25  0440   WBC Thousand/uL 5.12   < > 3.87*   HEMOGLOBIN g/dL 9.4*   < > 9.5*   HEMATOCRIT % 31.1*   < > 31.9*   PLATELETS Thousands/uL 214   < > 221   SEGS PCT %  --   --  49   LYMPHO PCT %  --   --  28   MONO PCT %  --   --  21*   EOS PCT %  --   --  0    < > = values in this interval not displayed.     Results from last 7 days   Lab Units 02/06/25  0538 02/04/25  0440 02/03/25  0509   SODIUM mmol/L 141   < > 141   POTASSIUM mmol/L 3.4*   < > 3.5   CHLORIDE mmol/L 102   < > 106   CO2 mmol/L 34*   < > 27   BUN mg/dL 13   < > 13   CREATININE mg/dL 0.58*   < > 0.47*   ANION GAP mmol/L 5   < > 8   CALCIUM mg/dL 9.1   < > 8.5   ALBUMIN g/dL 3.4*   < > 3.1*   TOTAL BILIRUBIN mg/dL  --   --  0.36   ALK PHOS U/L  --   --  65   ALT U/L  --   --  13   AST U/L  --   --   13   GLUCOSE RANDOM mg/dL 139   < > 154*    < > = values in this interval not displayed.     Results from last 7 days   Lab Units 02/02/25  0637   INR  1.30*     Results from last 7 days   Lab Units 02/06/25  1054 02/06/25  0746 02/05/25  2031 02/05/25  1545 02/05/25  1124 02/05/25  0725 02/04/25  2112 02/04/25  1638 02/04/25  1145 02/04/25  0757 02/03/25  2101 02/03/25  1637   POC GLUCOSE mg/dl 125 144* 177* 221* 180* 166* 123 269* 165* 139 190* 207*         Results from last 7 days   Lab Units 02/02/25  0742 02/02/25  0647   LACTIC ACID mmol/L 1.8  --    PROCALCITONIN ng/ml  --  <0.05       Recent Cultures (last 7 days):   Results from last 7 days   Lab Units 02/02/25  0736 02/02/25  0647   BLOOD CULTURE  No Growth After 4 Days.  --    GRAM STAIN RESULT   --  Gram positive cocci in clusters*       Imaging Results Review: I reviewed radiology reports from this admission including: CT chest.  Other Study Results Review: EKG was reviewed.     Last 24 Hours Medication List:     Current Facility-Administered Medications:     acetaminophen (TYLENOL) tablet 975 mg, Q8H PRN    albuterol (PROVENTIL HFA,VENTOLIN HFA) inhaler 2 puff, Q4H PRN    amitriptyline (ELAVIL) tablet 150 mg, HS    apixaban (ELIQUIS) tablet 5 mg, BID    atorvastatin (LIPITOR) tablet 10 mg, Daily    benzonatate (TESSALON PERLES) capsule 200 mg, TID PRN    cefepime (MAXIPIME) 2 g/50 mL dextrose IVPB, Q8H, Last Rate: 2,000 mg (02/06/25 0904)    chlorhexidine (PERIDEX) 0.12 % oral rinse 15 mL, Q12H LATONIA    diazepam (VALIUM) tablet 5 mg, BID    famotidine (PEPCID) tablet 40 mg, Daily PRN    folic acid (FOLVITE) tablet 1 mg, Daily    furosemide (LASIX) injection 40 mg, BID (diuretic)    [Held by provider] glimepiride (AMARYL) tablet 2 mg, Daily With Breakfast    influenza vaccine, recombinant (PF) (Flublok) IM injection 0.5 mL, Once    insulin lispro (HumALOG/ADMELOG) 100 units/mL subcutaneous injection 1-6 Units, 4x Daily (AC & HS) **AND** Fingerstick  Glucose (POCT), 4x Daily AC and at bedtime    ipratropium (ATROVENT) 0.02 % inhalation solution 0.5 mg, Q6H    levalbuterol (XOPENEX) inhalation solution 1.25 mg, Q6H    levothyroxine tablet 25 mcg, Early Morning    losartan (COZAAR) tablet 25 mg, Daily    metoprolol tartrate (LOPRESSOR) partial tablet 12.5 mg, Q12H LATONIA    morphine injection 2 mg, Q4H PRN    naloxone (NARCAN) 0.04 mg/mL syringe 0.04 mg, Q1MIN PRN    oxyCODONE (ROXICODONE) IR tablet 5 mg, Q4H PRN **OR** oxyCODONE (ROXICODONE) immediate release tablet 10 mg, Q4H PRN    pneumococcal 20-juana conj vacc (PREVNAR 20) IM Injection 0.5 mL, Once    QUEtiapine (SEROquel) tablet 25 mg, HS    sodium chloride (OCEAN) 0.65 % nasal spray 1 spray, Q1H PRN    sodium chloride (OCEAN) 0.65 % nasal spray 2 spray, TID    vancomycin (VANCOCIN) 1,250 mg in sodium chloride 0.9 % 250 mL IVPB, Q12H, Last Rate: 1,250 mg (02/06/25 1025)    Administrative Statements   Today, Patient Was Seen By: Salinas Horner DO      **Please Note: This note may have been constructed using a voice recognition system.**

## 2025-02-06 NOTE — ASSESSMENT & PLAN NOTE
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   Has Pleurx catheter which was drained 250 ml of serosanguinous fluid last night  -  Recent echo 12/9/24 showing EF 60% and LV wall thickness mildly increased and with mild concentric hypertrophy; mild AR, aortic valve sclerosis, mild annual calcification MV      Given lasix 40 mg IV daily 2/4-2/6  Pulm following   Cefepime day 5 of 7

## 2025-02-06 NOTE — PLAN OF CARE
Problem: OCCUPATIONAL THERAPY ADULT  Goal: Performs self-care activities at highest level of function for planned discharge setting.  See evaluation for individualized goals.  Description: Treatment Interventions: ADL retraining, Endurance training, Energy conservation, Compensatory technique education  Equipment Recommended:  (use BSC over toilet for elevated surface, WC on first floor to maximize functional independence.)       See flowsheet documentation for full assessment, interventions and recommendations.   Note: Limitation: Decreased ADL status, Decreased endurance, Decreased self-care trans, Decreased high-level ADLs  Prognosis: Fair  Assessment: Patient is a 54 y.o. male seen for OT evaluation at [facility] following admission on 2/2/2025  s/p Chronic hypoxemic respiratory failure (HCC). Please see above for comprehensive list of comorbidities and significant PMHx impacting functional performance.  Upon initial evaluation, pt appears to be performing below baseline functional status.   Occupational performance is affected by the following deficits: endurance , decreased activity tolerance , and (+) pain . Personal/Environmental factors impacting D/C include: extensive comorbidities impacting prognosis. Supporting factors include: accessible home environment and support system available Patient would benefit from OT services within the acute care setting to maximize level of functional independence in the following areas self-care transfers, functional mobility, and ADLs.  From OT standpoint, recommendation at time of D/C would be Level 3: minimum resource intensity .     Rehab Resource Intensity Level, OT: III (Minimum Resource Intensity) (resume services)   Alta Stephens OT

## 2025-02-06 NOTE — ASSESSMENT & PLAN NOTE
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   Has Pleurx catheter which was drained 250 ml of serosanguinous fluid last night  -  Recent echo 12/9/24 showing EF 60% and LV wall thickness mildly increased and with mild concentric hypertrophy; mild AR, aortic valve sclerosis, mild annual calcification MV      Given lasix 40 mg IV daily on 2/4 and 2/5  Pulm following   Cefepime day 5   Admit Date: 11/3/2017    Subjective:     Patient has no new complaint. Objective:     Patient Vitals for the past 8 hrs:   BP Temp Pulse Resp SpO2   11/04/17 0444 127/70 97.4 °F (36.3 °C) 80 18 97 %   11/04/17 0021 145/79 97.3 °F (36.3 °C) (!) 101 18 100 %        11/02 1901 - 11/04 0700  In: 2150 [I.V.:2150]  Out: 6185 [Urine:1190]    Physical Exam: rrr, ctab, abd mildly distended with hypoactive bowel sounds, incision dressing c/d/i        Data Review   Recent Results (from the past 24 hour(s))   BLOOD GAS, ARTERIAL    Collection Time: 11/03/17  2:45 PM   Result Value Ref Range    pH 7.24 (L) 7.35 - 7.45      PCO2 58 (H) 35 - 45 mmHg    PO2 150 (H) 80 - 105 mmHg    BICARBONATE 24 22 - 26 mmol/L    BASE DEFICIT 3.8 (H) 0 - 2 mmol/L    TOTAL HEMOGLOBIN 13.3 11.7 - 15.0 GM/DL    O2 SAT 98 92 - 98.5 %    Arterial O2 Hgb 97.6 (H) 94 - 97 %    CARBOXYHEMOGLOBIN 0.2 (L) 0.5 - 1.5 %    METHEMOGLOBIN 0.6 0.0 - 1.5 %    DEOXYHEMOGLOBIN 2 0.0 - 5.0 %    SITE RR     ALLENS TEST POSITIVE      O2 FLOW 6.00 L/min    Respiratory comment: dr Marilee Decker at 11 3 2017 2 55 09 PM. Read back. CBC WITH AUTOMATED DIFF    Collection Time: 11/04/17  6:13 AM   Result Value Ref Range    WBC 9.5 4.3 - 11.1 K/uL    RBC 3.75 (L) 4.05 - 5.25 M/uL    HGB 10.9 (L) 11.7 - 15.4 g/dL    HCT 32.7 (L) 35.8 - 46.3 %    MCV 87.2 79.6 - 97.8 FL    MCH 29.1 26.1 - 32.9 PG    MCHC 33.3 31.4 - 35.0 g/dL    RDW 13.5 11.9 - 14.6 %    PLATELET 168 095 - 346 K/uL    MPV 9.6 (L) 10.8 - 14.1 FL    DF AUTOMATED      NEUTROPHILS 76 43 - 78 %    LYMPHOCYTES 14 13 - 44 %    MONOCYTES 10 4.0 - 12.0 %    EOSINOPHILS 0 (L) 0.5 - 7.8 %    BASOPHILS 0 0.0 - 2.0 %    IMMATURE GRANULOCYTES 0 0.0 - 5.0 %    ABS. NEUTROPHILS 7.2 1.7 - 8.2 K/UL    ABS. LYMPHOCYTES 1.4 0.5 - 4.6 K/UL    ABS. MONOCYTES 0.9 0.1 - 1.3 K/UL    ABS. EOSINOPHILS 0.0 0.0 - 0.8 K/UL    ABS. BASOPHILS 0.0 0.0 - 0.2 K/UL    ABS. IMM.  GRANS. 0.0 0.0 - 0.5 K/UL           Assessment:     Active Problems:    Left renal mass (11/3/2017)      Renal cell cancer, left ([address] (11/3/2017)      Overview: Nephrectomy 11/3/17    POD 1 s/p LEFT hand assisted laparoscopic nephrectomy    Plan:       Incentive spirometer    Ambulate    Remove wilson later if mobility looks OK    Labs in AM    Aubree Barlow MD

## 2025-02-06 NOTE — OCCUPATIONAL THERAPY NOTE
Occupational Therapy Evaluation     Patient Name: Papo Gibbs  Today's Date: 2/6/2025  Problem List  Principal Problem:    acute on Chronic hypoxemic respiratory failure (HCC)  Active Problems:    HTN (hypertension)    Adenocarcinoma of overlapping sites of right lung (HCC)    Palliative care by specialist    History of pulmonary embolism    Primary malignant neoplasm of right lung metastatic to other site (HCC)    Tachycardia    Cancer related pain    Diarrhea    Counseling regarding advance care planning and goals of care    Sepsis (HCC)    Past Medical History  Past Medical History:   Diagnosis Date    Cancer (HCC) 04,25,2024    Diabetes mellitus (HCC)     High cholesterol     History of blood clots     Hypertension     Leucocytosis 04/20/2024    Lung cancer (HCC)     Malignant neoplasm of overlapping sites of right lung (HCC) 06/04/2024    Pneumonia     Pulmonary embolism (HCC)      Past Surgical History  Past Surgical History:   Procedure Laterality Date    IR BIOPSY LYMPH NODE  4/23/2024    IR PORT PLACEMENT  5/20/2024    IR THORACENTESIS  12/9/2024    KNEE SURGERY      MANDIBLE FRACTURE SURGERY  1985    PATELLA FRACTURE SURGERY      RECTAL SURGERY             02/06/25 1511   OT Last Visit   OT Visit Date 02/06/25   Note Type   Note type Evaluation   Pain Assessment   Pain Assessment Tool 0-10   Pain Score 7   Pain Location/Orientation Orientation: Bilateral;Location: Chest   Effect of Pain on Daily Activities comfort   Hospital Pain Intervention(s) Rest;Emotional support   Restrictions/Precautions   Weight Bearing Precautions Per Order No   Other Precautions Fall Risk;Chair Alarm;Bed Alarm;O2;Pain;Multiple lines  (4L O2 via NC.)   Home Living   Type of Home House   Home Layout Two level;1/2 bath on main level;Bed/bath upstairs  (6 +6 steps c HR to 2nd floor. 2 HILARY c HR.)   Bathroom Shower/Tub Walk-in shower   Bathroom Toilet Standard  (standard on 1st floor. RT 2nd floor)   Bathroom Equipment Commode  "  Bathroom Accessibility Accessible  (not accessible via WC.)   Home Equipment Walker;Cane;Wheelchair-manual  (rollator, RW. Home O2 concentrator goes to 5L per EMR)   Additional Comments electric bed at home. Would like to get to 2nd floor but can maintain FFSU if needed.   Prior Function   Level of [place] Independent with ADLs;Independent with functional mobility;Needs assistance with IADLS   Lives With Spouse  (reports someone will be home with him \"almost all the time\" at DC)   Receives Help From Family;Home health  (supportive parents and sister, brother in law, significant other.  active c HHOT and HHPT 2x / week)   IADLs Independent with medication management  (family managing IADLs)   Falls in the last 6 months 0   Vocational Full time employment  (. reports since last hospitalization he has been working from home)   Comments Reports since recent DC he has been primarily maintaining FFSU (went upstairs a few times when able). Primarily using WC, but had good days where he could walk further. Spongebathing d/t concern with breathing. Self propels WC with LEs.   Lifestyle   Autonomy PTA, pt is (I) c ADLs, A with IADLs. mod (I) c W/C vs short distances with RW   Reciprocal Relationships supportive family and spouse   Service to Others works FT- currently working from home. Reports has been working while here in the hospital as well.   General   Additional Pertinent History Pt admitted d/t acute > chronic hypoxemic resp failure. 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 Complicated by sepsis, cancer related pain, tachycardia, HTN.   Family/Caregiver Present   (mother, sister, brother in law)   Subjective   Subjective Pt agreeable to OT session, reports he has been experiencing anxiety when feeling increased SOB   ADL   Eating Assistance 7  Independent   Grooming Assistance 6  Modified Independent   UB Bathing Assistance 5  Supervision/Setup   LB Bathing " Assistance 5  Supervision/Setup   UB Dressing Assistance 6  Modified independent   LB Dressing Assistance 5  Supervision/Setup   LB Dressing Deficit Increased time to complete;Supervision/safety;Requires assistive device for steadying;Setup  (thread BLE into underwear, pants. A for management of condom cath only. education for EC techniques c good application)   Toileting Assistance  5  Supervision/Setup   Functional Assistance 6  Modified independent   Additional Comments increased time for rest breaks during functional tasks d/t ROBERTSON   Bed Mobility   Additional Comments seated EOB upon arrival, end of session   Transfers   Sit to Stand 6  Modified independent   Additional items Increased time required;Armrests   Stand to Sit 6  Modified independent   Additional items Increased time required;Armrests   Stand pivot 6  Modified independent   Additional items Increased time required;Armrests   Additional Comments sit<>stand x 2 from EOB with safe body mechancis, RW. SPT EOB to/from armchair with safe body mechanics. resting Spo2 91-92% on 4L during conversationl, HR 120s, post mobility SpO2 87-89% on 4L after transfer,  max.   Functional Mobility   Additional Comments pt declines  d/t fear of SOB, anxiety. confirms plan to maintain WC level mobility upon DC until breathing improves   Balance   Static Sitting Good   Dynamic Sitting Good   Static Standing Fair +   Dynamic Standing Fair +   Activity Tolerance   Activity Tolerance Other (Comment)  (tachycardia, SOB)   Medical Staff Made Aware ISAIAH Plasencia prior to session. PT Peyton GREENFIELD Assessment   RUE Assessment WFL  (deferred MMT d/t metastatic CA. Grossly 4/5 based on functional assessment)   LUE Assessment   LUE Assessment WFL  (deferred MMT d/t metastatic CA. Grossly 4/5 based on functional assessment)   Hand Function   Gross Motor Coordination Functional   Fine Motor Coordination Functional   Sensation   Light Touch No apparent deficits   Vision-Basic Assessment    Current Vision Wears glasses all the time   Cognition   Overall Cognitive Status WFL   Arousal/Participation Alert;Cooperative   Attention Within functional limits   Orientation Level Oriented to situation;Oriented to place;Oriented to person   Memory Decreased recall of precautions;Decreased recall of recent events   Following Commands Follows all commands and directions without difficulty   Comments Fair insight, would benefit from continued education on EC technques. ? intermittent confusion noted   Assessment   Limitation Decreased ADL status;Decreased endurance;Decreased self-care trans;Decreased high-level ADLs   Prognosis Fair   Assessment Patient is a 54 y.o. male seen for OT evaluation at [facility] following admission on 2/2/2025  s/p Chronic hypoxemic respiratory failure (HCC). Please see above for comprehensive list of comorbidities and significant PMHx impacting functional performance.  Upon initial evaluation, pt appears to be performing below baseline functional status.   Occupational performance is affected by the following deficits: endurance , decreased activity tolerance , and (+) pain . Personal/Environmental factors impacting D/C include: extensive comorbidities impacting prognosis. Supporting factors include: accessible home environment and support system available Patient would benefit from OT services within the acute care setting to maximize level of functional independence in the following areas self-care transfers, functional mobility, and ADLs.  From OT standpoint, recommendation at time of D/C would be Level 3: minimum resource intensity .   Goals   Patient Goals to return home   Plan   Treatment Interventions ADL retraining;Endurance training;Energy conservation;Compensatory technique education   Goal Expiration Date 02/16/25   OT Treatment Day 0   OT Frequency 1-2x/wk   Discharge Recommendation   Rehab Resource Intensity Level, OT III (Minimum Resource  Intensity)  (resume services)   Equipment Recommended   (use BSC over toilet for elevated surface, WC on first floor to maximize functional independence.)   Additional Comments  The patient's raw score on the AM-PAC Daily Activity Inpatient Short Form is 21. A raw score of less than 19 suggests the patient may benefit from discharge to post-acute rehabilitation services. Please refer to the recommendation of the Occupational Therapist for safe discharge planning.   AM-PAC Daily Activity Inpatient   Lower Body Dressing 3   Bathing 3   Toileting 3   Upper Body Dressing 4   Grooming 4   Eating 4   Daily Activity Raw Score 21   Daily Activity Standardized Score (Calc for Raw Score >=11) 44.27   AM-PAC Applied Cognition Inpatient   Following a Speech/Presentation 3   Understanding Ordinary Conversation 4   Taking Medications 3   Remembering Where Things Are Placed or Put Away 4   Remembering List of 4-5 Errands 4   Taking Care of Complicated Tasks 3   Applied Cognition Raw Score 21   Applied Cognition Standardized Score 44.3   End of Consult   Education Provided Yes   Patient Position at End of Consult Bed/Chair alarm activated;All needs within reach;Seated edge of bed   Nurse Communication Nurse aware of consult   Goals established on initial evaluation in order to achieve pt's goal of returning home        Pt will complete LB ADLs Mod independent   for increased ADL independence within 10 days.     Pt will complete toileting Mod independent   with use of DME for increased ADL independence within 10 days.     Pt will demonstrate proper body mechanics to complete self-care transfers and functional mobility with Mod independent  and use of LRAD for increased safety and functional independence within 10 days.     Pt will demonstrate proper body mechanics and fall prevention strategies during 100% of tx sessions for increased safety awareness during ADL/IADLs    Pt will demonstrate activity tolerance of 30 min in  therapeutic tasks for increased participation in meaningful activities upon D/C.    Pt will verbalize and demonstrate understanding of energy conservation/deep breathing techniques for increased activity tolerance and endurance during meaningful activities.     Pt will complete W/C level functional mobility household distances with LE propulsion for EC technique and to maximize (I) c ADLs.       Alta Stephens, OT

## 2025-02-06 NOTE — PROGRESS NOTES
Progress Note - Hospitalist   Name: Papo Gibbs 54 y.o. male I MRN: [id number]  Unit/Bed#: W -01 I Date of Admission: 2/2/2025   Date of Service: 2/6/2025 I Hospital Day: 4    Assessment & Plan  HTN (hypertension)  Continue losartan 25 mg daily   Adenocarcinoma of overlapping sites of right lung (HCC)  Stage IV, being treated with chemotherapy, follows Dr. Bryan outpatient  - Status post carbo, Alimta, Keytruda from May to Aug 2024  - July 2024 received rad to brain   - Oct 2024 CT CAP showed met lung lymphangitic spread of adenocarcinoma and subsequently received palliative pemetrex with keytruda from Nov to Dec 2024 then docetaxel plus ramucirumab from Dec to Jan 21 2025  Oncology consulted  Palliative consulted  Guarded prognosis  Goals of care talks on going        acute on Chronic hypoxemic respiratory failure (HCC)  54-year-old male with past medical history of stage IV adenocarcinoma of the lung with mets to the brain s/p chemo as well as radiation to the brain presented to the ED on 2/2 with shortness of breath     CT lungs showing redemonstration of lymphangitic spread as well as extensive consolidation in RLL with complete collapse of RML.   Has Pleurx catheter which was drained 250 ml of serosanguinous fluid last night  -  Recent echo 12/9/24 showing EF 60% and LV wall thickness mildly increased and with mild concentric hypertrophy; mild AR, aortic valve sclerosis, mild annual calcification MV      Given lasix 40 mg IV daily on 2/4 and 2/5  Pulm following   Cefepime day 5  History of pulmonary embolism  Continue eliquis 5 mg bid  Cancer related pain  Continue oxycodone IR 5 mg/10 mg every 4 hours as needed for moderate/severe pain  Continue hydromorphone IV 0.5   Primary malignant neoplasm of right lung metastatic to other site (HCC)    Diarrhea    Sepsis (HCC)  Sepsis, present on admission, due to post obstructive PNA evidenced by Wbc 2.90, Tachycardia (), Tachypnea (RR 36), acute on  chronic respiratory failure requiring IV Cefepime/ Vancomycin, lactic acid and blood cultures.        Tachycardia  History of tachycardia   Trial of lopressor 12.5 mg bid     VTE Pharmacologic Prophylaxis:   Moderate Risk (Score 3-4) - Pharmacological DVT Prophylaxis Ordered: apixaban (Eliquis).    Mobility:   Basic Mobility Inpatient Raw Score: 12  JH-HLM Goal: 4: Move to chair/commode  JH-HLM Achieved: 3: Sit at edge of bed  JH-HLM Goal NOT achieved. Continue with multidisciplinary rounding and encourage appropriate mobility to improve upon -HLM goals.    Patient Centered Rounds: I performed bedside rounds with nursing staff today.   Discussions with Specialists or Other Care Team Provider: case management    Education and Discussions with Family / Patient: Updated  (wife) at bedside.    Current Length of Stay: 4 day(s)  Current Patient Status: Inpatient   Certification Statement: The patient will continue to require additional inpatient hospital stay due to hypoxic respiratory failure  Discharge Plan: Anticipate discharge in 48-72 hrs to discharge location to be determined pending rehab evaluations.    Code Status: Level 3 - DNAR and DNI    Subjective   Patient states shortness of breath improved a little    Objective :  Temp:  [97.5 °F (36.4 °C)-98 °F (36.7 °C)] 97.5 °F (36.4 °C)  HR:  [111-128] 122  BP: (124-131)/(79-80) 131/80  Resp:  [16-20] 16  SpO2:  [92 %-96 %] 93 %  O2 Device: Nasal cannula  Nasal Cannula O2 Flow Rate (L/min):  [4 L/min-6 L/min] 4 L/min    Body mass index is 28.24 kg/m².     Input and Output Summary (last 24 hours):     Intake/Output Summary (Last 24 hours) at 2/6/2025 0838  Last data filed at 2/5/2025 1839  Gross per 24 hour   Intake 1160 ml   Output 875 ml   Net 285 ml       Physical Exam  Vitals and nursing note reviewed.   Constitutional:       General: He is not in acute distress.     Appearance: He is well-developed. He is obese. He is ill-appearing.   HENT:       Head: Normocephalic and atraumatic.   Eyes:      Conjunctiva/sclera: Conjunctivae normal.   Cardiovascular:      Rate and Rhythm: Regular rhythm. Tachycardia present.      Heart sounds: No murmur heard.  Pulmonary:      Effort: Pulmonary effort is normal.      Comments: Decreased breath sounds right lower and middle lobe  Abdominal:      Palpations: Abdomen is soft.      Tenderness: There is no abdominal tenderness.   Skin:     General: Skin is warm and dry.      Capillary Refill: Capillary refill takes less than 2 seconds.   Neurological:      Mental Status: He is alert.   Psychiatric:         Mood and Affect: Mood normal.           Lines/Drains:  Lines/Drains/Airways       Active Status       Name Placement date Placement time Site Days    Port A Cath 05/20/24 Right Chest 05/20/24  1427  Chest  261    Pleural Effusion Long-Term Catheter 12/12/24  1009  --  55                    Central Line:  Goal for removal: Port accessed. Will de-access as appropriate.               Lab Results: I have reviewed the following results:   Results from last 7 days   Lab Units 02/06/25  0538 02/05/25  0551 02/04/25  0440   WBC Thousand/uL 5.12   < > 3.87*   HEMOGLOBIN g/dL 9.4*   < > 9.5*   HEMATOCRIT % 31.1*   < > 31.9*   PLATELETS Thousands/uL 214   < > 221   SEGS PCT %  --   --  49   LYMPHO PCT %  --   --  28   MONO PCT %  --   --  21*   EOS PCT %  --   --  0    < > = values in this interval not displayed.     Results from last 7 days   Lab Units 02/06/25  0538 02/04/25  0440 02/03/25  0509   SODIUM mmol/L 141   < > 141   POTASSIUM mmol/L 3.4*   < > 3.5   CHLORIDE mmol/L 102   < > 106   CO2 mmol/L 34*   < > 27   BUN mg/dL 13   < > 13   CREATININE mg/dL 0.58*   < > 0.47*   ANION GAP mmol/L 5   < > 8   CALCIUM mg/dL 9.1   < > 8.5   ALBUMIN g/dL 3.4*   < > 3.1*   TOTAL BILIRUBIN mg/dL  --   --  0.36   ALK PHOS U/L  --   --  65   ALT U/L  --   --  13   AST U/L  --   --  13   GLUCOSE RANDOM mg/dL 139   < > 154*    < > = values in this  interval not displayed.     Results from last 7 days   Lab Units 02/02/25  0637   INR  1.30*     Results from last 7 days   Lab Units 02/06/25  0746 02/05/25  2031 02/05/25  1545 02/05/25  1124 02/05/25  0725 02/04/25  2112 02/04/25  1638 02/04/25  1145 02/04/25  0757 02/03/25  2101 02/03/25  1637 02/03/25  1205   POC GLUCOSE mg/dl 144* 177* 221* 180* 166* 123 269* 165* 139 190* 207* 167*         Results from last 7 days   Lab Units 02/02/25  0742 02/02/25  0647   LACTIC ACID mmol/L 1.8  --    PROCALCITONIN ng/ml  --  <0.05       Recent Cultures (last 7 days):   Results from last 7 days   Lab Units 02/02/25  0736 02/02/25  0647   BLOOD CULTURE  No Growth at 72 hrs.  --    GRAM STAIN RESULT   --  Gram positive cocci in clusters*       Imaging Results Review: I reviewed radiology reports from this admission including: CT chest.  Other Study Results Review: EKG was reviewed.     Last 24 Hours Medication List:     Current Facility-Administered Medications:     acetaminophen (TYLENOL) tablet 975 mg, Q8H PRN    albuterol (PROVENTIL HFA,VENTOLIN HFA) inhaler 2 puff, Q4H PRN    amitriptyline (ELAVIL) tablet 150 mg, HS    apixaban (ELIQUIS) tablet 5 mg, BID    atorvastatin (LIPITOR) tablet 10 mg, Daily    benzonatate (TESSALON PERLES) capsule 200 mg, TID PRN    cefepime (MAXIPIME) 2 g/50 mL dextrose IVPB, Q8H, Last Rate: 2,000 mg (02/06/25 0049)    chlorhexidine (PERIDEX) 0.12 % oral rinse 15 mL, Q12H LATONIA    diazepam (VALIUM) tablet 5 mg, BID    famotidine (PEPCID) tablet 40 mg, Daily PRN    folic acid (FOLVITE) tablet 1 mg, Daily    [Held by provider] glimepiride (AMARYL) tablet 2 mg, Daily With Breakfast    influenza vaccine, recombinant (PF) (Flublok) IM injection 0.5 mL, Once    insulin lispro (HumALOG/ADMELOG) 100 units/mL subcutaneous injection 1-6 Units, 4x Daily (AC & HS) **AND** Fingerstick Glucose (POCT), 4x Daily AC and at bedtime    ipratropium (ATROVENT) 0.02 % inhalation solution 0.5 mg, Q6H    levalbuterol  (XOPENEX) inhalation solution 1.25 mg, Q6H    levothyroxine tablet 25 mcg, Early Morning    losartan (COZAAR) tablet 25 mg, Daily    metoprolol tartrate (LOPRESSOR) partial tablet 12.5 mg, Q12H LATONIA    morphine injection 2 mg, Q4H PRN    naloxone (NARCAN) 0.04 mg/mL syringe 0.04 mg, Q1MIN PRN    oxyCODONE (ROXICODONE) IR tablet 5 mg, Q4H PRN **OR** oxyCODONE (ROXICODONE) immediate release tablet 10 mg, Q4H PRN    pneumococcal 20-juana conj vacc (PREVNAR 20) IM Injection 0.5 mL, Once    QUEtiapine (SEROquel) tablet 25 mg, HS    sodium chloride (OCEAN) 0.65 % nasal spray 1 spray, Q1H PRN    sodium chloride (OCEAN) 0.65 % nasal spray 2 spray, TID    vancomycin (VANCOCIN) 1,250 mg in sodium chloride 0.9 % 250 mL IVPB, Q12H, Last Rate: 1,250 mg (02/05/25 2147)    Administrative Statements   Today, Patient Was Seen By: Salinas Horner DO      **Please Note: This note may have been constructed using a voice recognition system.**

## 2025-02-07 ENCOUNTER — TELEPHONE (OUTPATIENT)
Dept: PALLIATIVE MEDICINE | Facility: CLINIC | Age: 55
End: 2025-02-07

## 2025-02-07 LAB
ALBUMIN SERPL BCG-MCNC: 4 G/DL (ref 3.5–5)
ANION GAP SERPL CALCULATED.3IONS-SCNC: 9 MMOL/L (ref 4–13)
BACTERIA BLD CULT: NORMAL
BUN SERPL-MCNC: 14 MG/DL (ref 5–25)
CALCIUM SERPL-MCNC: 9.8 MG/DL (ref 8.4–10.2)
CHLORIDE SERPL-SCNC: 99 MMOL/L (ref 96–108)
CO2 SERPL-SCNC: 34 MMOL/L (ref 21–32)
CREAT SERPL-MCNC: 0.71 MG/DL (ref 0.6–1.3)
ERYTHROCYTE [DISTWIDTH] IN BLOOD BY AUTOMATED COUNT: 17.4 % (ref 11.6–15.1)
GFR SERPL CREATININE-BSD FRML MDRD: 105 ML/MIN/1.73SQ M
GLUCOSE SERPL-MCNC: 143 MG/DL (ref 65–140)
GLUCOSE SERPL-MCNC: 167 MG/DL (ref 65–140)
GLUCOSE SERPL-MCNC: 176 MG/DL (ref 65–140)
GLUCOSE SERPL-MCNC: 185 MG/DL (ref 65–140)
GLUCOSE SERPL-MCNC: 255 MG/DL (ref 65–140)
HCT VFR BLD AUTO: 37 % (ref 36.5–49.3)
HGB BLD-MCNC: 11 G/DL (ref 12–17)
MAGNESIUM SERPL-MCNC: 1.9 MG/DL (ref 1.9–2.7)
MCH RBC QN AUTO: 25.6 PG (ref 26.8–34.3)
MCHC RBC AUTO-ENTMCNC: 29.7 G/DL (ref 31.4–37.4)
MCV RBC AUTO: 86 FL (ref 82–98)
PHOSPHATE SERPL-MCNC: 3.4 MG/DL (ref 2.7–4.5)
PLATELET # BLD AUTO: 236 THOUSANDS/UL (ref 149–390)
PMV BLD AUTO: 10.3 FL (ref 8.9–12.7)
POTASSIUM SERPL-SCNC: 3.6 MMOL/L (ref 3.5–5.3)
RBC # BLD AUTO: 4.3 MILLION/UL (ref 3.88–5.62)
SODIUM SERPL-SCNC: 142 MMOL/L (ref 135–147)
WBC # BLD AUTO: 6.15 THOUSAND/UL (ref 4.31–10.16)

## 2025-02-07 PROCEDURE — 85027 COMPLETE CBC AUTOMATED: CPT | Performed by: PHYSICIAN ASSISTANT

## 2025-02-07 PROCEDURE — 83735 ASSAY OF MAGNESIUM: CPT | Performed by: PHYSICIAN ASSISTANT

## 2025-02-07 PROCEDURE — 99232 SBSQ HOSP IP/OBS MODERATE 35: CPT | Performed by: STUDENT IN AN ORGANIZED HEALTH CARE EDUCATION/TRAINING PROGRAM

## 2025-02-07 PROCEDURE — 80069 RENAL FUNCTION PANEL: CPT | Performed by: PHYSICIAN ASSISTANT

## 2025-02-07 PROCEDURE — 94640 AIRWAY INHALATION TREATMENT: CPT

## 2025-02-07 PROCEDURE — 99233 SBSQ HOSP IP/OBS HIGH 50: CPT | Performed by: STUDENT IN AN ORGANIZED HEALTH CARE EDUCATION/TRAINING PROGRAM

## 2025-02-07 PROCEDURE — 94760 N-INVAS EAR/PLS OXIMETRY 1: CPT

## 2025-02-07 PROCEDURE — 82948 REAGENT STRIP/BLOOD GLUCOSE: CPT

## 2025-02-07 RX ORDER — OXYCODONE HYDROCHLORIDE 5 MG/1
5-10 TABLET ORAL EVERY 4 HOURS PRN
Qty: 60 TABLET | Refills: 0 | Status: SHIPPED | OUTPATIENT
Start: 2025-02-07 | End: 2025-02-19

## 2025-02-07 RX ORDER — FUROSEMIDE 10 MG/ML
40 INJECTION INTRAMUSCULAR; INTRAVENOUS
Status: COMPLETED | OUTPATIENT
Start: 2025-02-07 | End: 2025-02-08

## 2025-02-07 RX ORDER — ALBUMIN (HUMAN) 12.5 G/50ML
25 SOLUTION INTRAVENOUS ONCE
Status: COMPLETED | OUTPATIENT
Start: 2025-02-07 | End: 2025-02-07

## 2025-02-07 RX ORDER — DIAZEPAM 5 MG/1
5 TABLET ORAL 2 TIMES DAILY
Qty: 60 TABLET | Refills: 0 | Status: SHIPPED | OUTPATIENT
Start: 2025-02-07 | End: 2025-02-19

## 2025-02-07 RX ADMIN — ALBUMIN (HUMAN) 25 G: 0.25 INJECTION, SOLUTION INTRAVENOUS at 11:35

## 2025-02-07 RX ADMIN — QUETIAPINE FUMARATE 25 MG: 25 TABLET ORAL at 22:20

## 2025-02-07 RX ADMIN — APIXABAN 5 MG: 5 TABLET, FILM COATED ORAL at 18:03

## 2025-02-07 RX ADMIN — CEFEPIME 2000 MG: 2 INJECTION, POWDER, FOR SOLUTION INTRAVENOUS at 00:20

## 2025-02-07 RX ADMIN — CHLORHEXIDINE GLUCONATE 15 ML: 1.2 RINSE ORAL at 08:02

## 2025-02-07 RX ADMIN — APIXABAN 5 MG: 5 TABLET, FILM COATED ORAL at 08:03

## 2025-02-07 RX ADMIN — CEFEPIME 2000 MG: 2 INJECTION, POWDER, FOR SOLUTION INTRAVENOUS at 07:57

## 2025-02-07 RX ADMIN — MORPHINE SULFATE 2 MG: 2 INJECTION, SOLUTION INTRAMUSCULAR; INTRAVENOUS at 13:05

## 2025-02-07 RX ADMIN — Medication 12.5 MG: at 22:20

## 2025-02-07 RX ADMIN — INSULIN LISPRO 3 UNITS: 100 INJECTION, SOLUTION INTRAVENOUS; SUBCUTANEOUS at 16:59

## 2025-02-07 RX ADMIN — INSULIN LISPRO 1 UNITS: 100 INJECTION, SOLUTION INTRAVENOUS; SUBCUTANEOUS at 22:21

## 2025-02-07 RX ADMIN — LEVALBUTEROL HYDROCHLORIDE 1.25 MG: 1.25 SOLUTION RESPIRATORY (INHALATION) at 01:09

## 2025-02-07 RX ADMIN — IPRATROPIUM BROMIDE 0.5 MG: 0.5 SOLUTION RESPIRATORY (INHALATION) at 01:09

## 2025-02-07 RX ADMIN — IPRATROPIUM BROMIDE 0.5 MG: 0.5 SOLUTION RESPIRATORY (INHALATION) at 13:45

## 2025-02-07 RX ADMIN — SALINE NASAL SPRAY 2 SPRAY: 1.5 SOLUTION NASAL at 15:32

## 2025-02-07 RX ADMIN — IPRATROPIUM BROMIDE 0.5 MG: 0.5 SOLUTION RESPIRATORY (INHALATION) at 19:25

## 2025-02-07 RX ADMIN — AMITRIPTYLINE HYDROCHLORIDE 150 MG: 25 TABLET, FILM COATED ORAL at 22:20

## 2025-02-07 RX ADMIN — OXYCODONE HYDROCHLORIDE 10 MG: 10 TABLET ORAL at 15:24

## 2025-02-07 RX ADMIN — ATORVASTATIN CALCIUM 10 MG: 10 TABLET, FILM COATED ORAL at 08:03

## 2025-02-07 RX ADMIN — IPRATROPIUM BROMIDE 0.5 MG: 0.5 SOLUTION RESPIRATORY (INHALATION) at 07:34

## 2025-02-07 RX ADMIN — LEVOTHYROXINE SODIUM 25 MCG: 25 TABLET ORAL at 05:22

## 2025-02-07 RX ADMIN — FUROSEMIDE 40 MG: 10 INJECTION, SOLUTION INTRAMUSCULAR; INTRAVENOUS at 18:05

## 2025-02-07 RX ADMIN — LEVALBUTEROL HYDROCHLORIDE 1.25 MG: 1.25 SOLUTION RESPIRATORY (INHALATION) at 13:45

## 2025-02-07 RX ADMIN — DIAZEPAM 5 MG: 5 TABLET ORAL at 08:03

## 2025-02-07 RX ADMIN — SALINE NASAL SPRAY 2 SPRAY: 1.5 SOLUTION NASAL at 22:23

## 2025-02-07 RX ADMIN — CHLORHEXIDINE GLUCONATE 15 ML: 1.2 RINSE ORAL at 22:20

## 2025-02-07 RX ADMIN — DIAZEPAM 5 MG: 5 TABLET ORAL at 18:03

## 2025-02-07 RX ADMIN — SALINE NASAL SPRAY 2 SPRAY: 1.5 SOLUTION NASAL at 08:07

## 2025-02-07 RX ADMIN — SALINE NASAL SPRAY 1 SPRAY: 1.5 SOLUTION NASAL at 12:12

## 2025-02-07 RX ADMIN — FUROSEMIDE 40 MG: 10 INJECTION, SOLUTION INTRAMUSCULAR; INTRAVENOUS at 11:31

## 2025-02-07 RX ADMIN — ALBUMIN (HUMAN) 25 G: 0.25 INJECTION, SOLUTION INTRAVENOUS at 16:52

## 2025-02-07 RX ADMIN — INSULIN LISPRO 1 UNITS: 100 INJECTION, SOLUTION INTRAVENOUS; SUBCUTANEOUS at 12:11

## 2025-02-07 RX ADMIN — LOSARTAN POTASSIUM 25 MG: 25 TABLET, FILM COATED ORAL at 08:03

## 2025-02-07 RX ADMIN — CEFEPIME 2000 MG: 2 INJECTION, POWDER, FOR SOLUTION INTRAVENOUS at 18:12

## 2025-02-07 RX ADMIN — LEVALBUTEROL HYDROCHLORIDE 1.25 MG: 1.25 SOLUTION RESPIRATORY (INHALATION) at 07:34

## 2025-02-07 RX ADMIN — LEVALBUTEROL HYDROCHLORIDE 1.25 MG: 1.25 SOLUTION RESPIRATORY (INHALATION) at 19:25

## 2025-02-07 RX ADMIN — VANCOMYCIN HYDROCHLORIDE 1000 MG: 5 INJECTION, POWDER, LYOPHILIZED, FOR SOLUTION INTRAVENOUS at 15:26

## 2025-02-07 RX ADMIN — FOLIC ACID 1 MG: 1 TABLET ORAL at 08:03

## 2025-02-07 NOTE — ASSESSMENT & PLAN NOTE
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   Has Pleurx catheter which was drained 250 ml of serosanguinous fluid last night  -  Recent echo 12/9/24 showing EF 60% and LV wall thickness mildly increased and with mild concentric hypertrophy; mild AR, aortic valve sclerosis, mild annual calcification MV   Pulm following      Given lasix 40 mg IV daily 2/4-2/6  Edema shins improved, still some thigh edema Lasix 40 mg iv bid  Cefepime day 6 of 7

## 2025-02-07 NOTE — PLAN OF CARE
Problem: PAIN - ADULT  Goal: Verbalizes/displays adequate comfort level or baseline comfort level  Description: Interventions:  - Encourage patient to monitor pain and request assistance  - Assess pain using appropriate pain scale  - Administer analgesics based on type and severity of pain and evaluate response  - Implement non-pharmacological measures as appropriate and evaluate response  - Consider cultural and social influences on pain and pain management  - Notify physician/advanced practitioner if interventions unsuccessful or patient reports new pain  Outcome: Progressing     Problem: INFECTION - ADULT  Goal: Absence or prevention of progression during hospitalization  Description: INTERVENTIONS:  - Assess and monitor for signs and symptoms of infection  - Monitor lab/diagnostic results  - Monitor all insertion sites, i.e. indwelling lines, tubes, and drains  - Monitor endotracheal if appropriate and nasal secretions for changes in amount and color  - [place] appropriate cooling/warming therapies per order  - Administer medications as ordered  - Instruct and encourage patient and family to use good hand hygiene technique  - Identify and instruct in appropriate isolation precautions for identified infection/condition  Outcome: Progressing  Goal: Absence of fever/infection during neutropenic period  Description: INTERVENTIONS:  - Monitor WBC    Outcome: Progressing     Problem: DISCHARGE PLANNING  Goal: Discharge to home or other facility with appropriate resources  Description: INTERVENTIONS:  - Identify barriers to discharge w/patient and caregiver  - Arrange for needed discharge resources and transportation as appropriate  - Identify discharge learning needs (meds, wound care, etc.)  - Arrange for interpretive services to assist at discharge as needed  - Refer to Case Management Department for coordinating discharge planning if the patient needs post-hospital services based on physician/advanced  practitioner order or complex needs related to functional status, cognitive ability, or social support system  Outcome: Progressing     Problem: Knowledge Deficit  Goal: Patient/family/caregiver demonstrates understanding of disease process, treatment plan, medications, and discharge instructions  Description: Complete learning assessment and assess knowledge base.  Interventions:  - Provide teaching at level of understanding  - Provide teaching via preferred learning methods  Outcome: Progressing     Problem: Prexisting or High Potential for Compromised Skin Integrity  Goal: Skin integrity is maintained or improved  Description: INTERVENTIONS:  - Identify patients at risk for skin breakdown  - Assess and monitor skin integrity  - Assess and monitor nutrition and hydration status  - Monitor labs   - Assess for incontinence   - Turn and reposition patient  - Assist with mobility/ambulation  - Relieve pressure over bony prominences  - Avoid friction and shearing  - Provide appropriate hygiene as needed including keeping skin clean and dry  - Evaluate need for skin moisturizer/barrier cream  - Collaborate with interdisciplinary team   - Patient/family teaching  - Consider wound care consult   Outcome: Progressing     Problem: Nutrition/Hydration-ADULT  Goal: Nutrient/Hydration intake appropriate for improving, restoring or maintaining nutritional needs  Description: Monitor and assess patient's nutrition/hydration status for malnutrition. Collaborate with interdisciplinary team and initiate plan and interventions as ordered.  Monitor patient's weight and dietary intake as ordered or per policy. Utilize nutrition screening tool and intervene as necessary. Determine patient's food preferences and provide high-protein, high-caloric foods as appropriate.     INTERVENTIONS:  - Monitor oral intake, urinary output, labs, and treatment plans  - Assess nutrition and hydration status and recommend course of action  - Evaluate  amount of meals eaten  - Assist patient with eating if necessary   - Allow adequate time for meals  - Recommend/ encourage appropriate diets, oral nutritional supplements, and vitamin/mineral supplements  - Order, calculate, and assess calorie counts as needed  - Recommend, monitor, and adjust tube feedings and TPN/PPN based on assessed needs  - Assess need for intravenous fluids  - Provide specific nutrition/hydration education as appropriate  - Include patient/family/caregiver in decisions related to nutrition  Outcome: Progressing

## 2025-02-07 NOTE — PROGRESS NOTES
Progress Note - Hospitalist   Name: Papo Gibbs 54 y.o. male I MRN: [id number]  Unit/Bed#: W -01 I Date of Admission: 2/2/2025   Date of Service: 2/7/2025 I Hospital Day: 5    Assessment & Plan  HTN (hypertension)  Continue losartan 25 mg daily   Adenocarcinoma of overlapping sites of right lung (HCC)  Stage IV, being treated with chemotherapy, follows Dr. Bryan outpatient  - Status post carbo, Alimta, Keytruda from May to Aug 2024  - July 2024 received rad to brain   - Oct 2024 CT CAP showed met lung lymphangitic spread of adenocarcinoma and subsequently received palliative pemetrex with keytruda from Nov to Dec 2024 then docetaxel plus ramucirumab from Dec to Jan 21 2025  Oncology consulted  Palliative consulted  Guarded prognosis  Goals of care talks on going    Patient not interested in pursuing any active chemo treatments in the near future. Patient and wife would like to take it one day at a time for now.     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   Has Pleurx catheter which was drained 250 ml of serosanguinous fluid last night  -  Recent echo 12/9/24 showing EF 60% and LV wall thickness mildly increased and with mild concentric hypertrophy; mild AR, aortic valve sclerosis, mild annual calcification MV   Pulm following      Given lasix 40 mg IV daily 2/4-2/6  Edema shins improved, still some thigh edema Lasix 40 mg iv bid  Cefepime day 6 of 7  History of pulmonary embolism  Continue eliquis 5 mg bid  Cancer related pain  Continue oxycodone IR 5 mg/10 mg every 4 hours as needed for moderate/severe pain  Continue hydromorphone IV 0.5   Sepsis (HCC)  Sepsis,  present on admission, due to post obstructive PNA evidenced by Wbc 2.90, Tachycardia (), Tachypnea (RR 36), acute on chronic respiratory failure requiring IV Cefepime/ Vancomycin, lactic acid and blood cultures.        Tachycardia  History of tachycardia   Trial of lopressor 12.5 mg bid     VTE Pharmacologic Prophylaxis:   Moderate Risk (Score 3-4) - Pharmacological DVT Prophylaxis Ordered: apixaban (Eliquis).    Mobility:   Basic Mobility Inpatient Raw Score: 12  JH-HLM Goal: 4: Move to chair/commode  JH-HLM Achieved: 4: Move to chair/commode  JH-HLM Goal achieved. Continue to encourage appropriate mobility.    Patient Centered Rounds: I performed bedside rounds with nursing staff today.   Discussions with Specialists or Other Care Team Provider: case management    Education and Discussions with Family / Patient: Patient declined call to .     Current Length of Stay: 5 day(s)  Current Patient Status: Inpatient   Certification Statement: The patient will continue to require additional inpatient hospital stay due to hypoxic respiratory failure  Discharge Plan: Anticipate discharge in 24-48 hrs to home with home services.    Code Status: Level 3 - DNAR and DNI    Subjective   Pt states his breathing slowly improving      Objective :  Temp:  [98.9 °F (37.2 °C)-99 °F (37.2 °C)] 98.9 °F (37.2 °C)  HR:  [108-128] 123  BP: (105-131)/(60-88) 131/88  Resp:  [16-18] 16  SpO2:  [90 %-96 %] 93 %  O2 Device: Nasal cannula  Nasal Cannula O2 Flow Rate (L/min):  [4 L/min] 4 L/min    Body mass index is 28.18 kg/m².     Input and Output Summary (last 24 hours):     Intake/Output Summary (Last 24 hours) at 2/7/2025 1443  Last data filed at 2/7/2025 1401  Gross per 24 hour   Intake 600 ml   Output 2450 ml   Net -1850 ml       Physical Exam  Vitals and nursing note reviewed.   Constitutional:       General: He is not in acute distress.     Appearance: He is well-developed. He is obese. He is ill-appearing.   HENT:       Head: Normocephalic and atraumatic.   Eyes:      Conjunctiva/sclera: Conjunctivae normal.   Cardiovascular:      Rate and Rhythm: Regular rhythm. Tachycardia present.      Heart sounds: No murmur heard.  Pulmonary:      Effort: Pulmonary effort is normal.      Comments: Decreased breath sounds right lower and middle lobe  Abdominal:      Palpations: Abdomen is soft.      Tenderness: There is no abdominal tenderness.   Skin:     General: Skin is warm and dry.      Capillary Refill: Capillary refill takes less than 2 seconds.   Neurological:      Mental Status: He is alert.   Psychiatric:         Mood and Affect: Mood normal.           Lines/Drains:  Lines/Drains/Airways       Active Status       Name Placement date Placement time Site Days    Port A Cath 05/20/24 Right Chest 05/20/24  1427  Chest  263    Pleural Effusion Long-Term Catheter 12/12/24  1009  --  57                    Central Line:  Goal for removal: Port accessed. Will de-access as appropriate.               Lab Results: I have reviewed the following results:   Results from last 7 days   Lab Units 02/07/25  0835 02/05/25  0551 02/04/25  0440   WBC Thousand/uL 6.15   < > 3.87*   HEMOGLOBIN g/dL 11.0*   < > 9.5*   HEMATOCRIT % 37.0   < > 31.9*   PLATELETS Thousands/uL 236   < > 221   SEGS PCT %  --   --  49   LYMPHO PCT %  --   --  28   MONO PCT %  --   --  21*   EOS PCT %  --   --  0    < > = values in this interval not displayed.     Results from last 7 days   Lab Units 02/07/25  0835 02/04/25  0440 02/03/25  0509   SODIUM mmol/L 142   < > 141   POTASSIUM mmol/L 3.6   < > 3.5   CHLORIDE mmol/L 99   < > 106   CO2 mmol/L 34*   < > 27   BUN mg/dL 14   < > 13   CREATININE mg/dL 0.71   < > 0.47*   ANION GAP mmol/L 9   < > 8   CALCIUM mg/dL 9.8   < > 8.5   ALBUMIN g/dL 4.0   < > 3.1*   TOTAL BILIRUBIN mg/dL  --   --  0.36   ALK PHOS U/L  --   --  65   ALT U/L  --   --  13   AST U/L  --   --  13   GLUCOSE RANDOM mg/dL 176*   < > 154*    < > = values in this  interval not displayed.     Results from last 7 days   Lab Units 02/02/25  0637   INR  1.30*     Results from last 7 days   Lab Units 02/07/25  1100 02/07/25  0659 02/06/25  2037 02/06/25  1553 02/06/25  1054 02/06/25  0746 02/05/25  2031 02/05/25  1545 02/05/25  1124 02/05/25  0725 02/04/25  2112 02/04/25  1638   POC GLUCOSE mg/dl 185* 143* 190* 191* 125 144* 177* 221* 180* 166* 123 269*         Results from last 7 days   Lab Units 02/02/25  0742 02/02/25  0647   LACTIC ACID mmol/L 1.8  --    PROCALCITONIN ng/ml  --  <0.05       Recent Cultures (last 7 days):   Results from last 7 days   Lab Units 02/02/25  0736 02/02/25  0647   BLOOD CULTURE  No Growth After 5 Days. Levar gonzales*   GRAM STAIN RESULT   --  Gram positive cocci in clusters*       Imaging Results Review: I reviewed radiology reports from this admission including: CT chest.  Other Study Results Review: EKG was reviewed.     Last 24 Hours Medication List:     Current Facility-Administered Medications:     acetaminophen (TYLENOL) tablet 975 mg, Q8H PRN    albumin human (FLEXBUMIN) 25 % injection 25 g, Once    albuterol (PROVENTIL HFA,VENTOLIN HFA) inhaler 2 puff, Q4H PRN    amitriptyline (ELAVIL) tablet 150 mg, HS    apixaban (ELIQUIS) tablet 5 mg, BID    atorvastatin (LIPITOR) tablet 10 mg, Daily    benzonatate (TESSALON PERLES) capsule 200 mg, TID PRN    cefepime (MAXIPIME) 2 g/50 mL dextrose IVPB, Q8H, Last Rate: 2,000 mg (02/07/25 0757)    chlorhexidine (PERIDEX) 0.12 % oral rinse 15 mL, Q12H LATONIA    diazepam (VALIUM) tablet 5 mg, BID    famotidine (PEPCID) tablet 40 mg, Daily PRN    folic acid (FOLVITE) tablet 1 mg, Daily    furosemide (LASIX) injection 40 mg, BID (diuretic)    [Held by provider] glimepiride (AMARYL) tablet 2 mg, Daily With Breakfast    influenza vaccine, recombinant (PF) (Flublok) IM injection 0.5 mL, Once    insulin lispro (HumALOG/ADMELOG) 100 units/mL subcutaneous injection 1-6 Units, 4x Daily (AC & HS) **AND** Fingerstick  Glucose (POCT), 4x Daily AC and at bedtime    ipratropium (ATROVENT) 0.02 % inhalation solution 0.5 mg, Q6H    levalbuterol (XOPENEX) inhalation solution 1.25 mg, Q6H    levothyroxine tablet 25 mcg, Early Morning    losartan (COZAAR) tablet 25 mg, Daily    metoprolol tartrate (LOPRESSOR) partial tablet 12.5 mg, Q12H LATONIA    morphine injection 2 mg, Q4H PRN    naloxone (NARCAN) 0.04 mg/mL syringe 0.04 mg, Q1MIN PRN    oxyCODONE (ROXICODONE) IR tablet 5 mg, Q4H PRN **OR** oxyCODONE (ROXICODONE) immediate release tablet 10 mg, Q4H PRN    pneumococcal 20-juana conj vacc (PREVNAR 20) IM Injection 0.5 mL, Once    QUEtiapine (SEROquel) tablet 25 mg, HS    sodium chloride (OCEAN) 0.65 % nasal spray 1 spray, Q1H PRN    sodium chloride (OCEAN) 0.65 % nasal spray 2 spray, TID    vancomycin (VANCOCIN) 1000 mg in sodium chloride 0.9% 250 mL IVPB, Q12H    Administrative Statements   Today, Patient Was Seen By: Salinas Horner DO      **Please Note: This note may have been constructed using a voice recognition system.**

## 2025-02-07 NOTE — TELEPHONE ENCOUNTER
Removed patient from hfu list per [facility] from home program. Patient will be seen by Palliative in the patient's home.

## 2025-02-07 NOTE — PROGRESS NOTES
Progress Note - Palliative Care   Name: Papo Gibbs 54 y.o. male I MRN: [id number]  Unit/Bed#: W -01 I Date of Admission: 2/2/2025   Date of Service: 2/7/2025 I Hospital Day: 5     Assessment & Plan  acute on Chronic hypoxemic respiratory failure (HCC)   4L NC O2    Symptoms exacerbated by pain and anxiety    For air hunger:  Oxycodone 5mg-10mg q4h prn, and Morphine 2mg IV q4h prn for breakthrough symptoms-->communicated/coordinated with RN team to use oxycodone IR primarily for air hunger, as he can't get morphine IV at home  Continue Valium 5 mg twice daily  Adenocarcinoma of overlapping sites of right lung (HCC)  Diagnosed 5/2024  Stage IV mets to brain and pleura s/p pleurx  S/p carboplatin/Alimta/Keytruda  Most recently treated with just Alimta    Imaging shows worsening disease with likely postobstructive pneumonia with collapse on R and lymphangitic carcinomatosis of both lungs  Cancer related pain  On oxycodone IR 2.5mg at home    Continue oxycodone IR 5 mg/10 mg every 4 hours as needed for moderate/severe pain  Continue morphine IV 2 mg every 4 hours as needed for breakthrough pain  Palliative care by specialist  Palliative diagnosis: Adenocarcinoma of the right lung  PPS: 50%  PSC Provider: Dr. Estrella    Supportive listening and presence provided  Anxiety containment provided  Instructions for management and anticipatory guidance provided    Counseled on possibility of Home Palliative Program (eligible zip code [id number])-->patient interested; communicated and coordinated with Home Palliative team to set up  Discharge prescriptions for oxycodone IR and Valium provided at today's encounter  Counseling regarding advance care planning and goals of care  Given that symptoms are better controlled, he confirms that he would still like to forego any more cancer related therapies.  However, he would still like to be able to be hospitalized for different situations and continue to follow-up with the palliative  care team in the outpatient setting.  We will make sure he sees us in the office after discharge.                 Code Status: Switch to DNAR/DNI - Level 3               Decisional apparatus:  Patient is competent on my exam today.  If competence is lost, patient's substitute decision maker would default to Tomasa (spouse) and then Giancarlo (alternate)               Advance Directive / Living Will / POLST:  On file         NARRATIVE AND INTERVAL HISTORY:       Patient seen and examined in his hospital bed.  He states that he is feeling well with pain, dypspnea, and anxiety well controlled. He is interested in the Palliative home program upon discharge.     MEDICATIONS / ALLERGIES:     all current active meds have been reviewed, current meds:   Current Facility-Administered Medications:     acetaminophen (TYLENOL) tablet 975 mg, Q8H PRN    albuterol (PROVENTIL HFA,VENTOLIN HFA) inhaler 2 puff, Q4H PRN    amitriptyline (ELAVIL) tablet 150 mg, HS    apixaban (ELIQUIS) tablet 5 mg, BID    atorvastatin (LIPITOR) tablet 10 mg, Daily    benzonatate (TESSALON PERLES) capsule 200 mg, TID PRN    cefepime (MAXIPIME) 2 g/50 mL dextrose IVPB, Q8H, Last Rate: 2,000 mg (02/07/25 0757)    chlorhexidine (PERIDEX) 0.12 % oral rinse 15 mL, Q12H LATONIA    diazepam (VALIUM) tablet 5 mg, BID    famotidine (PEPCID) tablet 40 mg, Daily PRN    folic acid (FOLVITE) tablet 1 mg, Daily    furosemide (LASIX) injection 40 mg, BID (diuretic)    [Held by provider] glimepiride (AMARYL) tablet 2 mg, Daily With Breakfast    influenza vaccine, recombinant (PF) (Flublok) IM injection 0.5 mL, Once    insulin lispro (HumALOG/ADMELOG) 100 units/mL subcutaneous injection 1-6 Units, 4x Daily (AC & HS) **AND** Fingerstick Glucose (POCT), 4x Daily AC and at bedtime    ipratropium (ATROVENT) 0.02 % inhalation solution 0.5 mg, Q6H    levalbuterol (XOPENEX) inhalation solution 1.25 mg, Q6H    levothyroxine tablet 25 mcg, Early Morning    losartan (COZAAR) tablet 25  mg, Daily    metoprolol tartrate (LOPRESSOR) partial tablet 12.5 mg, Q12H LATONIA    morphine injection 2 mg, Q4H PRN    naloxone (NARCAN) 0.04 mg/mL syringe 0.04 mg, Q1MIN PRN    oxyCODONE (ROXICODONE) IR tablet 5 mg, Q4H PRN **OR** oxyCODONE (ROXICODONE) immediate release tablet 10 mg, Q4H PRN    pneumococcal 20-juana conj vacc (PREVNAR 20) IM Injection 0.5 mL, Once    QUEtiapine (SEROquel) tablet 25 mg, HS    sodium chloride (OCEAN) 0.65 % nasal spray 1 spray, Q1H PRN    sodium chloride (OCEAN) 0.65 % nasal spray 2 spray, TID    vancomycin (VANCOCIN) 1,250 mg in sodium chloride 0.9 % 250 mL IVPB, Q12H, Last Rate: 1,250 mg (25 2212), and PTA meds:   Prior to Admission Medications   Prescriptions Last Dose Informant Patient Reported? Taking?   Glucagon, rDNA, (Glucagon Emergency) 1 MG KIT  Self No No   Sig: Inject 1 mg as directed once as needed (hypoglycemia) for up to 1 dose   Patient not taking: Reported on 2024   Lancets (OneTouch Delica Plus [id number]) MISC  Self No No   Sig: Use 1 Units 4 (four) times a day   OneTouch Verio test strip  Self No No   Sig: Use 1 each 4 (four) times a day   acetaminophen (TYLENOL) 325 mg tablet  Self No No   Sig: Take 3 tablets (975 mg total) by mouth every 8 (eight) hours as needed for mild pain, headaches or fever   albuterol (PROVENTIL HFA,VENTOLIN HFA) 90 mcg/act inhaler  Self No No   Sig: INHALE 2 PUFFS EVERY 6 HOURS AS NEEDED FOR WHEEZING   amitriptyline (ELAVIL) 100 mg tablet  Self Yes No   Si mg daily at bedtime   apixaban (Eliquis) 5 mg   No No   Sig: Take 1 tablet (5 mg total) by mouth 2 (two) times a day   atorvastatin (LIPITOR) 10 mg tablet  Self No No   Sig: Take 1 tablet (10 mg total) by mouth daily   benzonatate (TESSALON) 200 MG capsule  Self No No   Sig: Take 1 capsule (200 mg total) by mouth 3 (three) times a day as needed for cough   famotidine (PEPCID) 40 MG tablet  Self Yes No   Sig: Take 40 mg by mouth daily as needed for heartburn or indigestion  Taking as needed   folic acid (FOLVITE) 1 mg tablet  Self No No   Sig: TAKE 1 TABLET BY MOUTH EVERY DAY   glimepiride (AMARYL) 2 mg tablet  Self No No   Sig: TAKE 1 TABLET BY MOUTH DAILY WITH BREAKFAST   levothyroxine 25 mcg tablet  Self No No   Sig: TAKE 1 TABLET BY MOUTH EVERY DAY   losartan (COZAAR) 25 mg tablet   No No   Sig: Take 1 tablet (25 mg total) by mouth daily   magnesium Oxide (MAG-OX) 400 mg TABS   No No   Sig: Take 1 tablet (400 mg total) by mouth daily   meclizine (ANTIVERT) 25 mg tablet  Self No No   Sig: Take 1 tablet (25 mg total) by mouth every 8 (eight) hours as needed for dizziness   Patient not taking: Reported on 12/18/2024   metFORMIN (GLUCOPHAGE) 1000 MG tablet  Self Yes No   Sig: Take 1,000 mg by mouth 2 (two) times a day   naloxone (NARCAN) 4 mg/0.1 mL nasal spray  Self No No   Sig: Administer 1 spray into a nostril. If no response after 2-3 minutes, give another dose in the other nostril using a new spray. For use in emergencies for opioid / pain medication reversal for accidental ingestion, respiratory depression, sedation or concerns for overdose.   ondansetron (ZOFRAN-ODT) 8 mg disintegrating tablet  Self No No   Sig: Take 1 tablet (8 mg total) by mouth every 8 (eight) hours as needed for vomiting or nausea   oxyCODONE (Roxicodone) 5 immediate release tablet  Self No No   Sig: Take 0.5 tablets (2.5 mg total) by mouth every 4 (four) hours as needed for moderate pain If pain is severe, may instead take 1 tablet (5mg) every 4 hours as needed Max Daily Amount: 15 mg   senna (SENOKOT) 8.6 mg  Self No No   Sig: Take 1 tablet (8.6 mg total) by mouth daily at bedtime as needed for constipation   tiotropium-olodaterol (Stiolto Respimat) 2.5-2.5 MCG/ACT inhaler  Self No No   Sig: Inhale 2 puffs daily      Facility-Administered Medications: None       No Known Allergies    OBJECTIVE:    Physical Exam  Physical Exam  Constitutional:       General: He is not in acute distress.     Appearance: He  "is not ill-appearing.      Interventions: Nasal cannula in place.   HENT:      Head: Normocephalic and atraumatic.      Right Ear: External ear normal.      Left Ear: External ear normal.      Nose: Nose normal.      Mouth/Throat:      Mouth: Mucous membranes are moist.      Pharynx: Oropharynx is clear.   Eyes:      General: No scleral icterus.     Conjunctiva/sclera: Conjunctivae normal.   Cardiovascular:      Rate and Rhythm: Normal rate and regular rhythm.      Pulses: Normal pulses.   Pulmonary:      Effort: Pulmonary effort is normal. No respiratory distress.   Abdominal:      General: There is no distension.      Palpations: Abdomen is soft.      Tenderness: There is no abdominal tenderness.   Musculoskeletal:      Right lower leg: No edema.      Left lower leg: No edema.   Skin:     Coloration: Skin is pale. Skin is not jaundiced.   Neurological:      General: No focal deficit present.      Mental Status: He is alert. Mental status is at baseline.   Psychiatric:         Mood and Affect: Mood normal.         Behavior: Behavior normal.         Thought Content: Thought content normal.         Judgment: Judgment normal.         Lab Results: I have personally reviewed pertinent labs., CBC: No results found for: \"WBC\", \"HGB\", \"HCT\", \"MCV\", \"PLT\", \"ADJUSTEDWBC\", \"RBC\", \"MCH\", \"MCHC\", \"RDW\", \"MPV\", \"NRBC\", CMP: No results found for: \"SODIUM\", \"K\", \"CL\", \"CO2\", \"ANIONGAP\", \"BUN\", \"CREATININE\", \"GLUCOSE\", \"CALCIUM\", \"AST\", \"ALT\", \"ALKPHOS\", \"PROT\", \"BILITOT\", \"EGFR\", BMP:No results found for: \"SODIUM\", \"K\", \"CL\", \"CO2\", \"BUN\", \"CREATININE\", \"GLUC\", \"GLUF\", \"CALCIUM\", \"AGAP\", \"EGFR\", PT/PTT:No results found for: \"PT\", \"PTT\"  Imaging Studies: Reviewed and interpreted the pertinent studies  EKG, Pathology, and Other Studies: Reviewed and interpreted the pertinent studies    I have spent a total time of 35 minutes in caring for this patient on the day of the visit/encounter including Instructions for management, " Impressions, Counseling / Coordination of care, Documenting in the medical record, Reviewing / ordering tests, medicine, procedures  , Obtaining or reviewing history  , and Communicating with other healthcare professionals . Topics discussed with the patient / family include symptom assessment and management, medication review, medication adjustment, psychosocial support, goals of care, and supportive listening.

## 2025-02-07 NOTE — PLAN OF CARE
Problem: PAIN - ADULT  Goal: Verbalizes/displays adequate comfort level or baseline comfort level  Description: Interventions:  - Encourage patient to monitor pain and request assistance  - Assess pain using appropriate pain scale  - Administer analgesics based on type and severity of pain and evaluate response  - Implement non-pharmacological measures as appropriate and evaluate response  - Consider cultural and social influences on pain and pain management  - Notify physician/advanced practitioner if interventions unsuccessful or patient reports new pain  Outcome: Progressing     Problem: INFECTION - ADULT  Goal: Absence or prevention of progression during hospitalization  Description: INTERVENTIONS:  - Assess and monitor for signs and symptoms of infection  - Monitor lab/diagnostic results  - Monitor all insertion sites, i.e. indwelling lines, tubes, and drains  - Monitor endotracheal if appropriate and nasal secretions for changes in amount and color  - [place] appropriate cooling/warming therapies per order  - Administer medications as ordered  - Instruct and encourage patient and family to use good hand hygiene technique  - Identify and instruct in appropriate isolation precautions for identified infection/condition  Outcome: Progressing  Goal: Absence of fever/infection during neutropenic period  Description: INTERVENTIONS:  - Monitor WBC    Outcome: Progressing     Problem: DISCHARGE PLANNING  Goal: Discharge to home or other facility with appropriate resources  Description: INTERVENTIONS:  - Identify barriers to discharge w/patient and caregiver  - Arrange for needed discharge resources and transportation as appropriate  - Identify discharge learning needs (meds, wound care, etc.)  - Arrange for interpretive services to assist at discharge as needed  - Refer to Case Management Department for coordinating discharge planning if the patient needs post-hospital services based on physician/advanced  practitioner order or complex needs related to functional status, cognitive ability, or social support system  Outcome: Progressing     Problem: Knowledge Deficit  Goal: Patient/family/caregiver demonstrates understanding of disease process, treatment plan, medications, and discharge instructions  Description: Complete learning assessment and assess knowledge base.  Interventions:  - Provide teaching at level of understanding  - Provide teaching via preferred learning methods  Outcome: Progressing

## 2025-02-07 NOTE — ASSESSMENT & PLAN NOTE
Palliative diagnosis: Adenocarcinoma of the right lung  PPS: 50%  PSC Provider: Dr. Estrella    Supportive listening and presence provided  Anxiety containment provided  Instructions for management and anticipatory guidance provided    Counseled on possibility of Home Palliative Program (eligible zip code [address])-->patient interested; communicated and coordinated with Home Palliative team to set up  Discharge prescriptions for oxycodone IR and Valium provided at today's encounter

## 2025-02-07 NOTE — ASSESSMENT & PLAN NOTE
4L NC O2    Symptoms exacerbated by pain and anxiety    For air hunger:  Oxycodone 5mg-10mg q4h prn, and Morphine 2mg IV q4h prn for breakthrough symptoms-->communicated/coordinated with RN team to use oxycodone IR primarily for air hunger, as he can't get morphine IV at home  Continue Valium 5 mg twice daily

## 2025-02-07 NOTE — PROGRESS NOTES
Papo Gibbs is a 54 y.o. male who is currently ordered Vancomycin IV with management by the Pharmacy Consult service.  Relevant clinical data and objective / subjective history reviewed.  Vancomycin Assessment:  Indication and Goal AUC/Trough: Pneumonia (goal -600, trough >10)  Clinical Status: stable  Micro:     Renal Function:  SCr: 0.71 mg/dL  CrCl: 94.8 mL/min  Renal replacement: Not on dialysis  Days of Therapy: 6  Current Dose: 1250 mg IV every 12 hours  Vancomycin Plan:  New Dosin mg IV every 12 hours  Estimated AUC: 499 mcg*hr/mL  Estimated Trough: 14 mcg/mL  Next Level:  @ 0600  Renal Function Monitoring: Daily BMP and UOP  Pharmacy will continue to follow closely for s/sx of nephrotoxicity, infusion reactions and appropriateness of therapy.  BMP and CBC will be ordered per protocol. We will continue to follow the patient’s culture results and clinical progress daily.    Yolanda Hampton, Pharmacist

## 2025-02-08 LAB
ANION GAP SERPL CALCULATED.3IONS-SCNC: 6 MMOL/L (ref 4–13)
BUN SERPL-MCNC: 15 MG/DL (ref 5–25)
CALCIUM SERPL-MCNC: 9.6 MG/DL (ref 8.4–10.2)
CHLORIDE SERPL-SCNC: 99 MMOL/L (ref 96–108)
CO2 SERPL-SCNC: 37 MMOL/L (ref 21–32)
CREAT SERPL-MCNC: 0.49 MG/DL (ref 0.6–1.3)
GFR SERPL CREATININE-BSD FRML MDRD: 123 ML/MIN/1.73SQ M
GLUCOSE SERPL-MCNC: 134 MG/DL (ref 65–140)
GLUCOSE SERPL-MCNC: 135 MG/DL (ref 65–140)
GLUCOSE SERPL-MCNC: 138 MG/DL (ref 65–140)
GLUCOSE SERPL-MCNC: 172 MG/DL (ref 65–140)
GLUCOSE SERPL-MCNC: 254 MG/DL (ref 65–140)
POTASSIUM SERPL-SCNC: 3.3 MMOL/L (ref 3.5–5.3)
SODIUM SERPL-SCNC: 142 MMOL/L (ref 135–147)

## 2025-02-08 PROCEDURE — 94760 N-INVAS EAR/PLS OXIMETRY 1: CPT

## 2025-02-08 PROCEDURE — 94640 AIRWAY INHALATION TREATMENT: CPT

## 2025-02-08 PROCEDURE — 82948 REAGENT STRIP/BLOOD GLUCOSE: CPT

## 2025-02-08 PROCEDURE — 99232 SBSQ HOSP IP/OBS MODERATE 35: CPT | Performed by: STUDENT IN AN ORGANIZED HEALTH CARE EDUCATION/TRAINING PROGRAM

## 2025-02-08 PROCEDURE — 80048 BASIC METABOLIC PNL TOTAL CA: CPT | Performed by: PHYSICIAN ASSISTANT

## 2025-02-08 RX ORDER — FUROSEMIDE 10 MG/ML
40 INJECTION INTRAMUSCULAR; INTRAVENOUS
Status: COMPLETED | OUTPATIENT
Start: 2025-02-08 | End: 2025-02-10

## 2025-02-08 RX ORDER — METOPROLOL TARTRATE 25 MG/1
25 TABLET, FILM COATED ORAL EVERY 12 HOURS SCHEDULED
Status: DISCONTINUED | OUTPATIENT
Start: 2025-02-08 | End: 2025-02-13 | Stop reason: HOSPADM

## 2025-02-08 RX ADMIN — SALINE NASAL SPRAY 2 SPRAY: 1.5 SOLUTION NASAL at 21:39

## 2025-02-08 RX ADMIN — AMITRIPTYLINE HYDROCHLORIDE 150 MG: 25 TABLET, FILM COATED ORAL at 21:37

## 2025-02-08 RX ADMIN — LEVALBUTEROL HYDROCHLORIDE 1.25 MG: 1.25 SOLUTION RESPIRATORY (INHALATION) at 01:04

## 2025-02-08 RX ADMIN — APIXABAN 5 MG: 5 TABLET, FILM COATED ORAL at 17:00

## 2025-02-08 RX ADMIN — SALINE NASAL SPRAY 2 SPRAY: 1.5 SOLUTION NASAL at 08:40

## 2025-02-08 RX ADMIN — APIXABAN 5 MG: 5 TABLET, FILM COATED ORAL at 08:38

## 2025-02-08 RX ADMIN — FOLIC ACID 1 MG: 1 TABLET ORAL at 08:37

## 2025-02-08 RX ADMIN — VANCOMYCIN HYDROCHLORIDE 1250 MG: 5 INJECTION, POWDER, LYOPHILIZED, FOR SOLUTION INTRAVENOUS at 10:40

## 2025-02-08 RX ADMIN — LEVALBUTEROL HYDROCHLORIDE 1.25 MG: 1.25 SOLUTION RESPIRATORY (INHALATION) at 19:34

## 2025-02-08 RX ADMIN — Medication 12.5 MG: at 10:33

## 2025-02-08 RX ADMIN — IPRATROPIUM BROMIDE 0.5 MG: 0.5 SOLUTION RESPIRATORY (INHALATION) at 19:34

## 2025-02-08 RX ADMIN — ATORVASTATIN CALCIUM 10 MG: 10 TABLET, FILM COATED ORAL at 08:37

## 2025-02-08 RX ADMIN — CHLORHEXIDINE GLUCONATE 15 ML: 1.2 RINSE ORAL at 08:37

## 2025-02-08 RX ADMIN — QUETIAPINE FUMARATE 25 MG: 25 TABLET ORAL at 21:37

## 2025-02-08 RX ADMIN — OXYCODONE HYDROCHLORIDE 10 MG: 10 TABLET ORAL at 23:32

## 2025-02-08 RX ADMIN — IPRATROPIUM BROMIDE 0.5 MG: 0.5 SOLUTION RESPIRATORY (INHALATION) at 01:04

## 2025-02-08 RX ADMIN — CEFEPIME 2000 MG: 2 INJECTION, POWDER, FOR SOLUTION INTRAVENOUS at 07:45

## 2025-02-08 RX ADMIN — LOSARTAN POTASSIUM 25 MG: 25 TABLET, FILM COATED ORAL at 08:38

## 2025-02-08 RX ADMIN — IPRATROPIUM BROMIDE 0.5 MG: 0.5 SOLUTION RESPIRATORY (INHALATION) at 14:13

## 2025-02-08 RX ADMIN — MORPHINE SULFATE 2 MG: 2 INJECTION, SOLUTION INTRAMUSCULAR; INTRAVENOUS at 11:46

## 2025-02-08 RX ADMIN — LEVALBUTEROL HYDROCHLORIDE 1.25 MG: 1.25 SOLUTION RESPIRATORY (INHALATION) at 07:22

## 2025-02-08 RX ADMIN — DIAZEPAM 5 MG: 5 TABLET ORAL at 08:38

## 2025-02-08 RX ADMIN — CHLORHEXIDINE GLUCONATE 15 ML: 1.2 RINSE ORAL at 21:37

## 2025-02-08 RX ADMIN — FUROSEMIDE 40 MG: 10 INJECTION, SOLUTION INTRAMUSCULAR; INTRAVENOUS at 16:01

## 2025-02-08 RX ADMIN — DIAZEPAM 5 MG: 5 TABLET ORAL at 17:00

## 2025-02-08 RX ADMIN — METOPROLOL TARTRATE 25 MG: 25 TABLET, FILM COATED ORAL at 21:38

## 2025-02-08 RX ADMIN — SALINE NASAL SPRAY 2 SPRAY: 1.5 SOLUTION NASAL at 16:52

## 2025-02-08 RX ADMIN — OXYCODONE HYDROCHLORIDE 10 MG: 10 TABLET ORAL at 09:28

## 2025-02-08 RX ADMIN — CEFEPIME 2000 MG: 2 INJECTION, POWDER, FOR SOLUTION INTRAVENOUS at 01:38

## 2025-02-08 RX ADMIN — Medication 12.5 MG: at 08:38

## 2025-02-08 RX ADMIN — LEVALBUTEROL HYDROCHLORIDE 1.25 MG: 1.25 SOLUTION RESPIRATORY (INHALATION) at 14:13

## 2025-02-08 RX ADMIN — VANCOMYCIN HYDROCHLORIDE 1000 MG: 5 INJECTION, POWDER, LYOPHILIZED, FOR SOLUTION INTRAVENOUS at 01:38

## 2025-02-08 RX ADMIN — IPRATROPIUM BROMIDE 0.5 MG: 0.5 SOLUTION RESPIRATORY (INHALATION) at 07:22

## 2025-02-08 RX ADMIN — LEVOTHYROXINE SODIUM 25 MCG: 25 TABLET ORAL at 06:04

## 2025-02-08 RX ADMIN — FUROSEMIDE 40 MG: 10 INJECTION, SOLUTION INTRAMUSCULAR; INTRAVENOUS at 09:12

## 2025-02-08 RX ADMIN — INSULIN LISPRO 3 UNITS: 100 INJECTION, SOLUTION INTRAVENOUS; SUBCUTANEOUS at 16:52

## 2025-02-08 RX ADMIN — VANCOMYCIN HYDROCHLORIDE 1250 MG: 5 INJECTION, POWDER, LYOPHILIZED, FOR SOLUTION INTRAVENOUS at 21:41

## 2025-02-08 NOTE — PHYSICAL THERAPY NOTE
"                                     Physical Therapy Cancellation Note:    Arrived to pts room requesting for pt to participate in therapy PT IE. Pt currently prone in bed with use of NC O2 and visitor present. Pt currently declining PT IE at this time due to difficulty breathing and \"not up for it right now\". Cancel PT services for today per pt request. Will cont to follow as able to initiate PT eval as able and per pts medical status/stability.                                                                                        "

## 2025-02-08 NOTE — PROGRESS NOTES
Progress Note - Hospitalist   Name: Papo Gibbs 54 y.o. male I MRN: [id number]  Unit/Bed#: W -01 I Date of Admission: 2/2/2025   Date of Service: 2/8/2025 I Hospital Day: 6    Assessment & Plan  HTN (hypertension)  Continue losartan 25 mg daily   Adenocarcinoma of overlapping sites of right lung (HCC)  Stage IV, being treated with chemotherapy, follows Dr. Bryan outpatient  - Status post carbo, Alimta, Keytruda from May to Aug 2024  - July 2024 received rad to brain   - Oct 2024 CT CAP showed met lung lymphangitic spread of adenocarcinoma and subsequently received palliative pemetrex with keytruda from Nov to Dec 2024 then docetaxel plus ramucirumab from Dec to Jan 21 2025  Oncology consulted  Palliative consulted  Guarded prognosis  Goals of care talks on going    Patient not interested in pursuing any active chemo treatments in the near future. Patient and wife would like to take it one day at a time for now.     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   Has Pleurx catheter which was drained 250 ml of serosanguinous fluid last night  -  Recent echo 12/9/24 showing EF 60% and LV wall thickness mildly increased and with mild concentric hypertrophy; mild AR, aortic valve sclerosis, mild annual calcification MV   Pulm following      Given lasix 40 mg IV daily 2/4-2/6  Edema shins improved  2/7 Increased lasix to 40 mg bid  Finished 7 days of cefepime  History of pulmonary embolism  Continue eliquis 5 mg bid  Cancer related pain  Continue oxycodone IR 5 mg/10 mg every 4 hours as needed for moderate/severe pain  Continue hydromorphone IV 0.5   Sepsis (HCC)  Sepsis,  present on admission, due to post obstructive PNA evidenced by Wbc 2.90, Tachycardia (), Tachypnea (RR 36), acute on chronic respiratory failure requiring IV Cefepime/ Vancomycin, lactic acid and blood cultures.        Tachycardia  History of tachycardia   Trial of lopressor 12.5 mg bid   Palliative care by specialist    Counseling regarding advance care planning and goals of care      VTE Pharmacologic Prophylaxis:   Moderate Risk (Score 3-4) - Pharmacological DVT Prophylaxis Ordered: apixaban (Eliquis).    Mobility:   Basic Mobility Inpatient Raw Score: 12  JH-HLM Goal: 4: Move to chair/commode  JH-HLM Achieved: 4: Move to chair/commode  JH-HLM Goal achieved. Continue to encourage appropriate mobility.    Patient Centered Rounds: I performed bedside rounds with nursing staff today.   Discussions with Specialists or Other Care Team Provider: case management    Education and Discussions with Family / Patient: Patient declined call to .     Current Length of Stay: 6 day(s)  Current Patient Status: Inpatient   Certification Statement: The patient will continue to require additional inpatient hospital stay due to hypoxic respiratory failure  Discharge Plan: Anticipate discharge in 48-72 hrs to home with home services.    Code Status: Level 3 - DNAR and DNI    Subjective   Pt admits to having shortness of breath with exertion      Objective :  Temp:  [96.2 °F (35.7 °C)-98 °F (36.7 °C)] 98 °F (36.7 °C)  HR:  [113-126] 113  BP: (126-144)/(77-80) 126/79  Resp:  [18-20] 18  SpO2:  [93 %-100 %] 93 %  O2 Device: Nasal cannula  Nasal Cannula O2 Flow Rate (L/min):  [4 L/min] 4 L/min  FiO2 (%):  [40] 40    Body mass index is 28.66 kg/m².     Input and Output Summary (last 24 hours):     Intake/Output Summary (Last 24 hours) at 2/8/2025 1526  Last data filed at 2/8/2025 1040  Gross per 24 hour   Intake --   Output 2000 ml   Net -2000 ml       Physical Exam  Vitals and nursing note reviewed.   Constitutional:        General: He is not in acute distress.     Appearance: He is well-developed. He is obese. He is ill-appearing.   HENT:      Head: Normocephalic and atraumatic.   Eyes:      Conjunctiva/sclera: Conjunctivae normal.   Cardiovascular:      Rate and Rhythm: Regular rhythm. Tachycardia present.      Heart sounds: No murmur heard.  Pulmonary:      Effort: Pulmonary effort is normal.      Comments: Decreased breath sounds right lower and middle lobe  Abdominal:      Palpations: Abdomen is soft.      Tenderness: There is no abdominal tenderness.   Skin:     General: Skin is warm and dry.   Neurological:      Mental Status: He is alert. Mental status is at baseline.   Psychiatric:         Mood and Affect: Mood normal.           Lines/Drains:  Lines/Drains/Airways       Active Status       Name Placement date Placement time Site Days    Port A Cath 05/20/24 Right Chest 05/20/24  1427  Chest  264    Pleural Effusion Long-Term Catheter 12/12/24  1009  --  58                    Central Line:  Goal for removal: Port accessed. Will de-access as appropriate.               Lab Results: I have reviewed the following results:   Results from last 7 days   Lab Units 02/07/25  0835 02/05/25  0551 02/04/25  0440   WBC Thousand/uL 6.15   < > 3.87*   HEMOGLOBIN g/dL 11.0*   < > 9.5*   HEMATOCRIT % 37.0   < > 31.9*   PLATELETS Thousands/uL 236   < > 221   SEGS PCT %  --   --  49   LYMPHO PCT %  --   --  28   MONO PCT %  --   --  21*   EOS PCT %  --   --  0    < > = values in this interval not displayed.     Results from last 7 days   Lab Units 02/08/25  0609 02/07/25  0835 02/04/25  0440 02/03/25  0509   SODIUM mmol/L 142 142   < > 141   POTASSIUM mmol/L 3.3* 3.6   < > 3.5   CHLORIDE mmol/L 99 99   < > 106   CO2 mmol/L 37* 34*   < > 27   BUN mg/dL 15 14   < > 13   CREATININE mg/dL 0.49* 0.71   < > 0.47*   ANION GAP mmol/L 6 9   < > 8   CALCIUM mg/dL 9.6 9.8   < > 8.5   ALBUMIN g/dL  --  4.0   < > 3.1*   TOTAL BILIRUBIN mg/dL  --   --    --  0.36   ALK PHOS U/L  --   --   --  65   ALT U/L  --   --   --  13   AST U/L  --   --   --  13   GLUCOSE RANDOM mg/dL 138 176*   < > 154*    < > = values in this interval not displayed.     Results from last 7 days   Lab Units 02/02/25  0637   INR  1.30*     Results from last 7 days   Lab Units 02/08/25  1052 02/08/25  0733 02/07/25  2111 02/07/25  1601 02/07/25  1100 02/07/25  0659 02/06/25  2037 02/06/25  1553 02/06/25  1054 02/06/25  0746 02/05/25  2031 02/05/25  1545   POC GLUCOSE mg/dl 172* 134 167* 255* 185* 143* 190* 191* 125 144* 177* 221*         Results from last 7 days   Lab Units 02/02/25  0742 02/02/25  0647   LACTIC ACID mmol/L 1.8  --    PROCALCITONIN ng/ml  --  <0.05       Recent Cultures (last 7 days):   Results from last 7 days   Lab Units 02/02/25  0736 02/02/25  0647   BLOOD CULTURE  No Growth After 5 Days. Levar gonzales*   GRAM STAIN RESULT   --  Gram positive cocci in clusters*       Imaging Results Review: I reviewed radiology reports from this admission including: CT chest.  Other Study Results Review: EKG was reviewed.     Last 24 Hours Medication List:     Current Facility-Administered Medications:     acetaminophen (TYLENOL) tablet 975 mg, Q8H PRN    albuterol (PROVENTIL HFA,VENTOLIN HFA) inhaler 2 puff, Q4H PRN    amitriptyline (ELAVIL) tablet 150 mg, HS    apixaban (ELIQUIS) tablet 5 mg, BID    atorvastatin (LIPITOR) tablet 10 mg, Daily    benzonatate (TESSALON PERLES) capsule 200 mg, TID PRN    cefepime (MAXIPIME) 2 g/50 mL dextrose IVPB, Q8H, Last Rate: 2,000 mg (02/08/25 0745)    chlorhexidine (PERIDEX) 0.12 % oral rinse 15 mL, Q12H LATONIA    diazepam (VALIUM) tablet 5 mg, BID    famotidine (PEPCID) tablet 40 mg, Daily PRN    folic acid (FOLVITE) tablet 1 mg, Daily    furosemide (LASIX) injection 40 mg, BID (diuretic)    [Held by provider] glimepiride (AMARYL) tablet 2 mg, Daily With Breakfast    influenza vaccine, recombinant (PF) (Flublok) IM injection 0.5 mL, Once    insulin  lispro (HumALOG/ADMELOG) 100 units/mL subcutaneous injection 1-6 Units, 4x Daily (AC & HS) **AND** Fingerstick Glucose (POCT), 4x Daily AC and at bedtime    ipratropium (ATROVENT) 0.02 % inhalation solution 0.5 mg, Q6H    levalbuterol (XOPENEX) inhalation solution 1.25 mg, Q6H    levothyroxine tablet 25 mcg, Early Morning    losartan (COZAAR) tablet 25 mg, Daily    metoprolol tartrate (LOPRESSOR) tablet 25 mg, Q12H LATONIA    morphine injection 2 mg, Q4H PRN    naloxone (NARCAN) 0.04 mg/mL syringe 0.04 mg, Q1MIN PRN    oxyCODONE (ROXICODONE) IR tablet 5 mg, Q4H PRN **OR** oxyCODONE (ROXICODONE) immediate release tablet 10 mg, Q4H PRN    pneumococcal 20-juana conj vacc (PREVNAR 20) IM Injection 0.5 mL, Once    QUEtiapine (SEROquel) tablet 25 mg, HS    sodium chloride (OCEAN) 0.65 % nasal spray 1 spray, Q1H PRN    sodium chloride (OCEAN) 0.65 % nasal spray 2 spray, TID    vancomycin (VANCOCIN) 1,250 mg in sodium chloride 0.9 % 250 mL IVPB, Q12H, Last Rate: 1,250 mg (02/08/25 1040)    Administrative Statements   Today, Patient Was Seen By: Salinas Horner DO      **Please Note: This note may have been constructed using a voice recognition system.**

## 2025-02-08 NOTE — PLAN OF CARE
Problem: PAIN - ADULT  Goal: Verbalizes/displays adequate comfort level or baseline comfort level  Description: Interventions:  - Encourage patient to monitor pain and request assistance  - Assess pain using appropriate pain scale  - Administer analgesics based on type and severity of pain and evaluate response  - Implement non-pharmacological measures as appropriate and evaluate response  - Consider cultural and social influences on pain and pain management  - Notify physician/advanced practitioner if interventions unsuccessful or patient reports new pain  Outcome: Progressing     Problem: INFECTION - ADULT  Goal: Absence or prevention of progression during hospitalization  Description: INTERVENTIONS:  - Assess and monitor for signs and symptoms of infection  - Monitor lab/diagnostic results  - Monitor all insertion sites, i.e. indwelling lines, tubes, and drains  - Monitor endotracheal if appropriate and nasal secretions for changes in amount and color  - [place] appropriate cooling/warming therapies per order  - Administer medications as ordered  - Instruct and encourage patient and family to use good hand hygiene technique  - Identify and instruct in appropriate isolation precautions for identified infection/condition  Outcome: Progressing  Goal: Absence of fever/infection during neutropenic period  Description: INTERVENTIONS:  - Monitor WBC    Outcome: Progressing     Problem: DISCHARGE PLANNING  Goal: Discharge to home or other facility with appropriate resources  Description: INTERVENTIONS:  - Identify barriers to discharge w/patient and caregiver  - Arrange for needed discharge resources and transportation as appropriate  - Identify discharge learning needs (meds, wound care, etc.)  - Arrange for interpretive services to assist at discharge as needed  - Refer to Case Management Department for coordinating discharge planning if the patient needs post-hospital services based on physician/advanced  practitioner order or complex needs related to functional status, cognitive ability, or social support system  Outcome: Progressing     Problem: Knowledge Deficit  Goal: Patient/family/caregiver demonstrates understanding of disease process, treatment plan, medications, and discharge instructions  Description: Complete learning assessment and assess knowledge base.  Interventions:  - Provide teaching at level of understanding  - Provide teaching via preferred learning methods  Outcome: Progressing     Problem: Prexisting or High Potential for Compromised Skin Integrity  Goal: Skin integrity is maintained or improved  Description: INTERVENTIONS:  - Identify patients at risk for skin breakdown  - Assess and monitor skin integrity  - Assess and monitor nutrition and hydration status  - Monitor labs   - Assess for incontinence   - Turn and reposition patient  - Assist with mobility/ambulation  - Relieve pressure over bony prominences  - Avoid friction and shearing  - Provide appropriate hygiene as needed including keeping skin clean and dry  - Evaluate need for skin moisturizer/barrier cream  - Collaborate with interdisciplinary team   - Patient/family teaching  - Consider wound care consult   Outcome: Progressing     Problem: Nutrition/Hydration-ADULT  Goal: Nutrient/Hydration intake appropriate for improving, restoring or maintaining nutritional needs  Description: Monitor and assess patient's nutrition/hydration status for malnutrition. Collaborate with interdisciplinary team and initiate plan and interventions as ordered.  Monitor patient's weight and dietary intake as ordered or per policy. Utilize nutrition screening tool and intervene as necessary. Determine patient's food preferences and provide high-protein, high-caloric foods as appropriate.     INTERVENTIONS:  - Monitor oral intake, urinary output, labs, and treatment plans  - Assess nutrition and hydration status and recommend course of action  - Evaluate  amount of meals eaten  - Assist patient with eating if necessary   - Allow adequate time for meals  - Recommend/ encourage appropriate diets, oral nutritional supplements, and vitamin/mineral supplements  - Order, calculate, and assess calorie counts as needed  - Recommend, monitor, and adjust tube feedings and TPN/PPN based on assessed needs  - Assess need for intravenous fluids  - Provide specific nutrition/hydration education as appropriate  - Include patient/family/caregiver in decisions related to nutrition  Outcome: Progressing

## 2025-02-08 NOTE — PROGRESS NOTES
Papo Gibbs is a 54 y.o. male who is currently ordered Vancomycin IV with management by the Pharmacy Consult service.  Relevant clinical data and objective / subjective history reviewed.  Vancomycin Assessment:  Indication and Goal AUC/Trough: Pneumonia (goal -600, trough >10)  Clinical Status: stable  Micro:     Renal Function:  SCr: 0.49 mg/dL  CrCl: 173.9 mL/min  Renal replacement: Not on dialysis  Days of Therapy: 7  Current Dose: 1000 mg IV q12h  Vancomycin Plan:  New Dosin mg IV q12h  Estimated AUC: 495 mcg*hr/mL  Estimated Trough: 12.8 mcg/mL  Next Level:  at 0600  Renal Function Monitoring: Daily BMP and UOP  Pharmacy will continue to follow closely for s/sx of nephrotoxicity, infusion reactions and appropriateness of therapy.  BMP and CBC will be ordered per protocol. We will continue to follow the patient’s culture results and clinical progress daily.    Earl Mora, Pharmacist

## 2025-02-08 NOTE — ASSESSMENT & PLAN NOTE
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   Has Pleurx catheter which was drained 250 ml of serosanguinous fluid last night  -  Recent echo 12/9/24 showing EF 60% and LV wall thickness mildly increased and with mild concentric hypertrophy; mild AR, aortic valve sclerosis, mild annual calcification MV   Pulm following      Given lasix 40 mg IV daily 2/4-2/6  Edema shins improved  2/7 Increased lasix to 40 mg bid  Finished 7 days of cefepime

## 2025-02-09 LAB
ALBUMIN SERPL BCG-MCNC: 3.7 G/DL (ref 3.5–5)
ANION GAP SERPL CALCULATED.3IONS-SCNC: 7 MMOL/L (ref 4–13)
BUN SERPL-MCNC: 14 MG/DL (ref 5–25)
CALCIUM SERPL-MCNC: 9.4 MG/DL (ref 8.4–10.2)
CHLORIDE SERPL-SCNC: 99 MMOL/L (ref 96–108)
CO2 SERPL-SCNC: 35 MMOL/L (ref 21–32)
CREAT SERPL-MCNC: 0.51 MG/DL (ref 0.6–1.3)
ERYTHROCYTE [DISTWIDTH] IN BLOOD BY AUTOMATED COUNT: 17.2 % (ref 11.6–15.1)
FERRITIN SERPL-MCNC: 107 NG/ML (ref 24–336)
GFR SERPL CREATININE-BSD FRML MDRD: 121 ML/MIN/1.73SQ M
GLUCOSE SERPL-MCNC: 135 MG/DL (ref 65–140)
GLUCOSE SERPL-MCNC: 142 MG/DL (ref 65–140)
GLUCOSE SERPL-MCNC: 162 MG/DL (ref 65–140)
GLUCOSE SERPL-MCNC: 212 MG/DL (ref 65–140)
GLUCOSE SERPL-MCNC: 269 MG/DL (ref 65–140)
HCT VFR BLD AUTO: 33.4 % (ref 36.5–49.3)
HGB BLD-MCNC: 9.9 G/DL (ref 12–17)
IRON SATN MFR SERPL: 9 % (ref 15–50)
IRON SERPL-MCNC: 20 UG/DL (ref 50–212)
MAGNESIUM SERPL-MCNC: 1.7 MG/DL (ref 1.9–2.7)
MCH RBC QN AUTO: 25.6 PG (ref 26.8–34.3)
MCHC RBC AUTO-ENTMCNC: 29.6 G/DL (ref 31.4–37.4)
MCV RBC AUTO: 87 FL (ref 82–98)
PHOSPHATE SERPL-MCNC: 3.2 MG/DL (ref 2.7–4.5)
PLATELET # BLD AUTO: 220 THOUSANDS/UL (ref 149–390)
PMV BLD AUTO: 10 FL (ref 8.9–12.7)
POTASSIUM SERPL-SCNC: 3.3 MMOL/L (ref 3.5–5.3)
RBC # BLD AUTO: 3.86 MILLION/UL (ref 3.88–5.62)
SODIUM SERPL-SCNC: 141 MMOL/L (ref 135–147)
TIBC SERPL-MCNC: 233.8 UG/DL (ref 250–450)
TRANSFERRIN SERPL-MCNC: 167 MG/DL (ref 203–362)
UIBC SERPL-MCNC: 214 UG/DL (ref 155–355)
VIT B12 SERPL-MCNC: 543 PG/ML (ref 180–914)
WBC # BLD AUTO: 7.9 THOUSAND/UL (ref 4.31–10.16)

## 2025-02-09 PROCEDURE — 94640 AIRWAY INHALATION TREATMENT: CPT

## 2025-02-09 PROCEDURE — 82948 REAGENT STRIP/BLOOD GLUCOSE: CPT

## 2025-02-09 PROCEDURE — 83540 ASSAY OF IRON: CPT | Performed by: STUDENT IN AN ORGANIZED HEALTH CARE EDUCATION/TRAINING PROGRAM

## 2025-02-09 PROCEDURE — 94760 N-INVAS EAR/PLS OXIMETRY 1: CPT

## 2025-02-09 PROCEDURE — 83550 IRON BINDING TEST: CPT | Performed by: STUDENT IN AN ORGANIZED HEALTH CARE EDUCATION/TRAINING PROGRAM

## 2025-02-09 PROCEDURE — 99232 SBSQ HOSP IP/OBS MODERATE 35: CPT | Performed by: STUDENT IN AN ORGANIZED HEALTH CARE EDUCATION/TRAINING PROGRAM

## 2025-02-09 PROCEDURE — 85027 COMPLETE CBC AUTOMATED: CPT | Performed by: STUDENT IN AN ORGANIZED HEALTH CARE EDUCATION/TRAINING PROGRAM

## 2025-02-09 PROCEDURE — 82728 ASSAY OF FERRITIN: CPT | Performed by: STUDENT IN AN ORGANIZED HEALTH CARE EDUCATION/TRAINING PROGRAM

## 2025-02-09 PROCEDURE — 82607 VITAMIN B-12: CPT | Performed by: STUDENT IN AN ORGANIZED HEALTH CARE EDUCATION/TRAINING PROGRAM

## 2025-02-09 PROCEDURE — 83735 ASSAY OF MAGNESIUM: CPT | Performed by: STUDENT IN AN ORGANIZED HEALTH CARE EDUCATION/TRAINING PROGRAM

## 2025-02-09 PROCEDURE — 80069 RENAL FUNCTION PANEL: CPT | Performed by: STUDENT IN AN ORGANIZED HEALTH CARE EDUCATION/TRAINING PROGRAM

## 2025-02-09 RX ORDER — POTASSIUM CHLORIDE 1500 MG/1
40 TABLET, EXTENDED RELEASE ORAL
Status: COMPLETED | OUTPATIENT
Start: 2025-02-09 | End: 2025-02-09

## 2025-02-09 RX ORDER — MAGNESIUM SULFATE HEPTAHYDRATE 40 MG/ML
4 INJECTION, SOLUTION INTRAVENOUS ONCE
Status: COMPLETED | OUTPATIENT
Start: 2025-02-09 | End: 2025-02-09

## 2025-02-09 RX ADMIN — LEVALBUTEROL HYDROCHLORIDE 1.25 MG: 1.25 SOLUTION RESPIRATORY (INHALATION) at 13:52

## 2025-02-09 RX ADMIN — METOPROLOL TARTRATE 25 MG: 25 TABLET, FILM COATED ORAL at 21:38

## 2025-02-09 RX ADMIN — APIXABAN 5 MG: 5 TABLET, FILM COATED ORAL at 08:41

## 2025-02-09 RX ADMIN — POTASSIUM CHLORIDE 40 MEQ: 1500 TABLET, EXTENDED RELEASE ORAL at 12:02

## 2025-02-09 RX ADMIN — LEVALBUTEROL HYDROCHLORIDE 1.25 MG: 1.25 SOLUTION RESPIRATORY (INHALATION) at 02:13

## 2025-02-09 RX ADMIN — FUROSEMIDE 40 MG: 10 INJECTION, SOLUTION INTRAMUSCULAR; INTRAVENOUS at 09:41

## 2025-02-09 RX ADMIN — IPRATROPIUM BROMIDE 0.5 MG: 0.5 SOLUTION RESPIRATORY (INHALATION) at 19:31

## 2025-02-09 RX ADMIN — OXYCODONE HYDROCHLORIDE 10 MG: 10 TABLET ORAL at 08:52

## 2025-02-09 RX ADMIN — FUROSEMIDE 40 MG: 10 INJECTION, SOLUTION INTRAMUSCULAR; INTRAVENOUS at 15:19

## 2025-02-09 RX ADMIN — OXYCODONE HYDROCHLORIDE 10 MG: 10 TABLET ORAL at 15:19

## 2025-02-09 RX ADMIN — FOLIC ACID 1 MG: 1 TABLET ORAL at 08:41

## 2025-02-09 RX ADMIN — AMITRIPTYLINE HYDROCHLORIDE 150 MG: 25 TABLET, FILM COATED ORAL at 21:38

## 2025-02-09 RX ADMIN — INSULIN LISPRO 2 UNITS: 100 INJECTION, SOLUTION INTRAVENOUS; SUBCUTANEOUS at 12:02

## 2025-02-09 RX ADMIN — LEVALBUTEROL HYDROCHLORIDE 1.25 MG: 1.25 SOLUTION RESPIRATORY (INHALATION) at 19:31

## 2025-02-09 RX ADMIN — LOSARTAN POTASSIUM 25 MG: 25 TABLET, FILM COATED ORAL at 08:41

## 2025-02-09 RX ADMIN — SALINE NASAL SPRAY 2 SPRAY: 1.5 SOLUTION NASAL at 08:50

## 2025-02-09 RX ADMIN — LEVOTHYROXINE SODIUM 25 MCG: 25 TABLET ORAL at 05:21

## 2025-02-09 RX ADMIN — QUETIAPINE FUMARATE 25 MG: 25 TABLET ORAL at 21:39

## 2025-02-09 RX ADMIN — IPRATROPIUM BROMIDE 0.5 MG: 0.5 SOLUTION RESPIRATORY (INHALATION) at 13:52

## 2025-02-09 RX ADMIN — SALINE NASAL SPRAY 2 SPRAY: 1.5 SOLUTION NASAL at 15:23

## 2025-02-09 RX ADMIN — CHLORHEXIDINE GLUCONATE 15 ML: 1.2 RINSE ORAL at 08:43

## 2025-02-09 RX ADMIN — IPRATROPIUM BROMIDE 0.5 MG: 0.5 SOLUTION RESPIRATORY (INHALATION) at 07:48

## 2025-02-09 RX ADMIN — POTASSIUM CHLORIDE 40 MEQ: 1500 TABLET, EXTENDED RELEASE ORAL at 08:41

## 2025-02-09 RX ADMIN — DIAZEPAM 5 MG: 5 TABLET ORAL at 08:41

## 2025-02-09 RX ADMIN — MAGNESIUM SULFATE HEPTAHYDRATE 4 G: 40 INJECTION, SOLUTION INTRAVENOUS at 08:45

## 2025-02-09 RX ADMIN — CHLORHEXIDINE GLUCONATE 15 ML: 1.2 RINSE ORAL at 21:39

## 2025-02-09 RX ADMIN — IPRATROPIUM BROMIDE 0.5 MG: 0.5 SOLUTION RESPIRATORY (INHALATION) at 02:13

## 2025-02-09 RX ADMIN — ATORVASTATIN CALCIUM 10 MG: 10 TABLET, FILM COATED ORAL at 08:41

## 2025-02-09 RX ADMIN — APIXABAN 5 MG: 5 TABLET, FILM COATED ORAL at 21:38

## 2025-02-09 RX ADMIN — LEVALBUTEROL HYDROCHLORIDE 1.25 MG: 1.25 SOLUTION RESPIRATORY (INHALATION) at 07:48

## 2025-02-09 RX ADMIN — DIAZEPAM 5 MG: 5 TABLET ORAL at 21:38

## 2025-02-09 RX ADMIN — INSULIN LISPRO 3 UNITS: 100 INJECTION, SOLUTION INTRAVENOUS; SUBCUTANEOUS at 21:39

## 2025-02-09 NOTE — ASSESSMENT & PLAN NOTE
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   Has Pleurx catheter which was drained 250 ml of serosanguinous fluid last night  -  Recent echo 12/9/24 showing EF 60% and LV wall thickness mildly increased and with mild concentric hypertrophy; mild AR, aortic valve sclerosis, mild annual calcification MV   Pulm consulted on admission  Finished 7 days of cefepime and vanco     Given lasix 40 mg IV daily 2/4-2/6  Edema shins improved  2/7 Increased lasix to 40 mg bid

## 2025-02-09 NOTE — PLAN OF CARE
Problem: PAIN - ADULT  Goal: Verbalizes/displays adequate comfort level or baseline comfort level  Description: Interventions:  - Encourage patient to monitor pain and request assistance  - Assess pain using appropriate pain scale  - Administer analgesics based on type and severity of pain and evaluate response  - Implement non-pharmacological measures as appropriate and evaluate response  - Consider cultural and social influences on pain and pain management  - Notify physician/advanced practitioner if interventions unsuccessful or patient reports new pain  Outcome: Progressing     Problem: INFECTION - ADULT  Goal: Absence or prevention of progression during hospitalization  Description: INTERVENTIONS:  - Assess and monitor for signs and symptoms of infection  - Monitor lab/diagnostic results  - Monitor all insertion sites, i.e. indwelling lines, tubes, and drains  - Monitor endotracheal if appropriate and nasal secretions for changes in amount and color  - [place] appropriate cooling/warming therapies per order  - Administer medications as ordered  - Instruct and encourage patient and family to use good hand hygiene technique  - Identify and instruct in appropriate isolation precautions for identified infection/condition  Outcome: Progressing  Goal: Absence of fever/infection during neutropenic period  Description: INTERVENTIONS:  - Monitor WBC    Outcome: Progressing     Problem: DISCHARGE PLANNING  Goal: Discharge to home or other facility with appropriate resources  Description: INTERVENTIONS:  - Identify barriers to discharge w/patient and caregiver  - Arrange for needed discharge resources and transportation as appropriate  - Identify discharge learning needs (meds, wound care, etc.)  - Arrange for interpretive services to assist at discharge as needed  - Refer to Case Management Department for coordinating discharge planning if the patient needs post-hospital services based on physician/advanced  practitioner order or complex needs related to functional status, cognitive ability, or social support system  Outcome: Progressing     Problem: Knowledge Deficit  Goal: Patient/family/caregiver demonstrates understanding of disease process, treatment plan, medications, and discharge instructions  Description: Complete learning assessment and assess knowledge base.  Interventions:  - Provide teaching at level of understanding  - Provide teaching via preferred learning methods  Outcome: Progressing     Problem: Prexisting or High Potential for Compromised Skin Integrity  Goal: Skin integrity is maintained or improved  Description: INTERVENTIONS:  - Identify patients at risk for skin breakdown  - Assess and monitor skin integrity  - Assess and monitor nutrition and hydration status  - Monitor labs   - Assess for incontinence   - Turn and reposition patient  - Assist with mobility/ambulation  - Relieve pressure over bony prominences  - Avoid friction and shearing  - Provide appropriate hygiene as needed including keeping skin clean and dry  - Evaluate need for skin moisturizer/barrier cream  - Collaborate with interdisciplinary team   - Patient/family teaching  - Consider wound care consult   Outcome: Progressing     Problem: Nutrition/Hydration-ADULT  Goal: Nutrient/Hydration intake appropriate for improving, restoring or maintaining nutritional needs  Description: Monitor and assess patient's nutrition/hydration status for malnutrition. Collaborate with interdisciplinary team and initiate plan and interventions as ordered.  Monitor patient's weight and dietary intake as ordered or per policy. Utilize nutrition screening tool and intervene as necessary. Determine patient's food preferences and provide high-protein, high-caloric foods as appropriate.     INTERVENTIONS:  - Monitor oral intake, urinary output, labs, and treatment plans  - Assess nutrition and hydration status and recommend course of action  - Evaluate  amount of meals eaten  - Assist patient with eating if necessary   - Allow adequate time for meals  - Recommend/ encourage appropriate diets, oral nutritional supplements, and vitamin/mineral supplements  - Order, calculate, and assess calorie counts as needed  - Recommend, monitor, and adjust tube feedings and TPN/PPN based on assessed needs  - Assess need for intravenous fluids  - Provide specific nutrition/hydration education as appropriate  - Include patient/family/caregiver in decisions related to nutrition  Outcome: Progressing

## 2025-02-09 NOTE — PLAN OF CARE
Problem: PAIN - ADULT  Goal: Verbalizes/displays adequate comfort level or baseline comfort level  Description: Interventions:  - Encourage patient to monitor pain and request assistance  - Assess pain using appropriate pain scale  - Administer analgesics based on type and severity of pain and evaluate response  - Implement non-pharmacological measures as appropriate and evaluate response  - Consider cultural and social influences on pain and pain management  - Notify physician/advanced practitioner if interventions unsuccessful or patient reports new pain  2/9/2025 1358 by Brittni Mina LPN  Outcome: Progressing  2/9/2025 1357 by Brittni Mina LPN  Outcome: Progressing     Problem: INFECTION - ADULT  Goal: Absence or prevention of progression during hospitalization  Description: INTERVENTIONS:  - Assess and monitor for signs and symptoms of infection  - Monitor lab/diagnostic results  - Monitor all insertion sites, i.e. indwelling lines, tubes, and drains  - Monitor endotracheal if appropriate and nasal secretions for changes in amount and color  - [place] appropriate cooling/warming therapies per order  - Administer medications as ordered  - Instruct and encourage patient and family to use good hand hygiene technique  - Identify and instruct in appropriate isolation precautions for identified infection/condition  2/9/2025 1358 by Brittni Mina LPN  Outcome: Progressing  2/9/2025 1357 by Brittni Mina LPN  Outcome: Progressing  Goal: Absence of fever/infection during neutropenic period  Description: INTERVENTIONS:  - Monitor WBC    2/9/2025 1358 by Brittni Mina LPN  Outcome: Progressing  2/9/2025 1357 by Brittni Mina LPN  Outcome: Progressing     Problem: DISCHARGE PLANNING  Goal: Discharge to home or other facility with appropriate resources  Description: INTERVENTIONS:  - Identify barriers to discharge w/patient and caregiver  - Arrange for needed discharge resources and transportation as  appropriate  - Identify discharge learning needs (meds, wound care, etc.)  - Arrange for interpretive services to assist at discharge as needed  - Refer to Case Management Department for coordinating discharge planning if the patient needs post-hospital services based on physician/advanced practitioner order or complex needs related to functional status, cognitive ability, or social support system  2/9/2025 1358 by Brittni Mina LPN  Outcome: Progressing  2/9/2025 1357 by Brittni Mina LPN  Outcome: Progressing     Problem: Knowledge Deficit  Goal: Patient/family/caregiver demonstrates understanding of disease process, treatment plan, medications, and discharge instructions  Description: Complete learning assessment and assess knowledge base.  Interventions:  - Provide teaching at level of understanding  - Provide teaching via preferred learning methods  2/9/2025 1358 by Brittni Mina LPN  Outcome: Progressing  2/9/2025 1357 by Brittni Mina LPN  Outcome: Progressing     Problem: Prexisting or High Potential for Compromised Skin Integrity  Goal: Skin integrity is maintained or improved  Description: INTERVENTIONS:  - Identify patients at risk for skin breakdown  - Assess and monitor skin integrity  - Assess and monitor nutrition and hydration status  - Monitor labs   - Assess for incontinence   - Turn and reposition patient  - Assist with mobility/ambulation  - Relieve pressure over bony prominences  - Avoid friction and shearing  - Provide appropriate hygiene as needed including keeping skin clean and dry  - Evaluate need for skin moisturizer/barrier cream  - Collaborate with interdisciplinary team   - Patient/family teaching  - Consider wound care consult   2/9/2025 1358 by Brittni Mina LPN  Outcome: Progressing  2/9/2025 1357 by Brittni Mina LPN  Outcome: Progressing     Problem: Nutrition/Hydration-ADULT  Goal: Nutrient/Hydration intake appropriate for improving, restoring or maintaining nutritional  needs  Description: Monitor and assess patient's nutrition/hydration status for malnutrition. Collaborate with interdisciplinary team and initiate plan and interventions as ordered.  Monitor patient's weight and dietary intake as ordered or per policy. Utilize nutrition screening tool and intervene as necessary. Determine patient's food preferences and provide high-protein, high-caloric foods as appropriate.     INTERVENTIONS:  - Monitor oral intake, urinary output, labs, and treatment plans  - Assess nutrition and hydration status and recommend course of action  - Evaluate amount of meals eaten  - Assist patient with eating if necessary   - Allow adequate time for meals  - Recommend/ encourage appropriate diets, oral nutritional supplements, and vitamin/mineral supplements  - Order, calculate, and assess calorie counts as needed  - Recommend, monitor, and adjust tube feedings and TPN/PPN based on assessed needs  - Assess need for intravenous fluids  - Provide specific nutrition/hydration education as appropriate  - Include patient/family/caregiver in decisions related to nutrition  2/9/2025 1358 by Brittni Mina LPN  Outcome: Progressing  2/9/2025 1357 by Brittni Mina LPN  Outcome: Progressing

## 2025-02-09 NOTE — PROGRESS NOTES
Papo Gibbs is a 54 y.o. male who is currently ordered Vancomycin IV with management by the Pharmacy Consult service.  Relevant clinical data and objective / subjective history reviewed.  Vancomycin Assessment:  Indication and Goal AUC/Trough: Pneumonia (goal -600, trough >10)  Clinical Status: stable  Micro:     Renal Function:  SCr: 0.51 mg/dL  CrCl: 167.3 mL/min  Renal replacement: Not on dialysis  Days of Therapy: 8  Current Dose: 1250 mg IV q12h  Vancomycin Plan:  New Dosing: continue current dose  Estimated AUC: 495 mcg*hr/mL  Estimated Trough: 12.8 mcg/mL  Next Level: 02/11 at 0600  Renal Function Monitoring: Daily BMP and UOP  Pharmacy will continue to follow closely for s/sx of nephrotoxicity, infusion reactions and appropriateness of therapy.  BMP and CBC will be ordered per protocol. We will continue to follow the patient’s culture results and clinical progress daily.    Earl Mora, Pharmacist

## 2025-02-09 NOTE — PROGRESS NOTES
Progress Note - Hospitalist   Name: Papo Gibbs 54 y.o. male I MRN: [id number]  Unit/Bed#: W -01 I Date of Admission: 2/2/2025   Date of Service: 2/9/2025 I Hospital Day: 7    Assessment & Plan  HTN (hypertension)  Continue losartan 25 mg daily   Adenocarcinoma of overlapping sites of right lung (HCC)  Stage IV, being treated with chemotherapy, follows Dr. Bryan outpatient  - Status post carbo, Alimta, Keytruda from May to Aug 2024  - July 2024 received rad to brain   - Oct 2024 CT CAP showed met lung lymphangitic spread of adenocarcinoma and subsequently received palliative pemetrex with keytruda from Nov to Dec 2024 then docetaxel plus ramucirumab from Dec to Jan 21 2025  Oncology consulted  Palliative consulted  Guarded prognosis  Goals of care talks on going    Patient not interested in pursuing any active chemo treatments in the near future. Patient and wife would like to take it one day at a time for now.     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   Has Pleurx catheter which was drained 250 ml of serosanguinous fluid last night  -  Recent echo 12/9/24 showing EF 60% and LV wall thickness mildly increased and with mild concentric hypertrophy; mild AR, aortic valve sclerosis, mild annual calcification MV   Pulm consulted on admission  Finished 7 days of cefepime and vanco     Given lasix 40 mg IV daily 2/4-2/6  Edema shins improved  2/7 Increased lasix to 40 mg bid    History of pulmonary embolism  Continue eliquis 5 mg bid  Cancer related pain  Continue oxycodone IR 5 mg/10 mg every 4 hours as needed for moderate/severe pain  Continue hydromorphone IV 0.5    Sepsis (HCC)  Sepsis, present on admission, due to post obstructive PNA evidenced by Wbc 2.90, Tachycardia (), Tachypnea (RR 36), acute on chronic respiratory failure requiring IV Cefepime/ Vancomycin, lactic acid and blood cultures.        Tachycardia  History of tachycardia   Increase lopressor to 25 mg bid  Counseling regarding advance care planning and goals of care  Palliative following    VTE Pharmacologic Prophylaxis:   Moderate Risk (Score 3-4) - Pharmacological DVT Prophylaxis Ordered: apixaban (Eliquis).    Mobility:   Basic Mobility Inpatient Raw Score: 12  JH-HLM Goal: 4: Move to chair/commode  JH-HLM Achieved: 4: Move to chair/commode  JH-HLM Goal achieved. Continue to encourage appropriate mobility.    Patient Centered Rounds: I performed bedside rounds with nursing staff today.   Discussions with Specialists or Other Care Team Provider: case management    Education and Discussions with Family / Patient: Patient declined call to .     Current Length of Stay: 7 day(s)  Current Patient Status: Inpatient   Certification Statement: The patient will continue to require additional inpatient hospital stay due to hypoxic respiratory failure  Discharge Plan: Anticipate discharge in 48-72 hrs to home with home services.    Code Status: Level 3 - DNAR and DNI    Subjective   States shortness of breath slowly improving.      Objective :  Temp:  [96.8 °F (36 °C)-98 °F (36.7 °C)] 98 °F (36.7 °C)  HR:  [115-130] 130  BP: (108-118)/(68-74) 110/68  Resp:  [16-22] 18  SpO2:  [88 %-99 %] 99 %  O2 Device: Nasal cannula  Nasal Cannula O2 Flow Rate (L/min):  [4 L/min] 4 L/min  FiO2 (%):  [40] 40    Body mass index is 28.73 kg/m².     Input and Output Summary (last 24 hours):     Intake/Output Summary (Last 24 hours) at 2/9/2025 1514  Last data filed at 2/9/2025 1300  Gross per 24 hour   Intake 360 ml   Output 1800 ml   Net -1440 ml       Physical Exam  Vitals and nursing note reviewed.    Constitutional:       General: He is not in acute distress.     Appearance: He is well-developed. He is obese. He is ill-appearing.   HENT:      Head: Normocephalic and atraumatic.   Eyes:      Conjunctiva/sclera: Conjunctivae normal.   Cardiovascular:      Rate and Rhythm: Regular rhythm. Tachycardia present.      Heart sounds: No murmur heard.  Pulmonary:      Effort: Pulmonary effort is normal.      Comments: Decreased breath sounds right lower and middle lobe  Abdominal:      Palpations: Abdomen is soft.      Tenderness: There is no abdominal tenderness.   Skin:     General: Skin is warm and dry.   Neurological:      Mental Status: He is alert. Mental status is at baseline.   Psychiatric:         Mood and Affect: Mood normal.           Lines/Drains:  Lines/Drains/Airways       Active Status       Name Placement date Placement time Site Days    Port A Cath 05/20/24 Right Chest 05/20/24  1427  Chest  265    Pleural Effusion Long-Term Catheter 12/12/24  1009  --  59                    Central Line:  Goal for removal: Port accessed. Will de-access as appropriate.               Lab Results: I have reviewed the following results:   Results from last 7 days   Lab Units 02/09/25  0522 02/05/25  0551 02/04/25  0440   WBC Thousand/uL 7.90   < > 3.87*   HEMOGLOBIN g/dL 9.9*   < > 9.5*   HEMATOCRIT % 33.4*   < > 31.9*   PLATELETS Thousands/uL 220   < > 221   SEGS PCT %  --   --  49   LYMPHO PCT %  --   --  28   MONO PCT %  --   --  21*   EOS PCT %  --   --  0    < > = values in this interval not displayed.     Results from last 7 days   Lab Units 02/09/25  0522 02/04/25  0440 02/03/25  0509   SODIUM mmol/L 141   < > 141   POTASSIUM mmol/L 3.3*   < > 3.5   CHLORIDE mmol/L 99   < > 106   CO2 mmol/L 35*   < > 27   BUN mg/dL 14   < > 13   CREATININE mg/dL 0.51*   < > 0.47*   ANION GAP mmol/L 7   < > 8   CALCIUM mg/dL 9.4   < > 8.5   ALBUMIN g/dL 3.7   < > 3.1*   TOTAL BILIRUBIN mg/dL  --   --  0.36   ALK PHOS U/L  --   --  65    ALT U/L  --   --  13   AST U/L  --   --  13   GLUCOSE RANDOM mg/dL 142*   < > 154*    < > = values in this interval not displayed.           Results from last 7 days   Lab Units 02/09/25  1100 02/09/25  0733 02/08/25  2100 02/08/25  1604 02/08/25  1052 02/08/25  0733 02/07/25  2111 02/07/25  1601 02/07/25  1100 02/07/25  0659 02/06/25  2037 02/06/25  1553   POC GLUCOSE mg/dl 212* 135 135 254* 172* 134 167* 255* 185* 143* 190* 191*                 Recent Cultures (last 7 days):           Imaging Results Review: I reviewed radiology reports from this admission including: CT chest.  Other Study Results Review: EKG was reviewed.     Last 24 Hours Medication List:     Current Facility-Administered Medications:     acetaminophen (TYLENOL) tablet 975 mg, Q8H PRN    albuterol (PROVENTIL HFA,VENTOLIN HFA) inhaler 2 puff, Q4H PRN    amitriptyline (ELAVIL) tablet 150 mg, HS    apixaban (ELIQUIS) tablet 5 mg, BID    atorvastatin (LIPITOR) tablet 10 mg, Daily    benzonatate (TESSALON PERLES) capsule 200 mg, TID PRN    chlorhexidine (PERIDEX) 0.12 % oral rinse 15 mL, Q12H LATONIA    diazepam (VALIUM) tablet 5 mg, BID    famotidine (PEPCID) tablet 40 mg, Daily PRN    folic acid (FOLVITE) tablet 1 mg, Daily    furosemide (LASIX) injection 40 mg, BID (diuretic)    [Held by provider] glimepiride (AMARYL) tablet 2 mg, Daily With Breakfast    influenza vaccine, recombinant (PF) (Flublok) IM injection 0.5 mL, Once    insulin lispro (HumALOG/ADMELOG) 100 units/mL subcutaneous injection 1-6 Units, 4x Daily (AC & HS) **AND** Fingerstick Glucose (POCT), 4x Daily AC and at bedtime    ipratropium (ATROVENT) 0.02 % inhalation solution 0.5 mg, Q6H    levalbuterol (XOPENEX) inhalation solution 1.25 mg, Q6H    levothyroxine tablet 25 mcg, Early Morning    losartan (COZAAR) tablet 25 mg, Daily    metoprolol tartrate (LOPRESSOR) tablet 25 mg, Q12H LATONIA    morphine injection 2 mg, Q4H PRN    naloxone (NARCAN) 0.04 mg/mL syringe 0.04 mg, Q1MIN PRN     oxyCODONE (ROXICODONE) IR tablet 5 mg, Q4H PRN **OR** oxyCODONE (ROXICODONE) immediate release tablet 10 mg, Q4H PRN    pneumococcal 20-juana conj vacc (PREVNAR 20) IM Injection 0.5 mL, Once    QUEtiapine (SEROquel) tablet 25 mg, HS    sodium chloride (OCEAN) 0.65 % nasal spray 1 spray, Q1H PRN    sodium chloride (OCEAN) 0.65 % nasal spray 2 spray, TID    Administrative Statements   Today, Patient Was Seen By: Salinas Horner DO      **Please Note: This note may have been constructed using a voice recognition system.**

## 2025-02-10 LAB
ALBUMIN SERPL BCG-MCNC: 3.5 G/DL (ref 3.5–5)
ANION GAP SERPL CALCULATED.3IONS-SCNC: 6 MMOL/L (ref 4–13)
BUN SERPL-MCNC: 18 MG/DL (ref 5–25)
CALCIUM SERPL-MCNC: 9.6 MG/DL (ref 8.4–10.2)
CHLORIDE SERPL-SCNC: 99 MMOL/L (ref 96–108)
CO2 SERPL-SCNC: 36 MMOL/L (ref 21–32)
CREAT SERPL-MCNC: 0.59 MG/DL (ref 0.6–1.3)
ERYTHROCYTE [DISTWIDTH] IN BLOOD BY AUTOMATED COUNT: 17.4 % (ref 11.6–15.1)
GFR SERPL CREATININE-BSD FRML MDRD: 113 ML/MIN/1.73SQ M
GLUCOSE SERPL-MCNC: 135 MG/DL (ref 65–140)
GLUCOSE SERPL-MCNC: 136 MG/DL (ref 65–140)
GLUCOSE SERPL-MCNC: 142 MG/DL (ref 65–140)
GLUCOSE SERPL-MCNC: 170 MG/DL (ref 65–140)
GLUCOSE SERPL-MCNC: 201 MG/DL (ref 65–140)
HCT VFR BLD AUTO: 32.9 % (ref 36.5–49.3)
HGB BLD-MCNC: 9.9 G/DL (ref 12–17)
MAGNESIUM SERPL-MCNC: 2.1 MG/DL (ref 1.9–2.7)
MCH RBC QN AUTO: 26 PG (ref 26.8–34.3)
MCHC RBC AUTO-ENTMCNC: 30.1 G/DL (ref 31.4–37.4)
MCV RBC AUTO: 86 FL (ref 82–98)
PHOSPHATE SERPL-MCNC: 3.6 MG/DL (ref 2.7–4.5)
PLATELET # BLD AUTO: 219 THOUSANDS/UL (ref 149–390)
PMV BLD AUTO: 10.2 FL (ref 8.9–12.7)
POTASSIUM SERPL-SCNC: 4.3 MMOL/L (ref 3.5–5.3)
RBC # BLD AUTO: 3.81 MILLION/UL (ref 3.88–5.62)
SODIUM SERPL-SCNC: 141 MMOL/L (ref 135–147)
WBC # BLD AUTO: 9.54 THOUSAND/UL (ref 4.31–10.16)

## 2025-02-10 PROCEDURE — 99232 SBSQ HOSP IP/OBS MODERATE 35: CPT | Performed by: STUDENT IN AN ORGANIZED HEALTH CARE EDUCATION/TRAINING PROGRAM

## 2025-02-10 PROCEDURE — 82948 REAGENT STRIP/BLOOD GLUCOSE: CPT

## 2025-02-10 PROCEDURE — 94640 AIRWAY INHALATION TREATMENT: CPT

## 2025-02-10 PROCEDURE — 80069 RENAL FUNCTION PANEL: CPT | Performed by: STUDENT IN AN ORGANIZED HEALTH CARE EDUCATION/TRAINING PROGRAM

## 2025-02-10 PROCEDURE — 94760 N-INVAS EAR/PLS OXIMETRY 1: CPT

## 2025-02-10 PROCEDURE — 99233 SBSQ HOSP IP/OBS HIGH 50: CPT | Performed by: STUDENT IN AN ORGANIZED HEALTH CARE EDUCATION/TRAINING PROGRAM

## 2025-02-10 PROCEDURE — 85027 COMPLETE CBC AUTOMATED: CPT | Performed by: STUDENT IN AN ORGANIZED HEALTH CARE EDUCATION/TRAINING PROGRAM

## 2025-02-10 PROCEDURE — 83735 ASSAY OF MAGNESIUM: CPT | Performed by: STUDENT IN AN ORGANIZED HEALTH CARE EDUCATION/TRAINING PROGRAM

## 2025-02-10 RX ADMIN — DIAZEPAM 5 MG: 5 TABLET ORAL at 08:19

## 2025-02-10 RX ADMIN — LEVALBUTEROL HYDROCHLORIDE 1.25 MG: 1.25 SOLUTION RESPIRATORY (INHALATION) at 13:27

## 2025-02-10 RX ADMIN — INSULIN LISPRO 2 UNITS: 100 INJECTION, SOLUTION INTRAVENOUS; SUBCUTANEOUS at 12:10

## 2025-02-10 RX ADMIN — CHLORHEXIDINE GLUCONATE 15 ML: 1.2 RINSE ORAL at 08:19

## 2025-02-10 RX ADMIN — SALINE NASAL SPRAY 2 SPRAY: 1.5 SOLUTION NASAL at 17:36

## 2025-02-10 RX ADMIN — FUROSEMIDE 40 MG: 10 INJECTION, SOLUTION INTRAMUSCULAR; INTRAVENOUS at 08:18

## 2025-02-10 RX ADMIN — DIAZEPAM 5 MG: 5 TABLET ORAL at 17:37

## 2025-02-10 RX ADMIN — CHLORHEXIDINE GLUCONATE 15 ML: 1.2 RINSE ORAL at 21:27

## 2025-02-10 RX ADMIN — IPRATROPIUM BROMIDE 0.5 MG: 0.5 SOLUTION RESPIRATORY (INHALATION) at 19:22

## 2025-02-10 RX ADMIN — AMITRIPTYLINE HYDROCHLORIDE 150 MG: 25 TABLET, FILM COATED ORAL at 21:27

## 2025-02-10 RX ADMIN — INSULIN LISPRO 1 UNITS: 100 INJECTION, SOLUTION INTRAVENOUS; SUBCUTANEOUS at 21:27

## 2025-02-10 RX ADMIN — OXYCODONE HYDROCHLORIDE 10 MG: 10 TABLET ORAL at 04:58

## 2025-02-10 RX ADMIN — SALINE NASAL SPRAY 2 SPRAY: 1.5 SOLUTION NASAL at 21:29

## 2025-02-10 RX ADMIN — APIXABAN 5 MG: 5 TABLET, FILM COATED ORAL at 19:53

## 2025-02-10 RX ADMIN — LEVALBUTEROL HYDROCHLORIDE 1.25 MG: 1.25 SOLUTION RESPIRATORY (INHALATION) at 02:24

## 2025-02-10 RX ADMIN — IPRATROPIUM BROMIDE 0.5 MG: 0.5 SOLUTION RESPIRATORY (INHALATION) at 02:24

## 2025-02-10 RX ADMIN — FOLIC ACID 1 MG: 1 TABLET ORAL at 08:19

## 2025-02-10 RX ADMIN — METOPROLOL TARTRATE 25 MG: 25 TABLET, FILM COATED ORAL at 21:27

## 2025-02-10 RX ADMIN — METOPROLOL TARTRATE 25 MG: 25 TABLET, FILM COATED ORAL at 08:19

## 2025-02-10 RX ADMIN — IPRATROPIUM BROMIDE 0.5 MG: 0.5 SOLUTION RESPIRATORY (INHALATION) at 13:27

## 2025-02-10 RX ADMIN — LOSARTAN POTASSIUM 25 MG: 25 TABLET, FILM COATED ORAL at 08:19

## 2025-02-10 RX ADMIN — LEVALBUTEROL HYDROCHLORIDE 1.25 MG: 1.25 SOLUTION RESPIRATORY (INHALATION) at 07:12

## 2025-02-10 RX ADMIN — APIXABAN 5 MG: 5 TABLET, FILM COATED ORAL at 08:19

## 2025-02-10 RX ADMIN — IPRATROPIUM BROMIDE 0.5 MG: 0.5 SOLUTION RESPIRATORY (INHALATION) at 07:12

## 2025-02-10 RX ADMIN — LEVOTHYROXINE SODIUM 25 MCG: 25 TABLET ORAL at 04:52

## 2025-02-10 RX ADMIN — LEVALBUTEROL HYDROCHLORIDE 1.25 MG: 1.25 SOLUTION RESPIRATORY (INHALATION) at 19:22

## 2025-02-10 RX ADMIN — QUETIAPINE FUMARATE 25 MG: 25 TABLET ORAL at 21:27

## 2025-02-10 RX ADMIN — ATORVASTATIN CALCIUM 10 MG: 10 TABLET, FILM COATED ORAL at 08:19

## 2025-02-10 NOTE — PLAN OF CARE
Problem: PAIN - ADULT  Goal: Verbalizes/displays adequate comfort level or baseline comfort level  Description: Interventions:  - Encourage patient to monitor pain and request assistance  - Assess pain using appropriate pain scale  - Administer analgesics based on type and severity of pain and evaluate response  - Implement non-pharmacological measures as appropriate and evaluate response  - Consider cultural and social influences on pain and pain management  - Notify physician/advanced practitioner if interventions unsuccessful or patient reports new pain  Outcome: Progressing     Problem: INFECTION - ADULT  Goal: Absence or prevention of progression during hospitalization  Description: INTERVENTIONS:  - Assess and monitor for signs and symptoms of infection  - Monitor lab/diagnostic results  - Monitor all insertion sites, i.e. indwelling lines, tubes, and drains  - Monitor endotracheal if appropriate and nasal secretions for changes in amount and color  - [place] appropriate cooling/warming therapies per order  - Administer medications as ordered  - Instruct and encourage patient and family to use good hand hygiene technique  - Identify and instruct in appropriate isolation precautions for identified infection/condition  Outcome: Progressing  Goal: Absence of fever/infection during neutropenic period  Description: INTERVENTIONS:  - Monitor WBC    Outcome: Progressing     Problem: DISCHARGE PLANNING  Goal: Discharge to home or other facility with appropriate resources  Description: INTERVENTIONS:  - Identify barriers to discharge w/patient and caregiver  - Arrange for needed discharge resources and transportation as appropriate  - Identify discharge learning needs (meds, wound care, etc.)  - Arrange for interpretive services to assist at discharge as needed  - Refer to Case Management Department for coordinating discharge planning if the patient needs post-hospital services based on physician/advanced  practitioner order or complex needs related to functional status, cognitive ability, or social support system  Outcome: Progressing     Problem: Knowledge Deficit  Goal: Patient/family/caregiver demonstrates understanding of disease process, treatment plan, medications, and discharge instructions  Description: Complete learning assessment and assess knowledge base.  Interventions:  - Provide teaching at level of understanding  - Provide teaching via preferred learning methods  Outcome: Progressing     Problem: Prexisting or High Potential for Compromised Skin Integrity  Goal: Skin integrity is maintained or improved  Description: INTERVENTIONS:  - Identify patients at risk for skin breakdown  - Assess and monitor skin integrity  - Assess and monitor nutrition and hydration status  - Monitor labs   - Assess for incontinence   - Turn and reposition patient  - Assist with mobility/ambulation  - Relieve pressure over bony prominences  - Avoid friction and shearing  - Provide appropriate hygiene as needed including keeping skin clean and dry  - Evaluate need for skin moisturizer/barrier cream  - Collaborate with interdisciplinary team   - Patient/family teaching  - Consider wound care consult   Outcome: Progressing     Problem: Nutrition/Hydration-ADULT  Goal: Nutrient/Hydration intake appropriate for improving, restoring or maintaining nutritional needs  Description: Monitor and assess patient's nutrition/hydration status for malnutrition. Collaborate with interdisciplinary team and initiate plan and interventions as ordered.  Monitor patient's weight and dietary intake as ordered or per policy. Utilize nutrition screening tool and intervene as necessary. Determine patient's food preferences and provide high-protein, high-caloric foods as appropriate.     INTERVENTIONS:  - Monitor oral intake, urinary output, labs, and treatment plans  - Assess nutrition and hydration status and recommend course of action  - Evaluate  amount of meals eaten  - Assist patient with eating if necessary   - Allow adequate time for meals  - Recommend/ encourage appropriate diets, oral nutritional supplements, and vitamin/mineral supplements  - Order, calculate, and assess calorie counts as needed  - Recommend, monitor, and adjust tube feedings and TPN/PPN based on assessed needs  - Assess need for intravenous fluids  - Provide specific nutrition/hydration education as appropriate  - Include patient/family/caregiver in decisions related to nutrition  Outcome: Progressing

## 2025-02-10 NOTE — ASSESSMENT & PLAN NOTE
Sepsis, present on admission, due to post obstructive PNA evidenced by Wbc 2.90, Tachycardia (), Tachypnea (RR 36), acute on chronic respiratory failure requiring IV Cefepime/ Vancomycin, lactic acid and blood cultures.   Finished 7 days of vanc and cefepime

## 2025-02-10 NOTE — PROGRESS NOTES
Progress Note - Hospitalist   Name: Papo Gibbs 54 y.o. male I MRN: [id number]  Unit/Bed#: W -01 I Date of Admission: 2/2/2025   Date of Service: 2/10/2025 I Hospital Day: 8    Assessment & Plan  HTN (hypertension)  Continue losartan 25 mg daily   Adenocarcinoma of overlapping sites of right lung (HCC)  Stage IV, being treated with chemotherapy, follows Dr. Bryan outpatient  - Status post carbo, Alimta, Keytruda from May to Aug 2024  - July 2024 received rad to brain   - Oct 2024 CT CAP showed met lung lymphangitic spread of adenocarcinoma and subsequently received palliative pemetrex with keytruda from Nov to Dec 2024 then docetaxel plus ramucirumab from Dec to Jan 21 2025  Oncology consulted  Palliative consulted  Guarded prognosis  Patient not interested in pursuing any active chemo treatments in the near future. Patient and wife would like to take it one day at a time for now.   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   Has Pleurx catheter which was drained 250 ml of serosanguinous fluid last night  -  Recent echo 12/9/24 showing EF 60% and LV wall thickness mildly increased and with mild concentric hypertrophy; mild AR, aortic valve sclerosis, mild annual calcification MV   Pulm consulted on admission  Finished 7 days of cefepime and vanco     Given lasix 40 mg IV daily 2/4-2/6  Edema shins improved  2/7 Increased lasix to 40 mg bid  Continue with diuresis, patient reports slow improvement in breathing   Plan for d/c 24-72 depending on ability to wean O2 requirements     History of pulmonary embolism  Continue eliquis 5 mg bid  Cancer  related pain  Continue oxycodone IR 5 mg/10 mg every 4 hours as needed for moderate/severe pain  Continue hydromorphone IV 0.5   Sepsis (HCC)  Sepsis, present on admission, due to post obstructive PNA evidenced by Wbc 2.90, Tachycardia (), Tachypnea (RR 36), acute on chronic respiratory failure requiring IV Cefepime/ Vancomycin, lactic acid and blood cultures.   Finished 7 days of vanc and cefepime     Tachycardia  History of tachycardia   Increase lopressor to 25 mg bid  Counseling regarding advance care planning and goals of care  Palliative following  Patient not interested in chemo  Wishes to continue treating symptoms medically   Diarrhea  Admitted to having diarrhea on admission  Resolved    VTE Pharmacologic Prophylaxis:   Moderate Risk (Score 3-4) - Pharmacological DVT Prophylaxis Ordered: apixaban (Eliquis).    Mobility:   Basic Mobility Inpatient Raw Score: 12  JH-HLM Goal: 4: Move to chair/commode  JH-HLM Achieved: 4: Move to chair/commode  JH-HLM Goal achieved. Continue to encourage appropriate mobility.    Patient Centered Rounds: I performed bedside rounds with nursing staff today.   Discussions with Specialists or Other Care Team Provider: case management    Education and Discussions with Family / Patient: Patient declined call to .     Current Length of Stay: 8 day(s)  Current Patient Status: Inpatient   Certification Statement: The patient will continue to require additional inpatient hospital stay due to hypoxic respiratory failure  Discharge Plan: Anticipate discharge in 48-72 hrs to home with home services.    Code Status: Level 3 - DNAR and DNI    Subjective   States he feels like he can take deeper breaths and feeling less short of breath      Objective :  Temp:  [96.7 °F (35.9 °C)-97.6 °F (36.4 °C)] 96.7 °F (35.9 °C)  HR:  [120-130] 129  BP: (110-127)/(64-82) 127/82  Resp:  [19] 19  SpO2:  [87 %-99 %] 87 %  Nasal Cannula O2 Flow Rate (L/min):  [4 L/min] 4 L/min    Body mass  index is 28.83 kg/m².     Input and Output Summary (last 24 hours):     Intake/Output Summary (Last 24 hours) at 2/10/2025 0913  Last data filed at 2/10/2025 0821  Gross per 24 hour   Intake 600 ml   Output 800 ml   Net -200 ml       Physical Exam  Vitals and nursing note reviewed.   Constitutional:       General: He is not in acute distress.     Appearance: He is well-developed. He is obese. He is ill-appearing.   HENT:      Head: Normocephalic and atraumatic.   Eyes:      Conjunctiva/sclera: Conjunctivae normal.   Cardiovascular:      Rate and Rhythm: Regular rhythm. Tachycardia present.      Heart sounds: No murmur heard.  Pulmonary:      Effort: Pulmonary effort is normal.      Breath sounds: Rales present.   Abdominal:      Palpations: Abdomen is soft.      Tenderness: There is no abdominal tenderness.   Musculoskeletal:      Comments: Trace bilateral lower extremity edema   Skin:     General: Skin is warm and dry.   Neurological:      Mental Status: He is alert. Mental status is at baseline.   Psychiatric:         Mood and Affect: Mood normal.           Lines/Drains:  Lines/Drains/Airways       Active Status       Name Placement date Placement time Site Days    Port A Cath 05/20/24 Right Chest 05/20/24  1427  Chest  265    Pleural Effusion Long-Term Catheter 12/12/24  1009  --  59                    Central Line:  Goal for removal: Port accessed. Will de-access as appropriate.               Lab Results: I have reviewed the following results:   Results from last 7 days   Lab Units 02/10/25  0452 02/05/25  0551 02/04/25  0440   WBC Thousand/uL 9.54   < > 3.87*   HEMOGLOBIN g/dL 9.9*   < > 9.5*   HEMATOCRIT % 32.9*   < > 31.9*   PLATELETS Thousands/uL 219   < > 221   SEGS PCT %  --   --  49   LYMPHO PCT %  --   --  28   MONO PCT %  --   --  21*   EOS PCT %  --   --  0    < > = values in this interval not displayed.     Results from last 7 days   Lab Units 02/10/25  0452   SODIUM mmol/L 141   POTASSIUM mmol/L 4.3    CHLORIDE mmol/L 99   CO2 mmol/L 36*   BUN mg/dL 18   CREATININE mg/dL 0.59*   ANION GAP mmol/L 6   CALCIUM mg/dL 9.6   ALBUMIN g/dL 3.5   GLUCOSE RANDOM mg/dL 142*           Results from last 7 days   Lab Units 02/10/25  0727 02/09/25  2131 02/09/25  1612 02/09/25  1100 02/09/25  0733 02/08/25  2100 02/08/25  1604 02/08/25  1052 02/08/25  0733 02/07/25  2111 02/07/25  1601 02/07/25  1100   POC GLUCOSE mg/dl 135 269* 162* 212* 135 135 254* 172* 134 167* 255* 185*                 Recent Cultures (last 7 days):           Imaging Results Review: I reviewed radiology reports from this admission including: CT chest.  Other Study Results Review: EKG was reviewed.     Last 24 Hours Medication List:     Current Facility-Administered Medications:     acetaminophen (TYLENOL) tablet 975 mg, Q8H PRN    albuterol (PROVENTIL HFA,VENTOLIN HFA) inhaler 2 puff, Q4H PRN    amitriptyline (ELAVIL) tablet 150 mg, HS    apixaban (ELIQUIS) tablet 5 mg, BID    atorvastatin (LIPITOR) tablet 10 mg, Daily    benzonatate (TESSALON PERLES) capsule 200 mg, TID PRN    chlorhexidine (PERIDEX) 0.12 % oral rinse 15 mL, Q12H LATONIA    diazepam (VALIUM) tablet 5 mg, BID    famotidine (PEPCID) tablet 40 mg, Daily PRN    folic acid (FOLVITE) tablet 1 mg, Daily    [Held by provider] glimepiride (AMARYL) tablet 2 mg, Daily With Breakfast    influenza vaccine, recombinant (PF) (Flublok) IM injection 0.5 mL, Once    insulin lispro (HumALOG/ADMELOG) 100 units/mL subcutaneous injection 1-6 Units, 4x Daily (AC & HS) **AND** Fingerstick Glucose (POCT), 4x Daily AC and at bedtime    ipratropium (ATROVENT) 0.02 % inhalation solution 0.5 mg, Q6H    levalbuterol (XOPENEX) inhalation solution 1.25 mg, Q6H    levothyroxine tablet 25 mcg, Early Morning    losartan (COZAAR) tablet 25 mg, Daily    metoprolol tartrate (LOPRESSOR) tablet 25 mg, Q12H LATONIA    morphine injection 2 mg, Q4H PRN    naloxone (NARCAN) 0.04 mg/mL syringe 0.04 mg, Q1MIN PRN    oxyCODONE (ROXICODONE)  IR tablet 5 mg, Q4H PRN **OR** oxyCODONE (ROXICODONE) immediate release tablet 10 mg, Q4H PRN    pneumococcal 20-ujana conj vacc (PREVNAR 20) IM Injection 0.5 mL, Once    QUEtiapine (SEROquel) tablet 25 mg, HS    sodium chloride (OCEAN) 0.65 % nasal spray 1 spray, Q1H PRN    sodium chloride (OCEAN) 0.65 % nasal spray 2 spray, TID    Administrative Statements   Today, Patient Was Seen By: Salinas Horner DO      **Please Note: This note may have been constructed using a voice recognition system.**

## 2025-02-10 NOTE — PROGRESS NOTES
Progress Note - Palliative Care   Name: Papo Gibbs 54 y.o. male I MRN: [id number]  Unit/Bed#: W -01 I Date of Admission: 2/2/2025   Date of Service: 2/10/2025 I Hospital Day: 8     Assessment & Plan  acute on Chronic hypoxemic respiratory failure (HCC)   4L NC O2    Symptoms exacerbated by pain and anxiety    For air hunger:  Oxycodone 5mg-10mg q4h prn, and Morphine 2mg IV q4h prn for breakthrough symptoms-->communicated/coordinated with RN team to use oxycodone IR primarily for air hunger, as he can't get morphine IV at home  Continue Valium 5 mg twice daily    Discharge prescriptions of oxycodone and Valium sent to home pharmacy  Adenocarcinoma of overlapping sites of right lung (HCC)  Diagnosed 5/2024  Stage IV mets to brain and pleura s/p pleurx  S/p carboplatin/Alimta/Keytruda  Most recently treated with just Alimta    Imaging shows worsening disease with likely postobstructive pneumonia with collapse on R and lymphangitic carcinomatosis of both lungs  Cancer related pain  On oxycodone IR 2.5mg at home    Continue oxycodone IR 5 mg/10 mg every 4 hours as needed for moderate/severe pain  Continue morphine IV 2 mg every 4 hours as needed for breakthrough pain  Palliative care by specialist  Palliative diagnosis: Adenocarcinoma of the right lung  PPS: 50%  PSC Provider: Dr. Estrella    Supportive listening and presence provided  Anxiety containment provided  Instructions for management and anticipatory guidance provided    Counseled on possibility of Home Palliative Program (eligible zip code [id number])-->patient interested; communicated and coordinated with Home Palliative team to set up  Discharge prescriptions for oxycodone IR and Valium provided at today's encounter  Counseling regarding advance care planning and goals of care  Given that symptoms are better controlled, he confirms that he would still like to forego any more cancer related therapies.  However, he would still like to be able to be hospitalized  for different situations and continue to follow-up with the palliative care team in the outpatient setting.  We will make sure he sees us in the office after discharge.                 Code Status: Switch to DNAR/DNI - Level 3               Decisional apparatus:  Patient is competent on my exam today.  If competence is lost, patient's substitute decision maker would default to Tomasa (spouse) and then Giancarlo (alternate)               Advance Directive / Living Will / POLST:  On file         NARRATIVE AND INTERVAL HISTORY:       Patient seen and examined in his hospital bed.  He states that his symptoms are very well-controlled and has no complaints.    MEDICATIONS / ALLERGIES:     all current active meds have been reviewed, current meds:   Current Facility-Administered Medications:     acetaminophen (TYLENOL) tablet 975 mg, Q8H PRN    albuterol (PROVENTIL HFA,VENTOLIN HFA) inhaler 2 puff, Q4H PRN    amitriptyline (ELAVIL) tablet 150 mg, HS    apixaban (ELIQUIS) tablet 5 mg, BID    atorvastatin (LIPITOR) tablet 10 mg, Daily    benzonatate (TESSALON PERLES) capsule 200 mg, TID PRN    chlorhexidine (PERIDEX) 0.12 % oral rinse 15 mL, Q12H LATONIA    diazepam (VALIUM) tablet 5 mg, BID    famotidine (PEPCID) tablet 40 mg, Daily PRN    folic acid (FOLVITE) tablet 1 mg, Daily    [Held by provider] glimepiride (AMARYL) tablet 2 mg, Daily With Breakfast    influenza vaccine, recombinant (PF) (Flublok) IM injection 0.5 mL, Once    insulin lispro (HumALOG/ADMELOG) 100 units/mL subcutaneous injection 1-6 Units, 4x Daily (AC & HS) **AND** Fingerstick Glucose (POCT), 4x Daily AC and at bedtime    ipratropium (ATROVENT) 0.02 % inhalation solution 0.5 mg, Q6H    levalbuterol (XOPENEX) inhalation solution 1.25 mg, Q6H    levothyroxine tablet 25 mcg, Early Morning    losartan (COZAAR) tablet 25 mg, Daily    metoprolol tartrate (LOPRESSOR) tablet 25 mg, Q12H LATONIA    morphine injection 2 mg, Q4H PRN    naloxone (NARCAN) 0.04 mg/mL syringe  0.04 mg, Q1MIN PRN    oxyCODONE (ROXICODONE) IR tablet 5 mg, Q4H PRN **OR** oxyCODONE (ROXICODONE) immediate release tablet 10 mg, Q4H PRN    pneumococcal 20-juana conj vacc (PREVNAR 20) IM Injection 0.5 mL, Once    QUEtiapine (SEROquel) tablet 25 mg, HS    sodium chloride (OCEAN) 0.65 % nasal spray 1 spray, Q1H PRN    sodium chloride (OCEAN) 0.65 % nasal spray 2 spray, TID, and PTA meds:   Prior to Admission Medications   Prescriptions Last Dose Informant Patient Reported? Taking?   Glucagon, rDNA, (Glucagon Emergency) 1 MG KIT  Self No No   Sig: Inject 1 mg as directed once as needed (hypoglycemia) for up to 1 dose   Patient not taking: Reported on 2024   Lancets (OneTouch Delica Plus [id number]) MISC  Self No No   Sig: Use 1 Units 4 (four) times a day   OneTouch Verio test strip  Self No No   Sig: Use 1 each 4 (four) times a day   acetaminophen (TYLENOL) 325 mg tablet  Self No No   Sig: Take 3 tablets (975 mg total) by mouth every 8 (eight) hours as needed for mild pain, headaches or fever   albuterol (PROVENTIL HFA,VENTOLIN HFA) 90 mcg/act inhaler  Self No No   Sig: INHALE 2 PUFFS EVERY 6 HOURS AS NEEDED FOR WHEEZING   amitriptyline (ELAVIL) 100 mg tablet  Self Yes No   Si mg daily at bedtime   apixaban (Eliquis) 5 mg   No No   Sig: Take 1 tablet (5 mg total) by mouth 2 (two) times a day   atorvastatin (LIPITOR) 10 mg tablet  Self No No   Sig: Take 1 tablet (10 mg total) by mouth daily   benzonatate (TESSALON) 200 MG capsule  Self No No   Sig: Take 1 capsule (200 mg total) by mouth 3 (three) times a day as needed for cough   famotidine (PEPCID) 40 MG tablet  Self Yes No   Sig: Take 40 mg by mouth daily as needed for heartburn or indigestion Taking as needed   folic acid (FOLVITE) 1 mg tablet  Self No No   Sig: TAKE 1 TABLET BY MOUTH EVERY DAY   glimepiride (AMARYL) 2 mg tablet  Self No No   Sig: TAKE 1 TABLET BY MOUTH DAILY WITH BREAKFAST   levothyroxine 25 mcg tablet  Self No No   Sig: TAKE 1 TABLET BY  MOUTH EVERY DAY   losartan (COZAAR) 25 mg tablet   No No   Sig: Take 1 tablet (25 mg total) by mouth daily   magnesium Oxide (MAG-OX) 400 mg TABS   No No   Sig: Take 1 tablet (400 mg total) by mouth daily   meclizine (ANTIVERT) 25 mg tablet  Self No No   Sig: Take 1 tablet (25 mg total) by mouth every 8 (eight) hours as needed for dizziness   Patient not taking: Reported on 12/18/2024   metFORMIN (GLUCOPHAGE) 1000 MG tablet  Self Yes No   Sig: Take 1,000 mg by mouth 2 (two) times a day   naloxone (NARCAN) 4 mg/0.1 mL nasal spray  Self No No   Sig: Administer 1 spray into a nostril. If no response after 2-3 minutes, give another dose in the other nostril using a new spray. For use in emergencies for opioid / pain medication reversal for accidental ingestion, respiratory depression, sedation or concerns for overdose.   ondansetron (ZOFRAN-ODT) 8 mg disintegrating tablet  Self No No   Sig: Take 1 tablet (8 mg total) by mouth every 8 (eight) hours as needed for vomiting or nausea   oxyCODONE (Roxicodone) 5 immediate release tablet  Self No No   Sig: Take 0.5 tablets (2.5 mg total) by mouth every 4 (four) hours as needed for moderate pain If pain is severe, may instead take 1 tablet (5mg) every 4 hours as needed Max Daily Amount: 15 mg   senna (SENOKOT) 8.6 mg  Self No No   Sig: Take 1 tablet (8.6 mg total) by mouth daily at bedtime as needed for constipation   tiotropium-olodaterol (Stiolto Respimat) 2.5-2.5 MCG/ACT inhaler  Self No No   Sig: Inhale 2 puffs daily      Facility-Administered Medications: None       No Known Allergies    OBJECTIVE:    Physical Exam  Physical Exam  Vitals reviewed.   Constitutional:       General: He is not in acute distress.     Appearance: He is well-developed. He is not ill-appearing or diaphoretic.   HENT:      Head: Normocephalic and atraumatic.      Right Ear: External ear normal.      Left Ear: External ear normal.      Nose: Nose normal.      Mouth/Throat:      Mouth: Mucous  membranes are moist.      Pharynx: Oropharynx is clear.   Eyes:      General: No scleral icterus.     Conjunctiva/sclera: Conjunctivae normal.   Neck:      Thyroid: No thyromegaly.      Trachea: No tracheal deviation.   Cardiovascular:      Rate and Rhythm: Normal rate and regular rhythm.      Pulses: Normal pulses.   Pulmonary:      Effort: Pulmonary effort is normal. No respiratory distress.   Abdominal:      General: There is no distension.      Palpations: Abdomen is soft.      Tenderness: There is no abdominal tenderness.   Musculoskeletal:         General: No tenderness.      Cervical back: Neck supple.      Right lower leg: No edema.      Left lower leg: No edema.   Lymphadenopathy:      Cervical: No cervical adenopathy.   Skin:     General: Skin is warm.      Coloration: Skin is pale. Skin is not jaundiced.      Findings: No erythema.   Neurological:      General: No focal deficit present.      Mental Status: He is alert and oriented to person, place, and time.   Psychiatric:         Mood and Affect: Mood normal.         Behavior: Behavior normal.         Thought Content: Thought content normal.         Judgment: Judgment normal.         Lab Results: I have personally reviewed pertinent labs., CBC:   Lab Results   Component Value Date    WBC 9.54 02/10/2025    HGB 9.9 (L) 02/10/2025    HCT 32.9 (L) 02/10/2025    MCV 86 02/10/2025     02/10/2025    RBC 3.81 (L) 02/10/2025    MCH 26.0 (L) 02/10/2025    MCHC 30.1 (L) 02/10/2025    RDW 17.4 (H) 02/10/2025    MPV 10.2 02/10/2025   , CMP:   Lab Results   Component Value Date    SODIUM 141 02/10/2025    K 4.3 02/10/2025    CL 99 02/10/2025    CO2 36 (H) 02/10/2025    BUN 18 02/10/2025    CREATININE 0.59 (L) 02/10/2025    CALCIUM 9.6 02/10/2025    EGFR 113 02/10/2025   , BMP:  Lab Results   Component Value Date    SODIUM 141 02/10/2025    K 4.3 02/10/2025    CL 99 02/10/2025    CO2 36 (H) 02/10/2025    BUN 18 02/10/2025    CREATININE 0.59 (L) 02/10/2025     "GLUC 142 (H) 02/10/2025    CALCIUM 9.6 02/10/2025    AGAP 6 02/10/2025    EGFR 113 02/10/2025   , PT/PTT:No results found for: \"PT\", \"PTT\"  Imaging Studies: \Reviewed and interpreted the pertinent studies  EKG, Pathology, and Other Studies: Reviewed and interpreted the pertinent studies    I have spent a total time of 35 minutes in caring for this patient on the day of the visit/encounter including Instructions for management, Impressions, Counseling / Coordination of care, Documenting in the medical record, Reviewing / ordering tests, medicine, procedures  , and Obtaining or reviewing history  . Topics discussed with the patient / family include symptom assessment and management, medication review, psychosocial support, goals of care, supportive listening, and anticipatory guidance.   "

## 2025-02-10 NOTE — ASSESSMENT & PLAN NOTE
Stage IV, being treated with chemotherapy, follows Dr. Bryan outpatient  - Status post carbo, Alimta, Keytruda from May to Aug 2024  - July 2024 received rad to brain   - Oct 2024 CT CAP showed met lung lymphangitic spread of adenocarcinoma and subsequently received palliative pemetrex with keytruda from Nov to Dec 2024 then docetaxel plus ramucirumab from Dec to Jan 21 2025  Oncology consulted  Palliative consulted  Guarded prognosis  Patient not interested in pursuing any active chemo treatments in the near future. Patient and wife would like to take it one day at a time for now.   Oncology signed off

## 2025-02-10 NOTE — ASSESSMENT & PLAN NOTE
4L NC O2    Symptoms exacerbated by pain and anxiety    For air hunger:  Oxycodone 5mg-10mg q4h prn, and Morphine 2mg IV q4h prn for breakthrough symptoms-->communicated/coordinated with RN team to use oxycodone IR primarily for air hunger, as he can't get morphine IV at home  Continue Valium 5 mg twice daily    Discharge prescriptions of oxycodone and Valium sent to home pharmacy

## 2025-02-10 NOTE — CONSULTS
Vancomycin IV Pharmacy-to-Dose Consultation    Papo Gibbs is a 54 y.o. male who was receiving Vancomycin IV with management by the Pharmacy Consult service for treatment of Pneumonia (goal -600, trough >10)  .       The patient's Vancomycin therapy has been completed / discontinued. Thank you for allowing us to take part in this patient's care. Pharmacy will sign-off now; please call or re-consult if there are any questions.        Jannet Macias, PharmD  Pharmacist

## 2025-02-10 NOTE — ASSESSMENT & PLAN NOTE
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   Has Pleurx catheter which was drained 250 ml of serosanguinous fluid last night  -  Recent echo 12/9/24 showing EF 60% and LV wall thickness mildly increased and with mild concentric hypertrophy; mild AR, aortic valve sclerosis, mild annual calcification MV   Pulm consulted on admission  Finished 7 days of cefepime and vanco     Given lasix 40 mg IV daily 2/4-2/6  Edema shins improved  2/7 Increased lasix to 40 mg bid  Continue with diuresis, patient reports slow improvement in breathing   Plan for d/c 24-72 depending on ability to wean O2 requirements

## 2025-02-10 NOTE — ASSESSMENT & PLAN NOTE
Palliative following  Patient not interested in chemo  Wishes to continue treating symptoms medically

## 2025-02-10 NOTE — ASSESSMENT & PLAN NOTE
Palliative diagnosis: Adenocarcinoma of the right lung  PPS: 50%  PSC Provider: Dr. Estrella    Supportive listening and presence provided  Anxiety containment provided  Instructions for management and anticipatory guidance provided    Counseled on possibility of Home Palliative Program (eligible zip code [phone])-->patient interested; communicated and coordinated with Home Palliative team to set up  Discharge prescriptions for oxycodone IR and Valium provided at today's encounter

## 2025-02-10 NOTE — PLAN OF CARE
Problem: PAIN - ADULT  Goal: Verbalizes/displays adequate comfort level or baseline comfort level  Description: Interventions:  - Encourage patient to monitor pain and request assistance  - Assess pain using appropriate pain scale  - Administer analgesics based on type and severity of pain and evaluate response  - Implement non-pharmacological measures as appropriate and evaluate response  - Consider cultural and social influences on pain and pain management  - Notify physician/advanced practitioner if interventions unsuccessful or patient reports new pain  Outcome: Progressing     Problem: INFECTION - ADULT  Goal: Absence or prevention of progression during hospitalization  Description: INTERVENTIONS:  - Assess and monitor for signs and symptoms of infection  - Monitor lab/diagnostic results  - Monitor all insertion sites, i.e. indwelling lines, tubes, and drains  - Monitor endotracheal if appropriate and nasal secretions for changes in amount and color  - [place] appropriate cooling/warming therapies per order  - Administer medications as ordered  - Instruct and encourage patient and family to use good hand hygiene technique  - Identify and instruct in appropriate isolation precautions for identified infection/condition  Outcome: Progressing  Goal: Absence of fever/infection during neutropenic period  Description: INTERVENTIONS:  - Monitor WBC    Outcome: Progressing     Problem: DISCHARGE PLANNING  Goal: Discharge to home or other facility with appropriate resources  Description: INTERVENTIONS:  - Identify barriers to discharge w/patient and caregiver  - Arrange for needed discharge resources and transportation as appropriate  - Identify discharge learning needs (meds, wound care, etc.)  - Arrange for interpretive services to assist at discharge as needed  - Refer to Case Management Department for coordinating discharge planning if the patient needs post-hospital services based on physician/advanced  practitioner order or complex needs related to functional status, cognitive ability, or social support system  Outcome: Progressing     Problem: Knowledge Deficit  Goal: Patient/family/caregiver demonstrates understanding of disease process, treatment plan, medications, and discharge instructions  Description: Complete learning assessment and assess knowledge base.  Interventions:  - Provide teaching at level of understanding  - Provide teaching via preferred learning methods  Outcome: Progressing     Problem: Prexisting or High Potential for Compromised Skin Integrity  Goal: Skin integrity is maintained or improved  Description: INTERVENTIONS:  - Identify patients at risk for skin breakdown  - Assess and monitor skin integrity  - Assess and monitor nutrition and hydration status  - Monitor labs   - Assess for incontinence   - Turn and reposition patient  - Assist with mobility/ambulation  - Relieve pressure over bony prominences  - Avoid friction and shearing  - Provide appropriate hygiene as needed including keeping skin clean and dry  - Evaluate need for skin moisturizer/barrier cream  - Collaborate with interdisciplinary team   - Patient/family teaching  - Consider wound care consult   Outcome: Progressing     Problem: Nutrition/Hydration-ADULT  Goal: Nutrient/Hydration intake appropriate for improving, restoring or maintaining nutritional needs  Description: Monitor and assess patient's nutrition/hydration status for malnutrition. Collaborate with interdisciplinary team and initiate plan and interventions as ordered.  Monitor patient's weight and dietary intake as ordered or per policy. Utilize nutrition screening tool and intervene as necessary. Determine patient's food preferences and provide high-protein, high-caloric foods as appropriate.     INTERVENTIONS:  - Monitor oral intake, urinary output, labs, and treatment plans  - Assess nutrition and hydration status and recommend course of action  - Evaluate  amount of meals eaten  - Assist patient with eating if necessary   - Allow adequate time for meals  - Recommend/ encourage appropriate diets, oral nutritional supplements, and vitamin/mineral supplements  - Order, calculate, and assess calorie counts as needed  - Recommend, monitor, and adjust tube feedings and TPN/PPN based on assessed needs  - Assess need for intravenous fluids  - Provide specific nutrition/hydration education as appropriate  - Include patient/family/caregiver in decisions related to nutrition  Outcome: Progressing

## 2025-02-11 ENCOUNTER — HOSPITAL ENCOUNTER (OUTPATIENT)
Dept: INFUSION CENTER | Facility: CLINIC | Age: 55
End: 2025-02-11

## 2025-02-11 LAB
ALBUMIN SERPL BCG-MCNC: 3.4 G/DL (ref 3.5–5)
ANION GAP SERPL CALCULATED.3IONS-SCNC: 7 MMOL/L (ref 4–13)
BUN SERPL-MCNC: 20 MG/DL (ref 5–25)
CALCIUM ALBUM COR SERPL-MCNC: 10.1 MG/DL (ref 8.3–10.1)
CALCIUM SERPL-MCNC: 9.6 MG/DL (ref 8.4–10.2)
CHLORIDE SERPL-SCNC: 99 MMOL/L (ref 96–108)
CO2 SERPL-SCNC: 36 MMOL/L (ref 21–32)
CREAT SERPL-MCNC: 0.46 MG/DL (ref 0.6–1.3)
ERYTHROCYTE [DISTWIDTH] IN BLOOD BY AUTOMATED COUNT: 17.1 % (ref 11.6–15.1)
GFR SERPL CREATININE-BSD FRML MDRD: 126 ML/MIN/1.73SQ M
GLUCOSE SERPL-MCNC: 137 MG/DL (ref 65–140)
GLUCOSE SERPL-MCNC: 140 MG/DL (ref 65–140)
GLUCOSE SERPL-MCNC: 149 MG/DL (ref 65–140)
GLUCOSE SERPL-MCNC: 157 MG/DL (ref 65–140)
GLUCOSE SERPL-MCNC: 198 MG/DL (ref 65–140)
HCT VFR BLD AUTO: 31.8 % (ref 36.5–49.3)
HGB BLD-MCNC: 9.5 G/DL (ref 12–17)
MAGNESIUM SERPL-MCNC: 1.7 MG/DL (ref 1.9–2.7)
MCH RBC QN AUTO: 25 PG (ref 26.8–34.3)
MCHC RBC AUTO-ENTMCNC: 29.9 G/DL (ref 31.4–37.4)
MCV RBC AUTO: 84 FL (ref 82–98)
MYCOBACTERIUM SPEC CULT: NORMAL
MYCOBACTERIUM SPEC CULT: NORMAL
PHOSPHATE SERPL-MCNC: 3.3 MG/DL (ref 2.7–4.5)
PLATELET # BLD AUTO: 174 THOUSANDS/UL (ref 149–390)
PMV BLD AUTO: 9.2 FL (ref 8.9–12.7)
POTASSIUM SERPL-SCNC: 3.8 MMOL/L (ref 3.5–5.3)
RBC # BLD AUTO: 3.8 MILLION/UL (ref 3.88–5.62)
RHODAMINE-AURAMINE STN SPEC: NORMAL
RHODAMINE-AURAMINE STN SPEC: NORMAL
SODIUM SERPL-SCNC: 142 MMOL/L (ref 135–147)
WBC # BLD AUTO: 8.19 THOUSAND/UL (ref 4.31–10.16)

## 2025-02-11 PROCEDURE — 83735 ASSAY OF MAGNESIUM: CPT | Performed by: STUDENT IN AN ORGANIZED HEALTH CARE EDUCATION/TRAINING PROGRAM

## 2025-02-11 PROCEDURE — 85027 COMPLETE CBC AUTOMATED: CPT | Performed by: STUDENT IN AN ORGANIZED HEALTH CARE EDUCATION/TRAINING PROGRAM

## 2025-02-11 PROCEDURE — 97163 PT EVAL HIGH COMPLEX 45 MIN: CPT

## 2025-02-11 PROCEDURE — 94760 N-INVAS EAR/PLS OXIMETRY 1: CPT

## 2025-02-11 PROCEDURE — 94640 AIRWAY INHALATION TREATMENT: CPT

## 2025-02-11 PROCEDURE — 80069 RENAL FUNCTION PANEL: CPT | Performed by: STUDENT IN AN ORGANIZED HEALTH CARE EDUCATION/TRAINING PROGRAM

## 2025-02-11 PROCEDURE — 82948 REAGENT STRIP/BLOOD GLUCOSE: CPT

## 2025-02-11 PROCEDURE — 99231 SBSQ HOSP IP/OBS SF/LOW 25: CPT | Performed by: INTERNAL MEDICINE

## 2025-02-11 RX ORDER — ONDANSETRON 2 MG/ML
4 INJECTION INTRAMUSCULAR; INTRAVENOUS EVERY 6 HOURS PRN
Status: DISCONTINUED | OUTPATIENT
Start: 2025-02-11 | End: 2025-02-13 | Stop reason: HOSPADM

## 2025-02-11 RX ADMIN — SALINE NASAL SPRAY 2 SPRAY: 1.5 SOLUTION NASAL at 21:55

## 2025-02-11 RX ADMIN — LEVALBUTEROL HYDROCHLORIDE 1.25 MG: 1.25 SOLUTION RESPIRATORY (INHALATION) at 07:05

## 2025-02-11 RX ADMIN — APIXABAN 5 MG: 5 TABLET, FILM COATED ORAL at 17:37

## 2025-02-11 RX ADMIN — ATORVASTATIN CALCIUM 10 MG: 10 TABLET, FILM COATED ORAL at 09:45

## 2025-02-11 RX ADMIN — ONDANSETRON 4 MG: 2 INJECTION INTRAMUSCULAR; INTRAVENOUS at 18:27

## 2025-02-11 RX ADMIN — LEVALBUTEROL HYDROCHLORIDE 1.25 MG: 1.25 SOLUTION RESPIRATORY (INHALATION) at 21:01

## 2025-02-11 RX ADMIN — SALINE NASAL SPRAY 2 SPRAY: 1.5 SOLUTION NASAL at 09:46

## 2025-02-11 RX ADMIN — LEVALBUTEROL HYDROCHLORIDE 1.25 MG: 1.25 SOLUTION RESPIRATORY (INHALATION) at 13:36

## 2025-02-11 RX ADMIN — LEVOTHYROXINE SODIUM 25 MCG: 25 TABLET ORAL at 05:09

## 2025-02-11 RX ADMIN — AMITRIPTYLINE HYDROCHLORIDE 150 MG: 25 TABLET, FILM COATED ORAL at 21:51

## 2025-02-11 RX ADMIN — LEVALBUTEROL HYDROCHLORIDE 1.25 MG: 1.25 SOLUTION RESPIRATORY (INHALATION) at 01:07

## 2025-02-11 RX ADMIN — IPRATROPIUM BROMIDE 0.5 MG: 0.5 SOLUTION RESPIRATORY (INHALATION) at 07:05

## 2025-02-11 RX ADMIN — FOLIC ACID 1 MG: 1 TABLET ORAL at 09:45

## 2025-02-11 RX ADMIN — ALBUTEROL SULFATE 2 PUFF: 90 AEROSOL, METERED RESPIRATORY (INHALATION) at 17:37

## 2025-02-11 RX ADMIN — IPRATROPIUM BROMIDE 0.5 MG: 0.5 SOLUTION RESPIRATORY (INHALATION) at 21:01

## 2025-02-11 RX ADMIN — QUETIAPINE FUMARATE 25 MG: 25 TABLET ORAL at 21:51

## 2025-02-11 RX ADMIN — APIXABAN 5 MG: 5 TABLET, FILM COATED ORAL at 09:44

## 2025-02-11 RX ADMIN — DIAZEPAM 5 MG: 5 TABLET ORAL at 17:37

## 2025-02-11 RX ADMIN — DIAZEPAM 5 MG: 5 TABLET ORAL at 09:44

## 2025-02-11 RX ADMIN — METOPROLOL TARTRATE 25 MG: 25 TABLET, FILM COATED ORAL at 09:45

## 2025-02-11 RX ADMIN — OXYCODONE HYDROCHLORIDE 10 MG: 10 TABLET ORAL at 07:37

## 2025-02-11 RX ADMIN — LOSARTAN POTASSIUM 25 MG: 25 TABLET, FILM COATED ORAL at 09:45

## 2025-02-11 RX ADMIN — IPRATROPIUM BROMIDE 0.5 MG: 0.5 SOLUTION RESPIRATORY (INHALATION) at 01:07

## 2025-02-11 RX ADMIN — IPRATROPIUM BROMIDE 0.5 MG: 0.5 SOLUTION RESPIRATORY (INHALATION) at 13:36

## 2025-02-11 RX ADMIN — CHLORHEXIDINE GLUCONATE 15 ML: 1.2 RINSE ORAL at 09:44

## 2025-02-11 NOTE — QUICK NOTE
2/11/2025 9:45 AM -  Papo Gibbs's chart and case were reviewed by DIANNE Kelley.  Mode of review included electronic chart check.    Symptoms remain controlled. Did not use any PRN OxyIR in the past 24 hours. Palliative will continue to follow.     Per review, symptoms remain controlled on current regimen and no changes are made at this time.  Please continue the regimen in place, and review our last note for details.  For dispo plan, please review Case Management notes.     Palliative care will continue to follow for ongoing symptom management and GoC.    Patient is appropriate for outpatient follow-up.  We will make arrangements through our office.      For urgent issues or any questions/concerns, please notify on-call provider via Epic Secure Chat.  You may also call our answering service 24/7 at [phone].    DIANNE Kelley  Palliative and Supportive Care  Clinic/Answering Service: [phone]  You can find me on Kimberly!

## 2025-02-11 NOTE — PLAN OF CARE
Problem: PHYSICAL THERAPY ADULT  Goal: Performs mobility at highest level of function for planned discharge setting.  See evaluation for individualized goals.  Description: Treatment/Interventions: Functional transfer training, LE strengthening/ROM, Elevations, Therapeutic exercise, Endurance training, Patient/family training, Equipment eval/education, Bed mobility, Gait training, Spoke to nursing, Spoke to case management (wc mobility and management)  Equipment Recommended: Walker, Wheelchair (pt owns rollator and manual wc for use at home as needed)       See flowsheet documentation for full assessment, interventions and recommendations.  Note: Prognosis: Guarded  Problem List: Decreased strength, Decreased endurance, Impaired balance, Decreased mobility, Decreased cognition, Impaired judgement, Decreased safety awareness, Decreased skin integrity, Pain  Assessment: Pt is 54 y.o. male seen for PT evaluation s/p admit to [facility] on 2/2/2025 w/ Chronic hypoxemic respiratory failure (HCC). PT consulted to assess pt's functional mobility and d/c needs. Order placed for PT eval and tx, w/  PT  order. Comorbidities affecting pt's physical performance at time of assessment include: dec activity tolerance, use of 3 L NC O2, masimo monitoring,multiple lines,SOB at rest and during very minimal exertion, current medical diagnosis. PTA, pt was independent w/ all functional mobility w/ RW for short ambulation distances and use of manual wc for longer distances, ambulates household distances, has (+) HILARY, lives w/ spouse in 2 level home with first floor setup available, and works full time. Personal factors affecting pt at time of IE include: lives in 2 story house, ambulating w/ assistive device, stairs to enter home, inability to navigate level surfaces w/o external assistance, unable to perform dynamic tasks in community, decreased cognition, anxiety, impulsivity, depression, unable to perform physical  activity, limited insight into impairments, inability to perform IADLs, and inability to perform ADLs. Please find objective findings from PT assessment regarding body systems outlined above with impairments and limitations including weakness, impaired balance, decreased endurance, gait deviations, pain, decreased activity tolerance, decreased functional mobility tolerance, decreased safety awareness, impaired judgement, fall risk, SOB upon exertion, decreased skin integrity, decreased cognition, and SOB at rest with current use of 3 L NC O2,labored breathing observed at rest and following very minimal exertion . The following objective measures performed on IE also reveal limitations: AM-PAC 6-Clicks: 15/24. Pt's clinical presentation is currently unstable/unpredictable seen in pt's presentation of dec activity tolerance, dec balance, multiple lines,current use of 3 L NC O2,masimo monitoring,. Pt to benefit from continued PT tx to address deficits as defined above and maximize level of functional independent mobility and consistency. From PT/mobility standpoint, recommendation at time of d/c would be post acute rehabilitation services vs level 3 with home PT pending mobility and medical progress in order to facilitate return to PLOF.  Barriers to Discharge: Inaccessible home environment ((+) HILARY, 2 SH with first floor setup available)     Rehab Resource Intensity Level, PT: II (Moderate Resource Intensity) (vs level 3 HHPT pending medical and mobility progress)    See flowsheet documentation for full assessment.

## 2025-02-11 NOTE — CASE MANAGEMENT
Case Management Discharge Planning Note    Patient name Papo Gibbs  Location W /W -01 MRN [id number]  : 1970 Date 2025       Current Admission Date: 2025  Current Admission Diagnosis:acute on Chronic hypoxemic respiratory failure (HCC)   Patient Active Problem List    Diagnosis Date Noted Date Diagnosed    Sepsis (HCC) 2025     Counseling regarding advance care planning and goals of care 2025     Diarrhea 2025     Uncontrolled type 2 diabetes mellitus with hyperglycemia, without long-term current use of insulin (HCC) 2025     Ambulatory dysfunction 2025     Peripheral edema 2025     Cancer related pain 2025     Antineoplastic chemotherapy induced anemia 2024     Metastasis to brain (HCC) 2024     DM2 (diabetes mellitus, type 2) (HCC) 2024     Pleural effusion 2024     Tachycardia 2024     Right-sided thoracic back pain 2024     Acid reflux 10/10/2024     Nephrolithiasis 2024     Fatty liver 2024     Hepatomegaly 2024     Chronic anticoagulation 2024     Chemotherapy-induced nausea 2024     Port-A-Cath in place 2024     Adrenal nodule (HCC) 2024     Acute deep vein thrombosis (DVT) of popliteal vein of left lower extremity (HCC) 2024     Acute deep vein thrombosis (DVT) of left femoral vein (HCC) 2024     Primary malignant neoplasm of right lung metastatic to other site (HCC) 2024     Acquired left ventricular hypertrophy 2024     Palliative care by specialist 2024     History of pulmonary embolism 2024     acute on Chronic hypoxemic respiratory failure (HCC) 2024     Restrictive lung disease 2024     Adenocarcinoma of overlapping sites of right lung (HCC) 2024     Respiratory insufficiency 2024     Tobacco dependence 2024     SOB (shortness of breath) 2024     Chest pain 2024     Acute  pulmonary embolism with acute cor pulmonale, unspecified pulmonary embolism type (HCC) 04/19/2024     HTN (hypertension) 04/19/2024     Encounter to discuss test results 07/03/2022     Gross hematuria 07/03/2022       LOS (days): 9  Geometric Mean LOS (GMLOS) (days): 4.9  Days to GMLOS:-4.1     OBJECTIVE:  Risk of Unplanned Readmission Score: 34.19         Current admission status: Inpatient   Preferred Pharmacy:   [facility] #[id number]  [place] [address]  [address] [address]  Phone: [phone] Fax: [phone]    Primary Care Provider: Amelie Bacon MD    Primary Insurance: [employer]  Secondary Insurance:     DISCHARGE DETAILS:    Discharge planning discussed with:: pt  Freedom of Choice: Yes  Comments - Freedom of Choice: Pt would like to return home with [facility].     Requested Home Health Care         Is the patient interested in HHC at discharge?: Yes  Home Health Discipline requested:: Nursing, Occupational Therapy, Physical Therapy  Home Health Agency Name:: [facility]  A External Referral Reason (only applicable if external HHA name selected): Patient has established relationship with provider  Home Health Follow-Up Provider:: PCP  Home Health Services Needed:: Other (comment), Strengthening/Theraputic Exercises to Improve Function, Post-Op Care and Assessment, Oxygen Via Nasal Cannula, Evaluate Functional Status and Safety, Gait/ADL Training  Oxygen LPM Ordered (if applicable based on home health services needed):: 4 LPM  Homebound Criteria Met:: Uses an Assist Device (i.e. cane, walker, etc), Immunosuppressed, Requires the Assistance of Another Person for Safe Ambulation or to Leave the Home  Supporting Clincal Findings:: Limited Endurance, Requires Oxygen    DME Referral Provided  Referral made for DME?: No    Other Referral/Resources/Interventions Provided:  Interventions: HHC  Referral Comments: Referral has been made for [facility] with [facility] with  SN PT/OT.  CM will continue to follow to assist with needs and planning for DC to home with [facility].        Discharge Destination Plan:: Home with Home Health Care  Transport at Discharge : Family         [facility] is able to resume care with pt.  Contact information has been placed on AVS.

## 2025-02-11 NOTE — PHYSICAL THERAPY NOTE
Physical Therapy Evaluation:    2 forms of pt ID verified:name,birthdate and pt ID rangel    Patient's Name: Papo Gibbs    Admitting Diagnosis  Metastasis to brain (HCC) [C79.31]  Pneumonia [J18.9]  Dyspnea [R06.00]  SOB (shortness of breath) [R06.02]  Pleural effusion [J90]  Hypoxia [R09.02]  Cancer related pain [G89.3]  Adenocarcinoma of overlapping sites of right lung (HCC) [C34.81]    Problem List  Patient Active Problem List   Diagnosis    Encounter to discuss test results    Gross hematuria    Acute pulmonary embolism with acute cor pulmonale, unspecified pulmonary embolism type (HCC)    HTN (hypertension)    Tobacco dependence    SOB (shortness of breath)    Chest pain    Respiratory insufficiency    Adenocarcinoma of overlapping sites of right lung (HCC)    acute on Chronic hypoxemic respiratory failure (HCC)    Restrictive lung disease    Palliative care by specialist    History of pulmonary embolism    Adrenal nodule (HCC)    Acute deep vein thrombosis (DVT) of popliteal vein of left lower extremity (HCC)    Acute deep vein thrombosis (DVT) of left femoral vein (HCC)    Primary malignant neoplasm of right lung metastatic to other site (HCC)    Acquired left ventricular hypertrophy    Port-A-Cath in place    Chemotherapy-induced nausea    Nephrolithiasis    Fatty liver    Hepatomegaly    Chronic anticoagulation    Acid reflux    Right-sided thoracic back pain    Pleural effusion    Tachycardia    DM2 (diabetes mellitus, type 2) (HCC)    Metastasis to brain (HCC)    Antineoplastic chemotherapy induced anemia    Cancer related pain    Peripheral edema    Ambulatory dysfunction    Uncontrolled type 2 diabetes mellitus with hyperglycemia, without long-term current use of insulin (HCC)    Diarrhea    Counseling regarding advance care planning and goals of care    Sepsis (HCC)       Past Medical History  Past Medical History:   Diagnosis Date    Cancer (HCC) 04,25,2024    Diabetes mellitus (HCC)     High  cholesterol     History of blood clots     Hypertension     Leucocytosis 04/20/2024    Lung cancer (HCC)     Malignant neoplasm of overlapping sites of right lung (HCC) 06/04/2024    Pneumonia     Pulmonary embolism (HCC)        Past Surgical History  Past Surgical History:   Procedure Laterality Date    IR BIOPSY LYMPH NODE  4/23/2024    IR PORT PLACEMENT  5/20/2024    IR THORACENTESIS  12/9/2024    KNEE SURGERY      MANDIBLE FRACTURE SURGERY  1985    PATELLA FRACTURE SURGERY      RECTAL SURGERY          02/11/25 1315   PT Last Visit   PT Visit Date 02/11/25   Note Type   Note type Evaluation   Pain Assessment   Pain Assessment Tool 0-10   Pain Score 8   Pain Location/Orientation Orientation: Left;Orientation: Right;Orientation: Mid;Location: Chest  (when coughing)   Hospital Pain Intervention(s) Repositioned;Ambulation/increased activity;Emotional support;Rest;Relaxation technique   Restrictions/Precautions   Other Precautions Cognitive;Chair Alarm;Bed Alarm;Multiple lines;Telemetry;O2;Fall Risk;Pain  (3 L NC O2)   Home Living   Type of Home House   Home Layout Two level;1/2 bath on main level;Bed/bath upstairs  (6 +6 steps c HR to 2nd floor. 2 HILARY c HR.)   Home Equipment Walker;Cane;Wheelchair-manual  (rollator for short household distances and manual wc for longer distances)   Additional Comments pt reports living with supportive spouse in 2 , would like to get to second floor upon being DC from the hospital, but does have first floor setup,use of rollator for short ambulation distances and manual wc for longer distances, works from home and pt reports wife works daily outside of home; pt owns electric bed at home   Prior Function   Level of [place] Independent with ADLs;Independent with functional mobility;Needs assistance with IADLS   Lives With Spouse   Receives Help From Family  (pt reports supportive family, was receiving home therapy services PTA)   IADLs Independent with medication management  (pts  "family A with most IADLs)   Falls in the last 6 months 0   Vocational Full time employment  (works from home)   General   Additional Pertinent History acute on chronic hypoxemic resp failure, tage IV mets to brain and pleura s/p pleurx, adenocarcinoma of overlapping sites R lung   Family/Caregiver Present No   Cognition   Overall Cognitive Status Impaired   Arousal/Participation Cooperative   Attention Difficulty attending to directions  (pt needs constant max verbal and physical instruction during each and every sit<->stand transfer with proper placement of BLE and B hands prior to transfer. (+)foggy during PT IE with dec attention and flat affect)   Orientation Level Oriented to person;Oriented to place;Disoriented to time  (Pt reports Feb 5, 2005)   Following Commands Follows one step commands with increased time or repetition   Subjective   Subjective pt sitting on bed crossed legged resting comfortably with use of 3 L NC O2, premobility,during activity and postmobility SpO2 90%-92% throughout. Pt willing and agreeable to work with PT and to participate in therapy intervention. \"I just want to rest, I am very tired and weak\".   RLE Assessment   RLE Assessment   (at least 4/5 grossly throughout, BLE hip flexion 3+/5)   LLE Assessment   LLE Assessment   (at least 4/5 grossly throughout, BLE hip flexion 3+/5)   Vision-Basic Assessment   Current Vision Wears glasses all the time   Coordination   Movements are Fluid and Coordinated 0   Coordination and Movement Description ataxic and unsteady gait and movement pattern, forward flexed posture,shuffling gait pattern, limited activity tolerance due to dec endurance and fatigue,weakness,SOB following minimal exertion and at rest   Sensation WFL   Light Touch   RLE Light Touch Grossly intact   LLE Light Touch Grossly intact   Bed Mobility   Sit to Supine 5  Supervision   Additional items Assist x 1;HOB elevated;Bedrails;Increased time required;Verbal cues   Transfers " "  Sit to Stand 4  Minimal assistance   Additional items Assist x 1;Bedrails;Increased time required;Verbal cues  (max and constant verbal and physical instruction for placement of BLE and B hands prior to transfer, during each and every transfer performed.)   Stand to Sit 4  Minimal assistance   Additional items Assist x 1;Bedrails;Increased time required;Verbal cues  (A for control descent, education and instruction for proper placement of BLE and B hands prior to transfer)   Additional Comments sit<->stand transfer x3 from raised seated surface; once pt gets to static stand, inc fatigue and BLE weakness with pt returning to EOB. SOB at rest, with use of 3 L NC O2 and during and following very minimal exertion   Ambulation/Elevation   Gait pattern Poor UE support;Antalgic;Narrow DAVIS;Forward Flexion;Shuffling;Inconsistent ela;Foward flexed;Short stride;Ataxia;Step to;Excessively slow   Gait Assistance 4  Minimal assist   Additional items Assist x 1;Verbal cues   Assistive Device Rolling walker   Distance 4 sidesteps towards HOB;limited ambulation trial and gait 2* weakness BLE,extreme fatigue and pt reports of \"being tired\". Needed multiple seated rest breaks at EOB prior to perform gait with observable labored breathing with use of 3 L NC O2, SpO2 remain above 90% throughout   Ambulation/Elevation Additional Comments unable to assess ST due to fatigue, limited activity tolerance and unable to ambulate further than 4 sidesteps during PT IE   Balance   Static Sitting Fair  (bed alarm intact prior to leaving pts room)   Dynamic Sitting Poor +   Static Standing Poor +   Dynamic Standing Poor +   Ambulatory Poor +   Endurance Deficit   Endurance Deficit Yes   Endurance Deficit Description use of 3 L NC O2, dec activity tolerance, SOB at rest and during very minimal exertion,labored breathing throughout,pain B chest mid to upper area during coughing   Activity Tolerance   Activity Tolerance Patient limited by " fatigue;Patient limited by pain;Treatment limited secondary to medical complications (Comment)  (poor)   Medical Staff Made Aware CM and nursing   Nurse Made Aware yes   Assessment   Prognosis Guarded   Problem List Decreased strength;Decreased endurance;Impaired balance;Decreased mobility;Decreased cognition;Impaired judgement;Decreased safety awareness;Decreased skin integrity;Pain   Assessment Pt is 54 y.o. male seen for PT evaluation s/p admit to [facility] on 2/2/2025 w/ Chronic hypoxemic respiratory failure (HCC). PT consulted to assess pt's functional mobility and d/c needs. Order placed for PT eval and tx, w/  PT  order. Comorbidities affecting pt's physical performance at time of assessment include: dec activity tolerance, use of 3 L NC O2, masimo monitoring,multiple lines,SOB at rest and during very minimal exertion, current medical diagnosis. PTA, pt was independent w/ all functional mobility w/ RW for short ambulation distances and use of manual wc for longer distances, ambulates household distances, has (+) HILARY, lives w/ spouse in 2 level home with first floor setup available, and works full time. Personal factors affecting pt at time of IE include: lives in 2 story house, ambulating w/ assistive device, stairs to enter home, inability to navigate level surfaces w/o external assistance, unable to perform dynamic tasks in community, decreased cognition, anxiety, impulsivity, depression, unable to perform physical activity, limited insight into impairments, inability to perform IADLs, and inability to perform ADLs. Please find objective findings from PT assessment regarding body systems outlined above with impairments and limitations including weakness, impaired balance, decreased endurance, gait deviations, pain, decreased activity tolerance, decreased functional mobility tolerance, decreased safety awareness, impaired judgement, fall risk, SOB upon exertion, decreased skin integrity, decreased  cognition, and SOB at rest with current use of 3 L NC O2,labored breathing observed at rest and following very minimal exertion . The following objective measures performed on IE also reveal limitations: AM-PAC 6-Clicks: 15/24. Pt's clinical presentation is currently unstable/unpredictable seen in pt's presentation of dec activity tolerance, dec balance, multiple lines,current use of 3 L NC O2,masimo monitoring,. Pt to benefit from continued PT tx to address deficits as defined above and maximize level of functional independent mobility and consistency. From PT/mobility standpoint, recommendation at time of d/c would be post acute rehabilitation services vs level 3 with home PT pending mobility and medical progress in order to facilitate return to PLOF.   Barriers to Discharge Inaccessible home environment  ((+) HILARY, 2 SH with first floor setup available)   Goals   Patient Goals to catch his breath   STG Expiration Date 02/21/25   Short Term Goal #1 in 7-10 days: (1) Pt will be able to ambulate greater than 50 feet with use of RW and chair follow as needed on various surfaces needing min to S level of A and no LOB in order to A pt to return to PLOF, (2) activity tolerance:45 mins/45mins, (3) pt will be able to perform sit to stand transfers needing S level of A to and from various surfaces consistently in order to return to PLOF, (4) pt will be able to perform BM needing S level of A to A pt to return to PLOF, (5) (I) with BLE therapeutic ex HEP in various positions to A pt to inc balance,strength,mobility,endurance and to A to dec pain, (6) inc balance 1/2 grade in order to dec fall risk, (7) pt will be able to go up and down 1 FF of steps needing min level of A in order to navigate HILARY and 2 SH as able and as needed prior to D/C, (8) cont to provide pt and pt family education for safe D/C planning, (9) inc BLE strength 1/2 to 1 full grade in order to A pt to inc balance,strength,mobility,endurance and to A to dec  pain, (10) pt will be able to perform manual wc mobility greater than 100 feet on various surfaces needing min to S level of A and wc management needing min AX1 in order to navigate longer distances at home and in the community   Long Term Goal #2 0   Plan   Treatment/Interventions Functional transfer training;LE strengthening/ROM;Elevations;Therapeutic exercise;Endurance training;Patient/family training;Equipment eval/education;Bed mobility;Gait training;Spoke to nursing;Spoke to case management  (wc mobility and management)   PT Frequency 2-3x/wk   Discharge Recommendation   Rehab Resource Intensity Level, PT II (Moderate Resource Intensity)  (vs level 3 HHPT pending medical and mobility progress)   Equipment Recommended Walker;Wheelchair  (pt owns rollator and manual wc for use at home as needed)   AM-PAC Basic Mobility Inpatient   Turning in Flat Bed Without Bedrails 3   Lying on Back to Sitting on Edge of Flat Bed Without Bedrails 3   Moving Bed to Chair 3   Standing Up From Chair Using Arms 3   Walk in Room 2   Climb 3-5 Stairs With Railing 1   Basic Mobility Inpatient Raw Score 15   Basic Mobility Standardized Score 36.97   [facility] Highest Level Of Mobility   -[facility] Goal 4: Move to chair/commode   -HLM Achieved 5: Stand (1 or more minutes)           @Marita Giraldo, PT, DPT@

## 2025-02-11 NOTE — PROGRESS NOTES
Patient:  HANS MURCIA    MRN:  [id number]    Bautista Request ID:  [id number]    Level of care reserved:  Home Health Agency    Partner Reserved:  [facility] [place] [address] [phone]    Clinical needs requested:    Geography searched:  [phone]    Start of Service:    Request sent:  11:52am EST on 2/11/2025 by Wayne Pino    Partner reserved:  11:58am EST on 2/11/2025 by Wayne Pino    Choice list shared:  11:57am EST on 2/11/2025 by Wayne Pino

## 2025-02-11 NOTE — PROGRESS NOTES
"Progress Note - Hospitalist   Name: Papo Gibbs 54 y.o. male I MRN: [id number]  Unit/Bed#: W -01 I Date of Admission: 2/2/2025   Date of Service: 2/11/2025 I Hospital Day: 9    Assessment & Plan  HTN (hypertension)  Continue losartan 25 mg daily   /85  Adenocarcinoma of overlapping sites of right lung (HCC)  Stage IV, being treated with chemotherapy, follows Dr. Bryan outpatient  - Status post carbo, Alimta, Keytruda from May to Aug 2024  - July 2024 received rad to brain   - Oct 2024 CT CAP showed met lung lymphangitic spread of adenocarcinoma and subsequently received palliative pemetrex with keytruda from Nov to Dec 2024 then docetaxel plus ramucirumab from Dec to Jan 21 2025  Oncology consulted  Palliative consulted  Guarded prognosis  Patient not interested in pursuing any active chemo treatments in the near future. Patient and wife would like to take it one day at a time for now.   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   Has Pleurx catheter which was drained 250 ml of serosanguinous fluid last night  -  Recent echo 12/9/24 showing EF 60% and LV wall thickness mildly increased and with mild concentric hypertrophy; mild AR, aortic valve sclerosis, mild annual calcification MV   Pulm consulted on admission  Finished 7 days of cefepime and vanco     Given lasix 40 mg IV daily 2/4-2/6  Edema shins improved  2/7 Increased lasix to 40 mg bid  Continue with diuresis, patient reports slow improvement in breathing   Says he feels \"the same\" on 2/11      History of pulmonary embolism  Continue eliquis 5 mg bid  Cancer related " "pain  Continue oxycodone IR 5 mg/10 mg every 4 hours as needed for moderate/severe pain  Continue hydromorphone IV 0.5   Sepsis (HCC)  Sepsis, present on admission, due to post obstructive PNA evidenced by Wbc 2.90, Tachycardia (), Tachypnea (RR 36), acute on chronic respiratory failure requiring IV Cefepime/ Vancomycin, lactic acid and blood cultures.   Finished 7 days of vanc and cefepime     Tachycardia  History of tachycardia   Increased lopressor to 25 mg bid  Still tachy - PE study was negative.   Counseling regarding advance care planning and goals of care  Palliative following  Patient not interested in chemo  Wishes to continue treating symptoms medically   Diarrhea  Admitted to having diarrhea on admission  Resolved  Palliative care by specialist    Primary malignant neoplasm of right lung metastatic to other site (HCC)      VTE Pharmacologic Prophylaxis:   Moderate Risk (Score 3-4) - Pharmacological DVT Prophylaxis Ordered: heparin.    Mobility:   Basic Mobility Inpatient Raw Score: 15  -[facility] Goal: 4: Move to chair/commode  JH-HLM Achieved: 5: Stand (1 or more minutes)  -HLM Goal achieved. Continue to encourage appropriate mobility.    Patient Centered Rounds: I performed bedside rounds with nursing staff today.   Discussions with Specialists or Other Care Team Provider: Discussed with nursing.     Education and Discussions with Family / Patient: Updated  (daughter and mother) at bedside.    Current Length of Stay: 9 day(s)  Current Patient Status: Inpatient   Certification Statement: The patient will continue to require additional inpatient hospital stay due to feeling short of breath.   Discharge Plan: Anticipate discharge in 24-48 hrs to home.    Code Status: Level 3 - DNAR and DNI    Subjective   Patient seen and examined   Feeling \"the same\"    Objective :  Temp:  [97.7 °F (36.5 °C)-98.3 °F (36.8 °C)] 97.7 °F (36.5 °C)  HR:  [118-124] 122  BP: (117-134)/(77-85) 125/85  Resp:  " [16-18] 16  SpO2:  [90 %-97 %] 93 %  O2 Device: Nasal cannula  Nasal Cannula O2 Flow Rate (L/min):  [1 L/min-4 L/min] 1 L/min    Body mass index is 27.49 kg/m².     Input and Output Summary (last 24 hours):     Intake/Output Summary (Last 24 hours) at 2/11/2025 1507  Last data filed at 2/11/2025 1426  Gross per 24 hour   Intake 480 ml   Output 600 ml   Net -120 ml       Physical Exam  Constitutional:       General: He is not in acute distress.     Appearance: He is not ill-appearing, toxic-appearing or diaphoretic.   HENT:      Head: Normocephalic.      Mouth/Throat:      Mouth: Mucous membranes are moist.   Eyes:      General: No scleral icterus.        Right eye: No discharge.         Left eye: No discharge.      Pupils: Pupils are equal, round, and reactive to light.   Cardiovascular:      Rate and Rhythm: Normal rate.      Heart sounds: No murmur heard.     No friction rub. No gallop.   Pulmonary:      Effort: Respiratory distress present.      Breath sounds: No stridor. No wheezing, rhonchi or rales.      Comments: Diminished breath sounds     Chest:      Chest wall: No tenderness.   Abdominal:      General: There is no distension.      Palpations: There is no mass.      Tenderness: There is no abdominal tenderness. There is no right CVA tenderness, left CVA tenderness, guarding or rebound.      Hernia: No hernia is present.   Skin:     Capillary Refill: Capillary refill takes less than 2 seconds.      Coloration: Skin is pale. Skin is not jaundiced.      Findings: No bruising, erythema, lesion or rash.   Neurological:      General: No focal deficit present.      Mental Status: He is alert.      Cranial Nerves: No cranial nerve deficit.      Sensory: No sensory deficit.      Motor: No weakness.      Coordination: Coordination normal.      Gait: Gait normal.      Deep Tendon Reflexes: Reflexes normal.   Psychiatric:         Mood and Affect: Mood normal.           Lines/Drains:  Lines/Drains/Airways       Active  Status       Name Placement date Placement time Site Days    Port A Cath 05/20/24 Right Chest 05/20/24  1427  Chest  267    Pleural Effusion Long-Term Catheter 12/12/24  1009  --  61                    Central Line:  Goal for removal:  NA for chemo.                Lab Results: I have reviewed the following results:   Results from last 7 days   Lab Units 02/11/25  0513   WBC Thousand/uL 8.19   HEMOGLOBIN g/dL 9.5*   HEMATOCRIT % 31.8*   PLATELETS Thousands/uL 174     Results from last 7 days   Lab Units 02/11/25  0513   SODIUM mmol/L 142   POTASSIUM mmol/L 3.8   CHLORIDE mmol/L 99   CO2 mmol/L 36*   BUN mg/dL 20   CREATININE mg/dL 0.46*   ANION GAP mmol/L 7   CALCIUM mg/dL 9.6   ALBUMIN g/dL 3.4*   GLUCOSE RANDOM mg/dL 157*         Results from last 7 days   Lab Units 02/11/25  1112 02/11/25  0728 02/10/25  2109 02/10/25  1606 02/10/25  1123 02/10/25  0727 02/09/25  2131 02/09/25  1612 02/09/25  1100 02/09/25  0733 02/08/25  2100 02/08/25  1604   POC GLUCOSE mg/dl 198* 140 170* 136 201* 135 269* 162* 212* 135 135 254*               Recent Cultures (last 7 days):         Imaging Results Review: No pertinent imaging studies reviewed.  Other Study Results Review: No additional pertinent studies reviewed.    Last 24 Hours Medication List:     Current Facility-Administered Medications:     acetaminophen (TYLENOL) tablet 975 mg, Q8H PRN    albuterol (PROVENTIL HFA,VENTOLIN HFA) inhaler 2 puff, Q4H PRN    amitriptyline (ELAVIL) tablet 150 mg, HS    apixaban (ELIQUIS) tablet 5 mg, BID    atorvastatin (LIPITOR) tablet 10 mg, Daily    benzonatate (TESSALON PERLES) capsule 200 mg, TID PRN    chlorhexidine (PERIDEX) 0.12 % oral rinse 15 mL, Q12H LATONIA    diazepam (VALIUM) tablet 5 mg, BID    famotidine (PEPCID) tablet 40 mg, Daily PRN    folic acid (FOLVITE) tablet 1 mg, Daily    [Held by provider] glimepiride (AMARYL) tablet 2 mg, Daily With Breakfast    influenza vaccine, recombinant (PF) (Flublok) IM injection 0.5 mL, Once     insulin lispro (HumALOG/ADMELOG) 100 units/mL subcutaneous injection 1-6 Units, 4x Daily (AC & HS) **AND** Fingerstick Glucose (POCT), 4x Daily AC and at bedtime    ipratropium (ATROVENT) 0.02 % inhalation solution 0.5 mg, Q6H    levalbuterol (XOPENEX) inhalation solution 1.25 mg, Q6H    levothyroxine tablet 25 mcg, Early Morning    losartan (COZAAR) tablet 25 mg, Daily    metoprolol tartrate (LOPRESSOR) tablet 25 mg, Q12H LATONIA    morphine injection 2 mg, Q4H PRN    naloxone (NARCAN) 0.04 mg/mL syringe 0.04 mg, Q1MIN PRN    oxyCODONE (ROXICODONE) IR tablet 5 mg, Q4H PRN **OR** oxyCODONE (ROXICODONE) immediate release tablet 10 mg, Q4H PRN    pneumococcal 20-juana conj vacc (PREVNAR 20) IM Injection 0.5 mL, Once    QUEtiapine (SEROquel) tablet 25 mg, HS    sodium chloride (OCEAN) 0.65 % nasal spray 1 spray, Q1H PRN    sodium chloride (OCEAN) 0.65 % nasal spray 2 spray, TID    Administrative Statements   Today, Patient Was Seen By: Claudine Ruiz MD  I have spent a total time of 0 minutes in caring for this patient on the day of the visit/encounter including Diagnostic results, Prognosis, Risks and benefits of tx options, Instructions for management, Patient and family education, Importance of tx compliance, Risk factor reductions, Impressions, Counseling / Coordination of care, Documenting in the medical record, Reviewing / ordering tests, medicine, procedures  , Obtaining or reviewing history  , and Communicating with other healthcare professionals .    **Please Note: This note may have been constructed using a voice recognition system.**

## 2025-02-11 NOTE — ASSESSMENT & PLAN NOTE
"54-year-old male with past medical history of stage IV adenocarcinoma of the lung with mets to the brain s/p chemo as well as radiation to the brain presented to the ED on 2/2 with shortness of breath     CT lungs showing redemonstration of lymphangitic spread as well as extensive consolidation in RLL with complete collapse of RML.   Has Pleurx catheter which was drained 250 ml of serosanguinous fluid last night  -  Recent echo 12/9/24 showing EF 60% and LV wall thickness mildly increased and with mild concentric hypertrophy; mild AR, aortic valve sclerosis, mild annual calcification MV   Pulm consulted on admission  Finished 7 days of cefepime and vanco     Given lasix 40 mg IV daily 2/4-2/6  Edema shins improved  2/7 Increased lasix to 40 mg bid  Continue with diuresis, patient reports slow improvement in breathing   Says he feels \"the same\" on 2/11      "

## 2025-02-11 NOTE — CASE MANAGEMENT
Case Management Progress Note    Patient name Papo Gibbs  Location W /W -01 MRN [id number]  : 1970 Date 2025       LOS (days): 9  Geometric Mean LOS (GMLOS) (days): 4.9  Days to GMLOS:-4.1        OBJECTIVE:        Current admission status: Inpatient  Preferred Pharmacy:   [facility]/pharmacy #[id number]  [place] - [address]  [address] [address]  Phone: [phone] Fax: [phone]    Primary Care Provider: Amelie Bacon MD    Primary Insurance: [employer]  Secondary Insurance:     PROGRESS NOTE:    Weekly Care Management Length of Stay Review     Current LOS: 9 Days    Most Recent Labs:     Lab Results   Component Value Date/Time    WBC 8.19 2025 05:13 AM    HGB 9.5 (L) 2025 05:13 AM    HCT 31.8 (L) 2025 05:13 AM     2025 05:13 AM    SODIUM 142 2025 05:13 AM    K 3.8 2025 05:13 AM    CL 99 2025 05:13 AM    CO2 36 (H) 2025 05:13 AM    BUN 20 2025 05:13 AM    CREATININE 0.46 (L) 2025 05:13 AM    GLUC 157 (H) 2025 05:13 AM    ALB 3.4 (L) 2025 05:13 AM       Most Recent Vitals:   Vitals:    25 0734   BP: 125/85   Pulse: (!) 122   Resp: 16   Temp: 97.7 °F (36.5 °C)   SpO2: 93%        Identified Barriers to Discharge/Discharge Goals/Care Management Interventions:  acute on chronic hypoxemic respiratory failure, back to baseline level of O2.     Intended Discharge Disposition: Home w/[facility]    Expected Discharge Date:  today vs 24hrs

## 2025-02-11 NOTE — PLAN OF CARE
Problem: PAIN - ADULT  Goal: Verbalizes/displays adequate comfort level or baseline comfort level  Description: Interventions:  - Encourage patient to monitor pain and request assistance  - Assess pain using appropriate pain scale  - Administer analgesics based on type and severity of pain and evaluate response  - Implement non-pharmacological measures as appropriate and evaluate response  - Consider cultural and social influences on pain and pain management  - Notify physician/advanced practitioner if interventions unsuccessful or patient reports new pain  Outcome: Progressing     Problem: INFECTION - ADULT  Goal: Absence or prevention of progression during hospitalization  Description: INTERVENTIONS:  - Assess and monitor for signs and symptoms of infection  - Monitor lab/diagnostic results  - Monitor all insertion sites, i.e. indwelling lines, tubes, and drains  - Monitor endotracheal if appropriate and nasal secretions for changes in amount and color  - [place] appropriate cooling/warming therapies per order  - Administer medications as ordered  - Instruct and encourage patient and family to use good hand hygiene technique  - Identify and instruct in appropriate isolation precautions for identified infection/condition  Outcome: Progressing  Goal: Absence of fever/infection during neutropenic period  Description: INTERVENTIONS:  - Monitor WBC    Outcome: Progressing     Problem: DISCHARGE PLANNING  Goal: Discharge to home or other facility with appropriate resources  Description: INTERVENTIONS:  - Identify barriers to discharge w/patient and caregiver  - Arrange for needed discharge resources and transportation as appropriate  - Identify discharge learning needs (meds, wound care, etc.)  - Arrange for interpretive services to assist at discharge as needed  - Refer to Case Management Department for coordinating discharge planning if the patient needs post-hospital services based on physician/advanced  practitioner order or complex needs related to functional status, cognitive ability, or social support system  Outcome: Progressing     Problem: Knowledge Deficit  Goal: Patient/family/caregiver demonstrates understanding of disease process, treatment plan, medications, and discharge instructions  Description: Complete learning assessment and assess knowledge base.  Interventions:  - Provide teaching at level of understanding  - Provide teaching via preferred learning methods  Outcome: Progressing     Problem: Prexisting or High Potential for Compromised Skin Integrity  Goal: Skin integrity is maintained or improved  Description: INTERVENTIONS:  - Identify patients at risk for skin breakdown  - Assess and monitor skin integrity  - Assess and monitor nutrition and hydration status  - Monitor labs   - Assess for incontinence   - Turn and reposition patient  - Assist with mobility/ambulation  - Relieve pressure over bony prominences  - Avoid friction and shearing  - Provide appropriate hygiene as needed including keeping skin clean and dry  - Evaluate need for skin moisturizer/barrier cream  - Collaborate with interdisciplinary team   - Patient/family teaching  - Consider wound care consult   Outcome: Progressing     Problem: Nutrition/Hydration-ADULT  Goal: Nutrient/Hydration intake appropriate for improving, restoring or maintaining nutritional needs  Description: Monitor and assess patient's nutrition/hydration status for malnutrition. Collaborate with interdisciplinary team and initiate plan and interventions as ordered.  Monitor patient's weight and dietary intake as ordered or per policy. Utilize nutrition screening tool and intervene as necessary. Determine patient's food preferences and provide high-protein, high-caloric foods as appropriate.     INTERVENTIONS:  - Monitor oral intake, urinary output, labs, and treatment plans  - Assess nutrition and hydration status and recommend course of action  - Evaluate  amount of meals eaten  - Assist patient with eating if necessary   - Allow adequate time for meals  - Recommend/ encourage appropriate diets, oral nutritional supplements, and vitamin/mineral supplements  - Order, calculate, and assess calorie counts as needed  - Recommend, monitor, and adjust tube feedings and TPN/PPN based on assessed needs  - Assess need for intravenous fluids  - Provide specific nutrition/hydration education as appropriate  - Include patient/family/caregiver in decisions related to nutrition  Outcome: Progressing

## 2025-02-12 LAB
ALBUMIN SERPL BCG-MCNC: 3.5 G/DL (ref 3.5–5)
ALP SERPL-CCNC: 72 U/L (ref 34–104)
ALT SERPL W P-5'-P-CCNC: 10 U/L (ref 7–52)
ANION GAP SERPL CALCULATED.3IONS-SCNC: 7 MMOL/L (ref 4–13)
AST SERPL W P-5'-P-CCNC: 13 U/L (ref 13–39)
BASOPHILS # BLD AUTO: 0.06 THOUSANDS/ΜL (ref 0–0.1)
BASOPHILS NFR BLD AUTO: 1 % (ref 0–1)
BILIRUB SERPL-MCNC: 0.4 MG/DL (ref 0.2–1)
BUN SERPL-MCNC: 22 MG/DL (ref 5–25)
CALCIUM SERPL-MCNC: 9.8 MG/DL (ref 8.4–10.2)
CHLORIDE SERPL-SCNC: 99 MMOL/L (ref 96–108)
CO2 SERPL-SCNC: 36 MMOL/L (ref 21–32)
CREAT SERPL-MCNC: 0.74 MG/DL (ref 0.6–1.3)
EOSINOPHIL # BLD AUTO: 0.02 THOUSAND/ΜL (ref 0–0.61)
EOSINOPHIL NFR BLD AUTO: 0 % (ref 0–6)
ERYTHROCYTE [DISTWIDTH] IN BLOOD BY AUTOMATED COUNT: 17 % (ref 11.6–15.1)
GFR SERPL CREATININE-BSD FRML MDRD: 103 ML/MIN/1.73SQ M
GLUCOSE SERPL-MCNC: 127 MG/DL (ref 65–140)
GLUCOSE SERPL-MCNC: 128 MG/DL (ref 65–140)
GLUCOSE SERPL-MCNC: 130 MG/DL (ref 65–140)
GLUCOSE SERPL-MCNC: 137 MG/DL (ref 65–140)
GLUCOSE SERPL-MCNC: 176 MG/DL (ref 65–140)
HCT VFR BLD AUTO: 33.2 % (ref 36.5–49.3)
HGB BLD-MCNC: 9.9 G/DL (ref 12–17)
IMM GRANULOCYTES # BLD AUTO: 0.03 THOUSAND/UL (ref 0–0.2)
IMM GRANULOCYTES NFR BLD AUTO: 0 % (ref 0–2)
LYMPHOCYTES # BLD AUTO: 1.37 THOUSANDS/ΜL (ref 0.6–4.47)
LYMPHOCYTES NFR BLD AUTO: 16 % (ref 14–44)
MCH RBC QN AUTO: 25.6 PG (ref 26.8–34.3)
MCHC RBC AUTO-ENTMCNC: 29.8 G/DL (ref 31.4–37.4)
MCV RBC AUTO: 86 FL (ref 82–98)
MONOCYTES # BLD AUTO: 0.68 THOUSAND/ΜL (ref 0.17–1.22)
MONOCYTES NFR BLD AUTO: 8 % (ref 4–12)
NEUTROPHILS # BLD AUTO: 6.18 THOUSANDS/ΜL (ref 1.85–7.62)
NEUTS SEG NFR BLD AUTO: 75 % (ref 43–75)
NRBC BLD AUTO-RTO: 0 /100 WBCS
PLATELET # BLD AUTO: 169 THOUSANDS/UL (ref 149–390)
PMV BLD AUTO: 9.6 FL (ref 8.9–12.7)
POTASSIUM SERPL-SCNC: 4.4 MMOL/L (ref 3.5–5.3)
PROT SERPL-MCNC: 6.4 G/DL (ref 6.4–8.4)
RBC # BLD AUTO: 3.87 MILLION/UL (ref 3.88–5.62)
SODIUM SERPL-SCNC: 142 MMOL/L (ref 135–147)
WBC # BLD AUTO: 8.34 THOUSAND/UL (ref 4.31–10.16)

## 2025-02-12 PROCEDURE — 94640 AIRWAY INHALATION TREATMENT: CPT

## 2025-02-12 PROCEDURE — 97168 OT RE-EVAL EST PLAN CARE: CPT

## 2025-02-12 PROCEDURE — 82948 REAGENT STRIP/BLOOD GLUCOSE: CPT

## 2025-02-12 PROCEDURE — 94760 N-INVAS EAR/PLS OXIMETRY 1: CPT

## 2025-02-12 PROCEDURE — 80053 COMPREHEN METABOLIC PANEL: CPT | Performed by: INTERNAL MEDICINE

## 2025-02-12 PROCEDURE — 99231 SBSQ HOSP IP/OBS SF/LOW 25: CPT | Performed by: INTERNAL MEDICINE

## 2025-02-12 PROCEDURE — 85025 COMPLETE CBC W/AUTO DIFF WBC: CPT | Performed by: INTERNAL MEDICINE

## 2025-02-12 RX ADMIN — DIAZEPAM 5 MG: 5 TABLET ORAL at 08:48

## 2025-02-12 RX ADMIN — METOPROLOL TARTRATE 25 MG: 25 TABLET, FILM COATED ORAL at 08:48

## 2025-02-12 RX ADMIN — OXYCODONE HYDROCHLORIDE 5 MG: 5 TABLET ORAL at 08:48

## 2025-02-12 RX ADMIN — OXYCODONE HYDROCHLORIDE 5 MG: 5 TABLET ORAL at 22:41

## 2025-02-12 RX ADMIN — LOSARTAN POTASSIUM 25 MG: 25 TABLET, FILM COATED ORAL at 08:48

## 2025-02-12 RX ADMIN — CHLORHEXIDINE GLUCONATE 15 ML: 1.2 RINSE ORAL at 08:48

## 2025-02-12 RX ADMIN — CHLORHEXIDINE GLUCONATE 15 ML: 1.2 RINSE ORAL at 22:33

## 2025-02-12 RX ADMIN — FOLIC ACID 1 MG: 1 TABLET ORAL at 08:48

## 2025-02-12 RX ADMIN — ATORVASTATIN CALCIUM 10 MG: 10 TABLET, FILM COATED ORAL at 08:48

## 2025-02-12 RX ADMIN — ALBUTEROL SULFATE 2 PUFF: 90 AEROSOL, METERED RESPIRATORY (INHALATION) at 17:07

## 2025-02-12 RX ADMIN — AMITRIPTYLINE HYDROCHLORIDE 150 MG: 25 TABLET, FILM COATED ORAL at 22:33

## 2025-02-12 RX ADMIN — LEVALBUTEROL HYDROCHLORIDE 1.25 MG: 1.25 SOLUTION RESPIRATORY (INHALATION) at 07:00

## 2025-02-12 RX ADMIN — IPRATROPIUM BROMIDE 0.5 MG: 0.5 SOLUTION RESPIRATORY (INHALATION) at 07:00

## 2025-02-12 RX ADMIN — QUETIAPINE FUMARATE 25 MG: 25 TABLET ORAL at 22:34

## 2025-02-12 RX ADMIN — IPRATROPIUM BROMIDE 0.5 MG: 0.5 SOLUTION RESPIRATORY (INHALATION) at 20:58

## 2025-02-12 RX ADMIN — APIXABAN 5 MG: 5 TABLET, FILM COATED ORAL at 08:48

## 2025-02-12 RX ADMIN — LEVALBUTEROL HYDROCHLORIDE 1.25 MG: 1.25 SOLUTION RESPIRATORY (INHALATION) at 13:30

## 2025-02-12 RX ADMIN — LEVALBUTEROL HYDROCHLORIDE 1.25 MG: 1.25 SOLUTION RESPIRATORY (INHALATION) at 20:59

## 2025-02-12 RX ADMIN — SALINE NASAL SPRAY 2 SPRAY: 1.5 SOLUTION NASAL at 17:06

## 2025-02-12 RX ADMIN — METOPROLOL TARTRATE 25 MG: 25 TABLET, FILM COATED ORAL at 22:34

## 2025-02-12 RX ADMIN — SALINE NASAL SPRAY 2 SPRAY: 1.5 SOLUTION NASAL at 22:41

## 2025-02-12 RX ADMIN — IPRATROPIUM BROMIDE 0.5 MG: 0.5 SOLUTION RESPIRATORY (INHALATION) at 13:30

## 2025-02-12 RX ADMIN — SALINE NASAL SPRAY 2 SPRAY: 1.5 SOLUTION NASAL at 08:57

## 2025-02-12 RX ADMIN — OXYCODONE HYDROCHLORIDE 5 MG: 5 TABLET ORAL at 14:25

## 2025-02-12 RX ADMIN — LEVALBUTEROL HYDROCHLORIDE 1.25 MG: 1.25 SOLUTION RESPIRATORY (INHALATION) at 01:08

## 2025-02-12 RX ADMIN — APIXABAN 5 MG: 5 TABLET, FILM COATED ORAL at 17:17

## 2025-02-12 RX ADMIN — DIAZEPAM 5 MG: 5 TABLET ORAL at 17:17

## 2025-02-12 RX ADMIN — INSULIN LISPRO 1 UNITS: 100 INJECTION, SOLUTION INTRAVENOUS; SUBCUTANEOUS at 12:07

## 2025-02-12 RX ADMIN — IPRATROPIUM BROMIDE 0.5 MG: 0.5 SOLUTION RESPIRATORY (INHALATION) at 01:08

## 2025-02-12 RX ADMIN — LEVOTHYROXINE SODIUM 25 MCG: 25 TABLET ORAL at 05:13

## 2025-02-12 NOTE — PLAN OF CARE
Problem: OCCUPATIONAL THERAPY ADULT  Goal: Performs self-care activities at highest level of function for planned discharge setting.  See evaluation for individualized goals.  Description: Treatment Interventions: ADL retraining, Endurance training, Energy conservation, Compensatory technique education  Equipment Recommended:  (use BSC over toilet for elevated surface, WC on first floor to maximize functional independence.)       See flowsheet documentation for full assessment, interventions and recommendations.   Outcome: Progressing  Note: Limitation: Decreased ADL status, Decreased Safe judgement during ADL, Decreased cognition, Decreased endurance, Decreased self-care trans, Decreased high-level ADLs (impaired balance, fxnl mobility, act edna, fxnl reach, standing edna, and strength, pacing, response time, flat affect)  Prognosis: Fair  Assessment: Pt is a 54 y.o. male admitted to [facility] on 2/2/2025 due to acute respiratory failure. Pt seen on this date for OT Re-Evaluation due to change in functional status. Please refer to H&P/ initial OT eval (2/6) for detailed PMH and social history. At baseline pt is (I) c ADLs, A with IADLs. mod (I) c W/C vs short distances with RW. Upon re-eval pt performed as is listed above, demonstrating regression in functional transfers, ADL performance, overall endurance, strength, activity tolerance. Pt would benefit from continued skilled OT treatment in order to maximize safety, independence and overall performance with ADLs, functional transfers, functional mobility and cognition in order to achieve highest level of function. Updated goals are listed below     Rehab Resource Intensity Level, OT: (S) II (Moderate Resource Intensity)

## 2025-02-12 NOTE — PROGRESS NOTES
Progress Note - Hospitalist   Name: Papo Gibbs 54 y.o. male I MRN: [id number]  Unit/Bed#: W -01 I Date of Admission: 2/2/2025   Date of Service: 2/12/2025 I Hospital Day: 10    Assessment & Plan  HTN (hypertension)  Continue losartan 25 mg daily   /66  Adenocarcinoma of overlapping sites of right lung (HCC)  Stage IV, being treated with chemotherapy, follows Dr. Bryna outpatient  - Status post carbo, Alimta, Keytruda from May to Aug 2024  - July 2024 received rad to brain   - Oct 2024 CT CAP showed met lung lymphangitic spread of adenocarcinoma and subsequently received palliative pemetrex with keytruda from Nov to Dec 2024 then docetaxel plus ramucirumab from Dec to Jan 21 2025  Oncology consulted  Palliative consulted  Guarded prognosis  Patient not interested in pursuing any active chemo treatments in the near future. Patient and wife would like to take it one day at a time for now.   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   Has Pleurx catheter which was drained 250 ml of serosanguinous fluid last night  -  Recent echo 12/9/24 showing EF 60% and LV wall thickness mildly increased and with mild concentric hypertrophy; mild AR, aortic valve sclerosis, mild annual calcification MV   Pulm consulted on admission  Finished 7 days of cefepime and vanco  Respiratory status is stable and likely if remains stable tomorrow can DC       History of pulmonary embolism  Continue eliquis 5 mg bid  Cancer related pain  Continue oxycodone IR 5 mg/10 mg every 4 hours as needed for moderate/severe pain  Wean off IV  Sepsis (HCC)  Sepsis, present on  admission, due to post obstructive PNA evidenced by Wbc 2.90, Tachycardia (), Tachypnea (RR 36), acute on chronic respiratory failure requiring IV Cefepime/ Vancomycin, lactic acid and blood cultures.   Finished 7 days of vanc and cefepime     Tachycardia  History of tachycardia   Increased lopressor to 25 mg bid  Still tachy - PE study was negative.   Counseling regarding advance care planning and goals of care  Palliative following  Patient not interested in chemo  Wishes to continue treating symptoms medically   Diarrhea  Admitted to having diarrhea on admission  Resolved  Palliative care by specialist    Primary malignant neoplasm of right lung metastatic to other site (HCC)      VTE Pharmacologic Prophylaxis:   Moderate Risk (Score 3-4) - Pharmacological DVT Prophylaxis Ordered: heparin.    Mobility:   Basic Mobility Inpatient Raw Score: 15  -[facility] Goal: 4: Move to chair/commode  -[facility] Achieved: 3: Sit at edge of bed      Patient Centered Rounds: I performed bedside rounds with nursing staff today.   Discussions with Specialists or Other Care Team Provider: Discussed with CM.     Education and Discussions with Family / Patient:  updated mother at bedside. . Called Tomasa -     Current Length of Stay: 10 day(s)  Current Patient Status: Inpatient   Certification Statement: The patient will continue to require additional inpatient hospital stay due to monitor respiratory rate.  Discharge Plan: Anticipate discharge tomorrow to home.    Code Status: Level 3 - DNAR and DNI    Subjective   Patient seen and examined   No new complaints.     Objective :  Temp:  [97.8 °F (36.6 °C)-98 °F (36.7 °C)] 97.8 °F (36.6 °C)  HR:  [121-132] 121  BP: (100-111)/(63-66) 111/66  Resp:  [14-18] 18  SpO2:  [69 %-96 %] 91 %  O2 Device: Nasal cannula  Nasal Cannula O2 Flow Rate (L/min):  [3.5 L/min-4 L/min] 4 L/min    Body mass index is 27.28 kg/m².     Input and Output Summary (last 24 hours):     Intake/Output Summary (Last 24  hours) at 2/12/2025 1852  Last data filed at 2/12/2025 1430  Gross per 24 hour   Intake 370 ml   Output 0 ml   Net 370 ml       Physical Exam  Constitutional:       General: He is not in acute distress.     Appearance: He is not ill-appearing, toxic-appearing or diaphoretic.   HENT:      Head: Normocephalic.      Mouth/Throat:      Mouth: Mucous membranes are moist.   Eyes:      Pupils: Pupils are equal, round, and reactive to light.   Cardiovascular:      Rate and Rhythm: Tachycardia present.      Heart sounds: No murmur heard.     No friction rub. No gallop.   Pulmonary:      Effort: Respiratory distress present.      Breath sounds: No rales.   Abdominal:      General: Abdomen is flat. There is no distension.      Palpations: There is no mass.      Tenderness: There is no abdominal tenderness. There is no right CVA tenderness, left CVA tenderness, guarding or rebound.      Hernia: No hernia is present.   Musculoskeletal:      Right lower leg: No edema.      Left lower leg: No edema.   Skin:     Capillary Refill: Capillary refill takes less than 2 seconds.      Coloration: Skin is not jaundiced or pale.      Findings: No bruising, erythema, lesion or rash.   Neurological:      General: No focal deficit present.      Mental Status: He is alert.      Cranial Nerves: No cranial nerve deficit.      Sensory: No sensory deficit.      Motor: No weakness.      Coordination: Coordination normal.      Gait: Gait normal.      Deep Tendon Reflexes: Reflexes normal.           Lines/Drains:  Lines/Drains/Airways       Active Status       Name Placement date Placement time Site Days    Port A Cath 05/20/24 Right Chest 05/20/24  1427  Chest  268    Pleural Effusion Long-Term Catheter 12/12/24  1009  --  62                    Central Line:  Goal for removal: No longer needed. Will place order to discontinue.               Lab Results: I have reviewed the following results:   Results from last 7 days   Lab Units 02/12/25  0519   WBC  Thousand/uL 8.34   HEMOGLOBIN g/dL 9.9*   HEMATOCRIT % 33.2*   PLATELETS Thousands/uL 169   SEGS PCT % 75   LYMPHO PCT % 16   MONO PCT % 8   EOS PCT % 0     Results from last 7 days   Lab Units 02/12/25  0519   SODIUM mmol/L 142   POTASSIUM mmol/L 4.4   CHLORIDE mmol/L 99   CO2 mmol/L 36*   BUN mg/dL 22   CREATININE mg/dL 0.74   ANION GAP mmol/L 7   CALCIUM mg/dL 9.8   ALBUMIN g/dL 3.5   TOTAL BILIRUBIN mg/dL 0.40   ALK PHOS U/L 72   ALT U/L 10   AST U/L 13   GLUCOSE RANDOM mg/dL 137         Results from last 7 days   Lab Units 02/12/25  1537 02/12/25  1053 02/12/25  0722 02/11/25  2140 02/11/25  1606 02/11/25  1112 02/11/25  0728 02/10/25  2109 02/10/25  1606 02/10/25  1123 02/10/25  0727 02/09/25  2131   POC GLUCOSE mg/dl 130 176* 128 137 149* 198* 140 170* 136 201* 135 269*               Recent Cultures (last 7 days):         Imaging Results Review: No pertinent imaging studies reviewed.  Other Study Results Review: No additional pertinent studies reviewed.    Last 24 Hours Medication List:     Current Facility-Administered Medications:     acetaminophen (TYLENOL) tablet 975 mg, Q8H PRN    albuterol (PROVENTIL HFA,VENTOLIN HFA) inhaler 2 puff, Q4H PRN    amitriptyline (ELAVIL) tablet 150 mg, HS    apixaban (ELIQUIS) tablet 5 mg, BID    atorvastatin (LIPITOR) tablet 10 mg, Daily    benzonatate (TESSALON PERLES) capsule 200 mg, TID PRN    chlorhexidine (PERIDEX) 0.12 % oral rinse 15 mL, Q12H LATONIA    diazepam (VALIUM) tablet 5 mg, BID    famotidine (PEPCID) tablet 40 mg, Daily PRN    folic acid (FOLVITE) tablet 1 mg, Daily    [Held by provider] glimepiride (AMARYL) tablet 2 mg, Daily With Breakfast    influenza vaccine, recombinant (PF) (Flublok) IM injection 0.5 mL, Once    insulin lispro (HumALOG/ADMELOG) 100 units/mL subcutaneous injection 1-6 Units, 4x Daily (AC & HS) **AND** Fingerstick Glucose (POCT), 4x Daily AC and at bedtime    ipratropium (ATROVENT) 0.02 % inhalation solution 0.5 mg, Q6H    levalbuterol  (XOPENEX) inhalation solution 1.25 mg, Q6H    levothyroxine tablet 25 mcg, Early Morning    losartan (COZAAR) tablet 25 mg, Daily    metoprolol tartrate (LOPRESSOR) tablet 25 mg, Q12H LATONIA    morphine injection 2 mg, Q4H PRN    naloxone (NARCAN) 0.04 mg/mL syringe 0.04 mg, Q1MIN PRN    ondansetron (ZOFRAN) injection 4 mg, Q6H PRN    oxyCODONE (ROXICODONE) IR tablet 5 mg, Q4H PRN **OR** oxyCODONE (ROXICODONE) immediate release tablet 10 mg, Q4H PRN    pneumococcal 20-juana conj vacc (PREVNAR 20) IM Injection 0.5 mL, Once    QUEtiapine (SEROquel) tablet 25 mg, HS    sodium chloride (OCEAN) 0.65 % nasal spray 1 spray, Q1H PRN    sodium chloride (OCEAN) 0.65 % nasal spray 2 spray, TID    Administrative Statements   Today, Patient Was Seen By: Claudine Ruiz MD  I have spent a total time of 30 minutes in caring for this patient on the day of the visit/encounter including Diagnostic results, Prognosis, Risks and benefits of tx options, Instructions for management, Patient and family education, Importance of tx compliance, Risk factor reductions, Impressions, Counseling / Coordination of care, Documenting in the medical record, Reviewing / ordering tests, medicine, procedures  , Obtaining or reviewing history  , and Communicating with other healthcare professionals .    **Please Note: This note may have been constructed using a voice recognition system.**

## 2025-02-12 NOTE — OCCUPATIONAL THERAPY NOTE
Occupational Therapy Re-Evaluation     Patient Name: Papo Gibbs  Today's Date: 2/12/2025  Problem List  Principal Problem:    acute on Chronic hypoxemic respiratory failure (HCC)  Active Problems:    HTN (hypertension)    Adenocarcinoma of overlapping sites of right lung (HCC)    Palliative care by specialist    History of pulmonary embolism    Primary malignant neoplasm of right lung metastatic to other site (HCC)    Tachycardia    Cancer related pain    Diarrhea    Counseling regarding advance care planning and goals of care    Sepsis (HCC)    Past Medical History  Past Medical History:   Diagnosis Date    Cancer (HCC) 04,25,2024    Diabetes mellitus (HCC)     High cholesterol     History of blood clots     Hypertension     Leucocytosis 04/20/2024    Lung cancer (HCC)     Malignant neoplasm of overlapping sites of right lung (HCC) 06/04/2024    Pneumonia     Pulmonary embolism (HCC)      Past Surgical History  Past Surgical History:   Procedure Laterality Date    IR BIOPSY LYMPH NODE  4/23/2024    IR PORT PLACEMENT  5/20/2024    IR THORACENTESIS  12/9/2024    KNEE SURGERY      MANDIBLE FRACTURE SURGERY  1985    PATELLA FRACTURE SURGERY      RECTAL SURGERY               02/12/25 0955   OT Last Visit   OT Visit Date 02/12/25   Note Type   Note type Re-Evaluation   Pain Assessment   Pain Assessment Tool 0-10   Pain Score No Pain   Restrictions/Precautions   Weight Bearing Precautions Per Order No   Other Precautions Cognitive;Chair Alarm;Bed Alarm;Multiple lines;O2;Fall Risk;Pain  (3L O2)   Home Living   Additional Comments see IE for home set up and PLOF   Lifestyle   Autonomy PTA, pt is (I) c ADLs, A with IADLs. mod (I) c W/C vs short distances with RW   Reciprocal Relationships supportive family and spouse   Service to Others works FT- currently working from home. Reports has been working while here in the hospital as well.   General   Additional Pertinent History Pt admitted d/t acute > chronic hypoxemic  "resp failure. 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 Complicated by sepsis, cancer related pain, tachycardia, HTN   Family/Caregiver Present No   Subjective   Subjective \"I just feel so weak\"   ADL   Eating Assistance 7  Independent   Grooming Assistance 5  Supervision/Setup   UB Bathing Assistance 4  Minimal Assistance   LB Bathing Assistance 3  Moderate Assistance   UB Dressing Assistance 4  Minimal Assistance   LB Dressing Assistance 3  Moderate Assistance   LB Dressing Deficit Increased time to complete;Supervision/safety;Verbal cueing;Thread RLE into pants;Thread LLE into pants;Pull up over hips   Toileting Assistance  3  Moderate Assistance   Bed Mobility   Sit to Supine 5  Supervision   Additional items Increased time required;Verbal cues   Transfers   Sit to Stand 4  Minimal assistance   Additional items Assist x 1;Increased time required;Verbal cues   Stand to Sit 4  Minimal assistance   Additional items Assist x 1;Increased time required;Verbal cues   Additional Comments use of RW   Functional Mobility   Functional Mobility 3  Moderate assistance   Additional Comments Ax1, limited distance around bed to recliner chair. Pt reports that this is the distance he can typically walk on his own   Additional items Rolling walker   Balance   Static Sitting Fair +   Dynamic Sitting Fair -   Static Standing Poor +   Dynamic Standing Poor +   Ambulatory Poor +   Activity Tolerance   Activity Tolerance Patient limited by fatigue;Treatment limited secondary to medical complications (Comment)  (SOB - O2 sats dropping to mid 80s with activity and HR maintaining 130s with all activity)   Medical Staff Made Aware ISAIAH Plasencia, TERI Black   RUE Assessment   RUE Assessment WFL   LUE Assessment   LUE Assessment WFL   Hand Function   Gross Motor Coordination Functional   Fine Motor Coordination Functional   Sensation   Light Touch No apparent deficits   Vision-Basic Assessment   Current Vision " Wears glasses all the time   Cognition   Overall Cognitive Status Impaired   Arousal/Participation Alert;Cooperative   Attention Attends with cues to redirect   Orientation Level Oriented X4  (with increased time)   Memory Decreased short term memory;Decreased recall of recent events;Decreased recall of precautions   Following Commands Follows one step commands with increased time or repetition   Comments flat affect, limited insight into deficits, poor recall and problem solving   Assessment   Limitation Decreased ADL status;Decreased Safe judgement during ADL;Decreased cognition;Decreased endurance;Decreased self-care trans;Decreased high-level ADLs  (impaired balance, fxnl mobility, act edna, fxnl reach, standing edna, and strength, pacing, response time, flat affect)   Prognosis Fair   Assessment Pt is a 54 y.o. male admitted to [facility] on 2/2/2025 due to acute respiratory failure. Pt seen on this date for OT Re-Evaluation due to change in functional status. Please refer to H&P/ initial OT eval (2/6) for detailed PMH and social history. At baseline pt is (I) c ADLs, A with IADLs. mod (I) c W/C vs short distances with RW. Upon re-eval pt performed as is listed above, demonstrating regression in functional transfers, ADL performance, overall endurance, strength, activity tolerance. Pt would benefit from continued skilled OT treatment in order to maximize safety, independence and overall performance with ADLs, functional transfers, functional mobility and cognition in order to achieve highest level of function. Updated goals are listed below   Goals   Patient Goals to feel stronger   LTG Time Frame 10-14   Long Term Goal see goals listed below   Plan   Treatment Interventions ADL retraining;Functional transfer training;Endurance training;Patient/family training;Equipment evaluation/education;Compensatory technique education;Energy conservation;Activityengagement   Goal Expiration Date 02/22/25   OT Treatment Day 0   OT  Frequency 3-5x/wk   Discharge Recommendation   Rehab Resource Intensity Level, OT (S)  II (Moderate Resource Intensity)   AM-PAC Daily Activity Inpatient   Lower Body Dressing 2   Bathing 2   Toileting 2   Upper Body Dressing 3   Grooming 3   Eating 4   Daily Activity Raw Score 16   Daily Activity Standardized Score (Calc for Raw Score >=11) 35.96   AM-PAC Applied Cognition Inpatient   Following a Speech/Presentation 3   Understanding Ordinary Conversation 4   Taking Medications 3   Remembering Where Things Are Placed or Put Away 4   Remembering List of 4-5 Errands 3   Taking Care of Complicated Tasks 3   Applied Cognition Raw Score 20   Applied Cognition Standardized Score 41.76   End of Consult   Patient Position at End of Consult Bedside chair;Bed/Chair alarm activated;All needs within reach        GOALS:      -Patient will be Mod I with LB dressing with use of AE and AD as needed in order to increase (I) with ADLs    -Patient will be Mod I with LB bathing with use of AE and AD as needed in order to increase (I) with ADLs    -Patient will complete toileting w/ Mod I w/ G hygiene/thoroughness in order to reduce caregiver burden    -Patient will demonstrate Mod I with bed mobility for ability to manage own comfort and initiate OOB tasks.     -Patient will perform functional transfers with Mod I to/from all surfaces using DME as needed in order to increase (I) with functional tasks    -Patient will be Mod I with functional mobility to[place] [facility] increased independence with toileting tasks    -Patient will tolerate therapeutic activities for greater than 30 min, in order to increase tolerance for functional activities.     -Patient will increase OOB/sitting tolerance to 2-4 hours per day to increase participation in self-care and leisure tasks with no s/s of exertion.     -Patient will engage in ongoing cognitive assessment in order to assist with safe discharge planning/recommendations.    -Patient will  demonstrate PLB techniques during ADL tasks with min VC    -Patient will demonstrate standing for 3 min in order to increase active participation in functional activities        The patient's raw score on the AM-PAC Daily Activity Inpatient Short Form is 16. A raw score of less than 19 suggests the patient may benefit from discharge to post-acute rehabilitation services. HOWEVER please refer to the recommendation of the Occupational Therapist for safe discharge planning.    Eve Barrios MS, OTR/L

## 2025-02-12 NOTE — UTILIZATION REVIEW
Continued Stay Review    Date: 2/12/25                           Current Patient Class: Inpatient  Current Level of Care: [facility]     HPI:54 y.o. male initially admitted on 2/2/25    Current Diagnosis:    Acute on Chronic hypoxemic respiratory failure with hx  Adenocarcinoma of overlapping sites of right lung   CT lungs showing redemonstration of lymphangitic spread as well as extensive consolidation in RLL with complete collapse of RML. -Patient not interested in pursuing any active chemo treatments in the near future. Has Pleurx catheter . Sepsis .     Assessment/Plan:   Pt completed 7 days IV Cefepime and vanco . CXR 2/4 : persistent pleural effusion and right basilar lung consolidation. Pt received  IV lasix 40 mg IV daily  2/4-->2/6 then increased to 40 mg BID 2/7 thru 2/10 am when IV lasix d/c'ed .   Pt continues with tachycardia . Diminished breath sounds, expiratory wheezes. + ROBERTSON .  . O2 sats down to 86 % overnight on  O2 3.5 L . O2 up to 4 L nc, O2 sat 93-96 % this am . Pt reports feeling weak .   PT/OT - level II rehab resource intensity . Per PT vs level III HHPT pending medical and mobility progress.      Medications:   Scheduled Medications:  amitriptyline, 150 mg, Oral, HS  apixaban, 5 mg, Oral, BID  atorvastatin, 10 mg, Oral, Daily  chlorhexidine, 15 mL, Mouth/Throat, Q12H LATONIA  diazepam, 5 mg, Oral, BID  folic acid, 1 mg, Oral, Daily  [Held by provider] glimepiride, 2 mg, Oral, Daily With Breakfast  influenza vaccine, 0.5 mL, Intramuscular, Once  insulin lispro, 1-6 Units, Subcutaneous, 4x Daily (AC & HS)  ipratropium, 0.5 mg, Nebulization, Q6H  levalbuterol, 1.25 mg, Nebulization, Q6H  levothyroxine, 25 mcg, Oral, Early Morning  losartan, 25 mg, Oral, Daily  metoprolol tartrate, 25 mg, Oral, Q12H LATONIA  pneumococcal 20-juana conj vacc, 0.5 mL, Intramuscular, Once  QUEtiapine, 25 mg, Oral, HS  sodium chloride, 2 spray, Each Nare, TID      Continuous IV Infusions:     PRN Meds:  acetaminophen, 975 mg, Oral,  Q8H PRN  albuterol, 2 puff, Inhalation, Q4H PRN x1 2/11  benzonatate, 200 mg, Oral, TID PRN  famotidine, 40 mg, Oral, Daily PRN  morphine injection, 2 mg, Intravenous, Q4H PRN  naloxone, 0.04 mg, Intravenous, Q1MIN PRN  ondansetron, 4 mg, Intravenous, Q6H PRN x1 2/11  oxyCODONE, 5 mg, Oral, Q4H PRN x1 2/12    Or  oxyCODONE, 10 mg, Oral, Q4H PRN  x1 2/11  sodium chloride, 1 spray, Each Nare, Q1H PRN      Discharge Plan: TBD    Vital Signs (last 3 days)       Date/Time Temp Pulse Resp BP MAP (mmHg) SpO2 FiO2 (%) Calculated FIO2 (%) - Nasal Cannula Nasal Cannula O2 Flow Rate (L/min) O2 Device Pain    02/12/25 0955 -- -- -- -- -- -- -- -- -- -- No Pain    02/12/25 0848 -- -- -- -- -- -- -- -- -- -- 4    02/12/25 07:25:46 97.8 °F (36.6 °C) 127 18 100/63 75 93 % -- -- -- -- --    02/12/25 0701 -- -- -- -- -- 96 % -- 36 4 L/min Nasal cannula --    02/12/25 02:54:57 -- 122 -- -- -- 69 % -- -- -- -- --    02/12/25 0108 -- -- -- -- -- 90 % -- 34 3.5 L/min Nasal cannula --    02/11/25 23:39:23 98 °F (36.7 °C) 130 14 102/64 77 87 % -- -- -- -- --    02/11/25 2233 -- -- -- -- -- -- -- -- -- Nasal cannula No Pain    02/11/25 21:53:50 -- 132 -- 107/66 80 95 % -- -- -- -- --    02/11/25 2101 -- -- -- -- -- 93 % -- 34 3.5 L/min Nasal cannula --    02/11/25 15:09:03 97.8 °F (36.6 °C) 125 -- 111/65 80 92 % -- -- -- -- --    02/11/25 1336 -- -- -- -- -- 93 % -- 24 1 L/min Nasal cannula --    02/11/25 1315 -- -- -- -- -- -- -- -- -- -- 8    02/11/25 0945 -- -- -- -- -- -- -- -- -- -- 4    02/11/25 0737 -- -- -- -- -- -- -- -- -- -- 7    02/11/25 07:34:30 97.7 °F (36.5 °C) 122 16 125/85 98 93 % -- -- -- -- --    02/11/25 0706 -- -- -- -- -- 96 % -- 32 3 L/min Nasal cannula --    02/11/25 0107 -- -- -- -- -- 90 % -- -- -- -- --    02/10/25 22:01:52 97.9 °F (36.6 °C) 124 18 134/78 97 97 % -- -- -- -- --    02/10/25 2100 -- -- -- -- -- -- -- 36 4 L/min Nasal cannula No Pain    02/10/25 1923 -- -- -- -- -- 94 % -- 36 4 L/min Nasal cannula --     02/10/25 16:05:37 98.3 °F (36.8 °C) 118 18 117/77 90 95 % -- -- -- -- --    02/10/25 1327 -- -- -- -- -- 93 % -- -- -- -- --    02/10/25 1300 -- -- -- -- -- -- -- -- -- -- No Pain    02/10/25 0829 -- -- -- -- -- -- -- 36 4 L/min -- No Pain    02/10/25 07:31:33 96.7 °F (35.9 °C) 129 19 127/82 97 87 % -- -- -- -- --    02/10/25 0713 -- -- -- -- -- 95 % -- -- -- -- --    02/10/25 0458 -- -- -- -- -- -- -- -- -- -- 8    02/10/25 0226 -- -- -- -- -- 93 % -- -- -- -- --    02/09/25 2138 -- 127 -- 127/64 -- -- -- -- -- -- --    02/09/25 2000 -- -- -- -- -- -- -- -- -- -- No Pain    02/09/25 1931 -- -- -- -- -- 94 % -- -- -- -- --    02/09/25 15:25:59 97.6 °F (36.4 °C) 120 -- 115/71 86 96 % -- -- -- -- --    02/09/25 1519 -- -- -- -- -- -- -- -- -- -- 6    02/09/25 1353 -- -- -- -- -- 99 % -- -- -- -- 2    02/09/25 09:43:42 -- 130 -- 110/68 82 89 % -- -- -- -- --    02/09/25 0748 -- -- -- -- -- 92 % -- 36 4 L/min Nasal cannula --    02/09/25 07:32:42 98 °F (36.7 °C) 115 18 108/69 82 92 % -- -- -- -- --    02/09/25 0213 -- -- -- -- -- 91 % -- -- -- -- --    02/09/25 0100 -- -- -- -- -- -- 40 -- -- -- --          Weight (last 2 days)       Date/Time Weight    02/12/25 0600 79 (174.16)    02/11/25 0600 79.6 (175.49)    02/10/25 0600 83.5 (184.08)            Pertinent Labs/Diagnostic Results:   Radiology:  XR chest portable   Final Interpretation by Harshad Dover DO (02/04 1749)      No significant change of diffuse lung disease thought to represent lymphangitic tumor spread/edema with persistent pleural effusion and right basilar lung consolidation.            Workstation performed: [id number]                 Results from last 7 days   Lab Units 02/12/25  0519 02/11/25  0513 02/10/25  0452 02/09/25  0522 02/07/25  0835   WBC Thousand/uL 8.34 8.19 9.54 7.90 6.15   HEMOGLOBIN g/dL 9.9* 9.5* 9.9* 9.9* 11.0*   HEMATOCRIT % 33.2* 31.8* 32.9* 33.4* 37.0   PLATELETS Thousands/uL 169 174 219 220 236   TOTAL NEUT ABS  Thousands/µL 6.18  --   --   --   --          Results from last 7 days   Lab Units 02/12/25  0519 02/11/25  0513 02/10/25  0452 02/09/25  0522 02/08/25  0609 02/07/25  0835 02/06/25  0538   SODIUM mmol/L 142 142 141 141 142 142 141   POTASSIUM mmol/L 4.4 3.8 4.3 3.3* 3.3* 3.6 3.4*   CHLORIDE mmol/L 99 99 99 99 99 99 102   CO2 mmol/L 36* 36* 36* 35* 37* 34* 34*   ANION GAP mmol/L 7 7 6 7 6 9 5   BUN mg/dL 22 20 18 14 15 14 13   CREATININE mg/dL 0.74 0.46* 0.59* 0.51* 0.49* 0.71 0.58*   EGFR ml/min/1.73sq m 103 126 113 121 123 105 114   CALCIUM mg/dL 9.8 9.6 9.6 9.4 9.6 9.8 9.1   MAGNESIUM mg/dL  --  1.7* 2.1 1.7*  --  1.9 1.9   PHOSPHORUS mg/dL  --  3.3 3.6 3.2  --  3.4 2.7     Results from last 7 days   Lab Units 02/12/25  0519 02/11/25  0513 02/10/25  0452 02/09/25  0522 02/07/25  0835   AST U/L 13  --   --   --   --    ALT U/L 10  --   --   --   --    ALK PHOS U/L 72  --   --   --   --    TOTAL PROTEIN g/dL 6.4  --   --   --   --    ALBUMIN g/dL 3.5 3.4* 3.5 3.7 4.0   TOTAL BILIRUBIN mg/dL 0.40  --   --   --   --      Results from last 7 days   Lab Units 02/12/25  1053 02/12/25  0722 02/11/25  2140 02/11/25  1606 02/11/25  1112 02/11/25  0728 02/10/25  2109 02/10/25  1606 02/10/25  1123 02/10/25  0727 02/09/25  2131 02/09/25  1612   POC GLUCOSE mg/dl 176* 128 137 149* 198* 140 170* 136 201* 135 269* 162*     Results from last 7 days   Lab Units 02/12/25  0519 02/11/25  0513 02/10/25  0452 02/09/25  0522 02/08/25  0609 02/07/25  0835 02/06/25  0538   GLUCOSE RANDOM mg/dL 137 157* 142* 142* 138 176* 139                   Results from last 7 days   Lab Units 02/09/25  0952   FERRITIN ng/mL 107   IRON SATURATION % 9*   IRON ug/dL 20*   TIBC ug/dL 233.8*     Results from last 7 days   Lab Units 02/09/25  0952   TRANSFERRIN mg/dL 167*                 Network Utilization Review Department  ATTENTION: Please call with any questions or concerns to [phone] and carefully listen to the prompts so that you are directed to  the right person. All voicemails are confidential.   For Discharge needs, contact Care Management DC Support Team at [phone] opt. 2  Send all requests for admission clinical reviews, approved or denied determinations and any other requests to dedicated fax number below belonging to the campus where the patient is receiving treatment. List of dedicated fax numbers for the Facilities:  FACILITY NAME UR FAX NUMBER   ADMISSION DENIALS (Administrative/Medical Necessity) [phone]   DISCHARGE SUPPORT TEAM (NETWORK) [phone]   PARENT CHILD HEALTH (Maternity/NICU/Pediatrics) [phone]   [facility] [phone]   [facility] [phone]   [facility] [phone]   [facility] [phone]   [facility] [phone]   [facility] [phone]   [facility] [phone]   [facility] [phone]   [facility] [phone]   [facility] [phone]   [facility] [phone]   [facility] [phone]

## 2025-02-12 NOTE — CASE MANAGEMENT
Case Management Discharge Planning Note    Patient name Papo Gibbs  Location W /W -01 MRN [id number]  : 1970 Date 2025       Current Admission Date: 2025  Current Admission Diagnosis:acute on Chronic hypoxemic respiratory failure (HCC)   Patient Active Problem List    Diagnosis Date Noted Date Diagnosed    Sepsis (HCC) 2025     Counseling regarding advance care planning and goals of care 2025     Diarrhea 2025     Uncontrolled type 2 diabetes mellitus with hyperglycemia, without long-term current use of insulin (HCC) 2025     Ambulatory dysfunction 2025     Peripheral edema 2025     Cancer related pain 2025     Antineoplastic chemotherapy induced anemia 2024     Metastasis to brain (HCC) 2024     DM2 (diabetes mellitus, type 2) (HCC) 2024     Pleural effusion 2024     Tachycardia 2024     Right-sided thoracic back pain 2024     Acid reflux 10/10/2024     Nephrolithiasis 2024     Fatty liver 2024     Hepatomegaly 2024     Chronic anticoagulation 2024     Chemotherapy-induced nausea 2024     Port-A-Cath in place 2024     Adrenal nodule (HCC) 2024     Acute deep vein thrombosis (DVT) of popliteal vein of left lower extremity (HCC) 2024     Acute deep vein thrombosis (DVT) of left femoral vein (HCC) 2024     Primary malignant neoplasm of right lung metastatic to other site (HCC) 2024     Acquired left ventricular hypertrophy 2024     Palliative care by specialist 2024     History of pulmonary embolism 2024     acute on Chronic hypoxemic respiratory failure (HCC) 2024     Restrictive lung disease 2024     Adenocarcinoma of overlapping sites of right lung (HCC) 2024     Respiratory insufficiency 2024     Tobacco dependence 2024     SOB (shortness of breath) 2024     Chest pain 2024     Acute  pulmonary embolism with acute cor pulmonale, unspecified pulmonary embolism type (HCC) 04/19/2024     HTN (hypertension) 04/19/2024     Encounter to discuss test results 07/03/2022     Gross hematuria 07/03/2022       LOS (days): 10  Geometric Mean LOS (GMLOS) (days): 4.9  Days to GMLOS:-5.2     OBJECTIVE:  Risk of Unplanned Readmission Score: 34.65         Current admission status: Inpatient   Preferred Pharmacy:   [id number]  [place] [address]  [address] [address]  Phone: [phone] Fax: [phone]    Primary Care Provider: Amelie Bacon MD    Primary Insurance: [facility]  Secondary Insurance:     DISCHARGE DETAILS:        CM spoke with pt's wife regarding DC for tomorrow.  She stated pt is currently at his baseline and is actually breathing better than he had been on 4L of O2.  This is pt's baseline.      She is concerned she only has 2 portable tanks of O2 at home and she has contacted [employer] requesting additional tanks however they have not been delivered.  She is also concerned the concentrator only goes up to 5L of O2.  CM contacted [employer] liaison, Jeff, requested someone from the company reach out to the pt's wife to review this with her and assist her in getting additional tanks of O2.  He explained someone will reach out to her.    Pt is in need of transportation home with O2.  There are 2 HILARY the home.  CM to arrange transport for 2:00pm or later as requested by the attending.  CM to contact wife once transport has been arranged.

## 2025-02-12 NOTE — PLAN OF CARE
Problem: PAIN - ADULT  Goal: Verbalizes/displays adequate comfort level or baseline comfort level  Description: Interventions:  - Encourage patient to monitor pain and request assistance  - Assess pain using appropriate pain scale  - Administer analgesics based on type and severity of pain and evaluate response  - Implement non-pharmacological measures as appropriate and evaluate response  - Consider cultural and social influences on pain and pain management  - Notify physician/advanced practitioner if interventions unsuccessful or patient reports new pain  Outcome: Progressing     Problem: INFECTION - ADULT  Goal: Absence or prevention of progression during hospitalization  Description: INTERVENTIONS:  - Assess and monitor for signs and symptoms of infection  - Monitor lab/diagnostic results  - Monitor all insertion sites, i.e. indwelling lines, tubes, and drains  - Monitor endotracheal if appropriate and nasal secretions for changes in amount and color  - [place] appropriate cooling/warming therapies per order  - Administer medications as ordered  - Instruct and encourage patient and family to use good hand hygiene technique  - Identify and instruct in appropriate isolation precautions for identified infection/condition  Outcome: Progressing  Goal: Absence of fever/infection during neutropenic period  Description: INTERVENTIONS:  - Monitor WBC    Outcome: Progressing     Problem: DISCHARGE PLANNING  Goal: Discharge to home or other facility with appropriate resources  Description: INTERVENTIONS:  - Identify barriers to discharge w/patient and caregiver  - Arrange for needed discharge resources and transportation as appropriate  - Identify discharge learning needs (meds, wound care, etc.)  - Arrange for interpretive services to assist at discharge as needed  - Refer to Case Management Department for coordinating discharge planning if the patient needs post-hospital services based on physician/advanced  practitioner order or complex needs related to functional status, cognitive ability, or social support system  Outcome: Progressing     Problem: Knowledge Deficit  Goal: Patient/family/caregiver demonstrates understanding of disease process, treatment plan, medications, and discharge instructions  Description: Complete learning assessment and assess knowledge base.  Interventions:  - Provide teaching at level of understanding  - Provide teaching via preferred learning methods  Outcome: Progressing     Problem: Prexisting or High Potential for Compromised Skin Integrity  Goal: Skin integrity is maintained or improved  Description: INTERVENTIONS:  - Identify patients at risk for skin breakdown  - Assess and monitor skin integrity  - Assess and monitor nutrition and hydration status  - Monitor labs   - Assess for incontinence   - Turn and reposition patient  - Assist with mobility/ambulation  - Relieve pressure over bony prominences  - Avoid friction and shearing  - Provide appropriate hygiene as needed including keeping skin clean and dry  - Evaluate need for skin moisturizer/barrier cream  - Collaborate with interdisciplinary team   - Patient/family teaching  - Consider wound care consult   Outcome: Progressing     Problem: Nutrition/Hydration-ADULT  Goal: Nutrient/Hydration intake appropriate for improving, restoring or maintaining nutritional needs  Description: Monitor and assess patient's nutrition/hydration status for malnutrition. Collaborate with interdisciplinary team and initiate plan and interventions as ordered.  Monitor patient's weight and dietary intake as ordered or per policy. Utilize nutrition screening tool and intervene as necessary. Determine patient's food preferences and provide high-protein, high-caloric foods as appropriate.     INTERVENTIONS:  - Monitor oral intake, urinary output, labs, and treatment plans  - Assess nutrition and hydration status and recommend course of action  - Evaluate  amount of meals eaten  - Assist patient with eating if necessary   - Allow adequate time for meals  - Recommend/ encourage appropriate diets, oral nutritional supplements, and vitamin/mineral supplements  - Order, calculate, and assess calorie counts as needed  - Recommend, monitor, and adjust tube feedings and TPN/PPN based on assessed needs  - Assess need for intravenous fluids  - Provide specific nutrition/hydration education as appropriate  - Include patient/family/caregiver in decisions related to nutrition  Outcome: Progressing

## 2025-02-12 NOTE — ASSESSMENT & PLAN NOTE
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   Has Pleurx catheter which was drained 250 ml of serosanguinous fluid last night  -  Recent echo 12/9/24 showing EF 60% and LV wall thickness mildly increased and with mild concentric hypertrophy; mild AR, aortic valve sclerosis, mild annual calcification MV   Pulm consulted on admission  Finished 7 days of cefepime and vanco  Respiratory status is stable and likely if remains stable tomorrow can DC

## 2025-02-12 NOTE — PLAN OF CARE
Problem: PAIN - ADULT  Goal: Verbalizes/displays adequate comfort level or baseline comfort level  Description: Interventions:  - Encourage patient to monitor pain and request assistance  - Assess pain using appropriate pain scale  - Administer analgesics based on type and severity of pain and evaluate response  - Implement non-pharmacological measures as appropriate and evaluate response  - Consider cultural and social influences on pain and pain management  - Notify physician/advanced practitioner if interventions unsuccessful or patient reports new pain  Outcome: Progressing     Problem: INFECTION - ADULT  Goal: Absence or prevention of progression during hospitalization  Description: INTERVENTIONS:  - Assess and monitor for signs and symptoms of infection  - Monitor lab/diagnostic results  - Monitor all insertion sites, i.e. indwelling lines, tubes, and drains  - Monitor endotracheal if appropriate and nasal secretions for changes in amount and color  - [place] appropriate cooling/warming therapies per order  - Administer medications as ordered  - Instruct and encourage patient and family to use good hand hygiene technique  - Identify and instruct in appropriate isolation precautions for identified infection/condition  Outcome: Progressing  Goal: Absence of fever/infection during neutropenic period  Description: INTERVENTIONS:  - Monitor WBC    Outcome: Progressing     Problem: DISCHARGE PLANNING  Goal: Discharge to home or other facility with appropriate resources  Description: INTERVENTIONS:  - Identify barriers to discharge w/patient and caregiver  - Arrange for needed discharge resources and transportation as appropriate  - Identify discharge learning needs (meds, wound care, etc.)  - Arrange for interpretive services to assist at discharge as needed  - Refer to Case Management Department for coordinating discharge planning if the patient needs post-hospital services based on physician/advanced  practitioner order or complex needs related to functional status, cognitive ability, or social support system  Outcome: Progressing     Problem: Knowledge Deficit  Goal: Patient/family/caregiver demonstrates understanding of disease process, treatment plan, medications, and discharge instructions  Description: Complete learning assessment and assess knowledge base.  Interventions:  - Provide teaching at level of understanding  - Provide teaching via preferred learning methods  Outcome: Progressing     Problem: Prexisting or High Potential for Compromised Skin Integrity  Goal: Skin integrity is maintained or improved  Description: INTERVENTIONS:  - Identify patients at risk for skin breakdown  - Assess and monitor skin integrity  - Assess and monitor nutrition and hydration status  - Monitor labs   - Assess for incontinence   - Turn and reposition patient  - Assist with mobility/ambulation  - Relieve pressure over bony prominences  - Avoid friction and shearing  - Provide appropriate hygiene as needed including keeping skin clean and dry  - Evaluate need for skin moisturizer/barrier cream  - Collaborate with interdisciplinary team   - Patient/family teaching  - Consider wound care consult   Outcome: Progressing     Problem: Nutrition/Hydration-ADULT  Goal: Nutrient/Hydration intake appropriate for improving, restoring or maintaining nutritional needs  Description: Monitor and assess patient's nutrition/hydration status for malnutrition. Collaborate with interdisciplinary team and initiate plan and interventions as ordered.  Monitor patient's weight and dietary intake as ordered or per policy. Utilize nutrition screening tool and intervene as necessary. Determine patient's food preferences and provide high-protein, high-caloric foods as appropriate.     INTERVENTIONS:  - Monitor oral intake, urinary output, labs, and treatment plans  - Assess nutrition and hydration status and recommend course of action  - Evaluate  amount of meals eaten  - Assist patient with eating if necessary   - Allow adequate time for meals  - Recommend/ encourage appropriate diets, oral nutritional supplements, and vitamin/mineral supplements  - Order, calculate, and assess calorie counts as needed  - Recommend, monitor, and adjust tube feedings and TPN/PPN based on assessed needs  - Assess need for intravenous fluids  - Provide specific nutrition/hydration education as appropriate  - Include patient/family/caregiver in decisions related to nutrition  Outcome: Progressing

## 2025-02-13 VITALS
DIASTOLIC BLOOD PRESSURE: 74 MMHG | OXYGEN SATURATION: 91 % | HEART RATE: 122 BPM | TEMPERATURE: 97.9 F | SYSTOLIC BLOOD PRESSURE: 121 MMHG | BODY MASS INDEX: 27.72 KG/M2 | HEIGHT: 67 IN | RESPIRATION RATE: 18 BRPM | WEIGHT: 176.59 LBS

## 2025-02-13 PROBLEM — R79.89 ELEVATED SERUM CREATININE: Status: ACTIVE | Noted: 2025-02-13

## 2025-02-13 LAB
GLUCOSE SERPL-MCNC: 123 MG/DL (ref 65–140)
GLUCOSE SERPL-MCNC: 144 MG/DL (ref 65–140)

## 2025-02-13 PROCEDURE — 99239 HOSP IP/OBS DSCHRG MGMT >30: CPT | Performed by: INTERNAL MEDICINE

## 2025-02-13 PROCEDURE — 82948 REAGENT STRIP/BLOOD GLUCOSE: CPT

## 2025-02-13 PROCEDURE — 94640 AIRWAY INHALATION TREATMENT: CPT

## 2025-02-13 PROCEDURE — 94760 N-INVAS EAR/PLS OXIMETRY 1: CPT

## 2025-02-13 PROCEDURE — 94664 DEMO&/EVAL PT USE INHALER: CPT

## 2025-02-13 RX ORDER — ECHINACEA PURPUREA EXTRACT 125 MG
1 TABLET ORAL
Qty: 3 ML | Refills: 0 | Status: SHIPPED | OUTPATIENT
Start: 2025-02-13 | End: 2025-02-19

## 2025-02-13 RX ORDER — QUETIAPINE FUMARATE 25 MG/1
25 TABLET, FILM COATED ORAL
Qty: 30 TABLET | Refills: 0 | Status: SHIPPED | OUTPATIENT
Start: 2025-02-13 | End: 2025-02-19

## 2025-02-13 RX ORDER — METOPROLOL TARTRATE 25 MG/1
25 TABLET, FILM COATED ORAL EVERY 12 HOURS SCHEDULED
Qty: 60 TABLET | Refills: 0 | Status: SHIPPED | OUTPATIENT
Start: 2025-02-13 | End: 2025-02-19

## 2025-02-13 RX ADMIN — SALINE NASAL SPRAY 2 SPRAY: 1.5 SOLUTION NASAL at 08:06

## 2025-02-13 RX ADMIN — DIAZEPAM 5 MG: 5 TABLET ORAL at 08:05

## 2025-02-13 RX ADMIN — LOSARTAN POTASSIUM 25 MG: 25 TABLET, FILM COATED ORAL at 08:05

## 2025-02-13 RX ADMIN — METOPROLOL TARTRATE 25 MG: 25 TABLET, FILM COATED ORAL at 08:05

## 2025-02-13 RX ADMIN — IPRATROPIUM BROMIDE 0.5 MG: 0.5 SOLUTION RESPIRATORY (INHALATION) at 01:08

## 2025-02-13 RX ADMIN — ALBUTEROL SULFATE 2 PUFF: 90 AEROSOL, METERED RESPIRATORY (INHALATION) at 12:22

## 2025-02-13 RX ADMIN — IPRATROPIUM BROMIDE 0.5 MG: 0.5 SOLUTION RESPIRATORY (INHALATION) at 07:20

## 2025-02-13 RX ADMIN — LEVOTHYROXINE SODIUM 25 MCG: 25 TABLET ORAL at 06:20

## 2025-02-13 RX ADMIN — OXYCODONE HYDROCHLORIDE 5 MG: 5 TABLET ORAL at 12:27

## 2025-02-13 RX ADMIN — FOLIC ACID 1 MG: 1 TABLET ORAL at 08:05

## 2025-02-13 RX ADMIN — MORPHINE SULFATE 2 MG: 2 INJECTION, SOLUTION INTRAMUSCULAR; INTRAVENOUS at 13:21

## 2025-02-13 RX ADMIN — LEVALBUTEROL HYDROCHLORIDE 1.25 MG: 1.25 SOLUTION RESPIRATORY (INHALATION) at 07:20

## 2025-02-13 RX ADMIN — APIXABAN 5 MG: 5 TABLET, FILM COATED ORAL at 08:05

## 2025-02-13 RX ADMIN — LEVALBUTEROL HYDROCHLORIDE 1.25 MG: 1.25 SOLUTION RESPIRATORY (INHALATION) at 01:07

## 2025-02-13 RX ADMIN — CHLORHEXIDINE GLUCONATE 15 ML: 1.2 RINSE ORAL at 08:05

## 2025-02-13 RX ADMIN — ATORVASTATIN CALCIUM 10 MG: 10 TABLET, FILM COATED ORAL at 08:05

## 2025-02-13 RX ADMIN — ONDANSETRON 4 MG: 2 INJECTION INTRAMUSCULAR; INTRAVENOUS at 12:18

## 2025-02-13 NOTE — ASSESSMENT & PLAN NOTE
"Stage IV, being treated with chemotherapy, follows Dr. Bryan outpatient  - Status post carbo, Alimta, Keytruda from May to Aug 2024  - July 2024 received rad to brain   - Oct 2024 CT CAP showed met lung lymphangitic spread of adenocarcinoma and subsequently received palliative pemetrex with keytruda from Nov to Dec 2024 then docetaxel plus ramucirumab from Dec to Jan 21 2025  Oncology consulted  Palliative consulted  Guarded prognosis  Patient not interested in pursuing any active chemo treatments in the near future. Patient and wife would like to take it one day at a time for now.   Oncology signed off    At bedside family inquiring about other options and chemotherapy and \"what the next steps will be\".  At this juncture I have sent out a page to the oncology team to either stop by the patient's room or give him a call or his wife to call after discharge.  No need to await their formal input for discharge however as the patient will follow-up with his group and they are now aware that the family have more questions regarding treatment options.    "

## 2025-02-13 NOTE — PLAN OF CARE
Problem: PAIN - ADULT  Goal: Verbalizes/displays adequate comfort level or baseline comfort level  Description: Interventions:  - Encourage patient to monitor pain and request assistance  - Assess pain using appropriate pain scale  - Administer analgesics based on type and severity of pain and evaluate response  - Implement non-pharmacological measures as appropriate and evaluate response  - Consider cultural and social influences on pain and pain management  - Notify physician/advanced practitioner if interventions unsuccessful or patient reports new pain  Outcome: Progressing     Problem: INFECTION - ADULT  Goal: Absence or prevention of progression during hospitalization  Description: INTERVENTIONS:  - Assess and monitor for signs and symptoms of infection  - Monitor lab/diagnostic results  - Monitor all insertion sites, i.e. indwelling lines, tubes, and drains  - Monitor endotracheal if appropriate and nasal secretions for changes in amount and color  - [place] appropriate cooling/warming therapies per order  - Administer medications as ordered  - Instruct and encourage patient and family to use good hand hygiene technique  - Identify and instruct in appropriate isolation precautions for identified infection/condition  Outcome: Progressing  Goal: Absence of fever/infection during neutropenic period  Description: INTERVENTIONS:  - Monitor WBC    Outcome: Progressing     Problem: DISCHARGE PLANNING  Goal: Discharge to home or other facility with appropriate resources  Description: INTERVENTIONS:  - Identify barriers to discharge w/patient and caregiver  - Arrange for needed discharge resources and transportation as appropriate  - Identify discharge learning needs (meds, wound care, etc.)  - Arrange for interpretive services to assist at discharge as needed  - Refer to Case Management Department for coordinating discharge planning if the patient needs post-hospital services based on physician/advanced  practitioner order or complex needs related to functional status, cognitive ability, or social support system  Outcome: Progressing     Problem: Knowledge Deficit  Goal: Patient/family/caregiver demonstrates understanding of disease process, treatment plan, medications, and discharge instructions  Description: Complete learning assessment and assess knowledge base.  Interventions:  - Provide teaching at level of understanding  - Provide teaching via preferred learning methods  Outcome: Progressing     Problem: Prexisting or High Potential for Compromised Skin Integrity  Goal: Skin integrity is maintained or improved  Description: INTERVENTIONS:  - Identify patients at risk for skin breakdown  - Assess and monitor skin integrity  - Assess and monitor nutrition and hydration status  - Monitor labs   - Assess for incontinence   - Turn and reposition patient  - Assist with mobility/ambulation  - Relieve pressure over bony prominences  - Avoid friction and shearing  - Provide appropriate hygiene as needed including keeping skin clean and dry  - Evaluate need for skin moisturizer/barrier cream  - Collaborate with interdisciplinary team   - Patient/family teaching  - Consider wound care consult   Outcome: Progressing     Problem: Nutrition/Hydration-ADULT  Goal: Nutrient/Hydration intake appropriate for improving, restoring or maintaining nutritional needs  Description: Monitor and assess patient's nutrition/hydration status for malnutrition. Collaborate with interdisciplinary team and initiate plan and interventions as ordered.  Monitor patient's weight and dietary intake as ordered or per policy. Utilize nutrition screening tool and intervene as necessary. Determine patient's food preferences and provide high-protein, high-caloric foods as appropriate.     INTERVENTIONS:  - Monitor oral intake, urinary output, labs, and treatment plans  - Assess nutrition and hydration status and recommend course of action  - Evaluate  amount of meals eaten  - Assist patient with eating if necessary   - Allow adequate time for meals  - Recommend/ encourage appropriate diets, oral nutritional supplements, and vitamin/mineral supplements  - Order, calculate, and assess calorie counts as needed  - Recommend, monitor, and adjust tube feedings and TPN/PPN based on assessed needs  - Assess need for intravenous fluids  - Provide specific nutrition/hydration education as appropriate  - Include patient/family/caregiver in decisions related to nutrition  Outcome: Progressing

## 2025-02-13 NOTE — ASSESSMENT & PLAN NOTE
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   Has Pleurx catheter which was drained 250 ml of serosanguinous fluid last night  -  Recent echo 12/9/24 showing EF 60% and LV wall thickness mildly increased and with mild concentric hypertrophy; mild AR, aortic valve sclerosis, mild annual calcification MV   Pulm consulted on admission  Finished 7 days of cefepime and vanco  Patient's respiratory status and oxygen requirements have actually been stable over the last 72 hours I am comfortable with him being discharged home with oxygen.  Have explained to him that if he experiences worsening shortness of breath he should come back in the hospital

## 2025-02-13 NOTE — ASSESSMENT & PLAN NOTE
Palliative diagnosis: Adenocarcinoma of the right lung  PPS: 50%  PSC Provider: Dr. Estrella    Supportive listening and presence provided  Anxiety containment provided  Instructions for management and anticipatory guidance provided    Counseled on possibility of Home Palliative Program (eligible zip code [id number])-->patient interested; communicated and coordinated with Home Palliative team to set up  Discharge prescriptions for oxycodone IR and Valium provided at today's encounter

## 2025-02-13 NOTE — PROGRESS NOTES
Progress Note - Palliative Care   Name: Papo Gibbs 54 y.o. male I MRN: [id number]  Unit/Bed#: W -01 I Date of Admission: 2/2/2025   Date of Service: 2/13/2025 I Hospital Day: 11    Assessment & Plan  acute on Chronic hypoxemic respiratory failure (HCC)   4L NC O2    Symptoms exacerbated by pain and anxiety    For air hunger:  Oxycodone 5mg-10mg q4h prn, and Morphine 2mg IV q4h prn for breakthrough symptoms-->communicated/coordinated with RN team to use oxycodone IR primarily for air hunger, as he can't get morphine IV at home  Continue Valium 5 mg twice daily    Discharge prescriptions of oxycodone and Valium sent to home pharmacy  Adenocarcinoma of overlapping sites of right lung (HCC)  Diagnosed 5/2024  Stage IV mets to brain and pleura s/p pleurx  S/p carboplatin/Alimta/Keytruda  Most recently treated with just Alimta    Imaging shows worsening disease with likely postobstructive pneumonia with collapse on R and lymphangitic carcinomatosis of both lungs  Cancer related pain  On oxycodone IR 2.5mg at home    Continue oxycodone IR 5 mg/10 mg every 4 hours as needed for moderate/severe pain  Continue morphine IV 2 mg every 4 hours as needed for breakthrough pain  Palliative care by specialist  Palliative diagnosis: Adenocarcinoma of the right lung  PPS: 50%  PSC Provider: Dr. Estrella    Supportive listening and presence provided  Anxiety containment provided  Instructions for management and anticipatory guidance provided    Counseled on possibility of Home Palliative Program (eligible zip code [id number])-->patient interested; communicated and coordinated with Home Palliative team to set up  Discharge prescriptions for oxycodone IR and Valium provided at today's encounter  Counseling regarding advance care planning and goals of care  Given that symptoms are better controlled, he confirms that he would still like to forego any more cancer related therapies.  However, he would still like to be able to be hospitalized  "for different situations and continue to follow-up with the palliative care team in the outpatient setting.  We will make sure he sees us in the office after discharge.                 Code Status: Switch to DNAR/DNI - Level 3               Decisional apparatus:  Patient is competent on my exam today.  If competence is lost, patient's substitute decision maker would default to Tomasa (spouse) and then Giancarlo (alternate)               Advance Directive / Living Will / POLST:  On file       Subjective   Chart reviewed prior to visit, LAURI  Met w/ patient at the bedside, lying in bed, NAD  Stated that he is doing \"well\" and pain has been well controlled on OxyIR  Denies nausea/vomiting  No concerns/questions/complaints  Discussed w/ LISA    Spoke w/ Tomasa, via phone call  Notified that Oxycodone and Valium scripts were already sent to their home pharmacy  Discussed how to safely administer medications and notified her to call our office w/ any questions or concerns. Verbalized understanding    Objective :  Temp:  [97.8 °F (36.6 °C)-97.9 °F (36.6 °C)] 97.9 °F (36.6 °C)  HR:  [121-129] 122  BP: (111-124)/(66-74) 121/74  Resp:  [18] 18  SpO2:  [91 %] 91 %  O2 Device: Nasal cannula  Nasal Cannula O2 Flow Rate (L/min):  [4 L/min] 4 L/min    Physical Exam  Constitutional:       General: He is awake. He is not in acute distress.  HENT:      Head: Normocephalic and atraumatic.      Mouth/Throat:      Mouth: Mucous membranes are moist.   Eyes:      Pupils: Pupils are equal, round, and reactive to light.   Cardiovascular:      Rate and Rhythm: Tachycardia present.   Pulmonary:      Effort: Tachypnea present. No respiratory distress.   Skin:     General: Skin is warm and dry.   Neurological:      General: No focal deficit present.      Mental Status: He is alert and oriented to person, place, and time.          Lab Results: I have reviewed the following results:  Lab Results   Component Value Date/Time    SODIUM 142 02/12/2025 " 05:19 AM    K 4.4 02/12/2025 05:19 AM    BUN 22 02/12/2025 05:19 AM    CREATININE 0.74 02/12/2025 05:19 AM    GLUC 137 02/12/2025 05:19 AM    CALCIUM 9.8 02/12/2025 05:19 AM    AST 13 02/12/2025 05:19 AM    ALT 10 02/12/2025 05:19 AM    ALB 3.5 02/12/2025 05:19 AM    TP 6.4 02/12/2025 05:19 AM    EGFR 103 02/12/2025 05:19 AM     Lab Results   Component Value Date/Time    HGB 9.9 (L) 02/12/2025 05:19 AM    WBC 8.34 02/12/2025 05:19 AM     02/12/2025 05:19 AM    INR 1.30 (H) 02/02/2025 06:37 AM    PTT 31 02/02/2025 06:37 AM     Lab Results   Component Value Date/Time    [id number] 0.366 (L) 11/01/2024 02:53 PM     Administrative Statements   I have spent a total time of 20 minutes in caring for this patient on the day of the visit/encounter including Risks and benefits of tx options, Instructions for management, Patient and family education, Risk factor reductions, Counseling / Coordination of care, Documenting in the medical record, Reviewing / ordering tests, medicine, procedures  , Obtaining or reviewing history  , and Communicating with other healthcare professionals . Topics discussed with the patient / family include symptom assessment and management, medication review, psychosocial support, supportive listening, and anticipatory guidance.    Dee Dee Mejia

## 2025-02-13 NOTE — DISCHARGE INSTR - AVS FIRST PAGE
Dear Papo Gibbs,     It was our pleasure to care for you here at [facility].  It is our hope that we were always able to exceed the expected standards for your care during your stay.  You were hospitalized due to ***.  You were cared for on the *** floor under the service of Claudine Ruiz MD with the [facility] who covers for your primary care physician (PCP), Amelie Bacon MD, while you were hospitalized.  If you have any questions or concerns related to this hospitalization, you may contact us at .  For follow up as well as medication refills, we recommend that you follow up with your primary care physician.  A registered nurse will reach out to you by phone within a few days after your discharge to answer any additional questions that you may have after going home.  However, at this time we provide for you here, the most important instructions / recommendations at discharge:     Notable Medication Adjustments -   ***  Testing Required after Discharge -   ***  ** Please contact your PCP to request testing orders for any of the testing recommended here **  Important follow up information -   ***  Other Instructions -   ***  Please review this entire after visit summary as additional general instructions including medication list, appointments, activity, diet, any pertinent wound care, and other additional recommendations from your care team that may be provided for you.      Sincerely,     Claudine Ruiz MD

## 2025-02-13 NOTE — ASSESSMENT & PLAN NOTE
Patient has a creatinine of 0.74 which appears to be elevated when compared to yesterday at 0.4 however I note that going back in time over the last several days to weeks the creatinine has in fact fluctuated between 0.4 and 0.7.  Can follow-up with primary care doctor with regard to further lab work in the future.

## 2025-02-13 NOTE — PLAN OF CARE
Problem: PAIN - ADULT  Goal: Verbalizes/displays adequate comfort level or baseline comfort level  Description: Interventions:  - Encourage patient to monitor pain and request assistance  - Assess pain using appropriate pain scale  - Administer analgesics based on type and severity of pain and evaluate response  - Implement non-pharmacological measures as appropriate and evaluate response  - Consider cultural and social influences on pain and pain management  - Notify physician/advanced practitioner if interventions unsuccessful or patient reports new pain  Outcome: Progressing     Problem: INFECTION - ADULT  Goal: Absence or prevention of progression during hospitalization  Description: INTERVENTIONS:  - Assess and monitor for signs and symptoms of infection  - Monitor lab/diagnostic results  - Monitor all insertion sites, i.e. indwelling lines, tubes, and drains  - Monitor endotracheal if appropriate and nasal secretions for changes in amount and color  - [place] appropriate cooling/warming therapies per order  - Administer medications as ordered  - Instruct and encourage patient and family to use good hand hygiene technique  - Identify and instruct in appropriate isolation precautions for identified infection/condition  Outcome: Progressing  Goal: Absence of fever/infection during neutropenic period  Description: INTERVENTIONS:  - Monitor WBC    Outcome: Progressing     Problem: DISCHARGE PLANNING  Goal: Discharge to home or other facility with appropriate resources  Description: INTERVENTIONS:  - Identify barriers to discharge w/patient and caregiver  - Arrange for needed discharge resources and transportation as appropriate  - Identify discharge learning needs (meds, wound care, etc.)  - Arrange for interpretive services to assist at discharge as needed  - Refer to Case Management Department for coordinating discharge planning if the patient needs post-hospital services based on physician/advanced  practitioner order or complex needs related to functional status, cognitive ability, or social support system  Outcome: Progressing     Problem: Knowledge Deficit  Goal: Patient/family/caregiver demonstrates understanding of disease process, treatment plan, medications, and discharge instructions  Description: Complete learning assessment and assess knowledge base.  Interventions:  - Provide teaching at level of understanding  - Provide teaching via preferred learning methods  Outcome: Progressing     Problem: Prexisting or High Potential for Compromised Skin Integrity  Goal: Skin integrity is maintained or improved  Description: INTERVENTIONS:  - Identify patients at risk for skin breakdown  - Assess and monitor skin integrity  - Assess and monitor nutrition and hydration status  - Monitor labs   - Assess for incontinence   - Turn and reposition patient  - Assist with mobility/ambulation  - Relieve pressure over bony prominences  - Avoid friction and shearing  - Provide appropriate hygiene as needed including keeping skin clean and dry  - Evaluate need for skin moisturizer/barrier cream  - Collaborate with interdisciplinary team   - Patient/family teaching  - Consider wound care consult   Outcome: Progressing

## 2025-02-13 NOTE — DISCHARGE SUMMARY
"Discharge Summary - Hospitalist   Name: Papo Gibbs 54 y.o. male I MRN: [id number]  Unit/Bed#: W -01 I Date of Admission: 2/2/2025   Date of Service: 2/13/2025 I Hospital Day: 11     Assessment & Plan  HTN (hypertension)  Continue losartan 25 mg daily   /66  Adenocarcinoma of overlapping sites of right lung (HCC)  Stage IV, being treated with chemotherapy, follows Dr. Bryan outpatient  - Status post carbo, Alimta, Keytruda from May to Aug 2024  - July 2024 received rad to brain   - Oct 2024 CT CAP showed met lung lymphangitic spread of adenocarcinoma and subsequently received palliative pemetrex with keytruda from Nov to Dec 2024 then docetaxel plus ramucirumab from Dec to Jan 21 2025  Oncology consulted  Palliative consulted  Guarded prognosis  Patient not interested in pursuing any active chemo treatments in the near future. Patient and wife would like to take it one day at a time for now.   Oncology signed off    At bedside family inquiring about other options and chemotherapy and \"what the next steps will be\".  At this juncture I have sent out a page to the oncology team to either stop by the patient's room or give him a call or his wife to call after discharge.  No need to await their formal input for discharge however as the patient will follow-up with his group and they are now aware that the family have more questions regarding treatment options.    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   Has Pleurx catheter which was drained 250 ml of serosanguinous fluid last night  -  Recent echo 12/9/24 showing EF 60% and LV wall thickness mildly increased and " with mild concentric hypertrophy; mild AR, aortic valve sclerosis, mild annual calcification MV   Pulm consulted on admission  Finished 7 days of cefepime and vanco  Patient's respiratory status and oxygen requirements have actually been stable over the last 72 hours I am comfortable with him being discharged home with oxygen.  Have explained to him that if he experiences worsening shortness of breath he should come back in the hospital      History of pulmonary embolism  Continue eliquis 5 mg bid  Cancer related pain  Oral pain medications have been sent to pharmacy by the palliative care nurse practitioner.  Sepsis (HCC)  Sepsis, present on admission, due to post obstructive PNA evidenced by Wbc 2.90, Tachycardia (), Tachypnea (RR 36), acute on chronic respiratory failure requiring IV Cefepime/ Vancomycin, lactic acid and blood cultures.   Finished 7 days of vanc and cefepime     Tachycardia  History of tachycardia   Increased lopressor to 25 mg bid  Still tachy - PE study was negative. ]  Discussed with the patient and he usually has tachycardia, he was started on metoprolol however heart rates are still elevated.  Outpatient follow-up  Counseling regarding advance care planning and goals of care  Palliative following  Patient not interested in chemo  Wishes to continue treating symptoms medically   Diarrhea  Admitted to having diarrhea on admission  Resolved  Palliative care by specialist    Primary malignant neoplasm of right lung metastatic to other site (HCC)    Elevated serum creatinine  Patient has a creatinine of 0.74 which appears to be elevated when compared to yesterday at 0.4 however I note that going back in time over the last several days to weeks the creatinine has in fact fluctuated between 0.4 and 0.7.  Can follow-up with primary care doctor with regard to further lab work in the future.     Medical Problems       Resolved Problems  Date Reviewed: 1/22/2025   None       Discharging Physician  "/ Practitioner: Claudine Ruiz MD  PCP: Amelie Bacon MD  Admission Date:   Admission Orders (From admission, onward)       Ordered        02/02/25 1341  INPATIENT ADMISSION  Once                          Discharge Date: 02/13/25    Consultations During Hospital Stay:  Oncology   Palliative care  Critical Care     Procedures Performed:   CT PE study -   \"1. No evidence of pulmonary embolus.     2. Extensive consolidation throughout the right lower lobe with complete collapse of the right middle lobe, similar to previous study. Underlying tumor and/or pneumonia in these regions cannot be excluded.     3. 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 Increasing small left pleural effusion. Cannot entirely exclude element of superimposed interstitial edema.     4. Stable small to moderate loculated right pleural effusion with indwelling right chest tube.\"         Significant Findings / Test Results:    As above.     Incidental Findings:   None    I reviewed the above mentioned incidental findings with the patient and/or family and they expressed understanding.    Test Results Pending at Discharge (will require follow up):   None .     Outpatient Tests Requested:  Should have BMP and CBC checked with PCP in 5 days time     Complications:  none.     Reason for Admission:     Hospital Course:   Papo Gibbs is a 54 y.o. male patient who originally presented to the hospital on 2/2/2025 due to shortness of breath.  Was initially admitted to critical care service, then transferred to our service and pulmonology were following.    He was treated with a full course of antibiotics for pneumonia and seen by medical oncology as well.    At discharge he was stable and feeling \"the same as I have for the last couple of days\".    He is eager to be discharged home and palliative care have prescribed pain medications for him.    Condition at " "Discharge: stable    Discharge Day Visit / Exam:   Subjective:      Patient seen and examined   Feeling \"okay\"  Vitals: Blood Pressure: 121/74 (02/13/25 0707)  Pulse: (!) 122 (02/13/25 0707)  Temperature: 97.9 °F (36.6 °C) (02/13/25 0707)  Temp Source: Oral (02/11/25 2339)  Respirations: 18 (02/12/25 1527)  Height: 5' 7\" (170.2 cm) (02/02/25 1900)  Weight - Scale: 80.1 kg (176 lb 9.4 oz) (02/13/25 0600)  SpO2: 91 % (02/13/25 0707)  Physical Exam  Constitutional:       General: He is not in acute distress.     Appearance: He is ill-appearing (chronically). He is not toxic-appearing.   HENT:      Head: Normocephalic.      Mouth/Throat:      Mouth: Mucous membranes are moist.   Eyes:      General: No scleral icterus.        Right eye: No discharge.         Left eye: No discharge.      Pupils: Pupils are equal, round, and reactive to light.   Cardiovascular:      Rate and Rhythm: Tachycardia present.      Heart sounds: No murmur heard.     No friction rub. No gallop.   Pulmonary:      Effort: Pulmonary effort is normal. No respiratory distress.      Breath sounds: No stridor. No wheezing, rhonchi or rales.   Chest:      Chest wall: No tenderness.   Abdominal:      General: There is no distension.      Palpations: There is no mass.      Tenderness: There is no abdominal tenderness. There is no right CVA tenderness, left CVA tenderness, guarding or rebound.      Hernia: No hernia is present.   Musculoskeletal:      Right lower leg: No edema.      Left lower leg: No edema.   Skin:     Capillary Refill: Capillary refill takes less than 2 seconds.      Coloration: Skin is pale. Skin is not jaundiced.      Findings: No bruising, erythema, lesion or rash.   Neurological:      General: No focal deficit present.      Mental Status: He is alert.      Cranial Nerves: No cranial nerve deficit.      Sensory: No sensory deficit.      Motor: No weakness.      Coordination: Coordination normal.      Gait: Gait normal.      Deep Tendon " Reflexes: Reflexes normal.   Psychiatric:         Mood and Affect: Mood normal.          Discussion with Family: Updated  (sister) at bedside.    Discharge instructions/Information to patient and family:   See after visit summary for information provided to patient and family.      Provisions for Follow-Up Care:  See after visit summary for information related to follow-up care and any pertinent home health orders.           Disposition:   Home    Planned Readmission: none    Discharge Medications:  See after visit summary for reconciled discharge medications provided to patient and/or family.      Administrative Statements   Discharge Statement:  I have spent a total time of 90 minutes in caring for this patient on the day of the visit/encounter. >30 minutes of time was spent on: Diagnostic results, Prognosis, Risks and benefits of tx options, Instructions for management, Patient and family education, Importance of tx compliance, Risk factor reductions, Impressions, Counseling / Coordination of care, Documenting in the medical record, Reviewing / ordering tests, medicine, procedures  , and Communicating with other healthcare professionals .    **Please Note: This note may have been constructed using a voice recognition system**

## 2025-02-13 NOTE — PLAN OF CARE
Problem: PAIN - ADULT  Goal: Verbalizes/displays adequate comfort level or baseline comfort level  Description: Interventions:  - Encourage patient to monitor pain and request assistance  - Assess pain using appropriate pain scale  - Administer analgesics based on type and severity of pain and evaluate response  - Implement non-pharmacological measures as appropriate and evaluate response  - Consider cultural and social influences on pain and pain management  - Notify physician/advanced practitioner if interventions unsuccessful or patient reports new pain  2/13/2025 1446 by Mala Holt RN  Outcome: Completed  2/13/2025 1025 by Mala Holt RN  Outcome: Progressing     Problem: INFECTION - ADULT  Goal: Absence or prevention of progression during hospitalization  Description: INTERVENTIONS:  - Assess and monitor for signs and symptoms of infection  - Monitor lab/diagnostic results  - Monitor all insertion sites, i.e. indwelling lines, tubes, and drains  - Monitor endotracheal if appropriate and nasal secretions for changes in amount and color  - [place] appropriate cooling/warming therapies per order  - Administer medications as ordered  - Instruct and encourage patient and family to use good hand hygiene technique  - Identify and instruct in appropriate isolation precautions for identified infection/condition  2/13/2025 1446 by Mala Holt RN  Outcome: Completed  2/13/2025 1025 by Mala Holt RN  Outcome: Progressing  Goal: Absence of fever/infection during neutropenic period  Description: INTERVENTIONS:  - Monitor WBC    2/13/2025 1446 by Mala Holt RN  Outcome: Completed  2/13/2025 1025 by Mala Holt RN  Outcome: Progressing     Problem: DISCHARGE PLANNING  Goal: Discharge to home or other facility with appropriate resources  Description: INTERVENTIONS:  - Identify barriers to discharge w/patient and caregiver  - Arrange for needed discharge  resources and transportation as appropriate  - Identify discharge learning needs (meds, wound care, etc.)  - Arrange for interpretive services to assist at discharge as needed  - Refer to Case Management Department for coordinating discharge planning if the patient needs post-hospital services based on physician/advanced practitioner order or complex needs related to functional status, cognitive ability, or social support system  2/13/2025 1446 by Mala Holt RN  Outcome: Completed  2/13/2025 1025 by Mala Holt RN  Outcome: Progressing     Problem: Knowledge Deficit  Goal: Patient/family/caregiver demonstrates understanding of disease process, treatment plan, medications, and discharge instructions  Description: Complete learning assessment and assess knowledge base.  Interventions:  - Provide teaching at level of understanding  - Provide teaching via preferred learning methods  2/13/2025 1446 by Mala Holt RN  Outcome: Completed  2/13/2025 1025 by Mala Holt RN  Outcome: Progressing     Problem: Prexisting or High Potential for Compromised Skin Integrity  Goal: Skin integrity is maintained or improved  Description: INTERVENTIONS:  - Identify patients at risk for skin breakdown  - Assess and monitor skin integrity  - Assess and monitor nutrition and hydration status  - Monitor labs   - Assess for incontinence   - Turn and reposition patient  - Assist with mobility/ambulation  - Relieve pressure over bony prominences  - Avoid friction and shearing  - Provide appropriate hygiene as needed including keeping skin clean and dry  - Evaluate need for skin moisturizer/barrier cream  - Collaborate with interdisciplinary team   - Patient/family teaching  - Consider wound care consult   2/13/2025 1446 by Mala Holt RN  Outcome: Completed  2/13/2025 1025 by Mala Holt RN  Outcome: Progressing     Problem: Nutrition/Hydration-ADULT  Goal: Nutrient/Hydration  intake appropriate for improving, restoring or maintaining nutritional needs  Description: Monitor and assess patient's nutrition/hydration status for malnutrition. Collaborate with interdisciplinary team and initiate plan and interventions as ordered.  Monitor patient's weight and dietary intake as ordered or per policy. Utilize nutrition screening tool and intervene as necessary. Determine patient's food preferences and provide high-protein, high-caloric foods as appropriate.     INTERVENTIONS:  - Monitor oral intake, urinary output, labs, and treatment plans  - Assess nutrition and hydration status and recommend course of action  - Evaluate amount of meals eaten  - Assist patient with eating if necessary   - Allow adequate time for meals  - Recommend/ encourage appropriate diets, oral nutritional supplements, and vitamin/mineral supplements  - Order, calculate, and assess calorie counts as needed  - Recommend, monitor, and adjust tube feedings and TPN/PPN based on assessed needs  - Assess need for intravenous fluids  - Provide specific nutrition/hydration education as appropriate  - Include patient/family/caregiver in decisions related to nutrition  2/13/2025 1446 by Mala Holt RN  Outcome: Completed  2/13/2025 1025 by Mala Holt RN  Outcome: Progressing

## 2025-02-13 NOTE — ASSESSMENT & PLAN NOTE
History of tachycardia   Increased lopressor to 25 mg bid  Still tachy - PE study was negative. ]  Discussed with the patient and he usually has tachycardia, he was started on metoprolol however heart rates are still elevated.  Outpatient follow-up

## 2025-02-14 NOTE — QUICK NOTE
Was reached out by primary team, family is interested to know more about available chemotherapy options.  I called patient's wife Tomasa, who had confirmed that as of now they do not want to proceed with chemotherapy. If patient changes his mind will need to talk to his primary oncologist.

## 2025-02-14 NOTE — UTILIZATION REVIEW
NOTIFICATION OF ADMISSION DISCHARGE   This is a Notification of Discharge from [facility]. Please be advised that this patient has been discharge from our facility. Below you will find the admission and discharge date and time including the patient’s disposition.   UTILIZATION REVIEW CONTACT:  Chiquis Mead  Utilization   Network Utilization Review Department  Phone: [phone] x carefully listen to the prompts. All voicemails are confidential.  Email: [email or web address]     ADMISSION INFORMATION  PRESENTATION DATE: 2/2/2025  6:19 AM  OBERVATION ADMISSION DATE: N/A  INPATIENT ADMISSION DATE: 2/2/25  1:41 PM   DISCHARGE DATE: 2/13/2025  2:47 PM   DISPOSITION:Home with Home Health Care    Network Utilization Review Department  ATTENTION: Please call with any questions or concerns to [phone] and carefully listen to the prompts so that you are directed to the right person. All voicemails are confidential.   For Discharge needs, contact Care Management DC Support Team at [phone] opt. 2  Send all requests for admission clinical reviews, approved or denied determinations and any other requests to dedicated fax number below belonging to the campus where the patient is receiving treatment. List of dedicated fax numbers for the Facilities:  FACILITY NAME UR FAX NUMBER   ADMISSION DENIALS (Administrative/Medical Necessity) [phone]   DISCHARGE SUPPORT TEAM ([facility]) [phone]   PARENT CHILD HEALTH (Maternity/NICU/Pediatrics) [phone]   [facility] [phone]   [facility] [phone]   [facility] [phone]   [facility] [phone]   [facility] [phone]   [facility] [phone]   [facility] [phone]   [facility]  [phone]   [facility] [phone]   [facility] [phone]   [facility] [phone]   [facility] [phone]

## 2025-02-15 PROBLEM — G89.3 CANCER ASSOCIATED PAIN: Status: ACTIVE | Noted: 2024-05-08

## 2025-02-15 PROBLEM — C34.90 ADENOCARCINOMA OF LUNG (HCC): Status: ACTIVE | Noted: 2024-05-02

## 2025-02-15 PROBLEM — J96.21 ACUTE ON CHRONIC HYPOXIC RESPIRATORY FAILURE (HCC): Status: ACTIVE | Noted: 2025-01-01

## 2025-02-15 PROBLEM — J18.9 POSTOBSTRUCTIVE PNEUMONIA: Status: ACTIVE | Noted: 2025-02-15

## 2025-02-15 PROBLEM — E03.9 HYPOTHYROIDISM: Status: ACTIVE | Noted: 2025-01-01

## 2025-02-15 PROBLEM — E78.5 HYPERLIPIDEMIA: Status: ACTIVE | Noted: 2025-01-01

## 2025-02-15 PROBLEM — D64.9 ANEMIA: Status: ACTIVE | Noted: 2025-01-01

## 2025-02-15 NOTE — ASSESSMENT & PLAN NOTE
Secondary to postobstructive pneumonia in the setting of lung adenocarcinoma  Baseline is 4 L   Here, requiring up to 6 L  See plan under postobstructive pneumonia

## 2025-02-15 NOTE — ED PROVIDER NOTES
Time reflects when diagnosis was documented in both MDM as applicable and the Disposition within this note       Time User Action Codes Description Comment    2/15/2025  1:47 PM Brianna Melendez Add [R06.00] Dyspnea     2/15/2025  1:47 PM Brianna Melendez Add [R09.02] Hypoxemia     2/15/2025  4:06 PM Brianna Melendez Add [J18.9] Postobstructive pneumonia           ED Disposition       ED Disposition   Admit    Condition   Stable    Date/Time   Sat Feb 15, 2025  1:47 PM    Comment   --             Assessment & Plan       Medical Decision Making  Is a 54-year-old male patient presenting to the ED today for complaint of shortness of breath.  Patient has a history of adenocarcinoma, was recently hospitalized for postobstructive pneumonia, was discharged.  Patient denies any other complaints aside from some occasional abdominal pain/nausea and vomiting.  His exam is remarkable for significantly tachycardic individual, he looks to be chronically unwell, and was initially slightly tachypneic on presentation, and his temperature is elevated however does not reach a clinical fever.  His right lower and middle lobes are diminished on auscultation, however I do not hear any adventitious lung sounds aside from this.  His differential diagnosis includes: Postobstructive pneumonia versus VTE versus fluid overload versus other.  Patient appears to be clinically volume depleted, and for that reason I did put in for 30 cc/kg of IV fluids.  I also did place orders for antibiotics.  He underwent a CT PE showing evidence for persistent postobstructive pneumonia, without any other new acute findings.  I do think the patient just has not recovered from his previous postobstructive pneumonia.  He does have a new oxygen requirement.  For that reason patient was requested be hospitalized by the hospitalist provider excepted without any further orders requested.    Critical Care Time Statement: Upon my evaluation, this patient had a high probability of  imminent or life-threatening deterioration due to sob, which required my direct attention, intervention, and personal management.  I spent a total of 60 minutes directly providing critical care services, including interpretation of complex medical databases, evaluating for the presence of life-threatening injuries or illnesses, complex medical decision making (to support/prevent further life-threatening deterioration)., and interpretation of hemodynamic data. This time is exclusive of procedures, teaching, treating other patients, family meetings, and any prior time recorded by providers other than myself.        Amount and/or Complexity of Data Reviewed  Labs: ordered. Decision-making details documented in ED Course.  Radiology: ordered.    Risk  Prescription drug management.  Decision regarding hospitalization.        ED Course as of 02/15/25 1606   Sat Feb 15, 2025   1355 Chloride(!): 93   1356 Carbon Dioxide(!): 34   1356 BUN(!): 34   1356 hs TnI 0hr: 24   1520 Some discrepancy about whether patient took his beta-blocker this morning.  Family in the room, sister and mother seem to think that his significant other has been transitioning him off of his medications, and that he has not taken the beta-blocker.  We spoke with significant other, who is not in the room due to disagreements with mother and sister and she states that as far she knows he has been taking his beta-blocker.  Patient has decided that he does not want any chemotherapy anymore, is transitioning to immuno therapy due to nonresponse of the chemotherapy.       Medications   vancomycin (VANCOCIN) 2,000 mg in sodium chloride 0.9 % 500 mL IVPB (2,000 mg Intravenous New Bag 2/15/25 1509)   lactated ringers bolus 2,000 mL (2,000 mL Intravenous New Bag 2/15/25 1400)   piperacillin-tazobactam (ZOSYN) IVPB 4.5 g (0 g Intravenous Stopped 2/15/25 1504)   oxyCODONE (ROXICODONE) immediate release tablet 10 mg (10 mg Oral Given 2/15/25 1359)   iohexol  (OMNIPAQUE) 350 MG/ML injection (MULTI-DOSE) 60 mL (60 mL Intravenous Given 2/15/25 1446)       ED Risk Strat Scores                                                History of Present Illness       Chief Complaint   Patient presents with    Shortness of Breath     Increasing SOB. Stage 4 lung CA. On 4L nasal cannula at baseline.        Past Medical History:   Diagnosis Date    Cancer (HCC)     Diabetes mellitus (HCC)     High cholesterol     History of blood clots     Hypertension     Leucocytosis 2024    Lung cancer (HCC)     Malignant neoplasm of overlapping sites of right lung (HCC) 2024    Pneumonia     Pulmonary embolism (HCC)       Past Surgical History:   Procedure Laterality Date    IR BIOPSY LYMPH NODE  2024    IR PORT PLACEMENT  2024    IR THORACENTESIS  2024    KNEE SURGERY      MANDIBLE FRACTURE SURGERY  1985    PATELLA FRACTURE SURGERY      RECTAL SURGERY        Family History   Problem Relation Age of Onset    No Known Problems Father     No Known Problems Mother       Social History     Tobacco Use    Smoking status: Former     Current packs/day: 0.00     Average packs/day: 1.5 packs/day for 35.0 years (52.5 ttl pk-yrs)     Types: Cigarettes     Quit date: 4/15/2024     Years since quittin.8     Passive exposure: Past    Smokeless tobacco: Never   Vaping Use    Vaping status: Never Used   Substance Use Topics    Alcohol use: Not Currently    Drug use: Never      E-Cigarette/Vaping    E-Cigarette Use Never User       E-Cigarette/Vaping Substances    Nicotine No     THC No     CBD No     Flavoring No     Other No     Unknown No       I have reviewed and agree with the history as documented.     This is a 54-year-old male patient with a relevant past medical history of lung adenocarcinoma, last chemo treatment was approximately 3 to 4 weeks ago, DVT on Eliquis, hypertension, diabetes, presenting to the ED today for complaint of shortness of breath.  Patient was  recently hospitalized in our facility, was diagnosed with a postobstructive pneumonia at the time, received antibiotics, and upon being discharged, the next day he started to develop worsening shortness of breath again.  He denies any fever, however has had generalized weakness, as well as chills.  His sister and mother in the exam room at this point in time, but he does have a significant other whom is his healthcare power of  and they do not agree with each other.  Patient apparently has been thinking transition to palliative care/hospice, but has not actually gone that way yet.  There is some discrepancy on whether he has taken his metoprolol, his family in the room seem to think that he did not take it however his significant other states that he has still been taking his metoprolol.        Review of Systems   Constitutional:  Negative for chills and fever.   HENT:  Negative for ear pain and sore throat.    Eyes:  Negative for pain and visual disturbance.   Respiratory:  Positive for shortness of breath. Negative for cough.    Cardiovascular:  Negative for chest pain and palpitations.   Gastrointestinal:  Positive for abdominal pain. Negative for vomiting.   Genitourinary:  Negative for dysuria and hematuria.   Musculoskeletal:  Negative for arthralgias and back pain.   Skin:  Negative for color change and rash.   Neurological:  Negative for seizures and syncope.   All other systems reviewed and are negative.          Objective       ED Triage Vitals   Temperature Pulse Blood Pressure Respirations SpO2 Patient Position - Orthostatic VS   02/15/25 1305 02/15/25 1258 02/15/25 1258 02/15/25 1258 02/15/25 1258 02/15/25 1258   99.1 °F (37.3 °C) (!) 133 112/56 20 92 % Sitting      Temp Source Heart Rate Source BP Location FiO2 (%) Pain Score    02/15/25 1305 02/15/25 1258 02/15/25 1258 -- 02/15/25 1359    Oral Monitor Left arm  10 - Worst Possible Pain      Vitals      Date and Time Temp Pulse SpO2 Resp BP  Pain Score FACES Pain Rating User   02/15/25 1545 -- 124 94 % 24 97/64 -- -- SG   02/15/25 1359 -- -- -- -- -- 10 - Worst Possible Pain -- LD   02/15/25 1305 99.1 °F (37.3 °C) -- -- -- -- -- -- DB   02/15/25 1258 -- 133 92 % 20 112/56 -- -- DB            Physical Exam  Vitals and nursing note reviewed.   Constitutional:       General: He is not in acute distress.     Appearance: Normal appearance. He is well-developed and normal weight. He is not ill-appearing.   HENT:      Head: Normocephalic and atraumatic.      Right Ear: External ear normal.      Left Ear: External ear normal.      Nose: Nose normal. No congestion or rhinorrhea.      Mouth/Throat:      Mouth: Mucous membranes are moist.      Pharynx: Oropharynx is clear.   Eyes:      General: No scleral icterus.     Conjunctiva/sclera: Conjunctivae normal.   Cardiovascular:      Rate and Rhythm: Regular rhythm. Tachycardia present.      Pulses: Normal pulses.      Heart sounds: Normal heart sounds. No murmur heard.  Pulmonary:      Effort: Pulmonary effort is normal. No respiratory distress.      Breath sounds: Examination of the right-middle field reveals decreased breath sounds. Examination of the right-lower field reveals decreased breath sounds. Decreased breath sounds present. No wheezing or rales.      Comments: Drain noted in the right posterior lateral chest.  Abdominal:      General: Abdomen is flat. Bowel sounds are normal. There is no distension.      Palpations: Abdomen is soft. There is no mass.      Tenderness: There is no abdominal tenderness.   Musculoskeletal:         General: Normal range of motion.      Cervical back: Normal range of motion and neck supple. No tenderness.      Right lower leg: No edema.      Left lower leg: No edema.   Skin:     General: Skin is warm and dry.      Capillary Refill: Capillary refill takes less than 2 seconds.   Neurological:      General: No focal deficit present.      Mental Status: He is alert and oriented to  person, place, and time. Mental status is at baseline.      Motor: No weakness.   Psychiatric:         Mood and Affect: Mood normal.         Behavior: Behavior normal.         Thought Content: Thought content normal.         Judgment: Judgment normal.         Results Reviewed       Procedure Component Value Units Date/Time    HS Troponin I 2hr [[id number]] Collected: 02/15/25 1542    Lab Status: In process Specimen: Blood from Arm, Right Updated: 02/15/25 1546    HS Troponin I 4hr [[id number]]     Lab Status: No result Specimen: Blood     FLU/RSV/COVID - if FLU/RSV clinically relevant (2hr TAT) [[id number]]  (Normal) Collected: 02/15/25 1350    Lab Status: Final result Specimen: Nares from Nose Updated: 02/15/25 1438     SARS-CoV-2 Negative     INFLUENZA A PCR Negative     INFLUENZA B PCR Negative     RSV PCR Negative    Narrative:      This test has been performed using the CoV-2/Flu/RSV plus assay on the [employer] GeneXpert platform. This test has been validated by the  and verified by the performing laboratory.     This test is designed to amplify and detect the following: nucleocapsid (N), envelope (E), and RNA-dependent RNA polymerase (RdRP) genes of the SARS-CoV-2 genome; matrix (M), basic polymerase (PB2), and acidic protein (PA) segments of the influenza A genome; matrix (M) and non-structural protein (NS) segments of the influenza B genome, and the nucleocapsid genes of RSV A and RSV B.     Positive results are indicative of the presence of Flu A, Flu B, RSV, and/or SARS-CoV-2 RNA. Positive results for SARS-CoV-2 or suspected novel influenza should be reported to state, local, or federal health departments according to local reporting requirements.      All results should be assessed in conjunction with clinical presentation and other laboratory markers for clinical management.     FOR PEDIATRIC PATIENTS - copy/paste COVID Guidelines URL to browser:  [email or web address]       Lactic acid, plasma (w/reflex if result > 2.0) [[id number]]  (Normal) Collected: 02/15/25 1350    Lab Status: Final result Specimen: Blood from Arm, Right Updated: 02/15/25 1416     LACTIC ACID 1.1 mmol/L     Narrative:      Result may be elevated if tourniquet was used during collection.    Blood culture #2 [[id number]] Collected: 02/15/25 1350    Lab Status: In process Specimen: Blood from Arm, Left Updated: 02/15/25 1358    Blood culture #1 [[id number]] Collected: 02/15/25 1350    Lab Status: In process Specimen: Blood from Arm, Right Updated: 02/15/25 1358    HS Troponin 0hr (reflex protocol) [[id number]]  (Normal) Collected: 02/15/25 1316    Lab Status: Final result Specimen: Blood from Arm, Right Updated: 02/15/25 1353     hs TnI 0hr 24 ng/L     Comprehensive metabolic panel [[id number]]  (Abnormal) Collected: 02/15/25 1316    Lab Status: Final result Specimen: Blood from Arm, Right Updated: 02/15/25 1351     Sodium 137 mmol/L      Potassium 4.6 mmol/L      Chloride 93 mmol/L      CO2 34 mmol/L      ANION GAP 10 mmol/L      BUN 34 mg/dL      Creatinine 0.79 mg/dL      Glucose 129 mg/dL      Calcium 10.2 mg/dL      AST 13 U/L      ALT 9 U/L      Alkaline Phosphatase 74 U/L      Total Protein 7.4 g/dL      Albumin 3.8 g/dL      Total Bilirubin 0.55 mg/dL      eGFR 101 ml/min/1.73sq m     Narrative:      National Kid[employer] guidelines for Chronic Kidney Disease (CKD):     Stage 1 with normal or high GFR (GFR > 90 mL/min/1.73 square meters)    Stage 2 Mild CKD (GFR = 60-89 mL/min/1.73 square meters)    Stage 3A Moderate CKD (GFR = 45-59 mL/min/1.73 square meters)    Stage 3B Moderate CKD (GFR = 30-44 mL/min/1.73 square meters)    Stage 4 Severe CKD (GFR = 15-29 mL/min/1.73 square meters)    Stage 5 End Stage CKD (GFR <15 mL/min/1.73 square meters)  Note: GFR calculation is accurate only with a steady state creatinine    CBC  and differential [[id number]]  (Abnormal) Collected: 02/15/25 1316    Lab Status: Final result Specimen: Blood from Arm, Right Updated: 02/15/25 1333     WBC 10.78 Thousand/uL      RBC 4.10 Million/uL      Hemoglobin 10.4 g/dL      Hematocrit 34.7 %      MCV 85 fL      MCH 25.4 pg      MCHC 30.0 g/dL      RDW 17.1 %      MPV 10.0 fL      Platelets 208 Thousands/uL      nRBC 0 /100 WBCs      Segmented % 84 %      Immature Grans % 0 %      Lymphocytes % 9 %      Monocytes % 6 %      Eosinophils Relative 0 %      Basophils Relative 1 %      Absolute Neutrophils 9.05 Thousands/µL      Absolute Immature Grans 0.03 Thousand/uL      Absolute Lymphocytes 0.98 Thousands/µL      Absolute Monocytes 0.67 Thousand/µL      Eosinophils Absolute 0.00 Thousand/µL      Basophils Absolute 0.05 Thousands/µL             CTA chest pe study   Final Interpretation by Christopher Vargas MD (02/15 1537)         1. No evidence of acute pulmonary embolus.   2. Stable small bilateral pleural effusions and loculated right pleural fluid.   3. Stable airspace consolidation and atelectasis in the right lung and airspace opacities at the left lung base suggesting tumor and/or postobstructive pneumonia.   4. Diffuse lymphangitic spread of tumor, not significantly changed.                  Workstation performed: [id number]         XR chest 2 views   Final Interpretation by Nabil Raygoza MD (02/15 1341)      Persistent vascular prominence with diffuse interstitial and airspace opacities right greater than left. Findings thought to represent a combination of lymphangitic tumor spread and edema, based on prior imaging studies. Findings appear mildly increased    since the prior chest radiograph.               Workstation performed: [id number]             Procedures    ED Medication and Procedure Management   Prior to Admission Medications   Prescriptions Last Dose Informant Patient Reported? Taking?   Glucagon, rDNA, (Glucagon Emergency) 1 MG KIT  Self  No No   Sig: Inject 1 mg as directed once as needed (hypoglycemia) for up to 1 dose   Patient not taking: Reported on 2024   Lancets (OneTouch Delica Plus [id number]) MISC  Self No No   Sig: Use 1 Units 4 (four) times a day   OneTouch Verio test strip  Self No No   Sig: Use 1 each 4 (four) times a day   QUEtiapine (SEROquel) 25 mg tablet   No No   Sig: Take 1 tablet (25 mg total) by mouth daily at bedtime   acetaminophen (TYLENOL) 325 mg tablet  Self No No   Sig: Take 3 tablets (975 mg total) by mouth every 8 (eight) hours as needed for mild pain, headaches or fever   albuterol (PROVENTIL HFA,VENTOLIN HFA) 90 mcg/act inhaler  Self No No   Sig: INHALE 2 PUFFS EVERY 6 HOURS AS NEEDED FOR WHEEZING   amitriptyline (ELAVIL) 100 mg tablet  Self Yes No   Si mg daily at bedtime   apixaban (Eliquis) 5 mg   No No   Sig: Take 1 tablet (5 mg total) by mouth 2 (two) times a day   atorvastatin (LIPITOR) 10 mg tablet  Self No No   Sig: Take 1 tablet (10 mg total) by mouth daily   benzonatate (TESSALON) 200 MG capsule  Self No No   Sig: Take 1 capsule (200 mg total) by mouth 3 (three) times a day as needed for cough   diazepam (VALIUM) 5 mg tablet   No No   Sig: Take 1 tablet (5 mg total) by mouth 2 (two) times a day   famotidine (PEPCID) 40 MG tablet  Self Yes No   Sig: Take 40 mg by mouth daily as needed for heartburn or indigestion Taking as needed   folic acid (FOLVITE) 1 mg tablet  Self No No   Sig: TAKE 1 TABLET BY MOUTH EVERY DAY   glimepiride (AMARYL) 2 mg tablet  Self No No   Sig: TAKE 1 TABLET BY MOUTH DAILY WITH BREAKFAST   levothyroxine 25 mcg tablet  Self No No   Sig: TAKE 1 TABLET BY MOUTH EVERY DAY   losartan (COZAAR) 25 mg tablet   No No   Sig: Take 1 tablet (25 mg total) by mouth daily   magnesium Oxide (MAG-OX) 400 mg TABS   No No   Sig: Take 1 tablet (400 mg total) by mouth daily   meclizine (ANTIVERT) 25 mg tablet  Self No No   Sig: Take 1 tablet (25 mg total) by mouth every 8 (eight) hours as needed  for dizziness   Patient not taking: Reported on 2024   metFORMIN (GLUCOPHAGE) 1000 MG tablet  Self Yes No   Sig: Take 1,000 mg by mouth 2 (two) times a day   metoprolol tartrate (LOPRESSOR) 25 mg tablet   No No   Sig: Take 1 tablet (25 mg total) by mouth every 12 (twelve) hours   naloxone (NARCAN) 4 mg/0.1 mL nasal spray   No No   Sig: Administer 1 spray into a nostril. If no response after 2-3 minutes, give another dose in the other nostril using a new spray. For use in emergencies for opioid / pain medication reversal for accidental ingestion, respiratory depression, sedation or concerns for overdose.   ondansetron (ZOFRAN-ODT) 8 mg disintegrating tablet  Self No No   Sig: Take 1 tablet (8 mg total) by mouth every 8 (eight) hours as needed for vomiting or nausea   oxyCODONE (ROXICODONE) 5 immediate release tablet   No No   Sig: Take 1-2 tablets (5-10 mg total) by mouth every 4 (four) hours as needed (for cancer related pain/air hunger) Max Daily Amount: 60 mg   senna (SENOKOT) 8.6 mg  Self No No   Sig: Take 1 tablet (8.6 mg total) by mouth daily at bedtime as needed for constipation   sodium chloride (OCEAN) 0.65 % nasal spray   No No   Si spray into each nostril every hour as needed for congestion   tiotropium-olodaterol (Stiolto Respimat) 2.5-2.5 MCG/ACT inhaler  Self No No   Sig: Inhale 2 puffs daily      Facility-Administered Medications: None     Patient's Medications   Discharge Prescriptions    No medications on file     No discharge procedures on file.  ED SEPSIS DOCUMENTATION   Time reflects when diagnosis was documented in both MDM as applicable and the Disposition within this note       Time User Action Codes Description Comment    2/15/2025  1:47 PM Brianna Melendez [R06.00] Dyspnea     2/15/2025  1:47 PM Brianna Melendez [R09.02] Hypoxemia     2/15/2025  4:06 PM Brianna Melendez [J18.9] Postobstructive pneumonia                  Brianna Melendez MD  02/15/25 0307

## 2025-02-15 NOTE — H&P
H&P - Hospitalist   Name: Papo Gibbs 54 y.o. male I MRN: [id number]  Unit/Bed#: S -01 I Date of Admission: 2/15/2025   Date of Service: 2/16/2025 I Hospital Day: 1     Assessment & Plan  Postobstructive pneumonia  Presenting with SOB since last night.  With acute on chronic hypoxic respiratory failure, up to 6 L  Met sepsis criteria with WBC 10.78, .  Afebrile  Recent hospitalization at [facility] from 2/2 to 2/13 for postobstructive pneumonia with 7 days of IV cefepime and IV vancomycin  Flu/RSV/COVID - negative  CXR - persistent vascular prominence with diffuse interstitial and airspace opacities right greater than left. Findings thought to represent a combination of lymphangitic tumor spread and edema  CTA chest w/ contrast - stable airspace consolidation and atelectasis in the right lung and airspace opacity at the left lung base suggesting tumor and/or persistent within pneumonia  Received 2 L LR bolus and 1 dose of IV vancomycin and IV Zosyn in the ED    Plan:  Trend WBC and procalcitonin  Start IV ceftriaxone 1 g qd  Do not suspect MRSA pneumonia or parapneumonic effusion/empyema   With wheezing on auscultation.  Start IV Solu-Medrol 40 mg q8h. Xopenex/Atrovent TID.  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 Sandoval ([facility]/onc)  Diagnosed in April 2024  May to August 2024 - s/p carboplatin, Alimta, Keytruda  July 2024 - Received brain radiation  Oct 2024 - Received palliative Pemetrex with Keytruda from Nov to Dec 2024  Currently on chemotherapy - Ramucirumab + Docetaxel q21 days. Last on 1/21/2025  Patient does not  want any further chemotherapy.  They are interested in immunotherapy  Heme/onc consulted, appreciate recommendations  Pleural effusion  Has right-sided Pleurx catheter in place for recurrent pleural effusion  CTA chest w/ contrast - stable small bilateral pleural effusions and loculated right pleural fluid  Echo (12/9/24) - EF 60 percent.  Diastolic function is normal.  Mild AR.  Aortic valve sclerosis.  Essential hypertension  Home meds: Losartan 25 mg, Lopressor 25 mg BID    Plan:  Continue home Lopressor  Hold home losartan due to soft blood pressures  History of pulmonary embolism  Diagnosed on 10/4/24  In the setting of active malignancy  Continue home Eliquis 5 mg BID  Diabetes mellitus (HCC)  Lab Results   Component Value Date    HGBA1C 8.1 (H) 12/09/2024   Home meds: Metformin 1000 mg BID    Plan:  SSI only  Can uptitrate insulin regimen as necessary  Hypoglycemia protocol  Hypothyroidism  Continue home levothyroxine 25 mcg  Cancer associated pain  Follows with palliative care in the outpatient setting  Per palliative care note on 2/13  Continue home Valium 5 mg BID  Oxycodone IR 5 mg every 4 hours as needed for moderate pain  Oxycodone 10 mg every 4 hours as needed for severe pain  IV morphine 2 mg every 4 hours as needed for breakthrough pain  Palliative care consulted, appreciate recommendations  Restrictive lung disease  Continue home Stiolto and albuterol prn  Hyperlipidemia  Continue home atorvastatin 10 mg  Sepsis (HCC)  See plan under postobstructive pneumonia  Anemia  Lab Results   Component Value Date    HGB 10.4 (L) 02/15/2025    MCV 85 02/15/2025    IRON 20 (L) 02/09/2025    FERRITIN 107 02/09/2025    TIBC 233.8 (L) 02/09/2025    UIBC 214 02/09/2025     POA Hgb 10.4 (baseline is 9-10)  No active bleeding  Etiology could be anemia of chronic disease versus chemotherapy-induced  Continue to monitor  GERD (gastroesophageal reflux disease)  Continue home famotidine 40 mg prn  Sinus  tachycardia  Continue home Lopressor 25 mg BID  Monitor on telemetry to look for other intermittent arrhythmias     VTE Pharmacologic Prophylaxis: VTE Score: 8 High Risk (Score >/= 5) - Pharmacological DVT Prophylaxis Ordered: apixaban (Eliquis). Sequential Compression Devices Ordered.  Code Status: Level 3 - DNAR and DNI   Discussion with family: Updated  (mother and sister) at bedside.    Anticipated Length of Stay: Patient will be admitted on an inpatient basis with an anticipated length of stay of greater than 2 midnights secondary to acute on chronic hypoxic respiratory failure secondary to postobstructive pneumonia in the setting of metastatic lung adenocarcinoma.    History of Present Illness   Chief Complaint: Shortness of breath    Papo Gibbs is a 54 y.o. male with a PMH of HTN, HLD, DM2 (HgbA1c 8.1%), metastatic lung adenocarcinoma to brain (on chemo - Ramucirumab, Docetaxel, last on 1/21/2025), chronic hypoxic respiratory failure (on 4 L), recurrent right-sided pleural effusion (with Pleurx catheter in place), PE (on Eliquis), hypothyroidism, and GERD who presents with shortness of breath since last night.  Of note, patient was recently hospitalized from 2/2/25 to 2/13/2025 for postobstructive pneumonia that was treated with 7 days of IV cefepime and IV vancomycin.  Per mother and sister at bedside, patient was desaturating into the 80s on 4 L at home and endorsing chest wall and abdominal pain particularly with inspiration since yesterday.  They deny fevers, chills, cough, congestion, recent sick contact.    Upon presentation, patient's vital signs were remarkable for tachycardia  and 92% on 4 L.  CBC with WBC 10.78, Hgb 10.4 (baseline is 9-10).  CMP with Cl 93, CO2 34, BUN 34.  Troponins negative.  EKG shows sinus tachycardia 135, QTc 429.  Procalcitonin negative.  Lactic acid negative.  Flu/RSV/COVID negative.  CXR shows persistent vascular prominence with diffuse interstitial  and airspace opacities, right greater than left; findings thought to represent a combination of lymphangitic tumor spread and edema.  CTA chest with contrast was negative for acute PE.  It did show stable small bilateral pleural effusions and loculated right pleural fluid; stable airspace consolidation and atelectasis in the right lung and airspace opacities at the left lung base suggesting tumor and/or postobstructive pneumonia. In the ED, he was given 2 L bolus, oxycodone 10 mg, and started on IV Zosyn and IV vancomycin.  Admitted to [facility] for further management of acute on chronic hypoxic respiratory failure secondary to postobstructive pneumonia in the setting of metastatic lung adenocarcinoma with history of recurrent pleural effusions.    Review of Systems   Constitutional:  Positive for fatigue. Negative for chills and fever.   HENT:  Negative for congestion.    Respiratory:  Positive for shortness of breath and wheezing. Negative for cough.    Cardiovascular:  Negative for chest pain, palpitations and leg swelling.   Gastrointestinal:  Negative for abdominal pain, blood in stool, constipation, diarrhea, nausea and vomiting.   Genitourinary:  Negative for dysuria.   Musculoskeletal:  Negative for neck pain.        +Chest wall/abdominal wall pain   Skin:  Negative for rash and wound.   Neurological:  Negative for weakness, light-headedness, numbness and headaches.       Historical Information   Past Medical History:   Diagnosis Date    Cancer (HCC) 04,25,2024    Diabetes mellitus (HCC)     High cholesterol     History of blood clots     Hypertension     Leucocytosis 04/20/2024    Lung cancer (HCC)     Malignant neoplasm of overlapping sites of right lung (HCC) 06/04/2024    Pneumonia     Pulmonary embolism (HCC)      Past Surgical History:   Procedure Laterality Date    IR BIOPSY LYMPH NODE  4/23/2024    IR PORT PLACEMENT  5/20/2024    IR THORACENTESIS  12/9/2024    KNEE SURGERY      MANDIBLE FRACTURE SURGERY   1985    PATELLA FRACTURE SURGERY      RECTAL SURGERY       Social History     Tobacco Use    Smoking status: Former     Current packs/day: 0.00     Average packs/day: 1.5 packs/day for 35.0 years (52.5 ttl pk-yrs)     Types: Cigarettes     Quit date: 4/15/2024     Years since quittin.8     Passive exposure: Past    Smokeless tobacco: Never   Vaping Use    Vaping status: Never Used   Substance and Sexual Activity    Alcohol use: Not Currently    Drug use: Never    Sexual activity: Yes     Partners: Female     Birth control/protection: Post-menopausal, None     E-Cigarette/Vaping    E-Cigarette Use Never User      E-Cigarette/Vaping Substances    Nicotine No     THC No     CBD No     Flavoring No     Other No     Unknown No        Social History:  Marital Status: Single   Occupation: Delivery  Patient Pre-hospital Living Situation: Home  Patient Pre-hospital Level of Mobility: walks  Patient Pre-hospital Diet Restrictions: Diabetic    Meds/Allergies   I have reviewed home medications with patient personally.  Prior to Admission medications    Medication Sig Start Date End Date Taking? Authorizing Provider   acetaminophen (TYLENOL) 325 mg tablet Take 3 tablets (975 mg total) by mouth every 8 (eight) hours as needed for mild pain, headaches or fever 24  Yes Lore Chambers PA-C   albuterol (PROVENTIL HFA,VENTOLIN HFA) 90 mcg/act inhaler INHALE 2 PUFFS EVERY 6 HOURS AS NEEDED FOR WHEEZING 24  Yes DIANNE Marroquin   amitriptyline (ELAVIL) 100 mg tablet 200 mg daily at bedtime 3/9/22  Yes Historical Provider, MD   apixaban (Eliquis) 5 mg Take 1 tablet (5 mg total) by mouth 2 (two) times a day 25  Yes Amelie Bacon MD   atorvastatin (LIPITOR) 10 mg tablet Take 1 tablet (10 mg total) by mouth daily 24  Yes Amelie Bacon MD   benzonatate (TESSALON) 200 MG capsule Take 1 capsule (200 mg total) by mouth 3 (three) times a day as needed for cough 25  Yes Maryana Sandoval MD   diazepam (VALIUM)  5 mg tablet Take 1 tablet (5 mg total) by mouth 2 (two) times a day 2/7/25  Yes Gordo Solis DO   famotidine (PEPCID) 40 MG tablet Take 40 mg by mouth daily as needed for heartburn or indigestion Taking as needed 4/2/24  Yes Historical Provider, MD   folic acid (FOLVITE) 1 mg tablet TAKE 1 TABLET BY MOUTH EVERY DAY 11/1/24  Yes Maryana Sandoval MD   levothyroxine 25 mcg tablet TAKE 1 TABLET BY MOUTH EVERY DAY 12/17/24  Yes Maryana Sandoval MD   losartan (COZAAR) 25 mg tablet Take 1 tablet (25 mg total) by mouth daily 1/22/25  Yes Amelie Bacon MD   magnesium Oxide (MAG-OX) 400 mg TABS Take 1 tablet (400 mg total) by mouth daily 1/22/25  Yes Amelie Bacon MD   metFORMIN (GLUCOPHAGE) 1000 MG tablet Take 1,000 mg by mouth 2 (two) times a day 6/17/24  Yes Historical Provider, MD   metoprolol tartrate (LOPRESSOR) 25 mg tablet Take 1 tablet (25 mg total) by mouth every 12 (twelve) hours 2/13/25  Yes Claudine Ruiz MD   oxyCODONE (ROXICODONE) 5 immediate release tablet Take 1-2 tablets (5-10 mg total) by mouth every 4 (four) hours as needed (for cancer related pain/air hunger) Max Daily Amount: 60 mg 2/7/25  Yes Gordo Solis DO   QUEtiapine (SEROquel) 25 mg tablet Take 1 tablet (25 mg total) by mouth daily at bedtime 2/13/25  Yes Claudine Ruiz MD   senna (SENOKOT) 8.6 mg Take 1 tablet (8.6 mg total) by mouth daily at bedtime as needed for constipation 12/18/24  Yes Rikki Estrella DO   sodium chloride (OCEAN) 0.65 % nasal spray 1 spray into each nostril every hour as needed for congestion 2/13/25 3/15/25 Yes Claudine Ruiz MD   tiotropium-olodaterol (Stiolto Respimat) 2.5-2.5 MCG/ACT inhaler Inhale 2 puffs daily 6/20/24  Yes Lashonda T [place], CRNP   Lancets (OneTouch Delica Plus [id number]) MISC Use 1 Units 4 (four) times a day 9/24/24   Amelie Bacon MD   naloxone (NARCAN) 4 mg/0.1 mL nasal spray Administer 1 spray into a nostril. If no response after 2-3 minutes, give another dose in the other nostril using a new spray.  For use in emergencies for opioid / pain medication reversal for accidental ingestion, respiratory depression, sedation or concerns for overdose. 2/7/25 2/7/26  Gordo Solis,    ondansetron (ZOFRAN-ODT) 8 mg disintegrating tablet Take 1 tablet (8 mg total) by mouth every 8 (eight) hours as needed for vomiting or nausea 1/21/25   Rikki Estrella DO   OneTouch Verio test strip Use 1 each 4 (four) times a day 9/24/24   Amelie Bacon MD   glimepiride (AMARYL) 2 mg tablet TAKE 1 TABLET BY MOUTH DAILY WITH BREAKFAST 12/4/24 2/15/25  Amelie Bacon MD   Glucagon, rDNA, (Glucagon Emergency) 1 MG KIT Inject 1 mg as directed once as needed (hypoglycemia) for up to 1 dose  Patient not taking: Reported on 12/18/2024 6/4/24 2/15/25  Amelie Bacon MD   meclizine (ANTIVERT) 25 mg tablet Take 1 tablet (25 mg total) by mouth every 8 (eight) hours as needed for dizziness  Patient not taking: Reported on 12/18/2024 12/12/24 2/15/25  Lore Chambers PA-C     No Known Allergies    Objective :  Temp:  [96.8 °F (36 °C)-99.1 °F (37.3 °C)] 97.2 °F (36.2 °C)  HR:  [120-135] 129  BP: ()/(56-91) 114/66  Resp:  [18-24] 18  SpO2:  [64 %-98 %] 98 %  O2 Device: BiPAP  Nasal Cannula O2 Flow Rate (L/min):  [4 L/min-8 L/min] 8 L/min  FiO2 (%):  [80] 80    Physical Exam  Constitutional:       General: He is in acute distress.      Appearance: Normal appearance. He is ill-appearing. He is not diaphoretic.   HENT:      Mouth/Throat:      Mouth: Mucous membranes are dry.   Eyes:      Extraocular Movements: Extraocular movements intact.   Cardiovascular:      Rate and Rhythm: Regular rhythm. Tachycardia present.      Heart sounds: No murmur heard.  Pulmonary:      Breath sounds: Wheezing and rales present. No rhonchi.      Comments: On 6 L.  Decreased breath sounds at bases.  Using abdominal muscles to breathe.  Pleurx catheter in right chest  Abdominal:      General: Bowel sounds are normal. There is distension.      Palpations: Abdomen is soft.       Tenderness: There is no abdominal tenderness. There is no guarding or rebound.   Musculoskeletal:         General: No swelling or tenderness. Normal range of motion.      Cervical back: Normal range of motion and neck supple.      Right lower leg: No edema.      Left lower leg: No edema.   Skin:     General: Skin is warm.   Neurological:      General: No focal deficit present.      Mental Status: He is alert and oriented to person, place, and time.      Comments: Mildly lethargic due to oxycodone 10 mg given prior to my examination. But A&Ox3        Lines/Drains:  Lines/Drains/Airways       Active Status       Name Placement date Placement time Site Days    Port A Cath 05/20/24 Right Chest 05/20/24  1427  Chest  271                  Central Line:  Goal for removal: N/A - Chronic PICC             Lab Results: I have reviewed the following results:  Results from last 7 days   Lab Units 02/15/25  1316   WBC Thousand/uL 10.78*   HEMOGLOBIN g/dL 10.4*   HEMATOCRIT % 34.7*   PLATELETS Thousands/uL 208   SEGS PCT % 84*   LYMPHO PCT % 9*   MONO PCT % 6   EOS PCT % 0     Results from last 7 days   Lab Units 02/16/25  0635 02/15/25  1316   SODIUM mmol/L 136 137   POTASSIUM mmol/L 5.9* 4.6   CHLORIDE mmol/L 97 93*   CO2 mmol/L 32 34*   BUN mg/dL 36* 34*   CREATININE mg/dL 1.01 0.79   ANION GAP mmol/L 7 10   CALCIUM mg/dL 9.7 10.2   ALBUMIN g/dL  --  3.8   TOTAL BILIRUBIN mg/dL  --  0.55   ALK PHOS U/L  --  74   ALT U/L  --  9   AST U/L  --  13   GLUCOSE RANDOM mg/dL 188* 129         Results from last 7 days   Lab Units 02/16/25  0628 02/15/25  2038 02/15/25  1817 02/13/25  1100 02/13/25  0706 02/12/25  2127 02/12/25  1537 02/12/25  1053 02/12/25  0722 02/11/25  2140 02/11/25  1606 02/11/25  1112   POC GLUCOSE mg/dl 208* 140 134 144* 123 127 130 176* 128 137 149* 198*     Lab Results   Component Value Date    HGBA1C 8.1 (H) 12/09/2024    HGBA1C 6.7 (H) 06/28/2024    HGBA1C 7.1 (H) 04/20/2024     Results from last 7 days   Lab  Units 02/16/25  0635 02/15/25  1542 02/15/25  1350   LACTIC ACID mmol/L  --   --  1.1   PROCALCITONIN ng/ml 0.20 0.15  --        Imaging Results Review: I reviewed radiology reports from this admission including: chest xray and CT chest.  Other Study Results Review: EKG was reviewed.     Administrative Statements     ** Please Note: This note has been constructed using a voice recognition system. **

## 2025-02-15 NOTE — ASSESSMENT & PLAN NOTE
Has right-sided Pleurx catheter in place for recurrent pleural effusion  CTA chest w/ contrast - stable small bilateral pleural effusions and loculated right pleural fluid  Echo (12/9/24) - EF 60 percent.  Diastolic function is normal.  Mild AR.  Aortic valve sclerosis.

## 2025-02-15 NOTE — ASSESSMENT & PLAN NOTE
Presenting with SOB since last night.  With acute on chronic hypoxic respiratory failure, up to 6 L  Met sepsis criteria with WBC 10.78, .  Afebrile  Recent hospitalization at [facility] from 2/2 to 2/13 for postobstructive pneumonia with 7 days of IV cefepime and IV vancomycin  Flu/RSV/COVID - negative  CXR - persistent vascular prominence with diffuse interstitial and airspace opacities right greater than left. Findings thought to represent a combination of lymphangitic tumor spread and edema  CTA chest w/ contrast - stable airspace consolidation and atelectasis in the right lung and airspace opacity at the left lung base suggesting tumor and/or persistent within pneumonia  Received 2 L LR bolus and 1 dose of IV vancomycin and IV Zosyn in the ED    Plan:  Trend WBC and procalcitonin  Start IV ceftriaxone 1 g qd  Do not suspect MRSA pneumonia or parapneumonic effusion/empyema   With wheezing on auscultation.  Start IV Solu-Medrol 40 mg q8h. Xopenex/Atrovent TID.  Respiratory protocol  Pulmonology consulted, appreciate recommendations  Follow-up on blood cultures  Follow-up on MRSA culture

## 2025-02-15 NOTE — ASSESSMENT & PLAN NOTE
Lab Results   Component Value Date    HGBA1C 8.1 (H) 12/09/2024   Home meds: Metformin 1000 mg BID    Plan:  SSI only  Can uptitrate insulin regimen as necessary  Hypoglycemia protocol

## 2025-02-16 PROBLEM — K59.00 CONSTIPATION: Status: ACTIVE | Noted: 2025-01-01

## 2025-02-16 NOTE — CONSULTS
Pulmonary Consultation   Papo Gibbs 54 y.o. male MRN: [id number]  Unit/Bed#: S -01 Encounter: [id number]      Reason for consultation: shortness of breath    Requesting physician: Dr. Martinez      Assessment & Plan  Postobstructive pneumonia  -  no clear sign of infection  -  procal negative and no change on imaging  -  consider monitoring off of abx  Adenocarcinoma of lung (HCC)  -  w/ lymphangitic spread  -  continue with palliative care discussions  -  likely cause of abdominal pain and dyspnea  Cancer associated pain  -  opioids per primary team  History of pulmonary embolism  -  continue with blood thinner  Pleural effusion  -  small/stable; no indication for thoracentesis  Diabetes mellitus (HCC)  Lab Results   Component Value Date    HGBA1C 8.1 (H) 12/09/2024       Recent Labs     02/13/25  1100 02/15/25  1817 02/15/25  2038 02/16/25  0628   POCGLU 144* 134 140 208*       Blood Sugar Average: Last 72 hrs:  (P) [id number]    Sepsis (HCC)  -  unclear if infection present; more likley related to extensive cancer  Acute on chronic hypoxic respiratory failure (HCC)  -  d/w RT  -  transitioned off bipap to MFNC  -  previously noted difficulty with HFNC    Plan of care discussed with [facility].      History of Present Illness   HPI:  Papo Gibbs is a 54 y.o. male who has a pmhx sig for metastatic lung ca w/ recent prolonged admission who presents with shortness of breath and chest pain.  The patient was recently discharged about 3 days ago.  Per his wife, the patient noted pain along his upper abdomen in a cough.  He denies a cough, fevers or chills.  He denies nausea or vomiting.  He noted his oxygen levels were stable at home.  .    Review of systems:  12 point review of systems was completed and was otherwise negative except as listed in HPI.      Historical Information   Past Medical History:   Diagnosis Date    Cancer (HCC) 04,25,2024    Diabetes mellitus (HCC)     High cholesterol     History of  blood clots     Hypertension     Leucocytosis 2024    Lung cancer (HCC)     Malignant neoplasm of overlapping sites of right lung (HCC) 2024    Pneumonia     Pulmonary embolism (HCC)      Past Surgical History:   Procedure Laterality Date    IR BIOPSY LYMPH NODE  2024    IR PORT PLACEMENT  2024    IR THORACENTESIS  2024    KNEE SURGERY      MANDIBLE FRACTURE SURGERY  1985    PATELLA FRACTURE SURGERY      RECTAL SURGERY       Family History   Problem Relation Age of Onset    No Known Problems Father     No Known Problems Mother      Social History     Socioeconomic History    Marital status: Single     Spouse name: None    Number of children: None    Years of education: None    Highest education level: None   Occupational History    None   Tobacco Use    Smoking status: Former     Current packs/day: 0.00     Average packs/day: 1.5 packs/day for 35.0 years (52.5 ttl pk-yrs)     Types: Cigarettes     Quit date: 4/15/2024     Years since quittin.8     Passive exposure: Past    Smokeless tobacco: Never   Vaping Use    Vaping status: Never Used   Substance and Sexual Activity    Alcohol use: Not Currently    Drug use: Never    Sexual activity: Yes     Partners: Female     Birth control/protection: Post-menopausal, None   Other Topics Concern    None   Social History Narrative    None     Social Drivers of Health     Financial Resource Strain: Not on file   Food Insecurity: No Food Insecurity (2/15/2025)    Nursing - Inadequate Food Risk Classification     Worried About Running Out of Food in the Last Year: Not on file     Ran Out of Food in the Last Year: Not on file     Ran Out of Food in the Last Year: Never true   Transportation Needs: No Transportation Needs (2/15/2025)    Nursing - Transportation Risk Classification     Lack of Transportation: Not on file     Lack of Transportation: No   Physical Activity: Not on file   Stress: Not on file   Social Connections: Not on file   Intimate  Partner Violence: Unknown (2/15/2025)    Nursing IPS     Feels Physically and Emotionally Safe: Not on file     Physically Hurt by Someone: Not on file     Humiliated or Emotionally Abused by Someone: Not on file     Physically Hurt by Someone: No     Hurt or Threatened by Someone: No   Housing Stability: Unknown (2/15/2025)    Nursing: Inadequate Housing Risk Classification     Has Housing: Not on file     Worried About Losing Housing: Not on file     Unable to Get Utilities: Not on file     Unable to Pay for Housing in the Last Year: No     Has Housin         Meds/Allergies     Current Facility-Administered Medications:     acetaminophen (TYLENOL) tablet 975 mg, Q8H LATONIA    albuterol (PROVENTIL HFA,VENTOLIN HFA) inhaler 2 puff, Q4H PRN    amitriptyline (ELAVIL) tablet 200 mg, HS    apixaban (ELIQUIS) tablet 5 mg, BID    atorvastatin (LIPITOR) tablet 10 mg, Daily    benzonatate (TESSALON PERLES) capsule 200 mg, TID PRN    bisacodyl (DULCOLAX) rectal suppository 10 mg, Daily PRN    ceftriaxone (ROCEPHIN) 1 g/50 mL in dextrose IVPB, Q24H, Last Rate: 1,000 mg (02/15/25 1947)    diazepam (VALIUM) tablet 5 mg, BID    famotidine (PEPCID) tablet 40 mg, Daily PRN    folic acid (FOLVITE) tablet 1,000 mcg, Daily    furosemide (LASIX) injection 40 mg, Once    insulin lispro (HumALOG/ADMELOG) 100 units/mL subcutaneous injection 1-6 Units, 4x Daily (AC & HS) **AND** Fingerstick Glucose (POCT), 4x Daily AC and at bedtime    ipratropium (ATROVENT) 0.02 % inhalation solution 0.5 mg, TID    levalbuterol (XOPENEX) inhalation solution 1.25 mg, TID    levothyroxine tablet 25 mcg, Early Morning    [Held by provider] losartan (COZAAR) tablet 25 mg, Daily    magnesium Oxide (MAG-OX) tablet 400 mg, Daily    methylPREDNISolone sodium succinate (Solu-MEDROL) injection 40 mg, Q8H LATONIA    metoprolol tartrate (LOPRESSOR) tablet 25 mg, Q12H LATONIA    metroNIDAZOLE (FLAGYL) IVPB (premix) 500 mg 100 mL, Q8H    morphine injection 2 mg, Q4H PRN     umeclidinium 62.5 mcg/actuation inhaler AEPB 1 puff, Daily **AND** olodaterol HCl (STRIVERDI RESPIMAT) inhaler 2 puff, Daily    ondansetron (ZOFRAN) injection 4 mg, Q4H PRN    oxyCODONE (ROXICODONE) immediate release tablet 10 mg, Q4H PRN    oxyCODONE (ROXICODONE) IR tablet 5 mg, Q4H PRN    polyethylene glycol (MIRALAX) packet 17 g, Daily    QUEtiapine (SEROquel) tablet 25 mg, HS    senna-docusate sodium (SENOKOT S) 8.6-50 mg per tablet 1 tablet, HS    sodium chloride (OCEAN) 0.65 % nasal spray 1 spray, Q1H PRN    Medications Prior to Admission:     acetaminophen (TYLENOL) 325 mg tablet    albuterol (PROVENTIL HFA,VENTOLIN HFA) 90 mcg/act inhaler    amitriptyline (ELAVIL) 100 mg tablet    apixaban (Eliquis) 5 mg    atorvastatin (LIPITOR) 10 mg tablet    benzonatate (TESSALON) 200 MG capsule    diazepam (VALIUM) 5 mg tablet    famotidine (PEPCID) 40 MG tablet    folic acid (FOLVITE) 1 mg tablet    levothyroxine 25 mcg tablet    losartan (COZAAR) 25 mg tablet    magnesium Oxide (MAG-OX) 400 mg TABS    metFORMIN (GLUCOPHAGE) 1000 MG tablet    metoprolol tartrate (LOPRESSOR) 25 mg tablet    oxyCODONE (ROXICODONE) 5 immediate release tablet    QUEtiapine (SEROquel) 25 mg tablet    senna (SENOKOT) 8.6 mg    sodium chloride (OCEAN) 0.65 % nasal spray    tiotropium-olodaterol (Stiolto Respimat) 2.5-2.5 MCG/ACT inhaler    Lancets (OneTouch Delica Plus [id number]) MISC    naloxone (NARCAN) 4 mg/0.1 mL nasal spray    ondansetron (ZOFRAN-ODT) 8 mg disintegrating tablet    OneTouch Verio test strip  No Known Allergies    Vitals: Blood pressure 97/65, pulse (!) 124, temperature (!) 97.2 °F (36.2 °C), resp. rate 18, SpO2 96%., bipap, There is no height or weight on file to calculate BMI.    No intake or output data in the 24 hours ending 02/16/25 1002    Physical Exam  General: slight distress,  CARDIAC  tachycardia  PULM: diminished breath sounds  CHEST: No gross deformities, equal chest expansion on inspiration bilaterally  ABD:  "Normoactive bowel sounds, soft nontender, nondistended, no rebound, no rigidity, no guarding  EXT: No cyanosis, no clubbing, no edema, normal capillary refill  SKIN:  No rashes, no lesions  NEURO: no focal neurologic deficits,moving all extremities appropriately    Labs: I have personally reviewed pertinent lab results.  Results from last 7 days   Lab Units 02/16/25  0807 02/16/25  0639 02/15/25  1316 02/12/25  0519   WBC Thousand/uL 7.18  --  10.78* 8.34   HEMOGLOBIN g/dL 9.2*  --  10.4* 9.9*   I STAT HEMOGLOBIN g/dl  --  10.2*  --   --    HEMATOCRIT % 30.8*  --  34.7* 33.2*   HEMATOCRIT, ISTAT %  --  30*  --   --    PLATELETS Thousands/uL 194  --  208 169   SEGS PCT %  --   --  84* 75   MONO PCT % 1*  --  6 8   EOS PCT % 0  --  0 0      Results from last 7 days   Lab Units 02/16/25  0807 02/16/25  0639 02/16/25  0635 02/15/25  1316 02/12/25  0519   POTASSIUM mmol/L 5.5*  --  5.9* 4.6 4.4   CHLORIDE mmol/L  --   --  97 93* 99   CO2 mmol/L  --   --  32 34* 36*   CO2, I-STAT mmol/L  --  33*  --   --   --    BUN mg/dL  --   --  36* 34* 22   CREATININE mg/dL  --   --  1.01 0.79 0.74   CALCIUM mg/dL  --   --  9.7 10.2 9.8   ALK PHOS U/L  --   --   --  74 72   ALT U/L  --   --   --  9 10   AST U/L  --   --   --  13 13   GLUCOSE, ISTAT mg/dl  --  191*  --   --   --      Results from last 7 days   Lab Units 02/16/25 0635 02/11/25  0513 02/10/25  0452   MAGNESIUM mg/dL 1.9 1.7* 2.1     Results from last 7 days   Lab Units 02/16/25 0635 02/11/25  0513 02/10/25  0452   PHOSPHORUS mg/dL 5.5* 3.3 3.6          Results from last 7 days   Lab Units 02/15/25  1350   LACTIC ACID mmol/L 1.1     No results found for: \"TROPONINI\"    Imaging and other studies: Results Review Statement: I reviewed radiology reports from this admission including: CT chest.  2/15/2025:  PULMONARY ARTERIAL TREE:  No filling defect in the pulmonary arteries to indicate an acute embolus.  LUNGS: Respiratory motion artifact limits evaluation. Stable diffuse " lymphangitic spread of tumor. Patchy airspace opacities at the base of the left lung are stable. Stable airspace consolidation in the right upper and lower lobes. Stable narrowing of   proximal right upper, middle and lower lobe segmental bronchi and occlusion of distal branches mild Faulker.  PLEURA: Stable small bilateral pleural effusions with pleural fluid. Stable  HEART/GREAT VESSELS: Stable mild cardiomegaly. No thoracic aortic aneurysm or dissection.  MEDIASTINUM AND JEREMIAH: Unremarkable stable mildly enlarged prevascular lymph nodes  CHEST WALL AND LOWER NECK: Unremarkable.  VISUALIZED STRUCTURES IN THE UPPER ABDOMEN: Unremarkable.  OSSEOUS STRUCTURES: No acute fracture or destructive osseous lesion.    Pulmonary function testing:Results Review Statement: No pertinent imaging studies reviewed.    EKG, Pathology, and Other Studies: Results Review Statement: No pertinent imaging studies reviewed.    Code Status: Level 3 - DNAR and DNI    Luca Martinez MD  [facility]

## 2025-02-16 NOTE — ASSESSMENT & PLAN NOTE
Lab Results   Component Value Date    HGB 9.2 (L) 02/16/2025    MCV 84 02/16/2025    IRON 20 (L) 02/09/2025    FERRITIN 107 02/09/2025    TIBC 233.8 (L) 02/09/2025    UIBC 214 02/09/2025     POA Hgb 10.4 (baseline is 9-10)  No active bleeding  Etiology could be anemia of chronic disease versus chemotherapy-induced  Continue to monitor

## 2025-02-16 NOTE — RESPIRATORY THERAPY NOTE
RT Note  Papo Gibbs 54 y.o. male MRN: [id number]  Unit/Bed#: S -01 Encounter: [id number]          Resp Comments: (P) pt taken off BIPAP per pulm request and placed on MID FLOW 12 L for pt comfort . physician stated ok for pt to use NRB corinne if needed for his comfert.

## 2025-02-16 NOTE — ASSESSMENT & PLAN NOTE
Lab Results   Component Value Date    HGBA1C 8.1 (H) 12/09/2024       Recent Labs     02/13/25  1100 02/15/25  1817 02/15/25  2038 02/16/25  0628   POCGLU 144* 134 140 208*       Blood Sugar Average: Last 72 hrs:  (P) [id number]

## 2025-02-16 NOTE — ASSESSMENT & PLAN NOTE
Home meds: Losartan 25 mg, Lopressor 25 mg BID    Plan:  Continue home Lopressor, patient is tachycardic  Hold home losartan due to soft blood pressures

## 2025-02-16 NOTE — PHYSICAL THERAPY NOTE
"                                                                                  PHYSICAL THERAPY NOTE    Patient Name: Papo Gibbs  Today's Date: 2/16/2025 02/16/25 1528   Note Type   Note type Cancelled Session   Cancel Reasons Medical status   Additional Comments Chart reviewed.  Patient had rapid response earlier this morning and was eventually transferred down to [place] [id number]. RR for \"Acute change in neuro status and acute change in RR\"  He is currently on a one-to-one observation.  Will cancel and follow      Neno Black, PT    "

## 2025-02-16 NOTE — ASSESSMENT & PLAN NOTE
Continue home Lopressor 25 mg BID  Monitor on telemetry to look for other intermittent arrhythmias

## 2025-02-16 NOTE — PLAN OF CARE
Problem: PAIN - ADULT  Goal: Verbalizes/displays adequate comfort level or baseline comfort level  Description: Interventions:  - Encourage patient to monitor pain and request assistance  - Assess pain using appropriate pain scale  - Administer analgesics based on type and severity of pain and evaluate response  - Implement non-pharmacological measures as appropriate and evaluate response  - Consider cultural and social influences on pain and pain management  - Notify physician/advanced practitioner if interventions unsuccessful or patient reports new pain  Outcome: Progressing     Problem: INFECTION - ADULT  Goal: Absence or prevention of progression during hospitalization  Description: INTERVENTIONS:  - Assess and monitor for signs and symptoms of infection  - Monitor lab/diagnostic results  - Monitor all insertion sites, i.e. indwelling lines, tubes, and drains  - Monitor endotracheal if appropriate and nasal secretions for changes in amount and color  - Murray appropriate cooling/warming therapies per order  - Administer medications as ordered  - Instruct and encourage patient and family to use good hand hygiene technique  - Identify and instruct in appropriate isolation precautions for identified infection/condition  Outcome: Progressing  Goal: Absence of fever/infection during neutropenic period  Description: INTERVENTIONS:  - Monitor WBC    Outcome: Progressing     Problem: SAFETY ADULT  Goal: Patient will remain free of falls  Description: INTERVENTIONS:  - Educate patient/family on patient safety including physical limitations  - Instruct patient to call for assistance with activity   - Consult OT/PT to assist with strengthening/mobility   - Keep Call bell within reach  - Keep bed low and locked with side rails adjusted as appropriate  - Keep care items and personal belongings within reach  - Initiate and maintain comfort rounds  - Make Fall Risk Sign visible to staff  - Apply yellow socks and bracelet  for high fall risk patients  - Consider moving patient to room near nurses station  Outcome: Progressing  Goal: Maintain or return to baseline ADL function  Description: INTERVENTIONS:  -  Assess patient's ability to carry out ADLs; assess patient's baseline for ADL function and identify physical deficits which impact ability to perform ADLs (bathing, care of mouth/teeth, toileting, grooming, dressing, etc.)  - Assess/evaluate cause of self-care deficits   - Assess range of motion  - Assess patient's mobility; develop plan if impaired  - Assess patient's need for assistive devices and provide as appropriate  - Encourage maximum independence but intervene and supervise when necessary  - Involve family in performance of ADLs  - Assess for home care needs following discharge   - Consider OT consult to assist with ADL evaluation and planning for discharge  - Provide patient education as appropriate  Outcome: Progressing  Goal: Maintains/Returns to pre admission functional level  Description: INTERVENTIONS:  - Perform AM-PAC 6 Click Basic Mobility/ Daily Activity assessment daily.  - Set and communicate daily mobility goal to care team and patient/family/caregiver.   - Collaborate with rehabilitation services on mobility goals if consulted  - Out of bed for toileting  - Record patient progress and toleration of activity level   Outcome: Progressing     Problem: Knowledge Deficit  Goal: Patient/family/caregiver demonstrates understanding of disease process, treatment plan, medications, and discharge instructions  Description: Complete learning assessment and assess knowledge base.  Interventions:  - Provide teaching at level of understanding  - Provide teaching via preferred learning methods  Outcome: Progressing     Problem: CARDIOVASCULAR - ADULT  Goal: Maintains optimal cardiac output and hemodynamic stability  Description: INTERVENTIONS:  - Monitor I/O, vital signs and rhythm  - Monitor for S/S and trends of decreased  cardiac output  - Administer and titrate ordered vasoactive medications to optimize hemodynamic stability  - Assess quality of pulses, skin color and temperature  - Assess for signs of decreased coronary artery perfusion  - Instruct patient to report change in severity of symptoms  Outcome: Progressing  Goal: Absence of cardiac dysrhythmias or at baseline rhythm  Description: INTERVENTIONS:  - Continuous cardiac monitoring, vital signs, obtain 12 lead EKG if ordered  - Administer antiarrhythmic and heart rate control medications as ordered  - Monitor electrolytes and administer replacement therapy as ordered  Outcome: Progressing     Problem: METABOLIC, FLUID AND ELECTROLYTES - ADULT  Goal: Electrolytes maintained within normal limits  Description: INTERVENTIONS:  - Monitor labs and assess patient for signs and symptoms of electrolyte imbalances  - Administer electrolyte replacement as ordered  - Monitor response to electrolyte replacements, including repeat lab results as appropriate  - Instruct patient on fluid and nutrition as appropriate  Outcome: Progressing  Goal: Fluid balance maintained  Description: INTERVENTIONS:  - Monitor labs   - Monitor I/O and WT  - Instruct patient on fluid and nutrition as appropriate  - Assess for signs & symptoms of volume excess or deficit  Outcome: Progressing  Goal: Glucose maintained within target range  Description: INTERVENTIONS:  - Monitor Blood Glucose as ordered  - Assess for signs and symptoms of hyperglycemia and hypoglycemia  - Administer ordered medications to maintain glucose within target range  - Assess nutritional intake and initiate nutrition service referral as needed  Outcome: Progressing     Problem: RESPIRATORY - ADULT  Goal: Achieves optimal ventilation and oxygenation  Description: INTERVENTIONS:  - Assess for changes in respiratory status  - Assess for changes in mentation and behavior  - Position to facilitate oxygenation and minimize respiratory  effort  - Oxygen administered by appropriate delivery if ordered  - Initiate smoking cessation education as indicated  - Encourage broncho-pulmonary hygiene including cough, deep breathe, Incentive Spirometry  - Assess the need for suctioning and aspirate as needed  - Assess and instruct to report SOB or any respiratory difficulty  - Respiratory Therapy support as indicated  Outcome: Progressing     Problem: Prexisting or High Potential for Compromised Skin Integrity  Goal: Skin integrity is maintained or improved  Description: INTERVENTIONS:  - Identify patients at risk for skin breakdown  - Assess and monitor skin integrity  - Assess and monitor nutrition and hydration status  - Monitor labs   - Assess for incontinence   - Turn and reposition patient  - Assist with mobility/ambulation  - Relieve pressure over bony prominences  - Avoid friction and shearing  - Provide appropriate hygiene as needed including keeping skin clean and dry  - Evaluate need for skin moisturizer/barrier cream  - Collaborate with interdisciplinary team   - Patient/family teaching  - Consider wound care consult   Outcome: Progressing

## 2025-02-16 NOTE — ASSESSMENT & PLAN NOTE
Presenting with SOB since last night.  With acute on chronic hypoxic respiratory failure, up to 6 L  Met sepsis criteria with WBC 10.78, .  Afebrile  Recent hospitalization at [facility] from 2/2 to 2/13 for postobstructive pneumonia with 7 days of IV cefepime and IV vancomycin  Flu/RSV/COVID - negative  CXR - persistent vascular prominence with diffuse interstitial and airspace opacities right greater than left. Findings thought to represent a combination of lymphangitic tumor spread and edema  CTA chest w/ contrast - stable airspace consolidation and atelectasis in the right lung and airspace opacity at the left lung base suggesting tumor and/or persistent within pneumonia  Received 2 L LR bolus and 1 dose of IV vancomycin and IV Zosyn in the ED  continued on ceftriaxone   2/16-rapid response this morning, due to respiratory distress and desaturation on mid flow nasal cannula.  Status post Narcan and BiPAP-patient's respiration improved.  VBG this morning pCO2 of 52.7 pH 7.35.-Possibly component of worsening lung adenocarcinoma plus postobstructive pneumonia    Plan:  Trend WBC and procalcitonin  Continue ceftriaxone.  Add IV Flagyl for anaerobic coverage  Continue IV Solu-Medrol 40 mg every 8 hours, Xopenex /Atrovent TID.  Respiratory protocol-was started yesterday because of diffuse wheezing  Since imaging is also concerning for volume overload in the lungs-will give 1 dose of IV Lasix 40 mg and reassess  Pulmonology consulted, appreciate recommendations  Follow-up on blood cultures  Follow-up on MRSA culture

## 2025-02-16 NOTE — ASSESSMENT & PLAN NOTE
Follows with palliative care in the outpatient setting  Per palliative care note on 2/13  Continue home Valium 5 mg BID  Oxycodone IR 5 mg every 4 hours as needed for moderate pain  Oxycodone 10 mg every 4 hours as needed for severe pain  IV morphine 2 mg every 4 hours as needed for breakthrough pain  Palliative care consulted, appreciate recommendations

## 2025-02-16 NOTE — PROGRESS NOTES
Progress Note - Hospitalist   Name: Papo Gibbs 54 y.o. male I MRN: [id number]  Unit/Bed#: S -01 I Date of Admission: 2/15/2025   Date of Service: 2/16/2025 I Hospital Day: 1    Assessment & Plan  Postobstructive pneumonia  Presenting with SOB since last night.  With acute on chronic hypoxic respiratory failure, up to 6 L  Met sepsis criteria with WBC 10.78, .  Afebrile  Recent hospitalization at [facility] from 2/2 to 2/13 for postobstructive pneumonia with 7 days of IV cefepime and IV vancomycin  Flu/RSV/COVID - negative  CXR - persistent vascular prominence with diffuse interstitial and airspace opacities right greater than left. Findings thought to represent a combination of lymphangitic tumor spread and edema  CTA chest w/ contrast - stable airspace consolidation and atelectasis in the right lung and airspace opacity at the left lung base suggesting tumor and/or persistent within pneumonia  Received 2 L LR bolus and 1 dose of IV vancomycin and IV Zosyn in the ED  continued on ceftriaxone   2/16-rapid response this morning, due to respiratory distress and desaturation on mid flow nasal cannula.  Status post Narcan and BiPAP-patient's respiration improved.  VBG this morning pCO2 of 52.7 pH 7.35.-Possibly component of worsening lung adenocarcinoma plus postobstructive pneumonia    Plan:  Trend WBC and procalcitonin  Continue ceftriaxone.  Add IV Flagyl for anaerobic coverage  Continue IV Solu-Medrol 40 mg every 8 hours, Xopenex /Atrovent TID.  Respiratory protocol-was started yesterday because of diffuse wheezing  Since imaging is also concerning for volume overload in the lungs-will give 1 dose of IV Lasix 40 mg and reassess  Pulmonology consulted, appreciate recommendations  Follow-up on blood cultures  Follow-up on MRSA culture  Acute on chronic hypoxic respiratory failure (HCC)  Secondary to postobstructive pneumonia in the setting of lung adenocarcinoma  Baseline is 4 L   Here, requiring up to 6  L  Patient is also on high doses of opioids and valium , for pain and anxiety- could be one of the contributing reasons   See plan under postobstructive pneumonia  Since imaging is also concerning for volume overload in the lungs-will give 1 dose of IV Lasix 40 mg and reassess  Close monitoring for respiratory  depression   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 Sandoval ([facility] heme[facility]onc)  Diagnosed in April 2024  May to August 2024 - s/p carboplatin, Alimta, Keytruda  July 2024 - Received brain radiation  Oct 2024 - Received palliative Pemetrex with Keytruda from Nov to Dec 2024  Currently on chemotherapy - Ramucirumab + Docetaxel q21 days. Last on 1/21/2025  Patient does not want any further chemotherapy.  They are interested in immunotherapy  Heme/onc consulted, appreciate recommendations  Palliative care has been consulted appreciate recommendations  Pleural effusion  Has right-sided Pleurx catheter in place for recurrent pleural effusion  CTA chest w/ contrast - stable small bilateral pleural effusions and loculated right pleural fluid  Echo (12/9/24) - EF 60 percent.  Diastolic function is normal.  Mild AR.  Aortic valve sclerosis.  Pulmonology has been consulted-appreciate recommendations  Essential hypertension  Home meds: Losartan 25 mg, Lopressor 25 mg BID    Plan:  Continue home Lopressor, patient is tachycardic  Hold home losartan due to soft blood pressures  History of pulmonary embolism  Diagnosed on 10/4/24  In the setting of active malignancy  Continue home Eliquis 5 mg BID  Diabetes mellitus (HCC)  Lab Results   Component Value Date    HGBA1C 8.1 (H) 12/09/2024   Home meds: Metformin 1000 mg BID    Plan:  SSI only  Can uptitrate insulin regimen as necessary  Hypoglycemia protocol  Hypothyroidism  Continue home levothyroxine 25 mcg  Cancer associated pain  Follows with palliative care in the outpatient setting  Per palliative care note on 2/13  Continue home  Valium 5 mg BID  Oxycodone IR 5 mg every 4 hours as needed for moderate pain  Oxycodone 10 mg every 4 hours as needed for severe pain  IV morphine 2 mg every 4 hours as needed for breakthrough pain  Palliative care consulted, appreciate recommendations  Restrictive lung disease  Continue home Stiolto and albuterol prn  Hyperlipidemia  Continue home atorvastatin 10 mg  Sepsis (HCC)  See plan under postobstructive pneumonia  Anemia  Lab Results   Component Value Date    HGB 9.2 (L) 02/16/2025    MCV 84 02/16/2025    IRON 20 (L) 02/09/2025    FERRITIN 107 02/09/2025    TIBC 233.8 (L) 02/09/2025    UIBC 214 02/09/2025     POA Hgb 10.4 (baseline is 9-10)  No active bleeding  Etiology could be anemia of chronic disease versus chemotherapy-induced  Continue to monitor  GERD (gastroesophageal reflux disease)  Continue home famotidine 40 mg prn  Sinus tachycardia  Continue home Lopressor 25 mg BID  Monitor on telemetry to look for other intermittent arrhythmias   Constipation      VTE Pharmacologic Prophylaxis: VTE Score: 8 Moderate Risk (Score 3-4) - Pharmacological DVT Prophylaxis Contraindicated. Sequential Compression Devices Ordered.    Mobility:   Basic Mobility Inpatient Raw Score: 14  JH-HLM Goal: 4: Move to chair/commode  JH-HLM Achieved: 2: Bed activities/Dependent transfer  JH-HLM Goal achieved. Continue to encourage appropriate mobility.    Patient Centered Rounds: I performed bedside rounds with nursing staff today.   Discussions with Specialists or Other Care Team Provider: pulm, onc    Education and Discussions with Family / Patient: Updated  (sister) at bedside.    Current Length of Stay: 1 day(s)  Current Patient Status: Inpatient   Certification Statement: The patient will continue to require additional inpatient hospital stay due to acute hypoxic resp failure   Discharge Plan: Anticipate discharge in >72 hrs to home.    Code Status: Level 3 - DNAR and DNI    Subjective   This morning patient  was seen and examined at bedside . Patient was sitting up leaning forward on the bed in distress.  Patient was wearing BiPAP but complaining of chest pain and abdominal pain.    Objective :  Temp:  [96.8 °F (36 °C)-99.1 °F (37.3 °C)] 97.2 °F (36.2 °C)  HR:  [120-135] 120  BP: ()/(56-91) 98/56  Resp:  [18-24] 18  SpO2:  [64 %-98 %] 95 %  O2 Device: BiPAP  Nasal Cannula O2 Flow Rate (L/min):  [4 L/min-8 L/min] 8 L/min  FiO2 (%):  [80] 80    There is no height or weight on file to calculate BMI.     Input and Output Summary (last 24 hours):     Intake/Output Summary (Last 24 hours) at 2/16/2025 1134  Last data filed at 2/16/2025 0900  Gross per 24 hour   Intake 0 ml   Output --   Net 0 ml       Physical Exam  Constitutional:       General: He is in acute distress.   HENT:      Mouth/Throat:      Mouth: Mucous membranes are moist.      Pharynx: Oropharynx is clear.   Eyes:      Extraocular Movements: Extraocular movements intact.      Pupils: Pupils are equal, round, and reactive to light.   Cardiovascular:      Rate and Rhythm: Tachycardia present.      Pulses: Normal pulses.   Pulmonary:      Comments: Tachypneic, bilateral breath sounds decreased  Abdominal:      General: There is distension.      Palpations: Abdomen is soft.   Skin:     General: Skin is warm.      Capillary Refill: Capillary refill takes less than 2 seconds.   Neurological:      Mental Status: He is oriented to person, place, and time.           Lines/Drains:  Lines/Drains/Airways       Active Status       Name Placement date Placement time Site Days    Port A Cath 05/20/24 Right Chest 05/20/24  1427  Chest  271                           Telemetry:  Telemetry Orders (From admission, onward)               24 Hour Telemetry Monitoring  Continuous x 24 Hours (Telem)        Expiring   Question:  Reason for 24 Hour Telemetry  Answer:  Arrhythmias requiring acute medical intervention / PPM or ICD malfunction                     Telemetry Reviewed:  Sinus Tachycardia  Indication for Continued Telemetry Use: Metabolic/electrolyte disturbance with high probability of dysrhythmia               Lab Results: I have reviewed the following results:   Results from last 7 days   Lab Units 02/16/25  0807 02/16/25  0639 02/15/25  1316   WBC Thousand/uL 7.18  --  10.78*   HEMOGLOBIN g/dL 9.2*  --  10.4*   I STAT HEMOGLOBIN   --    < >  --    HEMATOCRIT % 30.8*  --  34.7*   HEMATOCRIT, ISTAT   --    < >  --    PLATELETS Thousands/uL 194  --  208   BANDS PCT % 1  --   --    SEGS PCT %  --   --  84*   LYMPHO PCT % 3*  --  9*   MONO PCT % 1*  --  6   EOS PCT % 0  --  0    < > = values in this interval not displayed.     Results from last 7 days   Lab Units 02/16/25  0807 02/16/25  0639 02/16/25  0635 02/15/25  1316   SODIUM mmol/L  --   --  136 137   POTASSIUM mmol/L 5.5*  --  5.9* 4.6   CHLORIDE mmol/L  --   --  97 93*   CO2 mmol/L  --   --  32 34*   CO2, I-STAT mmol/L  --  33*  --   --    BUN mg/dL  --   --  36* 34*   CREATININE mg/dL  --   --  1.01 0.79   ANION GAP mmol/L  --   --  7 10   CALCIUM mg/dL  --   --  9.7 10.2   ALBUMIN g/dL  --   --   --  3.8   TOTAL BILIRUBIN mg/dL  --   --   --  0.55   ALK PHOS U/L  --   --   --  74   ALT U/L  --   --   --  9   AST U/L  --   --   --  13   GLUCOSE RANDOM mg/dL  --   --  188* 129         Results from last 7 days   Lab Units 02/16/25  1109 02/16/25  0628 02/15/25  2038 02/15/25  1817 02/13/25  1100 02/13/25  0706 02/12/25  2127 02/12/25  1537 02/12/25  1053 02/12/25  0722 02/11/25  2140 02/11/25  1606   POC GLUCOSE mg/dl 178* 208* 140 134 144* 123 127 130 176* 128 137 149*         Results from last 7 days   Lab Units 02/16/25  0635 02/15/25  1542 02/15/25  1350   LACTIC ACID mmol/L  --   --  1.1   PROCALCITONIN ng/ml 0.20 0.15  --        Recent Cultures (last 7 days):   Results from last 7 days   Lab Units 02/15/25  1350   BLOOD CULTURE  Received in Microbiology Lab. Culture in Progress.  Received in Microbiology Lab. Culture in  Progress.             Last 24 Hours Medication List:     Current Facility-Administered Medications:     acetaminophen (TYLENOL) tablet 975 mg, Q8H LATONIA    albuterol (PROVENTIL HFA,VENTOLIN HFA) inhaler 2 puff, Q4H PRN    amitriptyline (ELAVIL) tablet 200 mg, HS    apixaban (ELIQUIS) tablet 5 mg, BID    atorvastatin (LIPITOR) tablet 10 mg, Daily    benzonatate (TESSALON PERLES) capsule 200 mg, TID PRN    bisacodyl (DULCOLAX) rectal suppository 10 mg, Daily PRN    ceftriaxone (ROCEPHIN) 1 g/50 mL in dextrose IVPB, Q24H, Last Rate: 1,000 mg (02/15/25 1947)    diazepam (VALIUM) tablet 5 mg, BID    famotidine (PEPCID) tablet 40 mg, Daily PRN    folic acid (FOLVITE) tablet 1,000 mcg, Daily    furosemide (LASIX) injection 40 mg, Once    insulin lispro (HumALOG/ADMELOG) 100 units/mL subcutaneous injection 1-6 Units, 4x Daily (AC & HS) **AND** Fingerstick Glucose (POCT), 4x Daily AC and at bedtime    ipratropium (ATROVENT) 0.02 % inhalation solution 0.5 mg, TID    levalbuterol (XOPENEX) inhalation solution 1.25 mg, TID    levothyroxine tablet 25 mcg, Early Morning    [Held by provider] losartan (COZAAR) tablet 25 mg, Daily    magnesium Oxide (MAG-OX) tablet 400 mg, Daily    methylPREDNISolone sodium succinate (Solu-MEDROL) injection 40 mg, Q8H LATONIA    metoprolol tartrate (LOPRESSOR) tablet 25 mg, Q12H LATONIA    metroNIDAZOLE (FLAGYL) IVPB (premix) 500 mg 100 mL, Q8H    morphine injection 2 mg, Q4H PRN    umeclidinium 62.5 mcg/actuation inhaler AEPB 1 puff, Daily **AND** olodaterol HCl (STRIVERDI RESPIMAT) inhaler 2 puff, Daily    ondansetron (ZOFRAN) injection 4 mg, Q4H PRN    oxyCODONE (ROXICODONE) immediate release tablet 10 mg, Q4H PRN    oxyCODONE (ROXICODONE) IR tablet 5 mg, Q4H PRN    polyethylene glycol (MIRALAX) packet 17 g, Daily    QUEtiapine (SEROquel) tablet 25 mg, HS    senna-docusate sodium (SENOKOT S) 8.6-50 mg per tablet 1 tablet, HS    sodium chloride (OCEAN) 0.65 % nasal spray 1 spray, Q1H PRN    Administrative  Statements   Today, Patient Was Seen By: Gordo Rodriguez MD      **Please Note: This note may have been constructed using a voice recognition system.**

## 2025-02-16 NOTE — ASSESSMENT & PLAN NOTE
With metastasis to brain  Follows with Dr. Sandoval ([facility] heme/onc)  Diagnosed in April 2024  May to August 2024 - s/p carboplatin, Alimta, Keytruda  July 2024 - Received brain radiation  Oct 2024 - Received palliative Pemetrex with Keytruda from Nov to Dec 2024  Currently on chemotherapy - Ramucirumab + Docetaxel q21 days. Last on 1/21/2025  Patient does not want any further chemotherapy.  They are interested in immunotherapy  Heme/onc consulted, appreciate recommendations

## 2025-02-16 NOTE — ASSESSMENT & PLAN NOTE
Secondary to postobstructive pneumonia in the setting of lung adenocarcinoma  Baseline is 4 L   Here, requiring up to 6 L  Patient is also on high doses of opioids and valium , for pain and anxiety- could be one of the contributing reasons   See plan under postobstructive pneumonia  Since imaging is also concerning for volume overload in the lungs-will give 1 dose of IV Lasix 40 mg and reassess  Close monitoring for respiratory  depression

## 2025-02-16 NOTE — ASSESSMENT & PLAN NOTE
With metastasis to brain  Follows with Dr. Sandoval ([facility] heme/onc)  Diagnosed in April 2024  May to August 2024 - s/p carboplatin, Alimta, Keytruda  July 2024 - Received brain radiation  Oct 2024 - Received palliative Pemetrex with Keytruda from Nov to Dec 2024  Currently on chemotherapy - Ramucirumab + Docetaxel q21 days. Last on 1/21/2025  Patient does not want any further chemotherapy.  They are interested in immunotherapy  Heme/onc consulted, appreciate recommendations  Palliative care has been consulted appreciate recommendations

## 2025-02-16 NOTE — PLAN OF CARE
Problem: PAIN - ADULT  Goal: Verbalizes/displays adequate comfort level or baseline comfort level  Description: Interventions:  - Encourage patient to monitor pain and request assistance  - Assess pain using appropriate pain scale  - Administer analgesics based on type and severity of pain and evaluate response  - Implement non-pharmacological measures as appropriate and evaluate response  - Consider cultural and social influences on pain and pain management  - Notify physician/advanced practitioner if interventions unsuccessful or patient reports new pain  Outcome: Progressing     Problem: INFECTION - ADULT  Goal: Absence or prevention of progression during hospitalization  Description: INTERVENTIONS:  - Assess and monitor for signs and symptoms of infection  - Monitor lab/diagnostic results  - Monitor all insertion sites, i.e. indwelling lines, tubes, and drains  - Monitor endotracheal if appropriate and nasal secretions for changes in amount and color  - [place] appropriate cooling/warming therapies per order  - Administer medications as ordered  - Instruct and encourage patient and family to use good hand hygiene technique  - Identify and instruct in appropriate isolation precautions for identified infection/condition  Outcome: Progressing     Problem: SAFETY ADULT  Goal: Patient will remain free of falls  Description: INTERVENTIONS:  - Educate patient/family on patient safety including physical limitations  - Instruct patient to call for assistance with activity   - Consult OT/PT to assist with strengthening/mobility   - Keep Call bell within reach  - Keep bed low and locked with side rails adjusted as appropriate  - Keep care items and personal belongings within reach  - Initiate and maintain comfort rounds  - Make Fall Risk Sign visible to staff  - Offer Toileting every two hours, in advance of need  - Initiate/Maintain bed and chair alarm  - Obtain necessary fall risk management equipment:     Problem:  RESPIRATORY - ADULT  Goal: Achieves optimal ventilation and oxygenation  Description: INTERVENTIONS:  - Assess for changes in respiratory status  - Assess for changes in mentation and behavior  - Position to facilitate oxygenation and minimize respiratory effort  - Oxygen administered by appropriate delivery if ordered  - Initiate smoking cessation education as indicated  - Encourage broncho-pulmonary hygiene including cough, deep breathe, Incentive Spirometry  - Assess the need for suctioning and aspirate as needed  - Assess and instruct to report SOB or any respiratory difficulty  - Respiratory Therapy support as indicated  Outcome: Progressing     Problem: METABOLIC, FLUID AND ELECTROLYTES - ADULT  Goal: Electrolytes maintained within normal limits  Description: INTERVENTIONS:  - Monitor labs and assess patient for signs and symptoms of electrolyte imbalances  - Administer electrolyte replacement as ordered  - Monitor response to electrolyte replacements, including repeat lab results as appropriate  - Instruct patient on fluid and nutrition as appropriate  Outcome: Progressing     Problem: METABOLIC, FLUID AND ELECTROLYTES - ADULT  Goal: Fluid balance maintained  Description: INTERVENTIONS:  - Monitor labs   - Monitor I/O and WT  - Instruct patient on fluid and nutrition as appropriate  - Assess for signs & symptoms of volume excess or deficit  Outcome: Progressing     Problem: CARDIOVASCULAR - ADULT  Goal: Maintains optimal cardiac output and hemodynamic stability  Description: INTERVENTIONS:  - Monitor I/O, vital signs and rhythm  - Monitor for S/S and trends of decreased cardiac output  - Administer and titrate ordered vasoactive medications to optimize hemodynamic stability  - Assess quality of pulses, skin color and temperature  - Assess for signs of decreased coronary artery perfusion  - Instruct patient to report change in severity of symptoms  Outcome: Progressing     Problem: CARDIOVASCULAR -  ADULT  Goal: Absence of cardiac dysrhythmias or at baseline rhythm  Description: INTERVENTIONS:  - Continuous cardiac monitoring, vital signs, obtain 12 lead EKG if ordered  - Administer antiarrhythmic and heart rate control medications as ordered  - Monitor electrolytes and administer replacement therapy as ordered  Outcome: Progressing     - Apply yellow socks and bracelet for high fall risk patients  - Consider moving patient to room near nurses station  Outcome: Progressing

## 2025-02-16 NOTE — ASSESSMENT & PLAN NOTE
Has right-sided Pleurx catheter in place for recurrent pleural effusion  CTA chest w/ contrast - stable small bilateral pleural effusions and loculated right pleural fluid  Echo (12/9/24) - EF 60 percent.  Diastolic function is normal.  Mild AR.  Aortic valve sclerosis.  Pulmonology has been consulted-appreciate recommendations

## 2025-02-16 NOTE — ASSESSMENT & PLAN NOTE
Lab Results   Component Value Date    HGB 10.4 (L) 02/15/2025    MCV 85 02/15/2025    IRON 20 (L) 02/09/2025    FERRITIN 107 02/09/2025    TIBC 233.8 (L) 02/09/2025    UIBC 214 02/09/2025     POA Hgb 10.4 (baseline is 9-10)  No active bleeding  Etiology could be anemia of chronic disease versus chemotherapy-induced  Continue to monitor

## 2025-02-16 NOTE — RAPID RESPONSE
Rapid Response Note  Papo Gibbs 54 y.o. male MRN: [id number]  Unit/Bed#: S -01 Encounter: [id number]    Rapid Response Notification(s):   Response called date/time:  2/16/2025 6:27 AM  Response team arrival date/time:  2/16/2025 6:29 AM  Response end date/time:  2/16/2025 7:00 AM  [facility]  Rapid response location:  [facility] unit  Primary reason for rapid response call:  Acute change in neuro status and acute change in RR    Rapid Response Intervention(s):   Airway:  None  Breathing:  Oxygen  Circulation:  None  Fluids administered:  None  Medications administered:  Other (comment) (Narcan)       Assessment:   Acute encephalopathy with respiratory distress likely in related to opiates   Acute hypercapnia likely secondary to opiates     Plan:   S/p Narcan with improved mental status and respiratory effort   VBG with respiratory acidosis, would recommend BIPAP for 2 hours and reassess VBG - can remove BIPAP at that time if ABG improved    Will defer plan for pain management to primary team, would recommend decreasing PRN regimen      Rapid Response Outcome:   Transfer:  Remain on floor  Primary service notified of transfer: Yes    Code Status: Level 3 (DNAR and DNI)      Family notified: Primary team to notify Family Member       Background/Situation:   Papo Gibbs is a 54 y.o. male RR called for altered mental status. Patient found obtunded with agonal respirations. Given Narcan with immediate improvement in neuro status and respiratory effort. He still did have increased work of breathing and was placed on BIPAP     Review of Systems   Unable to perform ROS: Acuity of condition       Objective:   Vitals:    02/16/25 0645 02/16/25 0648 02/16/25 0939 02/16/25 1054   BP:   97/65 98/56   Pulse: (!) 131 (!) 129 (!) 124 (!) 120   Resp:       Temp:       TempSrc:       SpO2: 98% 98% 96% 95%     Physical Exam  Constitutional:       General: He is in acute distress.      Appearance: He is  ill-appearing.   Eyes:      Pupils: Pupils are equal, round, and reactive to light.   Cardiovascular:      Rate and Rhythm: Normal rate and regular rhythm.   Pulmonary:      Effort: Respiratory distress present.      Breath sounds: No wheezing, rhonchi or rales.   Abdominal:      General: Bowel sounds are normal. There is no distension.      Palpations: Abdomen is soft.   Musculoskeletal:         General: No swelling.   Skin:     General: Skin is warm and dry.   Neurological:      Comments: Unresponsive, unable to obtain exam

## 2025-02-16 NOTE — ASSESSMENT & PLAN NOTE
Home meds: Losartan 25 mg, Lopressor 25 mg BID    Plan:  Continue home Lopressor  Hold home losartan due to soft blood pressures

## 2025-02-16 NOTE — QUICK NOTE
Mr. Lopez is a 54-year-old male with history of stage IV lung cancer,, adenocarcinoma of the lung with metastasis to the brain status postchemotherapy and radiation therapy to the brain, presenting with increasing shortness of breath.  Patient was discharged on 2/13/2025 to follow-up with oncologist and palliative care, however today family noticed him increasingly short of breath and distress, sent into the ED, patient at baseline is on 4 L of oxygen by nasal cannula currently needing 6 L of oxygen by nasal cannula.  Patient received oxycodone for pain management, currently is sleepy but arousable, has generalized weakness, on examination appears tachypneic and tachycardic with heart rate in the range of 120s to 130s, has decreased air entry bilaterally, S1, S2 heard, tachycardic, abdomen is soft and nontender, extremity shows no pedal edema, skin shows no rash,    Labs and imaging were reviewed, CT of the chest shows no evidence of no PE, shows stable bilateral small pleural effusion and loculated right-sided pleural fluid.  Stable airspace consolidation and atelectasis in the right lung and airspace opacity suggesting tumor and/or postobstructive pneumonia and diffuse lymphangitic spread of the tumor.  EKG shows sinus tachycardia    Assessment and plan-  acute hypoxic respiratory failure on chronic hypoxia-with underlying adenocarcinoma of the lung with lymphangitic spread with metastasis-with postobstructive pneumonia-received IV antibiotic therapy with vancomycin and Zosyn in the ED, will give ceftriaxone, will give nebulizer treatment and IV steroid, has a right-sided Pleurx catheter in situ, consult pulmonology, pleural effusions have been stable, likely symptoms secondary to progressive malignancy, recent admission patient was followed by oncology team and patient had indicated that he was not pursuing any active chemo treatments, and palliative care input noted,   Goals of care discussion-plan was discussed  in length with the patient's sister and patient himself and ACP documents were reviewed and patient is DNR/DNI status, confirmed with the sister, patient has a significant other as well . The sister would like to have her involved in the care but states that she is the power of .  Family understands that it is his prognosis is guarded/poor.  And they want him to have signed the will/notarized papers before transitioning to hospice care.  Will get case management involved.  History of PE-on Eliquis  Sinus tachycardia secondary to acute hypoxic respiratory failure-on metoprolol  Hypertension  Cancer related pain

## 2025-02-17 PROBLEM — Z51.5 PALLIATIVE CARE BY SPECIALIST: Status: ACTIVE | Noted: 2025-01-01

## 2025-02-17 PROBLEM — I10 ESSENTIAL HYPERTENSION: Status: RESOLVED | Noted: 2024-01-01 | Resolved: 2025-01-01

## 2025-02-17 NOTE — PLAN OF CARE
Problem: PAIN - ADULT  Goal: Verbalizes/displays adequate comfort level or baseline comfort level  Description: Interventions:  - Encourage patient to monitor pain and request assistance  - Assess pain using appropriate pain scale  - Administer analgesics based on type and severity of pain and evaluate response  - Implement non-pharmacological measures as appropriate and evaluate response  - Consider cultural and social influences on pain and pain management  - Notify physician/advanced practitioner if interventions unsuccessful or patient reports new pain  Outcome: Progressing     Problem: INFECTION - ADULT  Goal: Absence or prevention of progression during hospitalization  Description: INTERVENTIONS:  - Assess and monitor for signs and symptoms of infection  - Monitor lab/diagnostic results  - Monitor all insertion sites, i.e. indwelling lines, tubes, and drains  - Monitor endotracheal if appropriate and nasal secretions for changes in amount and color  - [place] appropriate cooling/warming therapies per order  - Administer medications as ordered  - Instruct and encourage patient and family to use good hand hygiene technique  - Identify and instruct in appropriate isolation precautions for identified infection/condition  Outcome: Progressing  Goal: Absence of fever/infection during neutropenic period  Description: INTERVENTIONS:  - Monitor WBC    Outcome: Progressing     Problem: SAFETY ADULT  Goal: Patient will remain free of falls  Description: INTERVENTIONS:  - Educate patient/family on patient safety including physical limitations  - Instruct patient to call for assistance with activity   - Consult OT/PT to assist with strengthening/mobility   - Keep Call bell within reach  - Keep bed low and locked with side rails adjusted as appropriate  - Keep care items and personal belongings within reach  - Initiate and maintain comfort rounds  - Make Fall Risk Sign visible to staff  - Offer Toileting every 2 Hours,  in advance of need  - Apply yellow socks and bracelet for high fall risk patients  - Consider moving patient to room near nurses station  Outcome: Progressing  Goal: Maintain or return to baseline ADL function  Description: INTERVENTIONS:  -  Assess patient's ability to carry out ADLs; assess patient's baseline for ADL function and identify physical deficits which impact ability to perform ADLs (bathing, care of mouth/teeth, toileting, grooming, dressing, etc.)  - Assess/evaluate cause of self-care deficits   - Assess range of motion  - Assess patient's mobility; develop plan if impaired  - Assess patient's need for assistive devices and provide as appropriate  - Encourage maximum independence but intervene and supervise when necessary  - Involve family in performance of ADLs  - Assess for home care needs following discharge   - Consider OT consult to assist with ADL evaluation and planning for discharge  - Provide patient education as appropriate  Outcome: Progressing  Goal: Maintains/Returns to pre admission functional level  Description: INTERVENTIONS:  - Perform AM-PAC 6 Click Basic Mobility/ Daily Activity assessment daily.  - Set and communicate daily mobility goal to care team and patient/family/caregiver.   - Collaborate with rehabilitation services on mobility goals if consulted.  - Out of bed for toileting  - Record patient progress and toleration of activity level   Outcome: Progressing     Problem: DISCHARGE PLANNING  Goal: Discharge to home or other facility with appropriate resources  Description: INTERVENTIONS:  - Identify barriers to discharge w/patient and caregiver  - Arrange for needed discharge resources and transportation as appropriate  - Identify discharge learning needs (meds, wound care, etc.)  - Arrange for interpretive services to assist at discharge as needed  - Refer to Case Management Department for coordinating discharge planning if the patient needs post-hospital services based on  physician/advanced practitioner order or complex needs related to functional status, cognitive ability, or social support system  Outcome: Progressing     Problem: Knowledge Deficit  Goal: Patient/family/caregiver demonstrates understanding of disease process, treatment plan, medications, and discharge instructions  Description: Complete learning assessment and assess knowledge base.  Interventions:  - Provide teaching at level of understanding  - Provide teaching via preferred learning methods  Outcome: Progressing     Problem: CARDIOVASCULAR - ADULT  Goal: Maintains optimal cardiac output and hemodynamic stability  Description: INTERVENTIONS:  - Monitor I/O, vital signs and rhythm  - Monitor for S/S and trends of decreased cardiac output  - Administer and titrate ordered vasoactive medications to optimize hemodynamic stability  - Assess quality of pulses, skin color and temperature  - Assess for signs of decreased coronary artery perfusion  - Instruct patient to report change in severity of symptoms  Outcome: Progressing  Goal: Absence of cardiac dysrhythmias or at baseline rhythm  Description: INTERVENTIONS:  - Continuous cardiac monitoring, vital signs, obtain 12 lead EKG if ordered  - Administer antiarrhythmic and heart rate control medications as ordered  - Monitor electrolytes and administer replacement therapy as ordered  Outcome: Progressing     Problem: RESPIRATORY - ADULT  Goal: Achieves optimal ventilation and oxygenation  Description: INTERVENTIONS:  - Assess for changes in respiratory status  - Assess for changes in mentation and behavior  - Position to facilitate oxygenation and minimize respiratory effort  - Oxygen administered by appropriate delivery if ordered  - Initiate smoking cessation education as indicated  - Encourage broncho-pulmonary hygiene including cough, deep breathe, Incentive Spirometry  - Assess the need for suctioning and aspirate as needed  - Assess and instruct to report SOB or  any respiratory difficulty  - Respiratory Therapy support as indicated  Outcome: Progressing     Problem: METABOLIC, FLUID AND ELECTROLYTES - ADULT  Goal: Electrolytes maintained within normal limits  Description: INTERVENTIONS:  - Monitor labs and assess patient for signs and symptoms of electrolyte imbalances  - Administer electrolyte replacement as ordered  - Monitor response to electrolyte replacements, including repeat lab results as appropriate  - Instruct patient on fluid and nutrition as appropriate  Outcome: Progressing  Goal: Fluid balance maintained  Description: INTERVENTIONS:  - Monitor labs   - Monitor I/O and WT  - Instruct patient on fluid and nutrition as appropriate  - Assess for signs & symptoms of volume excess or deficit  Outcome: Progressing  Goal: Glucose maintained within target range  Description: INTERVENTIONS:  - Monitor Blood Glucose as ordered  - Assess for signs and symptoms of hyperglycemia and hypoglycemia  - Administer ordered medications to maintain glucose within target range  - Assess nutritional intake and initiate nutrition service referral as needed  Outcome: Progressing     Problem: Prexisting or High Potential for Compromised Skin Integrity  Goal: Skin integrity is maintained or improved  Description: INTERVENTIONS:  - Identify patients at risk for skin breakdown  - Assess and monitor skin integrity  - Assess and monitor nutrition and hydration status  - Monitor labs   - Assess for incontinence   - Turn and reposition patient  - Assist with mobility/ambulation  - Relieve pressure over bony prominences  - Avoid friction and shearing  - Provide appropriate hygiene as needed including keeping skin clean and dry  - Evaluate need for skin moisturizer/barrier cream  - Collaborate with interdisciplinary team   - Patient/family teaching  - Consider wound care consult   Outcome: Progressing

## 2025-02-17 NOTE — ASSESSMENT & PLAN NOTE
Follows with palliative care in the outpatient setting  Palliative care consulted  Comfort care measures as above

## 2025-02-17 NOTE — ASSESSMENT & PLAN NOTE
- CTA PE study without evidence of acute pulmonary embolus  - History of prior pulmonary embolism  - Continue Eliquis 5 mg BID

## 2025-02-17 NOTE — ASSESSMENT & PLAN NOTE
-  No clear sign of infection  - CT chest with stable airspace consolidations and atelectasis in the right lung and airspace opacities at the left lung base suggesting tumor and/or postobstructive pneumonia.  -  procal negative and no change on imaging  - Blood cultures thus far negative  - Consider monitoring off of abx  - Currently on ceftriaxone and Flagyl (Day 3)  - On IV Solu-Medrol 40 mg TID (Day 3)

## 2025-02-17 NOTE — ASSESSMENT & PLAN NOTE
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

## 2025-02-17 NOTE — ASSESSMENT & PLAN NOTE
-  d/w RT  -  transitioned off bipap to MFNC  - Titrate to baseline oxygen requirement as tolerated  -  previously noted difficulty with HFNC  - Ipratropium and levalbuterol TID  - Striverdi respimat inhaler daily

## 2025-02-17 NOTE — ASSESSMENT & PLAN NOTE
-  no clear sign of infection  -  procal negative and no change on imaging  -  consider monitoring off of abx

## 2025-02-17 NOTE — PROGRESS NOTES
Received Paperwork   What type of form FMLA   Scanned blank form into patient's Epic chart Yes   Method received form  In Person drop off   Provider responsible for form Dr. Sandoval   Informed patient our office turn around time for completing patient forms is 5-7 business days. Yes, informed patient of turn around time     Comments Patient and wife were told to bring it to palliative care but Dr. Estrella and palliative do not do FMLA paperwork per Bhavna. Please fax completed forms to [phone]. Please call wife Tomasa, [phone] w/ any questions.

## 2025-02-17 NOTE — ASSESSMENT & PLAN NOTE
-  w/ lymphangitic spread  -  continue with palliative care discussions  -  likely cause of abdominal pain and dyspnea

## 2025-02-17 NOTE — ASSESSMENT & PLAN NOTE
Comfort care    Dilaudid 1mg Q4hrs scheduled  Ativan 0.5mg Q4hrs scheduled  Dilaudid 0.8mg Q10min  PRN  Ativan 0.5mg Q10min PRN

## 2025-02-17 NOTE — ASSESSMENT & PLAN NOTE
-  w/ lymphangitic spread  -  continue with palliative care discussions  - Medical oncology following  - Patient and family had expressed focus on palliative intervention and would not be interested in more chemotherapy or immunotherapy  -  likely cause of abdominal pain and dyspnea

## 2025-02-17 NOTE — ASSESSMENT & PLAN NOTE
Lab Results   Component Value Date    HGBA1C 8.1 (H) 12/09/2024       Recent Labs     02/16/25  1630 02/16/25  2118 02/16/25  2223 02/17/25  0713   POCGLU 166* 179* 195* 178*         Blood Sugar Average: Last 72 hrs:  (P) 172.25

## 2025-02-17 NOTE — ASSESSMENT & PLAN NOTE
Stage IV lung cancer, Pleurx catheter.  Presenting with SOB.  With acute on chronic hypoxic respiratory failure, up to 6 L. Met sepsis criteria with WBC 10.78, .  Afebrile. Recent hospitalization at [facility] from 2/2 to 2/13 for postobstructive pneumonia with 7 days of IV cefepime and IV vancomycin. Flu/RSV/COVID - negative  CXR and CTA chest.-concerning for lymphangitic tumor spread and edema.  Bilateral pleural effusions and loculated right pleural fluid.  Negative for PE  MRSA and blood culture negative    Presentation is possibly component of worsening lung adenocarcinoma plus postobstructive pneumonia.  Overall poor prognosis, family has decided comfort care    Plan:  Dilaudid 0.5 mg IV every 10 minutes as needed  Oxycodone 2.5 mg every 4 hours as needed for moderate pain,   Oxycodone 5 mg every 4 hours as needed for severe pain

## 2025-02-17 NOTE — UTILIZATION REVIEW
Initial Clinical Review    Admission: Date/Time/Statement:   Admission Orders (From admission, onward)       Ordered        02/15/25 1616  INPATIENT ADMISSION  Once                          Orders Placed This Encounter   Procedures    INPATIENT ADMISSION     Standing Status:   Standing     Number of Occurrences:   1     Level of Care:   Med Surg [16]     Estimated length of stay:   More than 2 Midnights     Certification:   I certify that inpatient services are medically necessary for this patient for a duration of greater than two midnights. See H&P and MD Progress Notes for additional information about the patient's course of treatment.     ED Arrival Information       Expected   -    Arrival   2/15/2025 12:25    Acuity   Emergent              Means of arrival   Ambulance    Escorted by   [facility]    Service   Hospitalist    Admission type   Emergency              Arrival complaint   *EMS/[facility]*             Chief Complaint   Patient presents with    Shortness of Breath     Increasing SOB. Stage 4 lung CA. On 4L nasal cannula at baseline.        Initial Presentation: 54 y.o. 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 Findings thought to represent a combination of lymphangitic tumor spread and edema, CT chest reveals stable airspace consolidation; atelectasis in the right lung and airspace opacity at the left lung base suggesting tumor  and/or persistent within pneumonia   Labs elevation of chloride, CO2, BUN @ 34. Patient reports he does not wish for anymore CHEMO only Immuno therapy for CA RX, considering transition to Palliative/ Hospice  PLAN Inpatient admission due to postobstructive PNA, acute on chronic resp failure w underlying adenocarcinoma w mets, Goals of Care Counseling, Cancer related pain. Cont IV Ceftriaxone, IV Solu medrol, schedule NEBs TID, incentive spirometry, consult Pulmonology, follow BCX/ MRSA CX, cont supplemental O2, consult IP CM/ HEME ONCO / Palliative care, cont home Lopressor & hold Losartan, cont eliquis, SSI, cont home pain regimen, monitor HGB, tele    Anticipated Length of Stay/Certification Statement: Patient will be admitted on an inpatient basis with an anticipated length of stay of greater than 2 midnights secondary to acute on chronic hypoxic respiratory failure secondary to postobstructive pneumonia in the setting of metastatic lung adenocarcinoma.   Date: 2/16/2025   Day 2: transfer to ICU  Rapid response event due to acute change in Neuro status, acute hypercapnia likely due to Opiates. Given Narcan for good mental status/ respiratory response. ABG w resp acidosis cont BiPAP repeat VBG DC BiPap if improved; Rec Pain management team review pain management of decreasing dose  Pulmonology  Consult for SOB  No clear sign of infection- pro chhaya neg & no change on imaging; consider monitor off abx, notable lymphangitic spread likely cause of ABD pain & dyspnea  cont Palliative care discussions  Attending  In moderate respiratory distress while wearing BIPAP. Will continue antibiotics and add Flagyl for anaerobic coverage , continue steroid . Add IV Lasix x 1 now. Patient is level 3 DNR DNI . Patient's girlfriend and sister at bedside. They understood patient's overall poor prognosis but would like to discuss GOC when pt can fully participate w Palliative Care  2/17/2025  Provider  desatted at approximately 0400  and was placed on NRB with O2 saturations improved to >92%, and was hypotensive 86/50. Opening eyes but did not follow commands. VBG notable for CO2 70.5, and patient was started on BiPAP at approximately 0430 for two hours and 250 mL Isolyte and 25% Albumin administered. Patient responsive and following commands while on BiPAP. Repeat VBG ordered for 0730.     ED Treatment-Medication Administration from 02/15/2025 1225 to 02/15/2025 1724         Date/Time Order Dose Route Action     02/15/2025 1400 lactated ringers bolus 2,000 mL 2,000 mL Intravenous New Bag     02/15/2025 1419 piperacillin-tazobactam (ZOSYN) IVPB 4.5 g 4.5 g Intravenous New Bag     02/15/2025 1509 vancomycin (VANCOCIN) 2,000 mg in sodium chloride 0.9 % 500 mL IVPB 2,000 mg Intravenous New Bag     02/15/2025 1359 oxyCODONE (ROXICODONE) immediate release tablet 10 mg 10 mg Oral Given     02/15/2025 1446 iohexol (OMNIPAQUE) 350 MG/ML injection (MULTI-DOSE) 60 mL 60 mL Intravenous Given            Scheduled Medications:  2/17 x1 IV =  albumin human (FLEXBUMIN) 25 % injection 12.5 g  Dose: 12.5 g  Freq: Once Route: IV  Last Dose: Stopped (02/17/25 0506)  Start: 02/17/25 0445 End: 02/17/25 0506    acetaminophen, 975 mg, Oral, Q8H LATONIA  amitriptyline, 200 mg, Oral, HS  apixaban, 5 mg, Oral, BID  atorvastatin, 10 mg, Oral, Daily  cefTRIAXone, 1,000 mg, Intravenous, Q24H  diazepam, 5 mg, Oral, BID  docusate sodium, 100 mg, Oral, BID  folic acid, 1,000 mcg, Oral, Daily  insulin lispro, 1-6 Units, Subcutaneous, 4x Daily (AC & HS)  ipratropium, 0.5 mg, Nebulization, TID  levalbuterol, 1.25 mg, Nebulization, TID  levothyroxine, 25 mcg, Oral, Early Morning  [Held by provider] losartan, 25 mg, Oral, Daily  magnesium Oxide, 400 mg, Oral, Daily  methylPREDNISolone sodium succinate, 40 mg, Intravenous, Q8H LATONIA  metoprolol tartrate, 25 mg, Oral, Q12H LATONIA  metroNIDAZOLE, 500 mg, Intravenous, Q8H  umeclidinium, 1 puff, Inhalation, Daily   And  olodaterol HCl, 2 puff,  Inhalation, Daily  polyethylene glycol, 17 g, Oral, Daily  QUEtiapine, 25 mg, Oral, HS  senna-docusate sodium, 1 tablet, Oral, HS      Continuous IV Infusions:     PRN Meds:  albuterol, 2 puff, Inhalation, Q4H PRN  benzonatate, 200 mg, Oral, TID PRN  bisacodyl, 10 mg, Rectal, Daily PRN  famotidine, 40 mg, Oral, Daily PRN  morphine injection, 2 mg, Intravenous, Q4H PRN  ondansetron, 4 mg, Intravenous, Q4H PRN  oxyCODONE, 2.5 mg, Oral, Q4H PRN   Or  oxyCODONE, 5 mg, Oral, Q4H PRN  sodium chloride, 1 spray, Each Nare, Q1H PRN      ED Triage Vitals   Temperature Pulse Respirations Blood Pressure SpO2 Pain Score   02/15/25 1305 02/15/25 1258 02/15/25 1258 02/15/25 1258 02/15/25 1258 02/15/25 1359   99.1 °F (37.3 °C) (!) 133 20 112/56 92 % 10 - Worst Possible Pain     Weight (last 2 days)       None            Vital Signs (last 3 days)       Date/Time Temp Pulse Resp BP MAP (mmHg) SpO2 FiO2 (%) Calculated FIO2 (%) - Nasal Cannula Nasal Cannula O2 Flow Rate (L/min) O2 Device O2 Interface Device Patient Position - Orthostatic VS Pain    02/17/25 09:04:50 -- 121 -- 131/83 99 94 % -- -- -- -- -- -- --    02/17/25 0805 -- -- -- -- -- 93 % -- 68 12 L/min Mid flow nasal cannula -- -- --    02/17/25 0745 -- -- -- -- -- 92 % -- 80 15 L/min Mid flow nasal cannula -- -- --    02/17/25 07:13:21 95.9 °F (35.5 °C) 111 22 113/63 80 97 % -- -- -- -- -- -- --    02/17/25 07:11:30 95.9 °F (35.5 °C) 111 16 113/63 80 96 % -- -- -- -- -- -- --    02/17/25 06:23:58 -- 111 -- 100/61 74 97 % -- -- -- -- -- -- --    02/17/25 0506 -- 115 -- 97/65 76 95 % -- -- -- -- -- -- --    02/17/25 0445 -- -- -- -- -- 93 % -- -- -- -- Face mask -- --    02/17/25 0430 -- -- -- 86/50 -- -- -- -- -- -- -- -- --    02/17/25 03:58:26 97.5 °F (36.4 °C) 127 -- 110/68 82 91 % -- -- -- -- -- -- --    02/16/25 2314 -- -- -- -- -- -- -- -- -- -- -- -- 8    02/16/25 2308 -- 125 -- 125/79 94 96 % -- -- -- -- -- -- 7 02/16/25 2039 -- -- -- -- -- -- -- -- -- -- -- -- 7     02/16/25 2024 -- -- -- -- -- 99 % -- 68 12 L/min Mid flow nasal cannula -- -- --    02/16/25 2023 -- -- -- -- -- 99 % -- -- -- -- -- -- --    02/16/25 1715 -- -- -- -- -- -- -- -- -- -- -- -- 8 02/16/25 1634 -- -- -- -- -- 90 % -- 68 12 L/min Mid flow nasal cannula -- -- --    02/16/25 1500 -- -- -- -- -- -- -- -- -- BiPAP -- -- 4    02/16/25 1440 -- -- -- -- -- -- -- -- -- -- Face mask -- --    02/16/25 1430 -- -- -- -- -- 93 % -- -- -- -- Face mask -- --    02/16/25 14:28:43 -- 119 17 137/83 101 92 % -- -- -- -- -- -- --    02/16/25 1427 96.8 °F (36 °C) 118 16 137/83 101 92 % -- -- -- -- -- -- --    02/16/25 1415 -- -- -- -- -- -- -- -- -- -- Face mask -- --    02/16/25 14:01:44 -- 120 -- 114/81 92 99 % -- -- -- -- -- -- --    02/16/25 10:54:22 -- 120 -- 98/56 70 95 % -- -- -- -- -- -- --    02/16/25 09:39:38 -- 124 -- 97/65 76 96 % -- -- -- -- -- -- --    02/16/25 0900 -- -- -- -- -- -- -- -- -- BiPAP -- -- --    02/16/25 0809 -- -- -- -- -- -- -- -- -- -- Face mask -- --    02/16/25 0658 -- -- -- -- -- -- 80 -- -- BiPAP Face mask -- --    02/16/25 06:48:28 -- 129 -- -- -- 98 % -- -- -- -- -- -- --    02/16/25 06:45:28 -- 131 -- -- -- 98 % -- -- -- -- -- -- --    02/16/25 06:42:28 -- 130 -- -- -- 94 % -- -- -- -- -- -- --    02/16/25 06:39:28 -- 131 -- -- -- 90 % -- -- -- -- -- -- --    02/16/25 06:36:28 -- 129 -- -- -- 92 % -- -- -- -- -- -- --    02/16/25 06:31:42 97.2 °F (36.2 °C) 127 -- 114/66 82 92 % -- -- -- -- -- -- --    02/16/25 06:31:36 -- -- -- 114/66 -- -- -- -- -- -- -- -- --    02/16/25 06:30:36 97.2 °F (36.2 °C) -- -- -- -- -- -- -- -- -- -- -- --    02/16/25 06:30:28 -- -- -- -- -- 64 % -- -- -- -- -- -- --    02/16/25 06:29:28 -- 120 -- -- -- -- -- -- -- -- -- -- --    02/16/25 0303 -- -- -- -- -- -- -- -- -- -- -- -- 7    02/16/25 0129 -- -- -- -- -- -- -- 52 8 L/min Mid flow nasal cannula MFNC prongs -- --    02/15/25 22:09:44 96.8 °F (36 °C) 135 18 159/91 114 95 % -- -- -- -- -- -- --     02/15/25 2114 -- 135 -- -- -- -- -- -- -- -- -- -- --    02/15/25 2113 -- -- -- -- -- -- -- -- -- -- -- -- 10 - Worst Possible Pain    02/15/25 2056 -- -- -- -- -- -- -- 52 8 L/min Mid flow nasal cannula MFNC prongs -- --    02/15/25 19:52:17 97.4 °F (36.3 °C) -- 18 131/79 96 -- -- -- -- -- -- -- --    02/15/25 1935 -- -- -- -- -- -- -- -- -- Mid flow nasal cannula -- -- --    02/15/25 1934 -- -- -- -- -- -- -- -- -- -- -- -- 8    02/15/25 1920 -- -- -- -- -- -- -- 52 8 L/min Mid flow nasal cannula -- -- --    02/15/25 17:36:56 98.3 °F (36.8 °C) 124 22 109/75 86 92 % -- -- -- -- -- -- --    02/15/25 1700 -- 122 24 108/60 76 94 % -- -- -- -- -- -- --    02/15/25 1545 -- 124 24 97/64 77 94 % -- -- -- -- -- -- --    02/15/25 1359 -- -- -- -- -- -- -- -- -- -- -- -- 10 - Worst Possible Pain    02/15/25 1305 99.1 °F (37.3 °C) -- -- -- -- -- -- -- -- -- -- -- --    02/15/25 1258 -- 133 20 112/56 -- 92 % -- 36 4 L/min Nasal cannula -- Sitting --              Pertinent Labs/Diagnostic Test Results:   Radiology:  CTA chest pe study   Final Interpretation by Christopher Vargas MD (02/15 1537)         1. No evidence of acute pulmonary embolus.   2. Stable small bilateral pleural effusions and loculated right pleural fluid.   3. Stable airspace consolidation and atelectasis in the right lung and airspace opacities at the left lung base suggesting tumor and/or postobstructive pneumonia.   4. Diffuse lymphangitic spread of tumor, not significantly changed.                  Workstation performed: [id number]         XR chest 2 views   Final Interpretation by Nabil Raygoza MD (02/15 1341)      Persistent vascular prominence with diffuse interstitial and airspace opacities right greater than left. Findings thought to represent a combination of lymphangitic tumor spread and edema, based on prior imaging studies. Findings appear mildly increased    since the prior chest radiograph.               Workstation performed: [id number]            Cardiology:  ECG 12 lead   Final Result by Humera Portillo MD (02/16 2159)   Sinus tachycardia   Nonspecific ST and T wave abnormality   Septal infarct , age undetermined   Abnormal ECG   When compared with ECG of 02-Feb-2025 06:41,   Inverted T waves have replaced nonspecific T wave abnormality in Lateral    leads   Confirmed by Humera Portillo ([id number]) on 2/16/2025 9:59:26 PM        GI:  No orders to display       Results from last 7 days   Lab Units 02/15/25  1350   SARS-COV-2  Negative     Results from last 7 days   Lab Units 02/17/25  0752 02/16/25  0807 02/16/25  0639 02/15/25  1316 02/12/25  0519   WBC Thousand/uL 8.02 7.18  --  10.78* 8.34   HEMOGLOBIN g/dL 8.8* 9.2*  --  10.4* 9.9*   I STAT HEMOGLOBIN g/dl  --   --  10.2*  --   --    HEMATOCRIT % 29.2* 30.8*  --  34.7* 33.2*   HEMATOCRIT, ISTAT %  --   --  30*  --   --    PLATELETS Thousands/uL 228 194  --  208 169   TOTAL NEUT ABS Thousands/µL  --   --   --  9.05* 6.18   BANDS PCT %  --  1  --   --   --          Results from last 7 days   Lab Units 02/17/25  0752 02/16/25  0807 02/16/25  0639 02/16/25  0635 02/15/25  1316 02/12/25  0519 02/11/25  0513   SODIUM mmol/L 137  --   --  136 137 142 142   POTASSIUM mmol/L 5.3 5.5*  --  5.9* 4.6 4.4 3.8   CHLORIDE mmol/L 96  --   --  97 93* 99 99   CO2 mmol/L 33*  --   --  32 34* 36* 36*   CO2, I-STAT mmol/L  --   --  33*  --   --   --   --    ANION GAP mmol/L 8  --   --  7 10 7 7   BUN mg/dL 43*  --   --  36* 34* 22 20   CREATININE mg/dL 0.95  --   --  1.01 0.79 0.74 0.46*   EGFR ml/min/1.73sq m 89  --   --  83 101 103 126   CALCIUM mg/dL 10.1  --   --  9.7 10.2 9.8 9.6   CALCIUM, IONIZED, ISTAT mmol/L  --   --  1.34*  --   --   --   --    MAGNESIUM mg/dL 2.0  --   --  1.9  --   --  1.7*   PHOSPHORUS mg/dL  --   --   --  5.5*  --   --  3.3     Results from last 7 days   Lab Units 02/17/25  0752 02/15/25  1316 02/12/25  0519 02/11/25  0513   AST U/L 11* 13 13  --    ALT U/L 9 9 10  --    ALK PHOS U/L 59 74  "72  --    TOTAL PROTEIN g/dL 6.9 7.4 6.4  --    ALBUMIN g/dL 3.6 3.8 3.5 3.4*   TOTAL BILIRUBIN mg/dL 0.29 0.55 0.40  --      Results from last 7 days   Lab Units 02/17/25  0713 02/16/25  2223 02/16/25  2118 02/16/25  1630 02/16/25  1109 02/16/25  0628 02/15/25  2038 02/15/25  1817 02/13/25  1100 02/13/25  0706 02/12/25  2127 02/12/25  1537   POC GLUCOSE mg/dl 178* 195* 179* 166* 178* 208* 140 134 144* 123 127 130     Results from last 7 days   Lab Units 02/17/25  0752 02/16/25  0635 02/15/25  1316 02/12/25  0519 02/11/25  0513   GLUCOSE RANDOM mg/dL 206* 188* 129 137 157*             No results found for: \"BETA-HYDROXYBUTYRATE\"       Results from last 7 days   Lab Units 02/17/25  0752 02/17/25  0424 02/16/25  0924   PH SANTOS  7.336 7.303 7.358   PCO2 SANTOS mm Hg 55.6* 70.5* 52.7*   PO2 SANTOS mm Hg 105.1* 58.2* 49.0*   HCO3 SANTOS mmol/L 29.1 34.1* 29.0   BASE EXC SANTOS mmol/L 2.5 6.2 2.8   O2 CONTENT SANTOS ml/dL 12.8 12.2 11.1   O2 HGB, VENOUS % 93.8* 86.5* 80.1*     Results from last 7 days   Lab Units 02/16/25  0639   PH, SANTOS I-STAT  7.284*   PCO2, SANTOS ISTAT mm HG 65.5*   PO2, SANTOS ISTAT mm HG 32.0*   HCO3, SANTOS ISTAT mmol/L 31.0*   I STAT BASE EXC mmol/L 3   I STAT O2 SAT % 52*         Results from last 7 days   Lab Units 02/15/25  1857 02/15/25  1542 02/15/25  1316   HS TNI 0HR ng/L  --   --  24   HS TNI 2HR ng/L  --  25  --    HSTNI D2 ng/L  --  1  --    HS TNI 4HR ng/L 25  --   --    HSTNI D4 ng/L 1  --   --                  Results from last 7 days   Lab Units 02/16/25  0635 02/15/25  1542   PROCALCITONIN ng/ml 0.20 0.15     Results from last 7 days   Lab Units 02/15/25  1350   LACTIC ACID mmol/L 1.1               Results from last 7 days   Lab Units 02/15/25  1350   INFLUENZA A PCR  Negative   INFLUENZA B PCR  Negative   RSV PCR  Negative           Results from last 7 days   Lab Units 02/15/25  1350   BLOOD CULTURE  No Growth at 24 hrs.  No Growth at 24 hrs.         Past Medical History:   Diagnosis Date    Cancer (HCC) " 04,25,2024    Diabetes mellitus (HCC)     Essential hypertension 04/19/2024    High cholesterol     History of blood clots     Hypertension     Leucocytosis 04/20/2024    Lung cancer (HCC)     Malignant neoplasm of overlapping sites of right lung (HCC) 06/04/2024    Pneumonia     Pulmonary embolism (HCC)      Present on Admission:   Adenocarcinoma of lung (HCC)   History of pulmonary embolism   Essential hypertension   Diabetes mellitus (HCC)   Pleural effusion   Restrictive lung disease   Sepsis (HCC)   GERD (gastroesophageal reflux disease)   Sinus tachycardia      Admitting Diagnosis: Hypoxemia [R09.02]  Dyspnea [R06.00]  SOB (shortness of breath) [R06.02]  Postobstructive pneumonia [J18.9]  Age/Sex: 54 y.o. male    Network Utilization Review Department  ATTENTION: Please call with any questions or concerns to [phone] and carefully listen to the prompts so that you are directed to the right person. All voicemails are confidential.   For Discharge needs, contact Care Management DC Support Team at [phone] opt. [phone]  Send all requests for admission clinical reviews, approved or denied determinations and any other requests to dedicated fax number below belonging to the [place] where the patient is receiving treatment. List of dedicated fax numbers for the Facilities:  FACILITY NAME UR FAX NUMBER   ADMISSION DENIALS (Administrative/Medical Necessity) [phone]   DISCHARGE SUPPORT TEAM (NETWORK) [phone]   PARENT CHILD HEALTH (Maternity/NICU/Pediatrics) [phone]   [facility] [phone]   [facility] [phone]   [facility] [phone]   [facility] [phone]   [facility] [phone]   [facility] [phone]   [facility] [phone]   [facility] [phone]   [facility]  [facility] [phone]   [facility] [phone]   [facility] [phone]   [facility] [phone]

## 2025-02-17 NOTE — ASSESSMENT & PLAN NOTE
Palliative diagnosis: Stage IV adenocarcinoma of the lung  PPS: 50%      Education/counseling provided on role/purpose of palliative care; benefits/risks of treatment options by our team reviewed; instructions for management and anticipatory guidance provided; supportive listening and presence provided; provided space for life review and whole person assessment    Communicated and coordinated with RN, SLIM and CM.    Transitioned to comfort care.

## 2025-02-17 NOTE — ASSESSMENT & PLAN NOTE
Lab Results   Component Value Date    HGB 8.8 (L) 02/17/2025    MCV 85 02/17/2025    IRON 20 (L) 02/09/2025    FERRITIN 107 02/09/2025    TIBC 233.8 (L) 02/09/2025    UIBC 214 02/09/2025     POA Hgb 10.4 (baseline is 9-10)  No active bleeding  Etiology could be anemia of chronic disease versus chemotherapy-induced  Continue to monitor

## 2025-02-17 NOTE — ASSESSMENT & PLAN NOTE
Has right-sided Pleurx catheter in place for recurrent pleural effusion  CTA chest w/ contrast - stable small bilateral pleural effusions and loculated right pleural fluid  Echo (12/9/24) - EF 60 percent.  Diastolic function is normal.  Mild AR.  Aortic valve sclerosis.  2/17, 250 mL drained from Pleurx catheter  Pulmonology following

## 2025-02-17 NOTE — ASSESSMENT & PLAN NOTE
Stage IV lung cancer, Pleurx catheter.  Presenting with SOB.  With acute on chronic hypoxic respiratory failure, up to 6 L. Met sepsis criteria with WBC 10.78, .  Afebrile. Recent hospitalization at [facility] from 2/2 to 2/13 for postobstructive pneumonia with 7 days of IV cefepime and IV vancomycin. Flu/RSV/COVID - negative  CXR and CTA chest.-concerning for lymphangitic tumor spread and edema.  Bilateral pleural effusions and loculated right pleural fluid.  Negative for PE  MRSA and blood culture negative    Presentation is possibly component of worsening lung adenocarcinoma plus postobstructive pneumonia.  Overall poor prognosis, goals of care discussions ongoing.    Plan:  Trend WBC  Continue ceftriaxone and IV Flagyl (day 3  Continue IV Solu-Medrol 40 mg every 8 hours (day 3), Xopenex /Atrovent TID  Pulmonology consulted  Not much to offer from pulm perspective, suggest continuing with palliative care discussions  Palliative care consulted

## 2025-02-17 NOTE — ASSESSMENT & PLAN NOTE
-  CT chest with stable small bilateral pleural effusions and loculated right pleural fluid  - Right-sided Pleurx catheter for recurrent pleural effusion  - Pleural effusion drained today with roughly 250 mL output of dark, non-bloody pleural fluid  - Continue daily palliative drainage of PleurX catheter  - No indication for thoracentesis

## 2025-02-17 NOTE — QUICK NOTE
Per nurse, patient desatted at approximately 0400 and was placed on NRB with O2 saturations improved to >92%, and was hypotensive 86/50. Patient was opening eyes but did not follow commands. VBG notable for CO2 70.5, and patient was started on BiPAP at approximately 0430 for two hours and 250 mL Isolyte and 25% Albumin administered. Patient responsive and following commands while on BiPAP. Repeat VBG ordered for 0730.

## 2025-02-17 NOTE — ASSESSMENT & PLAN NOTE
With metastasis to brain  Follows with Dr. Sandoval ([facility] heme/onc)  Diagnosed in April 2024  May to August 2024 - s/p carboplatin, Alimta, Keytruda  July 2024 - Received brain radiation  Oct 2024 - Received palliative Pemetrex with Keytruda from Nov to Dec 2024  Currently on chemotherapy - Ramucirumab + Docetaxel q21 days. Last on 1/21/2025  Patient does not want any further chemotherapy.  They are interested in immunotherapy  Heme/onc consulted at admission  Palliative care has been consulted appreciate recommendations

## 2025-02-17 NOTE — HOSPITAL COURSE
Papo Gibbs: This is a 54-year-old male with type 2 diabetes and lung cancer (stage IV adenocarcinoma with mets to brain and recurrent pleural effusions s/p Pleurx catheter) who had been recently hospitalized earlier this month for dyspnea, diarrhea, and episodes of presyncope.  He had been in the ICU for a brief amount of time for acute respiratory failure requiring BiPAP therapy.  Imaging at that time found pneumonia, lung collapse, lymphangitic carcinomatosis of both lungs with pleural effusions.  He was eventually sent home on home palliative program--wished to forego any more cancer related therapies, but did not want to transition to hospice.  He presented again 2/15/2025 for increasing shortness of breath.  His new baseline oxygen requirement is around 4 L NC.  Upon presentation to the ED, vital sign significant for  and RR 24.  Lab work significant for CO2 34, BUN 34, WBC 10.78, hemoglobin 10.4.  CTA chest PE study revealed no evidence of acute PE, stable small bilateral pleural effusions, loculated right pleural fluid, stable airspace consolidation and atelectasis in the right lung and airspace opacities in the left lung bases adapting tumor and/or postobstructive pneumonia, diffuse nephrogenic spread of tumor not significantly changed.  Chest x-ray revealed persistent vascular prominence with diffuse interstitial and airspace opacities right greater than the left.  ECG revealed sinus tachycardia and nonspecific ST and T wave abnormalities.  He was admitted to the hospital for postobstructive pneumonia.     Palliative and supportive care were consulted for cancer related pain and symptom management.  This morning of 2/17/2025, patient desaturated and required nonrebreather.  VBG showed CO2 of 70.5.  Patient started on BiPAP.     By time of this encounter, patient was off of BiPAP and back on MF NC.  Patient's significant other, sister, and brother-in-law were in the room with him.  RN team brought  up concerns that patient vacillated between being very uncomfortable to being too lethargic from the medications.  Upon chart review, he was getting 10 mg of oxycodone.  He is unable to communicate effectively with me.  He is expressing a lot of anxiety about needing to urinate.

## 2025-02-17 NOTE — ASSESSMENT & PLAN NOTE
Secondary to postobstructive pneumonia in the setting of lung adenocarcinoma. Baseline is 4 L   Here, requiring up to 6 L  Patient is also on high doses of opioids and valium , for pain and anxiety- could be one of the contributing reasons     See plan under postobstructive pneumonia. Close monitoring for respiratory  depression, holding sedating meds

## 2025-02-17 NOTE — CONSULTS
Inpatient Consultation - Palliative and Supportive Care   Papo Gibbs 54 y.o. male [id number]    Acute on chronic hypoxic respiratory failure (HCC)  Assessment & Plan  Insetting of worsening cancer involvement of the lungs    For air hunger:  Bedside fan to blow onto face  Continue opioid therapy as directed below, which can also be used for dyspnea  Continue Valium 5 mg twice daily; please give IV doses if unable to tolerate p.o.  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  S/p carboplatin/Alimta/Keytruda  Most recently treated with just Alimta    Imaging has shown worsening disease with right lung collapse, lymphangitic carcinomatosis of both lungs, and pleural effusions    Defers any more cancer treatments    Palliative care by specialist  Assessment & Plan  Palliative diagnosis: Stage IV adenocarcinoma of the lung  PPS: 50%  Palliative care provider: Home program    Education/counseling provided on role/purpose of palliative care; benefits/risks of treatment options by our team reviewed; instructions for management and anticipatory guidance provided; supportive listening and presence provided; provided space for life review and whole person assessment    Communicated and coordinated with RN team    Follow-up planning: TBD     Cancer associated pain  Assessment & Plan  Concerned about symptom control and adverse effects    Discontinue oxycodone IR 10 mg  Switch to oxycodone IR solution 2.5/5 mg every 4 hours as needed for moderate/severe pain  Continue morphine 2 mg IV every 4 hours as needed for breakthrough pain  Continue adjuncts such as acetaminophen and Solu-Medrol       Code Status: DNAR/DNI - Level 3   Decisional apparatus:  Patient is not competent on my exam today.  If competence is lost, patient's substitute decision maker would default to spouse.   Advance Directive / Living Will / POLST:    Watson's advance directive signed 6/6/2024.  He designated spouse Tomasa as primary and Giancarlo Gibbs at alternate HCR.     I have reviewed the patient's controlled substance dispensing history in the Prescription Drug Monitoring Program in compliance with the [facility] regulations before prescribing any controlled substances.         We appreciate the invitation to be involved in this patient's care.  We will continue to follow.  Please do not hesitate to reach our on call provider through our clinic answering service at [phone] should you have acute symptom control concerns.    Gordo Solis DO  Palliative and Supportive Care  Clinic/Answering Service: [phone]  You can find me on [employer] Secure Chat!       IDENTIFICATION:  Inpatient consult to Palliative Care  Consult performed by: Gordo Solis DO  Consult ordered by: Maria Del Rosario ySed DO        Physician Requesting Consult: Aditi Martinez MD  Reason for Consult / Principal Problem: Symptom management  Hx and PE limited by: Acute encephalopathy    NARRATIVE AND INTERVAL HISTORY:       Papo Gibbs: This is a 54-year-old male with type 2 diabetes and lung cancer (stage IV adenocarcinoma with mets to brain and recurrent pleural effusions s/p Pleurx catheter) who had been recently hospitalized earlier this month for dyspnea, diarrhea, and episodes of presyncope.  He had been in the ICU for a brief amount of time for acute respiratory failure requiring BiPAP therapy.  Imaging at that time found pneumonia, lung collapse, lymphangitic carcinomatosis of both lungs with pleural effusions.  He was eventually sent home on home palliative program--wished to forego any more cancer related therapies, but did not want to transition to hospice.  He presented again 2/15/2025 for increasing shortness of breath.  His new baseline oxygen requirement is around 4 L NC.  Upon presentation to the ED, vital sign significant for  and RR 24.  Lab work significant for CO2 34, BUN  34, WBC 10.78, hemoglobin 10.4.  CTA chest PE study revealed no evidence of acute PE, stable small bilateral pleural effusions, loculated right pleural fluid, stable airspace consolidation and atelectasis in the right lung and airspace opacities in the left lung bases adapting tumor and/or postobstructive pneumonia, diffuse nephrogenic spread of tumor not significantly changed.  Chest x-ray revealed persistent vascular prominence with diffuse interstitial and airspace opacities right greater than the left.  ECG revealed sinus tachycardia and nonspecific ST and T wave abnormalities.  He was admitted to the hospital for postobstructive pneumonia.    Palliative and supportive care were consulted for cancer related pain and symptom management.  This morning of 2/17/2025, patient desaturated and required nonrebreather.  VBG showed CO2 of 70.5.  Patient started on BiPAP.    By time of this encounter, patient was off of BiPAP and back on MF NC.  Patient's significant other, sister, and brother-in-law were in the room with him.  RN team brought up concerns that patient vacillated between being very uncomfortable to being too lethargic from the medications.  Upon chart review, he was getting 10 mg of oxycodone.  He is unable to communicate effectively with me.  He is expressing a lot of anxiety about needing to urinate.    Past Medical History:   Diagnosis Date    Cancer (HCC) 04,25,2024    Diabetes mellitus (HCC)     Essential hypertension 04/19/2024    High cholesterol     History of blood clots     Hypertension     Leucocytosis 04/20/2024    Lung cancer (HCC)     Malignant neoplasm of overlapping sites of right lung (HCC) 06/04/2024    Pneumonia     Pulmonary embolism (HCC)      Past Surgical History:   Procedure Laterality Date    IR BIOPSY LYMPH NODE  4/23/2024    IR PORT PLACEMENT  5/20/2024    IR THORACENTESIS  12/9/2024    KNEE SURGERY      MANDIBLE FRACTURE SURGERY  1985    PATELLA FRACTURE SURGERY      RECTAL  SURGERY       Social History     Socioeconomic History    Marital status: Single     Spouse name: Not on file    Number of children: Not on file    Years of education: Not on file    Highest education level: Not on file   Occupational History    Not on file   Tobacco Use    Smoking status: Former     Current packs/day: 0.00     Average packs/day: 1.5 packs/day for 35.0 years (52.5 ttl pk-yrs)     Types: Cigarettes     Quit date: 4/15/2024     Years since quittin.8     Passive exposure: Past    Smokeless tobacco: Never   Vaping Use    Vaping status: Never Used   Substance and Sexual Activity    Alcohol use: Not Currently    Drug use: Never    Sexual activity: Yes     Partners: Female     Birth control/protection: Post-menopausal, None   Other Topics Concern    Not on file   Social History Narrative    Not on file     Social Drivers of Health     Financial Resource Strain: Not on file   Food Insecurity: No Food Insecurity (2/15/2025)    Nursing - Inadequate Food Risk Classification     Worried About Running Out of Food in the Last Year: Not on file     Ran Out of Food in the Last Year: Not on file     Ran Out of Food in the Last Year: Never true   Transportation Needs: No Transportation Needs (2/15/2025)    Nursing - Transportation Risk Classification     Lack of Transportation: Not on file     Lack of Transportation: No   Physical Activity: Not on file   Stress: Not on file   Social Connections: Not on file   Intimate Partner Violence: Unknown (2/15/2025)    Nursing IPS     Feels Physically and Emotionally Safe: Not on file     Physically Hurt by Someone: Not on file     Humiliated or Emotionally Abused by Someone: Not on file     Physically Hurt by Someone: No     Hurt or Threatened by Someone: No   Housing Stability: Unknown (2/15/2025)    Nursing: Inadequate Housing Risk Classification     Has Housing: Not on file     Worried About Losing Housing: Not on file     Unable to Get Utilities: Not on file     Unable  to Pay for Housing in the Last Year: No     Has Housin     Family History   Problem Relation Age of Onset    No Known Problems Father     No Known Problems Mother        MEDICATIONS / ALLERGIES:    all current active meds have been reviewed, current meds:   Current Facility-Administered Medications:     acetaminophen (TYLENOL) tablet 975 mg, Q8H LATONIA    albuterol (PROVENTIL HFA,VENTOLIN HFA) inhaler 2 puff, Q4H PRN    amitriptyline (ELAVIL) tablet 200 mg, HS    apixaban (ELIQUIS) tablet 5 mg, BID    atorvastatin (LIPITOR) tablet 10 mg, Daily    benzonatate (TESSALON PERLES) capsule 200 mg, TID PRN    bisacodyl (DULCOLAX) rectal suppository 10 mg, Daily PRN    ceftriaxone (ROCEPHIN) 1 g/50 mL in dextrose IVPB, Q24H, Last Rate: 1,000 mg (25 1823)    diazepam (VALIUM) tablet 5 mg, BID    docusate sodium (COLACE) capsule 100 mg, BID    famotidine (PEPCID) tablet 40 mg, Daily PRN    folic acid (FOLVITE) tablet 1,000 mcg, Daily    insulin lispro (HumALOG/ADMELOG) 100 units/mL subcutaneous injection 1-6 Units, 4x Daily (AC & HS) **AND** Fingerstick Glucose (POCT), 4x Daily AC and at bedtime    ipratropium (ATROVENT) 0.02 % inhalation solution 0.5 mg, TID    levalbuterol (XOPENEX) inhalation solution 1.25 mg, TID    levothyroxine tablet 25 mcg, Early Morning    [Held by provider] losartan (COZAAR) tablet 25 mg, Daily    magnesium Oxide (MAG-OX) tablet 400 mg, Daily    methylPREDNISolone sodium succinate (Solu-MEDROL) injection 40 mg, Q8H LATONIA    metoprolol tartrate (LOPRESSOR) tablet 25 mg, Q12H LATONIA    metroNIDAZOLE (FLAGYL) IVPB (premix) 500 mg 100 mL, Q8H, Last Rate: 500 mg (25 1522)    morphine injection 2 mg, Q4H PRN    umeclidinium 62.5 mcg/actuation inhaler AEPB 1 puff, Daily **AND** olodaterol HCl (STRIVERDI RESPIMAT) inhaler 2 puff, Daily    ondansetron (ZOFRAN) injection 4 mg, Q4H PRN    oxyCODONE (ROXICODONE) oral solution 2.5 mg, Q4H PRN **OR** oxyCODONE (ROXICODONE) oral solution 5 mg, Q4H PRN     polyethylene glycol (MIRALAX) packet 17 g, Daily    QUEtiapine (SEROquel) tablet 25 mg, HS    senna-docusate sodium (SENOKOT S) 8.6-50 mg per tablet 1 tablet, HS    sodium chloride (OCEAN) 0.65 % nasal spray 1 spray, Q1H PRN, and PTA meds:   Prior to Admission Medications   Prescriptions Last Dose Informant Patient Reported? Taking?   Lancets (OneTouch Delica Plus [id number]) MISC  Self No No   Sig: Use 1 Units 4 (four) times a day   OneTouch Verio test strip  Self No No   Sig: Use 1 each 4 (four) times a day   QUEtiapine (SEROquel) 25 mg tablet 2025  No Yes   Sig: Take 1 tablet (25 mg total) by mouth daily at bedtime   acetaminophen (TYLENOL) 325 mg tablet 2025 Self No Yes   Sig: Take 3 tablets (975 mg total) by mouth every 8 (eight) hours as needed for mild pain, headaches or fever   albuterol (PROVENTIL HFA,VENTOLIN HFA) 90 mcg/act inhaler 2025 Self No Yes   Sig: INHALE 2 PUFFS EVERY 6 HOURS AS NEEDED FOR WHEEZING   amitriptyline (ELAVIL) 100 mg tablet 2025 Self Yes Yes   Si mg daily at bedtime   apixaban (Eliquis) 5 mg 2/15/2025  No Yes   Sig: Take 1 tablet (5 mg total) by mouth 2 (two) times a day   atorvastatin (LIPITOR) 10 mg tablet 2/15/2025 Self No Yes   Sig: Take 1 tablet (10 mg total) by mouth daily   benzonatate (TESSALON) 200 MG capsule Past Week Self No Yes   Sig: Take 1 capsule (200 mg total) by mouth 3 (three) times a day as needed for cough   diazepam (VALIUM) 5 mg tablet 2/15/2025  No Yes   Sig: Take 1 tablet (5 mg total) by mouth 2 (two) times a day   famotidine (PEPCID) 40 MG tablet Past Week Self Yes Yes   Sig: Take 40 mg by mouth daily as needed for heartburn or indigestion Taking as needed   folic acid (FOLVITE) 1 mg tablet 2/15/2025 Self No Yes   Sig: TAKE 1 TABLET BY MOUTH EVERY DAY   levothyroxine 25 mcg tablet 2/15/2025 Self No Yes   Sig: TAKE 1 TABLET BY MOUTH EVERY DAY   losartan (COZAAR) 25 mg tablet 2/15/2025  No Yes   Sig: Take 1 tablet (25 mg total) by mouth  daily   magnesium Oxide (MAG-OX) 400 mg TABS 2/15/2025  No Yes   Sig: Take 1 tablet (400 mg total) by mouth daily   metFORMIN (GLUCOPHAGE) 1000 MG tablet 2/15/2025 Self Yes Yes   Sig: Take 1,000 mg by mouth 2 (two) times a day   metoprolol tartrate (LOPRESSOR) 25 mg tablet 2/15/2025  No Yes   Sig: Take 1 tablet (25 mg total) by mouth every 12 (twelve) hours   naloxone (NARCAN) 4 mg/0.1 mL nasal spray Unknown  No No   Sig: Administer 1 spray into a nostril. If no response after 2-3 minutes, give another dose in the other nostril using a new spray. For use in emergencies for opioid / pain medication reversal for accidental ingestion, respiratory depression, sedation or concerns for overdose.   ondansetron (ZOFRAN-ODT) 8 mg disintegrating tablet Unknown Self No No   Sig: Take 1 tablet (8 mg total) by mouth every 8 (eight) hours as needed for vomiting or nausea   oxyCODONE (ROXICODONE) 5 immediate release tablet 2/15/2025  No Yes   Sig: Take 1-2 tablets (5-10 mg total) by mouth every 4 (four) hours as needed (for cancer related pain/air hunger) Max Daily Amount: 60 mg   senna (SENOKOT) 8.6 mg Past Week Self No Yes   Sig: Take 1 tablet (8.6 mg total) by mouth daily at bedtime as needed for constipation   sodium chloride (OCEAN) 0.65 % nasal spray Past Week  No Yes   Si spray into each nostril every hour as needed for congestion   tiotropium-olodaterol (Stiolto Respimat) 2.5-2.5 MCG/ACT inhaler Past Week Self No Yes   Sig: Inhale 2 puffs daily      Facility-Administered Medications: None       No Known Allergies    OBJECTIVE    Physical Exam  Physical Exam  Constitutional:       General: He is not in acute distress.     Appearance: He is ill-appearing.      Interventions: Nasal cannula in place.   HENT:      Head: Normocephalic and atraumatic.      Right Ear: External ear normal.      Left Ear: External ear normal.      Nose: Nose normal.      Mouth/Throat:      Mouth: Mucous membranes are dry.      Pharynx:  Oropharynx is clear.   Eyes:      General: No scleral icterus.     Conjunctiva/sclera: Conjunctivae normal.   Cardiovascular:      Rate and Rhythm: Regular rhythm. Tachycardia present.      Pulses: Normal pulses.   Pulmonary:      Effort: No respiratory distress.      Comments: Mildly tachypneic  Abdominal:      General: There is no distension.      Palpations: Abdomen is soft.      Tenderness: There is no abdominal tenderness.   Musculoskeletal:      Right lower leg: No edema.      Left lower leg: No edema.   Skin:     Coloration: Skin is pale. Skin is not jaundiced.   Neurological:      General: No focal deficit present.      Mental Status: He is disoriented.   Psychiatric:         Attention and Perception: He is inattentive.         Mood and Affect: Mood is anxious. Mood is not depressed.         Speech: Speech is delayed.         Behavior: Behavior is not agitated or aggressive.         Cognition and Memory: Cognition is impaired. Memory is impaired.         Lab Results: I have personally reviewed pertinent labs., CBC:   Lab Results   Component Value Date    WBC 8.02 02/17/2025    HGB 8.8 (L) 02/17/2025    HCT 29.2 (L) 02/17/2025    MCV 85 02/17/2025     02/17/2025    RBC 3.42 (L) 02/17/2025    MCH 25.7 (L) 02/17/2025    MCHC 30.1 (L) 02/17/2025    RDW 17.1 (H) 02/17/2025    MPV 10.0 02/17/2025   , CMP:   Lab Results   Component Value Date    SODIUM 137 02/17/2025    K 5.3 02/17/2025    CL 96 02/17/2025    CO2 33 (H) 02/17/2025    BUN 43 (H) 02/17/2025    CREATININE 0.95 02/17/2025    CALCIUM 10.1 02/17/2025    AST 11 (L) 02/17/2025    ALT 9 02/17/2025    ALKPHOS 59 02/17/2025    EGFR 89 02/17/2025   , BMP:  Lab Results   Component Value Date    SODIUM 137 02/17/2025    K 5.3 02/17/2025    CL 96 02/17/2025    CO2 33 (H) 02/17/2025    BUN 43 (H) 02/17/2025    CREATININE 0.95 02/17/2025    GLUC 206 (H) 02/17/2025    CALCIUM 10.1 02/17/2025    AGAP 8 02/17/2025    EGFR 89 02/17/2025   , PT/PTT:No results  "found for: \"PT\", \"PTT\"  Imaging Studies: Reviewed and interpreted the pertinent studies  EKG, Pathology, and Other Studies: Reviewed and interpreted the pertinent studies    I have spent a total time of 60 minutes in caring for this patient on the day of the visit/encounter including Instructions for management, Impressions, Counseling / Coordination of care, Documenting in the medical record, Reviewing/placing orders in the medical record (including tests, medications, and/or procedures), Obtaining or reviewing history  , and Communicating with other healthcare professionals . Topics discussed with the patient / family include symptom assessment and management, medication review, medication adjustment, psychosocial support, opioid titration, supportive listening, and anticipatory guidance.   " No

## 2025-02-17 NOTE — ASSESSMENT & PLAN NOTE
On comfort care    Oxygen for comfort  Dilaudid 1mg Q4hrs scheduled  Ativan 0.5mg Q4hrs scheduled  Dilaudid 0.8mg Q10min  PRN  Ativan 0.5mg Q10min PRN

## 2025-02-17 NOTE — PROGRESS NOTES
Progress Note - Hospitalist   Name: Papo Gibbs 54 y.o. male I MRN: [id number]  Unit/Bed#: S -01 I Date of Admission: 2/15/2025   Date of Service: 2/17/2025 I Hospital Day: 2    Assessment & Plan  Postobstructive pneumonia  Stage IV lung cancer, Pleurx catheter.  Presenting with SOB.  With acute on chronic hypoxic respiratory failure, up to 6 L. Met sepsis criteria with WBC 10.78, .  Afebrile. Recent hospitalization at [facility] from 2/2 to 2/13 for postobstructive pneumonia with 7 days of IV cefepime and IV vancomycin. Flu/RSV/COVID - negative  CXR and CTA chest.-concerning for lymphangitic tumor spread and edema.  Bilateral pleural effusions and loculated right pleural fluid.  Negative for PE  MRSA and blood culture negative    Presentation is possibly component of worsening lung adenocarcinoma plus postobstructive pneumonia.  Overall poor prognosis, goals of care discussions ongoing.    Plan:  Trend WBC  Continue ceftriaxone and IV Flagyl (day 3  Continue IV Solu-Medrol 40 mg every 8 hours (day 3), Xopenex /Atrovent TID  Pulmonology consulted  Not much to offer from pulm perspective, suggest continuing with palliative care discussions  Palliative care consulted  Acute on chronic hypoxic respiratory failure (HCC)  Secondary to postobstructive pneumonia in the setting of lung adenocarcinoma. Baseline is 4 L   Here, requiring up to 6 L  Patient is also on high doses of opioids and valium , for pain and anxiety- could be one of the contributing reasons     See plan under postobstructive pneumonia. 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 Sandoval ([facility]/onc)  Diagnosed in April 2024  May to August 2024 - s/p carboplatin, Alimta, Keytruda  July 2024 - Received brain radiation  Oct 2024 - Received palliative Pemetrex with Keytruda from Nov to Dec 2024  Currently on chemotherapy - Ramucirumab +  Docetaxel q21 days. Last on 1/21/2025  Patient does not want any further chemotherapy.  They are interested in immunotherapy  Heme/onc consulted at admission  Palliative care has been consulted appreciate recommendations  Pleural effusion  Has right-sided Pleurx catheter in place for recurrent pleural effusion  CTA chest w/ contrast - stable small bilateral pleural effusions and loculated right pleural fluid  Echo (12/9/24) - EF 60 percent.  Diastolic function is normal.  Mild AR.  Aortic valve sclerosis.  2/17, 250 mL drained from Pleurx catheter  Pulmonology following  Essential hypertension  Home meds: Losartan 25 mg, Lopressor 25 mg BID    Plan:  Continue home Lopressor  Hold home losartan due to soft blood pressures  History of pulmonary embolism  Diagnosed on 10/4/24  In the setting of active malignancy  Continue home Eliquis 5 mg BID  Diabetes mellitus (HCC)  Lab Results   Component Value Date    HGBA1C 8.1 (H) 12/09/2024   Home meds: Metformin 1000 mg BID    Plan:  SSI only  Can uptitrate insulin regimen as necessary  Hypoglycemia protocol  Hypothyroidism  Continue home levothyroxine 25 mcg  Cancer associated pain  Follows with palliative care in the outpatient setting  Per palliative care note on 2/13  Continue home Valium 5 mg BID  Oxycodone IR 5 mg every 4 hours as needed for moderate pain  Oxycodone 10 mg every 4 hours as needed for severe pain  IV morphine 2 mg every 4 hours as needed for breakthrough pain  Palliative care consulted, appreciate recommendations  Restrictive lung disease  Continue home Stiolto and albuterol prn  Hyperlipidemia  Continue home atorvastatin 10 mg  Sepsis (HCC)  See plan under postobstructive pneumonia  Anemia  Lab Results   Component Value Date    HGB 8.8 (L) 02/17/2025    MCV 85 02/17/2025    IRON 20 (L) 02/09/2025    FERRITIN 107 02/09/2025    TIBC 233.8 (L) 02/09/2025    UIBC 214 02/09/2025     POA Hgb 10.4 (baseline is 9-10)  No active bleeding  Etiology could be anemia  of chronic disease versus chemotherapy-induced  Continue to monitor  GERD (gastroesophageal reflux disease)  Continue home famotidine 40 mg prn  Sinus tachycardia  Continue home Lopressor 25 mg BID  Monitor on telemetry to look for other intermittent arrhythmias   Constipation  Senna, MiraLAX, Colace    VTE Pharmacologic Prophylaxis: VTE Score: 8 Moderate Risk (Score 3-4) - Pharmacological DVT Prophylaxis Contraindicated. Sequential Compression Devices Ordered.    Mobility:   Basic Mobility Inpatient Raw Score: 14  -HLM Goal: 4: Move to chair/commode  JH-HLM Achieved: 3: Sit at edge of bed  JH-HLM Goal achieved. Continue to encourage appropriate mobility.    Patient Centered Rounds: I performed bedside rounds with nursing staff today.   Discussions with Specialists or Other Care Team Provider: pulm palliative    Education and Discussions with Family / Patient: Updated  (sister) at bedside.    Current Length of Stay: 2 day(s)  Current Patient Status: Inpatient   Certification Statement: The patient will continue to require additional inpatient hospital stay due to acute hypoxic resp failure   Discharge Plan: Anticipate discharge in >72 hrs to home.    Code Status: Level 3 - DNAR and DNI    Subjective        Per nurse, patient desatted at approximately 0400 and was placed on NRB with O2 saturations improved to >92%, and was hypotensive 86/50. Patient was opening eyes but did not follow commands. VBG notable for CO2 70.5, and patient was started on BiPAP at approximately 0430 for two hours and 250 mL Isolyte and 25% Albumin administered. Patient responsive and following commands while on BiPAP. Repeat VBG ordered for 0730.      This morning patient was seen and examined at bedside . Patient was sitting up leaning forward on the bed in distress.  Patient was on BiPAP, upon awakening appears to be agitated.  Not able to respond to questions, pulling at his mask.    Objective :  Temp:  [95.9 °F (35.5  °C)-97.5 °F (36.4 °C)] 95.9 °F (35.5 °C)  HR:  [111-127] 111  BP: ()/(50-83) 113/63  Resp:  [16-22] 22  SpO2:  [90 %-99 %] 92 %  O2 Device: Mid flow nasal cannula  Nasal Cannula O2 Flow Rate (L/min):  [12 L/min-15 L/min] 15 L/min    There is no height or weight on file to calculate BMI.     Input and Output Summary (last 24 hours):     Intake/Output Summary (Last 24 hours) at 2/17/2025 0831  Last data filed at 2/16/2025 2201  Gross per 24 hour   Intake 0 ml   Output 1100 ml   Net -1100 ml       Physical Exam  Constitutional:       General: He is in acute distress.   HENT:      Mouth/Throat:      Mouth: Mucous membranes are moist.      Pharynx: Oropharynx is clear.   Eyes:      Extraocular Movements: Extraocular movements intact.      Pupils: Pupils are equal, round, and reactive to light.   Cardiovascular:      Rate and Rhythm: Tachycardia present.      Pulses: Normal pulses.   Pulmonary:      Comments: Tachypneic, bilateral breath sounds decreased  Abdominal:      General: There is distension.      Palpations: Abdomen is soft.   Skin:     General: Skin is warm.      Capillary Refill: Capillary refill takes less than 2 seconds.   Neurological:      Mental Status: He is oriented to person, place, and time.     No wheezing, BiPAP mask in place      Lines/Drains:  Lines/Drains/Airways       Active Status       Name Placement date Placement time Site Days    Port A Cath 05/20/24 Right Chest 05/20/24  1427  Chest  272                           Telemetry:  Telemetry Orders (From admission, onward)               24 Hour Telemetry Monitoring  Continuous x 24 Hours (Telem)        Expiring   Question:  Reason for 24 Hour Telemetry  Answer:  Arrhythmias requiring acute medical intervention / PPM or ICD malfunction                     Telemetry Reviewed: Sinus Tachycardia  Indication for Continued Telemetry Use: Metabolic/electrolyte disturbance with high probability of dysrhythmia               Lab Results: I have  reviewed the following results:   Results from last 7 days   Lab Units 02/17/25  0752 02/16/25  0807 02/16/25  0639 02/15/25  1316   WBC Thousand/uL 8.02 7.18  --  10.78*   HEMOGLOBIN g/dL 8.8* 9.2*  --  10.4*   I STAT HEMOGLOBIN   --   --    < >  --    HEMATOCRIT % 29.2* 30.8*  --  34.7*   HEMATOCRIT, ISTAT   --   --    < >  --    PLATELETS Thousands/uL 228 194  --  208   BANDS PCT %  --  1  --   --    SEGS PCT %  --   --   --  84*   LYMPHO PCT %  --  3*  --  9*   MONO PCT %  --  1*  --  6   EOS PCT %  --  0  --  0    < > = values in this interval not displayed.     Results from last 7 days   Lab Units 02/16/25  0807 02/16/25  0639 02/16/25  0635 02/15/25  1316   SODIUM mmol/L  --   --  136 137   POTASSIUM mmol/L 5.5*  --  5.9* 4.6   CHLORIDE mmol/L  --   --  97 93*   CO2 mmol/L  --   --  32 34*   CO2, I-STAT mmol/L  --  33*  --   --    BUN mg/dL  --   --  36* 34*   CREATININE mg/dL  --   --  1.01 0.79   ANION GAP mmol/L  --   --  7 10   CALCIUM mg/dL  --   --  9.7 10.2   ALBUMIN g/dL  --   --   --  3.8   TOTAL BILIRUBIN mg/dL  --   --   --  0.55   ALK PHOS U/L  --   --   --  74   ALT U/L  --   --   --  9   AST U/L  --   --   --  13   GLUCOSE RANDOM mg/dL  --   --  188* 129         Results from last 7 days   Lab Units 02/17/25  0713 02/16/25  2223 02/16/25  2118 02/16/25  1630 02/16/25  1109 02/16/25  0628 02/15/25  2038 02/15/25  1817 02/13/25  1100 02/13/25  0706 02/12/25  2127 02/12/25  1537   POC GLUCOSE mg/dl 178* 195* 179* 166* 178* 208* 140 134 144* 123 127 130         Results from last 7 days   Lab Units 02/16/25  0635 02/15/25  1542 02/15/25  1350   LACTIC ACID mmol/L  --   --  1.1   PROCALCITONIN ng/ml 0.20 0.15  --        Recent Cultures (last 7 days):   Results from last 7 days   Lab Units 02/15/25  1350   BLOOD CULTURE  No Growth at 24 hrs.  No Growth at 24 hrs.             Last 24 Hours Medication List:     Current Facility-Administered Medications:     acetaminophen (TYLENOL) tablet 975 mg, Q8H  LATONIA    albuterol (PROVENTIL HFA,VENTOLIN HFA) inhaler 2 puff, Q4H PRN    amitriptyline (ELAVIL) tablet 200 mg, HS    apixaban (ELIQUIS) tablet 5 mg, BID    atorvastatin (LIPITOR) tablet 10 mg, Daily    benzonatate (TESSALON PERLES) capsule 200 mg, TID PRN    bisacodyl (DULCOLAX) rectal suppository 10 mg, Daily PRN    ceftriaxone (ROCEPHIN) 1 g/50 mL in dextrose IVPB, Q24H, Last Rate: 1,000 mg (02/16/25 1823)    diazepam (VALIUM) tablet 5 mg, BID    docusate sodium (COLACE) capsule 100 mg, BID    famotidine (PEPCID) tablet 40 mg, Daily PRN    folic acid (FOLVITE) tablet 1,000 mcg, Daily    insulin lispro (HumALOG/ADMELOG) 100 units/mL subcutaneous injection 1-6 Units, 4x Daily (AC & HS) **AND** Fingerstick Glucose (POCT), 4x Daily AC and at bedtime    ipratropium (ATROVENT) 0.02 % inhalation solution 0.5 mg, TID    levalbuterol (XOPENEX) inhalation solution 1.25 mg, TID    levothyroxine tablet 25 mcg, Early Morning    [Held by provider] losartan (COZAAR) tablet 25 mg, Daily    magnesium Oxide (MAG-OX) tablet 400 mg, Daily    methylPREDNISolone sodium succinate (Solu-MEDROL) injection 40 mg, Q8H LATONIA    metoprolol tartrate (LOPRESSOR) tablet 25 mg, Q12H LATONIA    metroNIDAZOLE (FLAGYL) IVPB (premix) 500 mg 100 mL, Q8H, Last Rate: 500 mg (02/17/25 0743)    morphine injection 2 mg, Q4H PRN    umeclidinium 62.5 mcg/actuation inhaler AEPB 1 puff, Daily **AND** olodaterol HCl (STRIVERDI RESPIMAT) inhaler 2 puff, Daily    ondansetron (ZOFRAN) injection 4 mg, Q4H PRN    [Held by provider] oxyCODONE (ROXICODONE) immediate release tablet 10 mg, Q4H PRN    oxyCODONE (ROXICODONE) IR tablet 5 mg, Q4H PRN    polyethylene glycol (MIRALAX) packet 17 g, Daily    QUEtiapine (SEROquel) tablet 25 mg, HS    senna-docusate sodium (SENOKOT S) 8.6-50 mg per tablet 1 tablet, HS    sodium chloride (OCEAN) 0.65 % nasal spray 1 spray, Q1H PRN    Administrative Statements   Today, Patient Was Seen By: Amelia Gonzalez MD      **Please Note: This note may  have been constructed using a voice recognition system.**

## 2025-02-17 NOTE — PROGRESS NOTES
Progress Note - Pulmonology   Name: Papo Gibbs 54 y.o. male I MRN: [id number]  Unit/Bed#: S -01 I Date of Admission: 2/15/2025   Date of Service: 2/17/2025 I Hospital Day: 2    Assessment & Plan  Postobstructive pneumonia  -  No clear sign of infection  - CT chest with stable airspace consolidations and atelectasis in the right lung and airspace opacities at the left lung base suggesting tumor and/or postobstructive pneumonia.  -  procal negative and no change on imaging  - Blood cultures thus far negative  - Consider monitoring off of abx  - Currently on ceftriaxone and Flagyl (Day 3)  - On IV Solu-Medrol 40 mg TID (Day 3)  Adenocarcinoma of lung (HCC)  -  w/ lymphangitic spread  -  continue with palliative care discussions  - Medical oncology following  - Patient and family had expressed focus on palliative intervention and would not be interested in more chemotherapy or immunotherapy  -  likely cause of abdominal pain and dyspnea  Pleural effusion  -  CT chest with stable small bilateral pleural effusions and loculated right pleural fluid  - Right-sided Pleurx catheter for recurrent pleural effusion  - Pleural effusion drained today with roughly 250 mL output of dark, non-bloody pleural fluid  - Continue daily palliative drainage of PleurX catheter  - No indication for thoracentesis  Cancer associated pain  -  opioids per primary team and palliative care  History of pulmonary embolism  - CTA PE study without evidence of acute pulmonary embolus  - History of prior pulmonary embolism  - Continue Eliquis 5 mg BID  Sepsis (HCC)  -  unclear if infection present; more likley related to extensive cancer  Acute on chronic hypoxic respiratory failure (HCC)  -  d/w RT  -  transitioned off bipap to MFNC  - Titrate to baseline oxygen requirement as tolerated  -  previously noted difficulty with HFNC  - Ipratropium and levalbuterol TID  - Striverdi respimat inhaler daily    24 Hour Events : Episode of desaturation at  approximately 0400 and placed on NRB with improvement in oxygen saturation. VBG showed CO2 of 70.5 so patient placed on BiPAP overnight. Repeat VBG this morning showed improvement of hypercapnia.     Subjective : Reports reduced work of breathing however still requiring 12 L MFNC. Patient is not able to verbalize much at this time.    Objective :  Temp:  [95.9 °F (35.5 °C)-97.5 °F (36.4 °C)] 95.9 °F (35.5 °C)  HR:  [111-127] 121  BP: ()/(50-83) 131/83  Resp:  [16-22] 22  SpO2:  [90 %-99 %] 94 %  O2 Device: Mid flow nasal cannula  Nasal Cannula O2 Flow Rate (L/min):  [12 L/min-15 L/min] 15 L/min    Physical Exam  Vitals and nursing note reviewed.   Constitutional:       General: He is not in acute distress.     Appearance: He is well-developed and overweight. He is ill-appearing.   HENT:      Head: Normocephalic and atraumatic.   Eyes:      Extraocular Movements: Extraocular movements intact.      Conjunctiva/sclera: Conjunctivae normal.   Cardiovascular:      Rate and Rhythm: Normal rate and regular rhythm.      Pulses: Normal pulses.      Heart sounds: Normal heart sounds, S1 normal and S2 normal. No murmur heard.  Pulmonary:      Effort: Respiratory distress present.      Breath sounds: Examination of the right-upper field reveals decreased breath sounds. Examination of the right-middle field reveals decreased breath sounds. Examination of the right-lower field reveals decreased breath sounds. Decreased breath sounds present. No wheezing, rhonchi or rales.   Abdominal:      Palpations: Abdomen is soft.      Tenderness: There is no abdominal tenderness.   Musculoskeletal:         General: No swelling.      Cervical back: Neck supple.      Right lower leg: Edema present.      Left lower leg: Edema present.   Skin:     General: Skin is warm and dry.      Capillary Refill: Capillary refill takes less than 2 seconds.   Neurological:      Mental Status: He is alert.   Psychiatric:         Mood and Affect: Mood  normal.         Behavior: Behavior normal. Behavior is cooperative.           Lab Results: I have reviewed the following results:   .     02/17/25  0752   WBC 8.02   HGB 8.8*   HCT 29.2*      SODIUM 137   K 5.3   CL 96   CO2 33*   BUN 43*   CREATININE 0.95   GLUC 206*   MG 2.0   AST 11*   ALT 9   ALB 3.6   TBILI 0.29   ALKPHOS 59     ABG: No new results in last 24 hours.    Imaging Results Review: I reviewed radiology reports from this admission including: chest xray and CT chest.  Other Study Results Review: EKG was reviewed.   PFT Results Reviewed: None

## 2025-02-18 NOTE — PROGRESS NOTES
Progress Note - Pulmonology   Name: Papo Gibbs 54 y.o. male I MRN: [id number]  Unit/Bed#: S -01 I Date of Admission: 2/15/2025   Date of Service: 2/18/2025 I Hospital Day: 3    Assessment & Plan  Postobstructive pneumonia  -  No clear sign of infection  - CT chest with stable airspace consolidations and atelectasis in the right lung and airspace opacities at the left lung base suggesting tumor and/or postobstructive pneumonia.  -  procal negative and no change on imaging  - Blood cultures thus far negative  - Consider monitoring off of abx  - Currently on ceftriaxone and Flagyl (Day 4)  - On IV Solu-Medrol 40 mg TID (Day 4)  Adenocarcinoma of lung (HCC)  -  w/ lymphangitic spread  -  continue with palliative care discussions  - Pain management per palliative care  - Medical oncology following  - Patient and family had expressed focus on palliative intervention and would not be interested in more chemotherapy or immunotherapy  -  likely cause of abdominal pain and dyspnea  Pleural effusion  -  CT chest with stable small bilateral pleural effusions and loculated right pleural fluid  - Right-sided Pleurx catheter for recurrent pleural effusion  - Pleural effusion drained on 2/17 with roughly 250-300 mL output of dark, non-bloody pleural fluid  - Continue daily as needed palliative drainage of PleurX catheter  - No indication for thoracentesis  Cancer associated pain  -  opioids per primary team and palliative care  History of pulmonary embolism  - CTA PE study without evidence of acute pulmonary embolus  - History of prior pulmonary embolism  - Continue Eliquis 5 mg BID  Sepsis (HCC)  -  unclear if infection present; more likley related to extensive cancer  Acute on chronic hypoxic respiratory failure (HCC)  -  d/w RT  -  transitioned off bipap to MFNC  - Titrate to baseline oxygen requirement as tolerated  -  previously noted difficulty with HFNC  - Ipratropium and levalbuterol TID  - Striverdi respimat  "inhaler daily    24 Hour Events : Increased work of breathing overnight. Family at bedside noticed increased \"rattling\" in the lungs overnight.    Subjective : Restless and feeling short of breath. Pain is not adequately controlled with adjusted regimen per family at bedside.    Objective :  Temp:  [97.7 °F (36.5 °C)-98 °F (36.7 °C)] 98 °F (36.7 °C)  HR:  [117-138] 138  BP: (127-168)/(78-99) 158/99  Resp:  [16-30] 16  SpO2:  [86 %-94 %] 90 %  O2 Device: Mid flow nasal cannula  Nasal Cannula O2 Flow Rate (L/min):  [12 L/min-15 L/min] 15 L/min  FiO2 (%):  [80] 80    Physical Exam  Vitals and nursing note reviewed.   Constitutional:       General: He is not in acute distress.     Appearance: He is well-developed and overweight. He is ill-appearing.   HENT:      Head: Normocephalic and atraumatic.   Eyes:      Extraocular Movements: Extraocular movements intact.      Conjunctiva/sclera: Conjunctivae normal.   Cardiovascular:      Rate and Rhythm: Normal rate and regular rhythm.      Pulses: Normal pulses.      Heart sounds: Normal heart sounds, S1 normal and S2 normal. No murmur heard.  Pulmonary:      Effort: Respiratory distress present.      Breath sounds: Examination of the right-upper field reveals decreased breath sounds. Examination of the right-middle field reveals decreased breath sounds. Examination of the right-lower field reveals decreased breath sounds. Decreased breath sounds present. No wheezing, rhonchi or rales.   Abdominal:      Palpations: Abdomen is soft.      Tenderness: There is no abdominal tenderness.   Musculoskeletal:         General: No swelling.      Cervical back: Neck supple.      Right lower leg: Edema present.      Left lower leg: Edema present.   Skin:     General: Skin is warm and dry.      Capillary Refill: Capillary refill takes less than 2 seconds.   Neurological:      Mental Status: He is alert.   Psychiatric:         Mood and Affect: Mood normal.         Behavior: Behavior normal. " Behavior is cooperative.           Lab Results: I have reviewed the following results:   .     02/18/25  0511   WBC 9.86   HGB 9.2*   HCT 31.2*      SODIUM 138   K 4.8   CL 97   CO2 34*   BUN 40*   CREATININE 0.74   GLUC 223*   AST 11*   ALT 9   ALB 3.6   TBILI 0.35   ALKPHOS 60     ABG: No new results in last 24 hours.    Imaging Results Review: I reviewed radiology reports from this admission including: chest xray and CT chest.  Other Study Results Review: EKG was reviewed.   PFT Results Reviewed: None

## 2025-02-18 NOTE — ASSESSMENT & PLAN NOTE
Lab Results   Component Value Date    HGB 9.2 (L) 02/18/2025    MCV 84 02/18/2025    IRON 20 (L) 02/09/2025    FERRITIN 107 02/09/2025    TIBC 233.8 (L) 02/09/2025    UIBC 214 02/09/2025   POA Hgb 10.4 (baseline is 9-10)  No active bleeding  Etiology could be anemia of chronic disease versus chemotherapy-induced  Continue to monitor

## 2025-02-18 NOTE — ASSESSMENT & PLAN NOTE
-  No clear sign of infection  - CT chest with stable airspace consolidations and atelectasis in the right lung and airspace opacities at the left lung base suggesting tumor and/or postobstructive pneumonia.  -  procal negative and no change on imaging  - Blood cultures thus far negative  - Consider monitoring off of abx  - Currently on ceftriaxone and Flagyl (Day 4)  - On IV Solu-Medrol 40 mg TID (Day 4)

## 2025-02-18 NOTE — CASE MANAGEMENT
Case Management Progress Note    Patient name Papo Gibbs  Location S /S -01 MRN [id number]  : 1970 Date 2025       LOS (days): 3  Geometric Mean LOS (GMLOS) (days):   Days to GMLOS:        OBJECTIVE:        Current admission status: Inpatient  Preferred Pharmacy:   [facility]/pharmacy #[id number] - [place] - [address]  [address] [place] [address]  Phone: [phone] Fax: [phone]    Primary Care Provider: Amelie Bacon MD    Primary Insurance: [employer]  Secondary Insurance:     PROGRESS NOTE:  Patient placed on comfort care. Spoke with palliative AP, who states patient is not currently appropriate for hospice referral-- CM available if needed.

## 2025-02-18 NOTE — ASSESSMENT & PLAN NOTE
Lab Results   Component Value Date    HGBA1C 8.1 (H) 12/09/2024       Recent Labs     02/17/25  1136 02/17/25  1618 02/17/25  2114 02/18/25  0733   POCGLU 200* 169* 183* 210*       Blood Sugar Average: Last 72 hrs:  (P) [id number]

## 2025-02-18 NOTE — DISCHARGE INSTR - AVS FIRST PAGE
Dear Papo Gibbs,     It was our pleasure to care for you here at [facility].  It is our hope that we were always able to exceed the expected standards for your care during your stay.  You were hospitalized due to ***.  You were cared for on the *** floor by Amelia Gonzalez MD under the service of Jason Turcios MD with the [facility] who covers for your primary care physician (PCP), Amelie Bacon MD, while you were hospitalized.  If you have any questions or concerns related to this hospitalization, you may contact us at .  For follow up as well as any medication refills, we recommend that you follow up with your primary care physician.  A registered nurse will reach out to you by phone within a few days after your discharge to answer any additional questions that you may have after going home.  However, at this time we provide for you here, the most important instructions / recommendations at discharge:     Notable Medication Adjustments -   ***  Testing Required after Discharge -   ***  Important follow up information -   ***  Please make an appointment with your primary care physician within 1 week of discharge as soon as possible  Other Instructions -   ***  Call provider for: persistent nausea or vomiting  Call provider for: severe uncontrolled pain  Call provider for: redness, tenderness, or signs of infection (pain, swelling, redness, odor or green/yellow discharge around incision site)  Call provider for: active or persistent bleeding  Call provider for: difficulty breathing, headache or visual disturbances  Call provider for: hives  Call provider for: persistent dizziness or light-headedness  Call provider for: extreme fatigue  Please review this entire after visit summary as additional general instructions including medication list, appointments, activity, diet, any pertinent wound care, and other additional recommendations from your  care team that may be provided for you.      Sincerely,     Amelia Gonzalez MD

## 2025-02-18 NOTE — PROGRESS NOTES
Progress Note - Hospitalist   Name: Papo Gibbs 54 y.o. male I MRN: [id number]  Unit/Bed#: S -01 I Date of Admission: 2/15/2025   Date of Service: 2/18/2025 I Hospital Day: 3    Assessment & Plan  Postobstructive pneumonia  Stage IV lung cancer, Pleurx catheter.  Presenting with SOB.  With acute on chronic hypoxic respiratory failure, up to 6 L. Met sepsis criteria with WBC 10.78, .  Afebrile. Recent hospitalization at [facility] from 2/2 to 2/13 for postobstructive pneumonia with 7 days of IV cefepime and IV vancomycin. Flu/RSV/COVID - negative  CXR and CTA chest.-concerning for lymphangitic tumor spread and edema.  Bilateral pleural effusions and loculated right pleural fluid.  Negative for PE  MRSA and blood culture negative    Presentation is possibly component of worsening lung adenocarcinoma plus postobstructive pneumonia.  Overall poor prognosis, family has decided comfort care    Plan:  Dilaudid 0.5 mg IV every 10 minutes as needed  Oxycodone 2.5 mg every 4 hours as needed for moderate pain,   Oxycodone 5 mg every 4 hours as needed for severe pain  Acute on chronic hypoxic respiratory failure (HCC)  Secondary to postobstructive pneumonia in the setting of lung adenocarcinoma. 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 Sandoval ([facility] heme/onc)  Diagnosed in April 2024  May to August 2024 - s/p carboplatin, Alimta, Keytruda  July 2024 - Received brain radiation  Oct 2024 - Received palliative Pemetrex with Keytruda from Nov to Dec 2024  Currently on chemotherapy - Ramucirumab + Docetaxel q21 days. Last on 1/21/2025  Patient does not want any further chemotherapy.  They are interested in immunotherapy  Heme/onc consulted at admission  Palliative care has been consulted, family preferring comfort care  Pleural effusion  Has right-sided Pleurx catheter in place for recurrent pleural effusion  CTA chest w/ contrast - stable small bilateral pleural  effusions and loculated right pleural fluid  Echo (12/9/24) - EF 60 percent.  Diastolic function is normal.  Mild AR.  Aortic valve sclerosis.  2/17, 250 mL drained from Pleurx catheter  Pulmonology following  Essential hypertension  Home meds: Losartan 25 mg, Lopressor 25 mg BID  History of pulmonary embolism  Diagnosed on 10/4/24  In the setting of active malignancy  Continue home Eliquis 5 mg BID  Diabetes mellitus (HCC)  Lab Results   Component Value Date    HGBA1C 8.1 (H) 12/09/2024   Home meds: Metformin 1000 mg BID  Hypothyroidism  Continue home levothyroxine 25 mcg  Cancer associated pain  Follows with palliative care in the outpatient setting  Palliative care consulted  Comfort care measures as above  Restrictive lung disease  Continue home Stiolto and albuterol prn  Hyperlipidemia  Continue home atorvastatin 10 mg  Sepsis (HCC)  See plan under postobstructive pneumonia  Anemia  Lab Results   Component Value Date    HGB 9.2 (L) 02/18/2025    MCV 84 02/18/2025    IRON 20 (L) 02/09/2025    FERRITIN 107 02/09/2025    TIBC 233.8 (L) 02/09/2025    UIBC 214 02/09/2025   POA Hgb 10.4 (baseline is 9-10)  No active bleeding  Etiology could be anemia of chronic disease versus chemotherapy-induced  Continue to monitor  GERD (gastroesophageal reflux disease)  Continue home famotidine 40 mg prn  Constipation  Senna, MiraLAX, Colace  Palliative care by specialist      VTE Pharmacologic Prophylaxis: VTE Score: 8 Moderate Risk (Score 3-4) - Pharmacological DVT Prophylaxis Contraindicated. Sequential Compression Devices Ordered.    Mobility:   Basic Mobility Inpatient Raw Score: 14  JH-HLM Goal: 4: Move to chair/commode  JH-HLM Achieved: 4: Move to chair/commode  JH-HLM Goal achieved. Continue to encourage appropriate mobility.    Patient Centered Rounds: I performed bedside rounds with nursing staff today.   Discussions with Specialists or Other Care Team Provider: pulm palliative    Education and Discussions with Family /  Patient: Updated  (sister) at bedside.    Current Length of Stay: 3 day(s)  Current Patient Status: Inpatient   Certification Statement: The patient will continue to require additional inpatient hospital stay due to acute hypoxic resp failure   Discharge Plan: Anticipate discharge later today or tomorrow to hospice    Code Status: Level 3 - DNAR and DNI    Subjective   Last night patient desatted to the 80s on mid flow, placed on BiPAP for approximately 4 hours, was alert and responsive at this time however increasingly agitated and pulling on his mask.  Patient received IV Benadryl and 0.5 of IV Ativan for agitation.  At bedside patient appeared to be more calm receiving his nebulizer treatment with accompanied by family members at bedside.  They expressed their interest in comfort care today    Objective :  Temp:  [97.7 °F (36.5 °C)-98 °F (36.7 °C)] 98 °F (36.7 °C)  HR:  [117-138] 138  BP: (127-168)/(78-99) 158/99  Resp:  [16-30] 16  SpO2:  [86 %-94 %] 90 %  O2 Device: Mid flow nasal cannula  Nasal Cannula O2 Flow Rate (L/min):  [12 L/min-15 L/min] 15 L/min  FiO2 (%):  [80] 80    There is no height or weight on file to calculate BMI.     Input and Output Summary (last 24 hours):     Intake/Output Summary (Last 24 hours) at 2/18/2025 0802  Last data filed at 2/17/2025 2000  Gross per 24 hour   Intake 120 ml   Output 650 ml   Net -530 ml       Physical Exam  Constitutional:       General: He is not in acute distress.  HENT:      Mouth/Throat:      Mouth: Mucous membranes are moist.      Pharynx: Oropharynx is clear.   Eyes:      Extraocular Movements: Extraocular movements intact.      Pupils: Pupils are equal, round, and reactive to light.   Cardiovascular:      Rate and Rhythm: Tachycardia present.      Pulses: Normal pulses.   Pulmonary:      Comments: Tachypneic, bilateral breath sounds decreased  Abdominal:      General: There is distension.      Palpations: Abdomen is soft.   Skin:     General:  Skin is warm.      Capillary Refill: Capillary refill takes less than 2 seconds.   Neurological:      Mental Status: He is oriented to person, place, and time.     No wheezing, receiving nebulized treatment      Lines/Drains:  Lines/Drains/Airways       Active Status       Name Placement date Placement time Site Days    Port A Cath 05/20/24 Right Chest 05/20/24  1427  Chest  273    Pleural Effusion Long-Term Catheter 12/12/24  1009  --  67                           Telemetry:  Telemetry Orders (From admission, onward)               24 Hour Telemetry Monitoring  Continuous x 24 Hours (Telem)        Expiring   Question:  Reason for 24 Hour Telemetry  Answer:  Arrhythmias requiring acute medical intervention / PPM or ICD malfunction                     Telemetry Reviewed: Sinus Tachycardia  Indication for Continued Telemetry Use: Metabolic/electrolyte disturbance with high probability of dysrhythmia               Lab Results: I have reviewed the following results:   Results from last 7 days   Lab Units 02/18/25  0511 02/17/25  0752 02/16/25  0807 02/16/25  0639 02/15/25  1316   WBC Thousand/uL 9.86 8.02 7.18  --  10.78*   HEMOGLOBIN g/dL 9.2* 8.8* 9.2*  --  10.4*   I STAT HEMOGLOBIN   --   --   --    < >  --    HEMATOCRIT % 31.2* 29.2* 30.8*  --  34.7*   HEMATOCRIT, ISTAT   --   --   --    < >  --    PLATELETS Thousands/uL 239 228 194  --  208   BANDS PCT %  --   --  1  --   --    SEGS PCT %  --   --   --   --  84*   LYMPHO PCT %  --  3* 3*   < > 9*   MONO PCT %  --  1* 1*   < > 6   EOS PCT %  --  0 0   < > 0    < > = values in this interval not displayed.     Results from last 7 days   Lab Units 02/18/25  0511   SODIUM mmol/L 138   POTASSIUM mmol/L 4.8   CHLORIDE mmol/L 97   CO2 mmol/L 34*   BUN mg/dL 40*   CREATININE mg/dL 0.74   ANION GAP mmol/L 7   CALCIUM mg/dL 10.1   ALBUMIN g/dL 3.6   TOTAL BILIRUBIN mg/dL 0.35   ALK PHOS U/L 60   ALT U/L 9   AST U/L 11*   GLUCOSE RANDOM mg/dL 223*         Results from last 7  days   Lab Units 02/18/25  0733 02/17/25  2114 02/17/25  1618 02/17/25  1136 02/17/25  0713 02/16/25  2223 02/16/25  2118 02/16/25  1630 02/16/25  1109 02/16/25  0628 02/15/25  2038 02/15/25  1817   POC GLUCOSE mg/dl 210* 183* 169* 200* 178* 195* 179* 166* 178* 208* 140 134         Results from last 7 days   Lab Units 02/16/25  0635 02/15/25  1542 02/15/25  1350   LACTIC ACID mmol/L  --   --  1.1   PROCALCITONIN ng/ml 0.20 0.15  --        Recent Cultures (last 7 days):   Results from last 7 days   Lab Units 02/15/25  1350   BLOOD CULTURE  No Growth at 48 hrs.  No Growth at 48 hrs.             Last 24 Hours Medication List:     Current Facility-Administered Medications:     acetaminophen (Ofirmev) injection 1,000 mg, Q8H, Last Rate: 1,000 mg (02/18/25 0633)    albuterol (PROVENTIL HFA,VENTOLIN HFA) inhaler 2 puff, Q4H PRN    amitriptyline (ELAVIL) tablet 200 mg, HS    apixaban (ELIQUIS) tablet 5 mg, BID    atorvastatin (LIPITOR) tablet 10 mg, Daily    benzonatate (TESSALON PERLES) capsule 200 mg, TID PRN    bisacodyl (DULCOLAX) rectal suppository 10 mg, Daily PRN    ceftriaxone (ROCEPHIN) 1 g/50 mL in dextrose IVPB, Q24H, Last Rate: 1,000 mg (02/17/25 1812)    diazepam (VALIUM) tablet 5 mg, BID    docusate sodium (COLACE) capsule 100 mg, BID    famotidine (PEPCID) tablet 40 mg, Daily PRN    folic acid (FOLVITE) tablet 1,000 mcg, Daily    insulin lispro (HumALOG/ADMELOG) 100 units/mL subcutaneous injection 4-20 Units, TID AC **AND** Fingerstick Glucose (POCT), TID AC    ipratropium (ATROVENT) 0.02 % inhalation solution 0.5 mg, TID    levalbuterol (XOPENEX) inhalation solution 1.25 mg, TID    levothyroxine tablet 25 mcg, Early Morning    [Held by provider] losartan (COZAAR) tablet 25 mg, Daily    magnesium Oxide (MAG-OX) tablet 400 mg, Daily    melatonin tablet 3 mg, HS    methylPREDNISolone sodium succinate (Solu-MEDROL) injection 40 mg, Q8H LATONIA    metoprolol tartrate (LOPRESSOR) tablet 25 mg, Q12H LATONIA     metroNIDAZOLE (FLAGYL) IVPB (premix) 500 mg 100 mL, Q8H, Last Rate: 500 mg (02/17/25 3190)    morphine injection 2 mg, Q4H PRN    umeclidinium 62.5 mcg/actuation inhaler AEPB 1 puff, Daily **AND** olodaterol HCl (STRIVERDI RESPIMAT) inhaler 2 puff, Daily    ondansetron (ZOFRAN) injection 4 mg, Q4H PRN    oxyCODONE (ROXICODONE) oral solution 2.5 mg, Q4H PRN **OR** oxyCODONE (ROXICODONE) oral solution 5 mg, Q4H PRN    polyethylene glycol (MIRALAX) packet 17 g, Daily    QUEtiapine (SEROquel) tablet 25 mg, HS    senna-docusate sodium (SENOKOT S) 8.6-50 mg per tablet 1 tablet, HS    sodium chloride (OCEAN) 0.65 % nasal spray 1 spray, Q1H PRN    Administrative Statements   Today, Patient Was Seen By: Amelia Gonzalez MD      **Please Note: This note may have been constructed using a voice recognition system.**

## 2025-02-18 NOTE — ASSESSMENT & PLAN NOTE
Lab Results   Component Value Date    HGBA1C 8.1 (H) 12/09/2024   Home meds: Metformin 1000 mg BID

## 2025-02-18 NOTE — PLAN OF CARE
Problem: PAIN - ADULT  Goal: Verbalizes/displays adequate comfort level or baseline comfort level  Description: Interventions:  - Encourage patient to monitor pain and request assistance  - Assess pain using appropriate pain scale  - Administer analgesics based on type and severity of pain and evaluate response  - Implement non-pharmacological measures as appropriate and evaluate response  - Consider cultural and social influences on pain and pain management  - Notify physician/advanced practitioner if interventions unsuccessful or patient reports new pain  Outcome: Progressing     Problem: INFECTION - ADULT  Goal: Absence or prevention of progression during hospitalization  Description: INTERVENTIONS:  - Assess and monitor for signs and symptoms of infection  - Monitor lab/diagnostic results  - Monitor all insertion sites, i.e. indwelling lines, tubes, and drains  - Monitor endotracheal if appropriate and nasal secretions for changes in amount and color  - Taberg appropriate cooling/warming therapies per order  - Administer medications as ordered  - Instruct and encourage patient and family to use good hand hygiene technique  - Identify and instruct in appropriate isolation precautions for identified infection/condition  Outcome: Progressing

## 2025-02-18 NOTE — ASSESSMENT & PLAN NOTE
With metastasis to brain  Follows with Dr. Sandoval ([facility] heme/onc)  Diagnosed in April 2024  May to August 2024 - s/p carboplatin, Alimta, Keytruda  July 2024 - Received brain radiation  Oct 2024 - Received palliative Pemetrex with Keytruda from Nov to Dec 2024  Currently on chemotherapy - Ramucirumab + Docetaxel q21 days. Last on 1/21/2025  Patient does not want any further chemotherapy.  They are interested in immunotherapy  Heme/onc consulted at admission  Palliative care has been consulted, family preferring comfort care

## 2025-02-18 NOTE — PROGRESS NOTES
Progress Note - Palliative Care   Name: Papo Gibbs 54 y.o. male I MRN: [id number]  Unit/Bed#: S -01 I Date of Admission: 2/15/2025   Date of Service: 2/18/2025 I Hospital Day: 3    Assessment & Plan  Postobstructive pneumonia    Essential hypertension    Adenocarcinoma of lung (HCC)  Diagnosed 5/2024  Stage IV mets to brain and pleura s/p pleurx  S/p carboplatin/Alimta/Keytruda  Most recently treated with just Alimta    Imaging has shown worsening disease with right lung collapse, lymphangitic carcinomatosis of both lungs, and pleural effusions    Defers any more cancer treatments  Restrictive lung disease    Cancer associated pain  Comfort care    Dilaudid 1mg Q4hrs scheduled  Ativan 0.5mg Q4hrs scheduled  Dilaudid 0.8mg Q10min  PRN  Ativan 0.5mg Q10min PRN  History of pulmonary embolism    GERD (gastroesophageal reflux disease)    Pleural effusion    Sinus tachycardia    Diabetes mellitus (HCC)  Lab Results   Component Value Date    HGBA1C 8.1 (H) 12/09/2024       Recent Labs     02/17/25  1136 02/17/25  1618 02/17/25  2114 02/18/25  0733   POCGLU 200* 169* 183* 210*       Blood Sugar Average: Last 72 hrs:  (P) 178.1622680036221427    Sepsis (HCC)    Hypothyroidism    Acute on chronic hypoxic respiratory failure (HCC)    On comfort care    Oxygen for comfort  Dilaudid 1mg Q4hrs scheduled  Ativan 0.5mg Q4hrs scheduled  Dilaudid 0.8mg Q10min  PRN  Ativan 0.5mg Q10min PRN          Hyperlipidemia    Anemia    Constipation    Palliative care by specialist  Palliative diagnosis: Stage IV adenocarcinoma of the lung  PPS: 50%      Education/counseling provided on role/purpose of palliative care; benefits/risks of treatment options by our team reviewed; instructions for management and anticipatory guidance provided; supportive listening and presence provided; provided space for life review and whole person assessment    Communicated and coordinated with RN, SLIM and CM.    Transitioned to comfort  care.          PDMP Review: I have reviewed the patient's controlled substance dispensing history in the Prescription Drug Monitoring Program in compliance with the [facility] regulations before prescribing any controlled substances.    Subjective   24 Hour Chart History:  Chart Reviewed prior to visit    Patient sitting in chair. Appears uncomfortable.Labored breathing. Difficulty speaking. Family at bedside. Spouse and patient agree for comfort care measures Comfort care reviewed.   Orders placed.     Checked on patient - dicussed with RN for frequent comfort checks. Meds ordered for scheduled and PRN. Reviewed with RN, SLIM and family and patient.           Objective :  Temp:  [97.8 °F (36.6 °C)-98 °F (36.7 °C)] 98 °F (36.7 °C)  HR:  [125-138] 138  BP: (127-168)/(78-99) 158/99  Resp:  [16-30] 16  SpO2:  [86 %-92 %] 90 %  O2 Device: Mid flow nasal cannula  Nasal Cannula O2 Flow Rate (L/min):  [15 L/min] 15 L/min  FiO2 (%):  [80] 80    Physical Exam  Vitals and nursing note reviewed.   Constitutional:       General: He is awake.      Interventions: Nasal cannula in place.   HENT:      Head: Normocephalic and atraumatic.   Eyes:      General:         Right eye: No discharge.         Left eye: No discharge.   Cardiovascular:      Rate and Rhythm: Tachycardia present.   Pulmonary:      Effort: Accessory muscle usage and respiratory distress present.   Musculoskeletal:      Cervical back: Normal range of motion.   Skin:     General: Skin is warm and dry.   Neurological:      Mental Status: Mental status is at baseline.   Psychiatric:         Mood and Affect: Mood is anxious.         Behavior: Behavior normal.            Lab Results: I have reviewed the following results:  Lab Results   Component Value Date/Time    SODIUM 138 02/18/2025 05:11 AM    K 4.8 02/18/2025 05:11 AM    BUN 40 (H) 02/18/2025 05:11 AM    CREATININE 0.74 02/18/2025 05:11 AM    GLUC 223 (H) 02/18/2025 05:11 AM    CALCIUM 10.1 02/18/2025 05:11 AM    AST 11  (L) 02/18/2025 05:11 AM    ALT 9 02/18/2025 05:11 AM    ALB 3.6 02/18/2025 05:11 AM    TP 6.9 02/18/2025 05:11 AM    EGFR 103 02/18/2025 05:11 AM     Lab Results   Component Value Date/Time    HGB 9.2 (L) 02/18/2025 05:11 AM    WBC 9.86 02/18/2025 05:11 AM     02/18/2025 05:11 AM    INR 1.30 (H) 02/02/2025 06:37 AM    PTT 31 02/02/2025 06:37 AM     Lab Results   Component Value Date/Time    [id number] 0.366 (L) 11/01/2024 02:53 PM       Code Status: Level 4 - Comfort Care  Advance Directive and Living Will: Yes    Power of :    POLST:      Administrative Statements   I have spent a total time of 65 minutes in caring for this patient on the day of the visit/encounter including Prognosis, Risks and benefits of tx options, Instructions for management, Patient and family education, Importance of tx compliance, Risk factor reductions, Impressions, Counseling / Coordination of care, Documenting in the medical record, Reviewing/placing orders in the medical record (including tests, medications, and/or procedures), Obtaining or reviewing history  , and Communicating with other healthcare professionals .

## 2025-02-18 NOTE — ASSESSMENT & PLAN NOTE
-  w/ lymphangitic spread  -  continue with palliative care discussions  - Pain management per palliative care  - Medical oncology following  - Patient and family had expressed focus on palliative intervention and would not be interested in more chemotherapy or immunotherapy  -  likely cause of abdominal pain and dyspnea

## 2025-02-18 NOTE — ASSESSMENT & PLAN NOTE
-  CT chest with stable small bilateral pleural effusions and loculated right pleural fluid  - Right-sided Pleurx catheter for recurrent pleural effusion  - Pleural effusion drained on 2/17 with roughly 250-300 mL output of dark, non-bloody pleural fluid  - Continue daily as needed palliative drainage of PleurX catheter  - No indication for thoracentesis

## 2025-02-18 NOTE — NURSING NOTE
This RN offered to medicate patient with prn ativan for increased work of breathing.  Family declined at this time.  Encouraged family to seek assistance if breathing becomes worse or patient becomes more restless.  Frequent rounding continued.

## 2025-02-19 NOTE — PROGRESS NOTES
Chart reviewed for scheduled outreach to reassess for any barriers to care and offer any supportive services that Jessica may need. Noted he is currently IP. I will continue to follow and will outreach once he is D/C.

## 2025-02-19 NOTE — ASSESSMENT & PLAN NOTE
Lab Results   Component Value Date    HGBA1C 8.1 (H) 12/09/2024       Recent Labs     02/17/25  1136 02/17/25  1618 02/17/25  2114 02/18/25  0733   POCGLU 200* 169* 183* 210*       Blood Sugar Average: Last 72 hrs:  (P) 186.6

## 2025-02-19 NOTE — ASSESSMENT & PLAN NOTE
On comfort care    Oxygen for comfort  Dilaudid 1mg Q4hrs scheduled  Ativan 0.5mg Q4hrs scheduled  Dilaudid 0.8mg Q10min  PRN  Ativan 0.5mg Q10min PRN           Hima PGY5, Derek MS4

## 2025-02-19 NOTE — ASSESSMENT & PLAN NOTE
Stage IV lung cancer, Pleurx catheter.  Presenting with SOB.  With acute on chronic hypoxic respiratory failure, up to 6 L. Met sepsis criteria with WBC 10.78, .  Afebrile. Recent hospitalization at [facility] from 2/2 to 2/13 for postobstructive pneumonia with 7 days of IV cefepime and IV vancomycin. Flu/RSV/COVID - negative  CXR and CTA chest.-concerning for lymphangitic tumor spread and edema.  Bilateral pleural effusions and loculated right pleural fluid.  Negative for PE  MRSA and blood culture negative    Presentation is possibly component of worsening lung adenocarcinoma plus postobstructive pneumonia.  Overall poor prognosis, family has decided comfort care    Plan:  Ativan 0.5 every 4 hours scheduled  Dilaudid 1 mg every 4 hours scheduled

## 2025-02-19 NOTE — PROGRESS NOTES
02/18/25 1400   Clinical Encounter Type   Visited With Family   Crisis Visit   (Pt on Comfort Care)   Referral From Nurse   [employer] Encounters   [employer] Needs Prayer  (Pt's parents and brother asked for visit/prayer out of room (in chapel))   Patient Spiritual Encounters   Spiritual Encounter Notes Long visit in chapel with Pt's mother , her spouse, and Pt's brother.  Per rest of family, neither they, nor Pt request/require spiritual support.  Pt's mother and brother are struggling, though state that family is strong and united and that all agre that Pt should not suffer.   Family Spiritual Encounters   Family Coping Accepting;Open/discussion;Sadness   Family Normalization 5   Family Participation in Care 5   Family Support During Treatment 5   Caregiver-Patient Relationship 5

## 2025-02-19 NOTE — PROGRESS NOTES
Progress Note - Palliative Care   Name: Papo Gibbs 54 y.o. 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 Holding cannon. Supportive listening and EOL education provided.     Communicated and coordinated with RN, SLIM and CM.    Transitioned to comfort care.    Decisional apparatus: Patient is not competent on my exam today. If competence is lost, patient's HCR is identified as Tomasa Hampton [primary] or Giancarlo Gibbs [alternative] by ACP Documentation.  Advance Directive / Living Will / POLST: On File     PDMP Review: I have reviewed the patient's controlled substance dispensing history in the Prescription Drug Monitoring Program in compliance with the [facility] regulations before prescribing any controlled substances.    Subjective   EOL Symptoms management per comfort cares.    Objective :  Nasal Cannula O2 Flow Rate (L/min):  [6 L/min] 6 L/min    Physical Exam  Constitutional:       Comments: Chronically ill appearing in NAD  FLACC 0 points  Non-toxic appearing   HENT:      Head: Normocephalic and atraumatic.      Right Ear: External ear normal.      Left Ear: External ear normal.   Eyes:      Comments: Eyes remained closed throughout examination   Pulmonary:      Comments: Mandibular breathing  pattern  Paradoxical pattern  Open mouth respirations  NC in place  Musculoskeletal:      Comments: Extremities cool to palpation   Skin:     Coloration: Skin is pale.   Neurological:      Mental Status: He is unresponsive.   Psychiatric:      Comments: Unable to assess            Lab Results: I have reviewed the following results:  Lab Results   Component Value Date/Time    SODIUM 138 02/18/2025 05:11 AM    K 4.8 02/18/2025 05:11 AM    BUN 40 (H) 02/18/2025 05:11 AM    CREATININE 0.74 02/18/2025 05:11 AM    GLUC 223 (H) 02/18/2025 05:11 AM    CALCIUM 10.1 02/18/2025 05:11 AM    AST 11 (L) 02/18/2025 05:11 AM    ALT 9 02/18/2025 05:11 AM    ALB 3.6 02/18/2025 05:11 AM    TP 6.9 02/18/2025 05:11 AM    EGFR 103 02/18/2025 05:11 AM     Lab Results   Component Value Date/Time    HGB 9.2 (L) 02/18/2025 05:11 AM    WBC 9.86 02/18/2025 05:11 AM     02/18/2025 05:11 AM    INR 1.30 (H) 02/02/2025 06:37 AM    PTT 31 02/02/2025 06:37 AM     Lab Results   Component Value Date/Time    WTL3FZGAAHGT 0.366 (L) 11/01/2024 02:53 PM       Code Status: Level 4 - Comfort Care  Advance Directive and Living Will: Yes    Power of :    POLST:      Administrative Statements   I have spent a total time of 90 minutes in caring for this patient on the day of the visit/encounter including Risks and benefits of tx options, Patient and family education, Importance of tx compliance, Counseling / Coordination of care, Documenting in the medical record, and Communicating with other healthcare professionals. Topics discussed with the patient / family include EOL symptom assessment and management, medication review, psychosocial support, supportive listening, and anticipatory guidance.

## 2025-02-19 NOTE — PROGRESS NOTES
Progress Note - Hospitalist   Name: Papo Gibbs 54 y.o. male I MRN: [id number]  Unit/Bed#: S -01 I Date of Admission: 2/15/2025   Date of Service: 2/19/2025 I Hospital Day: 4  { ?Quick Links I Problem List I PORCH I Billing Tip:33726}  Assessment & Plan  Postobstructive pneumonia  Stage IV lung cancer, Pleurx catheter.  Presenting with SOB.  With acute on chronic hypoxic respiratory failure, up to 6 L. Met sepsis criteria with WBC 10.78, .  Afebrile. Recent hospitalization at [facility] from 2/2 to 2/13 for postobstructive pneumonia with 7 days of IV cefepime and IV vancomycin. Flu/RSV/COVID - negative  CXR and CTA chest.-concerning for lymphangitic tumor spread and edema.  Bilateral pleural effusions and loculated right pleural fluid.  Negative for PE  MRSA and blood culture negative    Presentation is possibly component of worsening lung adenocarcinoma plus postobstructive pneumonia.  Overall poor prognosis, family has decided comfort care    Plan:  Ativan 0.5 every 4 hours scheduled  Dilaudid 1 mg every 4 hours scheduled  Acute on chronic hypoxic respiratory failure (HCC)  Secondary to postobstructive pneumonia in the setting of lung adenocarcinoma. 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 Sandoval ([facility] heme/onc)  Diagnosed in April 2024  May to August 2024 - s/p carboplatin, Alimta, Keytruda  July 2024 - Received brain radiation  Oct 2024 - Received palliative Pemetrex with Keytruda from Nov to Dec 2024  Currently on chemotherapy - Ramucirumab + Docetaxel q21 days. Last on 1/21/2025  Patient does not want any further chemotherapy.  They are interested in immunotherapy  Heme/onc consulted at admission  Palliative care has been consulted, family preferring comfort care  Pleural effusion  Has right-sided Pleurx catheter in place for recurrent pleural effusion  CTA chest w/ contrast - stable small bilateral pleural effusions and loculated right pleural  fluid  Echo (12/9/24) - EF 60 percent.  Diastolic function is normal.  Mild AR.  Aortic valve sclerosis.  2/17, 250 mL drained from Pleurx catheter  Pulmonology following  Essential hypertension  Home meds: Losartan 25 mg, Lopressor 25 mg BID  History of pulmonary embolism  Diagnosed on 10/4/24  In the setting of active malignancy  Continue home Eliquis 5 mg BID  Diabetes mellitus (HCC)  Lab Results   Component Value Date    HGBA1C 8.1 (H) 12/09/2024   Home meds: Metformin 1000 mg BID  Hypothyroidism  Continue home levothyroxine 25 mcg  Cancer associated pain  Follows with palliative care in the outpatient setting  Palliative care consulted  Comfort care measures as above  Restrictive lung disease  Continue home Stiolto and albuterol prn  Hyperlipidemia  Continue home atorvastatin 10 mg  Sepsis (HCC)  See plan under postobstructive pneumonia  Anemia  Lab Results   Component Value Date    HGB 9.2 (L) 02/18/2025    MCV 84 02/18/2025    IRON 20 (L) 02/09/2025    FERRITIN 107 02/09/2025    TIBC 233.8 (L) 02/09/2025    UIBC 214 02/09/2025   POA Hgb 10.4 (baseline is 9-10)  No active bleeding  Etiology could be anemia of chronic disease versus chemotherapy-induced  Continue to monitor  GERD (gastroesophageal reflux disease)  Continue home famotidine 40 mg prn  Sinus tachycardia  Continue home Lopressor 25 mg BID  Monitor on telemetry to look for other intermittent arrhythmias   Constipation  Senna, MiraLAX, Colace  Palliative care by specialist      VTE Pharmacologic Prophylaxis: VTE Score: 8 Moderate Risk (Score 3-4) - Pharmacological DVT Prophylaxis Contraindicated. Sequential Compression Devices Ordered.    Mobility:   Basic Mobility Inpatient Raw Score: 14  JH-HLM Goal: 4: Move to chair/commode  JH-HLM Achieved: 4: Move to chair/commode  JH-HLM Goal achieved. Continue to encourage appropriate mobility.    Patient Centered Rounds: I performed bedside rounds with nursing staff today.   Discussions with Specialists or  Other Care Team Provider: pulm palliative    Education and Discussions with Family / Patient: Updated  (sister) at bedside.    Current Length of Stay: 4 day(s)  Current Patient Status: Inpatient   Certification Statement: The patient will continue to require additional inpatient hospital stay due to acute hypoxic resp failure   Discharge Plan: Anticipate discharge later today or tomorrow to hospice    Code Status: Level 4 - Comfort Care    Subjective   Patient having increased air hunger yesterday evening, Dilaudid and Ativan was made scheduled.  Will try to wean mid flow to decrease the subjective feeling of suffocation.     Objective :{?Quick Links I ICU Summary I Vitals I I/Os I LDAs I Mobility (PT/OT) I Code Status / ACP   ?Quick Links I Active Meds I Pain Meds I Antibiotics I Anticoagulants:44273}  HR:  [138] 138  BP: (158)/(99) 158/99  Resp:  [16] 16  SpO2:  [88 %-90 %] 90 %  O2 Device: Mid flow nasal cannula  Nasal Cannula O2 Flow Rate (L/min):  [15 L/min] 15 L/min    There is no height or weight on file to calculate BMI.     Input and Output Summary (last 24 hours):   No intake or output data in the 24 hours ending 02/19/25 0637      Physical Exam  Constitutional:       General: He is not in acute distress.  HENT:      Mouth/Throat:      Mouth: Mucous membranes are moist.      Pharynx: Oropharynx is clear.   Eyes:      Extraocular Movements: Extraocular movements intact.      Pupils: Pupils are equal, round, and reactive to light.   Cardiovascular:      Rate and Rhythm: Tachycardia present.      Pulses: Normal pulses.   Pulmonary:      Comments: Tachypneic, bilateral breath sounds decreased  Abdominal:      General: There is distension.      Palpations: Abdomen is soft.   Skin:     General: Skin is warm.      Capillary Refill: Capillary refill takes less than 2 seconds.   Neurological:      Mental Status: He is oriented to person, place, and time.     No wheezing, receiving nebulized  treatment      Lines/Drains:  Lines/Drains/Airways       Active Status       Name Placement date Placement time Site Days    Port A Cath 05/20/24 Right Chest 05/20/24  1427  Chest  274    Pleural Effusion Long-Term Catheter 12/12/24  1009  --  68                           Telemetry:  Telemetry Orders (From admission, onward)               24 Hour Telemetry Monitoring  Continuous x 24 Hours (Telem)        Expiring   Question:  Reason for 24 Hour Telemetry  Answer:  Arrhythmias requiring acute medical intervention / PPM or ICD malfunction                     Telemetry Reviewed: Sinus Tachycardia  Indication for Continued Telemetry Use: Metabolic/electrolyte disturbance with high probability of dysrhythmia             {?Quick Links I Lab Review I Micro Results I Radiology I Cardiology:21980}  Lab Results: I have reviewed the following results:   Results from last 7 days   Lab Units 02/18/25  0511 02/17/25  0752 02/16/25  0807 02/16/25  0639 02/15/25  1316   WBC Thousand/uL 9.86 8.02 7.18  --  10.78*   HEMOGLOBIN g/dL 9.2* 8.8* 9.2*  --  10.4*   I STAT HEMOGLOBIN   --   --   --    < >  --    HEMATOCRIT % 31.2* 29.2* 30.8*  --  34.7*   HEMATOCRIT, ISTAT   --   --   --    < >  --    PLATELETS Thousands/uL 239 228 194  --  208   BANDS PCT %  --   --  1  --   --    SEGS PCT %  --   --   --   --  84*   LYMPHO PCT %  --  3* 3*   < > 9*   MONO PCT %  --  1* 1*   < > 6   EOS PCT %  --  0 0   < > 0    < > = values in this interval not displayed.     Results from last 7 days   Lab Units 02/18/25  0511   SODIUM mmol/L 138   POTASSIUM mmol/L 4.8   CHLORIDE mmol/L 97   CO2 mmol/L 34*   BUN mg/dL 40*   CREATININE mg/dL 0.74   ANION GAP mmol/L 7   CALCIUM mg/dL 10.1   ALBUMIN g/dL 3.6   TOTAL BILIRUBIN mg/dL 0.35   ALK PHOS U/L 60   ALT U/L 9   AST U/L 11*   GLUCOSE RANDOM mg/dL 223*         Results from last 7 days   Lab Units 02/18/25  0733 02/17/25  2114 02/17/25  1618 02/17/25  1136 02/17/25  0713 02/16/25  2223 02/16/25  2111  02/16/25  1630 02/16/25  1109 02/16/25  0628 02/15/25  2038 02/15/25  1817   POC GLUCOSE mg/dl 210* 183* 169* 200* 178* 195* 179* 166* 178* 208* 140 134         Results from last 7 days   Lab Units 02/16/25  0635 02/15/25  1542 02/15/25  1350   LACTIC ACID mmol/L  --   --  1.1   PROCALCITONIN ng/ml 0.20 0.15  --        Recent Cultures (last 7 days):   Results from last 7 days   Lab Units 02/15/25  1350   BLOOD CULTURE  No Growth at 72 hrs.  No Growth at 72 hrs.             Last 24 Hours Medication List:     Current Facility-Administered Medications:     albuterol (PROVENTIL HFA,VENTOLIN HFA) inhaler 2 puff, Q4H PRN    amitriptyline (ELAVIL) tablet 200 mg, HS    benzonatate (TESSALON PERLES) capsule 200 mg, TID PRN    bisacodyl (DULCOLAX) rectal suppository 10 mg, Daily PRN    famotidine (PEPCID) tablet 40 mg, Daily PRN    HYDROmorphone (DILAUDID) injection 0.8 mg, Q10 Min PRN    HYDROmorphone (DILAUDID) injection 1 mg, Q4H LATONIA    [Held by provider] insulin glargine (LANTUS) subcutaneous injection 8 Units 0.08 mL, Daily With Breakfast    [Held by provider] insulin lispro (HumALOG/ADMELOG) 100 units/mL subcutaneous injection 4-20 Units, TID AC **AND** [CANCELED] Fingerstick Glucose (POCT), TID AC    [Held by provider] levothyroxine tablet 25 mcg, Early Morning    LORazepam (ATIVAN) injection 0.5 mg, Q10 Min PRN    LORazepam (ATIVAN) injection 0.5 mg, Q4H LATONIA    [Held by provider] losartan (COZAAR) tablet 25 mg, Daily    [Held by provider] metoprolol tartrate (LOPRESSOR) tablet 25 mg, Q12H LATONIA    ondansetron (ZOFRAN) injection 4 mg, Q4H PRN    QUEtiapine (SEROquel) tablet 25 mg, HS    senna-docusate sodium (SENOKOT S) 8.6-50 mg per tablet 1 tablet, HS    sodium chloride (OCEAN) 0.65 % nasal spray 1 spray, Q1H PRN    Administrative Statements   Today, Patient Was Seen By: Amelia Gonzalez MD      **Please Note: This note may have been constructed using a voice recognition system.**

## 2025-02-19 NOTE — ASSESSMENT & PLAN NOTE
Palliative diagnosis: Stage IV adenocarcinoma of the lung  PPS: 10%    Met with S/O and sister at bedside. Holding cannon. Supportive listening and EOL education provided.     Communicated and coordinated with RN, SLIM and CM.    Transitioned to comfort care.

## 2025-02-19 NOTE — DEATH NOTE
INPATIENT DEATH NOTE  Papo Gibbs 54 y.o. male MRN: [id number]  Unit/Bed#: S -01 Encounter: [id number]             PHYSICAL EXAM:  Unresponsive to noxious stimuli, Spontaneous respirations absent, Breath sounds absent, Carotid pulse absent, and Heart sounds absent    Medical Examiner notification criteria:  NONE APPLICABLE   Medical Examiner's office notified?:  No, does not meet ME notification criteria   Medical Examiner accepted case?:  n/a  Name of Medical Examiner: n/a       Autopsy Options:  Post-mortem examination declined by next of kin    Primary Service Attending Physician notified?:  Yes - Attending:  Jason Turcios MD    Physician/Resident responsible for completing Discharge Summary:  MD Maxwell Godwin MD  Palliative and Supportive Care  Ph: [phone]

## 2025-02-19 NOTE — ASSESSMENT & PLAN NOTE
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

## 2025-02-19 NOTE — DISCHARGE SUMMARY
Admission Date: 2/15/2025   Admitting Diagnosis: Hypoxemia [R09.02]  Dyspnea [R06.00]  SOB (shortness of breath) [R06.02]  Postobstructive pneumonia [J18.9]  Discharge Date: 02/19/25    HPI: Papo Gibbs is a 54 y.o. male with a PMH of HTN, HLD, DM2 (HgbA1c 8.1%), metastatic lung adenocarcinoma to brain (on chemo; Ramucirumab, Docetaxel, last on 1/21/2025), chronic hypoxic respiratory failure (on 4 L), recurrent right-sided pleural effusion (with Pleurx catheter in place), PE (on Eliquis), hypothyroidism, and GERD who presents with shortness of breath since last night.  Of note, patient was recently hospitalized from 2/2/25 to 2/13/2025 for postobstructive pneumonia that was treated with 7 days of IV cefepime and IV vancomycin.  Per mother and sister at bedside, patient was desaturating into the 80s on 4 L at home and endorsing chest wall and abdominal pain particularly with inspiration since yesterday.  They deny fevers, chills, cough, congestion, recent sick contact.     Upon presentation, patient's vital signs were remarkable for tachycardia  and 92% on 4 L.  CBC with WBC 10.78, Hgb 10.4 (baseline is 9-10).  CMP with Cl 93, CO2 34, BUN 34.  Troponins negative.  EKG shows sinus tachycardia 135, QTc 429.  Procalcitonin negative.  Lactic acid negative.  Flu/RSV/COVID negative.  CXR shows persistent vascular prominence with diffuse interstitial and airspace opacities, right greater than left; findings thought to represent a combination of lymphangitic tumor spread and edema.  CTA chest with contrast was negative for acute PE.  It did show stable small bilateral pleural effusions and loculated right pleural fluid; stable airspace consolidation and atelectasis in the right lung and airspace opacities at the left lung base suggesting tumor and/or postobstructive pneumonia. In the ED, he was given 2 L bolus, oxycodone 10 mg, and started on IV Zosyn and IV vancomycin.      Admitted to [facility] for further management of  acute on chronic hypoxic respiratory failure secondary to postobstructive pneumonia in the setting of metastatic lung adenocarcinoma with history of recurrent pleural effusions.      Palliative care and pulmonology was consulted, goals of care discussions were initiated with family who confirmed that patient would prefer comfort care.  Patient was transitioned to comfort care on his third day of admission, and passed on day 4 surrounded by family.    Procedures Performed: No orders of the defined types were placed in this encounter.      Significant Findings, Care, Treatment and Services Provided: Palliative care, pulmonology    Complications: None    Disposition:      Final Diagnosis: Postobstructive pneumonia    Medical Problems       Resolved Problems  Date Reviewed: 2025   None         Condition at Time of Death: DNR/DNI,  at 1052      Death Note:

## 2025-02-19 NOTE — ASSESSMENT & PLAN NOTE
With metastasis to brain  Follows with Dr. Sandoval ([facility] heme/onc)  Diagnosed in April 2024  May to August 2024 - s/p carboplatin, Alimta, Keytruda  July 2024 - Received brain radiation  Oct 2024 - Received palliative Pemetrex with Keytruda from Nov to Dec 2024  Currently on chemotherapy - Ramucirumab + Docetaxel q21 days. Last on 1/21/2025  Patient does not want any further chemotherapy.  They are interested in immunotherapy  Heme/onc consulted at admission  Palliative care has been consulted, family preferring comfort care

## 2025-02-20 LAB
BACTERIA BLD CULT: NORMAL
BACTERIA BLD CULT: NORMAL
